# Patient Record
Sex: FEMALE | Race: WHITE | NOT HISPANIC OR LATINO | Employment: OTHER | URBAN - METROPOLITAN AREA
[De-identification: names, ages, dates, MRNs, and addresses within clinical notes are randomized per-mention and may not be internally consistent; named-entity substitution may affect disease eponyms.]

---

## 2017-01-23 DIAGNOSIS — I10 BENIGN ESSENTIAL HYPERTENSION: Primary | ICD-10-CM

## 2017-01-23 RX ORDER — AMLODIPINE BESYLATE 10 MG/1
TABLET ORAL
Qty: 90 TABLET | Refills: 0 | Status: SHIPPED | OUTPATIENT
Start: 2017-01-23 | End: 2017-07-02

## 2017-01-23 NOTE — TELEPHONE ENCOUNTER
Medication is being filled for 1 time refill only due to:  will be due for f/u OV with PCP x 3 mths.     Antonia Dobbs RN, BSN

## 2017-01-23 NOTE — TELEPHONE ENCOUNTER
amLODIPine (NORVASC) 10 MG tablet 90 tablet 0 11/22/2016           Last Written Prescription Date: 11/22/2016  Last Fill Quantity: 90, # refills: 0  Last Office Visit with [facility], [facility] or [facility] prescribing provider: 09/21/2016       POTASSIUM   Date Value Ref Range Status   09/21/2016 4.1 3.4 - 5.3 mmol/L Final     CREATININE   Date Value Ref Range Status   09/21/2016 1.25* 0.52 - 1.04 mg/dL Final     BP Readings from Last 3 Encounters:   09/21/16 134/86   09/01/16 119/83   06/07/16 123/84

## 2017-03-24 ENCOUNTER — RADIANT APPOINTMENT (OUTPATIENT)
Dept: GENERAL RADIOLOGY | Facility: CLINIC | Age: 67
End: 2017-03-24
Attending: PHYSICIAN ASSISTANT
Payer: COMMERCIAL

## 2017-03-24 ENCOUNTER — MYC MEDICAL ADVICE (OUTPATIENT)
Dept: FAMILY MEDICINE | Facility: CLINIC | Age: 67
End: 2017-03-24

## 2017-03-24 ENCOUNTER — OFFICE VISIT (OUTPATIENT)
Dept: URGENT CARE | Facility: URGENT CARE | Age: 67
End: 2017-03-24
Payer: COMMERCIAL

## 2017-03-24 ENCOUNTER — TELEPHONE (OUTPATIENT)
Dept: FAMILY MEDICINE | Facility: CLINIC | Age: 67
End: 2017-03-24

## 2017-03-24 VITALS
SYSTOLIC BLOOD PRESSURE: 128 MMHG | HEART RATE: 90 BPM | BODY MASS INDEX: 36.06 KG/M2 | OXYGEN SATURATION: 92 % | WEIGHT: 216.7 LBS | TEMPERATURE: 98.3 F | RESPIRATION RATE: 16 BRPM | DIASTOLIC BLOOD PRESSURE: 80 MMHG

## 2017-03-24 DIAGNOSIS — N18.30 CKD (CHRONIC KIDNEY DISEASE) STAGE 3, GFR 30-59 ML/MIN (H): ICD-10-CM

## 2017-03-24 DIAGNOSIS — R06.02 SOB (SHORTNESS OF BREATH): Primary | ICD-10-CM

## 2017-03-24 DIAGNOSIS — R05.8 PRODUCTIVE COUGH: ICD-10-CM

## 2017-03-24 DIAGNOSIS — R09.02 HYPOXIA: ICD-10-CM

## 2017-03-24 DIAGNOSIS — R06.02 SOB (SHORTNESS OF BREATH): ICD-10-CM

## 2017-03-24 PROCEDURE — 99215 OFFICE O/P EST HI 40 MIN: CPT | Mod: 25 | Performed by: PHYSICIAN ASSISTANT

## 2017-03-24 PROCEDURE — 94640 AIRWAY INHALATION TREATMENT: CPT | Performed by: PHYSICIAN ASSISTANT

## 2017-03-24 PROCEDURE — 71020 XR CHEST 2 VW: CPT

## 2017-03-24 RX ORDER — PREDNISONE 20 MG/1
20 TABLET ORAL 2 TIMES DAILY
Qty: 10 TABLET | Refills: 0 | Status: SHIPPED | OUTPATIENT
Start: 2017-03-24 | End: 2017-10-11

## 2017-03-24 RX ORDER — PREDNISONE 20 MG/1
40 TABLET ORAL ONCE
Qty: 2 TABLET | Refills: 0 | Status: SHIPPED | OUTPATIENT
Start: 2017-03-24 | End: 2017-03-24

## 2017-03-24 RX ORDER — AZITHROMYCIN 250 MG/1
TABLET, FILM COATED ORAL
Qty: 6 TABLET | Refills: 0 | Status: SHIPPED | OUTPATIENT
Start: 2017-03-24 | End: 2017-10-11

## 2017-03-24 RX ORDER — ALBUTEROL SULFATE 0.83 MG/ML
1 SOLUTION RESPIRATORY (INHALATION) EVERY 4 HOURS PRN
Qty: 1 BOX | Refills: 0 | Status: SHIPPED | OUTPATIENT
Start: 2017-03-24 | End: 2017-12-05

## 2017-03-24 RX ORDER — ALBUTEROL SULFATE 90 UG/1
2 AEROSOL, METERED RESPIRATORY (INHALATION) EVERY 6 HOURS
Qty: 1 INHALER | Refills: 0 | Status: SHIPPED | OUTPATIENT
Start: 2017-03-24 | End: 2017-12-05

## 2017-03-24 RX ORDER — ALBUTEROL SULFATE 0.83 MG/ML
1 SOLUTION RESPIRATORY (INHALATION) ONCE
Qty: 3 ML | Refills: 0
Start: 2017-03-24 | End: 2017-03-24

## 2017-03-24 NOTE — PROGRESS NOTES
SUBJECTIVE:   Yvette Gastelum is a 66 year old female presenting with a chief complaint of chest congestion, wheezing, feeling SOB .  Onset of symptoms was 1 week(s) ago.  Course of illness is worsening.    Severity moderately severe  Current and Associated symptoms: coughing chest tightness, wheezing  Treatment measures tried include OTC medications.  Predisposing factors include recent illness.    Past Medical History:   Diagnosis Date     Hypertension      Kidney stones      Recurrent UTI      S/P CL-BSO      Sepsis (H)     secondary to uti        ALLERGIES   No Known Allergies      Social History   Substance Use Topics     Smoking status: Former Smoker     Quit date: 9/1/2015     Smokeless tobacco: Never Used     Alcohol use No       ROS:  CONSTITUTIONAL:NEGATIVE for fever, chills, change in weight  INTEGUMENTARY/SKIN: NEGATIVE for worrisome rashes, moles or lesions  ENT/MOUTH: POSITIVE for nasal congestion  RESP:POSITIVE for chest congestion, coughing, wheezing  CV: NEGATIVE for chest pain, pressure  GI: NEGATIVE for nausea, abdominal pain, heartburn, or change in bowel habits  MUSCULOSKELETAL: NEGATIVE for significant arthralgias or myalgia  NEURO: NEGATIVE for weakness, dizziness or paresthesias    OBJECTIVE  :/80 (BP Location: Right arm, Patient Position: Chair, Cuff Size: Adult Regular)  Pulse 90  Temp 98.3  F (36.8  C) (Oral)  Resp 16  Wt 216 lb 11.2 oz (98.3 kg)  SpO2 92%  BMI 36.06 kg/m2  GENERAL APPEARANCE: healthy, alert and no distress  EYES: EOMI,  PERRL, conjunctiva clear  HENT: ear canals and TM's normal.  Nose and mouth without ulcers, erythema or lesions  NECK: supple, nontender, no lymphadenopathy  RESP: Positive for wheezing, crackles in lungs  CV: regular rates and rhythm, normal S1 S2, no murmur noted  ABDOMEN:  soft, nontender, no HSM or masses and bowel sounds normal  NEURO: Normal strength and tone, sensory exam grossly normal,  normal speech and mentation  SKIN: no  "suspicious lesions or rashes    Chest xray Negative for acute findings, read by Wilver Garcia PA-C [facility] at time of visit.    Prednisone 40 mg PO given in clinic    Albuterol and Atrovent nebs given in clinic    ASSESSMENT/PLAN:\"      ICD-10-CM    1. SOB (shortness of breath) R06.02 INHALATION/NEBULIZER TREATMENT, INITIAL     albuterol (2.5 MG/3ML) 0.083% neb solution     XR Chest 2 Views     predniSONE (DELTASONE) 20 MG tablet     albuterol (PROVENTIL HFA) 108 (90 BASE) MCG/ACT Inhaler     predniSONE (DELTASONE) 20 MG tablet     IPRATROPIUM BROM INH SOL U D     order for DME     albuterol (2.5 MG/3ML) 0.083% neb solution   2. Hypoxia R09.02 INHALATION/NEBULIZER TREATMENT, INITIAL     albuterol (2.5 MG/3ML) 0.083% neb solution     XR Chest 2 Views     predniSONE (DELTASONE) 20 MG tablet     albuterol (PROVENTIL HFA) 108 (90 BASE) MCG/ACT Inhaler     predniSONE (DELTASONE) 20 MG tablet     IPRATROPIUM BROM INH SOL U D     order for DME     albuterol (2.5 MG/3ML) 0.083% neb solution   3. Productive cough R05 XR Chest 2 Views     azithromycin (ZITHROMAX) 250 MG tablet     albuterol (PROVENTIL HFA) 108 (90 BASE) MCG/ACT Inhaler   4. CKD (chronic kidney disease) stage 3, GFR 30-59 ml/min N18.3        I have discussed any lab or imaging results, the patient's diagnosis, and my plan of treatment with the patient and/or family. Patient is aware to come back in with worsening symptoms or if no relief despite treatment plan.  Patient voiced understanding and had no further questions.   If symptoms worsen then she should go to the ED  "

## 2017-03-24 NOTE — MR AVS SNAPSHOT
After Visit Summary   3/24/2017    Yvette Gastelum    MRN: [id number]           Patient Information     Date Of Birth          1950        Visit Information        Provider Department      3/24/2017 10:00 AM Wilver Garcia PA-C [facility]        Today's Diagnoses     SOB (shortness of breath)    -  1    Hypoxia        Productive cough        CKD (chronic kidney disease) stage 3, GFR 30-59 ml/min           Follow-ups after your visit        Who to contact     If you have questions or need follow up information about today's clinic visit or your schedule please contact [facility] directly at [phone].  Normal or non-critical lab and imaging results will be communicated to you by [employer], letter or phone within 4 business days after the clinic has received the results. If you do not hear from us within 7 days, please contact the clinic through [employer] or phone. If you have a critical or abnormal lab result, we will notify you by phone as soon as possible.  Submit refill requests through [employer] or call your pharmacy and they will forward the refill request to us. Please allow 3 business days for your refill to be completed.          Additional Information About Your Visit        MyChart Information     [employer] gives you secure access to your electronic health record. If you see a primary care provider, you can also send messages to your care team and make appointments. If you have questions, please call your primary care clinic.  If you do not have a primary care provider, please call [phone] and they will assist you.        Care EveryWhere ID     This is your Care EveryWhere ID. This could be used by other organizations to access your [place] medical records  [id number]        Your Vitals Were     Pulse Temperature Respirations Pulse Oximetry BMI (Body Mass Index)       90 98.3  F (36.8  C) (Oral) 16 92% 36.06 kg/m2         Blood Pressure from Last 3 Encounters:   03/24/17 128/80   09/21/16 134/86   09/01/16 119/83    Weight from Last 3 Encounters:   03/24/17 216 lb 11.2 oz (98.3 kg)   09/21/16 213 lb 1.6 oz (96.7 kg)   09/01/16 217 lb 3.2 oz (98.5 kg)              We Performed the Following     INHALATION/NEBULIZER TREATMENT, INITIAL     IPRATROPIUM BROM INH SOL U D          Today's Medication Changes          These changes are accurate as of: 3/24/17 11:05 AM.  If you have any questions, ask your nurse or doctor.               Start taking these medicines.        Dose/Directions    azithromycin 250 MG tablet   Commonly known as:  ZITHROMAX   Used for:  Productive cough   Started by:  Wilver Garcia PA-C        2 tabs po qd day 1, then 1 tab po qd days 2-5   Quantity:  6 tablet   Refills:  0       order for DME   Used for:  Hypoxia, SOB (shortness of breath)   Started by:  Wilver Garcia PA-C        Dose:  1 Device   Inhale 1 Device into the lungs 4 times daily as needed Nebulizer with tubing   Quantity:  1 Device   Refills:  0       * predniSONE 20 MG tablet   Commonly known as:  DELTASONE   Used for:  SOB (shortness of breath), Hypoxia   Started by:  Wilver Garcia PA-C        Dose:  20 mg   Take 1 tablet (20 mg) by mouth 2 times daily   Quantity:  10 tablet   Refills:  0       * predniSONE 20 MG tablet   Commonly known as:  DELTASONE   Used for:  Hypoxia, SOB (shortness of breath)   Started by:  Wilver Garcia PA-C        Dose:  40 mg   Take 2 tablets (40 mg) by mouth once for 1 dose   Quantity:  2 tablet   Refills:  0       * Notice:  This list has 2 medication(s) that are the same as other medications prescribed for you. Read the directions carefully, and ask your doctor or other care provider to review them with you.      These medicines have changed or have updated prescriptions.        Dose/Directions    * albuterol 108 (90 BASE) MCG/ACT Inhaler   Commonly known as:  PROAIR HFA/PROVENTIL HFA/VENTOLIN HFA   This may  have changed:  Another medication with the same name was added. Make sure you understand how and when to take each.   Used for:  Simple chronic bronchitis (H)   Changed by:  Arie House MD        Dose:  2 puff   Inhale 2 puffs into the lungs every 6 hours as needed for shortness of breath / dyspnea or wheezing   Quantity:  1 Inhaler   Refills:  1       * albuterol (2.5 MG/3ML) 0.083% neb solution   This may have changed:  You were already taking a medication with the same name, and this prescription was added. Make sure you understand how and when to take each.   Used for:  SOB (shortness of breath), Hypoxia   Changed by:  Wilver Garcia PA-C        Dose:  1 vial   Take 1 vial (2.5 mg) by nebulization once for 1 dose   Quantity:  3 mL   Refills:  0       * albuterol 108 (90 BASE) MCG/ACT Inhaler   Commonly known as:  PROVENTIL HFA   This may have changed:  You were already taking a medication with the same name, and this prescription was added. Make sure you understand how and when to take each.   Used for:  Productive cough, Hypoxia, SOB (shortness of breath)   Changed by:  Wilver Garcia PA-C        Dose:  2 puff   Inhale 2 puffs into the lungs every 6 hours   Quantity:  1 Inhaler   Refills:  0       * albuterol (2.5 MG/3ML) 0.083% neb solution   This may have changed:  You were already taking a medication with the same name, and this prescription was added. Make sure you understand how and when to take each.   Used for:  Hypoxia, SOB (shortness of breath)   Changed by:  Wilver Garcia PA-C        Dose:  1 vial   Take 1 vial (2.5 mg) by nebulization every 4 hours as needed for shortness of breath / dyspnea or wheezing   Quantity:  1 Box   Refills:  0       * Notice:  This list has 4 medication(s) that are the same as other medications prescribed for you. Read the directions carefully, and ask your doctor or other care provider to review them with you.         Where to get your medicines       These medications were sent to [place], [place] - [address]  [address], [facility] [address]     Phone:  [phone]     albuterol (2.5 MG/3ML) 0.083% neb solution    albuterol 108 (90 BASE) MCG/ACT Inhaler    azithromycin 250 MG tablet    predniSONE 20 MG tablet         Some of these will need a paper prescription and others can be bought over the counter.  Ask your nurse if you have questions.     Bring a paper prescription for each of these medications     order for DME    predniSONE 20 MG tablet       You don't need a prescription for these medications     albuterol (2.5 MG/3ML) 0.083% neb solution                Primary Care Provider Office Phone # Fax #    Arie House [phone]       [facility] [address] [facility] [address]  [facility] [address]        Thank you!     Thank you for choosing [facility]  for your care. Our goal is always to provide you with excellent care. Hearing back from our patients is one way we can continue to improve our services. Please take a few minutes to complete the written survey that you may receive in the mail after your visit with us. Thank you!             Your Updated Medication List - Protect others around you: Learn how to safely use, store and throw away your medicines at www.disposemymeds.org.          This list is accurate as of: 3/24/17 11:05 AM.  Always use your most recent med list.                   Brand Name Dispense Instructions for use    ACETAMINOPHEN PO      Take 325-650 mg by mouth every 6 hours as needed for pain       * albuterol 108 (90 BASE) MCG/ACT Inhaler    PROAIR HFA/PROVENTIL HFA/VENTOLIN HFA    1 Inhaler    Inhale 2 puffs into the lungs every 6 hours as needed for shortness of breath / dyspnea or wheezing       * albuterol (2.5 MG/3ML) 0.083% neb solution     3 mL    Take 1 vial (2.5 mg) by nebulization once for 1 dose       * albuterol 108 (90  BASE) MCG/ACT Inhaler    PROVENTIL HFA    1 Inhaler    Inhale 2 puffs into the lungs every 6 hours       * albuterol (2.5 MG/3ML) 0.083% neb solution     1 Box    Take 1 vial (2.5 mg) by nebulization every 4 hours as needed for shortness of breath / dyspnea or wheezing       amLODIPine 10 MG tablet    NORVASC    90 tablet    TAKE 1 TABLET EVERY DAY       aspirin 81 MG chewable tablet     90 tablet    Take 1 tablet (81 mg) by mouth daily       atorvastatin 10 MG tablet    LIPITOR    90 tablet    TAKE 1 TABLET EVERY DAY       azithromycin 250 MG tablet    ZITHROMAX    6 tablet    2 tabs po qd day 1, then 1 tab po qd days 2-5       Multi-vitamin Tabs tablet      Take 1 tablet by mouth daily       order for DME     1 Device    Inhale 1 Device into the lungs 4 times daily as needed Nebulizer with tubing       * predniSONE 20 MG tablet    DELTASONE    10 tablet    Take 1 tablet (20 mg) by mouth 2 times daily       * predniSONE 20 MG tablet    DELTASONE    2 tablet    Take 2 tablets (40 mg) by mouth once for 1 dose       * Notice:  This list has 6 medication(s) that are the same as other medications prescribed for you. Read the directions carefully, and ask your doctor or other care provider to review them with you.

## 2017-07-02 DIAGNOSIS — I10 BENIGN ESSENTIAL HYPERTENSION: ICD-10-CM

## 2017-07-03 RX ORDER — AMLODIPINE BESYLATE 10 MG/1
TABLET ORAL
Qty: 90 TABLET | Refills: 0 | Status: SHIPPED | OUTPATIENT
Start: 2017-07-03 | End: 2017-10-11

## 2017-07-03 NOTE — TELEPHONE ENCOUNTER
Prescription approved per [facility] Refill Protocol.  Gladys Velasquez RN

## 2017-07-03 NOTE — TELEPHONE ENCOUNTER
Pending Prescriptions:                       Disp   Refills    amLODIPine (NORVASC) 10 MG tablet [Pharma*90 tab*0            Sig: TAKE 1 TABLET EVERY DAY          Last Written Prescription Date: 1/23/17  Last Fill Quantity: 90, # refills: 0    Last Office Visit with [facility], [facility] or [facility] prescribing provider:  9/21/16   Future Office Visit:        BP Readings from Last 3 Encounters:   03/24/17 128/80   09/21/16 134/86   09/01/16 119/83

## 2017-10-07 DIAGNOSIS — E78.5 HYPERLIPIDEMIA LDL GOAL <130: ICD-10-CM

## 2017-10-07 DIAGNOSIS — I10 BENIGN ESSENTIAL HYPERTENSION: ICD-10-CM

## 2017-10-07 RX ORDER — ATORVASTATIN CALCIUM 10 MG/1
TABLET, FILM COATED ORAL
Qty: 90 TABLET | Refills: 2 | Status: CANCELLED | OUTPATIENT
Start: 2017-10-07

## 2017-10-07 NOTE — TELEPHONE ENCOUNTER
TC - please contact pt to set up appt with PCP, then route back to CS Refill    Pending Prescriptions:                       Disp   Refills    amLODIPine (NORVASC) 10 MG tablet [Pharma*90 tab*0            Sig: TAKE 1 TABLET EVERY DAY    atorvastatin (LIPITOR) 10 MG tablet [Phar*90 tab*2            Sig: TAKE 1 TABLET EVERY DAY    Amlodipine      Last Written Prescription Date: 7/3/17  Last Fill Quantity: 90, # refills: 0    Last Office Visit with [facility], [facility] or [facility] prescribing provider:  9/21/16 [employer]   Future Office Visit:        BP Readings from Last 3 Encounters:   03/24/17 128/80   09/21/16 134/86   09/01/16 119/83     Atorvastatin     Last Written Prescription Date: 11/28/16  Last Fill Quantity: 90, # refills: 2  Last Office Visit with [facility], [facility] or [facility] prescribing provider: 9/21/16 [employer]       Lab Results   Component Value Date    CHOL 260 01/25/2016     Lab Results   Component Value Date    HDL 45 01/25/2016     Lab Results   Component Value Date     01/25/2016     Lab Results   Component Value Date    TRIG 297 01/25/2016     No results found for: AUTUMN Bob, RT(R)

## 2017-10-09 NOTE — TELEPHONE ENCOUNTER
TC - please contact pt to set up appt with PCP, then route back to CS Refill    Maile Dowling RN

## 2017-10-10 NOTE — PATIENT INSTRUCTIONS
Preventive Health Recommendations    Female Ages 65 +    Yearly exam:     See your health care provider every year in order to  o Review health changes.   o Discuss preventive care.    o Review your medicines if your doctor has prescribed any.      You no longer need a yearly Pap test unless you've had an abnormal Pap test in the past 10 years. If you have vaginal symptoms, such as bleeding or discharge, be sure to talk with your provider about a Pap test.      Every 1 to 2 years, have a mammogram.  If you are over 69, talk with your health care provider about whether or not you want to continue having screening mammograms.      Every 10 years, have a colonoscopy. Or, have a yearly FIT test (stool test). These exams will check for colon cancer.       Have a cholesterol test every 5 years, or more often if your doctor advises it.       Have a diabetes test (fasting glucose) every three years. If you are at risk for diabetes, you should have this test more often.       At age 65, have a bone density scan (DEXA) to check for osteoporosis (brittle bone disease).    Shots:    Get a flu shot each year.    Get a tetanus shot every 10 years.    Talk to your doctor about your pneumonia vaccines. There are now two you should receive - Pneumovax (PPSV 23) and Prevnar (PCV 13).    Talk to your doctor about the shingles vaccine.    Talk to your doctor about the hepatitis B vaccine.    Nutrition:     Eat at least 5 servings of fruits and vegetables each day.      Eat whole-grain bread, whole-wheat pasta and brown rice instead of white grains and rice.      Talk to your provider about Calcium and Vitamin D.     Lifestyle    Exercise at least 150 minutes a week (30 minutes a day, 5 days a week). This will help you control your weight and prevent disease.      Limit alcohol to one drink per day.      No smoking.       Wear sunscreen to prevent skin cancer.       See your dentist twice a year for an exam and cleaning.      See your  eye doctor every 1 to 2 years to screen for conditions such as glaucoma, macular degeneration and cataracts.    (Z00.00) Routine general medical examination at a health care facility  (primary encounter diagnosis)  Comment: For routine exam, we will draw labs as ordered, cholesterol, diabetes mellitus check, liver function, renal function,.  We will also update vaccination history.  Plan: CBC with platelets, Lipid panel reflex to         direct LDL, Comprehensive metabolic panel,         NUTRITION REFERRAL, Pneumococcal vaccine 13         valent PCV13 IM (Prevnar) [15418], EKG 12-lead         complete w/read - Clinics            (Z23) Need for prophylactic vaccination and inoculation against influenza  Comment:   Plan: FLU VACCINE, INCREASED ANTIGEN, PRESV FREE            (N18.3) CKD (chronic kidney disease) stage 3, GFR 30-59 ml/min  Comment: Check labs today - renal ultrasound was performed in 2015 and within normal limits   Plan: EKG 12-lead complete w/read - Clinics            (D50.9) Iron deficiency anemia, unspecified iron deficiency anemia type  Comment: check complete blood counts today   Plan:     (R06.81) Apnea  Comment: Referral for sleep clinic today  Plan: SLEEP EVALUATION & MANAGEMENT REFERRAL - ADULT            (Z12.31) Screening breast examination  Comment:  [facility] (also performs diagnostic mammogram, ultrasound and biopsy) [phone].   Plan: *MA Screening Digital Bilateral           hypertension  Comment; noted recent worsening fluid retention which may be due to amlodipine prescription - we will stop amlodipine and start hydrochlorothiazide 12.5 mg dialy - follow up in 2-3 weeks for recheck of blood pressure and labs.

## 2017-10-10 NOTE — PROGRESS NOTES
SUBJECTIVE:   Yvette Gastelum is a 66 year old female who presents for Preventive Visit.      Are you in the first 12 months of your Medicare Part B coverage?  No    Healthy Habits:    Do you get at least three servings of calcium containing foods daily (dairy, green leafy vegetables, etc.)?no     Amount of exercise or daily activities, outside of work: 5 day(s) per week    Problems taking medications regularly No    Medication side effects: No    Have you had an eye exam in the past two years? yes    Do you see a dentist twice per year? no    Do you have sleep apnea, excessive snoring or daytime drowsiness?yes    COGNITIVE SCREEN  1) Repeat 3 items (Banana, Sunrise, Chair)    2) Clock draw:   3) 3 item recall: Recalls 3 objects  Results: 3 items recalled: COGNITIVE IMPAIRMENT LESS LIKELY    Mini-CogTM Copyright S [place]. Licensed by the author for use in [facility]; reprinted with permission ([email or web address]). All rights reserved.                PROBLEMS TO ADD ON...  Reviewed and updated as needed this visit by clinical staff         Reviewed and updated as needed this visit by Provider      Social History   Substance Use Topics     Smoking status: Former Smoker     Quit date: 9/1/2015     Smokeless tobacco: Never Used     Alcohol use No       The patient does not drink >3 drinks per day nor >7 drinks per week.    Today's PHQ-2 Score:   PHQ-2 ( 1999 Pfizer) 9/1/2016 1/25/2016   Q1: Little interest or pleasure in doing things 0 0   Q2: Feeling down, depressed or hopeless 0 0   PHQ-2 Score 0 0         Do you feel safe in your environment - Yes    Do you have a Health Care Directive?: No: Advance care planning was reviewed with patient; patient declined at this time.      Current providers sharing in care for this patient include: Patient Care Team:  Arie House MD as PCP - General (Internal Medicine)      Hearing impairment: No    Ability to successfully perform activities of daily living:  "Yes, no assistance needed     Fall risk:         Home safety:  none identified      The following health maintenance items are reviewed in Epic and correct as of today:Health Maintenance   Topic Date Due     ADVANCE DIRECTIVE PLANNING Q5 YRS  10/26/2005     PNEUMOCOCCAL (2 of 2 - PCV13) 11/27/2016     CMP Q1 YR  01/25/2017     FALL RISK ASSESSMENT  01/25/2017     LIPID MONITORING Q1 YEAR  01/25/2017     INFLUENZA VACCINE (SYSTEM ASSIGNED)  09/01/2017     HEMOGLOBIN Q1 YR  09/21/2017     MICROALBUMIN Q1 YEAR  09/21/2017     JONATHAN QUESTIONNAIRE 1 YEAR  09/21/2017     CBC Q1 YR  09/21/2017     PHQ-9 Q1YR  09/21/2017     MAMMO SCREEN Q2 YR (SYSTEM ASSIGNED)  01/15/2018     TETANUS IMMUNIZATION (SYSTEM ASSIGNED)  12/04/2025     COLON CANCER SCREEN (SYSTEM ASSIGNED)  02/10/2026     DEXA SCAN SCREENING (SYSTEM ASSIGNED)  Completed     HEPATITIS C SCREENING  Completed     Degenerative joint disease right knee   Yvette Gastelum has not been able to see an orthopedic surgeon.  She is planning to schedule with orthopedics.    ROS:  Constitutional, HEENT, cardiovascular, pulmonary, GI, , musculoskeletal, neuro, skin, endocrine and psych systems are negative, except as otherwise noted.      OBJECTIVE:   /88  Pulse 73  Temp 97.8  F (36.6  C) (Oral)  Ht 5' 5\" (1.651 m)  Wt 220 lb (99.8 kg)  SpO2 97%  Breastfeeding? No  BMI 36.61 kg/m2 Estimated body mass index is 36.06 kg/(m^2) as calculated from the following:    Height as of 9/21/16: 5' 5\" (1.651 m).    Weight as of 3/24/17: 216 lb 11.2 oz (98.3 kg).  EXAM:   GENERAL: healthy, alert and no distress  EYES: Eyes grossly normal to inspection, PERRL and conjunctivae and sclerae normal  HENT: ear canals and TM's normal, nose and mouth without ulcers or lesions  NECK: no adenopathy, no asymmetry, masses, or scars and thyroid normal to palpation  RESP: lungs clear to auscultation - no rales, rhonchi or wheezes  BREAST: deferred  CV: regular rate and rhythm, normal S1 S2, " no S3 or S4, no murmur, click or rub, 1+ bilateral peripheral edema and peripheral pulses strong  ABDOMEN: soft, nontender, no hepatosplenomegaly, no masses and bowel sounds normal  MS: no gross musculoskeletal defects noted, no edema  SKIN: no suspicious lesions or rashes  NEURO: Normal strength and tone, mentation intact and speech normal  PSYCH: mentation appears normal, affect normal/bright    ASSESSMENT / PLAN:     (Z00.00) Routine general medical examination at a health care facility  (primary encounter diagnosis)  Comment: For routine exam, we will draw labs as ordered, cholesterol, diabetes mellitus check, liver function, renal function,.  We will also update vaccination history.  Plan: CBC with platelets, Lipid panel reflex to         direct LDL, Comprehensive metabolic panel,         NUTRITION REFERRAL, Pneumococcal vaccine 13         valent PCV13 IM (Prevnar) [[id number]], EKG 12-lead         complete w/read - Clinics            (Z23) Need for prophylactic vaccination and inoculation against influenza  Comment:   Plan: FLU VACCINE, INCREASED ANTIGEN, PRESV FREE            (N18.3) CKD (chronic kidney disease) stage 3, GFR 30-59 ml/min  Comment: Check labs today - renal ultrasound was performed in 2015 and within normal limits   Plan: EKG 12-lead complete w/read - Clinics            (D50.9) Iron deficiency anemia, unspecified iron deficiency anemia type  Comment: check complete blood counts today   Plan:     (R06.81) Apnea  Comment: Referral for sleep clinic today  Plan: SLEEP EVALUATION & MANAGEMENT REFERRAL - ADULT            (Z12.31) Screening breast examination  Comment:  [facility] (also performs diagnostic mammogram, ultrasound and biopsy) [phone].   Plan: *MA Screening Digital Bilateral           hypertension  Comment; noted recent worsening fluid retention which may be due to amlodipine prescription - we will stop amlodipine and start hydrochlorothiazide 12.5 mg dialy - follow  "up in 2-3 weeks for recheck of blood pressure and labs.          End of Life Planning:  Patient currently has an advanced directive: Yes.  Practitioner is supportive of decision.    COUNSELING:  Reviewed preventive health counseling, as reflected in patient instructions        Estimated body mass index is 36.06 kg/(m^2) as calculated from the following:    Height as of 9/21/16: 5' 5\" (1.651 m).    Weight as of 3/24/17: 216 lb 11.2 oz (98.3 kg).     reports that she quit smoking about 2 years ago. She has never used smokeless tobacco.        Appropriate preventive services were discussed with this patient, including applicable screening as appropriate for cardiovascular disease, diabetes, osteopenia/osteoporosis, and glaucoma.  As appropriate for age/gender, discussed screening for colorectal cancer, prostate cancer, breast cancer, and cervical cancer. Checklist reviewing preventive services available has been given to the patient.    Reviewed patients plan of care and provided an AVS. The Basic Care Plan (routine screening as documented in Health Maintenance) for Yvette meets the Care Plan requirement. This Care Plan has been established and reviewed with the Patient.    Counseling Resources:  ATP IV Guidelines  Pooled Cohorts Equation Calculator  Breast Cancer Risk Calculator  FRAX Risk Assessment  ICSI Preventive Guidelines  Dietary Guidelines for Americans, 2010  [employer] MyPlate  ASA Prophylaxis  Lung CA Screening    Arie House MD, MD  [facility] EDINAInjectable Influenza Immunization Documentation    1.  Is the person to be vaccinated sick today?   No    2. Does the person to be vaccinated have an allergy to a component   of the vaccine?   No    3. Has the person to be vaccinated ever had a serious reaction   to influenza vaccine in the past?   No    4. Has the person to be vaccinated ever had Guillain-Barré syndrome?   No    Form completed by Marianna MCDONOUGH CMA  Prior to injection verified patient " identity using patient's name and date of birth.

## 2017-10-11 ENCOUNTER — OFFICE VISIT (OUTPATIENT)
Dept: FAMILY MEDICINE | Facility: CLINIC | Age: 67
End: 2017-10-11
Payer: COMMERCIAL

## 2017-10-11 VITALS
HEART RATE: 73 BPM | OXYGEN SATURATION: 97 % | BODY MASS INDEX: 36.65 KG/M2 | DIASTOLIC BLOOD PRESSURE: 88 MMHG | SYSTOLIC BLOOD PRESSURE: 122 MMHG | TEMPERATURE: 97.8 F | HEIGHT: 65 IN | WEIGHT: 220 LBS

## 2017-10-11 DIAGNOSIS — D50.9 IRON DEFICIENCY ANEMIA, UNSPECIFIED IRON DEFICIENCY ANEMIA TYPE: ICD-10-CM

## 2017-10-11 DIAGNOSIS — E78.5 HYPERLIPIDEMIA LDL GOAL <130: ICD-10-CM

## 2017-10-11 DIAGNOSIS — Z12.39 SCREENING BREAST EXAMINATION: ICD-10-CM

## 2017-10-11 DIAGNOSIS — I10 BENIGN ESSENTIAL HYPERTENSION: ICD-10-CM

## 2017-10-11 DIAGNOSIS — Z00.00 ROUTINE GENERAL MEDICAL EXAMINATION AT A HEALTH CARE FACILITY: Primary | ICD-10-CM

## 2017-10-11 DIAGNOSIS — R06.81 APNEA: ICD-10-CM

## 2017-10-11 DIAGNOSIS — N18.30 CKD (CHRONIC KIDNEY DISEASE) STAGE 3, GFR 30-59 ML/MIN (H): ICD-10-CM

## 2017-10-11 DIAGNOSIS — Z23 NEED FOR PROPHYLACTIC VACCINATION AND INOCULATION AGAINST INFLUENZA: ICD-10-CM

## 2017-10-11 LAB
ERYTHROCYTE [DISTWIDTH] IN BLOOD BY AUTOMATED COUNT: 12.5 % (ref 10–15)
HCT VFR BLD AUTO: 39.6 % (ref 35–47)
HGB BLD-MCNC: 13.6 G/DL (ref 11.7–15.7)
MCH RBC QN AUTO: 32.1 PG (ref 26.5–33)
MCHC RBC AUTO-ENTMCNC: 34.3 G/DL (ref 31.5–36.5)
MCV RBC AUTO: 93 FL (ref 78–100)
PLATELET # BLD AUTO: 193 10E9/L (ref 150–450)
RBC # BLD AUTO: 4.24 10E12/L (ref 3.8–5.2)
WBC # BLD AUTO: 8 10E9/L (ref 4–11)

## 2017-10-11 PROCEDURE — 93000 ELECTROCARDIOGRAM COMPLETE: CPT | Performed by: INTERNAL MEDICINE

## 2017-10-11 PROCEDURE — 80061 LIPID PANEL: CPT | Performed by: INTERNAL MEDICINE

## 2017-10-11 PROCEDURE — 85027 COMPLETE CBC AUTOMATED: CPT | Performed by: INTERNAL MEDICINE

## 2017-10-11 PROCEDURE — 36415 COLL VENOUS BLD VENIPUNCTURE: CPT | Performed by: INTERNAL MEDICINE

## 2017-10-11 PROCEDURE — G0439 PPPS, SUBSEQ VISIT: HCPCS | Performed by: INTERNAL MEDICINE

## 2017-10-11 PROCEDURE — 99212 OFFICE O/P EST SF 10 MIN: CPT | Mod: 25 | Performed by: INTERNAL MEDICINE

## 2017-10-11 PROCEDURE — 80053 COMPREHEN METABOLIC PANEL: CPT | Performed by: INTERNAL MEDICINE

## 2017-10-11 PROCEDURE — 90662 IIV NO PRSV INCREASED AG IM: CPT | Performed by: INTERNAL MEDICINE

## 2017-10-11 PROCEDURE — 90670 PCV13 VACCINE IM: CPT | Performed by: INTERNAL MEDICINE

## 2017-10-11 PROCEDURE — G0008 ADMIN INFLUENZA VIRUS VAC: HCPCS | Performed by: INTERNAL MEDICINE

## 2017-10-11 PROCEDURE — G0009 ADMIN PNEUMOCOCCAL VACCINE: HCPCS | Performed by: INTERNAL MEDICINE

## 2017-10-11 RX ORDER — ASPIRIN 81 MG/1
81 TABLET, CHEWABLE ORAL DAILY
Qty: 90 TABLET | Refills: 3 | Status: ON HOLD | OUTPATIENT
Start: 2017-10-11 | End: 2018-08-23

## 2017-10-11 RX ORDER — HYDROCHLOROTHIAZIDE 12.5 MG/1
12.5 TABLET ORAL DAILY
Qty: 90 TABLET | Refills: 3 | Status: SHIPPED | OUTPATIENT
Start: 2017-10-11 | End: 2017-12-05

## 2017-10-11 RX ORDER — ATORVASTATIN CALCIUM 10 MG/1
10 TABLET, FILM COATED ORAL DAILY
Qty: 90 TABLET | Refills: 3 | Status: SHIPPED | OUTPATIENT
Start: 2017-10-11 | End: 2018-07-09

## 2017-10-11 RX ORDER — AMLODIPINE BESYLATE 10 MG/1
TABLET ORAL
Qty: 90 TABLET | Refills: 0 | OUTPATIENT
Start: 2017-10-11

## 2017-10-11 NOTE — PROGRESS NOTES
Screening Questionnaire for Adult Immunization    Are you sick today?   No   Do you have allergies to medications, food, a vaccine component or latex?   No   Have you ever had a serious reaction after receiving a vaccination?   No   Do you have a long-term health problem with heart disease, lung disease, asthma, kidney disease, metabolic disease (e.g. diabetes), anemia, or other blood disorder?   No   Do you have cancer, leukemia, HIV/AIDS, or any other immune system problem?   No   In the past 3 months, have you taken medications that affect  your immune system, such as prednisone, other steroids, or anticancer drugs; drugs for the treatment of rheumatoid arthritis, Crohn s disease, or psoriasis; or have you had radiation treatments?   No   Have you had a seizure, or a brain or other nervous system problem?   No   During the past year, have you received a transfusion of blood or blood     products, or been given immune (gamma) globulin or antiviral drug?   No   For women: Are you pregnant or is there a chance you could become        pregnant during the next month?   No   Have you received any vaccinations in the past 4 weeks?   No     Immunization questionnaire answers were all negative.        Per orders of Dr. wei, injection of Prevnar 13 given by Arlin Almaguer. Patient instructed to remain in clinic for 15 minutes afterwards, and to report any adverse reaction to me immediately.       Screening performed by Arlin Almaguer on 10/11/2017 at 3:08 PM.  Prior to injection verified patient identity using patient's name and date of birth.

## 2017-10-11 NOTE — TELEPHONE ENCOUNTER
Patient has appointment today with Dr. House.  Will route refill request to him to review and approve.    GIOVANNI King, RN, N  [facility]) [phone]

## 2017-10-11 NOTE — MR AVS SNAPSHOT
After Visit Summary   10/11/2017    Yvette Gastelum    MRN: [id number]           Patient Information     Date Of Birth          1950        Visit Information        Provider Department      10/11/2017 12:30 PM Arie House MD [facility]        Today's Diagnoses     Routine general medical examination at a health care facility    -  1    Need for prophylactic vaccination and inoculation against influenza        CKD (chronic kidney disease) stage 3, GFR 30-59 ml/min        Iron deficiency anemia, unspecified iron deficiency anemia type        Apnea        Screening breast examination        Benign essential hypertension        Hyperlipidemia LDL goal <130          Care Instructions      Preventive Health Recommendations    Female Ages 65 +    Yearly exam:     See your health care provider every year in order to  o Review health changes.   o Discuss preventive care.    o Review your medicines if your doctor has prescribed any.      You no longer need a yearly Pap test unless you've had an abnormal Pap test in the past 10 years. If you have vaginal symptoms, such as bleeding or discharge, be sure to talk with your provider about a Pap test.      Every 1 to 2 years, have a mammogram.  If you are over 69, talk with your health care provider about whether or not you want to continue having screening mammograms.      Every 10 years, have a colonoscopy. Or, have a yearly FIT test (stool test). These exams will check for colon cancer.       Have a cholesterol test every 5 years, or more often if your doctor advises it.       Have a diabetes test (fasting glucose) every three years. If you are at risk for diabetes, you should have this test more often.       At age 65, have a bone density scan (DEXA) to check for osteoporosis (brittle bone disease).    Shots:    Get a flu shot each year.    Get a tetanus shot every 10 years.    Talk to your doctor about your pneumonia vaccines.  There are now two you should receive - Pneumovax (PPSV 23) and Prevnar (PCV 13).    Talk to your doctor about the shingles vaccine.    Talk to your doctor about the hepatitis B vaccine.    Nutrition:     Eat at least 5 servings of fruits and vegetables each day.      Eat whole-grain bread, whole-wheat pasta and brown rice instead of white grains and rice.      Talk to your provider about Calcium and Vitamin D.     Lifestyle    Exercise at least 150 minutes a week (30 minutes a day, 5 days a week). This will help you control your weight and prevent disease.      Limit alcohol to one drink per day.      No smoking.       Wear sunscreen to prevent skin cancer.       See your dentist twice a year for an exam and cleaning.      See your eye doctor every 1 to 2 years to screen for conditions such as glaucoma, macular degeneration and cataracts.    (Z00.00) Routine general medical examination at a health care facility  (primary encounter diagnosis)  Comment: For routine exam, we will draw labs as ordered, cholesterol, diabetes mellitus check, liver function, renal function,.  We will also update vaccination history.  Plan: CBC with platelets, Lipid panel reflex to         direct LDL, Comprehensive metabolic panel,         NUTRITION REFERRAL, Pneumococcal vaccine 13         valent PCV13 IM (Prevnar) [[id number]], EKG 12-lead         complete w/read - Clinics            (Z23) Need for prophylactic vaccination and inoculation against influenza  Comment:   Plan: FLU VACCINE, INCREASED ANTIGEN, PRESV FREE            (N18.3) CKD (chronic kidney disease) stage 3, GFR 30-59 ml/min  Comment: Check labs today - renal ultrasound was performed in 2015 and within normal limits   Plan: EKG 12-lead complete w/read - Clinics            (D50.9) Iron deficiency anemia, unspecified iron deficiency anemia type  Comment: check complete blood counts today   Plan:     (R06.81) Apnea  Comment: Referral for sleep clinic today  Plan: SLEEP EVALUATION &  MANAGEMENT REFERRAL - ADULT            (Z12.31) Screening breast examination  Comment:  [facility] (also performs diagnostic mammogram, ultrasound and biopsy) [phone].   Plan: *MA Screening Digital Bilateral           hypertension  Comment; noted recent worsening fluid retention which may be due to amlodipine prescription - we will stop amlodipine and start hydrochlorothiazide 12.5 mg dialy - follow up in 2-3 weeks for recheck of blood pressure and labs.            Follow-ups after your visit        Additional Services     NUTRITION REFERRAL       Your provider has referred you to: [facility]: [facility] - [place] [phone]   [email or web address]    Please be aware that coverage of these services is subject to the terms and limitations of your health insurance plan.  Call member services at your health plan with any benefit or coverage questions.      Please bring the following with you to your appointment:    (1) This referral request  (2) Any documents given to you regarding this referral  (3) Any specific questions you have about diet and/or food choices            SLEEP EVALUATION & MANAGEMENT REFERRAL - ADULT       Please be aware that coverage of these services is subject to the terms and limitations of your health insurance plan.  Call member services at your health plan with any benefit or coverage questions.      Please bring the following to your appointment:    >>   List of current medications   >>   This referral request   >>   Any documents/labs given to you for this referral    [facility]tammi ([place])   [phone] (Age 18 and up)                  Future tests that were ordered for you today     Open Future Orders        Priority Expected Expires Ordered    *MA Screening Digital Bilateral Routine  10/11/2018 10/11/2017    SLEEP EVALUATION & MANAGEMENT REFERRAL - ADULT Routine  10/11/2018 10/11/2017            Who to contact     If you have  "questions or need follow up information about today's clinic visit or your schedule please contact [facility] directly at [phone].  Normal or non-critical lab and imaging results will be communicated to you by MyChart, letter or phone within 4 business days after the clinic has received the results. If you do not hear from us within 7 days, please contact the clinic through [employer] or phone. If you have a critical or abnormal lab result, we will notify you by phone as soon as possible.  Submit refill requests through [employer] or call your pharmacy and they will forward the refill request to us. Please allow 3 business days for your refill to be completed.          Additional Information About Your Visit        [employer] Information     [employer] gives you secure access to your electronic health record. If you see a primary care provider, you can also send messages to your care team and make appointments. If you have questions, please call your primary care clinic.  If you do not have a primary care provider, please call [phone] and they will assist you.        Care EveryWhere ID     This is your Care EveryWhere ID. This could be used by other organizations to access your [facility] medical records  [id number]        Your Vitals Were     Pulse Temperature Height Pulse Oximetry Breastfeeding? BMI (Body Mass Index)    73 97.8  F (36.6  C) (Oral) 5' 5\" (1.651 m) 97% No 36.61 kg/m2       Blood Pressure from Last 3 Encounters:   10/11/17 122/88   03/24/17 128/80   09/21/16 134/86    Weight from Last 3 Encounters:   10/11/17 220 lb (99.8 kg)   03/24/17 216 lb 11.2 oz (98.3 kg)   09/21/16 213 lb 1.6 oz (96.7 kg)              We Performed the Following     CBC with platelets     Comprehensive metabolic panel     EKG 12-lead complete w/read - Clinics     FLU VACCINE, INCREASED ANTIGEN, PRESV FREE     Lipid panel reflex to direct LDL     NUTRITION REFERRAL     Pneumococcal vaccine 13 valent PCV13 IM (Prevnar) " [[id number]]          Today's Medication Changes          These changes are accurate as of: 10/11/17  1:09 PM.  If you have any questions, ask your nurse or doctor.               Start taking these medicines.        Dose/Directions    hydrochlorothiazide 12.5 MG Tabs tablet   Used for:  Benign essential hypertension   Started by:  Arie House MD        Dose:  12.5 mg   Take 1 tablet (12.5 mg) by mouth daily   Quantity:  90 tablet   Refills:  3         These medicines have changed or have updated prescriptions.        Dose/Directions    atorvastatin 10 MG tablet   Commonly known as:  LIPITOR   This may have changed:  See the new instructions.   Used for:  Hyperlipidemia LDL goal <130   Changed by:  Arie House MD        Dose:  10 mg   Take 1 tablet (10 mg) by mouth daily   Quantity:  90 tablet   Refills:  3         Stop taking these medicines if you haven't already. Please contact your care team if you have questions.     amLODIPine 10 MG tablet   Commonly known as:  NORVASC   Stopped by:  Arie House MD           azithromycin 250 MG tablet   Commonly known as:  ZITHROMAX   Stopped by:  Arie House MD           predniSONE 20 MG tablet   Commonly known as:  DELTASONE   Stopped by:  Arie House MD                Where to get your medicines      These medications were sent to [facility] - [facility] [address]  [address] [facility] [address]     Phone:  [phone]     aspirin 81 MG chewable tablet    atorvastatin 10 MG tablet    hydrochlorothiazide 12.5 MG Tabs tablet                Primary Care Provider Office Phone # Fax #    Arie House [phone] [address] [facility] [address]  [facility] [address]        Equal Access to Services     YANIRA VILLANUEVA : Skyla Louise, tony ellis, qaybta kamary canales. So [facility]  [phone].    ATENCIÓN: Si sridhar marin, tiene a brewster disposición servicios gratuitos de asistencia lingüística. Tk rahman [phone].    We comply with applicable federal civil rights laws and [place] laws. We do not discriminate on the basis of race, color, national origin, age, disability, sex, sexual orientation, or gender identity.            Thank you!     Thank you for choosing [facility]  for your care. Our goal is always to provide you with excellent care. Hearing back from our patients is one way we can continue to improve our services. Please take a few minutes to complete the written survey that you may receive in the mail after your visit with us. Thank you!             Your Updated Medication List - Protect others around you: Learn how to safely use, store and throw away your medicines at www.disposemymeds.org.          This list is accurate as of: 10/11/17  1:10 PM.  Always use your most recent med list.                   Brand Name Dispense Instructions for use Diagnosis    ACETAMINOPHEN PO      Take 325-650 mg by mouth every 6 hours as needed for pain        * albuterol 108 (90 BASE) MCG/ACT Inhaler    PROAIR HFA/PROVENTIL HFA/VENTOLIN HFA    1 Inhaler    Inhale 2 puffs into the lungs every 6 hours as needed for shortness of breath / dyspnea or wheezing    Simple chronic bronchitis (H)       * albuterol 108 (90 BASE) MCG/ACT Inhaler    PROVENTIL HFA    1 Inhaler    Inhale 2 puffs into the lungs every 6 hours    Productive cough, Hypoxia, SOB (shortness of breath)       * albuterol (2.5 MG/3ML) 0.083% neb solution     1 Box    Take 1 vial (2.5 mg) by nebulization every 4 hours as needed for shortness of breath / dyspnea or wheezing    Hypoxia, SOB (shortness of breath)       aspirin 81 MG chewable tablet     90 tablet    Take 1 tablet (81 mg) by mouth daily    Hyperlipidemia LDL goal <130       atorvastatin 10 MG tablet    LIPITOR    90 tablet    Take 1 tablet (10 mg) by mouth daily     Hyperlipidemia LDL goal <130       hydrochlorothiazide 12.5 MG Tabs tablet     90 tablet    Take 1 tablet (12.5 mg) by mouth daily    Benign essential hypertension       Multi-vitamin Tabs tablet      Take 1 tablet by mouth daily        order for DME     1 Device    Inhale 1 Device into the lungs 4 times daily as needed Nebulizer with tubing    Hypoxia, SOB (shortness of breath)       * Notice:  This list has 3 medication(s) that are the same as other medications prescribed for you. Read the directions carefully, and ask your doctor or other care provider to review them with you.

## 2017-10-11 NOTE — NURSING NOTE
"Chief Complaint   Patient presents with     Medicare Visit       Initial /88  Pulse 73  Temp 97.8  F (36.6  C) (Oral)  Ht 5' 5\" (1.651 m)  Wt 220 lb (99.8 kg)  SpO2 97%  Breastfeeding? No  BMI 36.61 kg/m2 Estimated body mass index is 36.61 kg/(m^2) as calculated from the following:    Height as of this encounter: 5' 5\" (1.651 m).    Weight as of this encounter: 220 lb (99.8 kg).  Medication Reconciliation: complete   Marianna MCDONOUGH CMA      "

## 2017-10-12 ENCOUNTER — TELEPHONE (OUTPATIENT)
Dept: FAMILY MEDICINE | Facility: CLINIC | Age: 67
End: 2017-10-12

## 2017-10-12 DIAGNOSIS — E78.5 HYPERLIPIDEMIA LDL GOAL <130: Primary | ICD-10-CM

## 2017-10-12 LAB
ALBUMIN SERPL-MCNC: 4.1 G/DL (ref 3.4–5)
ALP SERPL-CCNC: 93 U/L (ref 40–150)
ALT SERPL W P-5'-P-CCNC: 40 U/L (ref 0–50)
ANION GAP SERPL CALCULATED.3IONS-SCNC: 11 MMOL/L (ref 3–14)
AST SERPL W P-5'-P-CCNC: 52 U/L (ref 0–45)
BILIRUB SERPL-MCNC: 0.6 MG/DL (ref 0.2–1.3)
BUN SERPL-MCNC: 20 MG/DL (ref 7–30)
CALCIUM SERPL-MCNC: 9.5 MG/DL (ref 8.5–10.1)
CHLORIDE SERPL-SCNC: 109 MMOL/L (ref 94–109)
CHOLEST SERPL-MCNC: 189 MG/DL
CO2 SERPL-SCNC: 21 MMOL/L (ref 20–32)
CREAT SERPL-MCNC: 1.01 MG/DL (ref 0.52–1.04)
GFR SERPL CREATININE-BSD FRML MDRD: 55 ML/MIN/1.7M2
GLUCOSE SERPL-MCNC: 93 MG/DL (ref 70–99)
HDLC SERPL-MCNC: 48 MG/DL
LDLC SERPL CALC-MCNC: 90 MG/DL
NONHDLC SERPL-MCNC: 141 MG/DL
POTASSIUM SERPL-SCNC: 3.9 MMOL/L (ref 3.4–5.3)
PROT SERPL-MCNC: 8.2 G/DL (ref 6.8–8.8)
SODIUM SERPL-SCNC: 141 MMOL/L (ref 133–144)
TRIGL SERPL-MCNC: 257 MG/DL

## 2017-10-12 NOTE — TELEPHONE ENCOUNTER
Spoke with patient and relayed lab results message she will f/u as recommended and cont. Statin.  Marilin Bingham- SHAVONNE

## 2017-10-12 NOTE — PROGRESS NOTES
Brady Crawford,    I had the opportunity to review your recent labs and a summary of your labs reads as follows:    Your complete blood counts show no sign of anemia, normal white blood cell count and platelets.  Your comprehensive metabolic panel showed stable renal function and normal fasting blood glucose indicating no evidence of diabetes mellitus.  Your liver function tests returned within normal limits, but for an isolated AST level - this may be related to statin medication and I would recommend rechecking this lab in 2-3 weeks.  Continue statin for now  Your fasting lipid panel show  - stable HDL (good) cholesterol -as your goal is greater than 40  - low LDL (bad) cholesterol as your goal is less than 130  - stable triglyceride levels    I would recommend we recheck your liver function tests at your convenience within the next few weeks      Sincerely,  Arie House MD

## 2017-10-12 NOTE — TELEPHONE ENCOUNTER
Can we call Yvette Gastelum and let her know that     Brady Crawford,    I had the opportunity to review your recent labs and a summary of your labs reads as follows:    Your complete blood counts show no sign of anemia, normal white blood cell count and platelets.  Your comprehensive metabolic panel showed stable renal function and normal fasting blood glucose indicating no evidence of diabetes mellitus.  Your liver function tests returned within normal limits, but for an isolated AST level - this may be related to statin medication and I would recommend rechecking this lab in 2-3 weeks.  Continue statin for now  Your fasting lipid panel show  - stable HDL (good) cholesterol -as your goal is greater than 40  - low LDL (bad) cholesterol as your goal is less than 130  - stable triglyceride levels    I would recommend we recheck your liver function tests at your convenience within the next few weeks      Sincerely,  Arie House MD

## 2017-12-05 ENCOUNTER — OFFICE VISIT (OUTPATIENT)
Dept: FAMILY MEDICINE | Facility: CLINIC | Age: 67
End: 2017-12-05
Payer: COMMERCIAL

## 2017-12-05 VITALS
DIASTOLIC BLOOD PRESSURE: 94 MMHG | TEMPERATURE: 97.1 F | WEIGHT: 232 LBS | HEART RATE: 105 BPM | HEIGHT: 65 IN | SYSTOLIC BLOOD PRESSURE: 132 MMHG | BODY MASS INDEX: 38.65 KG/M2 | OXYGEN SATURATION: 96 %

## 2017-12-05 DIAGNOSIS — R06.02 SOB (SHORTNESS OF BREATH): ICD-10-CM

## 2017-12-05 DIAGNOSIS — Z91.81 AT RISK FOR FALLING: ICD-10-CM

## 2017-12-05 DIAGNOSIS — Z01.818 PREOP GENERAL PHYSICAL EXAM: ICD-10-CM

## 2017-12-05 DIAGNOSIS — I10 BENIGN ESSENTIAL HYPERTENSION: ICD-10-CM

## 2017-12-05 DIAGNOSIS — R09.02 HYPOXIA: ICD-10-CM

## 2017-12-05 PROCEDURE — 80048 BASIC METABOLIC PNL TOTAL CA: CPT | Performed by: INTERNAL MEDICINE

## 2017-12-05 PROCEDURE — 82043 UR ALBUMIN QUANTITATIVE: CPT | Performed by: INTERNAL MEDICINE

## 2017-12-05 PROCEDURE — 99214 OFFICE O/P EST MOD 30 MIN: CPT | Performed by: INTERNAL MEDICINE

## 2017-12-05 PROCEDURE — 36415 COLL VENOUS BLD VENIPUNCTURE: CPT | Performed by: INTERNAL MEDICINE

## 2017-12-05 RX ORDER — HYDROCHLOROTHIAZIDE 25 MG/1
25 TABLET ORAL DAILY
Qty: 90 TABLET | Refills: 3 | Status: SHIPPED | OUTPATIENT
Start: 2017-12-05 | End: 2018-07-09

## 2017-12-05 RX ORDER — ALBUTEROL SULFATE 0.83 MG/ML
1 SOLUTION RESPIRATORY (INHALATION) EVERY 4 HOURS PRN
Qty: 1 BOX | Refills: 11 | Status: SHIPPED | OUTPATIENT
Start: 2017-12-05 | End: 2020-07-01

## 2017-12-05 ASSESSMENT — PATIENT HEALTH QUESTIONNAIRE - PHQ9
5. POOR APPETITE OR OVEREATING: NOT AT ALL
SUM OF ALL RESPONSES TO PHQ QUESTIONS 1-9: 3

## 2017-12-05 ASSESSMENT — ANXIETY QUESTIONNAIRES
7. FEELING AFRAID AS IF SOMETHING AWFUL MIGHT HAPPEN: NOT AT ALL
1. FEELING NERVOUS, ANXIOUS, OR ON EDGE: NOT AT ALL
GAD7 TOTAL SCORE: 0
5. BEING SO RESTLESS THAT IT IS HARD TO SIT STILL: NOT AT ALL
6. BECOMING EASILY ANNOYED OR IRRITABLE: NOT AT ALL
IF YOU CHECKED OFF ANY PROBLEMS ON THIS QUESTIONNAIRE, HOW DIFFICULT HAVE THESE PROBLEMS MADE IT FOR YOU TO DO YOUR WORK, TAKE CARE OF THINGS AT HOME, OR GET ALONG WITH OTHER PEOPLE: NOT DIFFICULT AT ALL
3. WORRYING TOO MUCH ABOUT DIFFERENT THINGS: NOT AT ALL
2. NOT BEING ABLE TO STOP OR CONTROL WORRYING: NOT AT ALL

## 2017-12-05 NOTE — PATIENT INSTRUCTIONS
Before Your Surgery      Call your surgeon if there is any change in your health. This includes signs of a cold or flu (such as a sore throat, runny nose, cough, rash or fever).    Do not smoke, drink alcohol or take over the counter medicine (unless your surgeon or primary care doctor tells you to) for the 24 hours before and after surgery.    If you take prescribed drugs: Follow your doctor s orders about which medicines to take and which to stop until after surgery.    Eating and drinking prior to surgery: follow the instructions from your surgeon    Take a shower or bath the night before surgery. Use the soap your surgeon gave you to gently clean your skin. If you do not have soap from your surgeon, use your regular soap. Do not shave or scrub the surgery site.  Wear clean pajamas and have clean sheets on your bed.     (Z01.818) Preop general physical exam  Comment: We will check basic metabolic panel today and plan to conitnue all medications.  Holds hydrochlorothiazide on the day of surgery.  Plan:       (I10) Benign essential hypertension  Comment: blood pressure is a bit elevated today and we will increase your hydrochlorothiazide to 25 mg daily  Plan: Albumin Random Urine Quantitative with Creat         Ratio, hydrochlorothiazide (HYDRODIURIL) 25 MG         tablet, Basic metabolic panel            (R09.02) Hypoxia  Comment: OK to continue albuterol as needed   Plan: albuterol (2.5 MG/3ML) 0.083% neb solution            (R06.02) SOB (shortness of breath)  Comment: as above   Plan: albuterol (2.5 MG/3ML) 0.083% neb solution

## 2017-12-05 NOTE — MR AVS SNAPSHOT
After Visit Summary   12/5/2017    Yvette Gastelum    MRN: [id number]           Patient Information     Date Of Birth          1950        Visit Information        Provider Department      12/5/2017 3:00 PM Arie House MD [facility]        Today's Diagnoses     Preop general physical exam        At risk for falling        Benign essential hypertension        Hypoxia        SOB (shortness of breath)          Care Instructions      Before Your Surgery      Call your surgeon if there is any change in your health. This includes signs of a cold or flu (such as a sore throat, runny nose, cough, rash or fever).    Do not smoke, drink alcohol or take over the counter medicine (unless your surgeon or primary care doctor tells you to) for the 24 hours before and after surgery.    If you take prescribed drugs: Follow your doctor s orders about which medicines to take and which to stop until after surgery.    Eating and drinking prior to surgery: follow the instructions from your surgeon    Take a shower or bath the night before surgery. Use the soap your surgeon gave you to gently clean your skin. If you do not have soap from your surgeon, use your regular soap. Do not shave or scrub the surgery site.  Wear clean pajamas and have clean sheets on your bed.     (Z01.818) Preop general physical exam  Comment: We will check basic metabolic panel today and plan to conitnue all medications.  Holds hydrochlorothiazide on the day of surgery.  Plan:       (I10) Benign essential hypertension  Comment: blood pressure is a bit elevated today and we will increase your hydrochlorothiazide to 25 mg daily  Plan: Albumin Random Urine Quantitative with Creat         Ratio, hydrochlorothiazide (HYDRODIURIL) 25 MG         tablet, Basic metabolic panel            (R09.02) Hypoxia  Comment: OK to continue albuterol as needed   Plan: albuterol (2.5 MG/3ML) 0.083% neb solution            (R06.02) SOB  "(shortness of breath)  Comment: as above   Plan: albuterol (2.5 MG/3ML) 0.083% neb solution                     Follow-ups after your visit        Who to contact     If you have questions or need follow up information about today's clinic visit or your schedule please contact [facility] directly at [phone].  Normal or non-critical lab and imaging results will be communicated to you by [employer], letter or phone within 4 business days after the clinic has received the results. If you do not hear from us within 7 days, please contact the clinic through [employer] or phone. If you have a critical or abnormal lab result, we will notify you by phone as soon as possible.  Submit refill requests through [employer] or call your pharmacy and they will forward the refill request to us. Please allow 3 business days for your refill to be completed.          Additional Information About Your Visit        [employer] Information     [employer] gives you secure access to your electronic health record. If you see a primary care provider, you can also send messages to your care team and make appointments. If you have questions, please call your primary care clinic.  If you do not have a primary care provider, please call [phone] and they will assist you.        Care EveryWhere ID     This is your Care EveryWhere ID. This could be used by other organizations to access your [place] medical records  [id number]        Your Vitals Were     Pulse Temperature Height Pulse Oximetry BMI (Body Mass Index)       105 97.1  F (36.2  C) (Tympanic) 5' 5\" (1.651 m) 96% 38.61 kg/m2        Blood Pressure from Last 3 Encounters:   12/05/17 (!) 132/94   10/11/17 122/88   03/24/17 128/80    Weight from Last 3 Encounters:   12/05/17 232 lb (105.2 kg)   10/11/17 220 lb (99.8 kg)   03/24/17 216 lb 11.2 oz (98.3 kg)              We Performed the Following     Albumin Random Urine Quantitative with Creat Ratio     Basic metabolic panel        "   Today's Medication Changes          These changes are accurate as of: 12/5/17  3:28 PM.  If you have any questions, ask your nurse or doctor.               These medicines have changed or have updated prescriptions.        Dose/Directions    hydrochlorothiazide 25 MG tablet   Commonly known as:  HYDRODIURIL   This may have changed:    - medication strength  - how much to take   Used for:  Benign essential hypertension   Changed by:  Arie House MD        Dose:  25 mg   Take 1 tablet (25 mg) by mouth daily   Quantity:  90 tablet   Refills:  3            Where to get your medicines      These medications were sent to [facility] - [place], [place] - [address]  [address], [facility] [address]     Phone:  [phone]     albuterol (2.5 MG/3ML) 0.083% neb solution    hydrochlorothiazide 25 MG tablet                Primary Care Provider Office Phone # Fax #    Arie House [phone] [address] [facility] [address] [address]        Equal Access to Services     [facility]: Hadii prashant ku hadasho Soomaali, waaxda luqadaha, qaybta kaalmada adeegyada, waxay marielin haysonja hernandez . So [facility] [phone].    ATENCIÓN: Si habla español, tiene a brewster disposición servicios gratuitos de asistencia lingüística. Llame al [phone].    We comply with applicable federal civil rights laws and [place] laws. We do not discriminate on the basis of race, color, national origin, age, disability, sex, sexual orientation, or gender identity.            Thank you!     Thank you for choosing [facility]  for your care. Our goal is always to provide you with excellent care. Hearing back from our patients is one way we can continue to improve our services. Please take a few minutes to complete the written survey that you may receive in the mail after your visit with us. Thank you!             Your Updated Medication List - Protect others  around you: Learn how to safely use, store and throw away your medicines at www.disposemymeds.org.          This list is accurate as of: 12/5/17  3:28 PM.  Always use your most recent med list.                   Brand Name Dispense Instructions for use Diagnosis    ACETAMINOPHEN PO      Take 325-650 mg by mouth every 6 hours as needed for pain        albuterol (2.5 MG/3ML) 0.083% neb solution     1 Box    Take 1 vial (2.5 mg) by nebulization every 4 hours as needed for shortness of breath / dyspnea or wheezing    Hypoxia, SOB (shortness of breath)       aspirin 81 MG chewable tablet     90 tablet    Take 1 tablet (81 mg) by mouth daily    Hyperlipidemia LDL goal <130       atorvastatin 10 MG tablet    LIPITOR    90 tablet    Take 1 tablet (10 mg) by mouth daily    Hyperlipidemia LDL goal <130       hydrochlorothiazide 25 MG tablet    HYDRODIURIL    90 tablet    Take 1 tablet (25 mg) by mouth daily    Benign essential hypertension       Multi-vitamin Tabs tablet      Take 1 tablet by mouth daily

## 2017-12-05 NOTE — PROGRESS NOTES
[place] [address] [address] [facility] [address]  [phone]  Dept: [phone]    PRE-OP EVALUATION:  Today's date: 2017    Yvette Gastelum (: 1950) presents for pre-operative evaluation assessment as requested by Dr. Lua.  She requires evaluation and anesthesia risk assessment prior to undergoing surgery/procedure for treatment of Cataracts .  Proposed procedure: Cataract surgery    Date of Surgery/ Procedure: 17 Left eye  17 right eye  Time of Surgery/ Procedure: RUST  Hospital/Surgical Facility[facility]  Fax number for surgical facility: [phone]  Primary Physician: Arie House  Type of Anesthesia Anticipated: Local    Patient has a Health Care Directive or Living Will:  no    1. NO - Do you have a history of heart attack, stroke, stent, bypass or surgery on an artery in the head, neck, heart or legs?  2. NO - Do you ever have any pain or discomfort in your chest?  3. NO - Do you have a history of  Heart Failure?  4. NO - Are you troubled by shortness of breath when: walking on the level, up a slight hill or at night?  5. NO - Do you currently have a cold, bronchitis or other respiratory infection?  6. NO - Do you have a cough, shortness of breath or wheezing?  7. NO - Do you sometimes get pains in the calves of your legs when you walk?  8. NO - Do you or anyone in your family have previous history of blood clots?  9. NO - Do you or does anyone in your family have a serious bleeding problem such as prolonged bleeding following surgeries or cuts?  10.YES - Have you ever had problems with anemia or been told to take iron pills? - not currently  11. NO - Have you had any abnormal blood loss such as black, tarry or bloody stools, or abnormal vaginal bleeding?  12. NO - Have you ever had a blood transfusion?  13. NO - Have you or any of your relatives ever had problems with anesthesia?  14. NO - Do you have sleep apnea, excessive  snoring or daytime drowsiness?  15. NO - Do you have any prosthetic heart valves?  16. NO - Do you have prosthetic joints?  17. NO - Is there any chance that you may be pregnant?        HPI:                                                      Brief HPI related to upcoming procedure: Cataract      See problem list for active medical problems.  Problems all longstanding and stable, except as noted/documented.  See ROS for pertinent symptoms related to these conditions.                                                                                                  .    MEDICAL HISTORY:                                                    Patient Active Problem List    Diagnosis Date Noted     CKD (chronic kidney disease) stage 3, GFR 30-59 ml/min 09/01/2016     Priority: Medium     Hyperlipidemia LDL goal <130 01/25/2016     Priority: Medium     Iron deficiency anemia 12/05/2015     Priority: Medium     Simple chronic bronchitis (H) 12/04/2015     Priority: Medium     Anemia 12/04/2015     Priority: Medium     Benign essential hypertension 12/04/2015     Priority: Medium     Kidney stones      Priority: Medium     Sepsis secondary to UTI (H) 11/25/2015     Priority: Medium      Past Medical History:   Diagnosis Date     Hypertension      Kidney stones      Recurrent UTI      S/P CL-BSO      Sepsis (H)     secondary to uti      Past Surgical History:   Procedure Laterality Date     COLONOSCOPY       COLONOSCOPY N/A 2/10/2016    Procedure: COMBINED COLONOSCOPY, SINGLE OR MULTIPLE BIOPSY/POLYPECTOMY BY BIOPSY;  Surgeon: Antonia Jiménez MD;  Location:  GI     GYN SURGERY      ABD     Current Outpatient Prescriptions   Medication Sig Dispense Refill     hydrochlorothiazide 12.5 MG TABS tablet Take 1 tablet (12.5 mg) by mouth daily 90 tablet 3     atorvastatin (LIPITOR) 10 MG tablet Take 1 tablet (10 mg) by mouth daily 90 tablet 3     aspirin 81 MG chewable tablet Take 1 tablet (81 mg) by mouth daily 90 tablet 3      "albuterol (PROVENTIL HFA) 108 (90 BASE) MCG/ACT Inhaler Inhale 2 puffs into the lungs every 6 hours 1 Inhaler 0     order for DME Inhale 1 Device into the lungs 4 times daily as needed Nebulizer with tubing 1 Device 0     albuterol (2.5 MG/3ML) 0.083% neb solution Take 1 vial (2.5 mg) by nebulization every 4 hours as needed for shortness of breath / dyspnea or wheezing 1 Box 0     ACETAMINOPHEN PO Take 325-650 mg by mouth every 6 hours as needed for pain       albuterol (PROAIR HFA, PROVENTIL HFA, VENTOLIN HFA) 108 (90 BASE) MCG/ACT inhaler Inhale 2 puffs into the lungs every 6 hours as needed for shortness of breath / dyspnea or wheezing 1 Inhaler 1     multivitamin, therapeutic with minerals (MULTI-VITAMIN) TABS Take 1 tablet by mouth daily       OTC products: None, except as noted above    No Known Allergies   Latex Allergy: NO    Social History   Substance Use Topics     Smoking status: Former Smoker     Quit date: 9/1/2015     Smokeless tobacco: Never Used     Alcohol use No     History   Drug Use No       REVIEW OF SYSTEMS:                                                    Constitutional, neuro, ENT, endocrine, pulmonary, cardiac, gastrointestinal, genitourinary, musculoskeletal, integument and psychiatric systems are negative, except as otherwise noted.      EXAM:                                                    BP (!) 132/94 (BP Location: Right arm, Patient Position: Chair, Cuff Size: Adult Large)  Pulse 105  Temp 97.1  F (36.2  C) (Tympanic)  Ht 5' 5\" (1.651 m)  Wt 232 lb (105.2 kg)  SpO2 96%  BMI 38.61 kg/m2    GENERAL APPEARANCE: healthy, alert and no distress     EYES: EOMI,  PERRL     HENT: ear canals and TM's normal and nose and mouth without ulcers or lesions     NECK: no adenopathy, no asymmetry, masses, or scars and thyroid normal to palpation     RESP: lungs clear to auscultation - no rales, rhonchi or wheezes     CV: regular rates and rhythm, normal S1 S2, no S3 or S4 and no murmur, click " or rub     ABDOMEN:  soft, nontender, no HSM or masses and bowel sounds normal     MS: extremities normal- no gross deformities noted, no evidence of inflammation in joints, FROM in all extremities.     SKIN: no suspicious lesions or rashes     NEURO: Normal strength and tone, sensory exam grossly normal, mentation intact and speech normal     PSYCH: mentation appears normal. and affect normal/bright     LYMPHATICS: No axillary, cervical, or supraclavicular nodes    DIAGNOSTICS:                                                      No EKG required for low risk surgery (cataract, skin procedure, breast biopsy, etc)  Labs Resulted Today:   Results for orders placed or performed in visit on 12/05/17   Albumin Random Urine Quantitative with Creat Ratio   Result Value Ref Range    Creatinine Urine 160 mg/dL    Albumin Urine mg/L 8 mg/L    Albumin Urine mg/g Cr 4.79 0 - 25 mg/g Cr   Basic metabolic panel   Result Value Ref Range    Sodium 142 133 - 144 mmol/L    Potassium 3.8 3.4 - 5.3 mmol/L    Chloride 110 (H) 94 - 109 mmol/L    Carbon Dioxide 22 20 - 32 mmol/L    Anion Gap 10 3 - 14 mmol/L    Glucose 119 (H) 70 - 99 mg/dL    Urea Nitrogen 19 7 - 30 mg/dL    Creatinine 1.06 (H) 0.52 - 1.04 mg/dL    GFR Estimate 52 (L) >60 mL/min/1.7m2    GFR Estimate If Black 63 >60 mL/min/1.7m2    Calcium 9.3 8.5 - 10.1 mg/dL     Labs Drawn and in Process:   Unresulted Labs Ordered in the Past 30 Days of this Admission     No orders found from 10/6/2017 to 12/6/2017.          Recent Labs   Lab Test  10/11/17   1325  09/21/16   1154   HGB  13.6  13.0   PLT  193  195   NA  141  140   POTASSIUM  3.9  4.1   CR  1.01  1.25*        IMPRESSION:                                                    Reason for surgery/procedure:  Cataract  Diagnosis/reason for consult: Pre-operative Evalaution    The proposed surgical procedure is considered LOW risk.    REVISED CARDIAC RISK INDEX  The patient has the following serious cardiovascular risks for  perioperative complications such as (MI, PE, VFib and 3  AV Block):  No serious cardiac risks  INTERPRETATION: 0 risks: Class I (very low risk - 0.4% complication rate)    The patient has the following additional risks for perioperative complications:  No identified additional risks    No diagnosis found.    RECOMMENDATIONS:                                                      (Z01.818) Preop general physical exam  Comment: We will check basic metabolic panel today and plan to conitnue all medications.  Holds hydrochlorothiazide on the day of surgery.  Plan:       (I10) Benign essential hypertension  Comment: blood pressure is a bit elevated today and we will increase your hydrochlorothiazide to 25 mg daily  Plan: Albumin Random Urine Quantitative with Creat         Ratio, hydrochlorothiazide (HYDRODIURIL) 25 MG         tablet, Basic metabolic panel            (R09.02) Hypoxia  Comment: OK to continue albuterol as needed   Plan: albuterol (2.5 MG/3ML) 0.083% neb solution            (R06.02) SOB (shortness of breath)  Comment: as above   Plan: albuterol (2.5 MG/3ML) 0.083% neb solution       Signed Electronically by: Arie House MD, MD    Copy of this evaluation report is provided to requesting physician.    [place] Preop Guidelines

## 2017-12-06 LAB
ANION GAP SERPL CALCULATED.3IONS-SCNC: 10 MMOL/L (ref 3–14)
BUN SERPL-MCNC: 19 MG/DL (ref 7–30)
CALCIUM SERPL-MCNC: 9.3 MG/DL (ref 8.5–10.1)
CHLORIDE SERPL-SCNC: 110 MMOL/L (ref 94–109)
CO2 SERPL-SCNC: 22 MMOL/L (ref 20–32)
CREAT SERPL-MCNC: 1.06 MG/DL (ref 0.52–1.04)
CREAT UR-MCNC: 160 MG/DL
GFR SERPL CREATININE-BSD FRML MDRD: 52 ML/MIN/1.7M2
GLUCOSE SERPL-MCNC: 119 MG/DL (ref 70–99)
MICROALBUMIN UR-MCNC: 8 MG/L
MICROALBUMIN/CREAT UR: 4.79 MG/G CR (ref 0–25)
POTASSIUM SERPL-SCNC: 3.8 MMOL/L (ref 3.4–5.3)
SODIUM SERPL-SCNC: 142 MMOL/L (ref 133–144)

## 2017-12-06 ASSESSMENT — ANXIETY QUESTIONNAIRES: GAD7 TOTAL SCORE: 0

## 2017-12-06 NOTE — PROGRESS NOTES
Brady Crawford,    I have had the opportunity to review your recent results and an interpretation is as follows:  Your follow up basic metabolic panel shows stable renal function and electrolytes     Sincerely,  Arie House MD

## 2018-07-09 DIAGNOSIS — I10 BENIGN ESSENTIAL HYPERTENSION: ICD-10-CM

## 2018-07-09 DIAGNOSIS — E78.5 HYPERLIPIDEMIA LDL GOAL <130: ICD-10-CM

## 2018-07-09 NOTE — TELEPHONE ENCOUNTER
" atorvastatin (LIPITOR) 10 MG tablet 90 tablet 3 10/11/2017     Last Written Prescription Date:  10/11/17  Last Fill Quantity: 90,  # refills: 3   Last office visit: 12/5/2017 with prescribing provider:  Harsh   Future Office Visit:  none    Requested Prescriptions   Pending Prescriptions Disp Refills     atorvastatin (LIPITOR) 10 MG tablet [Pharmacy Med Name: ATORVASTATIN CALCIUM 10 MG Tablet] 90 tablet 3     Sig: TAKE 1 TABLET EVERY DAY    Statins Protocol Passed    7/9/2018 12:46 PM       Passed - LDL on file in past 12 months    Recent Labs   Lab Test  10/11/17   1325   LDL  90            Passed - No abnormal creatine kinase in past 12 months    No lab results found.            Passed - Recent (12 mo) or future (30 days) visit within the authorizing provider's specialty    Patient had office visit in the last 12 months or has a visit in the next 30 days with authorizing provider or within the authorizing provider's specialty.  See \"Patient Info\" tab in inbasket, or \"Choose Columns\" in Meds & Orders section of the refill encounter.           Passed - Patient is age 18 or older       Passed - No active pregnancy on record       Passed - No positive pregnancy test in past 12 months        hydrochlorothiazide (HYDRODIURIL) 25 MG tablet [Pharmacy Med Name: HYDROCHLOROTHIAZIDE 25 MG Tablet] 90 tablet 3     Sig: TAKE 1 TABLET EVERY DAY (INCREASED DOSE)    Diuretics (Including Combos) Protocol Failed    7/9/2018 12:46 PM       Failed - Blood pressure under 140/90 in past 12 months    BP Readings from Last 3 Encounters:   12/05/17 (!) 132/94   10/11/17 122/88   03/24/17 128/80                Failed - Normal serum creatinine on file in past 12 months    Recent Labs   Lab Test  12/05/17   1532   CR  1.06*             Passed - Recent (12 mo) or future (30 days) visit within the authorizing provider's specialty    Patient had office visit in the last 12 months or has a visit in the next 30 days with authorizing provider " "or within the authorizing provider's specialty.  See \"Patient Info\" tab in inbasket, or \"Choose Columns\" in Meds & Orders section of the refill encounter.           Passed - Patient is age 18 or older       Passed - No active pregancy on record       Passed - Normal serum potassium on file in past 12 months    Recent Labs   Lab Test  12/05/17   1532   POTASSIUM  3.8                   Passed - Normal serum sodium on file in past 12 months    Recent Labs   Lab Test  12/05/17   1532   NA  142             Passed - No positive pregnancy test in past 12 months        PHQ-9 SCORE 9/21/2016 12/5/2017   Total Score 10 3     hydrochlorothiazide (HYDRODIURIL) 25 MG tablet 90 tablet 3 12/5/2017     Last Written Prescription Date:  12/5/17  Last Fill Quantity: 90,  # refills: 3   Last office visit: 12/5/2017 with prescribing provider:  Harsh   Future Office Visit:  none    "

## 2018-07-10 RX ORDER — HYDROCHLOROTHIAZIDE 25 MG/1
TABLET ORAL
Qty: 90 TABLET | Refills: 0 | Status: SHIPPED | OUTPATIENT
Start: 2018-07-10 | End: 2018-10-03

## 2018-07-10 RX ORDER — ATORVASTATIN CALCIUM 10 MG/1
TABLET, FILM COATED ORAL
Qty: 90 TABLET | Refills: 0 | Status: SHIPPED | OUTPATIENT
Start: 2018-07-10 | End: 2019-02-26

## 2018-07-10 NOTE — TELEPHONE ENCOUNTER
Routing refill request to provider for review/approval because:  Labs out of range:  LFTs from 10/11/17- was to recheck in a couple of weeks but has not.  Has had BMP that creat was abnormal 12/5/17.  Also BP out of range.  Not sure if you want to wait until Oct to do physical and labs or come in sooner.  Please authorize if appropriate.  Thanks,  Apryl Portillo RN

## 2018-07-14 ENCOUNTER — E-VISIT (OUTPATIENT)
Dept: FAMILY MEDICINE | Facility: CLINIC | Age: 68
End: 2018-07-14
Payer: COMMERCIAL

## 2018-07-14 DIAGNOSIS — N30.00 ACUTE CYSTITIS WITHOUT HEMATURIA: Primary | ICD-10-CM

## 2018-07-14 PROCEDURE — 99444 ZZC PHYSICIAN ONLINE EVALUATION & MANAGEMENT SERVICE: CPT | Performed by: INTERNAL MEDICINE

## 2018-07-17 RX ORDER — CEPHALEXIN 500 MG/1
500 CAPSULE ORAL 2 TIMES DAILY
Qty: 14 CAPSULE | Refills: 0 | Status: SHIPPED | OUTPATIENT
Start: 2018-07-17 | End: 2019-02-26

## 2018-08-21 ENCOUNTER — HOSPITAL ENCOUNTER (INPATIENT)
Facility: CLINIC | Age: 68
LOS: 2 days | Discharge: HOME | DRG: 181 | End: 2018-08-23
Attending: EMERGENCY MEDICINE | Admitting: INTERNAL MEDICINE
Payer: COMMERCIAL

## 2018-08-21 ENCOUNTER — OFFICE VISIT (OUTPATIENT)
Dept: FAMILY MEDICINE | Facility: CLINIC | Age: 68
End: 2018-08-21
Payer: COMMERCIAL

## 2018-08-21 ENCOUNTER — HOSPITAL ENCOUNTER (OUTPATIENT)
Dept: CT IMAGING | Facility: CLINIC | Age: 68
Discharge: HOME | DRG: 181 | End: 2018-08-21
Attending: INTERNAL MEDICINE | Admitting: INTERNAL MEDICINE
Payer: COMMERCIAL

## 2018-08-21 VITALS
HEART RATE: 90 BPM | SYSTOLIC BLOOD PRESSURE: 122 MMHG | DIASTOLIC BLOOD PRESSURE: 88 MMHG | OXYGEN SATURATION: 95 % | HEIGHT: 65 IN | WEIGHT: 231 LBS | TEMPERATURE: 97.2 F | BODY MASS INDEX: 38.49 KG/M2

## 2018-08-21 DIAGNOSIS — Z12.31 ENCOUNTER FOR SCREENING MAMMOGRAM FOR BREAST CANCER: ICD-10-CM

## 2018-08-21 DIAGNOSIS — J41.0 SIMPLE CHRONIC BRONCHITIS (H): ICD-10-CM

## 2018-08-21 DIAGNOSIS — R04.2 HEMOPTYSIS: Primary | ICD-10-CM

## 2018-08-21 DIAGNOSIS — R91.8 MASS OF LOWER LOBE OF RIGHT LUNG: ICD-10-CM

## 2018-08-21 DIAGNOSIS — R06.02 SHORTNESS OF BREATH: ICD-10-CM

## 2018-08-21 DIAGNOSIS — E66.01 MORBID OBESITY (H): ICD-10-CM

## 2018-08-21 DIAGNOSIS — R04.2 HEMOPTYSIS: ICD-10-CM

## 2018-08-21 LAB
ABO + RH BLD: NORMAL
ABO + RH BLD: NORMAL
ALBUMIN SERPL-MCNC: 3.7 G/DL (ref 3.4–5)
ALP SERPL-CCNC: 98 U/L (ref 40–150)
ALT SERPL W P-5'-P-CCNC: 37 U/L (ref 0–50)
ANION GAP SERPL CALCULATED.3IONS-SCNC: 8 MMOL/L (ref 3–14)
APTT PPP: 33 SEC (ref 22–37)
AST SERPL W P-5'-P-CCNC: 75 U/L (ref 0–45)
BILIRUB DIRECT SERPL-MCNC: 0.2 MG/DL (ref 0–0.2)
BILIRUB SERPL-MCNC: 0.6 MG/DL (ref 0.2–1.3)
BLD GP AB SCN SERPL QL: NORMAL
BLOOD BANK CMNT PATIENT-IMP: NORMAL
BUN SERPL-MCNC: 20 MG/DL (ref 7–30)
CALCIUM SERPL-MCNC: 9.4 MG/DL (ref 8.5–10.1)
CHLORIDE SERPL-SCNC: 107 MMOL/L (ref 94–109)
CO2 SERPL-SCNC: 24 MMOL/L (ref 20–32)
CREAT SERPL-MCNC: 1.02 MG/DL (ref 0.52–1.04)
ERYTHROCYTE [DISTWIDTH] IN BLOOD BY AUTOMATED COUNT: 13.6 % (ref 10–15)
GFR SERPL CREATININE-BSD FRML MDRD: 54 ML/MIN/1.7M2
GLUCOSE SERPL-MCNC: 98 MG/DL (ref 70–99)
HCT VFR BLD AUTO: 40 % (ref 35–47)
HGB BLD-MCNC: 13.3 G/DL (ref 11.7–15.7)
INR PPP: 1.03 (ref 0.86–1.14)
INTERPRETATION ECG - MUSE: NORMAL
MCH RBC QN AUTO: 30.6 PG (ref 26.5–33)
MCHC RBC AUTO-ENTMCNC: 33.3 G/DL (ref 31.5–36.5)
MCV RBC AUTO: 92 FL (ref 78–100)
PLATELET # BLD AUTO: 197 10E9/L (ref 150–450)
POTASSIUM SERPL-SCNC: 4 MMOL/L (ref 3.4–5.3)
PROT SERPL-MCNC: 8.4 G/DL (ref 6.8–8.8)
RADIOLOGIST FLAGS: ABNORMAL
RBC # BLD AUTO: 4.34 10E12/L (ref 3.8–5.2)
SODIUM SERPL-SCNC: 139 MMOL/L (ref 133–144)
SPECIMEN EXP DATE BLD: NORMAL
WBC # BLD AUTO: 7.3 10E9/L (ref 4–11)

## 2018-08-21 PROCEDURE — 80048 BASIC METABOLIC PNL TOTAL CA: CPT | Performed by: INTERNAL MEDICINE

## 2018-08-21 PROCEDURE — 85610 PROTHROMBIN TIME: CPT | Performed by: EMERGENCY MEDICINE

## 2018-08-21 PROCEDURE — 12000000 ZZH R&B MED SURG/OB

## 2018-08-21 PROCEDURE — 86901 BLOOD TYPING SEROLOGIC RH(D): CPT | Performed by: EMERGENCY MEDICINE

## 2018-08-21 PROCEDURE — 99214 OFFICE O/P EST MOD 30 MIN: CPT | Performed by: INTERNAL MEDICINE

## 2018-08-21 PROCEDURE — 99223 1ST HOSP IP/OBS HIGH 75: CPT | Mod: AI | Performed by: INTERNAL MEDICINE

## 2018-08-21 PROCEDURE — 86900 BLOOD TYPING SEROLOGIC ABO: CPT | Performed by: EMERGENCY MEDICINE

## 2018-08-21 PROCEDURE — 36415 COLL VENOUS BLD VENIPUNCTURE: CPT | Performed by: INTERNAL MEDICINE

## 2018-08-21 PROCEDURE — 25000128 H RX IP 250 OP 636: Performed by: RADIOLOGY

## 2018-08-21 PROCEDURE — 85027 COMPLETE CBC AUTOMATED: CPT | Performed by: INTERNAL MEDICINE

## 2018-08-21 PROCEDURE — 25000132 ZZH RX MED GY IP 250 OP 250 PS 637: Performed by: INTERNAL MEDICINE

## 2018-08-21 PROCEDURE — 80076 HEPATIC FUNCTION PANEL: CPT | Performed by: EMERGENCY MEDICINE

## 2018-08-21 PROCEDURE — 25000128 H RX IP 250 OP 636: Performed by: INTERNAL MEDICINE

## 2018-08-21 PROCEDURE — 25000125 ZZHC RX 250: Performed by: RADIOLOGY

## 2018-08-21 PROCEDURE — 85730 THROMBOPLASTIN TIME PARTIAL: CPT | Performed by: EMERGENCY MEDICINE

## 2018-08-21 PROCEDURE — 93005 ELECTROCARDIOGRAM TRACING: CPT

## 2018-08-21 PROCEDURE — 86850 RBC ANTIBODY SCREEN: CPT | Performed by: EMERGENCY MEDICINE

## 2018-08-21 PROCEDURE — 71260 CT THORAX DX C+: CPT

## 2018-08-21 PROCEDURE — 99285 EMERGENCY DEPT VISIT HI MDM: CPT

## 2018-08-21 RX ORDER — ALBUTEROL SULFATE 0.83 MG/ML
2.5 SOLUTION RESPIRATORY (INHALATION) EVERY 4 HOURS PRN
Status: DISCONTINUED | OUTPATIENT
Start: 2018-08-21 | End: 2018-08-23 | Stop reason: HOSPADM

## 2018-08-21 RX ORDER — MAGNESIUM SULFATE HEPTAHYDRATE 40 MG/ML
2 INJECTION, SOLUTION INTRAVENOUS DAILY PRN
Status: DISCONTINUED | OUTPATIENT
Start: 2018-08-21 | End: 2018-08-22

## 2018-08-21 RX ORDER — POTASSIUM CHLORIDE 1.5 G/1.58G
20-40 POWDER, FOR SOLUTION ORAL
Status: DISCONTINUED | OUTPATIENT
Start: 2018-08-21 | End: 2018-08-23 | Stop reason: HOSPADM

## 2018-08-21 RX ORDER — LIDOCAINE 40 MG/G
CREAM TOPICAL
Status: DISCONTINUED | OUTPATIENT
Start: 2018-08-21 | End: 2018-08-23 | Stop reason: HOSPADM

## 2018-08-21 RX ORDER — AMOXICILLIN 250 MG
2 CAPSULE ORAL 2 TIMES DAILY
Status: DISCONTINUED | OUTPATIENT
Start: 2018-08-21 | End: 2018-08-22

## 2018-08-21 RX ORDER — ATORVASTATIN CALCIUM 10 MG/1
10 TABLET, FILM COATED ORAL EVERY EVENING
Status: DISCONTINUED | OUTPATIENT
Start: 2018-08-22 | End: 2018-08-23 | Stop reason: HOSPADM

## 2018-08-21 RX ORDER — MAGNESIUM OXIDE 400 MG/1
400 TABLET ORAL DAILY
Status: DISCONTINUED | OUTPATIENT
Start: 2018-08-22 | End: 2018-08-22

## 2018-08-21 RX ORDER — HYDROCHLOROTHIAZIDE 25 MG/1
25 TABLET ORAL DAILY
Status: DISCONTINUED | OUTPATIENT
Start: 2018-08-22 | End: 2018-08-23 | Stop reason: HOSPADM

## 2018-08-21 RX ORDER — ONDANSETRON 2 MG/ML
4 INJECTION INTRAMUSCULAR; INTRAVENOUS EVERY 6 HOURS PRN
Status: DISCONTINUED | OUTPATIENT
Start: 2018-08-21 | End: 2018-08-23 | Stop reason: HOSPADM

## 2018-08-21 RX ORDER — MAGNESIUM SULFATE HEPTAHYDRATE 40 MG/ML
4 INJECTION, SOLUTION INTRAVENOUS EVERY 4 HOURS PRN
Status: DISCONTINUED | OUTPATIENT
Start: 2018-08-21 | End: 2018-08-22

## 2018-08-21 RX ORDER — SODIUM CHLORIDE 9 MG/ML
INJECTION, SOLUTION INTRAVENOUS CONTINUOUS
Status: ACTIVE | OUTPATIENT
Start: 2018-08-21 | End: 2018-08-22

## 2018-08-21 RX ORDER — OXYCODONE HYDROCHLORIDE 5 MG/1
5 TABLET ORAL EVERY 4 HOURS PRN
Status: DISCONTINUED | OUTPATIENT
Start: 2018-08-21 | End: 2018-08-23 | Stop reason: HOSPADM

## 2018-08-21 RX ORDER — POTASSIUM CL/LIDO/0.9 % NACL 10MEQ/0.1L
10 INTRAVENOUS SOLUTION, PIGGYBACK (ML) INTRAVENOUS
Status: DISCONTINUED | OUTPATIENT
Start: 2018-08-21 | End: 2018-08-23 | Stop reason: HOSPADM

## 2018-08-21 RX ORDER — NALOXONE HYDROCHLORIDE 0.4 MG/ML
.1-.4 INJECTION, SOLUTION INTRAMUSCULAR; INTRAVENOUS; SUBCUTANEOUS
Status: DISCONTINUED | OUTPATIENT
Start: 2018-08-21 | End: 2018-08-23 | Stop reason: HOSPADM

## 2018-08-21 RX ORDER — AMOXICILLIN 250 MG
1 CAPSULE ORAL 2 TIMES DAILY
Status: DISCONTINUED | OUTPATIENT
Start: 2018-08-21 | End: 2018-08-22

## 2018-08-21 RX ORDER — ONDANSETRON 4 MG/1
4 TABLET, ORALLY DISINTEGRATING ORAL EVERY 6 HOURS PRN
Status: DISCONTINUED | OUTPATIENT
Start: 2018-08-21 | End: 2018-08-23 | Stop reason: HOSPADM

## 2018-08-21 RX ORDER — IOPAMIDOL 755 MG/ML
79 INJECTION, SOLUTION INTRAVASCULAR ONCE
Status: COMPLETED | OUTPATIENT
Start: 2018-08-21 | End: 2018-08-21

## 2018-08-21 RX ORDER — POTASSIUM CHLORIDE 7.45 MG/ML
10 INJECTION INTRAVENOUS
Status: DISCONTINUED | OUTPATIENT
Start: 2018-08-21 | End: 2018-08-23 | Stop reason: HOSPADM

## 2018-08-21 RX ORDER — POTASSIUM CHLORIDE 29.8 MG/ML
20 INJECTION INTRAVENOUS
Status: DISCONTINUED | OUTPATIENT
Start: 2018-08-21 | End: 2018-08-23 | Stop reason: HOSPADM

## 2018-08-21 RX ORDER — BISACODYL 10 MG
10 SUPPOSITORY, RECTAL RECTAL DAILY PRN
Status: DISCONTINUED | OUTPATIENT
Start: 2018-08-21 | End: 2018-08-23 | Stop reason: HOSPADM

## 2018-08-21 RX ORDER — ACETAMINOPHEN 325 MG/1
325-650 TABLET ORAL EVERY 6 HOURS PRN
Status: DISCONTINUED | OUTPATIENT
Start: 2018-08-21 | End: 2018-08-23 | Stop reason: HOSPADM

## 2018-08-21 RX ORDER — ACETAMINOPHEN 325 MG/1
650 TABLET ORAL EVERY 4 HOURS PRN
Status: DISCONTINUED | OUTPATIENT
Start: 2018-08-21 | End: 2018-08-21

## 2018-08-21 RX ORDER — POTASSIUM CHLORIDE 1500 MG/1
20-40 TABLET, EXTENDED RELEASE ORAL
Status: DISCONTINUED | OUTPATIENT
Start: 2018-08-21 | End: 2018-08-23 | Stop reason: HOSPADM

## 2018-08-21 RX ORDER — LANOLIN ALCOHOL/MO/W.PET/CERES
400 CREAM (GRAM) TOPICAL DAILY
Status: ON HOLD | COMMUNITY
End: 2018-08-23

## 2018-08-21 RX ORDER — POLYETHYLENE GLYCOL 3350 17 G/17G
17 POWDER, FOR SOLUTION ORAL DAILY PRN
Status: DISCONTINUED | OUTPATIENT
Start: 2018-08-21 | End: 2018-08-23 | Stop reason: HOSPADM

## 2018-08-21 RX ADMIN — SENNOSIDES AND DOCUSATE SODIUM 2 TABLET: 8.6; 5 TABLET ORAL at 20:29

## 2018-08-21 RX ADMIN — SODIUM CHLORIDE, PRESERVATIVE FREE 101 ML: 5 INJECTION INTRAVENOUS at 15:29

## 2018-08-21 RX ADMIN — IOPAMIDOL 79 ML: 755 INJECTION, SOLUTION INTRAVENOUS at 15:29

## 2018-08-21 RX ADMIN — SODIUM CHLORIDE: 9 INJECTION, SOLUTION INTRAVENOUS at 20:23

## 2018-08-21 ASSESSMENT — ACTIVITIES OF DAILY LIVING (ADL)
TOILETING: 0-->INDEPENDENT
BATHING: 0-->INDEPENDENT
WHICH_OF_THE_ABOVE_FUNCTIONAL_RISKS_HAD_A_RECENT_ONSET_OR_CHANGE?: FALL HISTORY
COGNITION: 0 - NO COGNITION ISSUES REPORTED
AMBULATION: 0-->INDEPENDENT
NUMBER_OF_TIMES_PATIENT_HAS_FALLEN_WITHIN_LAST_SIX_MONTHS: 1
DRESS: 0-->INDEPENDENT
FALL_HISTORY_WITHIN_LAST_SIX_MONTHS: YES
TRANSFERRING: 0-->INDEPENDENT
RETIRED_EATING: 0-->INDEPENDENT
SWALLOWING: 0-->SWALLOWS FOODS/LIQUIDS WITHOUT DIFFICULTY
RETIRED_COMMUNICATION: 0-->UNDERSTANDS/COMMUNICATES WITHOUT DIFFICULTY

## 2018-08-21 ASSESSMENT — ENCOUNTER SYMPTOMS
BACK PAIN: 1
DIAPHORESIS: 1
SHORTNESS OF BREATH: 1

## 2018-08-21 ASSESSMENT — PAIN DESCRIPTION - DESCRIPTORS: DESCRIPTORS: ACHING

## 2018-08-21 NOTE — MR AVS SNAPSHOT
After Visit Summary   8/21/2018    Yvette Gastelum    MRN: [id number]           Patient Information     Date Of Birth          1950        Visit Information        Provider Department      8/21/2018 9:30 AM Arie House MD [facility]        Today's Diagnoses     Encounter for screening mammogram for breast cancer    -  1    Hemoptysis          Care Instructions    (Z12.31) Encounter for screening mammogram for breast cancer  (primary encounter diagnosis)  Comment:  [facility] (also performs diagnostic mammogram, ultrasound and biopsy) [phone].   Plan: *MA Screening Digital Bilateral            (R04.2) Hemoptysis  Comment: We will check labs today and will request sputum culture and CT chest [facility] phone #[phone]   Plan: Basic metabolic panel, CBC with platelets,         Sputum Culture Aerobic Bacterial, Gram stain,         PULMONARY MEDICINE REFERRAL, CT Chest w         Contrast, CANCELED: XR Chest 2 Views                     Follow-ups after your visit        Additional Services     PULMONARY MEDICINE REFERRAL       Your provider has referred you to: [facility]: [place] [facility] [phone]   [email or web address]    Please be aware that coverage of these services is subject to the terms and limitations of your health insurance plan.  Call member services at your health plan with any benefit or coverage questions.      Please bring the following with you to your appointment:    (1) Any X-Rays, CTs or MRIs which have been performed.  Contact the facility where they were done to arrange for  prior to your scheduled appointment.    (2) List of current medications   (3) This referral request   (4) Any documents/labs given to you for this referral                  Your next 10 appointments already scheduled     Aug 21, 2018  3:30 PM CDT   CT CHEST W  CONTRAST with SHCT1   [facility] ([facility]    [address] [address] [phone]           Please bring any scans or X-rays taken at other hospitals, if similar tests were done. Also bring a list of your medicines, including vitamins, minerals and over-the-counter drugs. It is safest to leave personal items at home.  Be sure to tell your doctor:   If you have any allergies.   If there s any chance you are pregnant.   If you are breastfeeding.    If you have diabetes as your medication may need to be adjusted for this exam.  You will have contrast for this exam. To prepare:   Do not eat or drink for 2 hours before your exam. If you need to take medicine, you may take it with small sips of water. (We may ask you to take liquid medicine as well.)   The day before your exam, drink extra fluids at least six 8-ounce glasses (unless your doctor tells you to restrict your fluids).  Patients over 70 or patients with diabetes or kidney problems:   If you haven t had a blood test (creatinine test) within the last 30 days, the Cardiologist/Radiologist may require you to get this test prior to your exam.  Please wear loose clothing, such as a sweat suit or jogging clothes. Avoid snaps, zippers and other metal. We may ask you to undress and put on a hospital gown.  If you have any questions, please call the Imaging Department where you will have your exam.              Future tests that were ordered for you today     Open Future Orders        Priority Expected Expires Ordered    CT Chest w Contrast Routine  8/21/2019 8/21/2018    Sputum Culture Aerobic Bacterial Routine  9/20/2018 8/21/2018    Gram stain Routine  10/20/2018 8/21/2018    *MA Screening Digital Bilateral Routine  8/21/2019 8/21/2018            Who to contact     If you have questions or need follow up information about today's clinic visit or your schedule please contact [facility] directly at  "[phone].  Normal or non-critical lab and imaging results will be communicated to you by MyChart, letter or phone within 4 business days after the clinic has received the results. If you do not hear from us within 7 days, please contact the clinic through [facility] or phone. If you have a critical or abnormal lab result, we will notify you by phone as soon as possible.  Submit refill requests through [employer] or call your pharmacy and they will forward the refill request to us. Please allow 3 business days for your refill to be completed.          Additional Information About Your Visit        [facility] Information     [employer] gives you secure access to your electronic health record. If you see a primary care provider, you can also send messages to your care team and make appointments. If you have questions, please call your primary care clinic.  If you do not have a primary care provider, please call [phone] and they will assist you.        Care EveryWhere ID     This is your Care EveryWhere ID. This could be used by other organizations to access your [facility] medical records  [id number]        Your Vitals Were     Pulse Temperature Height Pulse Oximetry Breastfeeding? BMI (Body Mass Index)    90 97.2  F (36.2  C) (Tympanic) 5' 5\" (1.651 m) 95% No 38.44 kg/m2       Blood Pressure from Last 3 Encounters:   08/21/18 122/88   12/05/17 (!) 132/94   10/11/17 122/88    Weight from Last 3 Encounters:   08/21/18 231 lb (104.8 kg)   12/05/17 232 lb (105.2 kg)   10/11/17 220 lb (99.8 kg)              We Performed the Following     Basic metabolic panel     CBC with platelets     PULMONARY MEDICINE REFERRAL        Primary Care Provider Office Phone # Fax #    Arie House [phone] [address]  [place] [address]        Equal Access to Services     YANIRA VILLANUEVA AH: Hadii prashant bao Soomaali, waaxda luqadaha, qaybta kaalmaginger hadley, mary ochoa " la'ivelissen laila. So [facility] [phone].    ATENCIÓN: Si habla tomas, tiene a brewster disposición servicios gratuitos de asistencia lingüística. Tk al [phone].    We comply with applicable federal civil rights laws and [place] laws. We do not discriminate on the basis of race, color, national origin, age, disability, sex, sexual orientation, or gender identity.            Thank you!     Thank you for choosing [facility]  for your care. Our goal is always to provide you with excellent care. Hearing back from our patients is one way we can continue to improve our services. Please take a few minutes to complete the written survey that you may receive in the mail after your visit with us. Thank you!             Your Updated Medication List - Protect others around you: Learn how to safely use, store and throw away your medicines at www.disposemymeds.org.          This list is accurate as of 8/21/18 10:21 AM.  Always use your most recent med list.                   Brand Name Dispense Instructions for use Diagnosis    ACETAMINOPHEN PO      Take 325-650 mg by mouth every 6 hours as needed for pain        albuterol (2.5 MG/3ML) 0.083% neb solution     1 Box    Take 1 vial (2.5 mg) by nebulization every 4 hours as needed for shortness of breath / dyspnea or wheezing    Hypoxia, SOB (shortness of breath)       aspirin 81 MG chewable tablet     90 tablet    Take 1 tablet (81 mg) by mouth daily    Hyperlipidemia LDL goal <130       atorvastatin 10 MG tablet    LIPITOR    90 tablet    TAKE 1 TABLET EVERY DAY    Hyperlipidemia LDL goal <130       hydrochlorothiazide 25 MG tablet    HYDRODIURIL    90 tablet    TAKE 1 TABLET EVERY DAY (INCREASED DOSE)    Benign essential hypertension       MAGNESIUM CITRATE PO           Multi-vitamin Tabs tablet      Take 1 tablet by mouth daily        VITAMIN E COMPLEX PO

## 2018-08-21 NOTE — IP AVS SNAPSHOT
[address], [address]    [place] [address]    Phone:  [phone]                                       After Visit Summary   8/21/2018    Yvette Gastelum    MRN: [id number]           After Visit Summary Signature Page     I have received my discharge instructions, and my questions have been answered. I have discussed any challenges I see with this plan with the nurse or doctor.    ..........................................................................................................................................  Patient/Patient Representative Signature      ..........................................................................................................................................  Patient Representative Print Name and Relationship to Patient    ..................................................               ................................................  Date                                            Time    ..........................................................................................................................................  Reviewed by Signature/Title    ...................................................              ..............................................  Date                                                            Time          22EPIC Rev 08/18

## 2018-08-21 NOTE — PATIENT INSTRUCTIONS
(Z12.31) Encounter for screening mammogram for breast cancer  (primary encounter diagnosis)  Comment:  [facility] (also performs diagnostic mammogram, ultrasound and biopsy) [phone].   Plan: *MA Screening Digital Bilateral            (R04.2) Hemoptysis  Comment: We will check labs today and will request sputum culture and CT chest [facility] phone #[phone]   Plan: Basic metabolic panel, CBC with platelets,         Sputum Culture Aerobic Bacterial, Gram stain,         PULMONARY MEDICINE REFERRAL, CT Chest w         Contrast, CANCELED: XR Chest 2 Views

## 2018-08-21 NOTE — PHARMACY-ADMISSION MEDICATION HISTORY
Admission medication history interview status for the 8/21/2018  admission is complete. See EPIC admission navigator for prior to admission medications     Medication history source reliability:Good    Actions taken by pharmacist (provider contacted, etc):None     Additional medication history information not noted on PTA med list : pt [facility] immediate needs but long term uses [facility] mail order    Medication reconciliation/reorder completed by provider prior to medication history? No    Time spent in this activity: 15 mins    Prior to Admission medications    Medication Sig Last Dose Taking? Auth Provider   ACETAMINOPHEN PO Take 325-650 mg by mouth every 6 hours as needed for pain prn Yes Reported, Patient   albuterol (2.5 MG/3ML) 0.083% neb solution Take 1 vial (2.5 mg) by nebulization every 4 hours as needed for shortness of breath / dyspnea or wheezing prn Yes Arie House MD   aspirin 81 MG chewable tablet Take 1 tablet (81 mg) by mouth daily 8/21/2018 at Unknown time Yes Arie House MD   atorvastatin (LIPITOR) 10 MG tablet TAKE 1 TABLET EVERY DAY 8/21/2018 at Unknown time Yes Arie House MD   hydrochlorothiazide (HYDRODIURIL) 25 MG tablet TAKE 1 TABLET EVERY DAY (INCREASED DOSE) 8/21/2018 at Unknown time Yes Arie House MD   magnesium oxide (MAG-OX) 400 (240 Mg) MG tablet Take 400 mg by mouth daily (for muscle cramps associated with lipitor) 8/21/2018 at Unknown time Yes Unknown, Entered By History   multivitamin, therapeutic with minerals (MULTI-VITAMIN) TABS Take 1 tablet by mouth daily 8/21/2018 at Unknown time Yes Unknown, Entered By History   VITAMIN E COMPLEX PO Take 400 Units by mouth daily  8/21/2018 at Unknown time Yes Reported, Patient   Piper Coffey, PharmD

## 2018-08-21 NOTE — IP AVS SNAPSHOT
MRN:[id number]                      After Visit Summary   8/21/2018    Yvette Gastelum    MRN: [id number]           Thank you!     Thank you for choosing [facility] for your care. Our goal is always to provide you with excellent care. Hearing back from our patients is one way we can continue to improve our services. Please take a few minutes to complete the written survey that you may receive in the mail after you visit with us. Thank you!        Patient Information     Date Of Birth          1950        Designated Caregiver       Most Recent Value    Caregiver    Will someone help with your care after discharge? no      About your hospital stay     You were admitted on:  August 21, 2018 You last received care in the:  [place] [address] [facility]    You were discharged on:  August 23, 2018        Reason for your hospital stay       This is a 67 year old female admitted with a lung mass.                  Who to Call     For medical emergencies, please call 911.  For non-urgent questions about your medical care, please call your primary care provider or clinic, [phone]          Attending Provider     Provider Specialty    Neville Sheldon MD Emergency Medicine    [facility]    Pradip Garcia MD Internal Medicine       Primary Care Provider Office Phone # Fax #    Arie House [phone]      After Care Instructions     Activity       Your activity upon discharge: activity as tolerated            Diet       Follow this diet upon discharge: Orders Placed This Encounter      NPO for Medical/Clinical Reasons Except for: Other, Meds; Specify: NPO 4 hours prior to procedure      Regular Diet Adult                  Follow-up Appointments     Follow-up and recommended labs and tests       Follow up with Dr. Norris (Thoracic Surgery) at the [facility] in [place].  Our  will call you to set up a time for your appointment.   "Dr Norris has asked that you have a PET scan performed prior to your appointment so he can review it with you.  Our  will help to coordinate the PET scan as well.    Scheduled for Wednesday August 29   + 2:00 PM PET SCAN  + 3:45 PM DR NORRIS  [facility] [place] [facility]  [phone]  [address] [place] [address]            Follow-up and recommended labs and tests        Follow up with Thoracic Surgery as directed.                  Your next 10 appointments already scheduled     Aug 31, 2018 11:00 AM CDT   Office Visit with Arie House MD   [facility] ([facility])    [address] [facility] [id number] [phone]           Bring a current list of meds and any records pertaining to this visit. For Physicals, please bring immunization records and any forms needing to be filled out. Please arrive 10 minutes early to complete paperwork.              Further instructions from your care team       A follow-up appointment was scheduled for you [see above].  It is very important to go to it--bring these papers and your insurance card with you.  At the visit, talk about your hospital stay.  Tell your doctor how you feel.  Show your new list of medications.  Your doctor will update records, make sure you are still doing OK, and decide if any tests or medication changes are needed.          Pending Results     No orders found from 8/19/2018 to 8/22/2018.            Admission Information     Date & Time Provider Department Dept. Phone    8/21/2018 Pradip Garcia MD [place] [address] [facility] [phone]      Your Vitals Were     Blood Pressure Pulse Temperature Respirations Height Weight    100/70 (BP Location: Left arm) 107 95.9  F (35.5  C) (Oral) 16 1.651 m (5' 5\") 108.4 kg (238 lb 14.4 oz)    Pulse Oximetry BMI (Body Mass Index)                94% 39.76 kg/m2          [employer] Information     [employer] gives you secure access to your electronic " health record. If you see a primary care provider, you can also send messages to your care team and make appointments. If you have questions, please call your primary care clinic.  If you do not have a primary care provider, please call [phone] and they will assist you.        Care EveryWhere ID     This is your Care EveryWhere ID. This could be used by other organizations to access your [place] medical records  [id number]        Equal Access to Services     YANIRA VILLANUEVA : Skyla Louise, wapio ellis, qalilia kaalmada leonie, mary martinezdgjefferson hernandez . So [facility] [phone].    ATENCIÓN: Si habla espnicole, tiene a brewster disposición servicios gratuitos de asistencia lingüística. Tk al [phone].    We comply with applicable federal civil rights laws and [place] laws. We do not discriminate on the basis of race, color, national origin, age, disability, sex, sexual orientation, or gender identity.               Review of your medicines      CONTINUE these medicines which have NOT CHANGED        Dose / Directions    ACETAMINOPHEN PO        Dose:  325-650 mg   Take 325-650 mg by mouth every 6 hours as needed for pain   Refills:  0       albuterol (2.5 MG/3ML) 0.083% neb solution   Used for:  Hypoxia, SOB (shortness of breath)        Dose:  1 vial   Take 1 vial (2.5 mg) by nebulization every 4 hours as needed for shortness of breath / dyspnea or wheezing   Quantity:  1 Box   Refills:  11       atorvastatin 10 MG tablet   Commonly known as:  LIPITOR   Used for:  Hyperlipidemia LDL goal <130        TAKE 1 TABLET EVERY DAY   Quantity:  90 tablet   Refills:  0       hydrochlorothiazide 25 MG tablet   Commonly known as:  HYDRODIURIL   Used for:  Benign essential hypertension        TAKE 1 TABLET EVERY DAY (INCREASED DOSE)   Quantity:  90 tablet   Refills:  0       Multi-vitamin Tabs tablet        Dose:  1 tablet   Take 1 tablet by mouth daily   Refills:  0         STOP taking      aspirin 81 MG chewable tablet           magnesium oxide 400 (240 Mg) MG tablet   Commonly known as:  MAG-OX           VITAMIN E COMPLEX PO                    Protect others around you: Learn how to safely use, store and throw away your medicines at www.disposemymeds.org.             Medication List: This is a list of all your medications and when to take them. Check marks below indicate your daily home schedule. Keep this list as a reference.      Medications           Morning Afternoon Evening Bedtime As Needed    ACETAMINOPHEN PO   Take 325-650 mg by mouth every 6 hours as needed for pain   Last time this was given:  650 mg on 8/23/2018  8:19 AM                                albuterol (2.5 MG/3ML) 0.083% neb solution   Take 1 vial (2.5 mg) by nebulization every 4 hours as needed for shortness of breath / dyspnea or wheezing                                atorvastatin 10 MG tablet   Commonly known as:  LIPITOR   TAKE 1 TABLET EVERY DAY   Last time this was given:  10 mg on 8/22/2018  8:54 PM                                hydrochlorothiazide 25 MG tablet   Commonly known as:  HYDRODIURIL   TAKE 1 TABLET EVERY DAY (INCREASED DOSE)   Last time this was given:  25 mg on 8/23/2018  8:19 AM                                Multi-vitamin Tabs tablet   Take 1 tablet by mouth daily

## 2018-08-21 NOTE — PROGRESS NOTES
"[facility]  CLINIC PROGRESS NOTE    Subjective:  Hemoptysis   Yvette Gastelum presents to the clinic for evaluation of hemoptysis.  She has a normal chest x-ray and a normal VQ scan from November 2015.  She notes that hemoptysis is sporadic.  Also having some pain when she tries to breath deep.  She lives in an apartment building in which she may be exposed to pathogens.       Past medical history, medications, allergies, social history, family history reviewed and updated in [facility] as of 8/21/2018 .    ROS  CONSTITUTIONAL: no fatigue, no unexpected change in weight  SKIN: no worrisome rashes, no worrisome moles, no worrisome lesions  EYES: no acute vision problems or changes  ENT: no ear problems, no mouth problems, no throat problems  RESP: as above   CV: no chest pain, no palpitations, no new or worsening peripheral edema  GI: no nausea, no vomiting, no constipation, no diarrhea  : + frequency, no dysuria, no hematuria  MS: no claudication, no myalgias, no joint aches  PSYCHIATRIC: no changes in mood or affect      Objective:  Vitals  /88 (BP Location: Right arm, Cuff Size: Adult Large)  Pulse 90  Temp 97.2  F (36.2  C) (Tympanic)  Ht 5' 5\" (1.651 m)  Wt 231 lb (104.8 kg)  SpO2 95%  Breastfeeding? No  BMI 38.44 kg/m2  GEN: Alert Oriented x3 NAD  HEENT: Atraumatic, normocephalic, neck supple, no thyromegaly, negative cervical adenopathy  TM: TM bilaterally pearly and grey with normal light reflex  CV: RRR no murmurs or rubs  PULM: Wheezing all lung fields  ABD: Soft, nontender nondistended, no hepatosplenomegally  SKIN: No visible skin lesion or ulcerations  EXT:  no edema bilateral lower extremities  NEURO: Gait and station deferred, No focal neurologic deficits  PSYCH: Mood good, affect mood congruent    No images are attached to the encounter.    Results for orders placed or performed in visit on 08/21/18 (from the past 24 hour(s))   CBC with platelets   Result Value Ref Range    WBC " 7.3 4.0 - 11.0 10e9/L    RBC Count 4.34 3.8 - 5.2 10e12/L    Hemoglobin 13.3 11.7 - 15.7 g/dL    Hematocrit 40.0 35.0 - 47.0 %    MCV 92 78 - 100 fl    MCH 30.6 26.5 - 33.0 pg    MCHC 33.3 31.5 - 36.5 g/dL    RDW 13.6 10.0 - 15.0 %    Platelet Count 197 150 - 450 10e9/L       Assessment/Plan:  Patient Instructions   (Z12.31) Encounter for screening mammogram for breast cancer  (primary encounter diagnosis)  Comment:  [facility] (also performs diagnostic mammogram, ultrasound and biopsy) [phone].   Plan: *MA Screening Digital Bilateral            (R04.2) Hemoptysis  Comment: We will check labs today and will request sputum culture and CT chest [facility] phone #[phone]   Plan: Basic metabolic panel, CBC with platelets,         Sputum Culture Aerobic Bacterial, Gram stain,         PULMONARY MEDICINE REFERRAL, CT Chest w         Contrast, CANCELED: XR Chest 2 Views               Follow up in pulmonology pending CT  25 minutes spent with patient.  Over 50% of time counseling      Disclaimer: This note consists of symbols derived from keyboarding, dictation and/or voice recognition software. As a result, there may be errors in the script that have gone undetected. Please consider this when interpreting information found in this chart.    Arie House MD  [phone]

## 2018-08-21 NOTE — ED NOTES
"[facility] [place]  ED Nurse Handoff Report    ED Chief complaint: Shortness of Breath (Having SOB. Was in Radiology Dept. for CT. CT showed ? PE & mass so pt was brought to ED.)      ED Diagnosis:   Final diagnoses:   Hemoptysis   Mass of lower lobe of right lung   Shortness of breath       Code Status: Full    Allergies: No Known Allergies    Activity level - Baseline/Home:  Independent    Activity Level - Current:   Stand with Assist     Needed?: No    Isolation: No  Infection: Not Applicable  Bariatric?: No    Vital Signs:   Vitals:    08/21/18 1734 08/21/18 1805   BP: (!) 131/96 122/86   Pulse: 107    Resp: 20 18   Temp: 98.6  F (37  C)    TempSrc: Oral    SpO2: 96%    Weight: 103.4 kg (228 lb)    Height: 1.651 m (5' 5\")        Cardiac Rhythm: ,   Cardiac  Cardiac Rhythm: Normal sinus rhythm    Pain level: 0-10 Pain Scale: 6    Is this patient confused?: No   Teller - Suicide Severity Rating Scale Completed?  Yes  If yes, what color did the patient score?  White    Patient Report: Initial Complaint: Pt presents to the ED today after having an out patient CT scan that revealed multiple masses and a possible PE.  Focused Assessment: Pt presents to the ED complaining of worsening SOB with activity since April, as well as bouts were she has coughed up timur red blood on Sunday and Monday.  Pt currently denies pain but reports SOB with activity.  Pt VS unremarkable, O2 sats are in the high 90s on RA, lungs CTA.  Pt in NSR on cardiac monitor.  Pt alert and oriented x4 and cooperative with cares.    Tests Performed: Outpatient CT scan from previously today reads as follows:Right lower lobe pulmonary mass that is either directly invading the right lower lobe pulmonary artery and/or causing thrombusof the right lower lobe pulmonary artery. Mild mediastinal adenopathy.  [Critical Result: Pulmonary embolism versus direct invasion of right lower lobe pulmonary artery with tumor.]  INR, Hep panel, PTT, " and type and cross unremarkable.  Abnormal Results: See above  Treatments provided: 18g IV placed in right AC.    Family Comments: None    OBS brochure/video discussed/provided to patient: N/A    ED Medications: Medications - No data to display    Drips infusing?:  No    For the majority of the shift this patient was Green.   Interventions performed were NA.    Severe Sepsis OR Septic Shock Diagnosis Present: No      ED NURSE PHONE NUMBER: [phone]

## 2018-08-21 NOTE — ED PROVIDER NOTES
History     Chief Complaint:  Shortness of Breath     HPI   Yvette Gastelum is a 67 year old female with a history of tobacco use who presents to the emergency department today for evaluation of shortness of breath. Per chart review, the patient was seen by her primary care provider today for hemoptysis and shortness of breath. A CT was obtained as noted below and patient was immediately referred to the ED. Patient reports that she has been feeling short of breath since May and had one bout of coughing up blood in May as well. Since then, she says her shortness of breath has worsened with activity and is associated with sweating. She describes that she saw her primary care provider today because she was coughing up quarter sized amount of bloody sputum on Sunday and Monday but notes she had no blood today. She also notes mid back pain that worsens when taking a deep breath and radiates to her chest. Patient says she stopped smoking cigarettes three years ago.     CT Chest Pulmonary Embolism with Contrast   Right lower lobe pulmonary mass that is either directly  invading the right lower lobe pulmonary artery and/or causing thrombus  of the right lower lobe pulmonary artery. Mild mediastinal adenopathy.  Jimy Bailey MD     Allergies:  No Known Drug Allergies      Medications:    Albuterol  Aspirin  Lipitor  Hydrodiuril     Past Medical History:    Hypertension   Kidney stones   Recurrent UTI   Sepsis      Past Surgical History:    Hysterectomy     Family History:    Father: Aortic aneurysm  Mother: Cervical cancer    Social History:  Smoking Status: Former Smoker   Quit date: 9/1/2015  Smokeless Tobacco: Never Used  Alcohol Use: Negative    Marital Status:       Review of Systems   Constitutional: Positive for diaphoresis.   Respiratory: Positive for shortness of breath.         Hemoptysis    Musculoskeletal: Positive for back pain (mid ).   All other systems reviewed and are negative.    Physical Exam  "    Patient Vitals for the past 24 hrs:   BP Temp Temp src Pulse Heart Rate Resp SpO2 Height Weight   08/21/18 1926 - - - - 97 9 98 % - -   08/21/18 1918 (!) 156/99 - - - 96 24 97 % - -   08/21/18 1845 - - - - 97 (!) 41 97 % - -   08/21/18 1830 112/73 - - - - - - - -   08/21/18 1805 122/86 - - - 103 18 - - -   08/21/18 1734 (!) 131/96 98.6  F (37  C) Oral 107 - 20 96 % 1.651 m (5' 5\") 103.4 kg (228 lb)     Physical Exam  General: Well appearing, nontoxic. Resting comfortably  Head:  Scalp, face, and head appear normal  Eyes:  Pupils are equal, round, and reactive to light    Conjunctivae non-injected and sclerae white  ENT:    The external nose is normal    Pinnae are normal    The oropharynx is normal, mucous membranes moist    Posterior pharynx clear without swelling, exudates or erythema. No mucosal lesions, tongue or lip swelling. No tongue elevation. Uvula normal without deviation. Voice normal. No drooling or trismus.     Uvula is in the midline  Neck:  Normal range of motion    There is no rigidity noted    Trachea is in the midline  CV:  Tachycardic rate, regular rhythm     Normal S1/S2, no S3/S4    No murmur or rub. Radial pulses 2+ bilaterally   Resp:  Lungs are equal bilaterally    There is no tachypnea    No increased work of breathing    No rales, wheezing. Inspiratory rhonchi right lower lung fields.   GI:  Abdomen is soft, no rigidity or guarding    No distension, or mass    No tenderness or rebound tenderness   MS:  Normal muscular tone    Symmetric motor strength    No lower extremity edema  Skin:  No rash or acute skin lesions noted  Neuro: Awake and alert    Speech is normal and fluent    Moves all extremities spontaneously  Psych:  Normal affect.  Appropriate interactions.     Emergency Department Course     ECG:  ECG taken at 1741, ECG read at 1800  Sinus tachycardia  Low voltage QRS  Borderline ECG  Rate 103 bpm. MO interval 170 ms. QRS duration 92 ms. QT/QTc 352/461 ms. P-R-T axes 48 -3 " 45.    Laboratory:  Laboratory findings were communicated with the patient who voiced understanding of the findings.    ABO/Rh type and screen: ABO: A, Rh(D): Pos, Antibody Screen: Neg  INR: 1.03  Partial thromboplastin time: 33  Hepatic panel: AST 75(H) o/w WNL     Emergency Department Course:    1739 Nursing notes and vitals reviewed.    1741 EKG performed as noted above.     1744 I performed an exam of the patient as documented above.     1758 I spoke with Dr. Norris of the thoracic surgery service from [facility] regarding patient's presentation, findings, and plan of care.    1819 I spoke with Dr. Dudley of the hospitalist service from [facility] regarding patient's presentation, findings, and plan of care.    1944 I personally reviewed the lab and imaging results with the patient and answered all related questions prior to admit.    Impression & Plan      Medical Decision Making:  Yvette Gastelum is a 67 year old female who presents from referral from Dr. House after large right lower lube lung mass with possible arterial involvement versus thrombosis was seen on outpatient CT scan of the chest today. As noted above patient has been having subacute worsening shortness of breath with pleuritic chest pain and had hemoptysis this past weekend which has been small volume therefore lab studies and CTA of the chest was obtained which is concerning for a large mass with possible erosion into the right pulmonary artery verus thrombosis in this location. Patient is hemodynamically stable, well appearing, afebrile, no significant work of breathing or hypoxia. She has not had any hemoptysis today or in the emergency department. I discussed the case with Dr. Norris of thoracic surgery who feels that aside from the large tumor there does not appear to be an acute process that would require emergent intervention at this time. He agreed with hospital admission and observation as well as considering  workup for possible etiology of this mass which is likely cancer. I discussed the CT findings with the patient and showed her the CT scan images of the mass. Patient is agreeable with the plan of admission. Laboratory studies obtained as an outpatient today were reviewed and reveal a normal hemoglobin and are essentially otherwise unremarkable. Type and screen and LFT's were added and case was discussed with the hospitalist. Patient was admitted for further evaluation and treatment in stable condition.     Diagnosis:    ICD-10-CM    1. Hemoptysis R04.2 ABO/Rh type and screen     INR     Partial thromboplastin time     Hepatic panel     Hepatic panel   2. Mass of lower lobe of right lung R91.8    3. Shortness of breath R06.02      Disposition:   The patient is admitted into the care of Dr. Dudley.    Scribe Disclosure:  I, Nichol Layne, am serving as a scribe at 5:45 PM on 8/21/2018 to document services personally performed by Neville Sheldon MD based on my observations and the provider's statements to me.       EMERGENCY DEPARTMENT       Neville Sheldon MD  08/22/18 0813

## 2018-08-22 ENCOUNTER — TELEPHONE (OUTPATIENT)
Dept: FAMILY MEDICINE | Facility: CLINIC | Age: 68
End: 2018-08-22

## 2018-08-22 ENCOUNTER — APPOINTMENT (OUTPATIENT)
Dept: CT IMAGING | Facility: CLINIC | Age: 68
DRG: 181 | End: 2018-08-22
Attending: INTERNAL MEDICINE
Payer: COMMERCIAL

## 2018-08-22 ENCOUNTER — APPOINTMENT (OUTPATIENT)
Dept: MRI IMAGING | Facility: CLINIC | Age: 68
DRG: 181 | End: 2018-08-22
Attending: INTERNAL MEDICINE
Payer: COMMERCIAL

## 2018-08-22 LAB
ALBUMIN SERPL-MCNC: 3.1 G/DL (ref 3.4–5)
ALP SERPL-CCNC: 88 U/L (ref 40–150)
ALT SERPL W P-5'-P-CCNC: 28 U/L (ref 0–50)
ANION GAP SERPL CALCULATED.3IONS-SCNC: 8 MMOL/L (ref 3–14)
AST SERPL W P-5'-P-CCNC: 66 U/L (ref 0–45)
BILIRUB SERPL-MCNC: 0.8 MG/DL (ref 0.2–1.3)
BUN SERPL-MCNC: 15 MG/DL (ref 7–30)
CALCIUM SERPL-MCNC: 8.5 MG/DL (ref 8.5–10.1)
CHLORIDE SERPL-SCNC: 108 MMOL/L (ref 94–109)
CO2 SERPL-SCNC: 26 MMOL/L (ref 20–32)
CREAT SERPL-MCNC: 0.96 MG/DL (ref 0.52–1.04)
ERYTHROCYTE [DISTWIDTH] IN BLOOD BY AUTOMATED COUNT: 13.4 % (ref 10–15)
GFR SERPL CREATININE-BSD FRML MDRD: 58 ML/MIN/1.7M2
GLUCOSE SERPL-MCNC: 104 MG/DL (ref 70–99)
HCT VFR BLD AUTO: 35.9 % (ref 35–47)
HGB BLD-MCNC: 11.8 G/DL (ref 11.7–15.7)
MAGNESIUM SERPL-MCNC: 2 MG/DL (ref 1.6–2.3)
MCH RBC QN AUTO: 30 PG (ref 26.5–33)
MCHC RBC AUTO-ENTMCNC: 32.9 G/DL (ref 31.5–36.5)
MCV RBC AUTO: 91 FL (ref 78–100)
PLATELET # BLD AUTO: 160 10E9/L (ref 150–450)
POTASSIUM SERPL-SCNC: 3.6 MMOL/L (ref 3.4–5.3)
PROT SERPL-MCNC: 7.3 G/DL (ref 6.8–8.8)
RBC # BLD AUTO: 3.93 10E12/L (ref 3.8–5.2)
SODIUM SERPL-SCNC: 142 MMOL/L (ref 133–144)
WBC # BLD AUTO: 5.9 10E9/L (ref 4–11)

## 2018-08-22 PROCEDURE — 36415 COLL VENOUS BLD VENIPUNCTURE: CPT | Performed by: INTERNAL MEDICINE

## 2018-08-22 PROCEDURE — 25000132 ZZH RX MED GY IP 250 OP 250 PS 637: Performed by: INTERNAL MEDICINE

## 2018-08-22 PROCEDURE — 72157 MRI CHEST SPINE W/O & W/DYE: CPT

## 2018-08-22 PROCEDURE — 25000128 H RX IP 250 OP 636: Performed by: INTERNAL MEDICINE

## 2018-08-22 PROCEDURE — 85027 COMPLETE CBC AUTOMATED: CPT | Performed by: INTERNAL MEDICINE

## 2018-08-22 PROCEDURE — 12000000 ZZH R&B MED SURG/OB

## 2018-08-22 PROCEDURE — 99222 1ST HOSP IP/OBS MODERATE 55: CPT | Performed by: INTERNAL MEDICINE

## 2018-08-22 PROCEDURE — A9585 GADOBUTROL INJECTION: HCPCS | Performed by: INTERNAL MEDICINE

## 2018-08-22 PROCEDURE — 99232 SBSQ HOSP IP/OBS MODERATE 35: CPT | Performed by: INTERNAL MEDICINE

## 2018-08-22 PROCEDURE — 80053 COMPREHEN METABOLIC PANEL: CPT | Performed by: INTERNAL MEDICINE

## 2018-08-22 PROCEDURE — 83735 ASSAY OF MAGNESIUM: CPT | Performed by: INTERNAL MEDICINE

## 2018-08-22 PROCEDURE — 25000125 ZZHC RX 250: Performed by: INTERNAL MEDICINE

## 2018-08-22 PROCEDURE — 74177 CT ABD & PELVIS W/CONTRAST: CPT

## 2018-08-22 RX ORDER — MAGNESIUM SULFATE HEPTAHYDRATE 40 MG/ML
4 INJECTION, SOLUTION INTRAVENOUS EVERY 4 HOURS PRN
Status: DISCONTINUED | OUTPATIENT
Start: 2018-08-22 | End: 2018-08-23 | Stop reason: HOSPADM

## 2018-08-22 RX ORDER — LORAZEPAM 2 MG/ML
1 INJECTION INTRAMUSCULAR ONCE
Status: COMPLETED | OUTPATIENT
Start: 2018-08-22 | End: 2018-08-22

## 2018-08-22 RX ORDER — IOPAMIDOL 755 MG/ML
120 INJECTION, SOLUTION INTRAVASCULAR ONCE
Status: COMPLETED | OUTPATIENT
Start: 2018-08-22 | End: 2018-08-22

## 2018-08-22 RX ORDER — LOPERAMIDE HCL 2 MG
2 CAPSULE ORAL 4 TIMES DAILY PRN
Status: DISCONTINUED | OUTPATIENT
Start: 2018-08-22 | End: 2018-08-23 | Stop reason: HOSPADM

## 2018-08-22 RX ORDER — GADOBUTROL 604.72 MG/ML
10 INJECTION INTRAVENOUS ONCE
Status: COMPLETED | OUTPATIENT
Start: 2018-08-22 | End: 2018-08-22

## 2018-08-22 RX ORDER — LIDOCAINE 40 MG/G
CREAM TOPICAL
Status: DISCONTINUED | OUTPATIENT
Start: 2018-08-22 | End: 2018-08-23 | Stop reason: HOSPADM

## 2018-08-22 RX ADMIN — ACETAMINOPHEN 650 MG: 325 TABLET, FILM COATED ORAL at 01:12

## 2018-08-22 RX ADMIN — SODIUM CHLORIDE: 9 INJECTION, SOLUTION INTRAVENOUS at 06:01

## 2018-08-22 RX ADMIN — GADOBUTROL 10 ML: 604.72 INJECTION INTRAVENOUS at 16:24

## 2018-08-22 RX ADMIN — IOPAMIDOL 120 ML: 755 INJECTION, SOLUTION INTRAVENOUS at 14:36

## 2018-08-22 RX ADMIN — ATORVASTATIN CALCIUM 10 MG: 10 TABLET, FILM COATED ORAL at 20:54

## 2018-08-22 RX ADMIN — SODIUM CHLORIDE, PRESERVATIVE FREE 76 ML: 5 INJECTION INTRAVENOUS at 14:39

## 2018-08-22 RX ADMIN — LORAZEPAM 1 MG: 2 INJECTION INTRAMUSCULAR; INTRAVENOUS at 14:20

## 2018-08-22 RX ADMIN — Medication 400 MG: at 09:12

## 2018-08-22 RX ADMIN — HYDROCHLOROTHIAZIDE 25 MG: 25 TABLET ORAL at 09:12

## 2018-08-22 RX ADMIN — MAGNESIUM SULFATE HEPTAHYDRATE 2 G: 40 INJECTION, SOLUTION INTRAVENOUS at 09:08

## 2018-08-22 RX ADMIN — ACETAMINOPHEN 650 MG: 325 TABLET, FILM COATED ORAL at 17:15

## 2018-08-22 ASSESSMENT — ACTIVITIES OF DAILY LIVING (ADL)
ADLS_ACUITY_SCORE: 9

## 2018-08-22 ASSESSMENT — PATIENT HEALTH QUESTIONNAIRE - PHQ9: SUM OF ALL RESPONSES TO PHQ QUESTIONS 1-9: 5

## 2018-08-22 ASSESSMENT — PAIN DESCRIPTION - DESCRIPTORS: DESCRIPTORS: HEADACHE

## 2018-08-22 NOTE — TELEPHONE ENCOUNTER
Deepak Coleman) from [facility] called wanting to know more about the need for the pre authorization for the imaging and referral from Dr. House.  [facility] will be sending a fax over also, since they did have the wrong fax number.

## 2018-08-22 NOTE — UTILIZATION REVIEW
"  Admission Status; Secondary Review Determination         Under the authority of the Utilization Management Committee, the utilization review process indicated a secondary review on the above patient.  The review outcome is based on review of the medical records, discussions with staff, and applying clinical experience noted on the date of the review.          (x) Observation Status Appropriate - This patient does not meet hospital inpatient criteria and is placed in observation status. If this patient's primary payer is Medicare and was admitted as an inpatient, Condition Code 44 should be used and patient status changed to \"observation\".     RATIONALE FOR DETERMINATION     The severity of illness, intensity of service provided, expected LOS and risk for adverse outcome make the care appropriate for further observation; however, doesn't meet criteria for hospital inpatient admission. Dr. Garcia  notified of this determination.    This document was produced using voice recognition software.      The patient is a 67-year-old woman who has a past history significant for chronic kidney disease and essential hypertension and a 50 smoking history of 1 pack a day.  She has had issues with intermittent hemoptysis and some increasing shortness of breath when she is out walking.  She has a chronic cough.  CT scan done of her lungs does show a right middle lobe pulmonary mass that is either directly invading the right lower pulmonary artery or causing thrombus in the right lower lobe pulmonary artery with my mild mediastinal adenopathy.  Her primary care physician did not discuss and review with Dr. Vladislav Allen, thoracic surgery,  recommended admission.  Her other labs and oxygen saturation are stable at this time.  I did have several conversations  with her hospitalist, Dr. Garcia who is awaiting consultation from oncology and thoracic surgery.  She is currently not receiving care other than observation level and she " will be switched to observation status.  This can be re-reviewed and change back to inpatient if oncology or cardiothoracic surgery feels she needs more aggressive, acute tight inpatient care during this admission.    The information on this document is developed by the utilization review team in order for the business office to ensure compliance.  This only denotes the appropriateness of proper admission status and does not reflect the quality of care rendered.         The definitions of Inpatient Status and Observation Status used in making the determination above are those provided in the CMS Coverage Manual, Chapter 1 and Chapter 6, section 70.4.      Sincerely,     Alex Wilson MD  Physician Advisor  Utilization Review/ Case Management  [facility].

## 2018-08-22 NOTE — PLAN OF CARE
Problem: Breathing Pattern Ineffective (Adult)  Goal: Identify Related Risk Factors and Signs and Symptoms  Related risk factors and signs and symptoms are identified upon initiation of Human Response Clinical Practice Guideline (CPG).   Outcome: Improving  A/ o x 4.  C/o chronic pain to back declines pain medication intervention.  Tolerating full liquid det today but advance to regular for supper.  Patient COOPER but sats WDL.  Patient up independently in room.  Magnesium 2.0 on high protocol replaced this am and recheck in am.  Lung sounds diminished to bases with expiratory wheezes to upper lobes.  Possible DC in am then f/u in 5 days once off ASA for lung biopsy.

## 2018-08-22 NOTE — PLAN OF CARE
Problem: Patient Care Overview  Goal: Plan of Care/Patient Progress Review  Outcome: No Change  Pt is A&Ox4. VSS, on room air. Tele NSR. C/o 8/10 back pain, declined medication. C/o headache 3/10, given PRN Tylenol, effective. Full liquid diet. SBA, voids in bathroom. Dyspnea on excertion. NS infusing 100 mL/hr. Plan: consult w/ hem/onc and thoracic surgery

## 2018-08-22 NOTE — PLAN OF CARE
Problem: Patient Care Overview  Goal: Plan of Care/Patient Progress Review  Outcome: No Change  Note from [id number]:  A&Ox4. Pt. denies SOB in bed, but dyspneic with activity. VSS. Pt. c/o back pain, but declined medication. Pt. denies nausea. NS infusing at 100mL/hr. Full liquid diet. Voiding spontaneously. Up with SBA. Hem/Onc consulted. No acute events. Pt. Rested comfortably between cares. Will continue POC and notify MD with changes.

## 2018-08-22 NOTE — PROGRESS NOTES
[facility]    Hospitalist Progress Note    Date of Service (when I saw the patient): 08/22/2018    Assessment & Plan   Yvette Gastelum is a 67 year old female who was admitted on 8/21/2018 with a lung mass and hemoptysis.    1. Lung mass.  Concerning for malignancy.  Awaiting consults from Oncology and Thoracic Surgery.      2. Hemoptysis.  No recurrence since Monday afternoon.  ASA on hold.    3. HTN.  Continue hydrochlorothiazide.    4. HL.  Continue Lipitor.    # Pain Assessment:  Current Pain Score 8/22/2018   Patient currently in pain? sleeping: patient not able to self report   Pain score (0-10) -   Pain location -   Pain descriptors -   Yvette sanchez pain level was assessed and she currently denies pain.        DVT Prophylaxis: Pneumatic Compression Devices  Code Status: Full Code    Disposition: Expected discharge in 1-3 days.    Pradip Garcia  Text Page (7 am to 6 pm)    Interval History   The patient is resting comfortably in bed.  No acute complaints.    -Data reviewed today: I reviewed all new labs and imaging results over the last 24 hours. I personally reviewed no images or EKG's today.    Physical Exam   Temp: 95.6  F (35.3  C) Temp src: Oral BP: 108/57 Pulse: 107 Heart Rate: 76 Resp: 18 SpO2: 97 % O2 Device: None (Room air)    Vitals:    08/21/18 1734 08/22/18 0715   Weight: 103.4 kg (228 lb) 108.4 kg (238 lb 14.4 oz)     Vital Signs with Ranges  Temp:  [95.6  F (35.3  C)-98.6  F (37  C)] 95.6  F (35.3  C)  Pulse:  [107] 107  Heart Rate:  [] 76  Resp:  [9-41] 18  BP: (108-156)/(57-99) 108/57  SpO2:  [94 %-98 %] 97 %  I/O last 3 completed shifts:  In: 1406 [P.O.:240; I.V.:1166]  Out: 300 [Urine:300]    Gen: Well nourished, well developed, alert and oriented x 3, no acute distressed  HEENT: Atraumatic, normocephalic  Lungs: Clear to ausculation without wheezes, rhonchi, or rales  Heart: Regular rate and rhythm, no murmurs, gallops, or rubs  GI: Bowel sound normal, no  hepatosplenomegaly or masses  Lymph: No lymphadenopathy or edema  Skin: No rashes     Medications     sodium chloride 100 mL/hr at 08/22/18 0601       atorvastatin  10 mg Oral QPM     hydrochlorothiazide  25 mg Oral Daily     sodium chloride (PF)  3 mL Intracatheter Q8H       Data     Recent Labs  Lab 08/22/18  0715 08/21/18  1743 08/21/18  0954   WBC 5.9  --  7.3   HGB 11.8  --  13.3   MCV 91  --  92     --  197   INR  --  1.03  --      --  139   POTASSIUM 3.6  --  4.0   CHLORIDE 108  --  107   CO2 26  --  24   BUN 15  --  20   CR 0.96  --  1.02   ANIONGAP 8  --  8   RUSTY 8.5  --  9.4   *  --  98   ALBUMIN 3.1* 3.7  --    PROTTOTAL 7.3 8.4  --    BILITOTAL 0.8 0.6  --    ALKPHOS 88 98  --    ALT 28 37  --    AST 66* 75*  --        Recent Results (from the past 24 hour(s))   CT Chest Pulmonary Embolism w Contrast   Result Value    Radiologist flags Pulmonary embolism versus direct invasion of right (AA)    Narrative    CT CHEST PULMONARY EMBOLISM WITH CONTRAST  8/21/2018 3:34 PM     HISTORY: Hemoptysis and cough.     COMPARISON: None.    TECHNIQUE: Volumetric helical acquisition of CT images of the chest  from the lung apices to the kidneys were acquired after the  administration of 79 mL Isovue-370 IV contrast. Radiation dose for  this scan was reduced using automated exposure control, adjustment of  the mA and/or kV according to patient size, or iterative  reconstruction technique.    FINDINGS: There is a mass in the right lower lobe measuring 7.4 x 5.1  cm. The right lower lobe pulmonary artery appears cut off by the mass,  it is unclear if this is direct invasion and/or thrombus. At least  some thrombus could be present. Mildly prominent mediastinal lymph  nodes are noted, one adjacent aorta measures 1.4 x 1.0 cm. A  subcarinal lymph node measures 2.1 x 1.6 cm. The right lower lobe  bronchus is also occluded. The right middle lobe bronchus is narrowed.  The heart is not enlarged. Thoracic  aorta is atherosclerotic without  evidence of dissection or aneurysm. There is no pleural or pericardial  effusion.  There is no pneumothorax. Adrenal glands are normal.  Remainder of the visualized upper abdomen is unremarkable.      Impression    IMPRESSION: Right lower lobe pulmonary mass that is either directly  invading the right lower lobe pulmonary artery and/or causing thrombus  of the right lower lobe pulmonary artery. Mild mediastinal adenopathy.    [Critical Result: Pulmonary embolism versus direct invasion of right  lower lobe pulmonary artery with tumor.]    Finding was identified on 8/21/2018 3:34 PM.     Dr. OLMAN GUO was contacted by me at 8/21/2018 3:49 PM  and verbalized understanding of the critical finding.     BRANDT BARNEY MD

## 2018-08-22 NOTE — PROGRESS NOTES
RECEIVING UNIT ED HANDOFF REVIEW    ED Nurse Handoff Report was reviewed by: Kathy Nguyen on August 21, 2018 at 7:27 PM

## 2018-08-22 NOTE — CONSULTS
Oncology In -Patient Consult     Date:  08/22/2018      CHIEF COMPLAINT AND REASON FOR VISIT:   Right lower lobe mass.   The patient was admitted on 08/21/2016 for short of breath and status post hemoptysis prior to admission.      REQUESTING PHYSICIAN:  Hospitalist Service from Dr. Pradip Molina.      HISTORY OF PRESENT ILLNESS:  A 67-year-old female has  chronic kidney disease due to recurrent urinary tract infection, hypertension, 50 year history of smoking history, quit about 3 years ago, presented with gradually increasing dyspnea on exertion, decreased exercise tolerance.  She had 1 episode of hemoptysis in 05/2018, had according to the patient 10-15 times of hemoptysis on last Sunday and 1 episode on Mon, presented to the ER 08/21/2018 for further evaluation.  Her last dose of baby aspirin stay was on the day of admission.    CT scan identified large right lower lobe mass 7.4 cm.  Right lower lobe pulmonary artery appeared to be cut off by the mass.  It is not clear if this is direct invasion or thrombosis.  Mild prominent mediastinal lymph nodes are noticed, including subcarinal nodes at 2.1 cm.  The right lower lobe bronchus is also occluded right middle lobe bronchus is narrowed.      e is admitted for observation and possible workup.  Since admission, the patient has no episode of hemoptysis.      PAST MEDICAL HISTORY:  Hypertension, kidney stone, recurrent UTI, which led to renal insufficiency,  history of sepsis secondary to UTI      ALLERGIES:  MEDICATION ALLERGY REVIEWED IN [facility].      SOCIAL HISTORY:  She is .  She has 3 boys, 2 of them are close by.  She quit smoking 3 years ago, had 50-pack-year smoking history.      FAMILY HISTORY:  Mother had cervical cancer.  She lived a long age.  Father had gastric cancer, maybe also lung cancer, details unknown.      REVIEW OF SYSTEMS:   GENERAL: She denies any weight loss.  She had positive night sweats.  She has frequent UTI and episode of  "hemoptysis. No weight change, baseline energy level, ECOG 0:1, mid back pain 4-5/10.  NEURO:   No headache, double vision, or focal weakness.  No neuropathy.   SKIN:  No chronic skin rash or skin infection.   CARDIOVASCULAR:  Hypertension. No palpitations, no chest pressure, no dyspnea on exertion.   PULMONARY:  Gradually increasing dyspnea on exertion, found to have large right lower lobe mass 08/2018.  She also had hemoptysis 2 days prior to admission. No shortness of breath, no pleurisy, no cough.   GI:  No abdominal pain, nausea, vomiting, constipation.  No diarrhea.  No bright red blood per rectum.   :  No urgency, frequency.  Recurrent urinary tract infection plus renal insufficiency and kidney stone.   RHEUMATOLOGY/MUSCULOSKELETAL SYSTEM:  No arthritis.  No joint pain.  No muscle ache. The patient reports mid back pain, probably for weeks, not by months.     ENDOCRINE:  No history of diabetes or thyroid problem.  No complaints of hot flashes.   HEMATOLOGY:  No history of bleeding or thrombosis episode.   IMMUNOLOGY:  No recurrent fever or chills episode.  No recurrent infectious episode.   BREASTS/GYN:  No history of vaginal bleeding/discharge/dryness.  No breast pain or nipple discharge.       PHYSICAL EXAMINATION:   VITAL SIGNS:  Blood pressure 108/57, pulse 107, temperature 95.6  F (35.3  C), temperature source Oral, resp. rate 18, height 1.651 m (5' 5\"), weight 108.4 kg (238 lb 14.4 oz), SpO2 97 %, not currently breastfeeding.    ECOG 0-1    GENERAL APPEARANCE:  A middle-aged woman, looks very pleasant, not in acute distress.   HEENT: The patient is normocephalic, atraumatic. Mild ptosis noticed on the left side.  No miosis, no anhidrosis. Pupils are equally reactive to light.  Sclerae are anicteric.  Moist oral mucosa.  Negative pharynx.  No oral thrush.   NECK:  Supple.  No jugular venous distention.  Thyroid is not palpable.   LYMPH NODES:  Superficial lymphadenopathy is not appreciable in the " bilateral cervical, supraclavicular, axillary or inguinal areas.   CARDIOVASCULAR:  S1, S2 regular with no murmurs or gallops.  No carotid or abdominal bruits.   PULMONARY:  High pitched breath sounds on the right side, indicating of bronchus narrowing, no obvious rhonchi or crackles   GASTROINTESTINAL:  Abdomen is soft, nontender.  No hepatosplenomegaly.  No signs of ascites.  No mass appreciable.   MUSCULOSKELETAL/EXTREMITIES:  No edema.  No cyanotic changes.  No signs of joint deformity.  No lymphedema.  No spinal or paraspinal tenderness.  No CVA tenderness. The  patient has pain on palpation mid thoracic spine area.   NEUROLOGIC:  Cranial nerves II-XII are grossly intact.  Sensation intact.  Muscle strength and muscle tone symmetrical, 5/5 throughout.   SKIN:  No petechiae.  No rash.  No signs of cellulitis.          CURRENT LABORATORY DATA REVIEWED:    CBC without diff is normal.  Magnesium is normal.    CMP indicating normal kidney function, a touch elevation of AST at 66, bilirubin is normal.      CURRENT IMAGE DATA REVIEWED  CT scan identified large right lower lobe mass 7.4 cm.  Right lower lobe pulmonary artery appeared to be cut off by the mass.  It is not clear if this is direct invasion or thrombosis.  Mild prominent mediastinal lymph nodes are noticed, including subcarinal nodes at 2.1 cm.  The right lower lobe bronchus is also occluded right middle lobe bronchus is narrowed.         OLD DATA REVIEWED WITH SUMMARY:    03/2017 chest x-ray that was negative.        ASSESSMENT AND PLAN:   1.  67 years old female who presented with a months' duration of worsening dyspnea on exertion, hemoptysis prior to admission.  On admission 08/21, found to have large right lower lobe pulmonary mass 7.4 cm causing narrowing or cut off pulmonary artery,  and right lower lobe bronchus is occluded, right middle lobe bronchus narrowed.  She is a former 50-pack-year smoker, quit 3 years ago.      I discussed the case with  Dr. Sequeira and the radiologist, Dr. Leach.  The quicker and less invasive diagnostic procedure will be CT-guided right lower lobe biopsy.  Unfortunately, patient's last dose of aspirin was yesterday on admission.  This will delay the biopsy./       She is advised to hold her aspirin until the biopsy is done.      When the path is available, will do further staging staging work up e.g. outpt PET.   While she is here, can proceed with CT a/p.      Final oncology recommendation will be based on the path. Base on current data, this is not a resectable early stage.     2.  New mid thoracic pain has been going on for weeks, probably not months, as part of her cancer workup recommend T-spine MRI.      3.  New slight ptosis on the left side without miosis or anhidrosis on exam.   She has no neurologic deficit.  Will hold off brain MRI or other  neurologic workup at this point.     4.  New hemoptysis, last episode was 2 days ago. stay prior to admission.   The most episodes she had was on  prior to admission.  She has no active hemoptysis since admission.  She is hemodynamically stable on observation with nl Hb at this point.   If hemoptysis happens again, she may benefit from diagnostic and therapeutic bronchoscopy.    She is to hold aspirin and no NSAID.       The above plan was discussed with Dr. Molina.         THU SAWYER MD             D: 2018   T: 2018   MT: JEFF      Name:     BISHNU LEY   MRN:      [id number]        Account:       [id number]   :      1950           Consult Date:  2018      Document: [id number]

## 2018-08-22 NOTE — PROGRESS NOTES
Here is the result of your recent CT scan - as per our discussion we will plan to evaluate further in the hospital

## 2018-08-22 NOTE — H&P
[facility]    History and Physical  Hospitalist       Date of Admission:  8/21/2018    Cumulative Summary:  Yvette Gastelum is a 67 year old female who with past medical history significant for chronic kidney disease stage III, essential hypertension, 50 years history of smoking about 1 pack per day, quit his smoking 3 years ago, also have history of recurrent urinary tract infection who was admitted from the emergency room with a new diagnosis of right lower lobe lung mass invading to right pulmonary artery with recent episodes of hemoptysis.  Patient also have issues with shortness of breath on exertion with significant decrease in exertional capacity in the last month.      Assessment & Plan   Principal Problem:  Right lower lobe lung mass (8/21/2018): Patient is found to have a mass in the right lower lobe measuring 7.4 X 5.1 cm the right lower lobe pulmonary artery appears cut off by the mass and it is unclear if this is direct invasion or thrombus.  Mildly prominent mediastinal lymph nodes are also present one near aorta measuring 1.4 into 1.0 cm.  Subcranial lymph node measuring 2.1 into 1.6 cm.  The right lower lobe bronchus is also occluded.  The right middle lobe bronchus is narrowed.  There is no pleural or pericardial effusion.  Patient has been dealing with ongoing hemoptysis especially 7-8 episodes on Sunday and couple of episodes on Monday she does not have any further hemoptysis since then.  Case was also reviewed with  from thoracic surgery who recommended admission to hospital   Hemoptysis (8/21/2018): Most likely sec to above , although there is some concern for thrombus considering patient ongoing hemoptysis at this time patient should not be started on anticoagulation before discussing with thoracic surgery and oncology.  Her hemoglobin was stable at 13.3    --Admit patient to general medical floor with telemetry.  --Start patient on full liquid diet considering  patient has noticed some episodes of feeling of food stuck in her throat which might be most likely secondary to mass-effect from this lung mass.  --Activity as tolerated.  --Start patient on gentle hydration as patient has received contrast in the picture of her chronic kidney disease her GFR was 54.  --We will consult thoracic surgery and oncology for further evaluation regarding biopsy and if there is high concern for thrombus and patient needs to be started on any type of anticoagulation.  --Patient should not be started on anticoagulation until case is reviewed by thoracic surgery and oncology as if patient pulmonary arteries invaded patient is at risk for significant bleeding.  --Check her INR and PTT    Kidney stones ()  Benign essential hypertension (12/4/2015): Patient is taking hydrochlorothiazide at home  Hyperlipidemia LDL goal <130 (1/25/2016)  CKD (chronic kidney disease) stage 3, GFR 30-59 ml/min (9/1/2016)    --Start patient on her home medication of Lipitor 10 mg p.o. daily and hydrochlorothiazide 25 mg p.o. daily.  --I will hold patient aspirin at this point in   Anticipation for plan for any biopsy and ongoing hemoptysis.  --Continue her home dose of mag oxide 400 mg p.o. daily.    # Pain Assessment:  Current Pain Score 8/21/2018   Pain score (0-10) 6   Pain location -   - Yvette is experiencing pain due to joint pain. Pain management was discussed and the plan was created in a collaborative fashion.  Yvette's response to the current recommendations: engaged  --At this point Tylenol is ordered for mild to moderate pain, and oxycodone is ordered for moderate to severe pain she is also ordered some stool softeners with MiraLAX.    DVT Prophylaxis: Pneumatic Compression Devices and Anti-embolisim stockings (TEDs)  Code Status: Full Code    Disposition: Expected discharge in next couple of days once her diagnostic workup is done and she is ok to be discharged from thoracic surgery and oncology point  of view  One of my hospitalist colleague will assume the care from tomorrow morning     Glory Dudley MD,[facility]    Primary Care Physician   Arie House MD    Chief Complaint   Shortness of breath on exertion along with hemoptysis for the last 2 days.    History is obtained from the patient    Patient Active Problem List   Diagnosis     Sepsis secondary to UTI (H)     Simple chronic bronchitis (H)     Kidney stones     Anemia     Benign essential hypertension     Iron deficiency anemia     Hyperlipidemia LDL goal <130     CKD (chronic kidney disease) stage 3, GFR 30-59 ml/min     Right lower lobe lung mass     Hemoptysis       History of Present Illness   Yvette Gastelum is a 67 year old female who presents with past medical history significant for chronic kidney disease, essential hypertension, 50 years history of smoking for 1 pack per day but quit his smoking 3 years ago, also have history of recurrent urinary tract infection who was admitted from the emergency room with the above complaint.    Patient is a 67-year-old female who was in her usual state of health but noticed that she was dealing with bronchitis throughout the winter but her symptoms did not get better even in April of this year.  Patient has a started to notice significant progressive shortness of breath on exertion she has noticed significant decline in her exertional capacity she used to swim almost 5 days a week for losing weight but she is now down to only 2 days a week, she still is able to get out of the house and is able to walk her dog 4-5 times a day but she has to do it very slowly otherwise she starts to get short of breath quickly.  She is denying any chest pain or palpitations.  She always have chronic cough which she attributed to smoker's cough.  She  had one episode of hemoptysis in May and did get appointment with her primary care physician which was in 2 weeks time.  As she did not have another episode of hemoptysis she  canceled her appointment but 2 days ago on Sunday she noticed that she had almost 8-9 episodes of hemoptysis and almost couple of episodes on Monday she did call her primary care physician and was seen this morning who ordered CT scan of the chest.  After getting the CT scan of the chest patient left the hospital and reached home after doing some grocery shopping and noticed they were for this because from Dr. House.  Her CT scan was concerning for right lower lobe pulmonary mass that is either directly invading the right lower lobe pulmonary artery or causing thrombus of the right lower lobe pulmonary artery with mild mediastinal adenopathy.  Case was also reviewed with thoracic surgery from the emergency room who recommended admission to the hospital for further evaluation and management.  Patient is otherwise denying any other complaints.    Of note her family history is positive for mother with a history of cervical cancer, father had cancer of the stomach and lung, one of her older brother was diagnosed with renal cancer and has undergone treatment her other brother is healthy.    Past Medical History    I have reviewed this patient's medical history and updated it with pertinent information if needed.   Past Medical History:   Diagnosis Date     Hypertension      Kidney stones      Recurrent UTI      S/P CL-BSO      Sepsis (H)     secondary to uti        Past Surgical History   I have reviewed this patient's surgical history and updated it with pertinent information if needed.  Past Surgical History:   Procedure Laterality Date     COLONOSCOPY       COLONOSCOPY N/A 2/10/2016    Procedure: COMBINED COLONOSCOPY, SINGLE OR MULTIPLE BIOPSY/POLYPECTOMY BY BIOPSY;  Surgeon: Antonia Jiménez MD;  Location:  GI     GYN SURGERY      ABD       Prior to Admission Medications   Prior to Admission Medications   Prescriptions Last Dose Informant Patient Reported? Taking?   ACETAMINOPHEN PO prn Self Yes Yes   Sig:  Take 325-650 mg by mouth every 6 hours as needed for pain   VITAMIN E COMPLEX PO 8/21/2018 at Unknown time Self Yes Yes   Sig: Take 400 Units by mouth daily    albuterol (2.5 MG/3ML) 0.083% neb solution prn Self No Yes   Sig: Take 1 vial (2.5 mg) by nebulization every 4 hours as needed for shortness of breath / dyspnea or wheezing   aspirin 81 MG chewable tablet 8/21/2018 at Unknown time Self No Yes   Sig: Take 1 tablet (81 mg) by mouth daily   atorvastatin (LIPITOR) 10 MG tablet 8/21/2018 at Unknown time Self No Yes   Sig: TAKE 1 TABLET EVERY DAY   hydrochlorothiazide (HYDRODIURIL) 25 MG tablet 8/21/2018 at Unknown time Self No Yes   Sig: TAKE 1 TABLET EVERY DAY (INCREASED DOSE)   magnesium oxide (MAG-OX) 400 (240 Mg) MG tablet 8/21/2018 at Unknown time Self Yes Yes   Sig: Take 400 mg by mouth daily (for muscle cramps associated with lipitor)   multivitamin, therapeutic with minerals (MULTI-VITAMIN) TABS 8/21/2018 at Unknown time Self Yes Yes   Sig: Take 1 tablet by mouth daily      Facility-Administered Medications: None     Allergies   No Known Allergies    Social History   I have reviewed this patient's social history and updated it with pertinent information if needed. Yvette Gastelum  reports that she quit smoking about 2 years ago. She has never used smokeless tobacco. She reports that she does not drink alcohol or use illicit drugs.    Family History   I have reviewed this patient's family history and updated it with pertinent information if needed.   Family History   Problem Relation Age of Onset     Aneurysm Father      Aorta     Cervical Cancer Mother        Review of Systems   CONSTITUTIONAL:  positive for fatigue and generalized weakness.  EYES:  negative for blurred vision and visual disturbance  HEENT:  negative for hoarseness and voice change  RESPIRATORY: Positive for cough with sputum, dyspnea,no wheezing and chest pain  CARDIOVASCULAR:  negative for chest pain, palpitations, orthopnea, exertional  chest pressure/discomfort  GASTROINTESTINAL: Negative for abd pain, nausea , vomiting ,constipation and abdominal pain  GENITOURINARY: Negative for burning /urgency and frequency.  HEMATOLOGIC/LYMPHATIC: negative  ALLERGIC/IMMUNOLOGIC:  negative for drug reactions  ENDOCRINE:  negative for diabetic symptoms including polyuria, polydipsia and weight loss  MUSCULOSKELETAL:  positive for arthralgias  NEUROLOGICAL:  negative  BEHAVIOR/PSYCH: negative    Physical Exam   Temp: 98.6  F (37  C) Temp src: Oral BP: 112/73 Pulse: 107 Heart Rate: 97 Resp: (!) 41 SpO2: 97 % O2 Device: None (Room air)    Vital Signs with Ranges  Temp:  [97.2  F (36.2  C)-98.6  F (37  C)] 98.6  F (37  C)  Pulse:  [] 107  Heart Rate:  [] 97  Resp:  [18-41] 41  BP: (112-131)/(73-96) 112/73  SpO2:  [95 %-97 %] 97 %  228 lbs 0 oz    Constitutional: Awake, alert,oriented to time, place and person , cooperative, no apparent distress.Pleasent and cooperative  Eyes: Conjunctiva and pupils examined and normal.  HEENT: Moist mucous membranes, normal dentition.  Respiratory: Clear to auscultation bilaterally, no crackles or wheezing.  Cardiovascular: Regular rate and rhythm, normal S1 and S2, and no murmur noted.  GI: Soft, non-distended, non-tender, normal bowel sounds.  Lymph/Hematologic: No anterior cervical or supraclavicular adenopathy.  Skin: No rashes, no cyanosis, no edema.  Musculoskeletal: No joint swelling, erythema or tenderness.  Neurologic: Cranial nerves 2-12 intact, normal strength and sensation.  Psychiatric: Alert, oriented to person, place and time, no obvious anxiety or depression.    Data   Data reviewed today:  I personally reviewed the EKG tracing showing sinua tachycardia with low voltage QRS.    Recent Labs  Lab 08/21/18  1743 08/21/18  0954   WBC  --  7.3   HGB  --  13.3   MCV  --  92   PLT  --  197   INR 1.03  --    NA  --  139   POTASSIUM  --  4.0   CHLORIDE  --  107   CO2  --  24   BUN  --  20   CR  --  1.02    ANIONGAP  --  8   RUSTY  --  9.4   GLC  --  98   ALBUMIN 3.7  --    PROTTOTAL 8.4  --    BILITOTAL 0.6  --    ALKPHOS 98  --    ALT 37  --    AST 75*  --        Imaging:  Recent Results (from the past 24 hour(s))   CT Chest Pulmonary Embolism w Contrast   Result Value    Radiologist flags Pulmonary embolism versus direct invasion of right (AA)    Narrative    CT CHEST PULMONARY EMBOLISM WITH CONTRAST  8/21/2018 3:34 PM     HISTORY: Hemoptysis and cough.     COMPARISON: None.    TECHNIQUE: Volumetric helical acquisition of CT images of the chest  from the lung apices to the kidneys were acquired after the  administration of 79 mL Isovue-370 IV contrast. Radiation dose for  this scan was reduced using automated exposure control, adjustment of  the mA and/or kV according to patient size, or iterative  reconstruction technique.    FINDINGS: There is a mass in the right lower lobe measuring 7.4 x 5.1  cm. The right lower lobe pulmonary artery appears cut off by the mass,  it is unclear if this is direct invasion and/or thrombus. At least  some thrombus could be present. Mildly prominent mediastinal lymph  nodes are noted, one adjacent aorta measures 1.4 x 1.0 cm. A  subcarinal lymph node measures 2.1 x 1.6 cm. The right lower lobe  bronchus is also occluded. The right middle lobe bronchus is narrowed.  The heart is not enlarged. Thoracic aorta is atherosclerotic without  evidence of dissection or aneurysm. There is no pleural or pericardial  effusion.  There is no pneumothorax. Adrenal glands are normal.  Remainder of the visualized upper abdomen is unremarkable.      Impression    IMPRESSION: Right lower lobe pulmonary mass that is either directly  invading the right lower lobe pulmonary artery and/or causing thrombus  of the right lower lobe pulmonary artery. Mild mediastinal adenopathy.    [Critical Result: Pulmonary embolism versus direct invasion of right  lower lobe pulmonary artery with tumor.]    Finding was  identified on 8/21/2018 3:34 PM.     Dr. OLMAN GUO was contacted by me at 8/21/2018 3:49 PM  and verbalized understanding of the critical finding.     BRANDT BARNEY MD

## 2018-08-23 VITALS
WEIGHT: 238.9 LBS | HEIGHT: 65 IN | HEART RATE: 107 BPM | DIASTOLIC BLOOD PRESSURE: 70 MMHG | SYSTOLIC BLOOD PRESSURE: 100 MMHG | BODY MASS INDEX: 39.8 KG/M2 | TEMPERATURE: 95.9 F | OXYGEN SATURATION: 94 % | RESPIRATION RATE: 16 BRPM

## 2018-08-23 LAB
HGB BLD-MCNC: 11.9 G/DL (ref 11.7–15.7)
MAGNESIUM SERPL-MCNC: 2.3 MG/DL (ref 1.6–2.3)

## 2018-08-23 PROCEDURE — 99239 HOSP IP/OBS DSCHRG MGMT >30: CPT | Performed by: INTERNAL MEDICINE

## 2018-08-23 PROCEDURE — 85018 HEMOGLOBIN: CPT | Performed by: INTERNAL MEDICINE

## 2018-08-23 PROCEDURE — 36415 COLL VENOUS BLD VENIPUNCTURE: CPT | Performed by: INTERNAL MEDICINE

## 2018-08-23 PROCEDURE — 25000132 ZZH RX MED GY IP 250 OP 250 PS 637: Performed by: INTERNAL MEDICINE

## 2018-08-23 PROCEDURE — 83735 ASSAY OF MAGNESIUM: CPT | Performed by: INTERNAL MEDICINE

## 2018-08-23 RX ADMIN — HYDROCHLOROTHIAZIDE 25 MG: 25 TABLET ORAL at 08:19

## 2018-08-23 RX ADMIN — ACETAMINOPHEN 650 MG: 325 TABLET, FILM COATED ORAL at 08:19

## 2018-08-23 ASSESSMENT — PAIN DESCRIPTION - DESCRIPTORS: DESCRIPTORS: HEADACHE

## 2018-08-23 ASSESSMENT — ACTIVITIES OF DAILY LIVING (ADL)
ADLS_ACUITY_SCORE: 9

## 2018-08-23 NOTE — PLAN OF CARE
Problem: Patient Care Overview  Goal: Plan of Care/Patient Progress Review  Outcome: No Change   A/O x4 VSS,Sats mid 90's RA-cont pulse Ox. Slept well-No Hemoptysis this shift. Lungs diminished Hgb 11.9. Denied any pain. Up IND.

## 2018-08-23 NOTE — PLAN OF CARE
Problem: Patient Care Overview  Goal: Plan of Care/Patient Progress Review  Outcome: No Change  Shift Update: A&O, up independently, thoracic spine MRI today showing lungs mass but no spinal invasion, unable to perform CT Bx as pt needs to be off ASA for 5 days prior, tylenol given for headache post MRI, tele NSR, advanced to regular diet, O2 stable on RA, lungs diminished in the bases, COOPER, coughing up blood this evening, hospitalist notified, cont pulse ox, Q4hr vitals, and midnight Hgb ordered

## 2018-08-23 NOTE — DISCHARGE SUMMARY
"Discharge Summary    Yvette Gastelum MRN# [id number]   YOB: 1950 Age: 67 year old     Date of Admission:  8/21/2018  Date of Discharge:  8/23/2018  Admitting Physician:  Pradip Garcia MD  Discharge Physician:  Pradip Garcia MD  Discharging Service:  Hospitalist     Primary Provider: Arie House          Admission Diagnoses:   Shortness of breath [R06.02]  Mass of lower lobe of right lung [R91.8]  Hemoptysis [R04.2]          Discharge Diagnosis:   Patient Active Problem List   Diagnosis     Sepsis secondary to UTI (H)     Simple chronic bronchitis (H)     Kidney stones     Anemia     Benign essential hypertension     Iron deficiency anemia     Hyperlipidemia LDL goal <130     CKD (chronic kidney disease) stage 3, GFR 30-59 ml/min     Right lower lobe lung mass     Hemoptysis     Lung mass, concerning for malignancy             Condition on Discharge:       Discharge vitals: Blood pressure 100/70, pulse 107, temperature 95.9  F (35.5  C), temperature source Oral, resp. rate 16, height 1.651 m (5' 5\"), weight 108.4 kg (238 lb 14.4 oz), SpO2 94 %, not currently breastfeeding.         Gen: Well nourished, well developed, alert and oriented x 3, no acute distressed  HEENT: Atraumatic, normocephalic  Lungs: Clear to ausculation without wheezes, rhonchi, or rales  Heart: Regular rate and rhythm, no murmurs, gallops, or rubs  GI: Bowel sound normal, no hepatosplenomegaly or masses  Lymph: No lymphadenopathy or edema  Skin: No rashes    # Discharge Pain Plan:   - Patient currently has NO PAIN and is not being prescribed pain medications on discharge.            Procedures / Labs / Imaging:   Most Recent 3 CBC's:  Recent Labs   Lab Test  08/23/18   0015  08/22/18   0715  08/21/18   0954  10/11/17   1325   WBC   --   5.9  7.3  8.0   HGB  11.9  11.8  13.3  13.6   MCV   --   91  92  93   PLT   --   160  197  193      Most Recent 3 BMP's:  Recent Labs   Lab Test  08/22/18   0715  08/21/18   " 0954  12/05/17   1532   NA  142  139  142   POTASSIUM  3.6  4.0  3.8   CHLORIDE  108  107  110*   CO2  26  24  22   BUN  15  20  19   CR  0.96  1.02  1.06*   ANIONGAP  8  8  10   RUSTY  8.5  9.4  9.3   GLC  104*  98  119*     Most Recent 3 Troponin's:No lab results found.    Invalid input(s): TROP, TROPONINIES  Most Recent 3 INR's:  Recent Labs   Lab Test  08/21/18   1743   INR  1.03     Most Recent 2 LFT's:  Recent Labs   Lab Test  08/22/18   0715  08/21/18   1743   AST  66*  75*   ALT  28  37   ALKPHOS  88  98   BILITOTAL  0.8  0.6     Most Recent Cholesterol Panel:  Recent Labs   Lab Test  10/11/17   1325   CHOL  189   LDL  90   HDL  48*   TRIG  257*     Most Recent 6 Bacteria Isolates From Any Culture (See EPIC Reports for Culture Details):  Recent Labs   Lab Test  11/25/15   1749  11/25/15   1535  11/25/15   1530   CULT  >100,000 colonies/mL Escherichia coli*  No growth  No growth     Most Recent TSH, T4 and HgbA1c:   Recent Labs   Lab Test  01/25/16   0939   TSH  1.43     Results for orders placed or performed during the hospital encounter of 08/21/18   CT Abdomen Pelvis w Contrast    Narrative    CT ABDOMEN AND PELVIS WITH CONTRAST 8/22/2018 2:40 PM     HISTORY: Staging possible lung cancer.     COMPARISON: None.    TECHNIQUE: Volumetric helical acquisition of CT images from the lung  bases through the symphysis pubis after the administration of 120 mL  Isovue-370  intravenous contrast. Radiation dose for this scan was  reduced using automated exposure control, adjustment of the mA and/or  kV according to patient size, or iterative reconstruction technique.    FINDINGS: Mass in the right lower lobe partially visualized. The  liver, spleen, adrenal glands, kidneys, and pancreas demonstrate no  worrisome focal lesion. There are no dilated loops of small intestine  or large bowel to suggest ileus or obstruction. No free fluid. No free  air. There are no lymph nodes that are abnormal by size criteria.  Survey of  the visualized bony structures demonstrates no destructive  bony lesions.      Impression    IMPRESSION: No evidence of metastatic disease or occult malignancy in  the abdomen and pelvis.     BRANDT BARNEY MD   MR Thoracic Spine w/o & w Contrast    Narrative    MRI THORACIC SPINE WITHOUT AND WITH CONTRAST  8/22/2018 4:23 PM     HISTORY: Likely lung cancer, no biopsy yet, mid back pain, rule out  spine pathology.     TECHNIQUE: Multiplanar multisequence MRI of the thoracic spine without  and with 10 mL Gadavist.    COMPARISON: Chest CT 8/21/2018.    FINDINGS:    Large mass is present within the medial aspect of the right lower lobe  abutting the mediastinum and pleura. There is no evidence of invasion  of the thoracic spine. Prevertebral fat plane is maintained between  the mass and the spine (series 2102 image 10). The spinal canal and  foramina are patent. Thoracic cord is normal in contour and signal. No  abnormal cord enhancement is identified.      Impression    IMPRESSION:  Right paraspinal lung mass without evidence of spine  invasion.    LUCIA ALLEN MD             Medications Prior to Admission:     Prescriptions Prior to Admission   Medication Sig Dispense Refill Last Dose     ACETAMINOPHEN PO Take 325-650 mg by mouth every 6 hours as needed for pain   prn     albuterol (2.5 MG/3ML) 0.083% neb solution Take 1 vial (2.5 mg) by nebulization every 4 hours as needed for shortness of breath / dyspnea or wheezing 1 Box 11 prn     atorvastatin (LIPITOR) 10 MG tablet TAKE 1 TABLET EVERY DAY 90 tablet 0 8/21/2018 at Unknown time     hydrochlorothiazide (HYDRODIURIL) 25 MG tablet TAKE 1 TABLET EVERY DAY (INCREASED DOSE) 90 tablet 0 8/21/2018 at Unknown time     multivitamin, therapeutic with minerals (MULTI-VITAMIN) TABS Take 1 tablet by mouth daily   8/21/2018 at Unknown time     [DISCONTINUED] aspirin 81 MG chewable tablet Take 1 tablet (81 mg) by mouth daily 90 tablet 3 8/21/2018 at Unknown time      [DISCONTINUED] magnesium oxide (MAG-OX) 400 (240 Mg) MG tablet Take 400 mg by mouth daily (for muscle cramps associated with lipitor)   8/21/2018 at Unknown time     [DISCONTINUED] VITAMIN E COMPLEX PO Take 400 Units by mouth daily    8/21/2018 at Unknown time             Discharge Medications:     Current Discharge Medication List      CONTINUE these medications which have NOT CHANGED    Details   ACETAMINOPHEN PO Take 325-650 mg by mouth every 6 hours as needed for pain      albuterol (2.5 MG/3ML) 0.083% neb solution Take 1 vial (2.5 mg) by nebulization every 4 hours as needed for shortness of breath / dyspnea or wheezing  Qty: 1 Box, Refills: 11    Associated Diagnoses: Hypoxia; SOB (shortness of breath)      atorvastatin (LIPITOR) 10 MG tablet TAKE 1 TABLET EVERY DAY  Qty: 90 tablet, Refills: 0    Associated Diagnoses: Hyperlipidemia LDL goal <130      hydrochlorothiazide (HYDRODIURIL) 25 MG tablet TAKE 1 TABLET EVERY DAY (INCREASED DOSE)  Qty: 90 tablet, Refills: 0    Associated Diagnoses: Benign essential hypertension      multivitamin, therapeutic with minerals (MULTI-VITAMIN) TABS Take 1 tablet by mouth daily         STOP taking these medications       aspirin 81 MG chewable tablet Comments:   Reason for Stopping:         magnesium oxide (MAG-OX) 400 (240 Mg) MG tablet Comments:   Reason for Stopping:         VITAMIN E COMPLEX PO Comments:   Reason for Stopping:                     Brief History of Illness:   Yvette Gastelum is a 67 year old female who was admitted for new right lung mass.          Hospital Course:   The patient was admitted with reports of hemoptysis and a new right lower lung mass on chest CT.  Oncology and Thoracic Surgery were consulted.  Because the patient was taking aspirin daily, CT guided biopsy was unable to be completed until next week.  The patient's last dose of aspirin was Tuesday, 8/21/18.  The patient had minimal hemoptysis during hospitalization.  CT of the abdomen and  pelvis with contrast demonstrated no evidence of metastatic disease.  MRI of the thoracic spine demonstrated a right paraspinal lung mass without spinal invasion.  The patient was discharged to home and will follow up with Thoracic Surgery next week after a PET scan.      Greater than 35 minutes were spent in discharge planning.             Pending Results:   Unresulted Labs Ordered in the Past 30 Days of this Admission     No orders found from 6/22/2018 to 8/22/2018.

## 2018-08-23 NOTE — PLAN OF CARE
Problem: Patient Care Overview  Goal: Plan of Care/Patient Progress Review  Outcome: Improving  A/O x4. VSS. Tele: SR. C/o headache, prn tylenol given x1, effective. Denies SOB/chest pain. Reg diet, tolerating well. Voiding adequately in BR. Plan for CT guided biopsy and PET scan on Monday. Pt is discharging home this evening.

## 2018-08-23 NOTE — PROVIDER NOTIFICATION
Brief update:    Paged re: small volume hemoptysis    Patient here with lung mass appearing to invade pulmonary vessels.  Episodes of hemoptysis prior to admission    Patient with a coughing spell tonight which was limited.  Coughed up approximately 1 teaspoon of small blood clots.  No longer coughing    Not hypoxic, vitally stable.    Continuous pulse oximetry ordered  Every 4 hours vitals  Repeat hemoglobin at midnight    If patient becomes hypoxic, hypotensive, tachycardic, evaluate closer for bleeding.  Most telling/concerning symptoms would be fits of coughing which are unable to be controlled which would likely represent ongoing bleeding.    If any of these occur, would transfer patient to the ICU for close monitoring and possible bronchoscopy in a.m.  At this time, patient is stable to remain on the floor with more frequent monitoring as she is no longer coughing.    Wilver Harrison MD  10:13 PM

## 2018-08-23 NOTE — CONSULTS
Care Transition Initial Assessment - SW  Reason For Consult: discharge planning  Met with:Chart review  Principal Problem:    Right lower lobe lung mass  Active Problems:    Hemoptysis    Kidney stones    Benign essential hypertension    Hyperlipidemia LDL goal <130    CKD (chronic kidney disease) stage 3, GFR 30-59 ml/min    Lung mass       DATA  Lives With: alone  Living Arrangements: apartment  Description of Support System: Supportive, Involved  Who is your support system?: Children  Support Assessment: Adequate family and caregiver support.   Identified issues/concerns regarding health management: SW following for d.c needs  Quality Of Family Relationships: supportive, involved     ASSESSMENT  Cognitive Status:  Alert and oriented X4  Concerns to be addressed: Patient is a 67 year old female who was admitted to the hospital for Right Lower Lobe Lung Mass. Prior to hospitalization patient was living at home alone where she was managing well. Patient is independent in room, on room air, tolerating reg diet. CTS RN assisting in follow ups. No other needs identified at this time. SW will continue to follow and assist as needed.     PLAN  Financial costs for the patient includes   Patient given options and choices for discharge   Patient/family is agreeable to the plan?  YES  Patient Goals and Preferences: HOME  Patient anticipates discharging to: HOME    SIMRAN Nieves   *[phone]

## 2018-08-23 NOTE — PROGRESS NOTES
Chart check    Patient is discharged home  Patient is scheduled for PET scan and with thoracic surgery for the CT-guided biopsy  Please schedule with medical oncology after PET scan and biopsy results are available

## 2018-08-23 NOTE — CONSULTS
Care Coordination:    Bedside RN and charge RN concerned, they state patient is supposed to have a CT guided biopsy as an outpatient, but it is not scheduled.     Reviewed chart:   + There is no order for CT  + There is no order for biopsy  + RN's do not have ordering capabilities    Noted order on discharge papers,   Follow up with Dr. Norris (Thoracic Surgery) on Monday, 8/27/2018, at the [facility] in [place].  Our  will call you to set up a time for your appointment.  Dr Norris has asked that you have a PET scan performed prior to your appointment so he can review it with you.  Our  will help to coordinate the PET scan as well.     Called Dr. Norris office.  Was transferred to , which went to voicemail.     Called office again.  The staff can see the order mentioning PET scan, but do not have CT / biopsy orders.  They do not have ordering abilities either.  They state usually Dr. Norris wants patient to bring results with them to appointments.  They state Carleen Gastelum (who wrote the above order) is Dr. Norris' PA.  A MD, NP, or PA needs to enter the order.  Staff were able to assist in providing the appointment time for the PET scan and with Dr. Norris.     Amended the above order to include:   Scheduled for Wednesday August 29   + 2:00 PM PET SCAN   + 3:45 PM DR NORRIS   [facility] [place] [facility]  [phone] [address] [facility] [address] [facility] [address]     Day shift bedside RN states there was a female NP who originally stated there would be a CT on Monday.  She does not know the name.  Evening RN will page providers to obtain orders.     Taya Sy RN, BSN  [facility] Care Coordinator   Mobile Phone: [phone]

## 2018-08-23 NOTE — PROGRESS NOTES
Pt discharged to home.  Per care coordinator PET scan set up for Wednesday 8/29 and follow up with Dr. Mejia following the scan.  Per [facility] oncology NP pt needs to have CT guided biopsy of the lung anytime after the PET scan.  CT was ordered as inpatient.  RN spoke with CT and order was changed to outpatient.  RN also spoke with scheduling to confirm CT biopsy was now showing up as outpatient.  RN informed that once the order is approved the patient will be contacted to set up an appointment for that biopsy on Thursday 8/31 or later.  Pt was discharged with this information.  PIV was removed.  Discharge information was gone over and understood by pt.

## 2018-08-24 ENCOUNTER — TELEPHONE (OUTPATIENT)
Dept: FAMILY MEDICINE | Facility: CLINIC | Age: 68
End: 2018-08-24

## 2018-08-24 NOTE — TELEPHONE ENCOUNTER
"ED / Discharge Outreach Protocol    Patient Contact    Attempt # 1    Was call answered?  Yes.  \"May I please speak with <Yvette>\"  Is patient available?   Yes    ED/Discharge Protocol    \"Hi, my name is Taya Schumacher, a registered nurse, and I am calling on behalf of Dr. House's office at [place].  I am calling to follow up and see how things are going for you after your recent visit.\"    \"I see that you were in the (ER/UC/IP) on Doing fine, needs to stop ASA x5 days to do biopsy. Scheduled on Wed for imaging, then consult with thoracic surgeon. Coughed up blood Wednesday night, not since then.     Is patient experiencing symptoms that may require a hospital visit?  Not currently    Discharge Instructions    \"Let's review your discharge instructions.  What is/are the follow-up recommendations?  Pt. Response: follow up with thoracic surgeon - may follow up with PCP after     \"Were you instructed to make a follow-up appointment?\"  Pt. Response: No.       \"When you see the provider, I would recommend that you bring your discharge instructions with you.    Medications    \"How many new medications are you on since your hospitalization/ED visit?\"    0-1  \"How many of your current medicines changed (dose, timing, name, etc.) while you were in the hospital/ED visit?\"   0-1  \"Do you have questions about your medications?\"   No  \"Were you newly diagnosed with heart failure, COPD, diabetes or did you have a heart attack?\"   No  For patients on insulin: \"Did you start on insulin in the hospital or did you have your insulin dose changed?\"   No    Medication reconciliation completed? Yes    Was MTM referral placed (*Make sure to put transitions as reason for referral)?   No    Call Summary    \"Do you have any questions or concerns about your condition or care plan at the moment?\"    No    Patient was in ER 1x in the past year (assess appropriateness of ER visits.)      \"If you have questions or things don't continue to " "improve, we encourage you contact us through the main clinic number, ([phone])  Even if the clinic is not open, triage nurses are available 24/7 to help you.     We would like you to know that our clinic has extended hours (provide information).  We also have urgent care (provide details on closest location and hours/contact info)\"    \"Thank you for your time and take care!\"    Taya MCDONOUGH RN        Date of Admission:                                      8/21/2018  Date of Discharge:                                      8/23/2018    STOP taking these medications         aspirin 81 MG chewable tablet Comments:   Reason for Stopping:            magnesium oxide (MAG-OX) 400 (240 Mg) MG tablet Comments:   Reason for Stopping:            VITAMIN E COMPLEX PO Comments:   Reason for Stopping:                After Care Instructions           Activity         Your activity upon discharge: activity as tolerated               Diet         Follow this diet upon discharge: Orders Placed This Encounter      NPO for Medical/Clinical Reasons Except for: Other, Meds; Specify: NPO 4 hours prior to procedure      Regular Diet Adult                        Follow-up Appointments           Follow-up and recommended labs and tests         Follow up with Dr. Norris (Thoracic Surgery) at the [facility] in [place].  Our  will call you to set up a time for your appointment.  Dr Norris has asked that you have a PET scan performed prior to your appointment so he can review it with you.  Our  will help to coordinate the PET scan as well.     Scheduled for Wednesday August 29   + 2:00 PM PET SCAN  + 3:45 PM DR NORRIS  [facility] [place] [facility]  [phone]  [address] [facility] [address] [facility] [address]               Follow-up and recommended labs and tests          Follow up with Thoracic Surgery as directed.                        Your next 10 appointments already scheduled            Aug 31, 2018 11:00 AM CDT "   [facility]   [facility]     [address] [facility] [address] [phone]

## 2018-08-27 ENCOUNTER — HOSPITAL ENCOUNTER (OUTPATIENT)
Facility: CLINIC | Age: 68
End: 2018-08-27

## 2018-08-27 ENCOUNTER — TELEPHONE (OUTPATIENT)
Dept: INTERVENTIONAL RADIOLOGY/VASCULAR | Facility: CLINIC | Age: 68
End: 2018-08-27

## 2018-08-27 NOTE — TELEPHONE ENCOUNTER
Interventional Radiology   Interventional Radiology at [facility] has been requested to perform a Right lower lobe lung biopsy from Dr Johnson.  Yvette Gastelum is a 67 year old woman with a history of sepsis, chronic renal disease r/t renal stones and chronic UTIs, 50 pack smoking history-quit 3 years ago who was admitted to the hospital on 8/21/18 with increasing shortness of breath and hemoptysis.  Per imaging there was a large right lower lung mass 7.4 cm in size.  A biopsy had been recommended during admission but Yvette had taken her last aspirin up to the day of admission. During hospitalization Dr Johnson discussed with Dr Norris and Dr Bailey. The patient is scheduled to have a PET scan on 8/29 and will see Dr Norris this week also.   Reviewed imaging and clinical information with Radiology staff Dr Bailey who approved the biopsy with CT guidance.     Yvette takes a daily Aspirin and has been holding it since Wednesday 8/22/18,  Central scheduling has contacted the patient and scheduled the biopsy for Tuesday 8/28/18.     Thanks Kassy [facility] Interventional Radiology CNP ([phone])

## 2018-08-28 ENCOUNTER — TELEPHONE (OUTPATIENT)
Dept: ONCOLOGY | Facility: CLINIC | Age: 68
End: 2018-08-28

## 2018-08-28 ENCOUNTER — HOSPITAL ENCOUNTER (OUTPATIENT)
Facility: CLINIC | Age: 68
Discharge: HOME | End: 2018-08-28
Payer: COMMERCIAL

## 2018-08-28 VITALS
DIASTOLIC BLOOD PRESSURE: 69 MMHG | TEMPERATURE: 97.3 F | RESPIRATION RATE: 18 BRPM | SYSTOLIC BLOOD PRESSURE: 116 MMHG | OXYGEN SATURATION: 95 %

## 2018-08-28 PROCEDURE — 40000863 ZZH STATISTIC RADIOLOGY XRAY, US, CT, MAR, NM

## 2018-08-28 RX ORDER — LIDOCAINE 40 MG/G
CREAM TOPICAL
Status: DISCONTINUED | OUTPATIENT
Start: 2018-08-28 | End: 2018-08-28 | Stop reason: HOSPADM

## 2018-08-28 RX ORDER — FENTANYL CITRATE 50 UG/ML
25-50 INJECTION, SOLUTION INTRAMUSCULAR; INTRAVENOUS EVERY 5 MIN PRN
Status: DISCONTINUED | OUTPATIENT
Start: 2018-08-28 | End: 2018-08-28 | Stop reason: HOSPADM

## 2018-08-28 RX ORDER — FLUMAZENIL 0.1 MG/ML
0.2 INJECTION, SOLUTION INTRAVENOUS
Status: DISCONTINUED | OUTPATIENT
Start: 2018-08-28 | End: 2018-08-28 | Stop reason: HOSPADM

## 2018-08-28 RX ORDER — LIDOCAINE HYDROCHLORIDE 10 MG/ML
1-30 INJECTION, SOLUTION EPIDURAL; INFILTRATION; INTRACAUDAL; PERINEURAL
Status: DISCONTINUED | OUTPATIENT
Start: 2018-08-28 | End: 2018-08-28 | Stop reason: HOSPADM

## 2018-08-28 RX ORDER — NALOXONE HYDROCHLORIDE 0.4 MG/ML
.1-.4 INJECTION, SOLUTION INTRAMUSCULAR; INTRAVENOUS; SUBCUTANEOUS
Status: DISCONTINUED | OUTPATIENT
Start: 2018-08-28 | End: 2018-08-28 | Stop reason: HOSPADM

## 2018-08-28 NOTE — DISCHARGE INSTRUCTIONS
Lung Biopsy Discharge Instructions     After you go home:      You may resume your normal diet    Have an adult stay with you for 6 hours if you received sedation       For 24 hours - due to the sedation you received:    Relax and take it easy    Do NOT make any important or legal decisions    Do NOT drive or operate machines at home or at work    Do NOT drink alcohol    Care of Puncture Site:      For the first 48 hrs, check your puncture site every couple hours while you are awake     You may remove/change the bandaid tomorrow    You may shower tomorrow    No tub baths, whirlpools or swimming until your puncture site has fully healed    Activity       You may go back to normal activity in 24 hours     Wait 48 hours before lifting, straining, exercise or other strenuous activity    Medicines:      You may resume all medications    For minor pain, you may take Acetaminophen (Tylenol) or Ibuprofen (Advil)            Call the provider who ordered this test if:      Increased pain or a large or growing hard lump around the site    Blood or fluid is draining from the site    The site is red, swollen, hot or tender    Chills or a fever greater than 101 F (38 C)    Pain that is getting worse    Any questions or concerns    Call  911 or go to the Emergency Room if:      Severe pain or trouble breathing    Bleeding that you cannot control        If you have questions call:          [facility] Radiology Dept @ [phone]      The provider who performed your procedure was ______Dr Bailey___________.

## 2018-08-28 NOTE — IP AVS SNAPSHOT
[place] [address]    [place] [address]    Phone:  [phone]                                       After Visit Summary   8/28/2018    Yvette Gastelum    MRN: [id number]           After Visit Summary Signature Page     I have received my discharge instructions, and my questions have been answered. I have discussed any challenges I see with this plan with the nurse or doctor.    ..........................................................................................................................................  Patient/Patient Representative Signature      ..........................................................................................................................................  Patient Representative Print Name and Relationship to Patient    ..................................................               ................................................  Date                                            Time    ..........................................................................................................................................  Reviewed by Signature/Title    ...................................................              ..............................................  Date                                                            Time          22EPIC Rev 08/18

## 2018-08-28 NOTE — PROGRESS NOTES
Pt to CT and discussed plan for biopsy with MD office and [facility].  Pt would like to have PET scan and Dr. Norris visit tomorrow as scheduled with pt and sons.  She is willing to reschedule biopsy for Friday if necessary. Pt Discharged to home per self and aware to arrive Friday at 8am for CT biopsy if not cancelled by Dr Norris office.

## 2018-08-28 NOTE — TELEPHONE ENCOUNTER
I wonder if this is tied to the recent fax that we got from [employer] [facility] (f [phone]). This form from [facility] is requesting last office notes from [facility].     I faxed 8/21 office note and 8/21 admission note to [facility] just now.    Dandre Romero, [facility]

## 2018-08-28 NOTE — TELEPHONE ENCOUNTER
Received a phone call from Fiord[place] in [place] stating that patient was scheduled for a biopsy today and a Pet/CT scan tomorrow at [facility]. The biopsy can effect the results of the Pet/CT scan so writer consulted with Dr. Johnson who stated that Pet should be held for a week. Fiordaliza called back after consulting with patient's family and family wants to keep Pet/CT scan appointment at [facility] tomorrow because it was scheduled through Dr. Norris. Patient's biopsy will be rescheduled. Stated to Fiordaliza that Dr. Norris office will coordinate. Writer will follow up after Pet scan and biopsy per Dr. Norris.

## 2018-08-28 NOTE — IP AVS SNAPSHOT
MRN:[id number]                      After Visit Summary   8/28/2018    Yvette Gastelum    MRN: [id number]           Visit Information        Department      8/28/2018  7:24 AM [facility]          Review of your medicines      UNREVIEWED medicines. Ask your doctor about these medicines        Dose / Directions    ACETAMINOPHEN PO        Dose:  325-650 mg   Take 325-650 mg by mouth every 6 hours as needed for pain   Refills:  0       albuterol (2.5 MG/3ML) 0.083% neb solution   Used for:  Hypoxia, SOB (shortness of breath)        Dose:  1 vial   Take 1 vial (2.5 mg) by nebulization every 4 hours as needed for shortness of breath / dyspnea or wheezing   Quantity:  1 Box   Refills:  11       ASPIRIN EC PO        Dose:  81 mg   Take 81 mg by mouth daily   Refills:  0       atorvastatin 10 MG tablet   Commonly known as:  LIPITOR   Used for:  Hyperlipidemia LDL goal <130        TAKE 1 TABLET EVERY DAY   Quantity:  90 tablet   Refills:  0       hydrochlorothiazide 25 MG tablet   Commonly known as:  HYDRODIURIL   Used for:  Benign essential hypertension        TAKE 1 TABLET EVERY DAY (INCREASED DOSE)   Quantity:  90 tablet   Refills:  0       Multi-vitamin Tabs tablet        Dose:  1 tablet   Take 1 tablet by mouth daily   Refills:  0                Protect others around you: Learn how to safely use, store and throw away your medicines at www.disposemymeds.org.         Follow-ups after your visit        Your next 10 appointments already scheduled     Aug 28, 2018  9:00 AM CDT   [facility],  BODY RAD   [facility] CT ([facility])    [address] [place] [address] [phone]           Please bring any scans or X-rays taken at other hospitals, if they may be helpful. Also bring a list of your medicines, including vitamins, minerals and over-the-counter drugs.  Tell your doctor in advance:   If you  have any allergies.   If you are breastfeeding or there s any chance you are pregnant.   If you are taking Coumadin (or any other blood thinners) 5 days prior to the exam for any special instructions.   If you are diabetic to determine if your insulin needs have to be adjusted for the exam.  The day before your exam:   Drink extra fluids  at least six 8-ounce glasses (unless your doctor tells you to restrict fluids).  The day of your exam:   No eating or drinking for 4 hours before your test. You may take medicine with small sips of water.   Plan for an adult to drive you home and stay with you until morning.   Leave your valuables at home.  If you have any questions, please call the imaging department where you will have your exam.            Aug 31, 2018 11:00 AM CDT   [facility] with Arie House MD   [facility]    [address] [facility] [address] [phone]           Bring a current list of meds and any records pertaining to this visit. For Physicals, please bring immunization records and any forms needing to be filled out. Please arrive 10 minutes early to complete paperwork.               Care Instructions        Further instructions from your care team       Lung Biopsy Discharge Instructions     After you go home:      You may resume your normal diet    Have an adult stay with you for 6 hours if you received sedation       For 24 hours - due to the sedation you received:    Relax and take it easy    Do NOT make any important or legal decisions    Do NOT drive or operate machines at home or at work    Do NOT drink alcohol    Care of Puncture Site:      For the first 48 hrs, check your puncture site every couple hours while you are awake     You may remove/change the bandaid tomorrow    You may shower tomorrow    No tub baths, whirlpools or swimming until your puncture site has fully healed    Activity       You may go back to normal activity in  24 hours     Wait 48 hours before lifting, straining, exercise or other strenuous activity    Medicines:      You may resume all medications    For minor pain, you may take Acetaminophen (Tylenol) or Ibuprofen (Advil)            Call the provider who ordered this test if:      Increased pain or a large or growing hard lump around the site    Blood or fluid is draining from the site    The site is red, swollen, hot or tender    Chills or a fever greater than 101 F (38 C)    Pain that is getting worse    Any questions or concerns    Call  911 or go to the Emergency Room if:      Severe pain or trouble breathing    Bleeding that you cannot control        If you have questions call:          [facility] Radiology Dept @ [phone]      The provider who performed your procedure was ______Dr Bailey___________.             Additional Information About Your Visit        MyChart Information     [employer] Â® gives you secure access to your electronic health record. If you see a primary care provider, you can also send messages to your care team and make appointments. If you have questions, please call your primary care clinic.  If you do not have a primary care provider, please call [phone] and they will assist you.        Care EveryWhere ID     This is your Care EveryWhere ID. This could be used by other organizations to access your [facility] medical records  [id number]        Your Vitals Were     Blood Pressure Temperature Respirations Pulse Oximetry          116/69 (BP Location: Left arm) 97.3  F (36.3  C) (Oral) 18 95%         Primary Care Provider Office Phone # Fax #    Arie House [phone]      Equal Access to Services     [facility]: Hadii prashant bao Sokel, waaxda luqadaha, qaybta kaalmada mary hadley . So [facility] [phone].    ATENCIÓN: Si habla español, tiene a brewster disposición servicios gratuitos de asistencia lingüística.  Tk rahman [phone].    We comply with applicable federal civil rights laws and [place] laws. We do not discriminate on the basis of race, color, national origin, age, disability, sex, sexual orientation, or gender identity.            Thank you!     Thank you for choosing [facility] for your care. Our goal is always to provide you with excellent care. Hearing back from our patients is one way we can continue to improve our services. Please take a few minutes to complete the written survey that you may receive in the mail after you visit with us. Thank you!             Medication List: This is a list of all your medications and when to take them. Check marks below indicate your daily home schedule. Keep this list as a reference.      Medications           Morning Afternoon Evening Bedtime As Needed    ACETAMINOPHEN PO   Take 325-650 mg by mouth every 6 hours as needed for pain                                albuterol (2.5 MG/3ML) 0.083% neb solution   Take 1 vial (2.5 mg) by nebulization every 4 hours as needed for shortness of breath / dyspnea or wheezing                                ASPIRIN EC PO   Take 81 mg by mouth daily                                atorvastatin 10 MG tablet   Commonly known as:  LIPITOR   TAKE 1 TABLET EVERY DAY                                hydrochlorothiazide 25 MG tablet   Commonly known as:  HYDRODIURIL   TAKE 1 TABLET EVERY DAY (INCREASED DOSE)                                Multi-vitamin Tabs tablet   Take 1 tablet by mouth daily

## 2018-08-28 NOTE — PROGRESS NOTES
Pre procedure plan of care reviewed with pt prior to sedation.  All questions answered and pt appears to accept and understand. Discharge instructions reviewed prior to sedation.

## 2018-08-29 ENCOUNTER — TRANSFERRED RECORDS (OUTPATIENT)
Dept: HEALTH INFORMATION MANAGEMENT | Facility: CLINIC | Age: 68
End: 2018-08-29

## 2018-08-30 ENCOUNTER — HOSPITAL ENCOUNTER (OUTPATIENT)
Dept: MRI IMAGING | Facility: CLINIC | Age: 68
Discharge: HOME | End: 2018-08-30
Attending: THORACIC SURGERY (CARDIOTHORACIC VASCULAR SURGERY) | Admitting: THORACIC SURGERY (CARDIOTHORACIC VASCULAR SURGERY)
Payer: COMMERCIAL

## 2018-08-30 DIAGNOSIS — R91.8 LUNG MASS: ICD-10-CM

## 2018-08-30 PROCEDURE — 70553 MRI BRAIN STEM W/O & W/DYE: CPT

## 2018-08-30 PROCEDURE — A9585 GADOBUTROL INJECTION: HCPCS | Performed by: THORACIC SURGERY (CARDIOTHORACIC VASCULAR SURGERY)

## 2018-08-30 PROCEDURE — 25000128 H RX IP 250 OP 636: Performed by: THORACIC SURGERY (CARDIOTHORACIC VASCULAR SURGERY)

## 2018-08-30 RX ORDER — GADOBUTROL 604.72 MG/ML
11 INJECTION INTRAVENOUS ONCE
Status: COMPLETED | OUTPATIENT
Start: 2018-08-30 | End: 2018-08-30

## 2018-08-30 RX ADMIN — GADOBUTROL 11 ML: 604.72 INJECTION INTRAVENOUS at 14:13

## 2018-08-31 ENCOUNTER — APPOINTMENT (OUTPATIENT)
Dept: GENERAL RADIOLOGY | Facility: CLINIC | Age: 68
End: 2018-08-31
Attending: RADIOLOGY
Payer: COMMERCIAL

## 2018-08-31 ENCOUNTER — HOSPITAL ENCOUNTER (OUTPATIENT)
Facility: CLINIC | Age: 68
Discharge: HOME | End: 2018-08-31
Attending: THORACIC SURGERY (CARDIOTHORACIC VASCULAR SURGERY) | Admitting: THORACIC SURGERY (CARDIOTHORACIC VASCULAR SURGERY)
Payer: COMMERCIAL

## 2018-08-31 ENCOUNTER — APPOINTMENT (OUTPATIENT)
Dept: CT IMAGING | Facility: CLINIC | Age: 68
End: 2018-08-31
Attending: INTERNAL MEDICINE
Payer: COMMERCIAL

## 2018-08-31 VITALS
OXYGEN SATURATION: 97 % | SYSTOLIC BLOOD PRESSURE: 117 MMHG | RESPIRATION RATE: 18 BRPM | DIASTOLIC BLOOD PRESSURE: 89 MMHG

## 2018-08-31 PROCEDURE — 32405 CT LUNG MEDIASTINUM BIOPSY: CPT

## 2018-08-31 PROCEDURE — 88172 CYTP DX EVAL FNA 1ST EA SITE: CPT | Performed by: THORACIC SURGERY (CARDIOTHORACIC VASCULAR SURGERY)

## 2018-08-31 PROCEDURE — 88305 TISSUE EXAM BY PATHOLOGIST: CPT | Performed by: THORACIC SURGERY (CARDIOTHORACIC VASCULAR SURGERY)

## 2018-08-31 PROCEDURE — 40000986 XR CHEST 1 VW

## 2018-08-31 PROCEDURE — 88161 CYTOPATH SMEAR OTHER SOURCE: CPT | Performed by: THORACIC SURGERY (CARDIOTHORACIC VASCULAR SURGERY)

## 2018-08-31 PROCEDURE — 88161 CYTOPATH SMEAR OTHER SOURCE: CPT | Mod: 26 | Performed by: THORACIC SURGERY (CARDIOTHORACIC VASCULAR SURGERY)

## 2018-08-31 PROCEDURE — 88172 CYTP DX EVAL FNA 1ST EA SITE: CPT | Mod: 26 | Performed by: THORACIC SURGERY (CARDIOTHORACIC VASCULAR SURGERY)

## 2018-08-31 PROCEDURE — 25000125 ZZHC RX 250: Performed by: RADIOLOGY

## 2018-08-31 PROCEDURE — 88342 IMHCHEM/IMCYTCHM 1ST ANTB: CPT | Mod: 26 | Performed by: THORACIC SURGERY (CARDIOTHORACIC VASCULAR SURGERY)

## 2018-08-31 PROCEDURE — 99153 MOD SED SAME PHYS/QHP EA: CPT

## 2018-08-31 PROCEDURE — 99152 MOD SED SAME PHYS/QHP 5/>YRS: CPT

## 2018-08-31 PROCEDURE — 88305 TISSUE EXAM BY PATHOLOGIST: CPT | Mod: 26 | Performed by: THORACIC SURGERY (CARDIOTHORACIC VASCULAR SURGERY)

## 2018-08-31 PROCEDURE — 88341 IMHCHEM/IMCYTCHM EA ADD ANTB: CPT | Performed by: THORACIC SURGERY (CARDIOTHORACIC VASCULAR SURGERY)

## 2018-08-31 PROCEDURE — 88341 IMHCHEM/IMCYTCHM EA ADD ANTB: CPT | Mod: 26 | Performed by: THORACIC SURGERY (CARDIOTHORACIC VASCULAR SURGERY)

## 2018-08-31 PROCEDURE — G0500 MOD SEDAT ENDO SERVICE >5YRS: HCPCS

## 2018-08-31 PROCEDURE — 40000863 ZZH STATISTIC RADIOLOGY XRAY, US, CT, MAR, NM

## 2018-08-31 PROCEDURE — 88173 CYTOPATH EVAL FNA REPORT: CPT | Performed by: THORACIC SURGERY (CARDIOTHORACIC VASCULAR SURGERY)

## 2018-08-31 PROCEDURE — 88342 IMHCHEM/IMCYTCHM 1ST ANTB: CPT | Performed by: THORACIC SURGERY (CARDIOTHORACIC VASCULAR SURGERY)

## 2018-08-31 PROCEDURE — 25000128 H RX IP 250 OP 636: Performed by: RADIOLOGY

## 2018-08-31 PROCEDURE — 88173 CYTOPATH EVAL FNA REPORT: CPT | Mod: 26 | Performed by: THORACIC SURGERY (CARDIOTHORACIC VASCULAR SURGERY)

## 2018-08-31 RX ORDER — LIDOCAINE 40 MG/G
CREAM TOPICAL
Status: DISCONTINUED | OUTPATIENT
Start: 2018-08-31 | End: 2018-08-31 | Stop reason: HOSPADM

## 2018-08-31 RX ORDER — FLUMAZENIL 0.1 MG/ML
0.2 INJECTION, SOLUTION INTRAVENOUS
Status: DISCONTINUED | OUTPATIENT
Start: 2018-08-31 | End: 2018-08-31 | Stop reason: HOSPADM

## 2018-08-31 RX ORDER — NALOXONE HYDROCHLORIDE 0.4 MG/ML
.1-.4 INJECTION, SOLUTION INTRAMUSCULAR; INTRAVENOUS; SUBCUTANEOUS
Status: DISCONTINUED | OUTPATIENT
Start: 2018-08-31 | End: 2018-08-31 | Stop reason: HOSPADM

## 2018-08-31 RX ORDER — FENTANYL CITRATE 50 UG/ML
25-50 INJECTION, SOLUTION INTRAMUSCULAR; INTRAVENOUS EVERY 5 MIN PRN
Status: DISCONTINUED | OUTPATIENT
Start: 2018-08-31 | End: 2018-08-31 | Stop reason: HOSPADM

## 2018-08-31 RX ADMIN — LIDOCAINE HYDROCHLORIDE 18 ML: 10 INJECTION, SOLUTION EPIDURAL; INFILTRATION; INTRACAUDAL; PERINEURAL at 10:55

## 2018-08-31 RX ADMIN — FENTANYL CITRATE 50 MCG: 50 INJECTION INTRAMUSCULAR; INTRAVENOUS at 09:50

## 2018-08-31 RX ADMIN — MIDAZOLAM HYDROCHLORIDE 1 MG: 1 INJECTION, SOLUTION INTRAMUSCULAR; INTRAVENOUS at 09:58

## 2018-08-31 RX ADMIN — MIDAZOLAM HYDROCHLORIDE 1 MG: 1 INJECTION, SOLUTION INTRAMUSCULAR; INTRAVENOUS at 09:50

## 2018-08-31 NOTE — PROGRESS NOTES
Sedation Post Procedure Summary:    Immediately prior to starting the procedure a Time Out was conducted with procedural staff and re-confirmed the patient s name, procedure, and site/side.      Consent obtained from patient after discussing the risks, benefits and alternatives.       Indication:  Sedation was required to allow for CT guided lung biopsy    Sedatives: Fentanyl and Midazolam (Versed)    Vital signs, airway, and pulse oximetry were monitored throughout the procedure and sedation was maintained until the procedure was complete.      Patient tolerance: Patient tolerated the procedure well with no immediate complications.  Awaiting response from pathology to determine if we have adequate tissue samples.      (See Doc Flow-sheets and MAR for additional information)    Vianey Alcantar

## 2018-08-31 NOTE — PROGRESS NOTES
RADIOLOGY PROCEDURE NOTE  Patient name: Yvette Gastelum  MRN: [id number]  : 1950     Pre-procedure diagnosis: RLL mass  Post-procedure diagnosis: Same    Procedure Date/Time: 2018  10:31 AM  Procedure: Biopsy.  Estimated blood loss: None  Specimen(s) collected with description: Core biopsy samples.  The patient tolerated the procedure well with no immediate complications.  I determined this patient to be an appropriate candidate for the planned sedation and procedure and reassessed the patient IMMEDIATELY PRIOR to sedation and procedure.    See imaging dictation for procedural details and findings.    Provider name: Jimy Bailey  Assistant(s):None

## 2018-08-31 NOTE — DISCHARGE INSTRUCTIONS
Lung Biopsy Discharge Instructions     After you go home:      You may resume your normal diet    Have an adult stay with you for 6 hours if you received sedation       For 24 hours - due to the sedation you received:    Relax and take it easy    Do NOT make any important or legal decisions    Do NOT drive or operate machines at home or at work    Do NOT drink alcohol    Care of Puncture Site:      For the first 48 hrs, check your puncture site every couple hours while you are awake     You may remove/change the bandaid tomorrow    You may shower tomorrow    No tub baths, whirlpools or swimming until your puncture site has fully healed    Activity       You may go back to normal activity in 24 hours     Wait 48 hours before lifting, straining, exercise or other strenuous activity    Medicines:      You may resume all medications    Resume your Warfarin/Coumadin at your regular dose today. Follow up with your provider to have your INR rechecked    Resume your Platelet Inhibitors and Aspirin tomorrow at your regular dose    For minor pain, you may take Acetaminophen (Tylenol) or Ibuprofen (Advil)            Call the provider who ordered this test if:      Increased pain or a large or growing hard lump around the site    Blood or fluid is draining from the site    The site is red, swollen, hot or tender    Chills or a fever greater than 101 F (38 C)    Pain that is getting worse    Any questions or concerns    Call  911 or go to the Emergency Room if:      Severe pain or trouble breathing    Bleeding that you cannot control        If you have questions call:          [facility] Radiology Dept @ [phone]      The provider who performed your procedure was _________________.

## 2018-08-31 NOTE — IP AVS SNAPSHOT
[place] [address]    [place] [address]    Phone:  [phone]                                       After Visit Summary   8/31/2018    Yvette Gastelum    MRN: [id number]           After Visit Summary Signature Page     I have received my discharge instructions, and my questions have been answered. I have discussed any challenges I see with this plan with the nurse or doctor.    ..........................................................................................................................................  Patient/Patient Representative Signature      ..........................................................................................................................................  Patient Representative Print Name and Relationship to Patient    ..................................................               ................................................  Date                                            Time    ..........................................................................................................................................  Reviewed by Signature/Title    ...................................................              ..............................................  Date                                                            Time          22EPIC Rev 08/18

## 2018-08-31 NOTE — PROGRESS NOTES
1225 VSS. pt denies pain. bx site right mid lung on back is CDI with bandaid. Pt drinking juice. Pt chest x ray is good, no pneumothorax per dr chow and ok to discharge per dr chow. Iv d/c'd. Pt has all discharge papers given her by yvette taylor. Pt discharged to son.

## 2018-08-31 NOTE — IP AVS SNAPSHOT
MRN:[id number]                      After Visit Summary   8/31/2018    Yvette Gastelum    MRN: [id number]           Visit Information        Department      8/31/2018  8:08 AM [facility]          Review of your medicines      UNREVIEWED medicines. Ask your doctor about these medicines        Dose / Directions    ACETAMINOPHEN PO        Dose:  325-650 mg   Take 325-650 mg by mouth every 6 hours as needed for pain   Refills:  0       albuterol (2.5 MG/3ML) 0.083% neb solution   Used for:  Hypoxia, SOB (shortness of breath)        Dose:  1 vial   Take 1 vial (2.5 mg) by nebulization every 4 hours as needed for shortness of breath / dyspnea or wheezing   Quantity:  1 Box   Refills:  11       ASPIRIN EC PO        Dose:  81 mg   Take 81 mg by mouth daily   Refills:  0       atorvastatin 10 MG tablet   Commonly known as:  LIPITOR   Used for:  Hyperlipidemia LDL goal <130        TAKE 1 TABLET EVERY DAY   Quantity:  90 tablet   Refills:  0       hydrochlorothiazide 25 MG tablet   Commonly known as:  HYDRODIURIL   Used for:  Benign essential hypertension        TAKE 1 TABLET EVERY DAY (INCREASED DOSE)   Quantity:  90 tablet   Refills:  0       Multi-vitamin Tabs tablet        Dose:  1 tablet   Take 1 tablet by mouth daily   Refills:  0                Protect others around you: Learn how to safely use, store and throw away your medicines at www.disposemymeds.org.         Follow-ups after your visit        Your next 10 appointments already scheduled     Aug 31, 2018  9:00 AM CDT   [facility]  [facility]UR[facility],  BODY RAD   [facility] CT ([facility])    [address] [place] [address] [phone]           Please bring any scans or X-rays taken at other hospitals, if they may be helpful. Also bring a list of your medicines, including vitamins, minerals and over-the-counter drugs.  Tell your doctor in advance:   If you  have any allergies.   If you are breastfeeding or there s any chance you are pregnant.   If you are taking Coumadin (or any other blood thinners) 5 days prior to the exam for any special instructions.   If you are diabetic to determine if your insulin needs have to be adjusted for the exam.  The day before your exam:   Drink extra fluids  at least six 8-ounce glasses (unless your doctor tells you to restrict fluids).  The day of your exam:   No eating or drinking for 4 hours before your test. You may take medicine with small sips of water.   Plan for an adult to drive you home and stay with you until morning.   Leave your valuables at home.  If you have any questions, please call the imaging department where you will have your exam.            Aug 31, 2018  2:30 PM CDT   Office Visit with Arie House MD   [facility]    [address] [facility] [address] [phone]           Bring a current list of meds and any records pertaining to this visit. For Physicals, please bring immunization records and any forms needing to be filled out. Please arrive 10 minutes early to complete paperwork.               Care Instructions        Further instructions from your care team       Lung Biopsy Discharge Instructions     After you go home:      You may resume your normal diet    Have an adult stay with you for 6 hours if you received sedation       For 24 hours - due to the sedation you received:    Relax and take it easy    Do NOT make any important or legal decisions    Do NOT drive or operate machines at home or at work    Do NOT drink alcohol    Care of Puncture Site:      For the first 48 hrs, check your puncture site every couple hours while you are awake     You may remove/change the bandaid tomorrow    You may shower tomorrow    No tub baths, whirlpools or swimming until your puncture site has fully healed    Activity       You may go back to normal activity in  24 hours     Wait 48 hours before lifting, straining, exercise or other strenuous activity    Medicines:      You may resume all medications    Resume your Warfarin/Coumadin at your regular dose today. Follow up with your provider to have your INR rechecked    Resume your Platelet Inhibitors and Aspirin tomorrow at your regular dose    For minor pain, you may take Acetaminophen (Tylenol) or Ibuprofen (Advil)            Call the provider who ordered this test if:      Increased pain or a large or growing hard lump around the site    Blood or fluid is draining from the site    The site is red, swollen, hot or tender    Chills or a fever greater than 101 F (38 C)    Pain that is getting worse    Any questions or concerns    Call  911 or go to the Emergency Room if:      Severe pain or trouble breathing    Bleeding that you cannot control        If you have questions call:          [facility] Radiology Dept @ [phone]      The provider who performed your procedure was _________________.             Additional Information About Your Visit        [employer] Information     [employer] gives you secure access to your electronic health record. If you see a primary care provider, you can also send messages to your care team and make appointments. If you have questions, please call your primary care clinic.  If you do not have a primary care provider, please call [phone] and they will assist you.        Care EveryWhere ID     This is your Care EveryWhere ID. This could be used by other organizations to access your [facility] medical records  [id number]        Your Vitals Were     Blood Pressure Pulse Oximetry                109/85 96%           Primary Care Provider Office Phone # Fax #    Arie House [phone]      Equal Access to Services     [facility] KAY : Skyla Louise, tony ellis, mary beaulieu. So [facility]  [phone].    ATENCIÓN: Si sridhar marin, tiene a brewster disposición servicios gratuitos de asistencia lingüística. Tk rahman [phone].    We comply with applicable federal civil rights laws and [place] laws. We do not discriminate on the basis of race, color, national origin, age, disability, sex, sexual orientation, or gender identity.            Thank you!     Thank you for choosing [facility] for your care. Our goal is always to provide you with excellent care. Hearing back from our patients is one way we can continue to improve our services. Please take a few minutes to complete the written survey that you may receive in the mail after you visit with us. Thank you!             Medication List: This is a list of all your medications and when to take them. Check marks below indicate your daily home schedule. Keep this list as a reference.      Medications           Morning Afternoon Evening Bedtime As Needed    ACETAMINOPHEN PO   Take 325-650 mg by mouth every 6 hours as needed for pain                                albuterol (2.5 MG/3ML) 0.083% neb solution   Take 1 vial (2.5 mg) by nebulization every 4 hours as needed for shortness of breath / dyspnea or wheezing                                ASPIRIN EC PO   Take 81 mg by mouth daily                                atorvastatin 10 MG tablet   Commonly known as:  LIPITOR   TAKE 1 TABLET EVERY DAY                                hydrochlorothiazide 25 MG tablet   Commonly known as:  HYDRODIURIL   TAKE 1 TABLET EVERY DAY (INCREASED DOSE)                                Multi-vitamin Tabs tablet   Take 1 tablet by mouth daily

## 2018-09-05 ENCOUNTER — TRANSFERRED RECORDS (OUTPATIENT)
Dept: HEALTH INFORMATION MANAGEMENT | Facility: CLINIC | Age: 68
End: 2018-09-05

## 2018-09-05 LAB — COPATH REPORT: NORMAL

## 2018-09-07 ENCOUNTER — TRANSFERRED RECORDS (OUTPATIENT)
Dept: HEALTH INFORMATION MANAGEMENT | Facility: CLINIC | Age: 68
End: 2018-09-07

## 2018-09-14 ENCOUNTER — ANESTHESIA (OUTPATIENT)
Dept: SURGERY | Facility: CLINIC | Age: 68
End: 2018-09-14
Payer: COMMERCIAL

## 2018-09-14 ENCOUNTER — SURGERY (OUTPATIENT)
Age: 68
End: 2018-09-14

## 2018-09-14 ENCOUNTER — HOSPITAL ENCOUNTER (OUTPATIENT)
Facility: CLINIC | Age: 68
Discharge: HOME | End: 2018-09-14
Attending: THORACIC SURGERY (CARDIOTHORACIC VASCULAR SURGERY) | Admitting: THORACIC SURGERY (CARDIOTHORACIC VASCULAR SURGERY)
Payer: COMMERCIAL

## 2018-09-14 ENCOUNTER — ANESTHESIA EVENT (OUTPATIENT)
Dept: SURGERY | Facility: CLINIC | Age: 68
End: 2018-09-14
Payer: COMMERCIAL

## 2018-09-14 ENCOUNTER — APPOINTMENT (OUTPATIENT)
Dept: GENERAL RADIOLOGY | Facility: CLINIC | Age: 68
End: 2018-09-14
Attending: THORACIC SURGERY (CARDIOTHORACIC VASCULAR SURGERY)
Payer: COMMERCIAL

## 2018-09-14 VITALS
WEIGHT: 228.9 LBS | OXYGEN SATURATION: 94 % | DIASTOLIC BLOOD PRESSURE: 75 MMHG | BODY MASS INDEX: 38.14 KG/M2 | TEMPERATURE: 98 F | RESPIRATION RATE: 16 BRPM | HEIGHT: 65 IN | SYSTOLIC BLOOD PRESSURE: 121 MMHG

## 2018-09-14 DIAGNOSIS — G89.18 ACUTE POST-OPERATIVE PAIN: ICD-10-CM

## 2018-09-14 DIAGNOSIS — R91.8 RIGHT LOWER LOBE LUNG MASS: Primary | ICD-10-CM

## 2018-09-14 PROCEDURE — 25000125 ZZHC RX 250: Performed by: THORACIC SURGERY (CARDIOTHORACIC VASCULAR SURGERY)

## 2018-09-14 PROCEDURE — 25000128 H RX IP 250 OP 636: Performed by: THORACIC SURGERY (CARDIOTHORACIC VASCULAR SURGERY)

## 2018-09-14 PROCEDURE — 37000009 ZZH ANESTHESIA TECHNICAL FEE, EACH ADDTL 15 MIN: Performed by: THORACIC SURGERY (CARDIOTHORACIC VASCULAR SURGERY)

## 2018-09-14 PROCEDURE — 71000027 ZZH RECOVERY PHASE 2 EACH 15 MINS: Performed by: THORACIC SURGERY (CARDIOTHORACIC VASCULAR SURGERY)

## 2018-09-14 PROCEDURE — 36000052 ZZH SURGERY LEVEL 2 EA 15 ADDTL MIN: Performed by: THORACIC SURGERY (CARDIOTHORACIC VASCULAR SURGERY)

## 2018-09-14 PROCEDURE — 27210794 ZZH OR GENERAL SUPPLY STERILE: Performed by: THORACIC SURGERY (CARDIOTHORACIC VASCULAR SURGERY)

## 2018-09-14 PROCEDURE — 25000128 H RX IP 250 OP 636: Performed by: NURSE ANESTHETIST, CERTIFIED REGISTERED

## 2018-09-14 PROCEDURE — 40000985 XR CHEST PORT 1 VW

## 2018-09-14 PROCEDURE — 37000008 ZZH ANESTHESIA TECHNICAL FEE, 1ST 30 MIN: Performed by: THORACIC SURGERY (CARDIOTHORACIC VASCULAR SURGERY)

## 2018-09-14 PROCEDURE — 36000054 ZZH SURGERY LEVEL 2 W FLUORO 1ST 30 MIN: Performed by: THORACIC SURGERY (CARDIOTHORACIC VASCULAR SURGERY)

## 2018-09-14 PROCEDURE — 40000170 ZZH STATISTIC PRE-PROCEDURE ASSESSMENT II: Performed by: THORACIC SURGERY (CARDIOTHORACIC VASCULAR SURGERY)

## 2018-09-14 PROCEDURE — 71000012 ZZH RECOVERY PHASE 1 LEVEL 1 FIRST HR: Performed by: THORACIC SURGERY (CARDIOTHORACIC VASCULAR SURGERY)

## 2018-09-14 PROCEDURE — 25000125 ZZHC RX 250: Performed by: NURSE ANESTHETIST, CERTIFIED REGISTERED

## 2018-09-14 PROCEDURE — C1788 PORT, INDWELLING, IMP: HCPCS | Performed by: THORACIC SURGERY (CARDIOTHORACIC VASCULAR SURGERY)

## 2018-09-14 DEVICE — CATH PORT POWERPORT CLEARVUE ISP 8FR 5608062
Type: IMPLANTABLE DEVICE | Status: NON-FUNCTIONAL
Removed: 2019-05-21

## 2018-09-14 RX ORDER — SODIUM CHLORIDE, SODIUM LACTATE, POTASSIUM CHLORIDE, CALCIUM CHLORIDE 600; 310; 30; 20 MG/100ML; MG/100ML; MG/100ML; MG/100ML
INJECTION, SOLUTION INTRAVENOUS CONTINUOUS
Status: DISCONTINUED | OUTPATIENT
Start: 2018-09-14 | End: 2018-09-14 | Stop reason: HOSPADM

## 2018-09-14 RX ORDER — LIDOCAINE HYDROCHLORIDE 10 MG/ML
INJECTION, SOLUTION INFILTRATION; PERINEURAL PRN
Status: DISCONTINUED | OUTPATIENT
Start: 2018-09-14 | End: 2018-09-14 | Stop reason: HOSPADM

## 2018-09-14 RX ORDER — LIDOCAINE HYDROCHLORIDE 10 MG/ML
INJECTION, SOLUTION INFILTRATION; PERINEURAL
Status: DISCONTINUED
Start: 2018-09-14 | End: 2018-09-14 | Stop reason: HOSPADM

## 2018-09-14 RX ORDER — HYDROCODONE BITARTRATE AND ACETAMINOPHEN 5; 325 MG/1; MG/1
1 TABLET ORAL
Status: DISCONTINUED | OUTPATIENT
Start: 2018-09-14 | End: 2018-09-14 | Stop reason: HOSPADM

## 2018-09-14 RX ORDER — PROPOFOL 10 MG/ML
INJECTION, EMULSION INTRAVENOUS CONTINUOUS PRN
Status: DISCONTINUED | OUTPATIENT
Start: 2018-09-14 | End: 2018-09-14

## 2018-09-14 RX ORDER — ONDANSETRON 2 MG/ML
INJECTION INTRAMUSCULAR; INTRAVENOUS PRN
Status: DISCONTINUED | OUTPATIENT
Start: 2018-09-14 | End: 2018-09-14

## 2018-09-14 RX ORDER — MEPERIDINE HYDROCHLORIDE 25 MG/ML
12.5 INJECTION INTRAMUSCULAR; INTRAVENOUS; SUBCUTANEOUS
Status: DISCONTINUED | OUTPATIENT
Start: 2018-09-14 | End: 2018-09-14 | Stop reason: HOSPADM

## 2018-09-14 RX ORDER — ACETAMINOPHEN 325 MG/1
650 TABLET ORAL
Status: DISCONTINUED | OUTPATIENT
Start: 2018-09-14 | End: 2018-09-14 | Stop reason: HOSPADM

## 2018-09-14 RX ORDER — HYDROMORPHONE HYDROCHLORIDE 1 MG/ML
.3-.5 INJECTION, SOLUTION INTRAMUSCULAR; INTRAVENOUS; SUBCUTANEOUS EVERY 10 MIN PRN
Status: DISCONTINUED | OUTPATIENT
Start: 2018-09-14 | End: 2018-09-14 | Stop reason: HOSPADM

## 2018-09-14 RX ORDER — FENTANYL CITRATE 50 UG/ML
INJECTION, SOLUTION INTRAMUSCULAR; INTRAVENOUS PRN
Status: DISCONTINUED | OUTPATIENT
Start: 2018-09-14 | End: 2018-09-14

## 2018-09-14 RX ORDER — HEPARIN SODIUM (PORCINE) LOCK FLUSH IV SOLN 100 UNIT/ML 100 UNIT/ML
SOLUTION INTRAVENOUS
Status: DISCONTINUED
Start: 2018-09-14 | End: 2018-09-14 | Stop reason: HOSPADM

## 2018-09-14 RX ORDER — EPHEDRINE SULFATE 50 MG/ML
INJECTION, SOLUTION INTRAMUSCULAR; INTRAVENOUS; SUBCUTANEOUS PRN
Status: DISCONTINUED | OUTPATIENT
Start: 2018-09-14 | End: 2018-09-14

## 2018-09-14 RX ORDER — ONDANSETRON 2 MG/ML
4 INJECTION INTRAMUSCULAR; INTRAVENOUS EVERY 30 MIN PRN
Status: DISCONTINUED | OUTPATIENT
Start: 2018-09-14 | End: 2018-09-14 | Stop reason: HOSPADM

## 2018-09-14 RX ORDER — HEPARIN SODIUM 1000 [USP'U]/ML
INJECTION, SOLUTION INTRAVENOUS; SUBCUTANEOUS
Status: DISCONTINUED
Start: 2018-09-14 | End: 2018-09-14 | Stop reason: HOSPADM

## 2018-09-14 RX ORDER — FENTANYL CITRATE 50 UG/ML
25-50 INJECTION, SOLUTION INTRAMUSCULAR; INTRAVENOUS
Status: DISCONTINUED | OUTPATIENT
Start: 2018-09-14 | End: 2018-09-14 | Stop reason: HOSPADM

## 2018-09-14 RX ORDER — NALOXONE HYDROCHLORIDE 0.4 MG/ML
.1-.4 INJECTION, SOLUTION INTRAMUSCULAR; INTRAVENOUS; SUBCUTANEOUS
Status: DISCONTINUED | OUTPATIENT
Start: 2018-09-14 | End: 2018-09-14 | Stop reason: HOSPADM

## 2018-09-14 RX ORDER — HYDROCODONE BITARTRATE AND ACETAMINOPHEN 5; 325 MG/1; MG/1
1 TABLET ORAL EVERY 6 HOURS PRN
Qty: 4 TABLET | Refills: 0 | Status: SHIPPED | OUTPATIENT
Start: 2018-09-14 | End: 2018-10-03

## 2018-09-14 RX ORDER — ONDANSETRON 4 MG/1
4 TABLET, ORALLY DISINTEGRATING ORAL EVERY 30 MIN PRN
Status: DISCONTINUED | OUTPATIENT
Start: 2018-09-14 | End: 2018-09-14 | Stop reason: HOSPADM

## 2018-09-14 RX ORDER — HEPARIN SODIUM (PORCINE) LOCK FLUSH IV SOLN 100 UNIT/ML 100 UNIT/ML
SOLUTION INTRAVENOUS PRN
Status: DISCONTINUED | OUTPATIENT
Start: 2018-09-14 | End: 2018-09-14 | Stop reason: HOSPADM

## 2018-09-14 RX ORDER — SODIUM CHLORIDE, SODIUM LACTATE, POTASSIUM CHLORIDE, CALCIUM CHLORIDE 600; 310; 30; 20 MG/100ML; MG/100ML; MG/100ML; MG/100ML
INJECTION, SOLUTION INTRAVENOUS CONTINUOUS PRN
Status: DISCONTINUED | OUTPATIENT
Start: 2018-09-14 | End: 2018-09-14

## 2018-09-14 RX ADMIN — MIDAZOLAM 2 MG: 1 INJECTION INTRAMUSCULAR; INTRAVENOUS at 12:03

## 2018-09-14 RX ADMIN — FENTANYL CITRATE 100 MCG: 50 INJECTION, SOLUTION INTRAMUSCULAR; INTRAVENOUS at 12:03

## 2018-09-14 RX ADMIN — Medication 10 MG: at 12:25

## 2018-09-14 RX ADMIN — Medication 10 MG: at 12:31

## 2018-09-14 RX ADMIN — ONDANSETRON 4 MG: 2 INJECTION INTRAMUSCULAR; INTRAVENOUS at 12:15

## 2018-09-14 RX ADMIN — HEPARIN SODIUM 25 ML: 1000 INJECTION, SOLUTION INTRAVENOUS; SUBCUTANEOUS at 12:37

## 2018-09-14 RX ADMIN — PROPOFOL 75 MCG/KG/MIN: 10 INJECTION, EMULSION INTRAVENOUS at 12:06

## 2018-09-14 RX ADMIN — SODIUM CHLORIDE, POTASSIUM CHLORIDE, SODIUM LACTATE AND CALCIUM CHLORIDE: 600; 310; 30; 20 INJECTION, SOLUTION INTRAVENOUS at 12:03

## 2018-09-14 RX ADMIN — Medication 5 MG: at 12:21

## 2018-09-14 RX ADMIN — DEXMEDETOMIDINE HYDROCHLORIDE 12 MCG: 100 INJECTION, SOLUTION INTRAVENOUS at 12:08

## 2018-09-14 RX ADMIN — SODIUM CHLORIDE, PRESERVATIVE FREE 10 ML: 5 INJECTION INTRAVENOUS at 12:37

## 2018-09-14 RX ADMIN — LIDOCAINE HYDROCHLORIDE 10 ML: 10 INJECTION, SOLUTION INFILTRATION; PERINEURAL at 12:37

## 2018-09-14 ASSESSMENT — COPD QUESTIONNAIRES: COPD: 1

## 2018-09-14 ASSESSMENT — ENCOUNTER SYMPTOMS: SEIZURES: 0

## 2018-09-14 ASSESSMENT — LIFESTYLE VARIABLES: TOBACCO_USE: 1

## 2018-09-14 NOTE — ANESTHESIA PREPROCEDURE EVALUATION
Anesthesia Evaluation     . Pt has not had prior anesthetic     No history of anesthetic complications          ROS/MED HX    ENT/Pulmonary: Comment: Lung mass; hx of hemoptysis  Small cell lung ca    (+)tobacco use, Past use COPD, , . .   (-) asthma and sleep apnea   Neurologic:      (-) seizures and CVA   Cardiovascular:     (+) hypertension----. : . . . :. .       METS/Exercise Tolerance:     Hematologic:         Musculoskeletal:         GI/Hepatic:        (-) GERD   Renal/Genitourinary:     (+) chronic renal disease,       Endo:     (+) Obesity, .      Psychiatric:         Infectious Disease:         Malignancy:         Other:                     Physical Exam      Airway   Mallampati: I  TM distance: >3 FB  Neck ROM: full    Dental   (+) upper dentures    Cardiovascular   Rhythm and rate: regular      Pulmonary    breath sounds clear to auscultation                    Anesthesia Plan      History & Physical Review  History and physical reviewed and following examination; no interval change.    ASA Status:  3 .        Plan for MAC with Intravenous induction.   PONV prophylaxis:  Ondansetron (or other 5HT-3) and Dexamethasone or Solumedrol       Postoperative Care  Postoperative pain management:  IV analgesics.      Consents  Anesthetic plan, risks, benefits and alternatives discussed with:  Patient..                          .

## 2018-09-14 NOTE — IP AVS SNAPSHOT
[facility] Same Day Surgery    [address]    [place] [address]    Phone:  [phone]    Fax:  [phone]                                       After Visit Summary   9/14/2018    Yvette Gastelum    MRN: [id number]           After Visit Summary Signature Page     I have received my discharge instructions, and my questions have been answered. I have discussed any challenges I see with this plan with the nurse or doctor.    ..........................................................................................................................................  Patient/Patient Representative Signature      ..........................................................................................................................................  Patient Representative Print Name and Relationship to Patient    ..................................................               ................................................  Date                                   Time    ..........................................................................................................................................  Reviewed by Signature/Title    ...................................................              ..............................................  Date                                               Time          22EPIC Rev 08/18

## 2018-09-14 NOTE — OP NOTE
DATE OF PROCEDURE:  September 14, 2018      SURGEON:  Todd Norris MD   FIRST ASSIST: Chloé Gann PA-C      PREOPERATIVE DIAGNOSIS:  small cell lung cancer right lower lobe lung      POSTOPERATIVE DIAGNOSIS:  Same       PROCEDURE:  Placement of [place] PowerPort implantable port, left subclavian vein.       ANESTHESIA:  Local with lidocaine 1% without epinephrine and sedation.       INDICATIONS:  Patient will undergo chemotherapy and a PowerPort is indicated for venous access.       DESCRIPTION OF PROCEDURE:  The patient was brought to the OR and placed in supine position.  The patient was placed into Trendelenburg.  IV sedation was given.  The neck and upper chest were prepared and draped in the usual fashion using ChloraPrep.  Local anesthesia was performed with lidocaine 1% without epinephrine. The left subclavian vein was punctured easily with a 16-gauge needle on the first attempt.  There was excellent blood return.  A guidewire was advanced through the needle without any resistance.  The needle was removed.  A transverse incision was made along the guidewire.  Subcutaneous pocket was made inferior to the incision.  Hemostasis was verified and was excellent.  An introducer with a peel-away sheath was introduced into the vein over the guidewire.  Guidewire and introducer were then removed.  The catheter was advanced through the peel-away sheath without resistance. The peel-away sheath was removed.  The catheter was placed at 21 cm at the skin level.  The catheter was cut and connected to the port.  The port was placed in the subcutaneous pocket.  The port was accessed with a non-coring needle.  There was excellent blood return, PowerPort was finally flushed with 10 mL of solution of heparin flush.  The incision was closed in the usual fashion.  A chest x-ray performed in the operating room and showed the catheter was in excellent position and the PowerPort is ready to be used.           TODD  SOLEDAD VELA MD

## 2018-09-14 NOTE — ANESTHESIA POSTPROCEDURE EVALUATION
Patient: Yvette Gastelum    Procedure(s):  POWER PORT PLACEMENT - Wound Class: I-Clean    Diagnosis:SMALL CELL LUNG CANCER   Diagnosis Additional Information: No value filed.    Anesthesia Type:  MAC    Note:  Anesthesia Post Evaluation    Patient location during evaluation: PACU  Patient participation: Able to fully participate in evaluation  Level of consciousness: awake  Pain management: adequate  Airway patency: patent  Cardiovascular status: acceptable  Respiratory status: acceptable  Hydration status: acceptable  PONV: none     Anesthetic complications: None          Last vitals:  Vitals:    09/14/18 1240 09/14/18 1245 09/14/18 1300   BP: 108/80 108/87 106/74   Resp: 12 9 9   Temp: 36.6  C (97.9  F)  36.7  C (98  F)   SpO2: 99% 100% 100%         Electronically Signed By: Lalo Trammell MD  September 14, 2018  1:21 PM

## 2018-09-14 NOTE — DISCHARGE INSTRUCTIONS
Same Day Surgery Discharge Instructions for  Sedation and General Anesthesia       It's not unusual to feel dizzy, light-headed or faint for up to 24 hours after surgery or while taking pain medication.  If you have these symptoms: sit for a few minutes before standing and have someone assist you when you get up to walk or use the bathroom.      You should rest and relax for the next 24 hours. We recommend you make arrangements to have an adult stay with you for at least 24 hours after your discharge.  Avoid hazardous and strenuous activity.      DO NOT DRIVE any vehicle or operate mechanical equipment for 24 hours following the end of your surgery.  Even though you may feel normal, your reactions may be affected by the medication you have received.      Do not drink alcoholic beverages for 24 hours following surgery.       Slowly progress to your regular diet as you feel able. It's not unusual to feel nauseated and/or vomit after receiving anesthesia.  If you develop these symptoms, drink clear liquids (apple juice, ginger ale, broth, 7-up, etc. ) until you feel better.  If your nausea and vomiting persists for 24 hours, please notify your surgeon.        All narcotic pain medications, along with inactivity and anesthesia, can cause constipation. Drinking plenty of liquids and increasing fiber intake will help.      For any questions of a medical nature, call your surgeon.      Do not make important decisions for 24 hours.      If you had general anesthesia, you may have a sore throat for a couple of days related to the breathing tube used during surgery.  You may use Cepacol lozenges to help with this discomfort.  If it worsens or if you develop a fever, contact your surgeon.       If you feel your pain is not well managed with the pain medications prescribed by your surgeon, please contact your surgeon's office to let them know so they can address your concerns.         Discharge Instructions for Power Port  Placement      General Instructions:    You will be given a card with information about your port.  You should carry this with you in your wallet/billfold. It provides information to clinicians about your port.  You should also carry the card in case your port triggers any security devices.     Activity:    Limit your activity for the first few days after your port is placed    Incision Care:     Unless otherwise directed by your surgeon, the dressing may removed tomorrow.      If a topical skin adhesive was used to close incision, you may shower tomorrow. Do not use soaps, lotions, or ointments on the wound area. Do not scrub the wound. After bathing, pat the wound dry with a soft towel.  Do not scratch, rub, or pick at the strips or film. Do not place tape directly over the strips or film.  Do not apply liquids (such as peroxide), ointments, or creams to the wound while the strips or film are in place.    Call your surgeon if you have:     Redness, swelling or drainage from incision     A fever of 101 F or greater.      Nausea or vomiting.     Pain that is not controlled by medications and/or rest.     Questions or concerns.                **If you have questions or concerns about your procedure,  call Dr. Norris at [phone]**

## 2018-09-14 NOTE — ANESTHESIA CARE TRANSFER NOTE
Patient: Yvette Gastelum    Procedure(s):  POWER PORT PLACEMENT - Wound Class: I-Clean    Diagnosis: SMALL CELL LUNG CANCER   Diagnosis Additional Information: No value filed.    Anesthesia Type:   MAC     Note:  Airway :Room Air  Patient transferred to:Phase II  Handoff Report: Identifed the Patient, Identified the Reponsible Provider, Reviewed the pertinent medical history, Discussed the surgical course, Reviewed Intra-OP anesthesia mangement and issues during anesthesia, Set expectations for post-procedure period and Allowed opportunity for questions and acknowledgement of understanding      Vitals: (Last set prior to Anesthesia Care Transfer)    CRNA VITALS  9/14/2018 1207 - 9/14/2018 1244      9/14/2018             Pulse: 103    SpO2: 100 %    Resp Rate (set): 10                Electronically Signed By: ЕКАТЕРИНА Ivey CRNA  September 14, 2018  12:44 PM

## 2018-09-14 NOTE — IP AVS SNAPSHOT
MRN:[id number]                      After Visit Summary   9/14/2018    Yvette Gastelum    MRN: [id number]           Thank you!     Thank you for choosing [facility] for your care. Our goal is always to provide you with excellent care. Hearing back from our patients is one way we can continue to improve our services. Please take a few minutes to complete the written survey that you may receive in the mail after you visit with us. Thank you!        Patient Information     Date Of Birth          1950        About your hospital stay     You were admitted on:  September 14, 2018 You last received care in the:  [facility] Same Day Surgery    You were discharged on:  September 14, 2018       Who to Call     For medical emergencies, please call 911.  For non-urgent questions about your medical care, please call your primary care provider or clinic, [phone]  For questions related to your surgery, please call your surgery clinic        Attending Provider     Provider Specialty    Todd Norris MD Thoracic Diseases       Primary Care Provider Office Phone # Fax #    Arie House [phone]      After Care Instructions     Diet Instructions       Resume pre-procedure diet            Discharge Instructions       Follow up appointment as instructed by Surgeon and or RN            Do not - immerse incision in water until sutures removed       Do not immerse incision in water/swim for one week.            Dressing       Keep clear Dermabond glue in place.  It will peel off on its own after about 7-10 days.            Shower       No shower for 24 hours post procedure. May shower Postoperative Day (POD)  one                  Further instructions from your care team         Same Day Surgery Discharge Instructions for  Sedation and General Anesthesia       It's not unusual to feel dizzy, light-headed or faint for up to 24 hours after surgery or while taking  pain medication.  If you have these symptoms: sit for a few minutes before standing and have someone assist you when you get up to walk or use the bathroom.      You should rest and relax for the next 24 hours. We recommend you make arrangements to have an adult stay with you for at least 24 hours after your discharge.  Avoid hazardous and strenuous activity.      DO NOT DRIVE any vehicle or operate mechanical equipment for 24 hours following the end of your surgery.  Even though you may feel normal, your reactions may be affected by the medication you have received.      Do not drink alcoholic beverages for 24 hours following surgery.       Slowly progress to your regular diet as you feel able. It's not unusual to feel nauseated and/or vomit after receiving anesthesia.  If you develop these symptoms, drink clear liquids (apple juice, ginger ale, broth, 7-up, etc. ) until you feel better.  If your nausea and vomiting persists for 24 hours, please notify your surgeon.        All narcotic pain medications, along with inactivity and anesthesia, can cause constipation. Drinking plenty of liquids and increasing fiber intake will help.      For any questions of a medical nature, call your surgeon.      Do not make important decisions for 24 hours.      If you had general anesthesia, you may have a sore throat for a couple of days related to the breathing tube used during surgery.  You may use Cepacol lozenges to help with this discomfort.  If it worsens or if you develop a fever, contact your surgeon.       If you feel your pain is not well managed with the pain medications prescribed by your surgeon, please contact your surgeon's office to let them know so they can address your concerns.         Discharge Instructions for Power Port Placement      General Instructions:    You will be given a card with information about your port.  You should carry this with you in your wallet/billfold. It provides information to  "clinicians about your port.  You should also carry the card in case your port triggers any security devices.     Activity:    Limit your activity for the first few days after your port is placed    Incision Care:     Unless otherwise directed by your surgeon, the dressing may removed tomorrow.      If a topical skin adhesive was used to close incision, you may shower tomorrow. Do not use soaps, lotions, or ointments on the wound area. Do not scrub the wound. After bathing, pat the wound dry with a soft towel.  Do not scratch, rub, or pick at the strips or film. Do not place tape directly over the strips or film.  Do not apply liquids (such as peroxide), ointments, or creams to the wound while the strips or film are in place.    Call your surgeon if you have:     Redness, swelling or drainage from incision     A fever of 101 F or greater.      Nausea or vomiting.     Pain that is not controlled by medications and/or rest.     Questions or concerns.                **If you have questions or concerns about your procedure,  call Dr. Norris at [phone]**          Pending Results     Date and Time Order Name Status Description    9/14/2018 1222 XR Chest Port 1 View In process             Admission Information     Date & Time Provider Department Dept. Phone    9/14/2018 Todd Norris MD [facility] Same Day Surgery [phone]      Your Vitals Were     Blood Pressure Temperature Respirations Height Weight Pulse Oximetry    106/74 97.9  F (36.6  C) (Temporal) 9 1.645 m (5' 4.75\") 103.8 kg (228 lb 14.4 oz) 100%    BMI (Body Mass Index)                   38.39 kg/m2           [facility] [employer] Information     [employer] gives you secure access to your electronic health record. If you see a primary care provider, you can also send messages to your care team and make appointments. If you have questions, please call your primary care clinic.  If you do not have a primary care provider, please call [phone] and " they will assist you.        Care EveryWhere ID     This is your Care EveryWhere ID. This could be used by other organizations to access your [place] medical records  [id number]        Equal Access to Services     YANIRA VILLANUEVA : Skyla Louise, tony ellis, portillota kakerri bebetonathanginger, mary martinezdgjefferson ulloa. So [facility] [phone].    ATENCIÓN: Si habla español, tiene a brewster disposición servicios gratuitos de asistencia lingüística. Llame al [phone].    We comply with applicable federal civil rights laws and [place] laws. We do not discriminate on the basis of race, color, national origin, age, disability, sex, sexual orientation, or gender identity.               Review of your medicines      START taking        Dose / Directions    HYDROcodone-acetaminophen 5-325 MG per tablet   Commonly known as:  NORCO   Used for:  Right lower lobe lung mass, Acute post-operative pain        Dose:  1 tablet   Take 1 tablet by mouth every 6 hours as needed for other (Moderate to Severe Pain)   Quantity:  4 tablet   Refills:  0         CONTINUE these medicines which have NOT CHANGED        Dose / Directions    ACETAMINOPHEN PO        Dose:  325-650 mg   Take 325-650 mg by mouth every 6 hours as needed for pain   Refills:  0       albuterol (2.5 MG/3ML) 0.083% neb solution   Used for:  Hypoxia, SOB (shortness of breath)        Dose:  1 vial   Take 1 vial (2.5 mg) by nebulization every 4 hours as needed for shortness of breath / dyspnea or wheezing   Quantity:  1 Box   Refills:  11       ASPIRIN EC PO        Dose:  81 mg   Take 81 mg by mouth daily   Refills:  0       atorvastatin 10 MG tablet   Commonly known as:  LIPITOR   Used for:  Hyperlipidemia LDL goal <130        TAKE 1 TABLET EVERY DAY   Quantity:  90 tablet   Refills:  0       hydrochlorothiazide 25 MG tablet   Commonly known as:  HYDRODIURIL   Used for:  Benign essential hypertension        TAKE 1 TABLET EVERY DAY (INCREASED  DOSE)   Quantity:  90 tablet   Refills:  0       Multi-vitamin Tabs tablet        Dose:  1 tablet   Take 1 tablet by mouth daily   Refills:  0            Where to get your medicines      Some of these will need a paper prescription and others can be bought over the counter. Ask your nurse if you have questions.     Bring a paper prescription for each of these medications     HYDROcodone-acetaminophen 5-325 MG per tablet                Protect others around you: Learn how to safely use, store and throw away your medicines at www.disposemymeds.org.        Information about OPIOIDS     PRESCRIPTION OPIOIDS: WHAT YOU NEED TO KNOW   We gave you an opioid (narcotic) pain medicine. It is important to manage your pain, but opioids are not always the best choice. You should first try all the other options your care team gave you. Take this medicine for as short a time (and as few doses) as possible.    Some activities can increase your pain, such as bandage changes or therapy sessions. It may help to take your pain medicine 30 to 60 minutes before these activities. Reduce your stress by getting enough sleep, working on hobbies you enjoy and practicing relaxation or meditation. Talk to your care team about ways to manage your pain beyond prescription opioids.    These medicines have risks:    DO NOT drive when on new or higher doses of pain medicine. These medicines can affect your alertness and reaction times, and you could be arrested for driving under the influence (DUI). If you need to use opioids long-term, talk to your care team about driving.    DO NOT operate heavy machinery    DO NOT do any other dangerous activities while taking these medicines.    DO NOT drink any alcohol while taking these medicines.     If the opioid prescribed includes acetaminophen, DO NOT take with any other medicines that contain acetaminophen. Read all labels carefully. Look for the word  acetaminophen  or  Tylenol.  Ask your pharmacist if  you have questions or are unsure.    You can get addicted to pain medicines, especially if you have a history of addiction (chemical, alcohol or substance dependence). Talk to your care team about ways to reduce this risk.    All opioids tend to cause constipation. Drink plenty of water and eat foods that have a lot of fiber, such as fruits, vegetables, prune juice, apple juice and high-fiber cereal. Take a laxative (Miralax, milk of magnesia, Colace, Senna) if you don t move your bowels at least every other day. Other side effects include upset stomach, sleepiness, dizziness, throwing up, tolerance (needing more of the medicine to have the same effect), physical dependence and slowed breathing.    Store your pills in a secure place, locked if possible. We will not replace any lost or stolen medicine. If you don t finish your medicine, please throw away (dispose) as directed by your pharmacist. The [place] Pollution Control Agency has more information about safe disposal: https://www.pca.[facility].[place].us/living-green/managing-unwanted-medications             Medication List: This is a list of all your medications and when to take them. Check marks below indicate your daily home schedule. Keep this list as a reference.      Medications           Morning Afternoon Evening Bedtime As Needed    ACETAMINOPHEN PO   Take 325-650 mg by mouth every 6 hours as needed for pain                                albuterol (2.5 MG/3ML) 0.083% neb solution   Take 1 vial (2.5 mg) by nebulization every 4 hours as needed for shortness of breath / dyspnea or wheezing                                ASPIRIN EC PO   Take 81 mg by mouth daily                                atorvastatin 10 MG tablet   Commonly known as:  LIPITOR   TAKE 1 TABLET EVERY DAY                                hydrochlorothiazide 25 MG tablet   Commonly known as:  HYDRODIURIL   TAKE 1 TABLET EVERY DAY (INCREASED DOSE)                                 HYDROcodone-acetaminophen 5-325 MG per tablet   Commonly known as:  NORCO   Take 1 tablet by mouth every 6 hours as needed for other (Moderate to Severe Pain)                                Multi-vitamin Tabs tablet   Take 1 tablet by mouth daily

## 2018-09-25 ENCOUNTER — TRANSFERRED RECORDS (OUTPATIENT)
Dept: HEALTH INFORMATION MANAGEMENT | Facility: CLINIC | Age: 68
End: 2018-09-25

## 2018-10-03 ENCOUNTER — TELEPHONE (OUTPATIENT)
Dept: FAMILY MEDICINE | Facility: CLINIC | Age: 68
End: 2018-10-03

## 2018-10-03 ENCOUNTER — TRANSFERRED RECORDS (OUTPATIENT)
Dept: HEALTH INFORMATION MANAGEMENT | Facility: CLINIC | Age: 68
End: 2018-10-03

## 2018-10-03 NOTE — TELEPHONE ENCOUNTER
Reason for Call:  Other Patient update/FYI    Detailed comments: per cancer clinic, patient is supposed to let Dr. House know that they took her off hr BP medication-HYDROcodone-acetaminophen (NORCO) 5-325 MG per tablet.    Pt. Will schedule an OV as soon as she gets all of her other appointments organized.        Phone Number Patient can be reached at: Home number on file [phone] (home)    Best Time: anytime    Can we leave a detailed message on this number? YES    Call taken on 10/3/2018 at 2:43 PM by Yecenia Agrawal  .

## 2018-10-12 ENCOUNTER — TRANSFERRED RECORDS (OUTPATIENT)
Dept: HEALTH INFORMATION MANAGEMENT | Facility: CLINIC | Age: 68
End: 2018-10-12

## 2018-10-30 ENCOUNTER — TRANSFERRED RECORDS (OUTPATIENT)
Dept: HEALTH INFORMATION MANAGEMENT | Facility: CLINIC | Age: 68
End: 2018-10-30

## 2018-10-31 ENCOUNTER — HOSPITAL ENCOUNTER (OUTPATIENT)
Dept: GENERAL RADIOLOGY | Facility: CLINIC | Age: 68
Discharge: HOME | End: 2018-10-31
Attending: NURSE PRACTITIONER | Admitting: NURSE PRACTITIONER
Payer: COMMERCIAL

## 2018-10-31 ENCOUNTER — HOSPITAL ENCOUNTER (OUTPATIENT)
Dept: NUCLEAR MEDICINE | Facility: CLINIC | Age: 68
Setting detail: NUCLEAR MEDICINE
End: 2018-10-31
Attending: INTERNAL MEDICINE
Payer: COMMERCIAL

## 2018-10-31 DIAGNOSIS — R06.02 SOB (SHORTNESS OF BREATH): ICD-10-CM

## 2018-10-31 DIAGNOSIS — C34.90 LUNG CANCER (H): ICD-10-CM

## 2018-10-31 DIAGNOSIS — R00.0 TACHYCARDIA: ICD-10-CM

## 2018-10-31 PROCEDURE — A9567 TECHNETIUM TC-99M AEROSOL: HCPCS | Performed by: INTERNAL MEDICINE

## 2018-10-31 PROCEDURE — 78582 LUNG VENTILAT&PERFUS IMAGING: CPT

## 2018-10-31 PROCEDURE — A9540 TC99M MAA: HCPCS | Performed by: INTERNAL MEDICINE

## 2018-10-31 PROCEDURE — 71046 X-RAY EXAM CHEST 2 VIEWS: CPT

## 2018-10-31 PROCEDURE — 34300033 ZZH RX 343: Performed by: INTERNAL MEDICINE

## 2018-10-31 RX ADMIN — KIT FOR THE PREPARATION OF TECHNETIUM TC 99M PENTETATE 65 MILLICURIE: 20 INJECTION, POWDER, LYOPHILIZED, FOR SOLUTION INTRAVENOUS; RESPIRATORY (INHALATION) at 10:35

## 2018-10-31 RX ADMIN — Medication 3.3 MILLICURIE: at 10:27

## 2018-11-12 ENCOUNTER — TRANSFERRED RECORDS (OUTPATIENT)
Dept: HEALTH INFORMATION MANAGEMENT | Facility: CLINIC | Age: 68
End: 2018-11-12

## 2018-11-27 ENCOUNTER — TRANSFERRED RECORDS (OUTPATIENT)
Dept: HEALTH INFORMATION MANAGEMENT | Facility: CLINIC | Age: 68
End: 2018-11-27

## 2018-11-28 ENCOUNTER — TRANSFERRED RECORDS (OUTPATIENT)
Dept: HEALTH INFORMATION MANAGEMENT | Facility: CLINIC | Age: 68
End: 2018-11-28

## 2018-11-28 ENCOUNTER — HOSPITAL ENCOUNTER (OUTPATIENT)
Dept: LAB | Facility: CLINIC | Age: 68
Discharge: HOME | End: 2018-11-28
Attending: INTERNAL MEDICINE | Admitting: INTERNAL MEDICINE
Payer: COMMERCIAL

## 2018-11-28 DIAGNOSIS — R91.8 RIGHT LOWER LOBE LUNG MASS: Primary | ICD-10-CM

## 2018-11-28 DIAGNOSIS — R06.02 SOB (SHORTNESS OF BREATH): ICD-10-CM

## 2018-11-28 DIAGNOSIS — D64.9 LOW HEMOGLOBIN: Primary | ICD-10-CM

## 2018-11-28 DIAGNOSIS — R91.8 RIGHT LOWER LOBE LUNG MASS: ICD-10-CM

## 2018-11-28 DIAGNOSIS — Z01.83 ENCOUNTER FOR BLOOD TYPING: ICD-10-CM

## 2018-11-28 DIAGNOSIS — R53.83 FATIGUE: ICD-10-CM

## 2018-11-28 LAB
BASOPHILS # BLD AUTO: 0 10E9/L (ref 0–0.2)
BASOPHILS NFR BLD AUTO: 0 %
DACRYOCYTES BLD QL SMEAR: SLIGHT
DIFFERENTIAL METHOD BLD: ABNORMAL
EOSINOPHIL # BLD AUTO: 0 10E9/L (ref 0–0.7)
EOSINOPHIL NFR BLD AUTO: 3.5 %
ERYTHROCYTE [DISTWIDTH] IN BLOOD BY AUTOMATED COUNT: 16.4 % (ref 10–15)
HCT VFR BLD AUTO: 20.4 % (ref 35–47)
HGB BLD-MCNC: 7 G/DL (ref 11.7–15.7)
LYMPHOCYTES # BLD AUTO: 0.4 10E9/L (ref 0.8–5.3)
LYMPHOCYTES NFR BLD AUTO: 41.5 %
MCH RBC QN AUTO: 30.3 PG (ref 26.5–33)
MCHC RBC AUTO-ENTMCNC: 34.3 G/DL (ref 31.5–36.5)
MCV RBC AUTO: 88 FL (ref 78–100)
MICROCYTES BLD QL SMEAR: PRESENT
MONOCYTES # BLD AUTO: 0.2 10E9/L (ref 0–1.3)
MONOCYTES NFR BLD AUTO: 17.5 %
NEUTROPHILS # BLD AUTO: 0.3 10E9/L (ref 1.6–8.3)
NEUTROPHILS NFR BLD AUTO: 37.5 %
NRBC # BLD AUTO: 0 10*3/UL
NRBC BLD AUTO-RTO: 2 /100
OVALOCYTES BLD QL SMEAR: SLIGHT
PLATELET # BLD AUTO: 22 10E9/L (ref 150–450)
PLATELET # BLD EST: ABNORMAL 10*3/UL
RBC # BLD AUTO: 2.31 10E12/L (ref 3.8–5.2)
WBC # BLD AUTO: 0.9 10E9/L (ref 4–11)

## 2018-11-28 PROCEDURE — 85025 COMPLETE CBC W/AUTO DIFF WBC: CPT | Performed by: INTERNAL MEDICINE

## 2018-11-28 PROCEDURE — 86923 COMPATIBILITY TEST ELECTRIC: CPT | Performed by: INTERNAL MEDICINE

## 2018-11-28 PROCEDURE — 86901 BLOOD TYPING SEROLOGIC RH(D): CPT | Performed by: INTERNAL MEDICINE

## 2018-11-28 PROCEDURE — 86900 BLOOD TYPING SEROLOGIC ABO: CPT | Performed by: INTERNAL MEDICINE

## 2018-11-28 PROCEDURE — 36415 COLL VENOUS BLD VENIPUNCTURE: CPT | Performed by: INTERNAL MEDICINE

## 2018-11-28 PROCEDURE — 86850 RBC ANTIBODY SCREEN: CPT | Performed by: INTERNAL MEDICINE

## 2018-11-29 LAB
ABO + RH BLD: NORMAL
ABO + RH BLD: NORMAL
BLD GP AB SCN SERPL QL: NORMAL
BLD PROD TYP BPU: NORMAL
BLOOD BANK CMNT PATIENT-IMP: NORMAL
NUM BPU REQUESTED: 1
SPECIMEN EXP DATE BLD: NORMAL

## 2018-11-30 ENCOUNTER — HOSPITAL ENCOUNTER (OUTPATIENT)
Facility: CLINIC | Age: 68
Setting detail: SPECIMEN
Discharge: HOME | End: 2018-11-30
Attending: NURSE PRACTITIONER | Admitting: INTERNAL MEDICINE
Payer: COMMERCIAL

## 2018-11-30 ENCOUNTER — INFUSION THERAPY VISIT (OUTPATIENT)
Dept: INFUSION THERAPY | Facility: CLINIC | Age: 68
End: 2018-11-30
Attending: NURSE PRACTITIONER
Payer: COMMERCIAL

## 2018-11-30 VITALS
DIASTOLIC BLOOD PRESSURE: 77 MMHG | HEART RATE: 94 BPM | SYSTOLIC BLOOD PRESSURE: 112 MMHG | TEMPERATURE: 97.6 F | OXYGEN SATURATION: 99 % | RESPIRATION RATE: 16 BRPM

## 2018-11-30 DIAGNOSIS — R91.8 RIGHT LOWER LOBE LUNG MASS: Primary | ICD-10-CM

## 2018-11-30 LAB
BASOPHILS # BLD AUTO: 0 10E9/L (ref 0–0.2)
BASOPHILS NFR BLD AUTO: 0.6 %
BLD PROD TYP BPU: NORMAL
BLD UNIT ID BPU: 0
BLOOD PRODUCT CODE: NORMAL
BPU ID: NORMAL
DACRYOCYTES BLD QL SMEAR: SLIGHT
DIFFERENTIAL METHOD BLD: ABNORMAL
EOSINOPHIL # BLD AUTO: 0 10E9/L (ref 0–0.7)
EOSINOPHIL NFR BLD AUTO: 1.9 %
ERYTHROCYTE [DISTWIDTH] IN BLOOD BY AUTOMATED COUNT: 16.9 % (ref 10–15)
HCT VFR BLD AUTO: 20.2 % (ref 35–47)
HGB BLD-MCNC: 6.8 G/DL (ref 11.7–15.7)
IMM GRANULOCYTES # BLD: 0 10E9/L (ref 0–0.4)
IMM GRANULOCYTES NFR BLD: 0.6 %
LYMPHOCYTES # BLD AUTO: 0.5 10E9/L (ref 0.8–5.3)
LYMPHOCYTES NFR BLD AUTO: 30.6 %
MCH RBC QN AUTO: 30.4 PG (ref 26.5–33)
MCHC RBC AUTO-ENTMCNC: 33.7 G/DL (ref 31.5–36.5)
MCV RBC AUTO: 90 FL (ref 78–100)
MICROCYTES BLD QL SMEAR: PRESENT
MONOCYTES # BLD AUTO: 0.4 10E9/L (ref 0–1.3)
MONOCYTES NFR BLD AUTO: 25.5 %
NEUTROPHILS # BLD AUTO: 0.6 10E9/L (ref 1.6–8.3)
NEUTROPHILS NFR BLD AUTO: 40.8 %
NRBC # BLD AUTO: 0 10*3/UL
NRBC BLD AUTO-RTO: 2 /100
OVALOCYTES BLD QL SMEAR: SLIGHT
PLATELET # BLD AUTO: 37 10E9/L (ref 150–450)
PLATELET # BLD EST: ABNORMAL 10*3/UL
POLYCHROMASIA BLD QL SMEAR: SLIGHT
RBC # BLD AUTO: 2.24 10E12/L (ref 3.8–5.2)
TRANSFUSION STATUS PATIENT QL: NORMAL
TRANSFUSION STATUS PATIENT QL: NORMAL
WBC # BLD AUTO: 1.6 10E9/L (ref 4–11)

## 2018-11-30 PROCEDURE — 36430 TRANSFUSION BLD/BLD COMPNT: CPT

## 2018-11-30 PROCEDURE — P9016 RBC LEUKOCYTES REDUCED: HCPCS | Performed by: INTERNAL MEDICINE

## 2018-11-30 PROCEDURE — 85025 COMPLETE CBC W/AUTO DIFF WBC: CPT | Performed by: INTERNAL MEDICINE

## 2018-11-30 RX ORDER — HEPARIN SODIUM (PORCINE) LOCK FLUSH IV SOLN 100 UNIT/ML 100 UNIT/ML
5 SOLUTION INTRAVENOUS EVERY 8 HOURS
Status: DISCONTINUED | OUTPATIENT
Start: 2018-11-30 | End: 2018-11-30 | Stop reason: HOSPADM

## 2018-11-30 ASSESSMENT — PAIN SCALES - GENERAL: PAINLEVEL: NO PAIN (0)

## 2018-11-30 NOTE — MR AVS SNAPSHOT
After Visit Summary   11/30/2018    Yvette Gastelum    MRN: [id number]           Patient Information     Date Of Birth          1950        Visit Information        Provider Department      11/30/2018 12:00 PM  INF[facility]AIR [facility]        Today's Diagnoses     Right lower lobe lung mass    -  1       Follow-ups after your visit        Who to contact     If you have questions or need follow up information about today's clinic visit or your schedule please contact [facility] directly at [phone].  Normal or non-critical lab and imaging results will be communicated to you by [employer], letter or phone within 4 business days after the clinic has received the results. If you do not hear from us within 7 days, please contact the clinic through [employer] or phone. If you have a critical or abnormal lab result, we will notify you by phone as soon as possible.  Submit refill requests through [employer] or call your pharmacy and they will forward the refill request to us. Please allow 3 business days for your refill to be completed.          Additional Information About Your Visit        MyChart Information     [employer] gives you secure access to your electronic health record. If you see a primary care provider, you can also send messages to your care team and make appointments. If you have questions, please call your primary care clinic.  If you do not have a primary care provider, please call [phone] and they will assist you.        Care EveryWhere ID     This is your Care EveryWhere ID. This could be used by other organizations to access your [place] medical records  [id number]        Your Vitals Were     Pulse Temperature Respirations Pulse Oximetry          94 97.6  F (36.4  C) (Oral) 16 99%         Blood Pressure from Last 3 Encounters:   11/30/18 112/77   09/14/18 121/75   08/31/18 117/89    Weight from Last 3  Encounters:   09/14/18 103.8 kg (228 lb 14.4 oz)   08/22/18 108.4 kg (238 lb 14.4 oz)   08/21/18 104.8 kg (231 lb)              We Performed the Following     CBC with platelets differential     Transfuse red blood cell unit        Primary Care Provider Office Phone # Fax #    Arie House [phone] [address]  [place] [address]        Equal Access to Services     ABRAHAN VILLANUEVA : Hadii aad ku hadasho Soomaali, waaxda luqadaha, qaybta kaalmada adeegyada, waxay idiin hayaan adeeg don hernandez . So [facility] [phone].    ATENCIÓN: Si habla español, tiene a brewster disposición servicios gratuitos de asistencia lingüística. [facility] [phone].    We comply with applicable federal civil rights laws and [place] laws. We do not discriminate on the basis of race, color, national origin, age, disability, sex, sexual orientation, or gender identity.            Thank you!     Thank you for choosing [facility]  for your care. Our goal is always to provide you with excellent care. Hearing back from our patients is one way we can continue to improve our services. Please take a few minutes to complete the written survey that you may receive in the mail after your visit with us. Thank you!             Your Updated Medication List - Protect others around you: Learn how to safely use, store and throw away your medicines at www.disposemymeds.org.          This list is accurate as of 11/30/18  4:05 PM.  Always use your most recent med list.                   Brand Name Dispense Instructions for use Diagnosis    ACETAMINOPHEN PO      Take 325-650 mg by mouth every 6 hours as needed for pain        albuterol (2.5 MG/3ML) 0.083% neb solution    PROVENTIL    1 Box    Take 1 vial (2.5 mg) by nebulization every 4 hours as needed for shortness of breath / dyspnea or wheezing    Hypoxia, SOB (shortness of breath)       ASPIRIN EC PO      Take 81 mg by mouth daily         atorvastatin 10 MG tablet    LIPITOR    90 tablet    TAKE 1 TABLET EVERY DAY    Hyperlipidemia LDL goal <130       Multi-vitamin tablet      Take 1 tablet by mouth daily

## 2018-11-30 NOTE — PROGRESS NOTES
Infusion Nursing Note:  Yvetet Gastelum presents today for labs and  1 unit PRBC's.    Patient seen by provider today: No   present during visit today: Not Applicable.    Note: Pt started on Levaquin yesterday per MOHPA.    Intravenous Access:  Lab draw site Right AC, Needle type Butterfly, Gauge #23.  Labs drawn without difficulty.  Implanted Port.    Treatment Conditions:  Lab Results   Component Value Date    HGB 6.8 11/30/2018     Lab Results   Component Value Date    WBC 1.6 11/30/2018      Lab Results   Component Value Date    ANEU 0.3 11/28/2018     Lab Results   Component Value Date    PLT 37 11/30/2018      Blood transfusion consent signed 11/30/2018.  Copy of [facility] labs faxed to [facility] at [phone]    Post Infusion Assessment:  Patient tolerated infusion without incident.  Blood return noted pre and post infusion.  Site patent and intact, free from redness, edema or discomfort.  No evidence of extravasations.  Access discontinued per protocol.    Discharge Plan:   Discharge instructions reviewed with: Patient.  Patient and/or family verbalized understanding of discharge instructions and all questions answered.  Copy of AVS reviewed with patient and/or family.  Patient will return prn for next appointment.  Patient discharged in stable condition accompanied by: self.  Departure Mode: Ambulatory.    Ca Burch RN

## 2018-12-16 ENCOUNTER — APPOINTMENT (OUTPATIENT)
Dept: GENERAL RADIOLOGY | Facility: CLINIC | Age: 68
End: 2018-12-16
Attending: EMERGENCY MEDICINE
Payer: COMMERCIAL

## 2018-12-16 ENCOUNTER — HOSPITAL ENCOUNTER (EMERGENCY)
Facility: CLINIC | Age: 68
Discharge: HOME | End: 2018-12-16
Attending: EMERGENCY MEDICINE | Admitting: EMERGENCY MEDICINE
Payer: COMMERCIAL

## 2018-12-16 VITALS
WEIGHT: 219 LBS | SYSTOLIC BLOOD PRESSURE: 111 MMHG | TEMPERATURE: 98.3 F | HEIGHT: 64 IN | DIASTOLIC BLOOD PRESSURE: 74 MMHG | RESPIRATION RATE: 16 BRPM | BODY MASS INDEX: 37.39 KG/M2 | OXYGEN SATURATION: 98 %

## 2018-12-16 DIAGNOSIS — N17.9 ACUTE RENAL FAILURE, UNSPECIFIED ACUTE RENAL FAILURE TYPE (H): ICD-10-CM

## 2018-12-16 DIAGNOSIS — J04.0 LARYNGITIS, ACUTE: ICD-10-CM

## 2018-12-16 DIAGNOSIS — J06.9 UPPER RESPIRATORY TRACT INFECTION, UNSPECIFIED TYPE: ICD-10-CM

## 2018-12-16 DIAGNOSIS — E86.0 DEHYDRATION: ICD-10-CM

## 2018-12-16 DIAGNOSIS — T45.1X5A ANTINEOPLASTIC CHEMOTHERAPY INDUCED PANCYTOPENIA (H): ICD-10-CM

## 2018-12-16 DIAGNOSIS — D61.810 ANTINEOPLASTIC CHEMOTHERAPY INDUCED PANCYTOPENIA (H): ICD-10-CM

## 2018-12-16 LAB
ANION GAP SERPL CALCULATED.3IONS-SCNC: 10 MMOL/L (ref 3–14)
BASOPHILS # BLD AUTO: 0 10E9/L (ref 0–0.2)
BASOPHILS NFR BLD AUTO: 2 %
BUN SERPL-MCNC: 31 MG/DL (ref 7–30)
CALCIUM SERPL-MCNC: 8.7 MG/DL (ref 8.5–10.1)
CHLORIDE SERPL-SCNC: 110 MMOL/L (ref 94–109)
CO2 SERPL-SCNC: 21 MMOL/L (ref 20–32)
CREAT SERPL-MCNC: 1.28 MG/DL (ref 0.52–1.04)
DACRYOCYTES BLD QL SMEAR: SLIGHT
DIFFERENTIAL METHOD BLD: ABNORMAL
EOSINOPHIL # BLD AUTO: 0 10E9/L (ref 0–0.7)
EOSINOPHIL NFR BLD AUTO: 1 %
ERYTHROCYTE [DISTWIDTH] IN BLOOD BY AUTOMATED COUNT: 18.8 % (ref 10–15)
FLUAV+FLUBV AG SPEC QL: NEGATIVE
FLUAV+FLUBV AG SPEC QL: NEGATIVE
GFR SERPL CREATININE-BSD FRML MDRD: 41 ML/MIN/1.7M2
GLUCOSE SERPL-MCNC: 116 MG/DL (ref 70–99)
HCT VFR BLD AUTO: 24 % (ref 35–47)
HGB BLD-MCNC: 8.2 G/DL (ref 11.7–15.7)
LACTATE BLD-SCNC: 2 MMOL/L (ref 0.7–2)
LYMPHOCYTES # BLD AUTO: 0.3 10E9/L (ref 0.8–5.3)
LYMPHOCYTES NFR BLD AUTO: 14 %
MCH RBC QN AUTO: 32.3 PG (ref 26.5–33)
MCHC RBC AUTO-ENTMCNC: 34.2 G/DL (ref 31.5–36.5)
MCV RBC AUTO: 95 FL (ref 78–100)
MONOCYTES # BLD AUTO: 0 10E9/L (ref 0–1.3)
MONOCYTES NFR BLD AUTO: 1 %
MYELOCYTES # BLD: 0 10E9/L
MYELOCYTES NFR BLD MANUAL: 1 %
NEUTROPHILS # BLD AUTO: 1.5 10E9/L (ref 1.6–8.3)
NEUTROPHILS NFR BLD AUTO: 81 %
PLATELET # BLD AUTO: 72 10E9/L (ref 150–450)
PLATELET # BLD EST: ABNORMAL 10*3/UL
POTASSIUM SERPL-SCNC: 4 MMOL/L (ref 3.4–5.3)
RBC # BLD AUTO: 2.54 10E12/L (ref 3.8–5.2)
RBC MORPH BLD: ABNORMAL
SODIUM SERPL-SCNC: 141 MMOL/L (ref 133–144)
SPECIMEN SOURCE: NORMAL
TOXIC GRANULES BLD QL SMEAR: PRESENT
WBC # BLD AUTO: 1.8 10E9/L (ref 4–11)

## 2018-12-16 PROCEDURE — 25000125 ZZHC RX 250: Performed by: EMERGENCY MEDICINE

## 2018-12-16 PROCEDURE — 96361 HYDRATE IV INFUSION ADD-ON: CPT

## 2018-12-16 PROCEDURE — 99284 EMERGENCY DEPT VISIT MOD MDM: CPT | Mod: 25

## 2018-12-16 PROCEDURE — 94640 AIRWAY INHALATION TREATMENT: CPT

## 2018-12-16 PROCEDURE — 80048 BASIC METABOLIC PNL TOTAL CA: CPT | Performed by: EMERGENCY MEDICINE

## 2018-12-16 PROCEDURE — 71046 X-RAY EXAM CHEST 2 VIEWS: CPT

## 2018-12-16 PROCEDURE — 87804 INFLUENZA ASSAY W/OPTIC: CPT | Performed by: EMERGENCY MEDICINE

## 2018-12-16 PROCEDURE — 86901 BLOOD TYPING SEROLOGIC RH(D): CPT | Performed by: EMERGENCY MEDICINE

## 2018-12-16 PROCEDURE — 86923 COMPATIBILITY TEST ELECTRIC: CPT | Performed by: EMERGENCY MEDICINE

## 2018-12-16 PROCEDURE — 83605 ASSAY OF LACTIC ACID: CPT | Performed by: EMERGENCY MEDICINE

## 2018-12-16 PROCEDURE — 85025 COMPLETE CBC W/AUTO DIFF WBC: CPT | Performed by: EMERGENCY MEDICINE

## 2018-12-16 PROCEDURE — 86850 RBC ANTIBODY SCREEN: CPT | Performed by: EMERGENCY MEDICINE

## 2018-12-16 PROCEDURE — 86900 BLOOD TYPING SEROLOGIC ABO: CPT | Performed by: EMERGENCY MEDICINE

## 2018-12-16 PROCEDURE — 25000131 ZZH RX MED GY IP 250 OP 636 PS 637: Performed by: EMERGENCY MEDICINE

## 2018-12-16 PROCEDURE — 96374 THER/PROPH/DIAG INJ IV PUSH: CPT

## 2018-12-16 PROCEDURE — 25000128 H RX IP 250 OP 636: Performed by: EMERGENCY MEDICINE

## 2018-12-16 RX ORDER — CODEINE PHOSPHATE AND GUAIFENESIN 10; 100 MG/5ML; MG/5ML
1-2 SOLUTION ORAL EVERY 4 HOURS PRN
Qty: 120 ML | Refills: 0 | Status: SHIPPED | OUTPATIENT
Start: 2018-12-16 | End: 2019-02-26

## 2018-12-16 RX ORDER — SODIUM CHLORIDE 9 MG/ML
1000 INJECTION, SOLUTION INTRAVENOUS CONTINUOUS
Status: DISCONTINUED | OUTPATIENT
Start: 2018-12-16 | End: 2018-12-16 | Stop reason: HOSPADM

## 2018-12-16 RX ORDER — HEPARIN SODIUM (PORCINE) LOCK FLUSH IV SOLN 100 UNIT/ML 100 UNIT/ML
500 SOLUTION INTRAVENOUS ONCE
Status: COMPLETED | OUTPATIENT
Start: 2018-12-16 | End: 2018-12-16

## 2018-12-16 RX ORDER — IPRATROPIUM BROMIDE AND ALBUTEROL SULFATE 2.5; .5 MG/3ML; MG/3ML
3 SOLUTION RESPIRATORY (INHALATION) ONCE
Status: COMPLETED | OUTPATIENT
Start: 2018-12-16 | End: 2018-12-16

## 2018-12-16 RX ORDER — DEXAMETHASONE 4 MG/1
12 TABLET ORAL ONCE
Status: COMPLETED | OUTPATIENT
Start: 2018-12-16 | End: 2018-12-16

## 2018-12-16 RX ADMIN — SODIUM CHLORIDE 1000 ML: 9 INJECTION, SOLUTION INTRAVENOUS at 09:20

## 2018-12-16 RX ADMIN — IPRATROPIUM BROMIDE AND ALBUTEROL SULFATE 3 ML: .5; 2.5 SOLUTION RESPIRATORY (INHALATION) at 09:32

## 2018-12-16 RX ADMIN — SODIUM CHLORIDE 1000 ML: 9 INJECTION, SOLUTION INTRAVENOUS at 10:27

## 2018-12-16 RX ADMIN — DEXAMETHASONE 12 MG: 4 TABLET ORAL at 12:22

## 2018-12-16 RX ADMIN — SODIUM CHLORIDE, PRESERVATIVE FREE 500 UNITS: 5 INJECTION INTRAVENOUS at 12:14

## 2018-12-16 ASSESSMENT — ENCOUNTER SYMPTOMS
SORE THROAT: 1
SHORTNESS OF BREATH: 1
APPETITE CHANGE: 1
FEVER: 0
CHILLS: 1
COUGH: 1
NAUSEA: 1
VOMITING: 0

## 2018-12-16 ASSESSMENT — MIFFLIN-ST. JEOR: SCORE: 1508.38

## 2018-12-16 NOTE — ED AVS SNAPSHOT
Emergency Department  [address] [place] [id number]  Phone:  [phone]  Fax:  [phone]                                    Yvette Gastelum   MRN: [id number]    Department:   Emergency Department   Date of Visit:  12/16/2018           After Visit Summary Signature Page    I have received my discharge instructions, and my questions have been answered. I have discussed any challenges I see with this plan with the nurse or doctor.    ..........................................................................................................................................  Patient/Patient Representative Signature      ..........................................................................................................................................  Patient Representative Print Name and Relationship to Patient    ..................................................               ................................................  Date                                   Time    ..........................................................................................................................................  Reviewed by Signature/Title    ...................................................              ..............................................  Date                                               Time          22EPIC Rev 08/18

## 2018-12-16 NOTE — DISCHARGE INSTRUCTIONS
Push fluids, Robitussin with codeine cough syrup at night to help you sleep, use your nebulizer every 4 hours and every hour as needed.  Recheck in the clinic tomorrow with your doctor, return to the ER sooner if you get significantly worse.

## 2018-12-16 NOTE — ED PROVIDER NOTES
"  History     Chief Complaint:  Shortness of breath    HPI   Yvette Gastelum is a 68 year old female with a history of hypertension, chronic kidney disease, chronic bronchitis, hyperlipidemia, and lung cancer who presents to the emergency department with her son for evaluation of shortness of breath. Of note, the patient had right lower lobe lung cancer and finished her last rounds of chemotherapy earlier this week. Here, she reports the onset of a \"croup like cold\" three days ago with a phlegm producing cough that worsens at night. She is also feeling more short of breath that worsens with activity. She was short of breath prior to cancer diagnosis, which temporarily improved, but has worsened again with the cold. She has tried nebulizing treatment at home with no improvement. Due to chemotherapy, a few weeks ago she needed a hemoglobin infusion and was told she will likely need another one this week. She also complains of a sore throat, nausea, loss of appetite, chills, and esophagitis from radiation. The patient denies fever, vomiting, chest pain, or hemoptysis.     Allergies:  No Known Drug Allergies    Medications:    Acetaminophen  Albuterol  Aspirin  Lipitor     Past Medical History:    Hypertension  Kidney stones  Obesity  Recurrent UTI  Sepsis  Chronic kidney disease  Anemia   Chronic bronchitis  Iron deficiency anemia  Hyperlipidemia  Hemoptysis   Right lower lung cancer     Past Surgical History:    Colonoscopy  Cataract extraction bilateral  Tubal ligation bilateral  [place] teeth removal  Port insertion     Family History:    Aorta Aneurysm   Cervical cancer     Social History:  Former smoker: quit date 9/1/15  Negative for alcohol use.  Marital Status:      Oncologist: Dr. Ames     Review of Systems   Constitutional: Positive for appetite change and chills. Negative for fever.   HENT: Positive for sore throat.    Respiratory: Positive for cough and shortness of breath.    Cardiovascular: " "Negative for chest pain.   Gastrointestinal: Positive for nausea. Negative for vomiting.   All other systems reviewed and are negative.    Physical Exam     Patient Vitals for the past 24 hrs:   BP Temp Temp src Heart Rate Resp SpO2 Height Weight   12/16/18 1214 -- -- -- -- -- 98 % -- --   12/16/18 1148 -- -- -- -- -- 99 % -- --   12/16/18 1120 111/74 -- -- -- -- 99 % -- --   12/16/18 1117 103/65 -- -- -- -- 100 % -- --   12/16/18 1100 102/71 -- -- 105 16 99 % -- --   12/16/18 1056 -- -- -- -- -- 99 % -- --   12/16/18 1001 103/68 -- -- -- -- 100 % -- --   12/16/18 0856 91/70 98.3  F (36.8  C) Oral -- 18 100 % 1.626 m (5' 4\") 99.3 kg (219 lb)       Physical Exam  Nursing note and vitals reviewed.    Constitutional:  Appears well-developed and well-nourished, comfortable, hoarse voice, hair loss.   HENT:    Nose normal.  No discharge.      Oropharynx is clear and moist.  Eyes:    Conjunctivae are normal without injection. No lid droop.     Pupils are equal, round, and reactive to light.   Lymph:  No enlarged or tender cervical or submandibular lymph nodes.   Cardiovascular:  Normal rate, regular rhythm with normal S1 and S2.      Normal heart sounds and peripheral pulses 2+ and equal.       No murmur or kenisha.  Pulmonary:  Effort normal and breath sounds clear to auscultation bilaterally   No respiratory distress.  No stridor.     No wheezes. No rales. Port a cath in chest wall.   GI:    Soft. No distension and no mass. No tenderness.      No rebound and no guarding. No flank pain.  No HSM.  Musculoskeletal:  Normal range of motion. No extremity deformity.     No edema and no tenderness.  No cyanosis.  Neurological:   Alert and oriented. No cranial nerve deficit, no facial droop.     Exhibits good muscle tone. Coordination normal.      GCS eye subscore is 4. GCS verbal subscore is 5.      GCS motor subscore is 6.   Skin:    Skin is warm and dry. No rash noted. No diaphoresis.      No erythema. No pallor.  No " lesions.  Psychiatric:   Behavior is normal. Appropriate mood and affect.     Judgment and thought content normal.     Emergency Department Course   Imaging:  Chest X-Ray. 2 Views:   IMPRESSION:  No acute consolidation or significant change.  Reading per radiology.     Radiographic findings were communicated with the patient who voiced understanding of the findings.    Laboratory:  CBC: WBC: 1.8 (L), HGB: 8.2 (L), PLT: 72 (L)  BMP: Glucose 116 (H), Chloride: 110 (H), Urea nitrogen: 31 (H), Creatinine: 1.28 (H), GFR: 41 (L), o/w WNL     0941 Lactic acid: 2.0    ABO/Rh type and screen: A pos, Antibody Screen Neg.    Influenza A/B antigen: Negative    Interventions:  0920 0.9% Sodium Chloride BOLUS 1000 mLs IV  0932 Albuterol-Ipratropium inhalation solution, 2.5 mg-0.5 mg/3 ml; 3 mL, inhalation  1027 0.9% Sodium Chloride BOLUS 1000 mLs IV   1214 Heparin 500 units IV  1222 Decadron 12 mg PO    Emergency Department Course:  0857 Nursing notes and vitals reviewed. I performed an exam of the patient as documented above.     IV inserted. Medicine administered as documented above. Blood drawn. Nose swabbed. This was sent to the lab for further testing, results above.    The patient was sent for a chest XR while in the emergency department, findings above.     1034 I rechecked the patient and discussed the results of her workup thus far.     Findings and plan explained to the Patient. Patient discharged home with instructions regarding supportive care, medications, and reasons to return. The importance of close follow-up was reviewed. The patient was prescribed Robitussin.    I personally reviewed the laboratory results with the Patient and answered all related questions prior to discharge.     Impression & Plan    Medical Decision Making:  Patient comes in with hoarse voice and cough.  She is feeling little more short of breath when she tries to do anything.  No chest pain.  She feels like there is quite a bit of mucus in  her chest.  She is not bringing up any phlegm.  No fevers.  She just finished chemo again this week for lung cancer.  Chest x-ray is unremarkable; no big tumor, no evidence of pneumonia or heart failure.  Her laboratory work came back with pancytopenia but it is actually improved somewhat.  Her white count is 1.8 with 81% neutrophils, hemoglobin is 8.2 which has come up, and her platelets are 72,000.  She was given a DuoNeb which may have helped a little bit.  Her oxygen saturations very briefly dropped but then came right back up to normal.  Most of her blood pressures have been normal, she had a few blood pressure readings that were in the 80s and 90s.  She said she runs low blood pressures normally but I believe these were false readings.  She was sitting up bending her arm when she got these low blood pressure readings.  Her electrolytes look normal.  She did have some prerenal azotemia with an elevated BUN of 31 and her creatinine had dropped to 41.  She was given over a liter of fluids, tolerating fluids well and was feeling much better.  She did not want to be admitted and I recommended that we send her home with conservative treatment at this time.  She does sound very hoarse and I think this is a viral laryngotracheal bronchitis.  She is not diabetic and I gave her 1 dose of Decadron to help with the swelling.  She will follow-up in the clinic tomorrow, return sooner if she gets significantly worse.    Diagnosis:    ICD-10-CM    1. Laryngitis, acute J04.0    2. Upper respiratory tract infection, unspecified type J06.9    3. Antineoplastic chemotherapy induced pancytopenia (H) D61.810     T45.1X5A    4. Dehydration E86.0    5. Acute renal failure, unspecified acute renal failure type (H) N17.9        Disposition:  Discharged to home. Push fluids, Robitussin with codeine cough syrup at night to help you sleep, use your nebulizer every 4 hours and every hour as needed.  Recheck in the clinic tomorrow with your  doctor, return to the ER sooner if you get significantly worse.    Discharge Medications:     Medication List      Started    guaiFENesin-codeine 100-10 MG/5ML solution  Commonly known as:  ROBITUSSIN AC  1-2 tsp., Oral, EVERY 4 HOURS PRN          Scribe Disclosure:  OLIVE, Aram Glass, am serving as a scribe on 12/16/2018 at 8:57 AM to personally document services performed by Dr. Moises MD based on my observations and the provider's statements to me.       Aram Glass  12/16/2018    EMERGENCY DEPARTMENT       Maeve De Leon MD  12/16/18 3199

## 2018-12-18 ENCOUNTER — TRANSFERRED RECORDS (OUTPATIENT)
Dept: HEALTH INFORMATION MANAGEMENT | Facility: CLINIC | Age: 68
End: 2018-12-18

## 2018-12-19 ENCOUNTER — HOSPITAL ENCOUNTER (OUTPATIENT)
Facility: CLINIC | Age: 68
Discharge: HOME | End: 2018-12-19
Attending: INTERNAL MEDICINE | Admitting: INTERNAL MEDICINE
Payer: COMMERCIAL

## 2018-12-19 VITALS
DIASTOLIC BLOOD PRESSURE: 72 MMHG | OXYGEN SATURATION: 99 % | RESPIRATION RATE: 18 BRPM | TEMPERATURE: 98.3 F | SYSTOLIC BLOOD PRESSURE: 127 MMHG | HEART RATE: 67 BPM

## 2018-12-19 LAB
ABO + RH BLD: NORMAL
ABO + RH BLD: NORMAL
BLD GP AB SCN SERPL QL: NORMAL
BLD PROD TYP BPU: NORMAL
BLD UNIT ID BPU: 0
BLOOD BANK CMNT PATIENT-IMP: NORMAL
BLOOD PRODUCT CODE: NORMAL
BPU ID: NORMAL
LACTATE BLD-SCNC: 1.9 MMOL/L (ref 0.7–2)
NUM BPU REQUESTED: 1
SPECIMEN EXP DATE BLD: NORMAL
TRANSFUSION STATUS PATIENT QL: NORMAL
TRANSFUSION STATUS PATIENT QL: NORMAL

## 2018-12-19 PROCEDURE — 40000935 ZZH STATISTIC OUTPATIENT (NON-OBS) EVE

## 2018-12-19 PROCEDURE — P9016 RBC LEUKOCYTES REDUCED: HCPCS | Performed by: EMERGENCY MEDICINE

## 2018-12-19 PROCEDURE — 86923 COMPATIBILITY TEST ELECTRIC: CPT | Performed by: INTERNAL MEDICINE

## 2018-12-19 PROCEDURE — 36430 TRANSFUSION BLD/BLD COMPNT: CPT

## 2018-12-19 PROCEDURE — 86901 BLOOD TYPING SEROLOGIC RH(D): CPT | Performed by: INTERNAL MEDICINE

## 2018-12-19 PROCEDURE — 86850 RBC ANTIBODY SCREEN: CPT | Performed by: INTERNAL MEDICINE

## 2018-12-19 PROCEDURE — 83605 ASSAY OF LACTIC ACID: CPT | Performed by: INTERNAL MEDICINE

## 2018-12-19 PROCEDURE — 25000128 H RX IP 250 OP 636: Performed by: INTERNAL MEDICINE

## 2018-12-19 PROCEDURE — 86900 BLOOD TYPING SEROLOGIC ABO: CPT | Performed by: INTERNAL MEDICINE

## 2018-12-19 RX ORDER — HEPARIN SODIUM (PORCINE) LOCK FLUSH IV SOLN 100 UNIT/ML 100 UNIT/ML
5 SOLUTION INTRAVENOUS
Status: DISCONTINUED | OUTPATIENT
Start: 2018-12-19 | End: 2018-12-19 | Stop reason: HOSPADM

## 2018-12-19 RX ORDER — HEPARIN SODIUM,PORCINE 10 UNIT/ML
5-10 VIAL (ML) INTRAVENOUS EVERY 24 HOURS
Status: DISCONTINUED | OUTPATIENT
Start: 2018-12-19 | End: 2018-12-19 | Stop reason: HOSPADM

## 2018-12-19 RX ORDER — HEPARIN SODIUM,PORCINE 10 UNIT/ML
5-10 VIAL (ML) INTRAVENOUS
Status: DISCONTINUED | OUTPATIENT
Start: 2018-12-19 | End: 2018-12-19 | Stop reason: HOSPADM

## 2018-12-19 RX ADMIN — SODIUM CHLORIDE, PRESERVATIVE FREE 5 ML: 5 INJECTION INTRAVENOUS at 17:09

## 2018-12-19 NOTE — PLAN OF CARE
A&OX4, pleasant. VSS x HR tachy. Lactic 1.9, MD notified via clinic. Ok to proceed with 1unit PRBC.

## 2018-12-20 NOTE — PLAN OF CARE
Pt A&O, received 1 unit PRBC. Tolerating infusing well. Baseline tachycardia, MD aware. All other VSS. L chest port de accessed after transfusion. Escorted to exit via wheelchair by aid.

## 2018-12-21 ENCOUNTER — TRANSFERRED RECORDS (OUTPATIENT)
Dept: HEALTH INFORMATION MANAGEMENT | Facility: CLINIC | Age: 68
End: 2018-12-21

## 2018-12-22 ENCOUNTER — HOSPITAL ENCOUNTER (OUTPATIENT)
Facility: CLINIC | Age: 68
Discharge: HOME | End: 2018-12-22
Attending: INTERNAL MEDICINE | Admitting: INTERNAL MEDICINE
Payer: COMMERCIAL

## 2018-12-22 VITALS
OXYGEN SATURATION: 100 % | BODY MASS INDEX: 37.39 KG/M2 | DIASTOLIC BLOOD PRESSURE: 69 MMHG | RESPIRATION RATE: 16 BRPM | HEIGHT: 64 IN | WEIGHT: 219 LBS | TEMPERATURE: 98.7 F | HEART RATE: 103 BPM | SYSTOLIC BLOOD PRESSURE: 106 MMHG

## 2018-12-22 LAB
ABO + RH BLD: NORMAL
ABO + RH BLD: NORMAL
BLD GP AB SCN SERPL QL: NORMAL
BLD PROD TYP BPU: NORMAL
BLD UNIT ID BPU: 0
BLD UNIT ID BPU: 0
BLOOD BANK CMNT PATIENT-IMP: NORMAL
BLOOD PRODUCT CODE: NORMAL
BLOOD PRODUCT CODE: NORMAL
BPU ID: NORMAL
BPU ID: NORMAL
NUM BPU REQUESTED: 1
SPECIMEN EXP DATE BLD: NORMAL
TRANSFUSION STATUS PATIENT QL: NORMAL

## 2018-12-22 PROCEDURE — P9016 RBC LEUKOCYTES REDUCED: HCPCS | Performed by: INTERNAL MEDICINE

## 2018-12-22 PROCEDURE — P9037 PLATE PHERES LEUKOREDU IRRAD: HCPCS | Performed by: INTERNAL MEDICINE

## 2018-12-22 PROCEDURE — 40000935 ZZH STATISTIC OUTPATIENT (NON-OBS) EVE

## 2018-12-22 PROCEDURE — 25000128 H RX IP 250 OP 636: Performed by: INTERNAL MEDICINE

## 2018-12-22 PROCEDURE — 40000934 ZZH STATISTIC OUTPATIENT (NON-OBS) DAY

## 2018-12-22 PROCEDURE — 36430 TRANSFUSION BLD/BLD COMPNT: CPT

## 2018-12-22 RX ORDER — HEPARIN SODIUM (PORCINE) LOCK FLUSH IV SOLN 100 UNIT/ML 100 UNIT/ML
5 SOLUTION INTRAVENOUS
Status: DISCONTINUED | OUTPATIENT
Start: 2018-12-22 | End: 2018-12-22 | Stop reason: HOSPADM

## 2018-12-22 RX ORDER — LIDOCAINE 40 MG/G
CREAM TOPICAL
Status: DISCONTINUED | OUTPATIENT
Start: 2018-12-22 | End: 2018-12-22 | Stop reason: HOSPADM

## 2018-12-22 RX ORDER — HEPARIN SODIUM,PORCINE 10 UNIT/ML
5-10 VIAL (ML) INTRAVENOUS EVERY 24 HOURS
Status: DISCONTINUED | OUTPATIENT
Start: 2018-12-22 | End: 2018-12-22 | Stop reason: HOSPADM

## 2018-12-22 RX ORDER — HEPARIN SODIUM,PORCINE 10 UNIT/ML
5-10 VIAL (ML) INTRAVENOUS
Status: DISCONTINUED | OUTPATIENT
Start: 2018-12-22 | End: 2018-12-22 | Stop reason: HOSPADM

## 2018-12-22 RX ADMIN — SODIUM CHLORIDE, PRESERVATIVE FREE 5 ML: 5 INJECTION INTRAVENOUS at 18:03

## 2018-12-22 ASSESSMENT — MIFFLIN-ST. JEOR: SCORE: 1508.38

## 2018-12-22 NOTE — PROGRESS NOTES
A&Ox4. VSS on RA. Denies discomfort. L chest power port. Platelets infusing. After platelets needs 1 unit RBCs. Reg diet. Up SBA. Continue to monitor.

## 2018-12-28 ENCOUNTER — MEDICAL CORRESPONDENCE (OUTPATIENT)
Dept: ULTRASOUND IMAGING | Facility: CLINIC | Age: 68
End: 2018-12-28

## 2018-12-28 ENCOUNTER — HOSPITAL ENCOUNTER (OUTPATIENT)
Dept: ULTRASOUND IMAGING | Facility: CLINIC | Age: 68
Discharge: HOME | End: 2018-12-28
Attending: INTERNAL MEDICINE | Admitting: INTERNAL MEDICINE
Payer: COMMERCIAL

## 2018-12-28 DIAGNOSIS — M79.89 LEG SWELLING: ICD-10-CM

## 2018-12-28 PROCEDURE — 93970 EXTREMITY STUDY: CPT

## 2019-01-02 ENCOUNTER — TRANSFERRED RECORDS (OUTPATIENT)
Dept: HEALTH INFORMATION MANAGEMENT | Facility: CLINIC | Age: 69
End: 2019-01-02

## 2019-01-03 ENCOUNTER — HOSPITAL ENCOUNTER (OUTPATIENT)
Facility: CLINIC | Age: 69
Discharge: HOME | End: 2019-01-03
Attending: INTERNAL MEDICINE | Admitting: INTERNAL MEDICINE
Payer: COMMERCIAL

## 2019-01-03 VITALS
TEMPERATURE: 98.4 F | WEIGHT: 220 LBS | OXYGEN SATURATION: 94 % | BODY MASS INDEX: 37.56 KG/M2 | RESPIRATION RATE: 16 BRPM | HEIGHT: 64 IN | DIASTOLIC BLOOD PRESSURE: 100 MMHG | HEART RATE: 79 BPM | SYSTOLIC BLOOD PRESSURE: 122 MMHG

## 2019-01-03 LAB
ABO + RH BLD: NORMAL
ABO + RH BLD: NORMAL
BLD GP AB SCN SERPL QL: NORMAL
BLD PROD TYP BPU: NORMAL
BLD PROD TYP BPU: NORMAL
BLD UNIT ID BPU: 0
BLOOD BANK CMNT PATIENT-IMP: NORMAL
BLOOD PRODUCT CODE: NORMAL
BPU ID: NORMAL
NUM BPU REQUESTED: 1
SPECIMEN EXP DATE BLD: NORMAL
TRANSFUSION STATUS PATIENT QL: NORMAL
TRANSFUSION STATUS PATIENT QL: NORMAL

## 2019-01-03 PROCEDURE — 36430 TRANSFUSION BLD/BLD COMPNT: CPT

## 2019-01-03 PROCEDURE — 86850 RBC ANTIBODY SCREEN: CPT | Performed by: INTERNAL MEDICINE

## 2019-01-03 PROCEDURE — 86901 BLOOD TYPING SEROLOGIC RH(D): CPT | Performed by: INTERNAL MEDICINE

## 2019-01-03 PROCEDURE — 86900 BLOOD TYPING SEROLOGIC ABO: CPT | Performed by: INTERNAL MEDICINE

## 2019-01-03 PROCEDURE — 86923 COMPATIBILITY TEST ELECTRIC: CPT | Performed by: INTERNAL MEDICINE

## 2019-01-03 PROCEDURE — P9016 RBC LEUKOCYTES REDUCED: HCPCS | Performed by: INTERNAL MEDICINE

## 2019-01-03 PROCEDURE — 25000128 H RX IP 250 OP 636: Performed by: INTERNAL MEDICINE

## 2019-01-03 RX ORDER — HEPARIN SODIUM (PORCINE) LOCK FLUSH IV SOLN 100 UNIT/ML 100 UNIT/ML
5 SOLUTION INTRAVENOUS
Status: DISCONTINUED | OUTPATIENT
Start: 2019-01-03 | End: 2019-01-03 | Stop reason: HOSPADM

## 2019-01-03 RX ORDER — HEPARIN SODIUM,PORCINE 10 UNIT/ML
5-10 VIAL (ML) INTRAVENOUS EVERY 24 HOURS
Status: DISCONTINUED | OUTPATIENT
Start: 2019-01-03 | End: 2019-01-03 | Stop reason: HOSPADM

## 2019-01-03 RX ORDER — HEPARIN SODIUM,PORCINE 10 UNIT/ML
5-10 VIAL (ML) INTRAVENOUS
Status: DISCONTINUED | OUTPATIENT
Start: 2019-01-03 | End: 2019-01-03 | Stop reason: HOSPADM

## 2019-01-03 RX ORDER — LIDOCAINE 40 MG/G
CREAM TOPICAL
Status: DISCONTINUED | OUTPATIENT
Start: 2019-01-03 | End: 2019-01-03 | Stop reason: HOSPADM

## 2019-01-03 RX ADMIN — SODIUM CHLORIDE, PRESERVATIVE FREE 5 ML: 5 INJECTION INTRAVENOUS at 12:50

## 2019-01-03 ASSESSMENT — MIFFLIN-ST. JEOR: SCORE: 1512.91

## 2019-01-03 NOTE — PROGRESS NOTES
1 unit RBC's transfused per order- patient tolerates well.  Port a cath de-accessed and per heparinized per protoocol.  Patient discharged with a .

## 2019-01-10 ENCOUNTER — TRANSFERRED RECORDS (OUTPATIENT)
Dept: HEALTH INFORMATION MANAGEMENT | Facility: CLINIC | Age: 69
End: 2019-01-10

## 2019-01-17 DIAGNOSIS — C34.31 MALIGNANT NEOPLASM OF LOWER LOBE OF RIGHT LUNG (H): Primary | ICD-10-CM

## 2019-01-18 ENCOUNTER — HOSPITAL ENCOUNTER (OUTPATIENT)
Facility: CLINIC | Age: 69
Discharge: HOME | End: 2019-01-18
Admitting: RADIOLOGY
Payer: COMMERCIAL

## 2019-01-18 ENCOUNTER — HOSPITAL ENCOUNTER (OUTPATIENT)
Dept: MRI IMAGING | Facility: CLINIC | Age: 69
End: 2019-01-18
Attending: RADIOLOGY | Admitting: RADIOLOGY
Payer: COMMERCIAL

## 2019-01-18 DIAGNOSIS — C34.31 MALIGNANT NEOPLASM OF LOWER LOBE OF RIGHT LUNG (H): ICD-10-CM

## 2019-01-18 PROCEDURE — 25500064 ZZH RX 255 OP 636: Performed by: RADIOLOGY

## 2019-01-18 PROCEDURE — 70553 MRI BRAIN STEM W/O & W/DYE: CPT

## 2019-01-18 PROCEDURE — A9585 GADOBUTROL INJECTION: HCPCS | Performed by: RADIOLOGY

## 2019-01-18 RX ORDER — GADOBUTROL 604.72 MG/ML
20 INJECTION INTRAVENOUS ONCE
Status: COMPLETED | OUTPATIENT
Start: 2019-01-18 | End: 2019-01-18

## 2019-01-18 RX ADMIN — GADOBUTROL 20 ML: 604.72 INJECTION INTRAVENOUS at 10:00

## 2019-02-11 ENCOUNTER — TRANSFERRED RECORDS (OUTPATIENT)
Dept: HEALTH INFORMATION MANAGEMENT | Facility: CLINIC | Age: 69
End: 2019-02-11

## 2019-02-26 ENCOUNTER — OFFICE VISIT (OUTPATIENT)
Dept: FAMILY MEDICINE | Facility: CLINIC | Age: 69
End: 2019-02-26
Payer: COMMERCIAL

## 2019-02-26 VITALS
OXYGEN SATURATION: 96 % | WEIGHT: 202 LBS | BODY MASS INDEX: 34.49 KG/M2 | HEART RATE: 103 BPM | SYSTOLIC BLOOD PRESSURE: 85 MMHG | DIASTOLIC BLOOD PRESSURE: 60 MMHG | TEMPERATURE: 98.6 F | HEIGHT: 64 IN

## 2019-02-26 DIAGNOSIS — C34.90 SMALL CELL LUNG CANCER (H): ICD-10-CM

## 2019-02-26 DIAGNOSIS — D50.9 IRON DEFICIENCY ANEMIA, UNSPECIFIED IRON DEFICIENCY ANEMIA TYPE: ICD-10-CM

## 2019-02-26 DIAGNOSIS — N18.30 CKD (CHRONIC KIDNEY DISEASE) STAGE 3, GFR 30-59 ML/MIN (H): ICD-10-CM

## 2019-02-26 DIAGNOSIS — J41.0 SIMPLE CHRONIC BRONCHITIS (H): ICD-10-CM

## 2019-02-26 DIAGNOSIS — Z12.31 ENCOUNTER FOR SCREENING MAMMOGRAM FOR BREAST CANCER: ICD-10-CM

## 2019-02-26 DIAGNOSIS — R00.0 TACHYCARDIA: ICD-10-CM

## 2019-02-26 DIAGNOSIS — E78.5 HYPERLIPIDEMIA LDL GOAL <130: ICD-10-CM

## 2019-02-26 DIAGNOSIS — I10 BENIGN ESSENTIAL HYPERTENSION: Primary | ICD-10-CM

## 2019-02-26 PROBLEM — R91.8 LUNG MASS: Status: RESOLVED | Noted: 2018-08-21 | Resolved: 2019-02-26

## 2019-02-26 PROBLEM — E66.01 MORBID OBESITY (H): Status: ACTIVE | Noted: 2019-02-26

## 2019-02-26 LAB
ERYTHROCYTE [DISTWIDTH] IN BLOOD BY AUTOMATED COUNT: 13.6 % (ref 10–15)
HCT VFR BLD AUTO: 31.1 % (ref 35–47)
HGB BLD-MCNC: 10.4 G/DL (ref 11.7–15.7)
MCH RBC QN AUTO: 32.8 PG (ref 26.5–33)
MCHC RBC AUTO-ENTMCNC: 33.4 G/DL (ref 31.5–36.5)
MCV RBC AUTO: 98 FL (ref 78–100)
PLATELET # BLD AUTO: 105 10E9/L (ref 150–450)
RBC # BLD AUTO: 3.17 10E12/L (ref 3.8–5.2)
WBC # BLD AUTO: 4.1 10E9/L (ref 4–11)

## 2019-02-26 PROCEDURE — 36415 COLL VENOUS BLD VENIPUNCTURE: CPT | Performed by: INTERNAL MEDICINE

## 2019-02-26 PROCEDURE — 80061 LIPID PANEL: CPT | Performed by: INTERNAL MEDICINE

## 2019-02-26 PROCEDURE — 85027 COMPLETE CBC AUTOMATED: CPT | Performed by: INTERNAL MEDICINE

## 2019-02-26 PROCEDURE — 80053 COMPREHEN METABOLIC PANEL: CPT | Performed by: INTERNAL MEDICINE

## 2019-02-26 PROCEDURE — 82043 UR ALBUMIN QUANTITATIVE: CPT | Performed by: INTERNAL MEDICINE

## 2019-02-26 PROCEDURE — 99214 OFFICE O/P EST MOD 30 MIN: CPT | Performed by: INTERNAL MEDICINE

## 2019-02-26 RX ORDER — FUROSEMIDE 20 MG
20 TABLET ORAL DAILY
Qty: 90 TABLET | Refills: 3 | Status: SHIPPED | OUTPATIENT
Start: 2019-02-26 | End: 2019-04-15

## 2019-02-26 RX ORDER — FUROSEMIDE 20 MG
20 TABLET ORAL DAILY
Refills: 1 | COMMUNITY
Start: 2019-02-06 | End: 2019-02-26

## 2019-02-26 RX ORDER — ATORVASTATIN CALCIUM 10 MG/1
10 TABLET, FILM COATED ORAL DAILY
Qty: 90 TABLET | Refills: 0 | Status: SHIPPED | OUTPATIENT
Start: 2019-02-26 | End: 2019-05-03

## 2019-02-26 ASSESSMENT — ANXIETY QUESTIONNAIRES
3. WORRYING TOO MUCH ABOUT DIFFERENT THINGS: NOT AT ALL
6. BECOMING EASILY ANNOYED OR IRRITABLE: NOT AT ALL
5. BEING SO RESTLESS THAT IT IS HARD TO SIT STILL: NOT AT ALL
IF YOU CHECKED OFF ANY PROBLEMS ON THIS QUESTIONNAIRE, HOW DIFFICULT HAVE THESE PROBLEMS MADE IT FOR YOU TO DO YOUR WORK, TAKE CARE OF THINGS AT HOME, OR GET ALONG WITH OTHER PEOPLE: NOT DIFFICULT AT ALL
7. FEELING AFRAID AS IF SOMETHING AWFUL MIGHT HAPPEN: NOT AT ALL
2. NOT BEING ABLE TO STOP OR CONTROL WORRYING: NOT AT ALL
GAD7 TOTAL SCORE: 0
1. FEELING NERVOUS, ANXIOUS, OR ON EDGE: NOT AT ALL

## 2019-02-26 ASSESSMENT — MIFFLIN-ST. JEOR: SCORE: 1431.27

## 2019-02-26 ASSESSMENT — PATIENT HEALTH QUESTIONNAIRE - PHQ9: 5. POOR APPETITE OR OVEREATING: NOT AT ALL

## 2019-02-26 NOTE — PROGRESS NOTES
SUBJECTIVE:   Yvette Gastelum is a 68 year old female who presents to clinic today for the following health issues:      Chief Complaint   Patient presents with     Follow Up     Malignant neoplasm of lower lobe of right lung      [facility]  CLINIC PROGRESS NOTE    Subjective:  Small Cell Lung Cancer   She has recently completed treatment for her small cell lung cancer and did complete both radiation and chemotherapy.  She is feeling good from a general health standpoint, but still with cough and concern for ongoing bronchitis that has been present since December.    Simple chronic bronchitis (H)   She did treat with levofloxacin which did not help and had to sit upright in a chair in 6 weeks.  She no longer has any shortness of breath  Benign essential hypertension   Yvette Gastelum presents to the clinic today for follow up.  She has been trying to stay hydrated.  She continues to drink propel, however her appetite is decreased.  She does cook, but having discomfort with swallowing as a result of her radiation.    CKD (chronic kidney disease) stage 3, GFR 30-59 ml/min (H)   She did develop worsening renal impairment during her treatment with chemotherapy.  She did have anemia as well and this has been followed     Past medical history, medications, allergies, social history, family history reviewed and updated in [facility] as of 2/26/2019 .    ROS  CONSTITUTIONAL: no fatigue, no unexpected change in weight  SKIN: no worrisome rashes, no worrisome moles, no worrisome lesions  EYES: no acute vision problems or changes  ENT: no ear problems, no mouth problems, no throat problems  RESP: as above   CV: no chest pain, no palpitations, +  worsening peripheral edema  GI: no nausea, no vomiting, no constipation, no diarrhea  : no frequency, no dysuria, no hematuria  MS: no claudication, no myalgias, no joint aches  PSYCHIATRIC: no changes in mood or affect      Objective:  Vitals  BP (!) 85/60 (BP Location:  "Left arm, Patient Position: Sitting, Cuff Size: Adult Large)   Pulse 103   Temp 98.6  F (37  C) (Oral)   Ht 1.626 m (5' 4\")   Wt 91.6 kg (202 lb)   SpO2 96%   BMI 34.67 kg/m    GEN: Alert Oriented x3 NAD  HEENT: Atraumatic, normocephalic, neck supple, no thyromegaly, negative cervical adenopathy  TM: TM bilaterally pearly and grey with normal light reflex  CV: tachycardia no murmurs or rubs  PULM: crackles right lung field otherwise clear to auscultation   ABD: Soft, nontender nondistended, no hepatosplenomegally  SKIN: No visible skin lesion or ulcerations  EXT:  trace edema bilateral lower extremities  NEURO: Gait and station normal , No focal neurologic deficits  PSYCH: Mood good, affect mood congruent    No images are attached to the encounter.    No results found for this or any previous visit (from the past 24 hour(s)).    Assessment/Plan:  Patient Instructions   (I10) Benign essential hypertension  (primary encounter diagnosis)  Comment: [facility] - [phone] to set up an echocardiogram to evaluate general heart function.  Following the test we will refer to cardiology to discuss ongoing rapid heart rate and possible need for beta blocker   Plan: Echocardiogram Complete with Bubble Study            (J41.0) Simple chronic bronchitis (H)  Comment: I would recommend holding off on any additional antibiotics - I would recommend using a neti-pot - make sure that you use sterile saline.    Plan:     (N18.3) CKD (chronic kidney disease) stage 3, GFR 30-59 ml/min (H)  Comment: check labs today and OK to continue lasix  Plan: Albumin Random Urine Quantitative with Creat         Ratio, Comprehensive metabolic panel            (D50.9) Iron deficiency anemia, unspecified iron deficiency anemia type  Comment: check complete blood counts today   Plan:     (C34.90) Small cell lung cancer (H)  Comment: Follow-up in oncology every 6 months  Plan:     (Z12.31) Encounter for screening mammogram for breast " cancer  Comment:  [facility] [place] (also performs diagnostic mammogram, ultrasound and biopsy) [phone].   Plan: MA SCREENING DIGITAL BILAT - Future  (s+30)            (R00.0) Tachycardia  Comment: Referral to cardiology placed and echocardiogram   Plan: Echocardiogram Complete with Bubble Study,         CARDIOLOGY EVAL ADULT REFERRAL            (E78.5) Hyperlipidemia LDL goal <130  Comment: check fasting lipid panel  Today   Plan: Lipid panel reflex to direct LDL Fasting               Follow up in 3 months    Disclaimer: This note consists of symbols derived from keyboarding, dictation and/or voice recognition software. As a result, there may be errors in the script that have gone undetected. Please consider this when interpreting information found in this chart.    Arie House MD  [phone]

## 2019-02-26 NOTE — PATIENT INSTRUCTIONS
(I10) Benign essential hypertension  (primary encounter diagnosis)  Comment: [facility] - [phone] to set up an echocardiogram to evaluate general heart function.  Following the test we will refer to cardiology to discuss ongoing rapid heart rate and possible need for beta blocker   Plan: Echocardiogram Complete with Bubble Study            (J41.0) Simple chronic bronchitis (H)  Comment: I would recommend holding off on any additional antibiotics - I would recommend using a neti-pot - make sure that you use sterile saline.    Plan:     (N18.3) CKD (chronic kidney disease) stage 3, GFR 30-59 ml/min (H)  Comment: check labs today and OK to continue lasix  Plan: Albumin Random Urine Quantitative with Creat         Ratio, Comprehensive metabolic panel            (D50.9) Iron deficiency anemia, unspecified iron deficiency anemia type  Comment: check complete blood counts today   Plan:     (C34.90) Small cell lung cancer (H)  Comment: Follow-up in oncology every 6 months  Plan:     (Z12.31) Encounter for screening mammogram for breast cancer  Comment:  [facility] [place] (also performs diagnostic mammogram, ultrasound and biopsy) [phone].   Plan: MA SCREENING DIGITAL BILAT - Future  (s+30)            (R00.0) Tachycardia  Comment: Referral to cardiology placed and echocardiogram   Plan: Echocardiogram Complete with Bubble Study,         CARDIOLOGY EVAL ADULT REFERRAL            (E78.5) Hyperlipidemia LDL goal <130  Comment: check fasting lipid panel  Today   Plan: Lipid panel reflex to direct LDL Fasting

## 2019-02-27 ENCOUNTER — TELEPHONE (OUTPATIENT)
Dept: FAMILY MEDICINE | Facility: CLINIC | Age: 69
End: 2019-02-27

## 2019-02-27 LAB
ALBUMIN SERPL-MCNC: 3.5 G/DL (ref 3.4–5)
ALP SERPL-CCNC: 76 U/L (ref 40–150)
ALT SERPL W P-5'-P-CCNC: 16 U/L (ref 0–50)
ANION GAP SERPL CALCULATED.3IONS-SCNC: 11 MMOL/L (ref 3–14)
AST SERPL W P-5'-P-CCNC: 43 U/L (ref 0–45)
BILIRUB SERPL-MCNC: 1.2 MG/DL (ref 0.2–1.3)
BUN SERPL-MCNC: 29 MG/DL (ref 7–30)
CALCIUM SERPL-MCNC: 9.4 MG/DL (ref 8.5–10.1)
CHLORIDE SERPL-SCNC: 107 MMOL/L (ref 94–109)
CHOLEST SERPL-MCNC: 155 MG/DL
CO2 SERPL-SCNC: 24 MMOL/L (ref 20–32)
CREAT SERPL-MCNC: 1.42 MG/DL (ref 0.52–1.04)
CREAT UR-MCNC: 187 MG/DL
GFR SERPL CREATININE-BSD FRML MDRD: 38 ML/MIN/{1.73_M2}
GLUCOSE SERPL-MCNC: 118 MG/DL (ref 70–99)
HDLC SERPL-MCNC: 34 MG/DL
LDLC SERPL CALC-MCNC: 82 MG/DL
MICROALBUMIN UR-MCNC: 10 MG/L
MICROALBUMIN/CREAT UR: 5.14 MG/G CR (ref 0–25)
NONHDLC SERPL-MCNC: 121 MG/DL
POTASSIUM SERPL-SCNC: 3.7 MMOL/L (ref 3.4–5.3)
PROT SERPL-MCNC: 8.2 G/DL (ref 6.8–8.8)
SODIUM SERPL-SCNC: 142 MMOL/L (ref 133–144)
TRIGL SERPL-MCNC: 194 MG/DL

## 2019-02-27 ASSESSMENT — ANXIETY QUESTIONNAIRES: GAD7 TOTAL SCORE: 0

## 2019-02-28 NOTE — TELEPHONE ENCOUNTER
Can we call Yvette Gastelum and let her know that     Brady Crawford,    I had the opportunity to review your recent labs and a summary of your labs reads as follows:    Your complete blood counts show stable hemoglobin and platelets  Your comprehensive metabolic panel shows normal liver function, and stable elevated fasting blood glucose indicating no evidence of diabetes mellitus.  Your creatinine is elevated compared to your previous and I would recommend follow up in the clinic in the next 1-2 weeks to review this and try to improve hydration with propel (sodium and electrolytes) in the interim.    Your fasting lipid panel show  - low HDL (good) cholesterol -as your goal is greater than 40  - low LDL (bad) cholesterol as your goal is less than 100  - elevated triglyceride levels    Let me know if you have any questions      Sincerely,  Arie House MD

## 2019-02-28 NOTE — TELEPHONE ENCOUNTER
"Left message to call back for \"lab results and recommendations from \".  Aurelia Patricia RN on 2/28/2019 at 8:52 AM    "

## 2019-02-28 NOTE — RESULT ENCOUNTER NOTE
Brady Crawford,    I had the opportunity to review your recent labs and a summary of your labs reads as follows:    Your complete blood counts show stable hemoglobin and platelets  Your comprehensive metabolic panel shows normal liver function, and stable elevated fasting blood glucose indicating no evidence of diabetes mellitus.  Your creatinine is elevated compared to your previous and I would recommend follow up in the clinic in the next 1-2 weeks to review this and try to improve hydration with propel (sodium and electrolytes) in the interim.    Your fasting lipid panel show  - low HDL (good) cholesterol -as your goal is greater than 40  - low LDL (bad) cholesterol as your goal is less than 100  - elevated triglyceride levels    Let me know if you have any questions      Sincerely,  Arie House MD

## 2019-02-28 NOTE — TELEPHONE ENCOUNTER
Called patient and reviewed below with her   Scheduled follow up visit with Dr. House:     Next 5 appointments (look out 90 days)    Mar 11, 2019 11:00 AM CDT  Office Visit with Arie House MD  [facility] [address] [address]  [phone]          Taya MCDONOUGH RN

## 2019-03-07 ENCOUNTER — TRANSFERRED RECORDS (OUTPATIENT)
Dept: HEALTH INFORMATION MANAGEMENT | Facility: CLINIC | Age: 69
End: 2019-03-07

## 2019-03-11 ENCOUNTER — OFFICE VISIT (OUTPATIENT)
Dept: FAMILY MEDICINE | Facility: CLINIC | Age: 69
End: 2019-03-11
Payer: COMMERCIAL

## 2019-03-11 VITALS
DIASTOLIC BLOOD PRESSURE: 66 MMHG | OXYGEN SATURATION: 98 % | SYSTOLIC BLOOD PRESSURE: 103 MMHG | HEIGHT: 64 IN | BODY MASS INDEX: 34.15 KG/M2 | TEMPERATURE: 97.4 F | HEART RATE: 105 BPM | WEIGHT: 200 LBS

## 2019-03-11 DIAGNOSIS — R00.0 TACHYCARDIA: Primary | ICD-10-CM

## 2019-03-11 DIAGNOSIS — Z12.31 VISIT FOR SCREENING MAMMOGRAM: ICD-10-CM

## 2019-03-11 DIAGNOSIS — I10 BENIGN ESSENTIAL HYPERTENSION: ICD-10-CM

## 2019-03-11 PROCEDURE — 99214 OFFICE O/P EST MOD 30 MIN: CPT | Performed by: INTERNAL MEDICINE

## 2019-03-11 PROCEDURE — 93000 ELECTROCARDIOGRAM COMPLETE: CPT | Performed by: INTERNAL MEDICINE

## 2019-03-11 PROCEDURE — 80048 BASIC METABOLIC PNL TOTAL CA: CPT | Performed by: INTERNAL MEDICINE

## 2019-03-11 PROCEDURE — 84439 ASSAY OF FREE THYROXINE: CPT | Performed by: INTERNAL MEDICINE

## 2019-03-11 PROCEDURE — 83735 ASSAY OF MAGNESIUM: CPT | Performed by: INTERNAL MEDICINE

## 2019-03-11 PROCEDURE — 36415 COLL VENOUS BLD VENIPUNCTURE: CPT | Performed by: INTERNAL MEDICINE

## 2019-03-11 PROCEDURE — 84443 ASSAY THYROID STIM HORMONE: CPT | Performed by: INTERNAL MEDICINE

## 2019-03-11 ASSESSMENT — MIFFLIN-ST. JEOR: SCORE: 1422.19

## 2019-03-11 NOTE — PROGRESS NOTES
"[facility]  CLINIC PROGRESS NOTE    Subjective:  Elevated creatinine   Yvette Gastelum did have difficulty with maintaining hydration.  She craves root-beer and has been trying to drink Propel to help with electrolyte replenishment.  She is taking the lasix off an on depending on swelling which comes and goes.    Tachycardia   She continues to have rapid heart rate.  She does experience occasional palpitations and hopes to start exercise soon to help with this.  No chest pain.       Past medical history, medications, allergies, social history, family history reviewed and updated in [facility] as of 3/11/2019 .    ROS  CONSTITUTIONAL: no fatigue, no unexpected change in weight  SKIN: no worrisome rashes, no worrisome moles, no worrisome lesions  EYES: no acute vision problems or changes  ENT: no ear problems, no mouth problems, no throat problems  RESP: no significant cough, no shortness of breath  CV: no chest pain, no palpitations, no new or worsening peripheral edema  GI: no nausea, no vomiting, no constipation, no diarrhea  : no frequency, no dysuria, no hematuria  MS: muscle weakness  PSYCHIATRIC: no changes in mood or affect      Objective:  Vitals  /66 (BP Location: Right arm, Patient Position: Chair, Cuff Size: Adult Large)   Pulse 105   Temp 97.4  F (36.3  C) (Tympanic)   Ht 1.626 m (5' 4\")   Wt 90.7 kg (200 lb)   SpO2 98%   BMI 34.33 kg/m    GEN: Alert Oriented x3 NAD  HEENT: Atraumatic, normocephalic, neck supple  CV: RRR no murmurs or rubs  PULM: CTA no wheezes or crackles  ABD: Soft, nontender nondistended  SKIN: No visible skin lesion or ulcerations  EXT: No edema bilateral lower extremities  NEURO:  No focal neurologic deficits  PSYCH: Mood good, affect mood congruent    No images are attached to the encounter.    No results found for this or any previous visit (from the past 24 hour(s)).    Assessment/Plan:  Patient Instructions   (R00.0) Tachycardia  (primary encounter " diagnosis)  Comment: We will check EKG, TSH, and recheck basic metabolic panel today  Plan: TSH with free T4 reflex, Basic metabolic panel            (Z12.31) Visit for screening mammogram  Comment: due for mammogram -  [facility] (also performs diagnostic mammogram, ultrasound and biopsy) [phone].   Plan:     (I10) Benign essential hypertension  Comment: blood pressure is well controlled today, and we will continue lasix intermittently for now.  Plan: Basic metabolic panel, Magnesium               Follow up in 3 months     Disclaimer: This note consists of symbols derived from keyboarding, dictation and/or voice recognition software. As a result, there may be errors in the script that have gone undetected. Please consider this when interpreting information found in this chart.    Arie House MD  [phone]

## 2019-03-11 NOTE — PATIENT INSTRUCTIONS
(R00.0) Tachycardia  (primary encounter diagnosis)  Comment: We will check EKG, TSH, and recheck basic metabolic panel today  Plan: TSH with free T4 reflex, Basic metabolic panel            (Z12.31) Visit for screening mammogram  Comment: due for mammogram -  [facility] (also performs diagnostic mammogram, ultrasound and biopsy) [phone].   Plan:     (I10) Benign essential hypertension  Comment: blood pressure is well controlled today, and we will continue lasix intermittently for now.  Plan: Basic metabolic panel, Magnesium

## 2019-03-12 ENCOUNTER — TELEPHONE (OUTPATIENT)
Dept: FAMILY MEDICINE | Facility: CLINIC | Age: 69
End: 2019-03-12

## 2019-03-12 DIAGNOSIS — I10 BENIGN ESSENTIAL HYPERTENSION: Primary | ICD-10-CM

## 2019-03-12 LAB
ANION GAP SERPL CALCULATED.3IONS-SCNC: 9 MMOL/L (ref 3–14)
BUN SERPL-MCNC: 26 MG/DL (ref 7–30)
CALCIUM SERPL-MCNC: 9.2 MG/DL (ref 8.5–10.1)
CHLORIDE SERPL-SCNC: 104 MMOL/L (ref 94–109)
CO2 SERPL-SCNC: 26 MMOL/L (ref 20–32)
CREAT SERPL-MCNC: 1.52 MG/DL (ref 0.52–1.04)
GFR SERPL CREATININE-BSD FRML MDRD: 35 ML/MIN/{1.73_M2}
GLUCOSE SERPL-MCNC: 107 MG/DL (ref 70–99)
MAGNESIUM SERPL-MCNC: 1.7 MG/DL (ref 1.6–2.3)
POTASSIUM SERPL-SCNC: 3.4 MMOL/L (ref 3.4–5.3)
SODIUM SERPL-SCNC: 139 MMOL/L (ref 133–144)
T4 FREE SERPL-MCNC: 1.15 NG/DL (ref 0.76–1.46)
TSH SERPL DL<=0.005 MIU/L-ACNC: 0.38 MU/L (ref 0.4–4)

## 2019-03-12 NOTE — TELEPHONE ENCOUNTER
Can we call Yvette Gastelum and let her know that     Brady Crawford,    I have had the opportunity to review your recent results and an interpretation is as follows:  Your follow-up metabolic panel shows a your creatinine is elevated still.  I would recommend we hold your Lasix at this time.  And plan to recheck again in 2 weeks.  Your thyroid labs also show a suppressed TSH and I would recommend we repeat this again in 6 weeks    Sincerely,  Arie House MD

## 2019-03-12 NOTE — RESULT ENCOUNTER NOTE
Brady Crawford,    I have had the opportunity to review your recent results and an interpretation is as follows:  Your follow-up metabolic panel shows a your creatinine is elevated still.  I would recommend we hold your Lasix at this time.  And plan to recheck again in 2 weeks.  Your thyroid labs also show a suppressed TSH and I would recommend we repeat this again in 6 weeks    Sincerely,  Arie House MD

## 2019-03-26 DIAGNOSIS — I10 BENIGN ESSENTIAL HYPERTENSION: ICD-10-CM

## 2019-03-26 LAB
ANION GAP SERPL CALCULATED.3IONS-SCNC: 10 MMOL/L (ref 3–14)
BUN SERPL-MCNC: 22 MG/DL (ref 7–30)
CALCIUM SERPL-MCNC: 9.1 MG/DL (ref 8.5–10.1)
CHLORIDE SERPL-SCNC: 113 MMOL/L (ref 94–109)
CO2 SERPL-SCNC: 20 MMOL/L (ref 20–32)
CREAT SERPL-MCNC: 1.2 MG/DL (ref 0.52–1.04)
GFR SERPL CREATININE-BSD FRML MDRD: 46 ML/MIN/{1.73_M2}
GLUCOSE SERPL-MCNC: 107 MG/DL (ref 70–99)
POTASSIUM SERPL-SCNC: 4.2 MMOL/L (ref 3.4–5.3)
SODIUM SERPL-SCNC: 143 MMOL/L (ref 133–144)
TSH SERPL DL<=0.005 MIU/L-ACNC: 0.94 MU/L (ref 0.4–4)

## 2019-03-26 PROCEDURE — 84443 ASSAY THYROID STIM HORMONE: CPT | Performed by: INTERNAL MEDICINE

## 2019-03-26 PROCEDURE — 36415 COLL VENOUS BLD VENIPUNCTURE: CPT | Performed by: INTERNAL MEDICINE

## 2019-03-26 PROCEDURE — 80048 BASIC METABOLIC PNL TOTAL CA: CPT | Performed by: INTERNAL MEDICINE

## 2019-03-27 NOTE — RESULT ENCOUNTER NOTE
Brady Crawford,    I have had the opportunity to review your recent results and an interpretation is as follows:  Your follow-up basic metabolic panel showed an improvement in your creatinine back to your baseline of 1.2.  Your thyroid levels returned stable    Sincerely,  Arie House MD

## 2019-04-12 ENCOUNTER — DOCUMENTATION ONLY (OUTPATIENT)
Dept: FAMILY MEDICINE | Facility: CLINIC | Age: 69
End: 2019-04-12

## 2019-04-12 ENCOUNTER — HOSPITAL ENCOUNTER (OUTPATIENT)
Dept: CARDIOLOGY | Facility: CLINIC | Age: 69
Discharge: HOME | End: 2019-04-12
Attending: INTERNAL MEDICINE | Admitting: INTERNAL MEDICINE
Payer: COMMERCIAL

## 2019-04-12 DIAGNOSIS — I10 BENIGN ESSENTIAL HYPERTENSION: ICD-10-CM

## 2019-04-12 DIAGNOSIS — R00.0 TACHYCARDIA: ICD-10-CM

## 2019-04-12 DIAGNOSIS — N18.30 CKD (CHRONIC KIDNEY DISEASE) STAGE 3, GFR 30-59 ML/MIN (H): Primary | ICD-10-CM

## 2019-04-12 PROCEDURE — 93306 TTE W/DOPPLER COMPLETE: CPT | Mod: 26 | Performed by: INTERNAL MEDICINE

## 2019-04-12 PROCEDURE — 25500064 ZZH RX 255 OP 636: Performed by: INTERNAL MEDICINE

## 2019-04-12 PROCEDURE — 40000264 ECHOCARDIOGRAM COMPLETE

## 2019-04-12 RX ADMIN — HUMAN ALBUMIN MICROSPHERES AND PERFLUTREN 9 ML: 10; .22 INJECTION, SOLUTION INTRAVENOUS at 09:38

## 2019-04-12 NOTE — PROGRESS NOTES
There are no orders for this patient for the upcoming lab visit. Please order labs as needed.     Patient did not leave reason for visit.    Thank you, lab.

## 2019-04-12 NOTE — RESULT ENCOUNTER NOTE
Brady Crawford,    I have had the opportunity to review your recent results and an interpretation is as follows:  Your echocardiogram shows low normal function with mild hypokinesia of the left ventricle.  I would recommend a follow-up in cardiology to review this given your recent heart rate. [facility] - [phone]     Sincerely,  Arie House MD

## 2019-04-15 ENCOUNTER — OFFICE VISIT (OUTPATIENT)
Dept: CARDIOLOGY | Facility: CLINIC | Age: 69
End: 2019-04-15
Attending: INTERNAL MEDICINE
Payer: COMMERCIAL

## 2019-04-15 VITALS
SYSTOLIC BLOOD PRESSURE: 105 MMHG | HEART RATE: 92 BPM | BODY MASS INDEX: 33.89 KG/M2 | DIASTOLIC BLOOD PRESSURE: 72 MMHG | HEIGHT: 64 IN | WEIGHT: 198.5 LBS

## 2019-04-15 DIAGNOSIS — R00.0 TACHYCARDIA: ICD-10-CM

## 2019-04-15 DIAGNOSIS — I95.1 ORTHOSTATIC HYPOTENSION: ICD-10-CM

## 2019-04-15 DIAGNOSIS — R13.19 ESOPHAGEAL DYSPHAGIA: ICD-10-CM

## 2019-04-15 DIAGNOSIS — K21.00 GASTROESOPHAGEAL REFLUX DISEASE WITH ESOPHAGITIS: ICD-10-CM

## 2019-04-15 DIAGNOSIS — I10 BENIGN ESSENTIAL HYPERTENSION: ICD-10-CM

## 2019-04-15 DIAGNOSIS — I10 BENIGN ESSENTIAL HYPERTENSION: Primary | ICD-10-CM

## 2019-04-15 DIAGNOSIS — T66.XXXS ADVERSE EFFECT OF RADIATION, SEQUELA: ICD-10-CM

## 2019-04-15 LAB — HGB BLD-MCNC: 10.6 G/DL (ref 11.7–15.7)

## 2019-04-15 PROCEDURE — 99204 OFFICE O/P NEW MOD 45 MIN: CPT | Performed by: INTERNAL MEDICINE

## 2019-04-15 PROCEDURE — 36415 COLL VENOUS BLD VENIPUNCTURE: CPT | Performed by: INTERNAL MEDICINE

## 2019-04-15 PROCEDURE — 85018 HEMOGLOBIN: CPT | Performed by: INTERNAL MEDICINE

## 2019-04-15 ASSESSMENT — MIFFLIN-ST. JEOR: SCORE: 1415.39

## 2019-04-15 NOTE — LETTER
4/15/2019    Arie House MD, MD  [address]  [facility] [address]    RE: Yvette Gatselum       Dear Colleague,    I had the pleasure of seeing Yvette Gastelum in the [facility].    HPI and Plan:   See dictation    Orders Placed This Encounter   Procedures     Hemoglobin     GASTROENTEROLOGY ADULT REF CONSULT ONLY     Follow-Up with Cardiologist     Zechariaho [facility] Greater than 48 hrs       Orders Placed This Encounter   Medications     omeprazole (PRILOSEC) 20 MG DR capsule     Sig: Take 1 capsule (20 mg) by mouth daily     Dispense:  30 capsule     Refill:  3     Elastic Bandages & Supports (MEDICAL COMPRESSION SOCKS) MISC     Si Package daily     Dispense:  2 each     Refill:  1       Medications Discontinued During This Encounter   Medication Reason     furosemide (LASIX) 20 MG tablet Stopped by Patient         Encounter Diagnoses   Name Primary?     Benign essential hypertension Yes     Gastroesophageal reflux disease with esophagitis      Adverse effect of radiation, sequela      Tachycardia      Orthostatic hypotension      Esophageal dysphagia        CURRENT MEDICATIONS:  Current Outpatient Medications   Medication Sig Dispense Refill     ACETAMINOPHEN PO Take 325-650 mg by mouth every 6 hours as needed for pain       albuterol (2.5 MG/3ML) 0.083% neb solution Take 1 vial (2.5 mg) by nebulization every 4 hours as needed for shortness of breath / dyspnea or wheezing 1 Box 11     atorvastatin (LIPITOR) 10 MG tablet Take 1 tablet (10 mg) by mouth daily 90 tablet 0     Elastic Bandages & Supports (MEDICAL COMPRESSION SOCKS) MISC 1 Package daily 2 each 1     omeprazole (PRILOSEC) 20 MG DR capsule Take 1 capsule (20 mg) by mouth daily 30 capsule 3       ALLERGIES     Allergies   Allergen Reactions     No Known Allergies        PAST MEDICAL HISTORY:  Past Medical History:   Diagnosis Date     Hypertension      Kidney stones      Obese       Recurrent UTI      S/P CL-BSO      Sepsis (H)     secondary to uti        PAST SURGICAL HISTORY:  Past Surgical History:   Procedure Laterality Date     COLONOSCOPY       COLONOSCOPY N/A 2/10/2016    Procedure: COMBINED COLONOSCOPY, SINGLE OR MULTIPLE BIOPSY/POLYPECTOMY BY BIOPSY;  Surgeon: Antonia Jiménez MD;  Location:  GI     EYE SURGERY      CATARACT EXTRACTION RIGHT & LEFT     GYN SURGERY      BILATERAL TUBAL LIGATION, CL, LAPAROSCOPIC LEFT SALPINGO-OOPHORECTOMY     HEAD & NECK SURGERY      WISDOM TEETH EXTRACTION     INSERT PORT VASCULAR ACCESS N/A 9/14/2018    Procedure: INSERT PORT VASCULAR ACCESS;  POWER PORT PLACEMENT;  Surgeon: Todd Norris MD;  Location: [facility]       FAMILY HISTORY:  Family History   Problem Relation Age of Onset     Aneurysm Father         Aorta     Hypertension Father      Cervical Cancer Mother      Lung Cancer Brother         non smoker       SOCIAL HISTORY:  Social History     Socioeconomic History     Marital status:      Spouse name: None     Number of children: None     Years of education: None     Highest education level: None   Occupational History     None   Social Needs     Financial resource strain: None     Food insecurity:     Worry: None     Inability: None     Transportation needs:     Medical: None     Non-medical: None   Tobacco Use     Smoking status: Former Smoker     Packs/day: 1.00     Years: 50.00     Pack years: 50.00     Types: Cigarettes     Start date: 10/1965     Last attempt to quit: 9/1/2015     Years since quitting: 3.6     Smokeless tobacco: Never Used   Substance and Sexual Activity     Alcohol use: No     Alcohol/week: 0.0 oz     Drug use: No     Sexual activity: Not Currently     Partners: Male   Lifestyle     Physical activity:     Days per week: None     Minutes per session: None     Stress: None   Relationships     Social connections:     Talks on phone: None     Gets together: None     Attends Zoroastrian service: None     " Active member of club or organization: None     Attends meetings of clubs or organizations: None     Relationship status: None     Intimate partner violence:     Fear of current or ex partner: None     Emotionally abused: None     Physically abused: None     Forced sexual activity: None   Other Topics Concern     Parent/sibling w/ CABG, MI or angioplasty before 65F 55M? Not Asked   Social History Narrative    Single, 3 adult sons    Retired - previously employed as industrial consultant for [employer]        Review of Systems:  Skin:  Positive for hair changes;itching     Eyes:         ENT:         Respiratory:  Positive for dyspnea on exertion;shortness of breath;cough     Cardiovascular:  Negative;chest pain;syncope or near-syncope;cyanosis Positive for;lightheadedness;dizziness;fatigue;exercise intolerance edema, LE , at the end of the day, palpitations every now and then  Gastroenterology: Positive for nausea;heartburn;excessive gas or bloating;poor appetite    Genitourinary:  Positive for urinary frequency;nocturia    Musculoskeletal:  Positive for back pain;joint pain    Neurologic:  Negative      Psychiatric:  Negative      Heme/Lymph/Imm:  Positive for weight loss;anemia    Endocrine:  Negative        Physical Exam:  Vitals: /72 (BP Location: Left arm, Cuff Size: Adult Large)   Pulse 92   Ht 1.626 m (5' 4\")   Wt 90 kg (198 lb 8 oz)   BMI 34.07 kg/m       Constitutional:  cooperative chronically ill      Skin:  warm and dry to the touch          Head:  normocephalic        Eyes:  pupils equal and round        Lymph:      ENT:    pallor      Neck:  carotid pulses are full and equal bilaterally        Respiratory:  clear to auscultation;normal symmetry         Cardiac: regular rhythm;no murmurs, gallops or rubs detected                pulses full and equal                                        GI:  abdomen soft        Extremities and Muscular Skeletal:  no edema;no deformities, clubbing, " cyanosis, erythema observed              Neurological:  no gross motor deficits        Psych:  Alert and Oriented x 3          CC  Arie House MD  [address]  [place] [address]                    Thank you for allowing me to participate in the care of your patient.      Sincerely,     Valeria Macedo,      [facility]    cc:   Arie House MD  [address]  [place] [address]

## 2019-04-15 NOTE — PROGRESS NOTES
HPI and Plan:   See dictation    Orders Placed This Encounter   Procedures     Hemoglobin     GASTROENTEROLOGY ADULT REF CONSULT ONLY     Follow-Up with Cardiologist     Alysa [facility] Greater than 48 hrs       Orders Placed This Encounter   Medications     omeprazole (PRILOSEC) 20 MG DR capsule     Sig: Take 1 capsule (20 mg) by mouth daily     Dispense:  30 capsule     Refill:  3     Elastic Bandages & Supports (MEDICAL COMPRESSION SOCKS) MISC     Si Package daily     Dispense:  2 each     Refill:  1       Medications Discontinued During This Encounter   Medication Reason     furosemide (LASIX) 20 MG tablet Stopped by Patient         Encounter Diagnoses   Name Primary?     Benign essential hypertension Yes     Gastroesophageal reflux disease with esophagitis      Adverse effect of radiation, sequela      Tachycardia      Orthostatic hypotension      Esophageal dysphagia        CURRENT MEDICATIONS:  Current Outpatient Medications   Medication Sig Dispense Refill     ACETAMINOPHEN PO Take 325-650 mg by mouth every 6 hours as needed for pain       albuterol (2.5 MG/3ML) 0.083% neb solution Take 1 vial (2.5 mg) by nebulization every 4 hours as needed for shortness of breath / dyspnea or wheezing 1 Box 11     atorvastatin (LIPITOR) 10 MG tablet Take 1 tablet (10 mg) by mouth daily 90 tablet 0     Elastic Bandages & Supports (MEDICAL COMPRESSION SOCKS) MISC 1 Package daily 2 each 1     omeprazole (PRILOSEC) 20 MG DR capsule Take 1 capsule (20 mg) by mouth daily 30 capsule 3       ALLERGIES     Allergies   Allergen Reactions     No Known Allergies        PAST MEDICAL HISTORY:  Past Medical History:   Diagnosis Date     Hypertension      Kidney stones      Obese      Recurrent UTI      S/P CL-BSO      Sepsis (H)     secondary to uti        PAST SURGICAL HISTORY:  Past Surgical History:   Procedure Laterality Date     COLONOSCOPY       COLONOSCOPY N/A 2/10/2016    Procedure: COMBINED COLONOSCOPY,  SINGLE OR MULTIPLE BIOPSY/POLYPECTOMY BY BIOPSY;  Surgeon: Antonia Jiménez MD;  Location:  GI     EYE SURGERY      CATARACT EXTRACTION RIGHT & LEFT     GYN SURGERY      BILATERAL TUBAL LIGATION, CL, LAPAROSCOPIC LEFT SALPINGO-OOPHORECTOMY     HEAD & NECK SURGERY      WISDOM TEETH EXTRACTION     INSERT PORT VASCULAR ACCESS N/A 9/14/2018    Procedure: INSERT PORT VASCULAR ACCESS;  POWER PORT PLACEMENT;  Surgeon: Todd Norris MD;  Location: [facility]       FAMILY HISTORY:  Family History   Problem Relation Age of Onset     Aneurysm Father         Aorta     Hypertension Father      Cervical Cancer Mother      Lung Cancer Brother         non smoker       SOCIAL HISTORY:  Social History     Socioeconomic History     Marital status:      Spouse name: None     Number of children: None     Years of education: None     Highest education level: None   Occupational History     None   Social Needs     Financial resource strain: None     Food insecurity:     Worry: None     Inability: None     Transportation needs:     Medical: None     Non-medical: None   Tobacco Use     Smoking status: Former Smoker     Packs/day: 1.00     Years: 50.00     Pack years: 50.00     Types: Cigarettes     Start date: 10/1965     Last attempt to quit: 9/1/2015     Years since quitting: 3.6     Smokeless tobacco: Never Used   Substance and Sexual Activity     Alcohol use: No     Alcohol/week: 0.0 oz     Drug use: No     Sexual activity: Not Currently     Partners: Male   Lifestyle     Physical activity:     Days per week: None     Minutes per session: None     Stress: None   Relationships     Social connections:     Talks on phone: None     Gets together: None     Attends Orthodoxy service: None     Active member of club or organization: None     Attends meetings of clubs or organizations: None     Relationship status: None     Intimate partner violence:     Fear of current or ex partner: None     Emotionally abused: None      "Physically abused: None     Forced sexual activity: None   Other Topics Concern     Parent/sibling w/ CABG, MI or angioplasty before 65F 55M? Not Asked   Social History Narrative    Single, 3 adult sons    Retired - previously employed as industrial consultant for manufacturing        Review of Systems:  Skin:  Positive for hair changes;itching     Eyes:         ENT:         Respiratory:  Positive for dyspnea on exertion;shortness of breath;cough     Cardiovascular:  Negative;chest pain;syncope or near-syncope;cyanosis Positive for;lightheadedness;dizziness;fatigue;exercise intolerance edema, LE , at the end of the day, palpitations every now and then  Gastroenterology: Positive for nausea;heartburn;excessive gas or bloating;poor appetite    Genitourinary:  Positive for urinary frequency;nocturia    Musculoskeletal:  Positive for back pain;joint pain    Neurologic:  Negative      Psychiatric:  Negative      Heme/Lymph/Imm:  Positive for weight loss;anemia    Endocrine:  Negative        Physical Exam:  Vitals: /72 (BP Location: Left arm, Cuff Size: Adult Large)   Pulse 92   Ht 1.626 m (5' 4\")   Wt 90 kg (198 lb 8 oz)   BMI 34.07 kg/m      Constitutional:  cooperative chronically ill      Skin:  warm and dry to the touch          Head:  normocephalic        Eyes:  pupils equal and round        Lymph:      ENT:    pallor      Neck:  carotid pulses are full and equal bilaterally        Respiratory:  clear to auscultation;normal symmetry         Cardiac: regular rhythm;no murmurs, gallops or rubs detected                pulses full and equal                                        GI:  abdomen soft        Extremities and Muscular Skeletal:  no edema;no deformities, clubbing, cyanosis, erythema observed              Neurological:  no gross motor deficits        Psych:  Alert and Oriented x 3          CC  Arie House MD  [address]  [place] [address]                  "

## 2019-04-15 NOTE — LETTER
4/15/2019      Arie House MD, MD  [address] [facility] [address]  [facility] [address]      RE: Yvette BROTHERS Nirav       Dear Colleague,    I had the pleasure of seeing Yvette Gastelum in the [facility].    Service Date: 04/15/2019      REFERRING PHYSICIAN:  Dr. Arie House.      HISTORY OF PRESENT ILLNESS:  Ms. Gastelum is a pleasant 68-year-old female with a history of small cell lung cancer treated with carboplatin, cisplatin and radiation therapy.  She had preventative radiation therapy to her brain as well.  This was completed back in December of 2018.  Since then she has been experiencing occasional positional lightheadedness and fast heart rhythms and palpitations.  She did require transfusions during her chemotherapy for chemotherapy of anemia.  Her last hemoglobin check was 02/26.  At that time, it was 10.4.  She has had thyroid testing here this past March and although the TSH was abnormal initially, the free T4 was within normal range.  She has some kidney dysfunction that is thought secondary to her chemotherapy.  Her last creatinine was 1.2 checked on 03/26 with normal electrolytes.  Ms. Gastelum has been having trouble eating and drinking.  She has nausea and discomfort in the center of her chest and she feels pain and discomfort whenever she eats or even drinks anything.  She can only take sips at a time because of the discomfort and she has trouble maintaining hydration and also she has lost about 40-45 pounds since January.  She states she has no taste with loss of taste buds since chemotherapy as well.  She denies any difficulty breathing.      PHYSICAL EXAMINATION:  On exam today, her blood pressure is 105/72, but she is orthostatic.  Her blood pressure dropped to 90/78 with standing after 1 minute.  Her heart rate is 92 beats per minute.  Weight 198 with a body mass index of 34.  I did review her EKG from 03/11 which shows a sinus rhythm, fast heart rate.   Otherwise normal.  On exam, cardiovascular tones are regular and fast, distant.  I do not appreciate a murmur, gallop or rub.  Lungs are clear posteriorly.  I do not note any peripheral edema, but she has had complaints of peripheral edema since her chemo.      SUMMARY:  Ms. Gastelum is a very pleasant 68-year-old female with small cell lung cancer.  She has undergone carboplatin and cisplatin followed by radiation therapy to her chest and head.  She has sequelae of loss of taste, dysphagia with poor oral intake.  She is orthostatic today on exam.  I would like to recheck her hemoglobin and ensure that she is not continuing to have marked anemia.  I would like her to wear a ZIO Patch monitor to evaluate for possible arrhythmia given her palpitations.  Her tachycardia likely is reflux with presence of orthostatic hypotension.  I have ordered her compression socks and we may need to consider salt tablets for her as well if the compression socks are ineffective in treating her positional lightheadedness.  Lastly, I would like to refer her to Gastroenterology for her ongoing continued dysphagia with poor oral intake.  This is likely affecting her heart rate and blood pressure as well and I have taken the liberty of starting her on an acid suppressant with Prilosec.  Lastly, I did review her echocardiogram findings.  Her LV function is borderline low normal and no valvular disease.  I will see her back in clinic to go over the ZIO Patch monitor results and see if the compression socks are working for her if we need to add salt tablets.        Please feel free to contact me with any questions you have in regards to her care.  Thank you for allowing me to participate in the care of your nice patient.      cc:   [place] [address]         ELZA LINARES DO             D: 04/15/2019   T: 04/15/2019   MT: MELISSA      Name:     AV  BISHNU   MRN:      [id number]        Account:      [id number]   :      1950           Service Date: 04/15/2019      Document: [id number]         Outpatient Encounter Medications as of 4/15/2019   Medication Sig Dispense Refill     ACETAMINOPHEN PO Take 325-650 mg by mouth every 6 hours as needed for pain       albuterol (2.5 MG/3ML) 0.083% neb solution Take 1 vial (2.5 mg) by nebulization every 4 hours as needed for shortness of breath / dyspnea or wheezing 1 Box 11     atorvastatin (LIPITOR) 10 MG tablet Take 1 tablet (10 mg) by mouth daily 90 tablet 0     [DISCONTINUED] Elastic Bandages & Supports (MEDICAL COMPRESSION SOCKS) MISC 1 Package daily 2 each 1     [DISCONTINUED] omeprazole (PRILOSEC) 20 MG DR capsule Take 1 capsule (20 mg) by mouth daily 30 capsule 3     [DISCONTINUED] furosemide (LASIX) 20 MG tablet Take 1 tablet (20 mg) by mouth daily 90 tablet 3     No facility-administered encounter medications on file as of 4/15/2019.        Again, thank you for allowing me to participate in the care of your patient.      Sincerely,    Valeria Macedo, DO     [facility]

## 2019-04-15 NOTE — PROGRESS NOTES
Service Date: 04/15/2019      REFERRING PHYSICIAN:  Dr. Arie House.      HISTORY OF PRESENT ILLNESS:  Ms. Gastelum is a pleasant 68-year-old female with a history of small cell lung cancer treated with carboplatin, cisplatin and radiation therapy.  She had preventative radiation therapy to her brain as well.  This was completed back in December of 2018.  Since then she has been experiencing occasional positional lightheadedness and fast heart rhythms and palpitations.  She did require transfusions during her chemotherapy for chemotherapy of anemia.  Her last hemoglobin check was 02/26.  At that time, it was 10.4.  She has had thyroid testing here this past March and although the TSH was abnormal initially, the free T4 was within normal range.  She has some kidney dysfunction that is thought secondary to her chemotherapy.  Her last creatinine was 1.2 checked on 03/26 with normal electrolytes.  Ms. Gastelum has been having trouble eating and drinking.  She has nausea and discomfort in the center of her chest and she feels pain and discomfort whenever she eats or even drinks anything.  She can only take sips at a time because of the discomfort and she has trouble maintaining hydration and also she has lost about 40-45 pounds since January.  She states she has no taste with loss of taste buds since chemotherapy as well.  She denies any difficulty breathing.      PHYSICAL EXAMINATION:  On exam today, her blood pressure is 105/72, but she is orthostatic.  Her blood pressure dropped to 90/78 with standing after 1 minute.  Her heart rate is 92 beats per minute.  Weight 198 with a body mass index of 34.  I did review her EKG from 03/11 which shows a sinus rhythm, fast heart rate.  Otherwise normal.  On exam, cardiovascular tones are regular and fast, distant.  I do not appreciate a murmur, gallop or rub.  Lungs are clear posteriorly.  I do not note any peripheral edema, but she has had complaints of peripheral edema since  her chemo.      SUMMARY:  Ms. Gastelum is a very pleasant 68-year-old female with small cell lung cancer.  She has undergone carboplatin and cisplatin followed by radiation therapy to her chest and head.  She has sequelae of loss of taste, dysphagia with poor oral intake.  She is orthostatic today on exam.  I would like to recheck her hemoglobin and ensure that she is not continuing to have marked anemia.  I would like her to wear a ZIO Patch monitor to evaluate for possible arrhythmia given her palpitations.  Her tachycardia likely is reflux with presence of orthostatic hypotension.  I have ordered her compression socks and we may need to consider salt tablets for her as well if the compression socks are ineffective in treating her positional lightheadedness.  Lastly, I would like to refer her to Gastroenterology for her ongoing continued dysphagia with poor oral intake.  This is likely affecting her heart rate and blood pressure as well and I have taken the liberty of starting her on an acid suppressant with Prilosec.  Lastly, I did review her echocardiogram findings.  Her LV function is borderline low normal and no valvular disease.  I will see her back in clinic to go over the ZIO Patch monitor results and see if the compression socks are working for her if we need to add salt tablets.        Please feel free to contact me with any questions you have in regards to her care.  Thank you for allowing me to participate in the care of your nice patient.      cc:   [place] [address]         ELZA LINARES DO             D: 04/15/2019   T: 04/15/2019   MT: MELISSA      Name:     BISHNU GASTELUM   MRN:      [id number]        Account:      [id number]   :      1950           Service Date: 04/15/2019      Document: [id number]

## 2019-04-16 ENCOUNTER — HOSPITAL ENCOUNTER (OUTPATIENT)
Dept: CARDIOLOGY | Facility: CLINIC | Age: 69
Discharge: HOME | End: 2019-04-16
Attending: INTERNAL MEDICINE | Admitting: INTERNAL MEDICINE
Payer: COMMERCIAL

## 2019-04-16 DIAGNOSIS — K21.00 GASTROESOPHAGEAL REFLUX DISEASE WITH ESOPHAGITIS: ICD-10-CM

## 2019-04-16 DIAGNOSIS — T66.XXXS ADVERSE EFFECT OF RADIATION, SEQUELA: ICD-10-CM

## 2019-04-16 DIAGNOSIS — I10 BENIGN ESSENTIAL HYPERTENSION: ICD-10-CM

## 2019-04-16 DIAGNOSIS — R00.0 TACHYCARDIA: ICD-10-CM

## 2019-04-16 PROCEDURE — 0296T ZIO PATCH HOLTER ADULT PEDIATRIC GREATER THAN 48 HRS: CPT

## 2019-04-16 PROCEDURE — 0298T ZIO PATCH HOLTER ADULT PEDIATRIC GREATER THAN 48 HRS: CPT | Performed by: INTERNAL MEDICINE

## 2019-04-23 ENCOUNTER — TELEPHONE (OUTPATIENT)
Dept: FAMILY MEDICINE | Facility: CLINIC | Age: 69
End: 2019-04-23

## 2019-04-23 DIAGNOSIS — N18.30 CKD (CHRONIC KIDNEY DISEASE) STAGE 3, GFR 30-59 ML/MIN (H): ICD-10-CM

## 2019-04-23 LAB
ANION GAP SERPL CALCULATED.3IONS-SCNC: 8 MMOL/L (ref 3–14)
BUN SERPL-MCNC: 21 MG/DL (ref 7–30)
CALCIUM SERPL-MCNC: 9.3 MG/DL (ref 8.5–10.1)
CHLORIDE SERPL-SCNC: 113 MMOL/L (ref 94–109)
CO2 SERPL-SCNC: 21 MMOL/L (ref 20–32)
CREAT SERPL-MCNC: 1.23 MG/DL (ref 0.52–1.04)
ERYTHROCYTE [DISTWIDTH] IN BLOOD BY AUTOMATED COUNT: 14.2 % (ref 10–15)
GFR SERPL CREATININE-BSD FRML MDRD: 45 ML/MIN/{1.73_M2}
GLUCOSE SERPL-MCNC: 121 MG/DL (ref 70–99)
HCT VFR BLD AUTO: 32.4 % (ref 35–47)
HGB BLD-MCNC: 11 G/DL (ref 11.7–15.7)
MCH RBC QN AUTO: 32.8 PG (ref 26.5–33)
MCHC RBC AUTO-ENTMCNC: 34 G/DL (ref 31.5–36.5)
MCV RBC AUTO: 97 FL (ref 78–100)
PLATELET # BLD AUTO: 98 10E9/L (ref 150–450)
POTASSIUM SERPL-SCNC: 3.6 MMOL/L (ref 3.4–5.3)
RBC # BLD AUTO: 3.35 10E12/L (ref 3.8–5.2)
SODIUM SERPL-SCNC: 142 MMOL/L (ref 133–144)
WBC # BLD AUTO: 4 10E9/L (ref 4–11)

## 2019-04-23 PROCEDURE — 36415 COLL VENOUS BLD VENIPUNCTURE: CPT | Performed by: INTERNAL MEDICINE

## 2019-04-23 PROCEDURE — 80048 BASIC METABOLIC PNL TOTAL CA: CPT | Performed by: INTERNAL MEDICINE

## 2019-04-23 PROCEDURE — 85027 COMPLETE CBC AUTOMATED: CPT | Performed by: INTERNAL MEDICINE

## 2019-04-23 NOTE — TELEPHONE ENCOUNTER
Reason for Call:  Other Pt would like a call back.     Detailed comments: Pt stopped in requesting information on getting a service dog. Please call to discuss.     Phone Number Patient can be reached at: Home number on file [phone] (home)    Best Time: Anytime     Can we leave a detailed message on this number? YES    Call taken on 4/23/2019 at 9:26 AM by Cheri Gutierrez

## 2019-04-23 NOTE — LETTER
[facility]  [address] [facility]  [address]  [place]  [address]  Tel: [phone]    April 23, 2019    Yvette Gastelum  [address]   [facility] [address]        To whom it may concern,    Please allow Ms. Yvette Gastelum to keep her dog in her apartment. Her dog is an emotional support animal and she would benefit from keeping her dog with her in the apartment.    If you have any further questions or concerns, please contact me at [phone].    Sincerely,          Arie House MD

## 2019-04-23 NOTE — TELEPHONE ENCOUNTER
Pt has a dog now  Apartment rules are changing and animals are not allowed unless a letter is written from her PCP  Pt states her dog helped get her through her cancer and would like it if PCP could write/ sign letter  Pended. Please review and authorize if appropriate.    Pt would like letter mailed to her current address- confirmed address    Thank you,   Deandre CHAMBERS RN

## 2019-04-25 NOTE — RESULT ENCOUNTER NOTE
Brady Crawford,    I have had the opportunity to review your recent results and an interpretation is as follows:  Your follow up basic metabolic panel shows stable renal function and electrolytes  Your complete blood counts also shows stable anemia - no changes - we can continue to monitor     Sincerely,  Arie House MD

## 2019-05-01 DIAGNOSIS — T66.XXXS ADVERSE EFFECT OF RADIATION, SEQUELA: ICD-10-CM

## 2019-05-01 DIAGNOSIS — K21.00 GASTROESOPHAGEAL REFLUX DISEASE WITH ESOPHAGITIS: ICD-10-CM

## 2019-05-03 DIAGNOSIS — E78.5 HYPERLIPIDEMIA LDL GOAL <130: ICD-10-CM

## 2019-05-06 RX ORDER — ATORVASTATIN CALCIUM 10 MG/1
10 TABLET, FILM COATED ORAL DAILY
Qty: 90 TABLET | Refills: 2 | Status: SHIPPED | OUTPATIENT
Start: 2019-05-06 | End: 2019-10-02

## 2019-05-06 NOTE — TELEPHONE ENCOUNTER
"atorvastatin (LIPITOR) 10 MG tablet 90 tablet 0 2/26/2019     Last Written Prescription Date:  2/26/19  Last Fill Quantity: 90,  # refills: 0   Last office visit: 3/11/2019 with prescribing provider:  Harsh   Future Office Visit:   Next 5 appointments (look out 90 days)    May 15, 2019 12:30 PM CDT  Return Visit with Bisi Tsai PA-C  [facility] ([facility]) [address]  [facility] [address] [phone]   Jul 31, 2019  8:45 AM CDT  Return Visit with Valeria Macedo DO  [facility] ([facility]) [address]  [facility] [address] [phone] OPT 2         Requested Prescriptions   Pending Prescriptions Disp Refills     atorvastatin (LIPITOR) 10 MG tablet [Pharmacy Med Name: ATORVASTATIN CALCIUM 10 MG Tablet] 90 tablet 0     Sig: TAKE 1 TABLET (10 MG) BY MOUTH DAILY       Statins Protocol Passed - 5/3/2019  7:57 PM        Passed - LDL on file in past 12 months     Recent Labs   Lab Test 02/26/19  0921   LDL 82             Passed - No abnormal creatine kinase in past 12 months     No lab results found.             Passed - Recent (12 mo) or future (30 days) visit within the authorizing provider's specialty     Patient had office visit in the last 12 months or has a visit in the next 30 days with authorizing provider or within the authorizing provider's specialty.  See \"Patient Info\" tab in inbasket, or \"Choose Columns\" in Meds & Orders section of the refill encounter.              Passed - Medication is active on med list        Passed - Patient is age 18 or older        Passed - No active pregnancy on record        Passed - No positive pregnancy test in past 12 months        [facility] Follow-up to PHQ 9/21/2016 12/5/2017 8/21/2018   PHQ-9 9. Suicide Ideation past 2 weeks Not at all Not at all Not at all       "

## 2019-05-06 NOTE — TELEPHONE ENCOUNTER
Prescription approved per [facility] Refill Protocol.  Deirdre LEYVA RN

## 2019-05-16 ENCOUNTER — TRANSFERRED RECORDS (OUTPATIENT)
Dept: HEALTH INFORMATION MANAGEMENT | Facility: CLINIC | Age: 69
End: 2019-05-16

## 2019-05-21 ENCOUNTER — ANESTHESIA (OUTPATIENT)
Dept: SURGERY | Facility: CLINIC | Age: 69
End: 2019-05-21
Payer: COMMERCIAL

## 2019-05-21 ENCOUNTER — ANESTHESIA EVENT (OUTPATIENT)
Dept: SURGERY | Facility: CLINIC | Age: 69
End: 2019-05-21
Payer: COMMERCIAL

## 2019-05-21 ENCOUNTER — HOSPITAL ENCOUNTER (OUTPATIENT)
Facility: CLINIC | Age: 69
Discharge: HOME | End: 2019-05-21
Attending: THORACIC SURGERY (CARDIOTHORACIC VASCULAR SURGERY) | Admitting: THORACIC SURGERY (CARDIOTHORACIC VASCULAR SURGERY)
Payer: COMMERCIAL

## 2019-05-21 VITALS
TEMPERATURE: 97.3 F | HEIGHT: 64 IN | BODY MASS INDEX: 33.58 KG/M2 | SYSTOLIC BLOOD PRESSURE: 116 MMHG | HEART RATE: 72 BPM | DIASTOLIC BLOOD PRESSURE: 83 MMHG | RESPIRATION RATE: 16 BRPM | OXYGEN SATURATION: 100 % | WEIGHT: 196.7 LBS

## 2019-05-21 PROCEDURE — 27210794 ZZH OR GENERAL SUPPLY STERILE: Performed by: THORACIC SURGERY (CARDIOTHORACIC VASCULAR SURGERY)

## 2019-05-21 PROCEDURE — 40000170 ZZH STATISTIC PRE-PROCEDURE ASSESSMENT II: Performed by: THORACIC SURGERY (CARDIOTHORACIC VASCULAR SURGERY)

## 2019-05-21 PROCEDURE — 25000125 ZZHC RX 250: Performed by: THORACIC SURGERY (CARDIOTHORACIC VASCULAR SURGERY)

## 2019-05-21 PROCEDURE — 71000012 ZZH RECOVERY PHASE 1 LEVEL 1 FIRST HR: Performed by: THORACIC SURGERY (CARDIOTHORACIC VASCULAR SURGERY)

## 2019-05-21 PROCEDURE — 36000050 ZZH SURGERY LEVEL 2 1ST 30 MIN: Performed by: THORACIC SURGERY (CARDIOTHORACIC VASCULAR SURGERY)

## 2019-05-21 PROCEDURE — 37000008 ZZH ANESTHESIA TECHNICAL FEE, 1ST 30 MIN: Performed by: THORACIC SURGERY (CARDIOTHORACIC VASCULAR SURGERY)

## 2019-05-21 PROCEDURE — 71000027 ZZH RECOVERY PHASE 2 EACH 15 MINS: Performed by: THORACIC SURGERY (CARDIOTHORACIC VASCULAR SURGERY)

## 2019-05-21 PROCEDURE — 25800030 ZZH RX IP 258 OP 636: Performed by: NURSE ANESTHETIST, CERTIFIED REGISTERED

## 2019-05-21 PROCEDURE — 25000125 ZZHC RX 250: Performed by: NURSE ANESTHETIST, CERTIFIED REGISTERED

## 2019-05-21 PROCEDURE — 25000128 H RX IP 250 OP 636: Performed by: NURSE ANESTHETIST, CERTIFIED REGISTERED

## 2019-05-21 RX ORDER — FENTANYL CITRATE 50 UG/ML
25-50 INJECTION, SOLUTION INTRAMUSCULAR; INTRAVENOUS
Status: DISCONTINUED | OUTPATIENT
Start: 2019-05-21 | End: 2019-05-21 | Stop reason: HOSPADM

## 2019-05-21 RX ORDER — FENTANYL CITRATE 50 UG/ML
INJECTION, SOLUTION INTRAMUSCULAR; INTRAVENOUS PRN
Status: DISCONTINUED | OUTPATIENT
Start: 2019-05-21 | End: 2019-05-21

## 2019-05-21 RX ORDER — PROPOFOL 10 MG/ML
INJECTION, EMULSION INTRAVENOUS PRN
Status: DISCONTINUED | OUTPATIENT
Start: 2019-05-21 | End: 2019-05-21

## 2019-05-21 RX ORDER — NALOXONE HYDROCHLORIDE 0.4 MG/ML
.1-.4 INJECTION, SOLUTION INTRAMUSCULAR; INTRAVENOUS; SUBCUTANEOUS
Status: DISCONTINUED | OUTPATIENT
Start: 2019-05-21 | End: 2019-05-21 | Stop reason: HOSPADM

## 2019-05-21 RX ORDER — ONDANSETRON 4 MG/1
4 TABLET, ORALLY DISINTEGRATING ORAL EVERY 30 MIN PRN
Status: DISCONTINUED | OUTPATIENT
Start: 2019-05-21 | End: 2019-05-21 | Stop reason: HOSPADM

## 2019-05-21 RX ORDER — PROPOFOL 10 MG/ML
INJECTION, EMULSION INTRAVENOUS CONTINUOUS PRN
Status: DISCONTINUED | OUTPATIENT
Start: 2019-05-21 | End: 2019-05-21

## 2019-05-21 RX ORDER — SODIUM CHLORIDE, SODIUM LACTATE, POTASSIUM CHLORIDE, CALCIUM CHLORIDE 600; 310; 30; 20 MG/100ML; MG/100ML; MG/100ML; MG/100ML
INJECTION, SOLUTION INTRAVENOUS CONTINUOUS PRN
Status: DISCONTINUED | OUTPATIENT
Start: 2019-05-21 | End: 2019-05-21

## 2019-05-21 RX ORDER — AZITHROMYCIN 500 MG/1
500 TABLET, FILM COATED ORAL DAILY
COMMUNITY
End: 2019-11-07

## 2019-05-21 RX ORDER — ONDANSETRON 2 MG/ML
4 INJECTION INTRAMUSCULAR; INTRAVENOUS EVERY 30 MIN PRN
Status: DISCONTINUED | OUTPATIENT
Start: 2019-05-21 | End: 2019-05-21 | Stop reason: HOSPADM

## 2019-05-21 RX ORDER — ONDANSETRON 2 MG/ML
INJECTION INTRAMUSCULAR; INTRAVENOUS PRN
Status: DISCONTINUED | OUTPATIENT
Start: 2019-05-21 | End: 2019-05-21

## 2019-05-21 RX ORDER — SODIUM CHLORIDE, SODIUM LACTATE, POTASSIUM CHLORIDE, CALCIUM CHLORIDE 600; 310; 30; 20 MG/100ML; MG/100ML; MG/100ML; MG/100ML
INJECTION, SOLUTION INTRAVENOUS CONTINUOUS
Status: DISCONTINUED | OUTPATIENT
Start: 2019-05-21 | End: 2019-05-21 | Stop reason: HOSPADM

## 2019-05-21 RX ORDER — LIDOCAINE HYDROCHLORIDE 20 MG/ML
INJECTION, SOLUTION INFILTRATION; PERINEURAL PRN
Status: DISCONTINUED | OUTPATIENT
Start: 2019-05-21 | End: 2019-05-21

## 2019-05-21 RX ORDER — HYDROMORPHONE HYDROCHLORIDE 1 MG/ML
.3-.5 INJECTION, SOLUTION INTRAMUSCULAR; INTRAVENOUS; SUBCUTANEOUS EVERY 10 MIN PRN
Status: DISCONTINUED | OUTPATIENT
Start: 2019-05-21 | End: 2019-05-21 | Stop reason: HOSPADM

## 2019-05-21 RX ADMIN — PROPOFOL 20 MG: 10 INJECTION, EMULSION INTRAVENOUS at 12:13

## 2019-05-21 RX ADMIN — LIDOCAINE HYDROCHLORIDE 30 MG: 20 INJECTION, SOLUTION INFILTRATION; PERINEURAL at 12:13

## 2019-05-21 RX ADMIN — SODIUM CHLORIDE, POTASSIUM CHLORIDE, SODIUM LACTATE AND CALCIUM CHLORIDE: 600; 310; 30; 20 INJECTION, SOLUTION INTRAVENOUS at 12:12

## 2019-05-21 RX ADMIN — PROPOFOL 75 MCG/KG/MIN: 10 INJECTION, EMULSION INTRAVENOUS at 12:13

## 2019-05-21 RX ADMIN — ONDANSETRON 4 MG: 2 INJECTION INTRAMUSCULAR; INTRAVENOUS at 12:19

## 2019-05-21 RX ADMIN — MIDAZOLAM 2 MG: 1 INJECTION INTRAMUSCULAR; INTRAVENOUS at 12:13

## 2019-05-21 RX ADMIN — FENTANYL CITRATE 50 MCG: 50 INJECTION, SOLUTION INTRAMUSCULAR; INTRAVENOUS at 12:13

## 2019-05-21 ASSESSMENT — MIFFLIN-ST. JEOR: SCORE: 1407.23

## 2019-05-21 ASSESSMENT — LIFESTYLE VARIABLES: TOBACCO_USE: 1

## 2019-05-21 ASSESSMENT — COPD QUESTIONNAIRES: COPD: 1

## 2019-05-21 NOTE — ANESTHESIA POSTPROCEDURE EVALUATION
Patient: Yvette Gastelum    Procedure(s):  REMOVAL, VASCULAR ACCESS PORT    Diagnosis:SMALL CELL LUNG CANCER  Diagnosis Additional Information: No value filed.    Anesthesia Type:  MAC    Note:  Anesthesia Post Evaluation    Patient location during evaluation: PACU  Patient participation: Able to fully participate in evaluation  Level of consciousness: awake  Pain management: adequate  Airway patency: patent  Cardiovascular status: acceptable  Respiratory status: acceptable  Hydration status: acceptable  PONV: none     Anesthetic complications: None          Last vitals:  Vitals:    05/21/19 1300 05/21/19 1325 05/21/19 1345   BP: 109/69 126/81 116/83   Pulse: 72     Resp: 16 16 16   Temp: 36.3  C (97.3  F)     SpO2: 100% 100% 100%         Electronically Signed By: Kailyn Olson  May 21, 2019  3:07 PM

## 2019-05-21 NOTE — ANESTHESIA CARE TRANSFER NOTE
Patient: Yvette Gastelum    Procedure(s):  REMOVAL, VASCULAR ACCESS PORT    Diagnosis: SMALL CELL LUNG CANCER  Diagnosis Additional Information: No value filed.    Anesthesia Type:   MAC     Note:  Airway :Room Air  Patient transferred to:PACU  Comments: Pt to PACU on room air, airway patent, VSS.  Report to RN.Handoff Report: Identifed the Patient, Identified the Reponsible Provider, Reviewed the pertinent medical history, Discussed the surgical course, Reviewed Intra-OP anesthesia mangement and issues during anesthesia, Set expectations for post-procedure period and Allowed opportunity for questions and acknowledgement of understanding      Vitals: (Last set prior to Anesthesia Care Transfer)    CRNA VITALS  5/21/2019 1202 - 5/21/2019 1237      5/21/2019             Pulse:  70    SpO2:  97 %    Resp Rate (set):  10                Electronically Signed By: ЕКАТЕРИНА Nascimento CRNA  May 21, 2019  12:37 PM

## 2019-05-21 NOTE — DISCHARGE INSTRUCTIONS
Same Day Surgery Discharge Instructions for  Sedation and General Anesthesia       It's not unusual to feel dizzy, light-headed or faint for up to 24 hours after surgery or while taking pain medication.  If you have these symptoms: sit for a few minutes before standing and have someone assist you when you get up to walk or use the bathroom.      You should rest and relax for the next 24 hours. We recommend you make arrangements to have an adult stay with you for at least 24 hours after your discharge.  Avoid hazardous and strenuous activity.      DO NOT DRIVE any vehicle or operate mechanical equipment for 24 hours following the end of your surgery.  Even though you may feel normal, your reactions may be affected by the medication you have received.      Do not drink alcoholic beverages for 24 hours following surgery.       Slowly progress to your regular diet as you feel able. It's not unusual to feel nauseated and/or vomit after receiving anesthesia.  If you develop these symptoms, drink clear liquids (apple juice, ginger ale, broth, 7-up, etc. ) until you feel better.  If your nausea and vomiting persists for 24 hours, please notify your surgeon.        All narcotic pain medications, along with inactivity and anesthesia, can cause constipation. Drinking plenty of liquids and increasing fiber intake will help.      For any questions of a medical nature, call your surgeon.      Do not make important decisions for 24 hours.      If you had general anesthesia, you may have a sore throat for a couple of days related to the breathing tube used during surgery.  You may use Cepacol lozenges to help with this discomfort.  If it worsens or if you develop a fever, contact your surgeon.       If you feel your pain is not well managed with the pain medications prescribed by your surgeon, please contact your surgeon's office to let them know so they can address your concerns.       Discharge Instructions for Port  Removal        You may remove your bandage and shower in 24 hours.     You may resume normal activity as tolerated.      You may apply ice to the area for comfort.    Your incision was closed using a liquid adhesive.  Do not scratch, rub or pick at the film over your incision.  You may shower in 24 hours.  Do not soak in a tub for at least one week.  Do not apply lotions or cream to you incision.     Watch incision for signs of infection:  Redness  Swelling  Drainage  Temperature    Call your surgeon for questions and concerns.

## 2019-05-21 NOTE — OP NOTE
DATE OF PROCEDURE: 05/21/2019      SURGEON:  Todd Norris MD           PREOPERATIVE DIAGNOSIS:  small cell lung cancer      POSTOPERATIVE DIAGNOSIS:  Same       PROCEDURE:  Removal of PowerPort implanted port, left subclavian vein.       ANESTHESIA:  Local with lidocaine 1% without epinephrine and sedation.       INDICATIONS:  Patient had a port placed for treatment.  Therapy has now been completed and a PowerPort was no longer necessary.       DESCRIPTION OF PROCEDURE:  The patient was brought to the OR and placed in supine position.  IV sedation was given.  The left infraclavicular area was prepared and draped in the usual fashion using ChloraPrep.  Local anesthesia was done with lidocaine 1% without epinephrine.  The infraclavicular incision was opened.  The port was dissected from the subcutaneous pocket and port and the catheter were removed.  The catheter was intact.  The tract of the catheter was closed with a figure-of-eight of 3-0 Vicryl.  Hemostasis was excellent.  Incision was closed in the usual fashion.  Estimated blood loss minimal.  Sponge and needle counts correct.           TODD NORRIS MD

## 2019-05-21 NOTE — ANESTHESIA PREPROCEDURE EVALUATION
Anesthesia Pre-Procedure Evaluation    Patient: Yvette Gastelum   MRN: [id number] : 1950          Preoperative Diagnosis: SMALL CELL LUNG CANCER    Procedure(s):  REMOVAL, VASCULAR ACCESS PORT    Past Medical History:   Diagnosis Date     Cancer (H)     Lung cancer       Hypertension      Kidney stones      Obese      Recurrent UTI      S/P CL-BSO      Sepsis (H)     secondary to uti      Past Surgical History:   Procedure Laterality Date     COLONOSCOPY       COLONOSCOPY N/A 2/10/2016    Procedure: COMBINED COLONOSCOPY, SINGLE OR MULTIPLE BIOPSY/POLYPECTOMY BY BIOPSY;  Surgeon: Antonia Jiménez MD;  Location:  GI     EYE SURGERY      CATARACT EXTRACTION RIGHT & LEFT     GYN SURGERY      BILATERAL TUBAL LIGATION, CL, LAPAROSCOPIC LEFT SALPINGO-OOPHORECTOMY     HEAD & NECK SURGERY      WISDOM TEETH EXTRACTION     INSERT PORT VASCULAR ACCESS N/A 2018    Procedure: INSERT PORT VASCULAR ACCESS;  POWER PORT PLACEMENT;  Surgeon: Todd Norris MD;  Location: [facility]       Anesthesia Evaluation     . Pt has had prior anesthetic.     No history of anesthetic complications          ROS/MED HX    ENT/Pulmonary: Comment: Lung mass, small cell ca    (+)tobacco use, COPD, , . .    Neurologic:       Cardiovascular:     (+) hypertension----. : . . . :. .       METS/Exercise Tolerance:     Hematologic:         Musculoskeletal:         GI/Hepatic:         Renal/Genitourinary:     (+) chronic renal disease, type: CRI,       Endo:     (+) Obesity, .      Psychiatric:         Infectious Disease:         Malignancy:         Other:                          Physical Exam      Airway   Mallampati: I  TM distance: >3 FB  Neck ROM: full    Dental   (+) upper dentures    Cardiovascular   Rhythm and rate: regular and normal      Pulmonary             Lab Results   Component Value Date    WBC 4.0 2019    HGB 11.0 (L) 2019    HCT 32.4 (L) 2019    PLT 98 (L) 2019     2019     "POTASSIUM 3.6 04/23/2019    CHLORIDE 113 (H) 04/23/2019    CO2 21 04/23/2019    BUN 21 04/23/2019    CR 1.23 (H) 04/23/2019     (H) 04/23/2019    RUSTY 9.3 04/23/2019    MAG 1.7 03/11/2019    ALBUMIN 3.5 02/26/2019    PROTTOTAL 8.2 02/26/2019    ALT 16 02/26/2019    AST 43 02/26/2019    ALKPHOS 76 02/26/2019    BILITOTAL 1.2 02/26/2019    PTT 33 08/21/2018    INR 1.03 08/21/2018    TSH 0.94 03/26/2019    T4 1.15 03/11/2019       Preop Vitals  BP Readings from Last 3 Encounters:   05/21/19 123/80   04/15/19 105/72   03/11/19 103/66    Pulse Readings from Last 3 Encounters:   04/15/19 92   03/11/19 105   02/26/19 103      Resp Readings from Last 3 Encounters:   05/21/19 18   01/03/19 16   12/22/18 16    SpO2 Readings from Last 3 Encounters:   05/21/19 95%   03/11/19 98%   02/26/19 96%      Temp Readings from Last 1 Encounters:   05/21/19 36.6  C (97.9  F) (Oral)    Ht Readings from Last 1 Encounters:   05/21/19 1.626 m (5' 4\")      Wt Readings from Last 1 Encounters:   05/21/19 89.2 kg (196 lb 11.2 oz)    Estimated body mass index is 33.76 kg/m  as calculated from the following:    Height as of this encounter: 1.626 m (5' 4\").    Weight as of this encounter: 89.2 kg (196 lb 11.2 oz).       Anesthesia Plan      History & Physical Review  History and physical reviewed and following examination; no interval change.    ASA Status:  3 .        Plan for MAC with Intravenous induction.   PONV prophylaxis:  Ondansetron (or other 5HT-3)       Postoperative Care  Postoperative pain management:  IV analgesics.      Consents  Anesthetic plan, risks, benefits and alternatives discussed with:  Patient..                 Kailyn Olson  "

## 2019-07-31 ENCOUNTER — OFFICE VISIT (OUTPATIENT)
Dept: CARDIOLOGY | Facility: CLINIC | Age: 69
End: 2019-07-31
Attending: INTERNAL MEDICINE
Payer: COMMERCIAL

## 2019-07-31 VITALS
SYSTOLIC BLOOD PRESSURE: 113 MMHG | DIASTOLIC BLOOD PRESSURE: 77 MMHG | BODY MASS INDEX: 33.92 KG/M2 | WEIGHT: 198.7 LBS | HEIGHT: 64 IN | HEART RATE: 90 BPM

## 2019-07-31 DIAGNOSIS — T66.XXXS ADVERSE EFFECT OF RADIATION, SEQUELA: ICD-10-CM

## 2019-07-31 DIAGNOSIS — I95.1 ORTHOSTATIC HYPOTENSION: ICD-10-CM

## 2019-07-31 DIAGNOSIS — R13.19 ESOPHAGEAL DYSPHAGIA: ICD-10-CM

## 2019-07-31 DIAGNOSIS — R00.0 TACHYCARDIA: ICD-10-CM

## 2019-07-31 DIAGNOSIS — I10 BENIGN ESSENTIAL HYPERTENSION: ICD-10-CM

## 2019-07-31 DIAGNOSIS — K21.00 GASTROESOPHAGEAL REFLUX DISEASE WITH ESOPHAGITIS: ICD-10-CM

## 2019-07-31 PROCEDURE — 99213 OFFICE O/P EST LOW 20 MIN: CPT | Performed by: INTERNAL MEDICINE

## 2019-07-31 ASSESSMENT — MIFFLIN-ST. JEOR: SCORE: 1416.3

## 2019-07-31 NOTE — PROGRESS NOTES
HPI and Plan:   See dictation    No orders of the defined types were placed in this encounter.      Orders Placed This Encounter   Medications     omeprazole (PRILOSEC) 20 MG DR capsule     Sig: Take 1 capsule (20 mg) by mouth daily     Dispense:  90 capsule     Refill:  11       Medications Discontinued During This Encounter   Medication Reason     omeprazole (PRILOSEC) 20 MG DR capsule Reorder         Encounter Diagnoses   Name Primary?     Benign essential hypertension      Gastroesophageal reflux disease with esophagitis      Adverse effect of radiation, sequela      Tachycardia      Orthostatic hypotension      Esophageal dysphagia        CURRENT MEDICATIONS:  Current Outpatient Medications   Medication Sig Dispense Refill     ACETAMINOPHEN PO Take 325-650 mg by mouth every 6 hours as needed for pain       albuterol (2.5 MG/3ML) 0.083% neb solution Take 1 vial (2.5 mg) by nebulization every 4 hours as needed for shortness of breath / dyspnea or wheezing 1 Box 11     atorvastatin (LIPITOR) 10 MG tablet TAKE 1 TABLET (10 MG) BY MOUTH DAILY 90 tablet 2     Elastic Bandages & Supports (MEDICAL COMPRESSION SOCKS) MISC 1 Package daily 2 each 1     omeprazole (PRILOSEC) 20 MG DR capsule Take 1 capsule (20 mg) by mouth daily 90 capsule 11     azithromycin (ZITHROMAX TRI-OLIVIA) 500 MG tablet Take 500 mg by mouth daily         ALLERGIES     Allergies   Allergen Reactions     No Known Allergies        PAST MEDICAL HISTORY:  Past Medical History:   Diagnosis Date     Cancer (H)     Lung cancer       Hypertension      Kidney stones      Obese      Recurrent UTI      S/P CL-BSO      Sepsis (H)     secondary to uti        PAST SURGICAL HISTORY:  Past Surgical History:   Procedure Laterality Date     COLONOSCOPY       COLONOSCOPY N/A 2/10/2016    Procedure: COMBINED COLONOSCOPY, SINGLE OR MULTIPLE BIOPSY/POLYPECTOMY BY BIOPSY;  Surgeon: Antonia Jiménez MD;  Location:  GI     EYE SURGERY      CATARACT EXTRACTION RIGHT &  LEFT     GYN SURGERY      BILATERAL TUBAL LIGATION, CL, LAPAROSCOPIC LEFT SALPINGO-OOPHORECTOMY     HEAD & NECK SURGERY      WISDOM TEETH EXTRACTION     INSERT PORT VASCULAR ACCESS N/A 9/14/2018    Procedure: INSERT PORT VASCULAR ACCESS;  POWER PORT PLACEMENT;  Surgeon: Todd Norris MD;  Location: [facility]     REMOVE PORT VASCULAR ACCESS N/A 5/21/2019    Procedure: REMOVAL, VASCULAR ACCESS PORT;  Surgeon: Todd Norris MD;  Location:  OR       FAMILY HISTORY:  Family History   Problem Relation Age of Onset     Aneurysm Father         Aorta     Hypertension Father      Cervical Cancer Mother      Lung Cancer Brother         non smoker       SOCIAL HISTORY:  Social History     Socioeconomic History     Marital status:      Spouse name: None     Number of children: None     Years of education: None     Highest education level: None   Occupational History     None   Social Needs     Financial resource strain: None     Food insecurity:     Worry: None     Inability: None     Transportation needs:     Medical: None     Non-medical: None   Tobacco Use     Smoking status: Former Smoker     Packs/day: 1.00     Years: 50.00     Pack years: 50.00     Types: Cigarettes     Start date: 10/1965     Last attempt to quit: 9/1/2015     Years since quitting: 3.9     Smokeless tobacco: Never Used   Substance and Sexual Activity     Alcohol use: No     Alcohol/week: 0.0 oz     Drug use: No     Sexual activity: Not Currently     Partners: Male   Lifestyle     Physical activity:     Days per week: None     Minutes per session: None     Stress: None   Relationships     Social connections:     Talks on phone: None     Gets together: None     Attends [employer] service: None     Active member of club or organization: None     Attends meetings of clubs or organizations: None     Relationship status: None     Intimate partner violence:     Fear of current or ex partner: None     Emotionally abused: None      "Physically abused: None     Forced sexual activity: None   Other Topics Concern     Parent/sibling w/ CABG, MI or angioplasty before 65F 55M? Not Asked   Social History Narrative    Single, 3 adult sons    Retired - previously employed as industrial consultant for [employer]:  Skin:  Positive for rash     Eyes:  Negative      ENT:  Negative      Respiratory:  Positive for dyspnea on exertion;cough SOB occ. when laying down   Cardiovascular:  Negative;palpitations;chest pain;syncope or near-syncope;cyanosis;exercise intolerance;fatigue;edema Positive for lightheadedness, occ. if not wearing compression stockings  Gastroenterology: Positive for poor appetite has Gastro appt end of september  Genitourinary:  Positive for urinary frequency;nocturia    Musculoskeletal:  Positive for back pain    Neurologic:  Negative      Psychiatric:  Negative      Heme/Lymph/Imm:  Negative      Endocrine:  Negative        Physical Exam:  Vitals: /77 (BP Location: Right arm, Cuff Size: Adult Large)   Pulse 90   Ht 1.626 m (5' 4\")   Wt 90.1 kg (198 lb 11.2 oz)   BMI 34.11 kg/m      Constitutional:  cooperative chronically ill      Skin:  warm and dry to the touch          Head:  normocephalic        Eyes:  pupils equal and round        Lymph:      ENT:    pallor      Neck:  carotid pulses are full and equal bilaterally        Respiratory:  clear to auscultation;normal symmetry         Cardiac: regular rhythm;no murmurs, gallops or rubs detected                pulses full and equal                                        GI:  abdomen soft        Extremities and Muscular Skeletal:  no edema;no deformities, clubbing, cyanosis, erythema observed              Neurological:  no gross motor deficits        Psych:  Alert and Oriented x 3          CC  Valeria Alysia Macedo, DO  [address]  [place] [address]                  "

## 2019-07-31 NOTE — LETTER
7/31/2019      Arie House MD, MD  [address] [facility] [address]  [facility] [address]      RE: Yvette BROTHERS Nirav       Dear Colleague,    I had the pleasure of seeing Yvette Gastelum in the [facility].    Service Date: 07/31/2019      REFERRING PHYSICIAN:  Dr. Arie House.      HISTORY OF PRESENT ILLNESS:  Ms. Gastelum is a very pleasant 68-year-old female with a history of small cell lung cancer.  She underwent treatment with carboplatin and cisplatin as well as radiation therapy, and she had preventative radiation therapy to her brain as well.  She had symptoms of dysphagia, poor taste and poor oral intake following treatment.  She also had difficulty with orthostatic hypotension.  I saw her back in April and had recommended Prilosec for her GI symptoms, and I also recommended compression socks as well as liberal fluid and salt intake for her orthostatic hypotension.  We had her wear a Zio Patch monitor to look for arrhythmias as well.  She had an echocardiogram that showed fairly normal function, no valvular issues, and she is here to go over the Zio Patch monitor test results today, too.  The Zio Patch was worn for about 14 days, during which time she really did not have much.  She had one 6-beat run of SVT and that was it, and I reviewed those tests results with her.  It sounds like she is actually doing quite well.  Compression socks have worked well for her for her blood pressure and feeling lightheadedness, and the Prilosec really helps with her dysphagia symptoms as well.  She has gradually and slowly been improving over the last several months, and it looks like her weight has maintained since April as well.  Her weight is stable at 198.  She had previously lost about 30-35 pounds following her treatment.      PHYSICAL EXAMINATION:  Her blood pressure is 113/77, pulse is 90, body mass index of 34 and her physical exam findings were otherwise unchanged.      ASSESSMENT  AND PLAN:  In summary, Ms. Ley is a very pleasant 68-year-old female with small cell lung cancer.  She has undergone chemotherapy and radiation therapy.  She had orthostatic hypotension and dysphasia following her treatment, and both have been successfully treated with compression socks, liberal fluid and salt intake and Prilosec.  I did refill her Prilosec for her today.  She does have an appointment with Gastroenterology in the near future, but hopefully this will work well for her.  It seems to be doing really well so far.  I reminded her about the hot and humid months in August.  She may want to be real cognizant of her fluid intake and also liberal with salt intake in addition to wearing compression socks to avoid low blood pressures, especially if she is going to be outdoors.  I am happy to see her back at any time for any of her future cardiovascular needs.  Please feel free to contact me with any questions you have in regards to her care.      cc:   Arie House MD    [address], [address]    [place]  [address]       Artem Villanueva MD    [facility]    [address] [place] [address], [address]    [place]  [address]         ELZA LINARES DO             D: 2019   T: 2019   MT: KEVAN      Name:     BISHNU LEY   MRN:      [id number]        Account:      [id number]   :      1950           Service Date: 2019      Document: [id number]         Outpatient Encounter Medications as of 2019   Medication Sig Dispense Refill     ACETAMINOPHEN PO Take 325-650 mg by mouth every 6 hours as needed for pain       albuterol (2.5 MG/3ML) 0.083% neb solution Take 1 vial (2.5 mg) by nebulization every 4 hours as needed for shortness of breath / dyspnea or wheezing 1 Box 11     atorvastatin (LIPITOR) 10 MG tablet TAKE 1 TABLET (10 MG) BY MOUTH DAILY 90 tablet 2     Elastic Bandages & Supports (MEDICAL COMPRESSION SOCKS) MISC 1  Package daily 2 each 1     omeprazole (PRILOSEC) 20 MG DR capsule Take 1 capsule (20 mg) by mouth daily 90 capsule 11     azithromycin (ZITHROMAX TRI-OLIVIA) 500 MG tablet Take 500 mg by mouth daily       [DISCONTINUED] omeprazole (PRILOSEC) 20 MG DR capsule Take 1 capsule (20 mg) by mouth daily 90 capsule 3     No facility-administered encounter medications on file as of 7/31/2019.        Again, thank you for allowing me to participate in the care of your patient.      Sincerely,    Valeria Macedo, DO     [facility]

## 2019-07-31 NOTE — PROGRESS NOTES
Service Date: 07/31/2019      REFERRING PHYSICIAN:  Dr. Arie House.      HISTORY OF PRESENT ILLNESS:  Ms. Gastelum is a very pleasant 68-year-old female with a history of small cell lung cancer.  She underwent treatment with carboplatin and cisplatin as well as radiation therapy, and she had preventative radiation therapy to her brain as well.  She had symptoms of dysphagia, poor taste and poor oral intake following treatment.  She also had difficulty with orthostatic hypotension.  I saw her back in April and had recommended Prilosec for her GI symptoms, and I also recommended compression socks as well as liberal fluid and salt intake for her orthostatic hypotension.  We had her wear a Zio Patch monitor to look for arrhythmias as well.  She had an echocardiogram that showed fairly normal function, no valvular issues, and she is here to go over the Zio Patch monitor test results today, too.  The Zio Patch was worn for about 14 days, during which time she really did not have much.  She had one 6-beat run of SVT and that was it, and I reviewed those tests results with her.  It sounds like she is actually doing quite well.  Compression socks have worked well for her for her blood pressure and feeling lightheadedness, and the Prilosec really helps with her dysphagia symptoms as well.  She has gradually and slowly been improving over the last several months, and it looks like her weight has maintained since April as well.  Her weight is stable at 198.  She had previously lost about 30-35 pounds following her treatment.      PHYSICAL EXAMINATION:  Her blood pressure is 113/77, pulse is 90, body mass index of 34 and her physical exam findings were otherwise unchanged.      ASSESSMENT AND PLAN:  In summary, Ms. Gastelum is a very pleasant 68-year-old female with small cell lung cancer.  She has undergone chemotherapy and radiation therapy.  She had orthostatic hypotension and dysphasia following her treatment, and both  have been successfully treated with compression socks, liberal fluid and salt intake and Prilosec.  I did refill her Prilosec for her today.  She does have an appointment with Gastroenterology in the near future, but hopefully this will work well for her.  It seems to be doing really well so far.  I reminded her about the hot and humid months in August.  She may want to be real cognizant of her fluid intake and also liberal with salt intake in addition to wearing compression socks to avoid low blood pressures, especially if she is going to be outdoors.  I am happy to see her back at any time for any of her future cardiovascular needs.  Please feel free to contact me with any questions you have in regards to her care.      cc:   Arie House MD    [address], [address]    [place]  [address]       Artem Villaneuva MD    [facility]    [address] [place] [address], [address]    [place]  [address]         ELZA LINARES DO             D: 2019   T: 2019   MT: KEVAN      Name:     BISHNU LEY   MRN:      [id number]        Account:      [id number]   :      1950           Service Date: 2019      Document: [id number]

## 2019-07-31 NOTE — LETTER
7/31/2019    Arie House MD, MD  [address]  [place] [address]    RE: Yvette Gastelum       Dear Colleague,    I had the pleasure of seeing Yvette Gastelum in the [facility].    HPI and Plan:   See dictation    No orders of the defined types were placed in this encounter.      Orders Placed This Encounter   Medications     omeprazole (PRILOSEC) 20 MG DR capsule     Sig: Take 1 capsule (20 mg) by mouth daily     Dispense:  90 capsule     Refill:  11       Medications Discontinued During This Encounter   Medication Reason     omeprazole (PRILOSEC) 20 MG DR capsule Reorder         Encounter Diagnoses   Name Primary?     Benign essential hypertension      Gastroesophageal reflux disease with esophagitis      Adverse effect of radiation, sequela      Tachycardia      Orthostatic hypotension      Esophageal dysphagia        CURRENT MEDICATIONS:  Current Outpatient Medications   Medication Sig Dispense Refill     ACETAMINOPHEN PO Take 325-650 mg by mouth every 6 hours as needed for pain       albuterol (2.5 MG/3ML) 0.083% neb solution Take 1 vial (2.5 mg) by nebulization every 4 hours as needed for shortness of breath / dyspnea or wheezing 1 Box 11     atorvastatin (LIPITOR) 10 MG tablet TAKE 1 TABLET (10 MG) BY MOUTH DAILY 90 tablet 2     Elastic Bandages & Supports (MEDICAL COMPRESSION SOCKS) MISC 1 Package daily 2 each 1     omeprazole (PRILOSEC) 20 MG DR capsule Take 1 capsule (20 mg) by mouth daily 90 capsule 11     azithromycin (ZITHROMAX TRI-OLIVIA) 500 MG tablet Take 500 mg by mouth daily         ALLERGIES     Allergies   Allergen Reactions     No Known Allergies        PAST MEDICAL HISTORY:  Past Medical History:   Diagnosis Date     Cancer (H)     Lung cancer       Hypertension      Kidney stones      Obese      Recurrent UTI      S/P CL-BSO      Sepsis (H)     secondary to uti        PAST SURGICAL HISTORY:  Past Surgical History:   Procedure Laterality Date      COLONOSCOPY       COLONOSCOPY N/A 2/10/2016    Procedure: COMBINED COLONOSCOPY, SINGLE OR MULTIPLE BIOPSY/POLYPECTOMY BY BIOPSY;  Surgeon: Antonia Jiménez MD;  Location:  GI     EYE SURGERY      CATARACT EXTRACTION RIGHT & LEFT     GYN SURGERY      BILATERAL TUBAL LIGATION, CL, LAPAROSCOPIC LEFT SALPINGO-OOPHORECTOMY     HEAD & NECK SURGERY      WISDOM TEETH EXTRACTION     INSERT PORT VASCULAR ACCESS N/A 9/14/2018    Procedure: INSERT PORT VASCULAR ACCESS;  POWER PORT PLACEMENT;  Surgeon: Todd Norris MD;  Location:  SD     REMOVE PORT VASCULAR ACCESS N/A 5/21/2019    Procedure: REMOVAL, VASCULAR ACCESS PORT;  Surgeon: Todd Norris MD;  Location:  OR       FAMILY HISTORY:  Family History   Problem Relation Age of Onset     Aneurysm Father         Aorta     Hypertension Father      Cervical Cancer Mother      Lung Cancer Brother         non smoker       SOCIAL HISTORY:  Social History     Socioeconomic History     Marital status:      Spouse name: None     Number of children: None     Years of education: None     Highest education level: None   Occupational History     None   Social Needs     Financial resource strain: None     Food insecurity:     Worry: None     Inability: None     Transportation needs:     Medical: None     Non-medical: None   Tobacco Use     Smoking status: Former Smoker     Packs/day: 1.00     Years: 50.00     Pack years: 50.00     Types: Cigarettes     Start date: 10/1965     Last attempt to quit: 9/1/2015     Years since quitting: 3.9     Smokeless tobacco: Never Used   Substance and Sexual Activity     Alcohol use: No     Alcohol/week: 0.0 oz     Drug use: No     Sexual activity: Not Currently     Partners: Male   Lifestyle     Physical activity:     Days per week: None     Minutes per session: None     Stress: None   Relationships     Social connections:     Talks on phone: None     Gets together: None     Attends Taoism service: None      "Active member of club or organization: None     Attends meetings of clubs or organizations: None     Relationship status: None     Intimate partner violence:     Fear of current or ex partner: None     Emotionally abused: None     Physically abused: None     Forced sexual activity: None   Other Topics Concern     Parent/sibling w/ CABG, MI or angioplasty before 65F 55M? Not Asked   Social History Narrative    Single, 3 adult sons    Retired - previously employed as industrial consultant for [employer]        Review of Systems:  Skin:  Positive for rash     Eyes:  Negative      ENT:  Negative      Respiratory:  Positive for dyspnea on exertion;cough SOB occ. when laying down   Cardiovascular:  Negative;palpitations;chest pain;syncope or near-syncope;cyanosis;exercise intolerance;fatigue;edema Positive for lightheadedness, occ. if not wearing compression stockings  Gastroenterology: Positive for poor appetite has Gastro appt end of september  Genitourinary:  Positive for urinary frequency;nocturia    Musculoskeletal:  Positive for back pain    Neurologic:  Negative      Psychiatric:  Negative      Heme/Lymph/Imm:  Negative      Endocrine:  Negative        Physical Exam:  Vitals: /77 (BP Location: Right arm, Cuff Size: Adult Large)   Pulse 90   Ht 1.626 m (5' 4\")   Wt 90.1 kg (198 lb 11.2 oz)   BMI 34.11 kg/m       Constitutional:  cooperative chronically ill      Skin:  warm and dry to the touch          Head:  normocephalic        Eyes:  pupils equal and round        Lymph:      ENT:    pallor      Neck:  carotid pulses are full and equal bilaterally        Respiratory:  clear to auscultation;normal symmetry         Cardiac: regular rhythm;no murmurs, gallops or rubs detected                pulses full and equal                                        GI:  abdomen soft        Extremities and Muscular Skeletal:  no edema;no deformities, clubbing, cyanosis, erythema observed              Neurological:  no " gross motor deficits        Psych:  Alert and Oriented x 3          CC  Valeria Macedo,   [address]  [place] [address]                    Thank you for allowing me to participate in the care of your patient.      Sincerely,     Valeria Macedo DO     [facility]    cc:   Valeria Macedo DO  [address]  [place] [id number]

## 2019-08-21 ENCOUNTER — TRANSFERRED RECORDS (OUTPATIENT)
Dept: HEALTH INFORMATION MANAGEMENT | Facility: CLINIC | Age: 69
End: 2019-08-21

## 2019-08-23 ENCOUNTER — TRANSFERRED RECORDS (OUTPATIENT)
Dept: HEALTH INFORMATION MANAGEMENT | Facility: CLINIC | Age: 69
End: 2019-08-23

## 2019-10-02 ENCOUNTER — OFFICE VISIT (OUTPATIENT)
Dept: FAMILY MEDICINE | Facility: CLINIC | Age: 69
End: 2019-10-02
Payer: COMMERCIAL

## 2019-10-02 VITALS
TEMPERATURE: 98 F | SYSTOLIC BLOOD PRESSURE: 115 MMHG | HEART RATE: 90 BPM | WEIGHT: 200 LBS | HEIGHT: 64 IN | OXYGEN SATURATION: 98 % | BODY MASS INDEX: 34.15 KG/M2 | DIASTOLIC BLOOD PRESSURE: 86 MMHG

## 2019-10-02 DIAGNOSIS — E78.5 HYPERLIPIDEMIA LDL GOAL <130: ICD-10-CM

## 2019-10-02 DIAGNOSIS — C34.90 SMALL CELL LUNG CANCER (H): ICD-10-CM

## 2019-10-02 DIAGNOSIS — Z00.00 ROUTINE GENERAL MEDICAL EXAMINATION AT A HEALTH CARE FACILITY: Primary | ICD-10-CM

## 2019-10-02 DIAGNOSIS — I10 BENIGN ESSENTIAL HYPERTENSION: ICD-10-CM

## 2019-10-02 DIAGNOSIS — D61.810 ANTINEOPLASTIC CHEMOTHERAPY INDUCED PANCYTOPENIA (H): ICD-10-CM

## 2019-10-02 DIAGNOSIS — E66.01 MORBID OBESITY (H): ICD-10-CM

## 2019-10-02 DIAGNOSIS — T45.1X5A ANTINEOPLASTIC CHEMOTHERAPY INDUCED PANCYTOPENIA (H): ICD-10-CM

## 2019-10-02 LAB
ERYTHROCYTE [DISTWIDTH] IN BLOOD BY AUTOMATED COUNT: 12.2 % (ref 10–15)
HCT VFR BLD AUTO: 36.9 % (ref 35–47)
HGB BLD-MCNC: 12.6 G/DL (ref 11.7–15.7)
MCH RBC QN AUTO: 34.4 PG (ref 26.5–33)
MCHC RBC AUTO-ENTMCNC: 34.1 G/DL (ref 31.5–36.5)
MCV RBC AUTO: 101 FL (ref 78–100)
PLATELET # BLD AUTO: 118 10E9/L (ref 150–450)
RBC # BLD AUTO: 3.66 10E12/L (ref 3.8–5.2)
WBC # BLD AUTO: 4.7 10E9/L (ref 4–11)

## 2019-10-02 PROCEDURE — 80061 LIPID PANEL: CPT | Performed by: INTERNAL MEDICINE

## 2019-10-02 PROCEDURE — 85027 COMPLETE CBC AUTOMATED: CPT | Performed by: INTERNAL MEDICINE

## 2019-10-02 PROCEDURE — 99207 C PAF COMPLETED  NO CHARGE: CPT | Mod: 25 | Performed by: INTERNAL MEDICINE

## 2019-10-02 PROCEDURE — 36415 COLL VENOUS BLD VENIPUNCTURE: CPT | Performed by: INTERNAL MEDICINE

## 2019-10-02 PROCEDURE — 99213 OFFICE O/P EST LOW 20 MIN: CPT | Mod: 25 | Performed by: INTERNAL MEDICINE

## 2019-10-02 PROCEDURE — 99397 PER PM REEVAL EST PAT 65+ YR: CPT | Performed by: INTERNAL MEDICINE

## 2019-10-02 PROCEDURE — 80053 COMPREHEN METABOLIC PANEL: CPT | Performed by: INTERNAL MEDICINE

## 2019-10-02 RX ORDER — ATORVASTATIN CALCIUM 10 MG/1
10 TABLET, FILM COATED ORAL DAILY
Qty: 90 TABLET | Refills: 3 | Status: SHIPPED | OUTPATIENT
Start: 2019-10-02 | End: 2020-09-21

## 2019-10-02 ASSESSMENT — ACTIVITIES OF DAILY LIVING (ADL): CURRENT_FUNCTION: NO ASSISTANCE NEEDED

## 2019-10-02 ASSESSMENT — MIFFLIN-ST. JEOR: SCORE: 1422.19

## 2019-10-02 NOTE — PATIENT INSTRUCTIONS
(Z00.00) Routine general medical examination at a health care facility  (primary encounter diagnosis)  Comment: For routine exam, we will draw labs as ordered, cholesterol, diabetes mellitus check, liver function, renal function We will also update vaccination history.  Plan: CBC with platelets, Lipid panel reflex to         direct LDL Fasting, Comprehensive metabolic         panel            (D61.810,  T45.1X5A) Antineoplastic chemotherapy induced pancytopenia (H)  Comment:   Plan: PAF COMPLETED            (E66.01) Morbid obesity (H)  Comment: Continue to monitor diet  Plan: PAF COMPLETED            (C34.90) Small cell lung cancer (H)  Comment: referral placed to oncology within [place] today  Plan: PAF COMPLETED, Oncology/Hematology Adult         Referral            (I10) Benign essential hypertension  Comment: blood pressure is well controlled today off of medications - noted that you are taking sodium liberally to prevent erythrostasis   Plan: PAF COMPLETED, CBC with platelets,         Comprehensive metabolic panel            (E78.5) Hyperlipidemia LDL goal <130  Comment:   Plan: atorvastatin (LIPITOR) 10 MG tablet

## 2019-10-02 NOTE — PROGRESS NOTES
"SUBJECTIVE:   Yvette Gastelum is a 68 year old female who presents for Preventive Visit.  click delete button to remove this line now  click delete button to remove this line now  Are you in the first 12 months of your Medicare coverage?  No    Healthy Habits:    In general, how would you rate your overall health?  Good    Frequency of exercise:  6-7 days/week    Duration of exercise:  30-45 minutes    Do you usually eat at least 4 servings of fruit and vegetables a day, include whole grains    & fiber and avoid regularly eating high fat or \"junk\" foods?  Yes    Taking medications regularly:  Yes    Barriers to taking medications:  Not applicable    Medication side effects:  Not applicable    Ability to successfully perform activities of daily living:  No assistance needed    Home Safety:  No safety concerns identified    Hearing Impairment:  No hearing concerns    In the past 6 months, have you been bothered by leaking of urine?  No    In general, how would you rate your overall mental or emotional health?  Good      PHQ-2 Total Score:    Additional concerns today:  No    Do you feel safe in your environment? Yes    Do you have a Health Care Directive? Yes: Patient states has Advance Directive and will bring in a copy to clinic.      Fall risk       Cognitive Screening   1) Repeat 3 items (Leader, Season, Table)    2) Clock draw: NORMAL  3) 3 item recall: Recalls 3 objects  Results: 3 items recalled: COGNITIVE IMPAIRMENT LESS LIKELY    Mini-CogTM Copyright ANIYA Downing. Licensed by the author for use in [facility]; reprinted with permission (darin@.[facility]). All rights reserved.      Do you have sleep apnea, excessive snoring or daytime drowsiness?: no    Reviewed and updated as needed this visit by clinical staff         Reviewed and updated as needed this visit by Provider        Social History     Tobacco Use     Smoking status: Former Smoker     Packs/day: 1.00     Years: 50.00     Pack years: 50.00     " Types: Cigarettes     Start date: 10/1965     Last attempt to quit: 2015     Years since quittin.0     Smokeless tobacco: Never Used   Substance Use Topics     Alcohol use: No     Alcohol/week: 0.0 standard drinks     If you drink alcohol do you typically have >3 drinks per day or >7 drinks per week? No    Alcohol Use 10/11/2017   Prescreen: >3 drinks/day or >7 drinks/week? The patient does not drink >3 drinks per day nor >7 drinks per week.           PROBLEMS TO ADD ON...    Current providers sharing in care for this patient include:   Patient Care Team:  Arei House MD as PCP - General (Internal Medicine)  Arie House MD as Assigned PCP    The following health maintenance items are reviewed in Epic and correct as of today:  Health Maintenance   Topic Date Due     COPD ACTION PLAN  1950     ZOSTER IMMUNIZATION (1 of 2) 10/26/2000     MAMMO SCREENING  01/15/2018     MEDICARE ANNUAL WELLNESS VISIT  10/11/2018     PHQ-9  2019     INFLUENZA VACCINE (1) 2019     CMP  2020     LIPID  2020     MICROALBUMIN  2020     JONATHAN ASSESSMENT  2020     FALL RISK ASSESSMENT  2020     BMP  2020     CBC  2020     ADVANCE CARE PLANNING  2023     DTAP/TDAP/TD IMMUNIZATION (2 - Td) 2025     COLONOSCOPY  02/10/2026     DEXA  Completed     SPIROMETRY  Completed     HEPATITIS C SCREENING  Completed     PNEUMOCOCCAL IMMUNIZATION 65+ HIGH/HIGHEST RISK  Completed     IPV IMMUNIZATION  Aged Out     MENINGITIS IMMUNIZATION  Aged Out        Antineoplastic chemotherapy induced pancytopenia (H)   She has had follow up in oncology and doing well, however she had a CT scan showing a lung nodule  Morbid obesity (H)   She is doing well with her weight.  Small cell lung cancer (H)   As above  Benign essential hypertension    Blood pressure is well excellent    Review of Systems  Constitutional, HEENT, cardiovascular, pulmonary - recent cough and cold,  "GI, , musculoskeletal, neuro, skin, endocrine and psych systems are negative, except as otherwise noted.    OBJECTIVE:   /86 (BP Location: Left arm, Patient Position: Chair, Cuff Size: Adult Large)   Pulse 90   Temp 98  F (36.7  C) (Oral)   Ht 1.626 m (5' 4\")   Wt 90.7 kg (200 lb)   SpO2 98%   Breastfeeding? No   BMI 34.33 kg/m   Estimated body mass index is 34.33 kg/m  as calculated from the following:    Height as of this encounter: 1.626 m (5' 4\").    Weight as of this encounter: 90.7 kg (200 lb).  Physical Exam  GENERAL: healthy, alert and no distress  EYES: Eyes grossly normal to inspection, PERRL and conjunctivae and sclerae normal  HENT: ear canals and TM's normal, nose and mouth without ulcers or lesions  NECK: no adenopathy, no asymmetry, masses, or scars and thyroid normal to palpation  RESP: lungs clear to auscultation - no rales, rhonchi or wheezes  CV: regular rate and rhythm, normal S1 S2, no S3 or S4, no murmur, click or rub, no peripheral edema and peripheral pulses strong  ABDOMEN: soft, nontender, no hepatosplenomegaly, no masses and bowel sounds normal  MS: no gross musculoskeletal defects noted, no edema  SKIN: no suspicious lesions or rashes  NEURO: Normal strength and tone, mentation intact and speech normal  PSYCH: mentation appears normal, affect normal/bright    Diagnostic Test Results:  Labs reviewed in Epic    ASSESSMENT / PLAN:     Patient Instructions   (Z00.00) Routine general medical examination at a health care facility  (primary encounter diagnosis)  Comment: For routine exam, we will draw labs as ordered, cholesterol, diabetes mellitus check, liver function, renal function We will also update vaccination history.  Plan: CBC with platelets, Lipid panel reflex to         direct LDL Fasting, Comprehensive metabolic         panel            (D61.810,  T45.1X5A) Antineoplastic chemotherapy induced pancytopenia (H)  Comment:   Plan: PAF COMPLETED            (E66.01) Morbid " "obesity (H)  Comment: Continue to monitor diet  Plan: PAF COMPLETED            (C34.90) Small cell lung cancer (H)  Comment: referral placed to oncology within [place] today  Plan: PAF COMPLETED, Oncology/Hematology Adult         Referral            (I10) Benign essential hypertension  Comment: blood pressure is well controlled today off of medications - noted that you are taking sodium liberally to prevent orthostasis   Plan: PAF COMPLETED, CBC with platelets,         Comprehensive metabolic panel            (E78.5) Hyperlipidemia LDL goal <130  Comment:   Plan: atorvastatin (LIPITOR) 10 MG tablet                    End of Life Planning:  Patient currently has an advanced directive: No.  I have verified the patient's ablity to prepare an advanced directive/make health care decisions.  Literature was provided to assist patient in preparing an advanced directive.    COUNSELING:  Reviewed preventive health counseling, as reflected in patient instructions    Estimated body mass index is 34.11 kg/m  as calculated from the following:    Height as of 7/31/19: 1.626 m (5' 4\").    Weight as of 7/31/19: 90.1 kg (198 lb 11.2 oz).         reports that she quit smoking about 4 years ago. Her smoking use included cigarettes. She started smoking about 54 years ago. She has a 50.00 pack-year smoking history. She has never used smokeless tobacco.      Appropriate preventive services were discussed with this patient, including applicable screening as appropriate for cardiovascular disease, diabetes, osteopenia/osteoporosis, and glaucoma.  As appropriate for age/gender, discussed screening for colorectal cancer, prostate cancer, breast cancer, and cervical cancer. Checklist reviewing preventive services available has been given to the patient.    Reviewed patients plan of care and provided an AVS. The Basic Care Plan (routine screening as documented in Health Maintenance) for Yvette meets the Care Plan requirement. This Care Plan " has been established and reviewed with the Patient.    Counseling Resources:  ATP IV Guidelines  Pooled Cohorts Equation Calculator  Breast Cancer Risk Calculator  FRAX Risk Assessment  ICSI Preventive Guidelines  Dietary Guidelines for Americans, 2010  USDA's MyPlate  ASA Prophylaxis  Lung CA Screening    Arie House MD, MD  [facility]    Identified Health Risks:

## 2019-10-02 NOTE — LETTER
[address] [address]       To whom it may concern,    Ms Yvette Gastelum's dog is an emotional support animal and her dog is essential for her mental health.    Sincerely,  Arie House MD, MD  10/2/2019

## 2019-10-03 ENCOUNTER — TELEPHONE (OUTPATIENT)
Dept: ONCOLOGY | Facility: CLINIC | Age: 69
End: 2019-10-03

## 2019-10-03 LAB
ALBUMIN SERPL-MCNC: 3.9 G/DL (ref 3.4–5)
ALP SERPL-CCNC: 86 U/L (ref 40–150)
ALT SERPL W P-5'-P-CCNC: 27 U/L (ref 0–50)
ANION GAP SERPL CALCULATED.3IONS-SCNC: 9 MMOL/L (ref 3–14)
AST SERPL W P-5'-P-CCNC: 45 U/L (ref 0–45)
BILIRUB SERPL-MCNC: 0.8 MG/DL (ref 0.2–1.3)
BUN SERPL-MCNC: 12 MG/DL (ref 7–30)
CALCIUM SERPL-MCNC: 9 MG/DL (ref 8.5–10.1)
CHLORIDE SERPL-SCNC: 108 MMOL/L (ref 94–109)
CHOLEST SERPL-MCNC: 186 MG/DL
CO2 SERPL-SCNC: 22 MMOL/L (ref 20–32)
CREAT SERPL-MCNC: 1.19 MG/DL (ref 0.52–1.04)
GFR SERPL CREATININE-BSD FRML MDRD: 47 ML/MIN/{1.73_M2}
GLUCOSE SERPL-MCNC: 86 MG/DL (ref 70–99)
HDLC SERPL-MCNC: 44 MG/DL
LDLC SERPL CALC-MCNC: 89 MG/DL
NONHDLC SERPL-MCNC: 142 MG/DL
POTASSIUM SERPL-SCNC: 4.2 MMOL/L (ref 3.4–5.3)
PROT SERPL-MCNC: 7.4 G/DL (ref 6.8–8.8)
SODIUM SERPL-SCNC: 139 MMOL/L (ref 133–144)
TRIGL SERPL-MCNC: 265 MG/DL

## 2019-10-03 NOTE — TELEPHONE ENCOUNTER
ONCOLOGY INTAKE: Records Information      APPT INFORMATION:  Referring provider:  Dr. Arie House  Referring provider s clinic:  [facility]  Reason for visit/diagnosis:  Small cell lung cancer (H) [C34.90]  Has patient been notified of appointment date and time?: NA    RECORDS INFORMATION:  Were the records received with the referral (via [employer])? Internal Referral    ADDITIONAL INFORMATION:  .lmvl

## 2019-10-04 NOTE — RESULT ENCOUNTER NOTE
Brady Crawford,    I had the opportunity to review your recent labs and a summary of your labs reads as follows:    Your complete blood counts show no sign of anemia, normal white blood cell count and stable low platelets.  Your comprehensive metabolic panel showed stable renal function, normal liver function, and normal fasting blood glucose indicating no evidence of diabetes mellitus.  Your fasting lipid panel show  - normal HDL (good) cholesterol -as your goal is greater than 40  - low LDL (bad) cholesterol as your goal is less than 130  -Stable triglyceride levels    Congratulaions on your excellent results       Sincerely,  Arie House MD

## 2019-10-04 NOTE — TELEPHONE ENCOUNTER
ONCOLOGY INTAKE: Records Information      APPT INFORMATION:  Referring provider:  Dr. Arie House  Referring provider s clinic:  [facility]  Reason for visit/diagnosis:  small cell lung cancer, transfer of care  Has patient been notified of appointment date and time?: yes    RECORDS INFORMATION:  Were the records received with the referral (via [employer])? No, internal referral    Has patient been seen for any external appt for this diagnosis? yes    If yes, where? [facility]     Has patient had any imaging or procedures outside of [place] for this condition? yes      If Yes, where? [facility]    ADDITIONAL INFORMATION:

## 2019-10-07 NOTE — TELEPHONE ENCOUNTER
RECORDS STATUS - ALL OTHER DIAGNOSIS      RECORDS RECEIVED FROM: [facility]/ [facility]    DATE RECEIVED: 10/23/2019    NOTES STATUS DETAILS   OFFICE NOTE from referring provider Complete  Dr. Arie House   OFFICE NOTE from medical oncologist Complete [facility] Records in [facility]   DISCHARGE SUMMARY from hospital Complete EPIC   DISCHARGE REPORT from the ER N/A    OPERATIVE REPORT Complete EPIC   MEDICATION LIST Complete [facility]   CLINICAL TRIAL TREATMENTS TO DATE     LABS     PATHOLOGY REPORTS Complete [facility]   ANYTHING RELATED TO DIAGNOSIS Complete EPIC   GENONOMIC TESTING     TYPE:     IMAGING (NEED IMAGES & REPORT)     CT SCANS Complete  [facility] in PACS    MRI     MAMMO     ULTRASOUND     PET Complete PACS     Action    Action Taken 10/7/2019 4:51pm     I left a  for pt Yvette requesting a call back.    10/8/2019 12:36pm   Yvette called back - she confirmed that her last visit to [facility] in August. All [facility] Recs are in [facility] already     I called [facility] to have all recent CT IMG pushed to PACS and reports faxed over.

## 2019-10-22 ENCOUNTER — PATIENT OUTREACH (OUTPATIENT)
Dept: ONCOLOGY | Facility: CLINIC | Age: 69
End: 2019-10-22

## 2019-10-22 NOTE — PROGRESS NOTES
Patient is transferring care from Dr. Villanueva of [facility]. All records have been scanned into Epic.    I called and spoke with the patient and introduced myself and my role. I provided a brief description of our location and appointment details.    Jeannette Moore RN

## 2019-10-23 ENCOUNTER — ONCOLOGY VISIT (OUTPATIENT)
Dept: ONCOLOGY | Facility: CLINIC | Age: 69
End: 2019-10-23
Attending: INTERNAL MEDICINE
Payer: COMMERCIAL

## 2019-10-23 ENCOUNTER — PRE VISIT (OUTPATIENT)
Dept: ONCOLOGY | Facility: CLINIC | Age: 69
End: 2019-10-23

## 2019-10-23 VITALS
SYSTOLIC BLOOD PRESSURE: 116 MMHG | HEIGHT: 64 IN | TEMPERATURE: 97.8 F | OXYGEN SATURATION: 92 % | WEIGHT: 205.2 LBS | RESPIRATION RATE: 16 BRPM | DIASTOLIC BLOOD PRESSURE: 83 MMHG | BODY MASS INDEX: 35.03 KG/M2 | HEART RATE: 87 BPM

## 2019-10-23 DIAGNOSIS — C34.31 SMALL CELL LUNG CANCER, RIGHT LOWER LOBE (H): Primary | ICD-10-CM

## 2019-10-23 DIAGNOSIS — C34.90 SMALL CELL LUNG CANCER (H): ICD-10-CM

## 2019-10-23 PROCEDURE — G0463 HOSPITAL OUTPT CLINIC VISIT: HCPCS

## 2019-10-23 PROCEDURE — 99214 OFFICE O/P EST MOD 30 MIN: CPT | Performed by: INTERNAL MEDICINE

## 2019-10-23 ASSESSMENT — MIFFLIN-ST. JEOR: SCORE: 1445.78

## 2019-10-23 ASSESSMENT — PAIN SCALES - GENERAL: PAINLEVEL: NO PAIN (0)

## 2019-10-23 NOTE — PROGRESS NOTES
"Oncology Rooming Note    October 23, 2019 8:21 AM   Yvette Gastelum is a 68 year old female who presents for:    Chief Complaint   Patient presents with     Oncology Clinic Visit     Initial Vitals: /83   Pulse 87   Temp 97.8  F (36.6  C) (Oral)   Resp 16   Ht 1.626 m (5' 4\")   Wt 93.1 kg (205 lb 3.2 oz)   SpO2 92%   BMI 35.22 kg/m   Estimated body mass index is 35.22 kg/m  as calculated from the following:    Height as of this encounter: 1.626 m (5' 4\").    Weight as of this encounter: 93.1 kg (205 lb 3.2 oz). Body surface area is 2.05 meters squared.  No Pain (0) Comment: Data Unavailable   No LMP recorded. Patient has had a hysterectomy.  Allergies reviewed: Yes  Medications reviewed: Yes    Medications: Medication refills not needed today.  Pharmacy name entered into [employer]: [facility] - [facility] [address]    Clinical concerns:  doctor was notified.      Kim Plata CMA            "

## 2019-10-23 NOTE — PROGRESS NOTES
Visit Date:   10/23/2019      This is a self-referral for lung cancer.      Ms. Gastelum is a 68-year-old female who was admitted to the hospital in 08/2018 with hemoptysis and shortness of breath.  She had multiple investigations done and was found to have small cell lung cancer.  She was seen by Dr. Villanueva and has been treated with chemotherapy and radiation.  Investigation and treatment are summarized below.   1.  CT chest angiogram on 08/21/2018 revealed right lower lobe pulmonary mass invading the right lower lobe pulmonary artery and mild mediastinal lymphadenopathy.   2.  CT abdomen and pelvis on 08/22/2018 did not reveal any evidence of metastatic disease.   3.  Brain MRI on 08/30/2019 did not reveal any intracranial metastasis.   4.  CT-guided biopsy of right lung mass was done on 08/31/2018.  Pathology revealed high-grade neuroendocrine carcinoma, favor small cell carcinoma.   5.  PET scan in 08/2019 did not reveal any evidence of metastatic disease.   6.  She had limited stage small cell lung cancer.  She received concurrent chemoradiation with 4 cycles of platinum and etoposide between 09/17/2018 and 12/10/2018. Patient received 5940 cGy in 33 fractions between 10/11/2018 and 11/28/2018.   7.  The patient received prophylactic cranial radiation between 01/29/2019 and 02/11/2019.   8.  CT chest on 08/21/2019 revealed a new 0.6 cm pulmonary nodule in right lower lobe suspicious for metastatic lesion.  There are 2 additional indeterminate nodular opacities in the right lower lobe posteriorly which are stable.  There is a mildly enlarged subcarinal lymph node which is stable.      REVIEW OF SYSTEMS:  The patient has some fatigue.  No headache.  No dizziness.  No visual problem.  No chest pain.  No shortness of breath at rest.  She gets mildly short of breath on exertion.  No abdominal pain, nausea or vomiting.  No urinary or bowel complaints.  No bleeding.  No fever, chills, or night sweats.  Appetite has  been good.      All other review of systems negative.      ALLERGIES:  NONE.      MEDICATIONS:  Reviewed.      PAST MEDICAL HISTORY:   1.  Limited stage small cell lung cancer as described above.   2.  Chronic kidney disease.   3.  Renal stone.   4.  CL and BSO.   5.  Hyperlipidemia.   6.  Hypertension.      SOCIAL HISTORY:   -She is a former smoker.  Quit about 4 years ago.   -No alcohol use for 30 years.      FAMILY HISTORY:   -Mother is .  She has cervical cancer.   -Father is .  He had lung cancer, but  of ruptured aneurysm.   -She had 2 brothers.  One  of lung cancer.  He was a smoker.      PHYSICAL EXAMINATION:   GENERAL:  Alert and oriented x 3.   VITAL SIGNS:  Reviewed.  ECOG PS of 1.     EYES:  No icterus.   THROAT:  No ulcer. No thrush.   NECK:  Supple. No lymphadenopathy. No thyromegaly.   AXILLAE:  No lymphadenopathy.   LUNGS:  Good air entry bilaterally.  No crackles or wheezing.   HEART:  Regular.  No murmur.   ABDOMEN:  Soft.  Nontender. No mass.   EXTREMITIES:  No pedal edema.  No calf swelling or tenderness.   SKIN:  No rash.      LABORATORY DATA:  Reviewed.      ASSESSMENT:   1.  A 68-year-old female with limited stage small cell lung cancer diagnosed in 2018.  She was treated with concurrent chemoradiation and prophylactic cranial radiation.  CT scan reveals a new nodule in the right lower lobe suspicious for recurrent lung cancer.   2.  Chronic kidney disease.   3.  Thrombocytopenia from chemoradiation.   4.  Other medical problems including hypertension and hyperlipidemia.      PLAN:   1.  I had a long discussion with the patient.  I reviewed all of her previous investigations.  The patient was diagnosed with small cell lung cancer and was treated with the standard of care concurrent chemoradiation and prophylactic radiation.  She did well. Most recent CT chest from 2019 reveals new lung nodule.  This is suspicious for recurrent disease.     2.  We discussed  regarding further workup.  I am going to get a PET scan.  Because of renal insufficiency, I am avoiding CT with contrast.      If PET scan reveals hypermetabolic mass/nodule in the lung, we will biopsy it.  If there is no hypermetabolic abnormality, then we will simply monitor her.  She is agreeable for this plan.     3.  I will see her after the PET scan and decide regarding further investigation/treatment.         INOCENTE SABA MD             D: 10/23/2019   T: 10/23/2019   MT: PRABHAKAR      Name:     BISHNU ELY   MRN:      [id number]        Account:      [id number]   :      1950           Visit Date:   10/23/2019      Document: [id number]

## 2019-10-23 NOTE — Clinical Note
"    10/23/2019         RE: Yvette Gastelum  [address]  [facility] [place] [address]        Dear Colleague,    Thank you for referring your patient, Yvette Gastelum, to the [facility]. Please see a copy of my visit note below.    Oncology Rooming Note    October 23, 2019 8:21 AM   Yvette Gastelum is a 68 year old female who presents for:    Chief Complaint   Patient presents with     Oncology Clinic Visit     Initial Vitals: /83   Pulse 87   Temp 97.8  F (36.6  C) (Oral)   Resp 16   Ht 1.626 m (5' 4\")   Wt 93.1 kg (205 lb 3.2 oz)   SpO2 92%   BMI 35.22 kg/m    Estimated body mass index is 35.22 kg/m  as calculated from the following:    Height as of this encounter: 1.626 m (5' 4\").    Weight as of this encounter: 93.1 kg (205 lb 3.2 oz). Body surface area is 2.05 meters squared.  No Pain (0) Comment: Data Unavailable   No LMP recorded. Patient has had a hysterectomy.  Allergies reviewed: Yes  Medications reviewed: Yes    Medications: Medication refills not needed today.  Pharmacy name entered into [employer]: [facility] - [facility] [address]    Clinical concerns:  doctor was notified.      Kim Plata, [facility]              Visit Date:   10/23/2019      This is a self-referral for lung cancer.      Ms. Gastelum is a 68-year-old female who was admitted to the hospital in 08/2018 with hemoptysis and shortness of breath.  She had multiple investigations done and was found to have small cell lung cancer.  She was seen by Dr. Villanueva and has been treated with chemotherapy and radiation.  Investigation and treatment are summarized below.   1.  CT chest angiogram on 08/21/2018 revealed right lower lobe pulmonary mass invading the right lower lobe pulmonary artery and mild mediastinal lymphadenopathy.   2.  CT abdomen and pelvis on 08/22/2018 did not reveal any evidence of metastatic disease.   3.  Brain MRI on 08/30/2019 did not reveal any intracranial " metastasis.   4.  CT-guided biopsy of right lung mass was done on 2018.  Pathology revealed high-grade neuroendocrine carcinoma, favoring small cell carcinoma.   5.  PET scan did not reveal any evidence of metastatic disease.   6.  She had limited stage small cell lung cancer.  She received concurrent chemoradiation.  Chemotherapy was with 4 cycles of platinum and etoposide between 2018 and 12/10/2018.  The patient received 5940 cGy in 33 fractions, which was completed on 2018.   7.  The patient received prophylactic cranial radiation between 2019 and 2019.   8.  CT chest on 2019 revealed a new 0.6 cm pulmonary nodule in right lower lobe suspicious for metastatic lesion.  There are 2 additional indeterminate nodular opacities in the right lower lobe posteriorly which are stable.  There is a mildly enlarged subcarinal lymph node which is stable.      REVIEW OF SYSTEMS:  The patient has some fatigue.  No headache.  No dizziness.  No visual problem.  No chest pain.  No shortness of breath at rest.  She gets mildly short of breath on exertion.  No abdominal pain, nausea or vomiting.  No urinary bowel complaints.  No bleeding.  No fever, chills, or night sweats.  Appetite has been good.      All other review of systems negative.      ALLERGIES:  NONE.      MEDICATIONS:  Reviewed.      PAST MEDICAL HISTORY:   1.  Limited stage small cell lung cancer as described above.   2.  Chronic kidney disease.   3.  Renal stone.   4.  CL and BSO.   5.  Hyperlipidemia.   6.  Hypertension.      SOCIAL HISTORY:   -- She is a former smoker.  Quit about 4 years ago.   -- No alcohol use for 30 years.      FAMILY HISTORY:   -- Mother is .  She has cervical cancer.   -- Father is .  He had lung cancer, but  of ruptured aneurysm.   -- She had 2 brothers.  One  of lung cancer.  He was a smoker.      PHYSICAL EXAMINATION:  Normal.  ECOG PS of 1.      LABORATORY DATA:  Reviewed.       ASSESSMENT:   1.  A 68-year-old female with limited stage small cell lung cancer diagnosed in 2018.  She was treated with concurrent chemoradiation and prophylactic cranial radiation.  CT scan reveals a new nodule in the right lower lobe suspicious for recurrent lung cancer.   2.  Chronic kidney disease.   3.  Thrombocytopenia from chemoradiation.   4.  Other medical problems including hypertension and hyperlipidemia.      PLAN:   1.  I had a long discussion with the patient.  I reviewed all of her previous investigations.  The patient was diagnosed with small cell lung cancer and was treated with the standard of care concurrent chemoradiation and prophylactic radiation.  She did well.      Most recent CT chest from 2019 reveals new lung nodule.  This is suspicious for recurrent disease.   2.  We discussed regarding further workup.  I am going to get a PET scan.  Because of renal insufficiency, I am avoiding CT with contrast.      If PET scan reveals hypermetabolic mass/nodule in the lung, we will biopsy it.  If there is no hypermetabolic abnormality, then we will simply monitor her.  She is agreeable for this plan.   3.  I will see him after the PET scan and decide regarding further investigation/treatment.         INOCENTE SABA MD             D: 10/23/2019   T: 10/23/2019   MT: PRABHAKAR      Name:     BISHNU LEY   MRN:      [id number]        Account:      [id number]   :      1950           Visit Date:   10/23/2019      Document: [id number]       Again, thank you for allowing me to participate in the care of your patient.        Sincerely,        Inocente Saba MD

## 2019-10-28 ENCOUNTER — HOSPITAL ENCOUNTER (OUTPATIENT)
Dept: PET IMAGING | Facility: CLINIC | Age: 69
Discharge: HOME | End: 2019-10-28
Attending: INTERNAL MEDICINE | Admitting: INTERNAL MEDICINE
Payer: COMMERCIAL

## 2019-10-28 DIAGNOSIS — C34.31 SMALL CELL LUNG CANCER, RIGHT LOWER LOBE (H): ICD-10-CM

## 2019-10-28 DIAGNOSIS — C34.90 SMALL CELL LUNG CANCER (H): ICD-10-CM

## 2019-10-28 PROCEDURE — A9552 F18 FDG: HCPCS | Performed by: INTERNAL MEDICINE

## 2019-10-28 PROCEDURE — 78816 PET IMAGE W/CT FULL BODY: CPT | Mod: PS

## 2019-10-28 PROCEDURE — 34300033 ZZH RX 343: Performed by: INTERNAL MEDICINE

## 2019-10-28 RX ADMIN — FLUDEOXYGLUCOSE F-18 11.84 MCI.: 500 INJECTION, SOLUTION INTRAVENOUS at 08:39

## 2019-10-31 DIAGNOSIS — C34.31 SMALL CELL LUNG CANCER, RIGHT LOWER LOBE (H): ICD-10-CM

## 2019-10-31 DIAGNOSIS — R91.8 MASS OF LOWER LOBE OF RIGHT LUNG: Primary | ICD-10-CM

## 2019-10-31 NOTE — PROGRESS NOTES
Patient had PET scan done on 10/28/2019.  I spoke to her.  PET scan was reviewed.  There is mildly hypermetabolic 0.8 cm right lower lobe nodule.  We will get a biopsy.  Patient agreeable for it.  I will see her after CT-guided biopsy.

## 2019-11-01 ENCOUNTER — PATIENT OUTREACH (OUTPATIENT)
Dept: ONCOLOGY | Facility: CLINIC | Age: 69
End: 2019-11-01

## 2019-11-01 ENCOUNTER — TELEPHONE (OUTPATIENT)
Dept: INTERVENTIONAL RADIOLOGY/VASCULAR | Facility: CLINIC | Age: 69
End: 2019-11-01

## 2019-11-01 NOTE — TELEPHONE ENCOUNTER
Interventional Radiology   Radiology at [facility] has been requested to perform a Right lower lung nodule biopsy from Dr Lzu.  Yvette Gastelum is a 69 year old woman with a history significant for limited stage small cell lung cancer diagnosed in 8/2018 s/p chemo radiation ended in 11/2018.  Follow up CT chest showed a 6 mm pulmonary nodule in the R LL which grew to 8 mm noted on last PET to be mildly hypermetabolic. A biopsy was requested.   Reviewed imaging and clinical information with Radiology staff Dr Bailey who did not approve the biopsy as the lesion was too small. Need a 1 cm or larger nodule to get accurate diagnostic results.     I spoke with Angela RN in oncology clinic who messaged Dr Luz and his RN Angio Teresa.      Thanks [facility] Interventional Radiology CNP ([phone]) (phone [phone])

## 2019-11-01 NOTE — PROGRESS NOTES
Received a phone call from [facility] in radiology stating that lung mass is too small to be biopsied. Message will be routed to Dr. Luz and PAYALN.

## 2019-11-05 ENCOUNTER — TELEPHONE (OUTPATIENT)
Dept: ONCOLOGY | Facility: CLINIC | Age: 69
End: 2019-11-05

## 2019-11-05 NOTE — PROGRESS NOTES
Below message noted and Dr. Luz is aware and requested that patient be scheduled to follow up to develop an updated plan of care.    I have spoke to the patient and reviewed with her that the lung mass is too small to biopsy and Dr. Luz wants to be seen to develop and updated plan of care.     Patient has been added on for Thursday 11/7 at 2:40pm.     Jeannette Moore RN

## 2019-11-05 NOTE — TELEPHONE ENCOUNTER
Patient had a PET scan on 10/28/2019 and I had informed her of the result.  Plan was to get a biopsy of right lower lobe nodule and see her back in the clinic after that.  Radiologist called me today saying that they will not be able to do the biopsy as this lesion is very small.    I called and spoke to the patient today.  Told her that biopsy cannot be done.  We have few different options.  One is to monitor it.  When it is bigger, we will get a biopsy.  Second is to have a wedge resection.  Third is to treat her with radiation.  I would personally favor either waiting or do radiation.  I am going to discuss the case with radiation oncologist.  After that, will call the patient again.  Patient agreeable for this plan.

## 2019-11-07 ENCOUNTER — ONCOLOGY VISIT (OUTPATIENT)
Dept: ONCOLOGY | Facility: CLINIC | Age: 69
End: 2019-11-07
Attending: INTERNAL MEDICINE
Payer: COMMERCIAL

## 2019-11-07 ENCOUNTER — TRANSFERRED RECORDS (OUTPATIENT)
Dept: HEALTH INFORMATION MANAGEMENT | Facility: CLINIC | Age: 69
End: 2019-11-07

## 2019-11-07 VITALS
RESPIRATION RATE: 16 BRPM | HEIGHT: 64 IN | OXYGEN SATURATION: 98 % | SYSTOLIC BLOOD PRESSURE: 118 MMHG | WEIGHT: 208 LBS | HEART RATE: 97 BPM | DIASTOLIC BLOOD PRESSURE: 81 MMHG | BODY MASS INDEX: 35.51 KG/M2

## 2019-11-07 DIAGNOSIS — C34.31 SMALL CELL LUNG CANCER, RIGHT LOWER LOBE (H): Primary | ICD-10-CM

## 2019-11-07 PROCEDURE — 99213 OFFICE O/P EST LOW 20 MIN: CPT | Performed by: INTERNAL MEDICINE

## 2019-11-07 PROCEDURE — G0463 HOSPITAL OUTPT CLINIC VISIT: HCPCS

## 2019-11-07 ASSESSMENT — MIFFLIN-ST. JEOR: SCORE: 1453.48

## 2019-11-07 ASSESSMENT — PAIN SCALES - GENERAL: PAINLEVEL: NO PAIN (0)

## 2019-11-07 NOTE — PATIENT INSTRUCTIONS
1. Radiation oncology appointment. She is going there today.  2. See me in 3 months with CT scan.    Patient prefers a call closer to appt date

## 2019-11-07 NOTE — Clinical Note
"    11/7/2019         RE: Yvette Gastelum  [address]  [facility] [address]        Dear Colleague,    Thank you for referring your patient, Yvette Gastelum, to the [facility]. Please see a copy of my visit note below.    Oncology Rooming Note    November 7, 2019 2:44 PM   Yvette Gastelum is a 69 year old female who presents for:    Chief Complaint   Patient presents with     Oncology Clinic Visit     Initial Vitals: /81   Pulse 97   Resp 16   Ht 1.626 m (5' 4\")   Wt 94.3 kg (208 lb)   SpO2 98%   BMI 35.70 kg/m    Estimated body mass index is 35.7 kg/m  as calculated from the following:    Height as of this encounter: 1.626 m (5' 4\").    Weight as of this encounter: 94.3 kg (208 lb). Body surface area is 2.06 meters squared.  No Pain (0) Comment: Data Unavailable   No LMP recorded. Patient has had a hysterectomy.  Allergies reviewed: Yes  Medications reviewed: Yes    Medications: Medication refills not needed today.  Pharmacy name entered into [employer]: [facility] - [facility] [address]    Clinical concerns: no      Arlin Elena, [facility]              Visit Date:   11/07/2019      SUBJECTIVE:  Ms. Gastelum is a 69-year-old female with a small cell lung cancer.  Recent CT scan had revealed right lower lobe lung nodule.  A PET scan was done on 10/28/2019.  It revealed 0.8 cm right lower lobe nodule which is hypermetabolic and suspicious for malignancy.  No other areas of abnormal uptake.      Overall, her condition is stable.  She has no chest-related symptoms.  No chest pain or shortness of breath or cough.      ASSESSMENT:   1.  A 69-year-old female with limited stage small cell lung cancer, status post chemotherapy, chest radiation and prophylactic cranial radiation.  PET scan reveals a right lower lobe nodule which is suspicious for recurrent small cell lung cancer.   2.  Other medical problems including hypertension, hyperlipidemia and chronic " kidney disease.      PLAN:   1.  PET scan was reviewed with the patient.  I explained to her there is a tiny lung nodule which is suspicious for recurrent small cell lung cancer.  That was the only abnormal area.  No other suspicion of malignancy.      I had recommended biopsy.  Interventional radiologist did not recommend doing the biopsy as the nodule is small and they will not be able to get the sample.   2.  Discussed regarding treatment of this suspicious lung nodule.  Different options were discussed.  One is observation with followup CT chest in 6-8 weeks.  Second is a surgical resection.  Third is SBRT.  These were all discussed.  The patient does not want to be observed.  She is worried about progressive disease.      After discussion, patient will proceed with radiation.  I discussed the case with Dr. Barker.  He will see the patient today for SBRT.   3.  I will see her in 3 months' time with CT scan.  Advised her to call us with any questions or concerns.         INOCENTE SABA MD             D: 2019   T: 2019   MT: WT      Name:     BISHNU LEY   MRN:      [id number]        Account:      [id number]   :      1950           Visit Date:   2019      Document: [id number]       Again, thank you for allowing me to participate in the care of your patient.        Sincerely,        Inocente Saba MD

## 2019-11-07 NOTE — PROGRESS NOTES
"Oncology Rooming Note    November 7, 2019 2:44 PM   Yvette Gastelum is a 69 year old female who presents for:    Chief Complaint   Patient presents with     Oncology Clinic Visit     Initial Vitals: /81   Pulse 97   Resp 16   Ht 1.626 m (5' 4\")   Wt 94.3 kg (208 lb)   SpO2 98%   BMI 35.70 kg/m   Estimated body mass index is 35.7 kg/m  as calculated from the following:    Height as of this encounter: 1.626 m (5' 4\").    Weight as of this encounter: 94.3 kg (208 lb). Body surface area is 2.06 meters squared.  No Pain (0) Comment: Data Unavailable   No LMP recorded. Patient has had a hysterectomy.  Allergies reviewed: Yes  Medications reviewed: Yes    Medications: Medication refills not needed today.  Pharmacy name entered into [employer]: [facility] - [facility] [address]    Clinical concerns: no      Arlin Elena CMA            "

## 2019-11-08 NOTE — PROGRESS NOTES
Visit Date:   11/07/2019     ONCOLOGY HISTORY: Ms. Gastelum is a female with small cell lung cancer.    1.  CT chest angiogram on 08/21/2018 for hemoptysis revealed right lower lobe pulmonary mass invading the right lower lobe pulmonary artery and mild mediastinal lymphadenopathy.   -CT abdomen and pelvis on 08/22/2018 did not reveal any evidence of metastatic disease.   -Brain MRI on 08/30/2019 did not reveal any intracranial metastasis.   -CT-guided biopsy of right lung mass on 08/31/2018 revealed high-grade neuroendocrine carcinoma, favor small cell carcinoma.   -PET scan in 08/2019 did not reveal any evidence of metastatic disease.     2.  She had limited stage small cell lung cancer.  She received concurrent chemoradiation with 4 cycles of platinum and etoposide between 09/17/2018 and 12/10/2018. Patient received 5940 cGy in 33 fractions between 10/11/2018 and 11/28/2018.   -Prophylactic cranial radiation between 01/29/2019 and 02/11/2019.     3.  CT chest on 08/21/2019 revealed a new 0.6 cm pulmonary nodule in right lower lobe suspicious for metastatic lesion.  There are 2 additional indeterminate nodular opacities in the right lower lobe posteriorly which are stable.  There is a mildly enlarged subcarinal lymph node which is stable.   -PET scan on 10/28/2019 revealed 0.8 cm right lower lobe nodule which is hypermetabolic and suspicious for malignancy.  No other areas of abnormal uptake.      SUBJECTIVE:  Ms. Gastelum is a 69-year-old female with small cell lung cancer.  Recent CT scan had revealed right lower lobe lung nodule.  A PET scan was done on 10/28/2019.  It revealed 0.8 cm right lower lobe nodule which is hypermetabolic and suspicious for malignancy.  No other areas of abnormal uptake.      Overall, her condition is stable.  She has no chest-related symptoms.  No chest pain or shortness of breath or cough.      ASSESSMENT:   1.  A 69-year-old female with limited stage small cell lung cancer status post  chemotherapy, chest radiation and prophylactic cranial radiation.  PET scan reveals a right lower lobe nodule which is suspicious for recurrent small cell lung cancer.   2.  Other medical problems including hypertension, hyperlipidemia and chronic kidney disease.      PLAN:   1.  PET scan was reviewed with the patient.  I explained to her that there is a tiny lung nodule which is suspicious for recurrent small cell lung cancer.  That was the only abnormal area.  No other suspicion of malignancy.      I had recommended biopsy.  Interventional radiologist reviewed the films. They did not recommend doing the biopsy as the nodule is small and they will not be able to get the sample.     2.  Discussed regarding treatment of this suspicious lung nodule.  Different options were discussed.  One is observation with followup CT chest in 6-8 weeks.  Second is a surgical resection.  Third is SBRT.  These were all discussed.  The patient does not want to be observed.  She is worried about progressive disease.      After discussion, patient will proceed with radiation.  I discussed the case with Dr. Barker.  He will see the patient today for SBRT.     3.  I will see her in 3 months' time with CT scan.  Advised her to call us with any questions or concerns.         INOCENTE SABA MD             D: 2019   T: 2019   MT: OSCAR      Name:     BISHNU LEY   MRN:      [id number]        Account:      [id number]   :      1950           Visit Date:   2019      Document: [id number]

## 2019-11-27 ENCOUNTER — TRANSFERRED RECORDS (OUTPATIENT)
Dept: HEALTH INFORMATION MANAGEMENT | Facility: CLINIC | Age: 69
End: 2019-11-27

## 2019-12-26 ENCOUNTER — PATIENT OUTREACH (OUTPATIENT)
Dept: ONCOLOGY | Facility: CLINIC | Age: 69
End: 2019-12-26

## 2019-12-26 DIAGNOSIS — R05.9 COUGH: Primary | ICD-10-CM

## 2019-12-26 RX ORDER — BENZONATATE 100 MG/1
100 CAPSULE ORAL 3 TIMES DAILY PRN
Qty: 90 CAPSULE | Refills: 1 | Status: SHIPPED | OUTPATIENT
Start: 2019-12-26 | End: 2020-06-22

## 2020-01-01 ENCOUNTER — HEALTH MAINTENANCE LETTER (OUTPATIENT)
Age: 70
End: 2020-01-01

## 2020-01-01 ENCOUNTER — VIRTUAL VISIT (OUTPATIENT)
Dept: ONCOLOGY | Facility: CLINIC | Age: 70
End: 2020-01-01
Attending: INTERNAL MEDICINE
Payer: COMMERCIAL

## 2020-01-01 ENCOUNTER — HOSPITAL ENCOUNTER (OUTPATIENT)
Dept: LAB | Facility: CLINIC | Age: 70
End: 2020-12-17
Attending: INTERNAL MEDICINE
Payer: COMMERCIAL

## 2020-01-01 ENCOUNTER — HOSPITAL ENCOUNTER (OUTPATIENT)
Dept: CT IMAGING | Facility: CLINIC | Age: 70
End: 2020-12-17
Attending: INTERNAL MEDICINE
Payer: COMMERCIAL

## 2020-01-01 DIAGNOSIS — D69.6 THROMBOCYTOPENIA (H): ICD-10-CM

## 2020-01-01 DIAGNOSIS — C34.31 SMALL CELL LUNG CANCER, RIGHT LOWER LOBE (H): ICD-10-CM

## 2020-01-01 DIAGNOSIS — C34.31 SMALL CELL LUNG CANCER, RIGHT LOWER LOBE (H): Primary | ICD-10-CM

## 2020-01-01 DIAGNOSIS — D64.9 ANEMIA, UNSPECIFIED TYPE: ICD-10-CM

## 2020-01-01 LAB
ALBUMIN SERPL-MCNC: 3.8 G/DL (ref 3.4–5)
ALP SERPL-CCNC: 64 U/L (ref 40–150)
ALT SERPL W P-5'-P-CCNC: 20 U/L (ref 0–50)
ANION GAP SERPL CALCULATED.3IONS-SCNC: 8 MMOL/L (ref 3–14)
AST SERPL W P-5'-P-CCNC: 35 U/L (ref 0–45)
BILIRUB SERPL-MCNC: 1 MG/DL (ref 0.2–1.3)
BUN SERPL-MCNC: 23 MG/DL (ref 7–30)
CALCIUM SERPL-MCNC: 9.3 MG/DL (ref 8.5–10.1)
CHLORIDE SERPL-SCNC: 112 MMOL/L (ref 94–109)
CO2 SERPL-SCNC: 22 MMOL/L (ref 20–32)
CREAT BLD-MCNC: 1.3 MG/DL (ref 0.52–1.04)
CREAT SERPL-MCNC: 1.16 MG/DL (ref 0.52–1.04)
ERYTHROCYTE [DISTWIDTH] IN BLOOD BY AUTOMATED COUNT: 27.7 % (ref 10–15)
GFR SERPL CREATININE-BSD FRML MDRD: 40 ML/MIN/{1.73_M2}
GFR SERPL CREATININE-BSD FRML MDRD: 48 ML/MIN/{1.73_M2}
GLUCOSE SERPL-MCNC: 92 MG/DL (ref 70–99)
HCT VFR BLD AUTO: 31.5 % (ref 35–47)
HGB BLD-MCNC: 9.7 G/DL (ref 11.7–15.7)
MCH RBC QN AUTO: 27 PG (ref 26.5–33)
MCHC RBC AUTO-ENTMCNC: 30.8 G/DL (ref 31.5–36.5)
MCV RBC AUTO: 88 FL (ref 78–100)
PLATELET # BLD AUTO: 59 10E9/L (ref 150–450)
POTASSIUM SERPL-SCNC: 4.3 MMOL/L (ref 3.4–5.3)
PROT SERPL-MCNC: 7.4 G/DL (ref 6.8–8.8)
RBC # BLD AUTO: 3.59 10E12/L (ref 3.8–5.2)
SODIUM SERPL-SCNC: 142 MMOL/L (ref 133–144)
WBC # BLD AUTO: 4 10E9/L (ref 4–11)

## 2020-01-01 PROCEDURE — 999N001193 HC VIDEO/TELEPHONE VISIT; NO CHARGE

## 2020-01-01 PROCEDURE — 74177 CT ABD & PELVIS W/CONTRAST: CPT

## 2020-01-01 PROCEDURE — 250N000009 HC RX 250: Performed by: INTERNAL MEDICINE

## 2020-01-01 PROCEDURE — 80053 COMPREHEN METABOLIC PANEL: CPT | Performed by: INTERNAL MEDICINE

## 2020-01-01 PROCEDURE — 250N000011 HC RX IP 250 OP 636: Performed by: INTERNAL MEDICINE

## 2020-01-01 PROCEDURE — 85027 COMPLETE CBC AUTOMATED: CPT | Performed by: INTERNAL MEDICINE

## 2020-01-01 PROCEDURE — 82565 ASSAY OF CREATININE: CPT | Mod: 91

## 2020-01-01 PROCEDURE — 99214 OFFICE O/P EST MOD 30 MIN: CPT | Mod: 95 | Performed by: INTERNAL MEDICINE

## 2020-01-01 RX ORDER — IOPAMIDOL 755 MG/ML
104 INJECTION, SOLUTION INTRAVASCULAR ONCE
Status: COMPLETED | OUTPATIENT
Start: 2020-01-01 | End: 2020-01-01

## 2020-01-01 RX ADMIN — IOPAMIDOL 104 ML: 755 INJECTION, SOLUTION INTRAVENOUS at 10:40

## 2020-01-01 RX ADMIN — SODIUM CHLORIDE 70 ML: 9 INJECTION, SOLUTION INTRAVENOUS at 10:40

## 2020-01-30 ENCOUNTER — HOSPITAL ENCOUNTER (OUTPATIENT)
Dept: CT IMAGING | Facility: CLINIC | Age: 70
Discharge: HOME | End: 2020-01-30
Attending: INTERNAL MEDICINE | Admitting: INTERNAL MEDICINE
Payer: COMMERCIAL

## 2020-01-30 DIAGNOSIS — C34.31 SMALL CELL LUNG CANCER, RIGHT LOWER LOBE (H): ICD-10-CM

## 2020-01-30 LAB
CREAT BLD-MCNC: 1.4 MG/DL (ref 0.52–1.04)
GFR SERPL CREATININE-BSD FRML MDRD: 37 ML/MIN/{1.73_M2}

## 2020-01-30 PROCEDURE — 74177 CT ABD & PELVIS W/CONTRAST: CPT

## 2020-01-30 PROCEDURE — 25000128 H RX IP 250 OP 636: Performed by: INTERNAL MEDICINE

## 2020-01-30 PROCEDURE — 82565 ASSAY OF CREATININE: CPT

## 2020-01-30 PROCEDURE — 25000125 ZZHC RX 250: Performed by: INTERNAL MEDICINE

## 2020-01-30 RX ORDER — IOPAMIDOL 755 MG/ML
102 INJECTION, SOLUTION INTRAVASCULAR ONCE
Status: COMPLETED | OUTPATIENT
Start: 2020-01-30 | End: 2020-01-30

## 2020-01-30 RX ADMIN — IOPAMIDOL 102 ML: 755 INJECTION, SOLUTION INTRAVENOUS at 10:23

## 2020-01-30 RX ADMIN — SODIUM CHLORIDE 69 ML: 9 INJECTION, SOLUTION INTRAVENOUS at 10:23

## 2020-01-30 NOTE — RESULT ENCOUNTER NOTE
Dear Ms. Gastelum,    Your CT scan is same as before.  No suspicion of cancer.  We will discuss more during appointment.    Please, call me with any questions.    Jennifer Luz MD

## 2020-02-04 ENCOUNTER — ONCOLOGY VISIT (OUTPATIENT)
Dept: ONCOLOGY | Facility: CLINIC | Age: 70
End: 2020-02-04
Attending: INTERNAL MEDICINE
Payer: COMMERCIAL

## 2020-02-04 VITALS
WEIGHT: 215.2 LBS | HEIGHT: 64 IN | BODY MASS INDEX: 36.74 KG/M2 | DIASTOLIC BLOOD PRESSURE: 68 MMHG | OXYGEN SATURATION: 95 % | TEMPERATURE: 97.7 F | HEART RATE: 98 BPM | SYSTOLIC BLOOD PRESSURE: 92 MMHG | RESPIRATION RATE: 16 BRPM

## 2020-02-04 DIAGNOSIS — C34.31 SMALL CELL LUNG CANCER, RIGHT LOWER LOBE (H): Primary | ICD-10-CM

## 2020-02-04 PROCEDURE — G0463 HOSPITAL OUTPT CLINIC VISIT: HCPCS

## 2020-02-04 PROCEDURE — 99213 OFFICE O/P EST LOW 20 MIN: CPT | Performed by: INTERNAL MEDICINE

## 2020-02-04 ASSESSMENT — PAIN SCALES - GENERAL: PAINLEVEL: NO PAIN (0)

## 2020-02-04 ASSESSMENT — MIFFLIN-ST. JEOR: SCORE: 1486.14

## 2020-02-04 NOTE — PROGRESS NOTES
"Oncology Rooming Note    February 4, 2020 2:05 PM   Yvette Gastelum is a 69 year old female who presents for:    Chief Complaint   Patient presents with     Oncology Clinic Visit     Lung Cancer     Initial Vitals: BP 92/68   Pulse 98   Temp 97.7  F (36.5  C) (Oral)   Resp 16   Ht 1.626 m (5' 4\")   Wt 97.6 kg (215 lb 3.2 oz)   SpO2 95%   BMI 36.94 kg/m   Estimated body mass index is 36.94 kg/m  as calculated from the following:    Height as of this encounter: 1.626 m (5' 4\").    Weight as of this encounter: 97.6 kg (215 lb 3.2 oz). Body surface area is 2.1 meters squared.  No Pain (0) Comment: Data Unavailable   No LMP recorded. Patient has had a hysterectomy.  Allergies reviewed: Yes  Medications reviewed: Yes    Medications: Medication refills not needed today.  Pharmacy name entered into [employer]: [facility] - [facility] [address]    Clinical concerns:  doctor was notified.      Kim Plata CMA            "

## 2020-02-04 NOTE — Clinical Note
"    2/4/2020         RE: Yvette Gastelum  [address]  [facility] [address]        Dear Colleague,    Thank you for referring your patient, Yvette Gastelum, to the [facility]. Please see a copy of my visit note below.    Oncology Rooming Note    February 4, 2020 2:05 PM   Yvette Gastelum is a 69 year old female who presents for:    Chief Complaint   Patient presents with     Oncology Clinic Visit     Lung Cancer     Initial Vitals: BP 92/68   Pulse 98   Temp 97.7  F (36.5  C) (Oral)   Resp 16   Ht 1.626 m (5' 4\")   Wt 97.6 kg (215 lb 3.2 oz)   SpO2 95%   BMI 36.94 kg/m    Estimated body mass index is 36.94 kg/m  as calculated from the following:    Height as of this encounter: 1.626 m (5' 4\").    Weight as of this encounter: 97.6 kg (215 lb 3.2 oz). Body surface area is 2.1 meters squared.  No Pain (0) Comment: Data Unavailable   No LMP recorded. Patient has had a hysterectomy.  Allergies reviewed: Yes  Medications reviewed: Yes    Medications: Medication refills not needed today.  Pharmacy name entered into [employer]: [facility] - [facility] [id number]    Clinical concerns:  doctor was notified.      Kim Plata, [facility]              Visit Date:   02/04/2020      SUBJECTIVE:  Ms. Gastelum is a 69-year-old female with limited stage small cell lung cancer treated with chemoradiation and prophylactic cranial radiation.  She has been doing well.      Last PET scan in 10/2019 had revealed a right lower lobe nodule.  For followup, the scans were recommended.      CT chest, abdomen and pelvis done on 01/30/2020.  The right lower lobe nodule has resolved.  There is stable borderline-size subcarinal lymph node.  It did not have any PET activity before.      The patient is doing well.  Mild fatigue.  No headache.  No dizziness, no chest pain.  No shortness of breath.  No abdominal pain, nausea or vomiting.  No bleeding.  No fever, chills or night sweats.  Appetite " good.      PHYSICAL EXAMINATION:   GENERAL:  The patient was  alert, oriented x 3.   VITAL SIGNS:  Reviewed.  ECOG PS of 1.   The rest of the systems not examined.      ASSESSMENT:   1.  A 69-year-old female with limited stage small cell lung cancer.  No evidence of recurrence.   2.  Other medical problems including hypertension and chronic kidney disease.      PLAN:   1.  CT scan was reviewed with the patient.  I explained to her there is no evidence of any malignancy.  She was happy to know that.  Right lower lobe lung nodule has resolved.  Subcarinal lymph node is borderline and stable.  At this time, nothing to suggest any malignancy.   2.  We will continue to follow her.  CT chest will be done in 4 months.  I will see her back in the clinic after that.  She is agreeable for it.   3.  The patient advised to see a physician sooner if she has chest pain, shortness of breath, coughing of blood, worsening weakness or any other concerns.         INOCENTE SABA MD             D: 2020   T: 2020   MT: FOREIGN      Name:     BISHNU LEY   MRN:      [id number]        Account:      [id number]   :      1950           Visit Date:   2020      Document: [id number]       Again, thank you for allowing me to participate in the care of your patient.        Sincerely,        Inocente Saba MD

## 2020-02-05 NOTE — PROGRESS NOTES
Visit Date:   02/04/2020     ONCOLOGY HISTORY: Ms. Gastelum is a female with small cell lung cancer.    1.  CT chest angiogram on 08/21/2018 for hemoptysis revealed right lower lobe pulmonary mass invading the right lower lobe pulmonary artery and mild mediastinal lymphadenopathy.   -CT abdomen and pelvis on 08/22/2018 did not reveal any evidence of metastatic disease.   -Brain MRI on 08/30/2019 did not reveal any intracranial metastasis.   -CT-guided biopsy of right lung mass on 08/31/2018 revealed high-grade neuroendocrine carcinoma, favor small cell carcinoma.   -PET scan in 08/2019 did not reveal any evidence of metastatic disease.      2.  She had limited stage small cell lung cancer.  She received concurrent chemoradiation with 4 cycles of platinum and etoposide between 09/17/2018 and 12/10/2018. Patient received 5940 cGy in 33 fractions between 10/11/2018 and 11/28/2018.   -Prophylactic cranial radiation between 01/29/2019 and 02/11/2019.      3.  CT chest on 08/21/2019 revealed a new 0.6 cm pulmonary nodule in right lower lobe suspicious for metastatic lesion.  There are 2 additional indeterminate nodular opacities in the right lower lobe posteriorly which are stable.  There is a mildly enlarged subcarinal lymph node which is stable.   -PET scan on 10/28/2019 revealed 0.8 cm right lower lobe nodule which is hypermetabolic and suspicious for malignancy.  No other areas of abnormal uptake.      SUBJECTIVE:  Ms. Gastelum is a 69-year-old female with limited stage small cell lung cancer treated with chemoradiation and prophylactic cranial radiation.  She has been doing well.      Last PET scan in 10/2019 had revealed a right lower lobe nodule.  For followup, the scans were recommended.      CT chest, abdomen and pelvis done on 01/30/2020.  The right lower lobe nodule has resolved.  There is stable borderline-size subcarinal lymph node.  It did not have any PET activity before.      The patient is doing well.   Mild fatigue.  No headache.  No dizziness. No chest pain.  No shortness of breath.  No abdominal pain, nausea or vomiting.  No bleeding.  No fever, chills or night sweats.  Appetite is good.      PHYSICAL EXAMINATION:   GENERAL:  The patient was  alert, oriented x 3.   VITAL SIGNS:  Reviewed.  ECOG PS of 1.   The rest of the systems not examined.      ASSESSMENT:   1.  A 69-year-old female with limited stage small cell lung cancer.  No evidence of recurrence.   2.  Other medical problems including hypertension and chronic kidney disease.      PLAN:   1.  CT scan was reviewed with the patient.  I explained to her there is no evidence of any malignancy.  She was happy to know that.  Right lower lobe lung nodule has resolved.  Subcarinal lymph node is borderline and stable.  At this time, nothing to suggest any malignancy.   2.  We will continue to follow her.  CT chest will be done in 4 months.  I will see her back in the clinic after that.  She is agreeable for it.   3.  The patient advised to see a physician sooner if she has chest pain, shortness of breath, coughing of blood, worsening weakness or any other concerns.         INOCENTE SABA MD             D: 2020   T: 2020   MT: FOREIGN      Name:     BISHNU LEY   MRN:      [id number]        Account:      [id number]   :      1950           Visit Date:   2020      Document: [id number]

## 2020-02-16 NOTE — PROGRESS NOTES
Visit Date:   02/04/2020     ONCOLOGY HISTORY: Ms. Gastelum is a female with small cell lung cancer.    1.  CT chest angiogram on 08/21/2018 for hemoptysis revealed right lower lobe pulmonary mass invading the right lower lobe pulmonary artery and mild mediastinal lymphadenopathy.   -CT abdomen and pelvis on 08/22/2018 did not reveal any evidence of metastatic disease.   -Brain MRI on 08/30/2019 did not reveal any intracranial metastasis.   -CT-guided biopsy of right lung mass on 08/31/2018 revealed high-grade neuroendocrine carcinoma, favor small cell carcinoma.   -PET scan in 08/2019 did not reveal any evidence of metastatic disease.      2.  She had limited stage small cell lung cancer.  She received concurrent chemoradiation with 4 cycles of platinum and etoposide between 09/17/2018 and 12/10/2018. Patient received 5940 cGy in 33 fractions between 10/11/2018 and 11/28/2018.   -Prophylactic cranial radiation between 01/29/2019 and 02/11/2019.      3.  CT chest on 08/21/2019 revealed a new 0.6 cm pulmonary nodule in right lower lobe suspicious for metastatic lesion.  There are 2 additional indeterminate nodular opacities in the right lower lobe posteriorly which are stable.  There is a mildly enlarged subcarinal lymph node which is stable.   -PET scan on 10/28/2019 revealed 0.8 cm right lower lobe nodule which is hypermetabolic and suspicious for malignancy.  No other areas of abnormal uptake.      SUBJECTIVE:  Ms. Gastelum is a 69-year-old female with limited stage small cell lung cancer treated with chemoradiation and prophylactic cranial radiation.  She has been doing well.      Last PET scan in 10/2019 had revealed a right lower lobe nodule.  For followup, the scans were recommended.      CT chest, abdomen and pelvis done on 01/30/2020.  The right lower lobe nodule has resolved.  There is stable borderline-size subcarinal lymph node.  It did not have any PET activity before.      The patient is doing well.  Mild  fatigue.  No headache.  No dizziness. No chest pain.  No shortness of breath.  No abdominal pain, nausea or vomiting.  No bleeding.  No fever, chills or night sweats.  Appetite is good.      PHYSICAL EXAMINATION:   GENERAL:  The patient was  alert, oriented x 3.   VITAL SIGNS:  Reviewed.  ECOG PS of 1.   The rest of the systems not examined.      ASSESSMENT:   1.  A 69-year-old female with limited stage small cell lung cancer.  No evidence of recurrence.   2.  Other medical problems including hypertension and chronic kidney disease.      PLAN:   1.  CT scan was reviewed with the patient.  I explained to her there is no evidence of any malignancy.  She was happy to know that.  Right lower lobe lung nodule has resolved.  Subcarinal lymph node is borderline and stable.  At this time, nothing to suggest any malignancy.   2.  We will continue to follow her.  CT chest will be done in 4 months.  I will see her back in the clinic after that.  She is agreeable for it.   3.  The patient advised to see a physician sooner if she has chest pain, shortness of breath, coughing of blood, worsening weakness or any other concerns.         INOCENTE SABA MD             D: 2020   T: 2020   MT: FOREIGN      Name:     BISHNU LEY   MRN:      [id number]        Account:      [id number]   :      1950           Visit Date:   2020      Document: [id number]

## 2020-03-10 ENCOUNTER — HEALTH MAINTENANCE LETTER (OUTPATIENT)
Age: 70
End: 2020-03-10

## 2020-06-18 ENCOUNTER — HOSPITAL ENCOUNTER (OUTPATIENT)
Dept: CT IMAGING | Facility: CLINIC | Age: 70
Discharge: HOME | End: 2020-06-18
Attending: INTERNAL MEDICINE | Admitting: INTERNAL MEDICINE
Payer: COMMERCIAL

## 2020-06-18 DIAGNOSIS — C34.31 SMALL CELL LUNG CANCER, RIGHT LOWER LOBE (H): ICD-10-CM

## 2020-06-18 LAB
CREAT BLD-MCNC: 1.5 MG/DL (ref 0.52–1.04)
GFR SERPL CREATININE-BSD FRML MDRD: 34 ML/MIN/{1.73_M2}

## 2020-06-18 PROCEDURE — 25000125 ZZHC RX 250: Performed by: INTERNAL MEDICINE

## 2020-06-18 PROCEDURE — 82565 ASSAY OF CREATININE: CPT

## 2020-06-18 PROCEDURE — 71260 CT THORAX DX C+: CPT

## 2020-06-18 PROCEDURE — 25000128 H RX IP 250 OP 636: Performed by: INTERNAL MEDICINE

## 2020-06-18 RX ORDER — IOPAMIDOL 755 MG/ML
80 INJECTION, SOLUTION INTRAVASCULAR ONCE
Status: COMPLETED | OUTPATIENT
Start: 2020-06-18 | End: 2020-06-18

## 2020-06-18 RX ADMIN — IOPAMIDOL 60 ML: 755 INJECTION, SOLUTION INTRAVENOUS at 10:40

## 2020-06-18 RX ADMIN — SODIUM CHLORIDE 70 ML: 9 INJECTION, SOLUTION INTRAVENOUS at 10:40

## 2020-06-22 ENCOUNTER — VIRTUAL VISIT (OUTPATIENT)
Dept: ONCOLOGY | Facility: CLINIC | Age: 70
End: 2020-06-22
Attending: INTERNAL MEDICINE
Payer: COMMERCIAL

## 2020-06-22 DIAGNOSIS — R06.02 SHORTNESS OF BREATH ON EXERTION: ICD-10-CM

## 2020-06-22 DIAGNOSIS — C34.31 SMALL CELL LUNG CANCER, RIGHT LOWER LOBE (H): Primary | ICD-10-CM

## 2020-06-22 DIAGNOSIS — R05.9 COUGH: ICD-10-CM

## 2020-06-22 PROCEDURE — 99214 OFFICE O/P EST MOD 30 MIN: CPT | Mod: 95 | Performed by: INTERNAL MEDICINE

## 2020-06-22 RX ORDER — BENZONATATE 100 MG/1
100 CAPSULE ORAL 3 TIMES DAILY PRN
Qty: 90 CAPSULE | Refills: 3 | Status: SHIPPED | OUTPATIENT
Start: 2020-06-22 | End: 2021-01-01

## 2020-06-22 ASSESSMENT — PAIN SCALES - GENERAL: PAINLEVEL: NO PAIN (0)

## 2020-06-22 NOTE — LETTER
"    6/22/2020         RE: Yvette Gastelum  [address]  [facility] [address]        Dear Colleague,    Thank you for referring your patient, Yvette Gastelum, to the [facility]. Please see a copy of my visit note below.    Yvette Gastelum is a 69 year old female who is being evaluated via a billable video visit.      The patient has been notified of following:     \"This video visit will be conducted via a call between you and your physician/provider. We have found that certain health care needs can be provided without the need for an in-person physical exam.  This service lets us provide the care you need with a video conversation.  If a prescription is necessary we can send it directly to your pharmacy.  If lab work is needed we can place an order for that and you can then stop by our lab to have the test done at a later time.    Video visits are billed at different rates depending on your insurance coverage.  Please reach out to your insurance provider with any questions.    If during the course of the call the physician/provider feels a video visit is not appropriate, you will not be charged for this service.\"    Patient has given verbal consent for Video visit? Yes    Will anyone else be joining your video visit? No        Video-Visit Details    Type of service:  Video Visit    Video Start Time:   Video End Time:    Originating Location (pt. Location): Home    Distant Location (provider location):  [facility]     Platform used for Video Visit: Richard Fregoso MA        Visit Date:   06/22/2020      ONCOLOGY HISTORY: Ms. Gastelum is a female with small cell lung cancer.    1.  CT chest angiogram on 08/21/2018 for hemoptysis revealed right lower lobe pulmonary mass invading the right lower lobe pulmonary artery and mild mediastinal lymphadenopathy.   -CT abdomen and pelvis on 08/22/2018 did not reveal any evidence of metastatic disease.   -Brain MRI on 08/30/2019 " did not reveal any intracranial metastasis.   -CT-guided biopsy of right lung mass on 08/31/2018 revealed high-grade neuroendocrine carcinoma, favor small cell carcinoma.   -PET scan in 08/2019 did not reveal any evidence of metastatic disease.      2.  She had limited stage small cell lung cancer.  She received concurrent chemoradiation with 4 cycles of platinum and etoposide between 09/17/2018 and 12/10/2018. Patient received 5940 cGy in 33 fractions between 10/11/2018 and 11/28/2018.   -Prophylactic cranial radiation between 01/29/2019 and 02/11/2019.      3.  CT chest on 08/21/2019 revealed a new 0.6 cm pulmonary nodule in right lower lobe suspicious for metastatic lesion.  There are 2 additional indeterminate nodular opacities in the right lower lobe posteriorly which are stable.  There is a mildly enlarged subcarinal lymph node which is stable.   -PET scan on 10/28/2019 revealed 0.8 cm right lower lobe nodule which is hypermetabolic and suspicious for malignancy.  No other areas of abnormal uptake.     SUBJECTIVE:  Ms. Gastelum is a 69-year-old female with limited stage small cell lung cancer diagnosed in 2018. She is status post concurrent chemoradiation and prophylactic cranial radiation.  There has been no evidence of recurrence.      CT of the chest on 06/18/2020 does not reveal any evidence of malignancy.  There is a stable soft tissue density in the right lower lobe.  This was negative on prior PET scan.      The patient has had a cough ever since her lung cancer and its treatment.  It is not getting worse. It is nonproductive.  She also gets short of breath on exertion.  She uses Tessalon Perles, which helps with the cough.  She does have an albuterol inhaler, but does not use it often.      REVIEW OF SYSTEMS:  No headache.  No dizziness.  No chest pain. No shortness of breath at rest.  Gets short of breath on exertion.  No abdominal pain, nausea or vomiting.  Appetite has been fairly good.  No urinary  or bowel complaints.  No bleeding.  No fever, chills or night sweats.      The patient has noticed that her cough and shortness of breath gets worse during the summer months with high humidity.      All other review of systems are negative.      PHYSICAL EXAMINATION:   GENERAL:  She is alert, oriented x 3.   RESPIRATORY:  No cough.  No labored breathing.     The rest of a comprehensive physical exam is deferred due to [facility] emergency video visit restrictions.      ASSESSMENT:   1.  A 69-year-old female with limited stage small cell lung cancer. No evidence of disease.   2.  Chronic cough from her lung cancer and its treatment.   3.  Shortness of breath on exertion.   4.  Other medical problems including hypertension and chronic kidney disease.      PLAN:   1.  I had a long discussion with the patient. CT was reviewed.  She was happy to know that there is no evidence of any malignancy.      With regard to small cell lung cancer, we will continue to monitor her.  In 6 months' time, we will get a CT of the chest, abdomen and pelvis and also labs including CBC and CMP.  She is agreeable for it.      2.  Discussed regarding her cough and shortness of breath on exertion.  This needs further evaluation.  We will set her up to see a pulmonologist.  She will continue on Tessalon Perles and also albuterol inhaler.      3. She had a few questions, which were all answered.  I advised her to see a physician if she has chest pain, coughing of blood, weight loss, lumps, worsening weakness or any other concerns.         INOCENTE SABA MD             D: 2020   T: 2020   MT: BERT      Name:     BISHNU LEY   MRN:      [id number]        Account:      [id number]   :      1950           Visit Date:   2020      Document: [id number]      Video visit for 14 minutes.    Visit Date:   2020      ONCOLOGY HISTORY: Ms. Ley is a female with small cell lung cancer.    1.  CT chest angiogram on  08/21/2018 for hemoptysis revealed right lower lobe pulmonary mass invading the right lower lobe pulmonary artery and mild mediastinal lymphadenopathy.   -CT abdomen and pelvis on 08/22/2018 did not reveal any evidence of metastatic disease.   -Brain MRI on 08/30/2019 did not reveal any intracranial metastasis.   -CT-guided biopsy of right lung mass on 08/31/2018 revealed high-grade neuroendocrine carcinoma, favor small cell carcinoma.   -PET scan in 08/2019 did not reveal any evidence of metastatic disease.      2.  She had limited stage small cell lung cancer.  She received concurrent chemoradiation with 4 cycles of platinum and etoposide between 09/17/2018 and 12/10/2018. Patient received 5940 cGy in 33 fractions between 10/11/2018 and 11/28/2018.   -Prophylactic cranial radiation between 01/29/2019 and 02/11/2019.      3.  CT chest on 08/21/2019 revealed a new 0.6 cm pulmonary nodule in right lower lobe suspicious for metastatic lesion.  There are 2 additional indeterminate nodular opacities in the right lower lobe posteriorly which are stable.  There is a mildly enlarged subcarinal lymph node which is stable.   -PET scan on 10/28/2019 revealed 0.8 cm right lower lobe nodule which is hypermetabolic and suspicious for malignancy.  No other areas of abnormal uptake.     SUBJECTIVE:  Ms. Gastelum is a 69-year-old female with limited stage small cell lung cancer diagnosed in 2018. She is status post concurrent chemoradiation and prophylactic cranial radiation.  There has been no evidence of recurrence.      CT of the chest on 06/18/2020 does not reveal any evidence of malignancy.  There is a stable soft tissue density in the right lower lobe.  This was negative on prior PET scan.      The patient has had a cough ever since her lung cancer and its treatment.  It is not getting worse. It is nonproductive.  She also gets short of breath on exertion.  She uses Tessalon Perles, which helps with the cough.  She does have  an albuterol inhaler, but does not use it often.      REVIEW OF SYSTEMS:  No headache.  No dizziness.  No chest pain. No shortness of breath at rest.  Gets short of breath on exertion.  No abdominal pain, nausea or vomiting.  Appetite has been fairly good.  No urinary or bowel complaints.  No bleeding.  No fever, chills or night sweats.      The patient has noticed that her cough and shortness of breath gets worse during the summer months with high humidity.      All other review of systems are negative.      PHYSICAL EXAMINATION:   GENERAL:  She is alert, oriented x 3.   RESPIRATORY:  No cough.  No labored breathing.     The rest of a comprehensive physical exam is deferred due to [facility] emergency video visit restrictions.      ASSESSMENT:   1.  A 69-year-old female with limited stage small cell lung cancer. No evidence of disease.   2.  Chronic cough from her lung cancer and its treatment.   3.  Shortness of breath on exertion.   4.  Other medical problems including hypertension and chronic kidney disease.      PLAN:   1.  I had a long discussion with the patient. CT was reviewed.  She was happy to know that there is no evidence of any malignancy.      With regard to small cell lung cancer, we will continue to monitor her.  In 6 months' time, we will get a CT of the chest, abdomen and pelvis and also labs including CBC and CMP.  She is agreeable for it.      2.  Discussed regarding her cough and shortness of breath on exertion.  This needs further evaluation.  We will set her up to see a pulmonologist.  She will continue on Tessalon Perles and also albuterol inhaler.      3. She had a few questions, which were all answered.  I advised her to see a physician if she has chest pain, coughing of blood, weight loss, lumps, worsening weakness or any other concerns.         INOCENTE SABA MD             D: 06/22/2020   T: 06/22/2020   MT: BERT      Name:     BISHNU LEY   MRN:      [id number]        Account:       [id number]   :      1950           Visit Date:   2020      Document: [id number]      Video visit for 14 minutes.        Again, thank you for allowing me to participate in the care of your patient.        Sincerely,        Jennifer Luz MD

## 2020-06-22 NOTE — LETTER
"    6/22/2020         RE: Yvette Gastelum  [address]  [facility] [address]        Dear Colleague,    Thank you for referring your patient, Yvette Gastelum, to the [facility]. Please see a copy of my visit note below.    Yvette Gastelum is a 69 year old female who is being evaluated via a billable video visit.      The patient has been notified of following:     \"This video visit will be conducted via a call between you and your physician/provider. We have found that certain health care needs can be provided without the need for an in-person physical exam.  This service lets us provide the care you need with a video conversation.  If a prescription is necessary we can send it directly to your pharmacy.  If lab work is needed we can place an order for that and you can then stop by our lab to have the test done at a later time.    Video visits are billed at different rates depending on your insurance coverage.  Please reach out to your insurance provider with any questions.    If during the course of the call the physician/provider feels a video visit is not appropriate, you will not be charged for this service.\"    Patient has given verbal consent for Video visit? Yes    Will anyone else be joining your video visit? No        Video-Visit Details    Type of service:  Video Visit    Video Start Time:   Video End Time:    Originating Location (pt. Location): Home    Distant Location (provider location):  [facility]     Platform used for Video Visit: Richard Fregoso MA        Visit Date:   06/22/2020      ONCOLOGY HISTORY: Ms. Gastelum is a female with small cell lung cancer.    1.  CT chest angiogram on 08/21/2018 for hemoptysis revealed right lower lobe pulmonary mass invading the right lower lobe pulmonary artery and mild mediastinal lymphadenopathy.   -CT abdomen and pelvis on 08/22/2018 did not reveal any evidence of metastatic disease.   -Brain MRI on 08/30/2019 " did not reveal any intracranial metastasis.   -CT-guided biopsy of right lung mass on 08/31/2018 revealed high-grade neuroendocrine carcinoma, favor small cell carcinoma.   -PET scan in 08/2019 did not reveal any evidence of metastatic disease.      2.  She had limited stage small cell lung cancer.  She received concurrent chemoradiation with 4 cycles of platinum and etoposide between 09/17/2018 and 12/10/2018. Patient received 5940 cGy in 33 fractions between 10/11/2018 and 11/28/2018.   -Prophylactic cranial radiation between 01/29/2019 and 02/11/2019.      3.  CT chest on 08/21/2019 revealed a new 0.6 cm pulmonary nodule in right lower lobe suspicious for metastatic lesion.  There are 2 additional indeterminate nodular opacities in the right lower lobe posteriorly which are stable.  There is a mildly enlarged subcarinal lymph node which is stable.   -PET scan on 10/28/2019 revealed 0.8 cm right lower lobe nodule which is hypermetabolic and suspicious for malignancy.  No other areas of abnormal uptake.     SUBJECTIVE:  Ms. Gastelum is a 69-year-old female with limited stage small cell lung cancer diagnosed in 2018. She is status post concurrent chemoradiation and prophylactic cranial radiation.  There has been no evidence of recurrence.      CT of the chest on 06/18/2020 does not reveal any evidence of malignancy.  There is a stable soft tissue density in the right lower lobe.  This was negative on prior PET scan.      The patient has had a cough ever since her lung cancer and its treatment.  It is not getting worse. It is nonproductive.  She also gets short of breath on exertion.  She uses Tessalon Perles, which helps with the cough.  She does have an albuterol inhaler, but does not use it often.      REVIEW OF SYSTEMS:  No headache.  No dizziness.  No chest pain. No shortness of breath at rest.  Gets short of breath on exertion.  No abdominal pain, nausea or vomiting.  Appetite has been fairly good.  No urinary  or bowel complaints.  No bleeding.  No fever, chills or night sweats.      The patient has noticed that her cough and shortness of breath gets worse during the summer months with high humidity.      All other review of systems are negative.      PHYSICAL EXAMINATION:   GENERAL:  She is alert, oriented x 3.   RESPIRATORY:  No cough.  No labored breathing.     The rest of a comprehensive physical exam is deferred due to [facility] emergency video visit restrictions.      ASSESSMENT:   1.  A 69-year-old female with limited stage small cell lung cancer. No evidence of disease.   2.  Chronic cough from her lung cancer and its treatment.   3.  Shortness of breath on exertion.   4.  Other medical problems including hypertension and chronic kidney disease.      PLAN:   1.  I had a long discussion with the patient. CT was reviewed.  She was happy to know that there is no evidence of any malignancy.      With regard to small cell lung cancer, we will continue to monitor her.  In 6 months' time, we will get a CT of the chest, abdomen and pelvis and also labs including CBC and CMP.  She is agreeable for it.      2.  Discussed regarding her cough and shortness of breath on exertion.  This needs further evaluation.  We will set her up to see a pulmonologist.  She will continue on Tessalon Perles and also albuterol inhaler.      3. She had a few questions, which were all answered.  I advised her to see a physician if she has chest pain, coughing of blood, weight loss, lumps, worsening weakness or any other concerns.         INOCENTE SABA MD             D: 2020   T: 2020   MT: BERT      Name:     BISHNU LEY   MRN:      [id number]        Account:      [id number]   :      1950           Visit Date:   2020      Document: [id number]      Video visit for 14 minutes.    Visit Date:   2020      ONCOLOGY HISTORY: Ms. Ley is a female with small cell lung cancer.    1.  CT chest angiogram on  08/21/2018 for hemoptysis revealed right lower lobe pulmonary mass invading the right lower lobe pulmonary artery and mild mediastinal lymphadenopathy.   -CT abdomen and pelvis on 08/22/2018 did not reveal any evidence of metastatic disease.   -Brain MRI on 08/30/2019 did not reveal any intracranial metastasis.   -CT-guided biopsy of right lung mass on 08/31/2018 revealed high-grade neuroendocrine carcinoma, favor small cell carcinoma.   -PET scan in 08/2019 did not reveal any evidence of metastatic disease.      2.  She had limited stage small cell lung cancer.  She received concurrent chemoradiation with 4 cycles of platinum and etoposide between 09/17/2018 and 12/10/2018. Patient received 5940 cGy in 33 fractions between 10/11/2018 and 11/28/2018.   -Prophylactic cranial radiation between 01/29/2019 and 02/11/2019.      3.  CT chest on 08/21/2019 revealed a new 0.6 cm pulmonary nodule in right lower lobe suspicious for metastatic lesion.  There are 2 additional indeterminate nodular opacities in the right lower lobe posteriorly which are stable.  There is a mildly enlarged subcarinal lymph node which is stable.   -PET scan on 10/28/2019 revealed 0.8 cm right lower lobe nodule which is hypermetabolic and suspicious for malignancy.  No other areas of abnormal uptake.     SUBJECTIVE:  Ms. Gastelum is a 69-year-old female with limited stage small cell lung cancer diagnosed in 2018. She is status post concurrent chemoradiation and prophylactic cranial radiation.  There has been no evidence of recurrence.      CT of the chest on 06/18/2020 does not reveal any evidence of malignancy.  There is a stable soft tissue density in the right lower lobe.  This was negative on prior PET scan.      The patient has had a cough ever since her lung cancer and its treatment.  It is not getting worse. It is nonproductive.  She also gets short of breath on exertion.  She uses Tessalon Perles, which helps with the cough.  She does have  an albuterol inhaler, but does not use it often.      REVIEW OF SYSTEMS:  No headache.  No dizziness.  No chest pain. No shortness of breath at rest.  Gets short of breath on exertion.  No abdominal pain, nausea or vomiting.  Appetite has been fairly good.  No urinary or bowel complaints.  No bleeding.  No fever, chills or night sweats.      The patient has noticed that her cough and shortness of breath gets worse during the summer months with high humidity.      All other review of systems are negative.      PHYSICAL EXAMINATION:   GENERAL:  She is alert, oriented x 3.   RESPIRATORY:  No cough.  No labored breathing.     The rest of a comprehensive physical exam is deferred due to [facility] emergency video visit restrictions.      ASSESSMENT:   1.  A 69-year-old female with limited stage small cell lung cancer. No evidence of disease.   2.  Chronic cough from her lung cancer and its treatment.   3.  Shortness of breath on exertion.   4.  Other medical problems including hypertension and chronic kidney disease.      PLAN:   1.  I had a long discussion with the patient. CT was reviewed.  She was happy to know that there is no evidence of any malignancy.      With regard to small cell lung cancer, we will continue to monitor her.  In 6 months' time, we will get a CT of the chest, abdomen and pelvis and also labs including CBC and CMP.  She is agreeable for it.      2.  Discussed regarding her cough and shortness of breath on exertion.  This needs further evaluation.  We will set her up to see a pulmonologist.  She will continue on Tessalon Perles and also albuterol inhaler.      3. She had a few questions, which were all answered.  I advised her to see a physician if she has chest pain, coughing of blood, weight loss, lumps, worsening weakness or any other concerns.         INOCENTE SABA MD             D: 06/22/2020   T: 06/22/2020   MT: BERT      Name:     BISHNU LEY   MRN:      [id number]        Account:       [id number]   :      1950           Visit Date:   2020      Document: [id number]      Video visit for 14 minutes.        Again, thank you for allowing me to participate in the care of your patient.        Sincerely,        Jennifer Luz MD

## 2020-06-22 NOTE — PROGRESS NOTES
"Yvette Gastelum is a 69 year old female who is being evaluated via a billable video visit.      The patient has been notified of following:     \"This video visit will be conducted via a call between you and your physician/provider. We have found that certain health care needs can be provided without the need for an in-person physical exam.  This service lets us provide the care you need with a video conversation.  If a prescription is necessary we can send it directly to your pharmacy.  If lab work is needed we can place an order for that and you can then stop by our lab to have the test done at a later time.    Video visits are billed at different rates depending on your insurance coverage.  Please reach out to your insurance provider with any questions.    If during the course of the call the physician/provider feels a video visit is not appropriate, you will not be charged for this service.\"    Patient has given verbal consent for Video visit? Yes    Will anyone else be joining your video visit? No        Video-Visit Details    Type of service:  Video Visit    Video Start Time:   Video End Time:    Originating Location (pt. Location): Home    Distant Location (provider location):  [facility]     Platform used for Video Visit: Richard Fregoso MA      "

## 2020-06-22 NOTE — PATIENT INSTRUCTIONS
1.  CT scan and labs in 6 months. Scheduled 12/17/20 [facility] Desk, Check in at 10:00am.  Scan is at 10:30am. / [facility]   2.  Continue with Tessalon capsule as needed for cough.  3.  Continue albuterol inhaler as needed.  4.  Pulmonary consult for cough and shortness of breath on exertion.  This department will call the patient to schedule the appointment.  Their phone number is [phone]. If you have not received a phone call in the next couple days, call them.  5.  See me in 6 months.  Scheduled 12/21/20  10:20am/ [facility]     AVS mailed to patient per request/ [facility]

## 2020-06-22 NOTE — PROGRESS NOTES
Visit Date:   06/22/2020      ONCOLOGY HISTORY: Ms. Gastelum is a female with small cell lung cancer.    1.  CT chest angiogram on 08/21/2018 for hemoptysis revealed right lower lobe pulmonary mass invading the right lower lobe pulmonary artery and mild mediastinal lymphadenopathy.   -CT abdomen and pelvis on 08/22/2018 did not reveal any evidence of metastatic disease.   -Brain MRI on 08/30/2019 did not reveal any intracranial metastasis.   -CT-guided biopsy of right lung mass on 08/31/2018 revealed high-grade neuroendocrine carcinoma, favor small cell carcinoma.   -PET scan in 08/2019 did not reveal any evidence of metastatic disease.      2.  She had limited stage small cell lung cancer.  She received concurrent chemoradiation with 4 cycles of platinum and etoposide between 09/17/2018 and 12/10/2018. Patient received 5940 cGy in 33 fractions between 10/11/2018 and 11/28/2018.   -Prophylactic cranial radiation between 01/29/2019 and 02/11/2019.      3.  CT chest on 08/21/2019 revealed a new 0.6 cm pulmonary nodule in right lower lobe suspicious for metastatic lesion.  There are 2 additional indeterminate nodular opacities in the right lower lobe posteriorly which are stable.  There is a mildly enlarged subcarinal lymph node which is stable.   -PET scan on 10/28/2019 revealed 0.8 cm right lower lobe nodule which is hypermetabolic and suspicious for malignancy.  No other areas of abnormal uptake.     SUBJECTIVE:  Ms. Gastelum is a 69-year-old female with limited stage small cell lung cancer diagnosed in 2018. She is status post concurrent chemoradiation and prophylactic cranial radiation.  There has been no evidence of recurrence.      CT of the chest on 06/18/2020 does not reveal any evidence of malignancy.  There is a stable soft tissue density in the right lower lobe.  This was negative on prior PET scan.      The patient has had a cough ever since her lung cancer and its treatment.  It is not getting worse. It is  nonproductive.  She also gets short of breath on exertion.  She uses Tessalon Perles, which helps with the cough.  She does have an albuterol inhaler, but does not use it often.      REVIEW OF SYSTEMS:  No headache.  No dizziness.  No chest pain. No shortness of breath at rest.  Gets short of breath on exertion.  No abdominal pain, nausea or vomiting.  Appetite has been fairly good.  No urinary or bowel complaints.  No bleeding.  No fever, chills or night sweats.      The patient has noticed that her cough and shortness of breath gets worse during the summer months with high humidity.      All other review of systems are negative.      PHYSICAL EXAMINATION:   GENERAL:  She is alert, oriented x 3.   RESPIRATORY:  No cough.  No labored breathing.     The rest of a comprehensive physical exam is deferred due to public [facility] emergency video visit restrictions.      ASSESSMENT:   1.  A 69-year-old female with limited stage small cell lung cancer. No evidence of disease.   2.  Chronic cough from her lung cancer and its treatment.   3.  Shortness of breath on exertion.   4.  Other medical problems including hypertension and chronic kidney disease.      PLAN:   1.  I had a long discussion with the patient. CT was reviewed.  She was happy to know that there is no evidence of any malignancy.      With regard to small cell lung cancer, we will continue to monitor her.  In 6 months' time, we will get a CT of the chest, abdomen and pelvis and also labs including CBC and CMP.  She is agreeable for it.      2.  Discussed regarding her cough and shortness of breath on exertion.  This needs further evaluation.  We will set her up to see a pulmonologist.  She will continue on Tessalon Perles and also albuterol inhaler.      3. She had a few questions, which were all answered.  I advised her to see a physician if she has chest pain, coughing of blood, weight loss, lumps, worsening weakness or any other concerns.         INOCENTE  MD JAYANT             D: 2020   T: 2020   MT: BERT      Name:     BISHNU LEY   MRN:      [id number]        Account:      [id number]   :      1950           Visit Date:   2020      Document: [id number]      Video visit for 14 minutes.

## 2020-06-26 NOTE — TELEPHONE ENCOUNTER
RECORDS RECEIVED FROM:    DATE RECEIVED: 7.1.2020   NOTES STATUS DETAILS   OFFICE NOTE from referring provider Internal 6.22.20 THEA Luz [facility]  2.4.2020  10.23.19     OFFICE NOTE from other specialist Internal More records related to lung cancer from [facility] scanned into system   DISCHARGE SUMMARY from hospital N/A    DISCHARGE REPORT from the ER N/A    MEDICATION LIST In process    IMAGING  (NEED IMAGES AND REPORTS)     CT SCAN Internal 6.18.20  1.30.20   CHEST XRAY (CXR) N/A    TESTS     PULMONARY FUNCTION TESTING (PFT) N/A

## 2020-07-01 ENCOUNTER — PRE VISIT (OUTPATIENT)
Dept: PULMONOLOGY | Facility: CLINIC | Age: 70
End: 2020-07-01

## 2020-07-01 ENCOUNTER — VIRTUAL VISIT (OUTPATIENT)
Dept: PULMONOLOGY | Facility: CLINIC | Age: 70
End: 2020-07-01
Attending: INTERNAL MEDICINE
Payer: COMMERCIAL

## 2020-07-01 DIAGNOSIS — C34.31 SMALL CELL LUNG CANCER, RIGHT LOWER LOBE (H): ICD-10-CM

## 2020-07-01 DIAGNOSIS — R06.02 SHORTNESS OF BREATH ON EXERTION: ICD-10-CM

## 2020-07-01 DIAGNOSIS — R09.02 HYPOXIA: ICD-10-CM

## 2020-07-01 DIAGNOSIS — R06.02 SOB (SHORTNESS OF BREATH): ICD-10-CM

## 2020-07-01 DIAGNOSIS — R05.9 COUGH: ICD-10-CM

## 2020-07-01 DIAGNOSIS — Z08 ENCOUNTER FOR FOLLOW-UP EXAMINATION AFTER COMPLETED TREATMENT FOR MALIGNANT NEOPLASM: ICD-10-CM

## 2020-07-01 RX ORDER — ALBUTEROL SULFATE 0.83 MG/ML
2.5 SOLUTION RESPIRATORY (INHALATION) 2 TIMES DAILY
Qty: 1 BOX | Refills: 11 | Status: SHIPPED | OUTPATIENT
Start: 2020-07-01 | End: 2021-01-01

## 2020-07-01 RX ORDER — SODIUM CHLORIDE FOR INHALATION 3 %
3 VIAL, NEBULIZER (ML) INHALATION 2 TIMES DAILY
Qty: 90 ML | Refills: 4 | Status: SHIPPED | OUTPATIENT
Start: 2020-07-01 | End: 2021-01-01

## 2020-07-01 NOTE — PROGRESS NOTES
"Yvette Gastelum is a 69 year old female who is being evaluated via a billable video visit.      The patient has been notified of following:     \"This video visit will be conducted via a call between you and your physician/provider. We have found that certain health care needs can be provided without the need for an in-person physical exam.  This service lets us provide the care you need with a video conversation.  If a prescription is necessary we can send it directly to your pharmacy.  If lab work is needed we can place an order for that and you can then stop by our lab to have the test done at a later time.    Video visits are billed at different rates depending on your insurance coverage.  Please reach out to your insurance provider with any questions.    If during the course of the call the physician/provider feels a video visit is not appropriate, you will not be charged for this service.\"    Patient has given verbal consent for Video visit? Yes  How would you like to obtain your AVS? Enehar  Patient would like the video invitation sent by: Send to e-mail at: [email or web address].  Will anyone else be joining your video visit? No        Video-Visit Details    Type of service:  Video Visit    Video Start Time: 14:10  Video End Time: 15:25    Originating Location (pt. Location): Home    Distant Location (provider location):  [facility]     Platform used for Video Visit: Richard Gastelum is a 70 yo female with PMH of limited stage small cell lung cancer s/p chemoradiation and prophylactic cranial radiation, CKD, and obesity who is referred by Oncology for chronic cough and dyspnea on exertion.     Chart reviewed. Prior to cancer diagnosis received diagnosis of chronic bronchitis given chronic cough, periods of dyspnea and wheezing. Treated with albuterol nebs and inhaler. PFTs in early 2016 were normal. Cancer course summarized from Oncology notes Development of " "hemoptysis and imaging in Aug 2018 showing mediastinal lymphadenopathy and a RLL mass invading the pulmonary artery. 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 Treated with concurrent chemoradiation (cisplatin, etoposide) and prophylactic cranial radiation) during 9/2018 through 2/2019. Did have a RLL nodule on CT imaging 8/2019 with 0.8 cm hypermetabolic nodule on last PET scan 10/2019. Treated with SBRT in Nov 2019. Repeat CAP CT 1/2020 showed RLL nodule resolved, no evidence of cancer. Last seen in Oncology clinic 6/22/2020 with clear chest CT.    Today patient reports having a chronic cough prior to her cancer diagnosis. Had a \"croup like\" cough each winter with a barky natural and the feeling sometimes like her throat was closing up. Did produce sputum and occasionally cough contained blood. Did use a nebulizer sometimes that would help, also treated with azithromycin and prednisone tapers. In the summer the cough would mostly go away. Following initiation of chemoradiation her cough was mostly the same and then for a while did not have cough at all. Immediately after her first SBRT treatment in November started to have significant cough, fatigue, and dyspnea on exertion which is her primary symptom.     Continues to have a daily cough that is sometimes dry but can sometimes produce sputum and will have trouble bringing up secretions. Cough exacerbated by going between hot and cold environments, can occur at night but not always, and is alleviated sometimes by Tessalon pearls. Breathing becomes more difficult with almost any activity and is worsened by humidity. Still takes dog out for multiple walks each day but goes slow and will have to take a break at times. Voice has become more hoarse since radiation, made worse if talking a lot. Denies any chest pain or pressure with exertion, no dizziness or " lightheadedness. Does have acid reflux that is controlled by Prilosec. No significant allergy or sinus symptoms.     As a child was often sick with bronchitis or croup. Did not go to a doctor until she was an adult. Around [employer] growing up and smoked for 50 years (~ 1 ppd) until quitting about 5 years ago. Lives in an apartment, no mold or water damage, one dog. Worked multiple different jobs, none which brought significant exposures. Continues to spend time with her grandchildren.      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 Do have suspicion for radiation pneumonitis. Continues to have significant symptoms and will discuss possibility of steroid course with Oncology. Does have history of bronchitis which may have been exacerbated by cancer/cancer treatments. Prior PFTs in 2016 were normal and will repeat spirometry, obtain 6MW. Given reported difficulties with clearing secretions will start airway clearance regimen. Prior Echo with borderline low EF, unable to assess for pulmonary hypertension, and will get repeat Echo. Will also check thyroid function. In general will need to remain aware of potential for secondary cancer development.   - Start BID Albuterol and 3% saline nebs with Acapella/Aerobika for airway clearance  - Will bring into clinic for spirometry, 6 minute walk, echocardiogram, and thyroid panel  - Will discuss possibility of radiation pneumonitis/use of steroids with Dr. Luz    Will plan for Pulmonary follow up in 3 months    Patient seen and discussed with Dr. Shelton Davidson MD PhD  Pulmonary Critical Care Fellow    Pulmonary Attending Attestation  I saw the patient with Dr. Davidson and talked with her, confirming key aspects of the history.  I personally reviewed the recent Xrays and other labs.  The  fellow s note reflects our detailed discussion of the findings, assessment and plan.    Ms Gastelum has done remarkably well with her Small cell CA but now has complaints of COOPER and cough.  The cough may be due to difficulty with secretion clearance and we will give her treatment directed at that.  It also could be due to lung inflammation or other causes.  Her voice is very gravely - raising question of thyroid disease (which can cause cough through several mechanisms).  Her COOPER may be due to radiation pneumonitis.  Other possibilities are infection, CHF, or other drug-induced pneumopathy.  Dr. Davidson will discuss potential course of steroids for RT pneumonitis with Dr. Lewis of Oncology.    Homer Peoples MD  [phone]

## 2020-07-01 NOTE — LETTER
"    7/1/2020         RE: Yvette Gastelum  [address]  [facility] [address]        Dear Colleague,    Thank you for referring your patient, Yvette Gastelum, to the [facility]. Please see a copy of my visit note below.    Yvette Gastelum is a 69 year old female who is being evaluated via a billable video visit.          Video-Visit Details    Type of service:  Video Visit    Video Start Time: 14:10  Video End Time: 15:25    Originating Location (pt. Location): Home    Distant Location (provider location):  [facility]     Platform used for Video Visit: AmWell      Yvette Gastelum is a 70 yo female with PMH of limited stage small cell lung cancer s/p chemoradiation and prophylactic cranial radiation, CKD, and obesity who is referred by Oncology for chronic cough and dyspnea on exertion.     Chart reviewed. Prior to cancer diagnosis received diagnosis of chronic bronchitis given chronic cough, periods of dyspnea and wheezing. Treated with albuterol nebs and inhaler. PFTs in early 2016 were normal. Cancer course summarized from Oncology notes Development of hemoptysis and imaging in Aug 2018 showing mediastinal lymphadenopathy and a RLL mass invading the pulmonary artery. 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 Treated with concurrent chemoradiation (cisplatin, etoposide) and prophylactic cranial radiation) during 9/2018 through 2/2019. Did have a RLL nodule on CT imaging 8/2019 with 0.8 cm hypermetabolic nodule on last PET scan 10/2019. Treated with SBRT in Nov 2019. Repeat CAP CT 1/2020 showed RLL nodule resolved, no evidence of cancer. Last seen in Oncology clinic 6/22/2020 with clear chest CT.    Today patient reports having a chronic cough prior to her cancer diagnosis. Had a \"croup like\" cough each winter with a barky natural " and the feeling sometimes like her throat was closing up. Did produce sputum and occasionally cough contained blood. Did use a nebulizer sometimes that would help, also treated with azithromycin and prednisone tapers. In the summer the cough would mostly go away. Following initiation of chemoradiation her cough was mostly the same and then for a while did not have cough at all. Immediately after her first SBRT treatment in November started to have significant cough, fatigue, and dyspnea on exertion which is her primary symptom.     Continues to have a daily cough that is sometimes dry but can sometimes produce sputum and will have trouble bringing up secretions. Cough exacerbated by going between hot and cold environments, can occur at night but not always, and is alleviated sometimes by Tessalon pearls. Breathing becomes more difficult with almost any activity and is worsened by humidity. Still takes dog out for multiple walks each day but goes slow and will have to take a break at times. Voice has become more hoarse since radiation, made worse if talking a lot. Denies any chest pain or pressure with exertion, no dizziness or lightheadedness. Does have acid reflux that is controlled by Prilosec. No significant allergy or sinus symptoms.     As a child was often sick with bronchitis or croup. Did not go to a doctor until she was an adult. Around [employer] growing [place] and smoked for 50 years (~ 1 ppd) until quitting about 5 years ago. Lives in an apartment, no mold or water damage, one dog. Worked multiple different jobs, none which brought significant exposures. Continues to spend time with her grandchildren.      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 Do have suspicion for radiation pneumonitis. Continues to have significant symptoms and  will discuss possibility of steroid course with Oncology. Does have history of bronchitis which may have been exacerbated by cancer/cancer treatments. Prior PFTs in 2016 were normal and will repeat spirometry, obtain 6MW. Given reported difficulties with clearing secretions will start airway clearance regimen. Prior Echo with borderline low EF, unable to assess for pulmonary hypertension, and will get repeat Echo. Will also check thyroid function. In general will need to remain aware of potential for secondary cancer development.   - Start BID Albuterol and 3% saline nebs with Acapella/Aerobika for airway clearance  - Will bring into clinic for spirometry, 6 minute walk, echocardiogram, and thyroid panel  - Will discuss possibility of radiation pneumonitis/use of steroids with Dr. Luz    Will plan for Pulmonary follow up in 3 months    Patient seen and discussed with Dr. Shelton Davidson MD PhD  Pulmonary Critical Care Fellow    Pulmonary Attending Attestation  I saw the patient with Dr. Davidson and talked with her, confirming key aspects of the history.  I personally reviewed the recent Xrays and other labs.  The fellow s note reflects our detailed discussion of the findings, assessment and plan.    Ms Gastelum has done remarkably well with her Small cell CA but now has complaints of COOPER and cough.  The cough may be due to difficulty with secretion clearance and we will give her treatment directed at that.  It also could be due to lung inflammation or other causes.  Her voice is very gravely - raising question of thyroid disease (which can cause cough through several mechanisms).  Her COOPER may be due to radiation pneumonitis.  Other possibilities are infection, CHF, or other drug-induced pneumopathy.  Dr. Davidson will discuss potential course of steroids for RT pneumonitis with Dr. Lewis of Oncology.    Homer Peoples MD  [phone]      Again, thank you for allowing me to participate in the care of  your patient.        Sincerely,        Virginia Davidson MD

## 2020-07-02 NOTE — PATIENT INSTRUCTIONS
Start using your albuterol neb twice daily, once your saline nebs and Acapella arrive use them together.    You can continue to use the Tessalon pearls to help decrease cough    Will bring you into clinic in the next couple weeks to complete lung function testing including spirometry and a six minute walk to see if your oxygen level drops with activity. Will also get an ultrasound of your heart and blood tests to check your thyroid function.     Will discuss your case with Dr. Luz to see if should try any other treatments such as steroids.

## 2020-07-15 ENCOUNTER — ANCILLARY PROCEDURE (OUTPATIENT)
Dept: CARDIOLOGY | Facility: CLINIC | Age: 70
End: 2020-07-15
Payer: COMMERCIAL

## 2020-07-15 DIAGNOSIS — R06.02 SHORTNESS OF BREATH ON EXERTION: ICD-10-CM

## 2020-07-15 DIAGNOSIS — C34.31 SMALL CELL LUNG CANCER, RIGHT LOWER LOBE (H): ICD-10-CM

## 2020-07-15 DIAGNOSIS — R05.9 COUGH: ICD-10-CM

## 2020-07-15 DIAGNOSIS — Z08 ENCOUNTER FOR FOLLOW-UP EXAMINATION AFTER COMPLETED TREATMENT FOR MALIGNANT NEOPLASM: ICD-10-CM

## 2020-07-15 LAB
6 MIN WALK (FT): 1063 FT
6 MIN WALK (M): 324 M
T3FREE SERPL-MCNC: 2 PG/ML (ref 2.3–4.2)
T4 FREE SERPL-MCNC: 0.95 NG/DL (ref 0.76–1.46)
TSH SERPL DL<=0.005 MIU/L-ACNC: 1.65 MU/L (ref 0.4–4)

## 2020-07-15 PROCEDURE — 84481 FREE ASSAY (FT-3): CPT | Performed by: STUDENT IN AN ORGANIZED HEALTH CARE EDUCATION/TRAINING PROGRAM

## 2020-07-16 LAB
DLCOUNC-%PRED-PRE: 57 %
DLCOUNC-PRE: 11.43 ML/MIN/MMHG
DLCOUNC-PRED: 19.87 ML/MIN/MMHG
ERV-%PRED-PRE: 106 %
ERV-PRE: 0.29 L
ERV-PRED: 0.27 L
EXPTIME-PRE: 7.16 SEC
FEF2575-%PRED-PRE: 78 %
FEF2575-PRE: 1.52 L/SEC
FEF2575-PRED: 1.93 L/SEC
FEFMAX-%PRED-PRE: 77 %
FEFMAX-PRE: 4.49 L/SEC
FEFMAX-PRED: 5.8 L/SEC
FEV1-%PRED-PRE: 85 %
FEV1-PRE: 1.95 L
FEV1FEV6-PRE: 74 %
FEV1FEV6-PRED: 79 %
FEV1FVC-PRE: 74 %
FEV1FVC-PRED: 78 %
FEV1SVC-PRE: 76 %
FEV1SVC-PRED: 72 %
FIFMAX-PRE: 4.31 L/SEC
FRCPLETH-%PRED-PRE: 87 %
FRCPLETH-PRE: 2.38 L
FRCPLETH-PRED: 2.74 L
FVC-%PRED-PRE: 89 %
FVC-PRE: 2.63 L
FVC-PRED: 2.93 L
IC-%PRED-PRE: 79 %
IC-PRE: 2.28 L
IC-PRED: 2.87 L
RVPLETH-%PRED-PRE: 101 %
RVPLETH-PRE: 2.09 L
RVPLETH-PRED: 2.07 L
TLCPLETH-%PRED-PRE: 92 %
TLCPLETH-PRE: 4.67 L
TLCPLETH-PRED: 5.02 L
VA-%PRED-PRE: 86 %
VA-PRE: 4.14 L
VC-%PRED-PRE: 81 %
VC-PRE: 2.57 L
VC-PRED: 3.15 L

## 2020-09-20 DIAGNOSIS — E78.5 HYPERLIPIDEMIA LDL GOAL <130: ICD-10-CM

## 2020-09-21 RX ORDER — ATORVASTATIN CALCIUM 10 MG/1
10 TABLET, FILM COATED ORAL DAILY
Qty: 90 TABLET | Refills: 0 | Status: SHIPPED | OUTPATIENT
Start: 2020-09-21 | End: 2021-01-01

## 2020-09-24 ASSESSMENT — ENCOUNTER SYMPTOMS
STIFFNESS: 1
MUSCLE CRAMPS: 1
NECK PAIN: 0
JAUNDICE: 0
BLOOD IN STOOL: 0
NAUSEA: 0
POSTURAL DYSPNEA: 1
BOWEL INCONTINENCE: 1
HEMATURIA: 0
RECTAL PAIN: 0
SHORTNESS OF BREATH: 1
DIFFICULTY URINATING: 1
VOMITING: 0
BACK PAIN: 0
ARTHRALGIAS: 1
DYSPNEA ON EXERTION: 1
DYSURIA: 1
CONSTIPATION: 0
ABDOMINAL PAIN: 0
COUGH DISTURBING SLEEP: 1
HEMOPTYSIS: 0
FLANK PAIN: 0
MUSCLE WEAKNESS: 1
SPUTUM PRODUCTION: 1
BLOATING: 1
WHEEZING: 1
SNORES LOUDLY: 0
HEARTBURN: 1
MYALGIAS: 1
COUGH: 1
JOINT SWELLING: 1
DIARRHEA: 1

## 2020-09-24 NOTE — PROGRESS NOTES
[facility]    Pulmonary, Allergy, Critical Care and Sleep Medicine    Follow-up CLINIC Note  9/25/2020    Assessment and Recommendations:    Yvette Gastelum is a 70 yo female with PMH of limited stage small cell lung cancer s/p chemoradiation and prophylactic cranial radiation, CKD, and obesity who presents for chronic cough and dyspnea on exertion.     Small Cell Lung Cancer s/p Chemoradiation and SBRT  Chronic Cough  Suspected Radiation Pneumonitis  Exertional dyspnea and acute on chronic cough following initiation of SBRT for presumed recurrent SCLC in the RLL. Timing of symptoms and straight line appearance to paramediastinal fibrosis on CT would support radiation pneumonitis. Other testing including Echo and PFTs normal/stable. At this time clinical status slightly improved from prior. Will obtain repeat CT, check CRP. If continued evidence of fibrosis and inflammation will likely treat with steroid course. If appears to be improving may consider inhaled corticosteroid.    - CT chest and CRP, pending results will consider oral vs inhaled steroid therapy  - Will discuss results and steroid plan with patient's oncologist Dr. Luz  - Referral to Pulmonary Rehab to help increase daily activity level    Health Maintenance  - Flu shot today  - Up to date on pneumonia vaccinations      Follow up in 3 months    Seen and discussed with Dr. Sarah Davidson MD PhD  Pulmonary and Critical Care Fellow   Pager [phone]      Faculty Addendum:  This patient has been seen and evaluated by me.  I have discussed care with Dr. Davidson and agree with the findings and plan in this note.    Leon Smith MD  Pulmonary/Critical Care  September 28, 2020 11:29 AM           Subjective, Interval history:   History taken from patient and chart review.     Initially seen in Pulmonary clinic 7/1/2020. Reported chronic bronchitis prior to cancer with sometimes bloody cough, prior treatments with  nebulizers and azithromycin. Cough remained stable following chemotherapy but immediately after her first SBRT treatment in November 2019 started to have worsening cough, fatigue, and dyspnea on exertion. Started on airway clearance given reports of trouble clearing secretions. Further testing including an Echo that was stable from prior and PFTs showing moderate diffusion defect and desaturation on 6MW (99 to 95%).     Today reports feeling about the same as earlier in the summer. Cough is not as bad and coughing episodes do not last as long. Does bring up sputum every morning., no hemoptysis. Tessalon has helped, less nocturnal cough. Nebulizers have also helped. Intermittent wheezing. Trying to stay active by walking her dog multiple times per day. Can walk about one block before short of breath. Does a four block walk that she thinks takes about 30-45 min.     Cancer course (summarized from Oncology notes):  Development of hemoptysis prompting imaging in Aug 2018 showing mediastinal lymphadenopathy and a RLL mass invading the pulmonary artery. 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 Treated with concurrent chemoradiation (cisplatin, etoposide) and prophylactic cranial radiation during 9/2018 through 2/2019. Did have a RLL nodule on CT imaging 8/2019 with 0.8 cm hypermetabolic nodule on last PET scan 10/2019. Treated with SBRT in Nov 2019. Repeat CAP CT 1/2020 showed RLL nodule resolved, no evidence of cancer. Last seen in Oncology clinic 6/22/2020 with clear chest CT.      Exposure History:  Around [employer] growing up and smoked for 50 years (~ 1 ppd) until quitting about 5 years ago. Lives in an apartment, , one dog.     Objective:   Medications:  Current Outpatient Medications   Medication     ACETAMINOPHEN PO     albuterol (PROVENTIL) (2.5 MG/3ML) 0.083% neb solution     atorvastatin (LIPITOR) 10 MG  tablet     benzonatate (TESSALON) 100 MG capsule     Elastic Bandages & Supports (MEDICAL COMPRESSION SOCKS) MISC     omeprazole (PRILOSEC) 20 MG DR capsule     sodium chloride (NEBUSAL) 3 % neb solution     No current facility-administered medications for this visit.      Physical Exam:     /71   Pulse 109   Resp 18   Wt 96.2 kg (212 lb)   SpO2 97%   BMI 36.39 kg/m    General:  Alert, NAD  HEENT: anicteric  Neck: no palpable lymphadenopathy  Chest: CTAB, no wheezing, no crackles  Cardiac: RRR no murmurs, good radial pulses  Abdomen: Soft, flat, non tender, active BS  Extremities: No LE Edema  Neuro: A&Ox3, no focal deficits   Skin: no rash noted    Labs and imaging: All laboratory and imaging data personally reviewed, pertinent results discussed above.      Most Recent [facility] Pulmonary Function Testing    FVC-Pred   Date Value Ref Range Status   07/15/2020 2.93 L      FVC-Pre   Date Value Ref Range Status   07/15/2020 2.63 L      FVC-%Pred-Pre   Date Value Ref Range Status   07/15/2020 89 %      FEV1-Pre   Date Value Ref Range Status   07/15/2020 1.95 L      FEV1-%Pred-Pre   Date Value Ref Range Status   07/15/2020 85 %      FEV1FVC-Pred   Date Value Ref Range Status   07/15/2020 78 %      FEV1FVC-Pre   Date Value Ref Range Status   07/15/2020 74 %      No results found for: 20029  FEFMax-Pred   Date Value Ref Range Status   07/15/2020 5.80 L/sec      FEFMax-Pre   Date Value Ref Range Status   07/15/2020 4.49 L/sec      FEFMax-%Pred-Pre   Date Value Ref Range Status   07/15/2020 77 %      ExpTime-Pre   Date Value Ref Range Status   07/15/2020 7.16 sec      FIFMax-Pre   Date Value Ref Range Status   07/15/2020 4.31 L/sec      FEV1FEV6-Pred   Date Value Ref Range Status   07/15/2020 79 %      FEV1FEV6-Pre   Date Value Ref Range Status   07/15/2020 74 %      No results found for: 73459    7/15/2020   Echocardiogram with two-dimensional, color and spectral Doppler.  Interpretation Summary  Mildly reduced  left ventricular function, LVEF 49% based on biplane 2D tracing.  Global peak LV longitudinal strain is averaged at -16.2%. This suggests abnormal strain (normal <-18%).  Right ventricular function, chamber size, wall motion, and thickness are normal.  This study was compared with the study from 4/12/19: There has been no significant change. Specifically, LVEF is within the range of interstudy variability and appears  unchanged on direct visual comparsion.

## 2020-09-25 ENCOUNTER — OFFICE VISIT (OUTPATIENT)
Dept: PULMONOLOGY | Facility: CLINIC | Age: 70
End: 2020-09-25
Attending: INTERNAL MEDICINE
Payer: COMMERCIAL

## 2020-09-25 VITALS
BODY MASS INDEX: 36.39 KG/M2 | DIASTOLIC BLOOD PRESSURE: 71 MMHG | WEIGHT: 212 LBS | HEART RATE: 109 BPM | RESPIRATION RATE: 18 BRPM | OXYGEN SATURATION: 97 % | SYSTOLIC BLOOD PRESSURE: 118 MMHG

## 2020-09-25 DIAGNOSIS — Z23 ENCOUNTER FOR VACCINATION: Primary | ICD-10-CM

## 2020-09-25 DIAGNOSIS — C34.31 SMALL CELL LUNG CANCER, RIGHT LOWER LOBE (H): ICD-10-CM

## 2020-09-25 PROCEDURE — G0463 HOSPITAL OUTPT CLINIC VISIT: HCPCS | Mod: 25

## 2020-09-25 PROCEDURE — 90662 IIV NO PRSV INCREASED AG IM: CPT | Mod: ZF | Performed by: STUDENT IN AN ORGANIZED HEALTH CARE EDUCATION/TRAINING PROGRAM

## 2020-09-25 PROCEDURE — 25000128 H RX IP 250 OP 636: Mod: ZF | Performed by: STUDENT IN AN ORGANIZED HEALTH CARE EDUCATION/TRAINING PROGRAM

## 2020-09-25 PROCEDURE — G0008 ADMIN INFLUENZA VIRUS VAC: HCPCS | Mod: ZF

## 2020-09-25 RX ORDER — LOPERAMIDE HCL 2 MG
2 CAPSULE ORAL 4 TIMES DAILY PRN
COMMUNITY

## 2020-09-25 RX ORDER — FAMOTIDINE 20 MG
25 TABLET ORAL DAILY
COMMUNITY
End: 2021-01-01

## 2020-09-25 RX ORDER — MULTIVITAMIN WITH IRON
1 TABLET ORAL DAILY
COMMUNITY
End: 2021-01-01

## 2020-09-25 RX ADMIN — INFLUENZA A VIRUS A/MICHIGAN/45/2015 X-275 (H1N1) ANTIGEN (FORMALDEHYDE INACTIVATED), INFLUENZA A VIRUS A/SINGAPORE/INFIMH-16-0019/2016 IVR-186 (H3N2) ANTIGEN (FORMALDEHYDE INACTIVATED), INFLUENZA B VIRUS B/PHUKET/3073/2013 ANTIGEN (FORMALDEHYDE INACTIVATED), AND INFLUENZA B VIRUS B/MARYLAND/15/2016 BX-69A ANTIGEN (FORMALDEHYDE INACTIVATED) 0.7 ML: 60; 60; 60; 60 INJECTION, SUSPENSION INTRAMUSCULAR at 16:10

## 2020-09-25 ASSESSMENT — PAIN SCALES - GENERAL: PAINLEVEL: NO PAIN (0)

## 2020-09-25 NOTE — NURSING NOTE
Chief Complaint   Patient presents with     RECHECK     3 month follow up      Medications reviewed and updated.  Vitals taken  Debbie Tomlin CMA  Flu vaccination given today in right arm. Patient tolerated well.  Debbie Tomlin CMA

## 2020-09-25 NOTE — LETTER
9/25/2020         RE: Yvette Gastelum  [address]  [facility] [address]        Dear Colleague,    Thank you for referring your patient, Yvette Gastelum, to the [facility]. Please see a copy of my visit note below.    [facility]    Pulmonary, Allergy, Critical Care and Sleep Medicine    Follow-up CLINIC Note  9/25/2020    Assessment and Recommendations:    Yvette Gastelum is a 68 yo female with PMH of limited stage small cell lung cancer s/p chemoradiation and prophylactic cranial radiation, CKD, and obesity who presents for chronic cough and dyspnea on exertion.     Small Cell Lung Cancer s/p Chemoradiation and SBRT  Chronic Cough  Suspected Radiation Pneumonitis  Exertional dyspnea and acute on chronic cough following initiation of SBRT for presumed recurrent SCLC in the RLL. Timing of symptoms and straight line appearance to paramediastinal fibrosis on CT would support radiation pneumonitis. Other testing including Echo and PFTs normal/stable. At this time clinical status slightly improved from prior. Will obtain repeat CT, check CRP. If continued evidence of fibrosis and inflammation will likely treat with steroid course. If appears to be improving may consider inhaled corticosteroid.    - CT chest and CRP, pending results will consider oral vs inhaled steroid therapy  - Will discuss results and steroid plan with patient's oncologist Dr. Luz  - Referral to Pulmonary Rehab to help increase daily activity level    Health Maintenance  - Flu shot today  - Up to date on pneumonia vaccinations      Follow up in 3 months    Seen and discussed with Dr. Sarah Davidson MD PhD  Pulmonary and Critical Care Fellow   Pager [phone]      Faculty Addendum:  This patient has been seen and evaluated by me.  I have discussed care with Dr. Davidson and agree with the findings and plan in this note.    Leon Smith MD  Pulmonary/Critical  Care  September 28, 2020 11:29 AM           Subjective, Interval history:   History taken from patient and chart review.     Initially seen in Pulmonary clinic 7/1/2020. Reported chronic bronchitis prior to cancer with sometimes bloody cough, prior treatments with nebulizers and azithromycin. Cough remained stable following chemotherapy but immediately after her first SBRT treatment in November 2019 started to have worsening cough, fatigue, and dyspnea on exertion. Started on airway clearance given reports of trouble clearing secretions. Further testing including an Echo that was stable from prior and PFTs showing moderate diffusion defect and desaturation on 6MW (99 to 95%).     Today reports feeling about the same as earlier in the summer. Cough is not as bad and coughing episodes do not last as long. Does bring up sputum every morning., no hemoptysis. Tessalon has helped, less nocturnal cough. Nebulizers have also helped. Intermittent wheezing. Trying to stay active by walking her dog multiple times per day. Can walk about one block before short of breath. Does a four block walk that she thinks takes about 30-45 min.     Cancer course (summarized from Oncology notes):  Development of hemoptysis prompting imaging in Aug 2018 showing mediastinal lymphadenopathy and a RLL mass invading the pulmonary artery. 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 Treated with concurrent chemoradiation (cisplatin, etoposide) and prophylactic cranial radiation during 9/2018 through 2/2019. Did have a RLL nodule on CT imaging 8/2019 with 0.8 cm hypermetabolic nodule on last PET scan 10/2019. Treated with SBRT in Nov 2019. Repeat CAP CT 1/2020 showed RLL nodule resolved, no evidence of cancer. Last seen in Oncology clinic 6/22/2020 with clear chest CT.      Exposure History:  [place] [employer] growing up and smoked for 50 years (~ 1  ppd) until quitting about 5 years ago. Lives in an apartment, , one dog.     Objective:   Medications:  Current Outpatient Medications   Medication     ACETAMINOPHEN PO     albuterol (PROVENTIL) (2.5 MG/3ML) 0.083% neb solution     atorvastatin (LIPITOR) 10 MG tablet     benzonatate (TESSALON) 100 MG capsule     Elastic Bandages & Supports (MEDICAL COMPRESSION SOCKS) MISC     omeprazole (PRILOSEC) 20 MG DR capsule     sodium chloride (NEBUSAL) 3 % neb solution     No current facility-administered medications for this visit.      Physical Exam:     /71   Pulse 109   Resp 18   Wt 96.2 kg (212 lb)   SpO2 97%   BMI 36.39 kg/m    General:  Alert, NAD  HEENT: anicteric  Neck: no palpable lymphadenopathy  Chest: CTAB, no wheezing, no crackles  Cardiac: RRR no murmurs, good radial pulses  Abdomen: Soft, flat, non tender, active BS  Extremities: No LE Edema  Neuro: A&Ox3, no focal deficits   Skin: no rash noted    Labs and imaging: All laboratory and imaging data personally reviewed, pertinent results discussed above.      Most Recent [facility] Pulmonary Function Testing    FVC-Pred   Date Value Ref Range Status   07/15/2020 2.93 L      FVC-Pre   Date Value Ref Range Status   07/15/2020 2.63 L      FVC-%Pred-Pre   Date Value Ref Range Status   07/15/2020 89 %      FEV1-Pre   Date Value Ref Range Status   07/15/2020 1.95 L      FEV1-%Pred-Pre   Date Value Ref Range Status   07/15/2020 85 %      FEV1FVC-Pred   Date Value Ref Range Status   07/15/2020 78 %      FEV1FVC-Pre   Date Value Ref Range Status   07/15/2020 74 %      No results found for: 20029  FEFMax-Pred   Date Value Ref Range Status   07/15/2020 5.80 L/sec      FEFMax-Pre   Date Value Ref Range Status   07/15/2020 4.49 L/sec      FEFMax-%Pred-Pre   Date Value Ref Range Status   07/15/2020 77 %      ExpTime-Pre   Date Value Ref Range Status   07/15/2020 7.16 sec      FIFMax-Pre   Date Value Ref Range Status   07/15/2020 4.31 L/sec      FEV1FEV6-Pred   Date  Value Ref Range Status   07/15/2020 79 %      FEV1FEV6-Pre   Date Value Ref Range Status   07/15/2020 74 %      No results found for: 18033    7/15/2020   Echocardiogram with two-dimensional, color and spectral Doppler.  Interpretation Summary  Mildly reduced left ventricular function, LVEF 49% based on biplane 2D tracing.  Global peak LV longitudinal strain is averaged at -16.2%. This suggests abnormal strain (normal <-18%).  Right ventricular function, chamber size, wall motion, and thickness are normal.  This study was compared with the study from 4/12/19: There has been no significant change. Specifically, LVEF is within the range of interstudy variability and appears  unchanged on direct visual comparsion.        Again, thank you for allowing me to participate in the care of your patient.        Sincerely,        Virginia Davidson MD

## 2020-09-28 ENCOUNTER — TELEPHONE (OUTPATIENT)
Dept: PULMONOLOGY | Facility: CLINIC | Age: 70
End: 2020-09-28

## 2020-09-28 NOTE — PATIENT INSTRUCTIONS
Schedule your CT and blood test    We will call with the results and discuss next steps in treatment which may include prednisone course or steroid inhaler    Follow up in Pulmonary clinic in 3 months

## 2020-10-01 ENCOUNTER — HOSPITAL ENCOUNTER (OUTPATIENT)
Dept: CT IMAGING | Facility: CLINIC | Age: 70
End: 2020-10-01
Attending: INTERNAL MEDICINE
Payer: COMMERCIAL

## 2020-10-01 ENCOUNTER — HOSPITAL ENCOUNTER (OUTPATIENT)
Dept: LAB | Facility: CLINIC | Age: 70
End: 2020-10-01
Payer: COMMERCIAL

## 2020-10-01 DIAGNOSIS — Z23 ENCOUNTER FOR VACCINATION: ICD-10-CM

## 2020-10-01 DIAGNOSIS — C34.31 SMALL CELL LUNG CANCER, RIGHT LOWER LOBE (H): ICD-10-CM

## 2020-10-01 LAB — CRP SERPL-MCNC: 4.5 MG/L (ref 0–8)

## 2020-10-01 PROCEDURE — 71250 CT THORAX DX C-: CPT

## 2020-10-01 PROCEDURE — 86140 C-REACTIVE PROTEIN: CPT | Performed by: STUDENT IN AN ORGANIZED HEALTH CARE EDUCATION/TRAINING PROGRAM

## 2020-10-01 PROCEDURE — 36415 COLL VENOUS BLD VENIPUNCTURE: CPT | Performed by: STUDENT IN AN ORGANIZED HEALTH CARE EDUCATION/TRAINING PROGRAM

## 2020-10-08 DIAGNOSIS — J70.0 RADIATION PNEUMONITIS (H): Primary | ICD-10-CM

## 2020-10-08 RX ORDER — PREDNISONE 20 MG/1
40 TABLET ORAL DAILY
Qty: 28 TABLET | Refills: 0 | Status: SHIPPED | OUTPATIENT
Start: 2020-10-08 | End: 2020-10-22

## 2020-10-08 RX ORDER — FLUTICASONE PROPIONATE 110 UG/1
1 AEROSOL, METERED RESPIRATORY (INHALATION) 2 TIMES DAILY
Qty: 1 INHALER | Refills: 11 | Status: SHIPPED | OUTPATIENT
Start: 2020-10-08 | End: 2021-01-01

## 2020-10-12 ENCOUNTER — TELEPHONE (OUTPATIENT)
Dept: PULMONOLOGY | Facility: CLINIC | Age: 70
End: 2020-10-12

## 2020-10-12 NOTE — TELEPHONE ENCOUNTER
Jona Capps RN Lambert, Virginia Sanchez MD             Hi Dr Davidson,   Spoke to Yvette and explained she will take 2 weeks of 40 mg Prednisone daily and then Flovent inhaler. She will call clinic if does not receive meds in 5 days and then will put to another pharmacy. Sometimes mail order  can take too long. She has no questions at this time.   Thanks   Jona

## 2020-10-12 NOTE — TELEPHONE ENCOUNTER
----- Message from Virginia Davidson MD sent at 10/8/2020  9:17 AM CDT -----  Regarding: Steroid and inhaler course  Hi,    I saw Ms. Gastelum a few weeks ago for follow up of cough. Suspected radiation pneumonitis after chemo then SBRT for SCLC. Did some additional tests, and based on those Dr. Smith and I want to treat with an ICS inhaler after a 2 week course of steroids. I have tried to reach her multiple times, have left a message, and still haven't gotten through. Went ahead and ordered the meds through her mail service pharmacy. Can you try to reach her?. If she has questions we can set up a time for me to call her to go over things. If you reach her let her know I am updating her Oncologist.    Thanks,  Dandre

## 2020-10-20 ENCOUNTER — TELEPHONE (OUTPATIENT)
Dept: PULMONOLOGY | Facility: CLINIC | Age: 70
End: 2020-10-20

## 2020-10-20 NOTE — TELEPHONE ENCOUNTER
Left message to determine if patient has gotten Prednisone and Flovent inhaler from mail order [facility] pharmacy.    Patient left message that she did get Prednisone and Flovent and is taking per treatment plan.

## 2020-12-18 NOTE — RESULT ENCOUNTER NOTE
Dear Ms. Gastelum,    CT scan is good. No cancer seen.    Please, call me with any questions.    Jennifer Luz MD

## 2020-12-21 NOTE — PROGRESS NOTES
Visit Date:   12/21/2020     ONCOLOGY HISTORY: Ms. Gastelum is a female with small cell lung cancer.    1.  CT chest angiogram on 08/21/2018 for hemoptysis revealed right lower lobe pulmonary mass invading the right lower lobe pulmonary artery and mild mediastinal lymphadenopathy.   -CT abdomen and pelvis on 08/22/2018 did not reveal any evidence of metastatic disease.   -Brain MRI on 08/30/2019 did not reveal any intracranial metastasis.   -CT-guided biopsy of right lung mass on 08/31/2018 revealed high-grade neuroendocrine carcinoma, favor small cell carcinoma.   -PET scan in 08/2019 did not reveal any evidence of metastatic disease.      2.  She had limited stage small cell lung cancer.  She received concurrent chemoradiation with 4 cycles of platinum and etoposide between 09/17/2018 and 12/10/2018. Patient received 5940 cGy in 33 fractions between 10/11/2018 and 11/28/2018.   -Prophylactic cranial radiation between 01/29/2019 and 02/11/2019.      3.  CT chest on 08/21/2019 revealed a new 0.6 cm pulmonary nodule in right lower lobe suspicious for metastatic lesion.  There are 2 additional indeterminate nodular opacities in the right lower lobe posteriorly which are stable.  There is a mildly enlarged subcarinal lymph node which is stable.   -PET scan on 10/28/2019 revealed 0.8 cm right lower lobe nodule which is hypermetabolic and suspicious for malignancy.  No other areas of abnormal uptake.      SUBJECTIVE:  Ms. Gastelum is a 70-year-old female with limited stage small cell lung cancer diagnosed in 2018.  She was treated with chemotherapy and radiation and prophylactic cranial radiation.  She has been doing well.      The patient was having some cough.  She was seen by pulmonologist.  She was treated with steroid and her symptoms had improved.  She had radiation pneumonitis.  Since steroid has been completed, she again is starting to get some cough and also some shortness of breath on exertion.      No headache.   No dizziness.  No chest pain.  No abdominal pain, nausea or vomiting.  No urinary or bowel complaints.  No abnormal bleeding.  No fever, chills or night sweats.  All other review of systems negative.      CT chest, abdomen and pelvis was done on 12/17/2020.  No evidence of malignancy.  There are postradiation changes.      PHYSICAL EXAMINATION:   GENERAL:  The patient is alert, oriented x 3.  He is not in distress.   RESPIRATORY:  No cough.  No labored breathing.    The rest of a comprehensive physical examination is deferred due to [facility] emergency video visit restrictions.      LABORATORY DATA:  Reviewed.      ASSESSMENT:    1.  A 70-year-old female with limited stage small cell lung cancer.  No evidence of disease.   2.  Cough and shortness of breath on exertion.   3.  Anemia.   4.  Thrombocytopenia.      PLAN:   1.  CT scan was reviewed with the patient.  I told her that there is no evidence of any malignancy.  She was happy to know that.  I explained to the patient that there are some postradiation changes on the CT scan.  We will monitor it.  I explained to the patient that she is at risk of recurrence.  For followup of the lung cancer, we will get CT chest, abdomen and pelvis in 6 months.  She is agreeable for it.     2.  The patient has some cough and shortness of breath on exertion; likely sequela of radiation.  I advised her to follow-up with her pulmonologist.     3.  Discussed with her regarding her anemia and thrombocytopenia.  Cause is not clear.  We will recheck CBC and other labs in a month and do a virtual visit after that.     4.  She had a few questions, which were all answered.  I advised the patient to see me in a month with labs.  I advised her to go to the emergency room if she has any fever, chills, worsening weakness, worsening shortness of breath, lump, weight loss, bleeding, easy bruising, or any other concerns.         INOCENTE SABA MD             D: 12/21/2020   T: 12/21/2020    MT: YONATAN      Name:     BISHNU LEY   MRN:      [id number]        Account:      [id number]   :      1950           Visit Date:   2020      Document: [id number]      Video visit time of 20 minutes.

## 2020-12-21 NOTE — LETTER
"    12/21/2020         RE: Yvette Gastelum  [address]  [facility] [place] [facility] [address]        Dear Colleague,    Thank you for referring your patient, Yvette Gastelum, to the [facility]. Please see a copy of my visit note below.    Yvette Gastelum is a 70 year old female who is being evaluated via a billable video visit.      The patient has been notified of following:     \"This video visit will be conducted via a call between you and your physician/provider. We have found that certain health care needs can be provided without the need for an in-person physical exam.  This service lets us provide the care you need with a video conversation.  If a prescription is necessary we can send it directly to your pharmacy.  If lab work is needed we can place an order for that and you can then stop by our lab to have the test done at a later time.    Video visits are billed at different rates depending on your insurance coverage.  Please reach out to your insurance provider with any questions.    If during the course of the call the physician/provider feels a video visit is not appropriate, you will not be charged for this service.\"    Patient has given verbal consent for Video visit? Yes  How would you like to obtain your AVS? Enehart     Patient in the virtual lobby          Video-Visit Details    Type of service:  Video Visit    Originating Location (pt. Location): Home    Distant Location (provider location):  [facility]     Platform used for Video Visit: Richard Elena CMA        Visit Date:   12/21/2020     ONCOLOGY HISTORY: Ms. Gastelum is a female with small cell lung cancer.    1.  CT chest angiogram on 08/21/2018 for hemoptysis revealed right lower lobe pulmonary mass invading the right lower lobe pulmonary artery and mild mediastinal lymphadenopathy.   -CT abdomen and pelvis on 08/22/2018 did not reveal any evidence of metastatic " disease.   -Brain MRI on 08/30/2019 did not reveal any intracranial metastasis.   -CT-guided biopsy of right lung mass on 08/31/2018 revealed high-grade neuroendocrine carcinoma, favor small cell carcinoma.   -PET scan in 08/2019 did not reveal any evidence of metastatic disease.      2.  She had limited stage small cell lung cancer.  She received concurrent chemoradiation with 4 cycles of platinum and etoposide between 09/17/2018 and 12/10/2018. Patient received 5940 cGy in 33 fractions between 10/11/2018 and 11/28/2018.   -Prophylactic cranial radiation between 01/29/2019 and 02/11/2019.      3.  CT chest on 08/21/2019 revealed a new 0.6 cm pulmonary nodule in right lower lobe suspicious for metastatic lesion.  There are 2 additional indeterminate nodular opacities in the right lower lobe posteriorly which are stable.  There is a mildly enlarged subcarinal lymph node which is stable.   -PET scan on 10/28/2019 revealed 0.8 cm right lower lobe nodule which is hypermetabolic and suspicious for malignancy.  No other areas of abnormal uptake.      SUBJECTIVE:  Ms. Gastelum is a 70-year-old female with limited stage small cell lung cancer diagnosed in 2018.  She was treated with chemotherapy and radiation and prophylactic cranial radiation.  She has been doing well.      The patient was having some cough.  She was seen by pulmonologist.  She was treated with steroid and her symptoms had improved.  She had radiation pneumonitis.  Since steroid has been completed, she again is starting to get some cough and also some shortness of breath on exertion.      No headache.  No dizziness.  No chest pain.  No abdominal pain, nausea or vomiting.  No urinary or bowel complaints.  No abnormal bleeding.  No fever, chills or night sweats.  All other review of systems negative.      CT chest, abdomen and pelvis was done on 12/17/2020.  No evidence of malignancy.  There are postradiation changes.      PHYSICAL EXAMINATION:   GENERAL:   The patient is alert, oriented x 3.  He is not in distress.   RESPIRATORY:  No cough.  No labored breathing.    The rest of a comprehensive physical examination is deferred due to [facility] emergency video visit restrictions.      LABORATORY DATA:  Reviewed.      ASSESSMENT:    1.  A 70-year-old female with limited stage small cell lung cancer.  No evidence of disease.   2.  Cough and shortness of breath on exertion.   3.  Anemia.   4.  Thrombocytopenia.      PLAN:   1.  CT scan was reviewed with the patient.  I told her that there is no evidence of any malignancy.  She was happy to know that.  I explained to the patient that there are some postradiation changes on the CT scan.  We will monitor it.  I explained to the patient that she is at risk of recurrence.  For followup of the lung cancer, we will get CT chest, abdomen and pelvis in 6 months.  She is agreeable for it.     2.  The patient has some cough and shortness of breath on exertion; likely sequela of radiation.  I advised her to follow-up with her pulmonologist.     3.  Discussed with her regarding her anemia and thrombocytopenia.  Cause is not clear.  We will recheck CBC and other labs in a month and do a virtual visit after that.     4.  She had a few questions, which were all answered.  I advised the patient to see me in a month with labs.  I advised her to go to the emergency room if she has any fever, chills, worsening weakness, worsening shortness of breath, lump, weight loss, bleeding, easy bruising, or any other concerns.         INOCENTE SABA MD             D: 2020   T: 2020   MT: YONATAN      Name:     BISHNU LEY   MRN:      [id number]        Account:      [id number]   :      1950           Visit Date:   2020      Document: [id number]      Video visit time of 20 minutes.    This office note has been dictated.          Again, thank you for allowing me to participate in the care of your patient.         Sincerely,        Jennifer Luz MD

## 2020-12-21 NOTE — PATIENT INSTRUCTIONS
1.  CBC in 1 month.  -Virtual visit in 1 month.  2.  CT scan in 6 months.  -Follow-up in person in 6 months.    Please call to schedule.

## 2020-12-21 NOTE — LETTER
"    12/21/2020         RE: Yvette Gastelum  [address]  [facility] [address]        Dear Colleague,    Thank you for referring your patient, Yvette Gastelum, to the [facility]. Please see a copy of my visit note below.    Yvette Gastelum is a 70 year old female who is being evaluated via a billable video visit.      The patient has been notified of following:     \"This video visit will be conducted via a call between you and your physician/provider. We have found that certain health care needs can be provided without the need for an in-person physical exam.  This service lets us provide the care you need with a video conversation.  If a prescription is necessary we can send it directly to your pharmacy.  If lab work is needed we can place an order for that and you can then stop by our lab to have the test done at a later time.    Video visits are billed at different rates depending on your insurance coverage.  Please reach out to your insurance provider with any questions.    If during the course of the call the physician/provider feels a video visit is not appropriate, you will not be charged for this service.\"    Patient has given verbal consent for Video visit? Yes  How would you like to obtain your AVS? Enehart     Patient in the virtual lobby          Video-Visit Details    Type of service:  Video Visit    Originating Location (pt. Location): Home    Distant Location (provider location):  [facility]     Platform used for Video Visit: Richard Elena CMA        Visit Date:   12/21/2020     ONCOLOGY HISTORY: Ms. Gastelum is a female with small cell lung cancer.    1.  CT chest angiogram on 08/21/2018 for hemoptysis revealed right lower lobe pulmonary mass invading the right lower lobe pulmonary artery and mild mediastinal lymphadenopathy.   -CT abdomen and pelvis on 08/22/2018 did not reveal any evidence of metastatic " disease.   -Brain MRI on 08/30/2019 did not reveal any intracranial metastasis.   -CT-guided biopsy of right lung mass on 08/31/2018 revealed high-grade neuroendocrine carcinoma, favor small cell carcinoma.   -PET scan in 08/2019 did not reveal any evidence of metastatic disease.      2.  She had limited stage small cell lung cancer.  She received concurrent chemoradiation with 4 cycles of platinum and etoposide between 09/17/2018 and 12/10/2018. Patient received 5940 cGy in 33 fractions between 10/11/2018 and 11/28/2018.   -Prophylactic cranial radiation between 01/29/2019 and 02/11/2019.      3.  CT chest on 08/21/2019 revealed a new 0.6 cm pulmonary nodule in right lower lobe suspicious for metastatic lesion.  There are 2 additional indeterminate nodular opacities in the right lower lobe posteriorly which are stable.  There is a mildly enlarged subcarinal lymph node which is stable.   -PET scan on 10/28/2019 revealed 0.8 cm right lower lobe nodule which is hypermetabolic and suspicious for malignancy.  No other areas of abnormal uptake.      SUBJECTIVE:  Ms. Gastelum is a 70-year-old female with limited stage small cell lung cancer diagnosed in 2018.  She was treated with chemotherapy and radiation and prophylactic cranial radiation.  She has been doing well.      The patient was having some cough.  She was seen by pulmonologist.  She was treated with steroid and her symptoms had improved.  She had radiation pneumonitis.  Since steroid has been completed, she again is starting to get some cough and also some shortness of breath on exertion.      No headache.  No dizziness.  No chest pain.  No abdominal pain, nausea or vomiting.  No urinary or bowel complaints.  No abnormal bleeding.  No fever, chills or night sweats.  All other review of systems negative.      CT chest, abdomen and pelvis was done on 12/17/2020.  No evidence of malignancy.  There are postradiation changes.      PHYSICAL EXAMINATION:   GENERAL:   The patient is alert, oriented x 3.  He is not in distress.   RESPIRATORY:  No cough.  No labored breathing.    The rest of a comprehensive physical examination is deferred due to [facility] emergency video visit restrictions.      LABORATORY DATA:  Reviewed.      ASSESSMENT:    1.  A 70-year-old female with limited stage small cell lung cancer.  No evidence of disease.   2.  Cough and shortness of breath on exertion.   3.  Anemia.   4.  Thrombocytopenia.      PLAN:   1.  CT scan was reviewed with the patient.  I told her that there is no evidence of any malignancy.  She was happy to know that.  I explained to the patient that there are some postradiation changes on the CT scan.  We will monitor it.  I explained to the patient that she is at risk of recurrence.  For followup of the lung cancer, we will get CT chest, abdomen and pelvis in 6 months.  She is agreeable for it.     2.  The patient has some cough and shortness of breath on exertion; likely sequela of radiation.  I advised her to follow-up with her pulmonologist.     3.  Discussed with her regarding her anemia and thrombocytopenia.  Cause is not clear.  We will recheck CBC and other labs in a month and do a virtual visit after that.     4.  She had a few questions, which were all answered.  I advised the patient to see me in a month with labs.  I advised her to go to the emergency room if she has any fever, chills, worsening weakness, worsening shortness of breath, lump, weight loss, bleeding, easy bruising, or any other concerns.         INOCENTE SABA MD             D: 2020   T: 2020   MT: YONATAN      Name:     BISHNU LEY   MRN:      [id number]        Account:      [id number]   :      1950           Visit Date:   2020      Document: [id number]      Video visit time of 20 minutes.    This office note has been dictated.          Again, thank you for allowing me to participate in the care of your patient.         Sincerely,        Jennifer Luz MD

## 2020-12-21 NOTE — PROGRESS NOTES
"Yvette Gastelum is a 70 year old female who is being evaluated via a billable video visit.      The patient has been notified of following:     \"This video visit will be conducted via a call between you and your physician/provider. We have found that certain health care needs can be provided without the need for an in-person physical exam.  This service lets us provide the care you need with a video conversation.  If a prescription is necessary we can send it directly to your pharmacy.  If lab work is needed we can place an order for that and you can then stop by our lab to have the test done at a later time.    Video visits are billed at different rates depending on your insurance coverage.  Please reach out to your insurance provider with any questions.    If during the course of the call the physician/provider feels a video visit is not appropriate, you will not be charged for this service.\"    Patient has given verbal consent for Video visit? Yes  How would you like to obtain your AVS? Enehart     Patient in the virtual lobby          Video-Visit Details    Type of service:  Video Visit    Originating Location (pt. Location): Home    Distant Location (provider location):  [facility] [place]     Platform used for Video Visit: Richard Elena CMA      "

## 2021-01-01 ENCOUNTER — INFUSION THERAPY VISIT (OUTPATIENT)
Dept: INFUSION THERAPY | Facility: CLINIC | Age: 71
End: 2021-01-01
Attending: INTERNAL MEDICINE
Payer: COMMERCIAL

## 2021-01-01 ENCOUNTER — TELEPHONE (OUTPATIENT)
Dept: ONCOLOGY | Facility: CLINIC | Age: 71
End: 2021-01-01

## 2021-01-01 ENCOUNTER — THERAPY VISIT (OUTPATIENT)
Dept: PHYSICAL THERAPY | Facility: CLINIC | Age: 71
End: 2021-01-01
Attending: INTERNAL MEDICINE
Payer: COMMERCIAL

## 2021-01-01 ENCOUNTER — APPOINTMENT (OUTPATIENT)
Dept: PHYSICAL THERAPY | Facility: CLINIC | Age: 71
DRG: 025 | End: 2021-01-01
Payer: COMMERCIAL

## 2021-01-01 ENCOUNTER — PATIENT OUTREACH (OUTPATIENT)
Dept: ONCOLOGY | Facility: CLINIC | Age: 71
End: 2021-01-01

## 2021-01-01 ENCOUNTER — MEDICAL CORRESPONDENCE (OUTPATIENT)
Dept: HEALTH INFORMATION MANAGEMENT | Facility: CLINIC | Age: 71
End: 2021-01-01

## 2021-01-01 ENCOUNTER — ANESTHESIA (OUTPATIENT)
Dept: GASTROENTEROLOGY | Facility: CLINIC | Age: 71
End: 2021-01-01
Payer: COMMERCIAL

## 2021-01-01 ENCOUNTER — APPOINTMENT (OUTPATIENT)
Dept: SPEECH THERAPY | Facility: CLINIC | Age: 71
DRG: 025 | End: 2021-01-01
Payer: COMMERCIAL

## 2021-01-01 ENCOUNTER — PATIENT OUTREACH (OUTPATIENT)
Dept: CARE COORDINATION | Facility: CLINIC | Age: 71
End: 2021-01-01

## 2021-01-01 ENCOUNTER — APPOINTMENT (OUTPATIENT)
Dept: CT IMAGING | Facility: CLINIC | Age: 71
DRG: 025 | End: 2021-01-01
Attending: EMERGENCY MEDICINE
Payer: COMMERCIAL

## 2021-01-01 ENCOUNTER — HOSPITAL ENCOUNTER (OUTPATIENT)
Dept: MAMMOGRAPHY | Facility: CLINIC | Age: 71
Discharge: HOME | End: 2021-03-22
Attending: INTERNAL MEDICINE | Admitting: INTERNAL MEDICINE
Payer: COMMERCIAL

## 2021-01-01 ENCOUNTER — TELEPHONE (OUTPATIENT)
Dept: PHARMACY | Facility: CLINIC | Age: 71
End: 2021-01-01

## 2021-01-01 ENCOUNTER — HOSPITAL ENCOUNTER (OUTPATIENT)
Facility: CLINIC | Age: 71
Discharge: HOME | End: 2021-04-19
Attending: PATHOLOGY | Admitting: PATHOLOGY
Payer: COMMERCIAL

## 2021-01-01 ENCOUNTER — APPOINTMENT (OUTPATIENT)
Dept: GENERAL RADIOLOGY | Facility: CLINIC | Age: 71
DRG: 813 | End: 2021-01-01
Attending: NURSE PRACTITIONER
Payer: COMMERCIAL

## 2021-01-01 ENCOUNTER — ANESTHESIA (OUTPATIENT)
Dept: SURGERY | Facility: CLINIC | Age: 71
DRG: 025 | End: 2021-01-01
Payer: COMMERCIAL

## 2021-01-01 ENCOUNTER — HOSPITAL ENCOUNTER (OUTPATIENT)
Dept: CT IMAGING | Facility: CLINIC | Age: 71
Discharge: HOME | End: 2021-10-01
Attending: PHYSICIAN ASSISTANT | Admitting: PHYSICIAN ASSISTANT
Payer: COMMERCIAL

## 2021-01-01 ENCOUNTER — VIRTUAL VISIT (OUTPATIENT)
Dept: PHARMACY | Facility: CLINIC | Age: 71
End: 2021-01-01
Attending: INTERNAL MEDICINE
Payer: COMMERCIAL

## 2021-01-01 ENCOUNTER — OFFICE VISIT (OUTPATIENT)
Dept: FAMILY MEDICINE | Facility: CLINIC | Age: 71
End: 2021-01-01
Payer: COMMERCIAL

## 2021-01-01 ENCOUNTER — TELEPHONE (OUTPATIENT)
Dept: INTERVENTIONAL RADIOLOGY/VASCULAR | Facility: CLINIC | Age: 71
End: 2021-01-01

## 2021-01-01 ENCOUNTER — ONCOLOGY VISIT (OUTPATIENT)
Dept: ONCOLOGY | Facility: CLINIC | Age: 71
End: 2021-01-01
Attending: NURSE PRACTITIONER
Payer: COMMERCIAL

## 2021-01-01 ENCOUNTER — HOSPITAL ENCOUNTER (OUTPATIENT)
Facility: CLINIC | Age: 71
Discharge: HOME | End: 2021-07-28
Attending: INTERNAL MEDICINE | Admitting: INTERNAL MEDICINE
Payer: COMMERCIAL

## 2021-01-01 ENCOUNTER — ONCOLOGY VISIT (OUTPATIENT)
Dept: ONCOLOGY | Facility: CLINIC | Age: 71
End: 2021-01-01
Attending: INTERNAL MEDICINE
Payer: COMMERCIAL

## 2021-01-01 ENCOUNTER — HOSPITAL ENCOUNTER (OUTPATIENT)
Facility: CLINIC | Age: 71
Discharge: NURSING, REHAB OR LONG-TERM CARE | End: 2021-07-26
Attending: INTERNAL MEDICINE | Admitting: INTERNAL MEDICINE
Payer: COMMERCIAL

## 2021-01-01 ENCOUNTER — INFUSION THERAPY VISIT (OUTPATIENT)
Dept: INFUSION THERAPY | Facility: CLINIC | Age: 71
End: 2021-01-01
Attending: NURSE PRACTITIONER
Payer: COMMERCIAL

## 2021-01-01 ENCOUNTER — HOSPITAL ENCOUNTER (OUTPATIENT)
Facility: CLINIC | Age: 71
Discharge: HOME | End: 2021-08-05
Attending: NURSE PRACTITIONER | Admitting: NURSE PRACTITIONER
Payer: COMMERCIAL

## 2021-01-01 ENCOUNTER — APPOINTMENT (OUTPATIENT)
Dept: OCCUPATIONAL THERAPY | Facility: CLINIC | Age: 71
DRG: 025 | End: 2021-01-01
Payer: COMMERCIAL

## 2021-01-01 ENCOUNTER — APPOINTMENT (OUTPATIENT)
Dept: GENERAL RADIOLOGY | Facility: CLINIC | Age: 71
DRG: 025 | End: 2021-01-01
Attending: INTERNAL MEDICINE
Payer: COMMERCIAL

## 2021-01-01 ENCOUNTER — ANESTHESIA EVENT (OUTPATIENT)
Dept: SURGERY | Facility: CLINIC | Age: 71
DRG: 025 | End: 2021-01-01
Payer: COMMERCIAL

## 2021-01-01 ENCOUNTER — APPOINTMENT (OUTPATIENT)
Dept: OCCUPATIONAL THERAPY | Facility: CLINIC | Age: 71
DRG: 025 | End: 2021-01-01
Attending: HOSPITALIST
Payer: COMMERCIAL

## 2021-01-01 ENCOUNTER — APPOINTMENT (OUTPATIENT)
Dept: OCCUPATIONAL THERAPY | Facility: CLINIC | Age: 71
DRG: 025 | End: 2021-01-01
Attending: PHYSICIAN ASSISTANT
Payer: COMMERCIAL

## 2021-01-01 ENCOUNTER — HOSPITAL ENCOUNTER (OUTPATIENT)
Facility: CLINIC | Age: 71
Discharge: HOME | End: 2021-08-06
Attending: INTERNAL MEDICINE | Admitting: INTERNAL MEDICINE
Payer: COMMERCIAL

## 2021-01-01 ENCOUNTER — HOSPITAL ENCOUNTER (INPATIENT)
Facility: CLINIC | Age: 71
LOS: 9 days | Discharge: NURSING, REHAB OR LONG-TERM CARE | DRG: 025 | End: 2021-07-22
Attending: EMERGENCY MEDICINE | Admitting: INTERNAL MEDICINE
Payer: COMMERCIAL

## 2021-01-01 ENCOUNTER — HOSPITAL ENCOUNTER (OUTPATIENT)
Facility: CLINIC | Age: 71
Discharge: HOME | End: 2021-07-27
Attending: INTERNAL MEDICINE | Admitting: INTERNAL MEDICINE
Payer: COMMERCIAL

## 2021-01-01 ENCOUNTER — HOSPITAL ENCOUNTER (OUTPATIENT)
Dept: CT IMAGING | Facility: CLINIC | Age: 71
Discharge: HOME | End: 2021-08-19
Attending: NURSE PRACTITIONER | Admitting: NURSE PRACTITIONER
Payer: COMMERCIAL

## 2021-01-01 ENCOUNTER — HOSPITAL ENCOUNTER (OUTPATIENT)
Facility: CLINIC | Age: 71
Discharge: HOME | End: 2021-08-20
Attending: INTERNAL MEDICINE | Admitting: INTERNAL MEDICINE
Payer: COMMERCIAL

## 2021-01-01 ENCOUNTER — VIRTUAL VISIT (OUTPATIENT)
Dept: PHARMACY | Facility: CLINIC | Age: 71
End: 2021-01-01
Payer: COMMERCIAL

## 2021-01-01 ENCOUNTER — DISCHARGE SUMMARY NURSING HOME (OUTPATIENT)
Dept: GERIATRICS | Facility: CLINIC | Age: 71
End: 2021-01-01
Payer: COMMERCIAL

## 2021-01-01 ENCOUNTER — ANESTHESIA EVENT (OUTPATIENT)
Dept: GASTROENTEROLOGY | Facility: CLINIC | Age: 71
End: 2021-01-01
Payer: COMMERCIAL

## 2021-01-01 ENCOUNTER — LAB (OUTPATIENT)
Dept: URGENT CARE | Facility: URGENT CARE | Age: 71
End: 2021-01-01
Attending: INTERNAL MEDICINE
Payer: COMMERCIAL

## 2021-01-01 ENCOUNTER — MYC MEDICAL ADVICE (OUTPATIENT)
Dept: FAMILY MEDICINE | Facility: CLINIC | Age: 71
End: 2021-01-01

## 2021-01-01 ENCOUNTER — HOSPITAL ENCOUNTER (OUTPATIENT)
Facility: CLINIC | Age: 71
Discharge: HOME | End: 2021-02-10
Attending: PATHOLOGY | Admitting: PATHOLOGY
Payer: COMMERCIAL

## 2021-01-01 ENCOUNTER — THERAPY VISIT (OUTPATIENT)
Dept: PHYSICAL THERAPY | Facility: CLINIC | Age: 71
End: 2021-01-01
Payer: COMMERCIAL

## 2021-01-01 ENCOUNTER — TELEPHONE (OUTPATIENT)
Dept: NEUROSURGERY | Facility: CLINIC | Age: 71
End: 2021-01-01

## 2021-01-01 ENCOUNTER — APPOINTMENT (OUTPATIENT)
Dept: GENERAL RADIOLOGY | Facility: CLINIC | Age: 71
DRG: 813 | End: 2021-01-01
Attending: EMERGENCY MEDICINE
Payer: COMMERCIAL

## 2021-01-01 ENCOUNTER — APPOINTMENT (OUTPATIENT)
Dept: CT IMAGING | Facility: CLINIC | Age: 71
DRG: 025 | End: 2021-01-01
Attending: PHYSICIAN ASSISTANT
Payer: COMMERCIAL

## 2021-01-01 ENCOUNTER — HOSPITAL ENCOUNTER (OUTPATIENT)
Dept: CT IMAGING | Facility: CLINIC | Age: 71
End: 2021-06-17
Attending: INTERNAL MEDICINE
Payer: COMMERCIAL

## 2021-01-01 ENCOUNTER — HOSPITAL ENCOUNTER (OUTPATIENT)
Facility: CLINIC | Age: 71
End: 2021-07-27
Attending: NURSE PRACTITIONER
Payer: COMMERCIAL

## 2021-01-01 ENCOUNTER — HOSPITAL ENCOUNTER (OUTPATIENT)
Facility: CLINIC | Age: 71
Discharge: HOME | End: 2021-08-13
Attending: INTERNAL MEDICINE | Admitting: INTERNAL MEDICINE
Payer: COMMERCIAL

## 2021-01-01 ENCOUNTER — TELEPHONE (OUTPATIENT)
Dept: CARE COORDINATION | Facility: CLINIC | Age: 71
End: 2021-01-01

## 2021-01-01 ENCOUNTER — DOCUMENTATION ONLY (OUTPATIENT)
Dept: ONCOLOGY | Facility: CLINIC | Age: 71
End: 2021-01-01

## 2021-01-01 ENCOUNTER — APPOINTMENT (OUTPATIENT)
Dept: LAB | Facility: CLINIC | Age: 71
End: 2021-01-01
Payer: COMMERCIAL

## 2021-01-01 ENCOUNTER — APPOINTMENT (OUTPATIENT)
Dept: CT IMAGING | Facility: CLINIC | Age: 71
DRG: 813 | End: 2021-01-01
Attending: STUDENT IN AN ORGANIZED HEALTH CARE EDUCATION/TRAINING PROGRAM
Payer: COMMERCIAL

## 2021-01-01 ENCOUNTER — TELEPHONE (OUTPATIENT)
Dept: FAMILY MEDICINE | Facility: CLINIC | Age: 71
End: 2021-01-01

## 2021-01-01 ENCOUNTER — HOSPITAL ENCOUNTER (INPATIENT)
Facility: CLINIC | Age: 71
LOS: 4 days | DRG: 813 | End: 2021-10-31
Attending: EMERGENCY MEDICINE | Admitting: STUDENT IN AN ORGANIZED HEALTH CARE EDUCATION/TRAINING PROGRAM
Payer: COMMERCIAL

## 2021-01-01 ENCOUNTER — APPOINTMENT (OUTPATIENT)
Dept: PHYSICAL THERAPY | Facility: CLINIC | Age: 71
DRG: 025 | End: 2021-01-01
Attending: HOSPITALIST
Payer: COMMERCIAL

## 2021-01-01 ENCOUNTER — APPOINTMENT (OUTPATIENT)
Dept: PHYSICAL THERAPY | Facility: CLINIC | Age: 71
DRG: 025 | End: 2021-01-01
Attending: PHYSICIAN ASSISTANT
Payer: COMMERCIAL

## 2021-01-01 ENCOUNTER — HOSPITAL ENCOUNTER (OUTPATIENT)
Facility: CLINIC | Age: 71
End: 2021-07-28
Attending: INTERNAL MEDICINE | Admitting: INTERNAL MEDICINE
Payer: COMMERCIAL

## 2021-01-01 ENCOUNTER — HOSPITAL ENCOUNTER (OUTPATIENT)
Facility: CLINIC | Age: 71
Setting detail: SPECIMEN
Discharge: HOME | End: 2021-04-02
Attending: INTERNAL MEDICINE | Admitting: INTERNAL MEDICINE
Payer: COMMERCIAL

## 2021-01-01 ENCOUNTER — TRANSITIONAL CARE UNIT VISIT (OUTPATIENT)
Dept: GERIATRICS | Facility: CLINIC | Age: 71
End: 2021-01-01
Payer: COMMERCIAL

## 2021-01-01 ENCOUNTER — ANCILLARY PROCEDURE (OUTPATIENT)
Dept: GENERAL RADIOLOGY | Facility: CLINIC | Age: 71
End: 2021-01-01
Attending: INTERNAL MEDICINE
Payer: COMMERCIAL

## 2021-01-01 ENCOUNTER — LAB REQUISITION (OUTPATIENT)
Dept: LAB | Facility: CLINIC | Age: 71
End: 2021-01-01

## 2021-01-01 ENCOUNTER — APPOINTMENT (OUTPATIENT)
Dept: SPEECH THERAPY | Facility: CLINIC | Age: 71
DRG: 025 | End: 2021-01-01
Attending: HOSPITALIST
Payer: COMMERCIAL

## 2021-01-01 ENCOUNTER — CARE COORDINATION (OUTPATIENT)
Dept: ONCOLOGY | Facility: CLINIC | Age: 71
End: 2021-01-01

## 2021-01-01 ENCOUNTER — HOSPITAL ENCOUNTER (OUTPATIENT)
Facility: CLINIC | Age: 71
Discharge: HOME | End: 2021-07-25
Attending: INTERNAL MEDICINE | Admitting: NURSE PRACTITIONER
Payer: COMMERCIAL

## 2021-01-01 ENCOUNTER — LAB (OUTPATIENT)
Dept: LAB | Facility: CLINIC | Age: 71
End: 2021-01-01
Attending: INTERNAL MEDICINE
Payer: COMMERCIAL

## 2021-01-01 ENCOUNTER — HOSPITAL ENCOUNTER (OUTPATIENT)
Facility: CLINIC | Age: 71
Discharge: HOME | End: 2021-08-31
Attending: INTERNAL MEDICINE | Admitting: INTERNAL MEDICINE
Payer: COMMERCIAL

## 2021-01-01 ENCOUNTER — TELEPHONE (OUTPATIENT)
Dept: PALLIATIVE MEDICINE | Facility: CLINIC | Age: 71
End: 2021-01-01

## 2021-01-01 ENCOUNTER — HOSPITAL ENCOUNTER (OUTPATIENT)
Facility: CLINIC | Age: 71
Discharge: HOME | End: 2021-08-27
Attending: INTERNAL MEDICINE | Admitting: INTERNAL MEDICINE
Payer: COMMERCIAL

## 2021-01-01 ENCOUNTER — OFFICE VISIT (OUTPATIENT)
Dept: NEUROSURGERY | Facility: CLINIC | Age: 71
End: 2021-01-01
Payer: COMMERCIAL

## 2021-01-01 ENCOUNTER — HOSPITAL ENCOUNTER (OUTPATIENT)
Facility: CLINIC | Age: 71
Setting detail: SPECIMEN
Discharge: HOME | End: 2021-01-20
Attending: INTERNAL MEDICINE | Admitting: INTERNAL MEDICINE
Payer: COMMERCIAL

## 2021-01-01 ENCOUNTER — APPOINTMENT (OUTPATIENT)
Dept: INTERVENTIONAL RADIOLOGY/VASCULAR | Facility: CLINIC | Age: 71
End: 2021-01-01
Attending: INTERNAL MEDICINE
Payer: COMMERCIAL

## 2021-01-01 ENCOUNTER — HOSPITAL ENCOUNTER (OUTPATIENT)
Facility: CLINIC | Age: 71
Discharge: HOME | End: 2021-10-11
Attending: RADIOLOGY | Admitting: RADIOLOGY
Payer: COMMERCIAL

## 2021-01-01 ENCOUNTER — HOSPITAL ENCOUNTER (OUTPATIENT)
Facility: CLINIC | Age: 71
End: 2021-09-04
Attending: INTERNAL MEDICINE
Payer: COMMERCIAL

## 2021-01-01 ENCOUNTER — HOSPITAL ENCOUNTER (INPATIENT)
Facility: CLINIC | Age: 71
LOS: 5 days | Discharge: HOME HEALTH | DRG: 025 | End: 2021-07-10
Attending: EMERGENCY MEDICINE | Admitting: HOSPITALIST
Payer: COMMERCIAL

## 2021-01-01 ENCOUNTER — APPOINTMENT (OUTPATIENT)
Dept: CT IMAGING | Facility: CLINIC | Age: 71
DRG: 813 | End: 2021-01-01
Attending: EMERGENCY MEDICINE
Payer: COMMERCIAL

## 2021-01-01 ENCOUNTER — HOSPITAL ENCOUNTER (OUTPATIENT)
Dept: LAB | Facility: CLINIC | Age: 71
End: 2021-06-17
Attending: INTERNAL MEDICINE
Payer: COMMERCIAL

## 2021-01-01 ENCOUNTER — OFFICE VISIT (OUTPATIENT)
Dept: NEUROSURGERY | Facility: CLINIC | Age: 71
End: 2021-01-01
Attending: PHYSICIAN ASSISTANT
Payer: COMMERCIAL

## 2021-01-01 VITALS
SYSTOLIC BLOOD PRESSURE: 102 MMHG | TEMPERATURE: 98 F | DIASTOLIC BLOOD PRESSURE: 67 MMHG | HEART RATE: 94 BPM | RESPIRATION RATE: 20 BRPM

## 2021-01-01 VITALS
DIASTOLIC BLOOD PRESSURE: 65 MMHG | TEMPERATURE: 98.2 F | HEART RATE: 100 BPM | SYSTOLIC BLOOD PRESSURE: 94 MMHG | RESPIRATION RATE: 16 BRPM | OXYGEN SATURATION: 97 %

## 2021-01-01 VITALS
SYSTOLIC BLOOD PRESSURE: 110 MMHG | RESPIRATION RATE: 18 BRPM | TEMPERATURE: 98.5 F | OXYGEN SATURATION: 100 % | HEART RATE: 91 BPM | DIASTOLIC BLOOD PRESSURE: 68 MMHG | RESPIRATION RATE: 18 BRPM | DIASTOLIC BLOOD PRESSURE: 65 MMHG | SYSTOLIC BLOOD PRESSURE: 95 MMHG | HEART RATE: 92 BPM | TEMPERATURE: 98.3 F

## 2021-01-01 VITALS
OXYGEN SATURATION: 99 % | TEMPERATURE: 97.7 F | HEART RATE: 78 BPM | DIASTOLIC BLOOD PRESSURE: 71 MMHG | RESPIRATION RATE: 14 BRPM | SYSTOLIC BLOOD PRESSURE: 112 MMHG

## 2021-01-01 VITALS
OXYGEN SATURATION: 98 % | HEIGHT: 64 IN | DIASTOLIC BLOOD PRESSURE: 83 MMHG | HEART RATE: 105 BPM | WEIGHT: 206 LBS | TEMPERATURE: 98.5 F | BODY MASS INDEX: 35.17 KG/M2 | SYSTOLIC BLOOD PRESSURE: 127 MMHG

## 2021-01-01 VITALS
TEMPERATURE: 98.1 F | DIASTOLIC BLOOD PRESSURE: 73 MMHG | OXYGEN SATURATION: 100 % | RESPIRATION RATE: 18 BRPM | SYSTOLIC BLOOD PRESSURE: 106 MMHG | HEART RATE: 87 BPM

## 2021-01-01 VITALS
DIASTOLIC BLOOD PRESSURE: 67 MMHG | BODY MASS INDEX: 36.06 KG/M2 | HEIGHT: 64 IN | HEART RATE: 109 BPM | TEMPERATURE: 98.6 F | RESPIRATION RATE: 16 BRPM | OXYGEN SATURATION: 97 % | WEIGHT: 211.2 LBS | SYSTOLIC BLOOD PRESSURE: 120 MMHG

## 2021-01-01 VITALS
BODY MASS INDEX: 30.06 KG/M2 | SYSTOLIC BLOOD PRESSURE: 107 MMHG | TEMPERATURE: 97 F | DIASTOLIC BLOOD PRESSURE: 63 MMHG | HEART RATE: 85 BPM | OXYGEN SATURATION: 99 % | HEIGHT: 70 IN | WEIGHT: 210 LBS

## 2021-01-01 VITALS
SYSTOLIC BLOOD PRESSURE: 94 MMHG | TEMPERATURE: 98.4 F | HEART RATE: 121 BPM | OXYGEN SATURATION: 100 % | BODY MASS INDEX: 34.98 KG/M2 | WEIGHT: 203.8 LBS | DIASTOLIC BLOOD PRESSURE: 71 MMHG | RESPIRATION RATE: 18 BRPM

## 2021-01-01 VITALS
RESPIRATION RATE: 16 BRPM | HEART RATE: 98 BPM | OXYGEN SATURATION: 100 % | SYSTOLIC BLOOD PRESSURE: 102 MMHG | TEMPERATURE: 98.7 F | DIASTOLIC BLOOD PRESSURE: 67 MMHG

## 2021-01-01 VITALS
DIASTOLIC BLOOD PRESSURE: 75 MMHG | OXYGEN SATURATION: 100 % | RESPIRATION RATE: 18 BRPM | HEART RATE: 80 BPM | TEMPERATURE: 97.9 F | SYSTOLIC BLOOD PRESSURE: 112 MMHG

## 2021-01-01 VITALS
DIASTOLIC BLOOD PRESSURE: 57 MMHG | HEART RATE: 85 BPM | RESPIRATION RATE: 20 BRPM | OXYGEN SATURATION: 97 % | SYSTOLIC BLOOD PRESSURE: 119 MMHG | TEMPERATURE: 98 F

## 2021-01-01 VITALS
HEIGHT: 64 IN | OXYGEN SATURATION: 96 % | RESPIRATION RATE: 16 BRPM | BODY MASS INDEX: 36.96 KG/M2 | DIASTOLIC BLOOD PRESSURE: 78 MMHG | HEART RATE: 95 BPM | TEMPERATURE: 98 F | WEIGHT: 216.49 LBS | SYSTOLIC BLOOD PRESSURE: 122 MMHG

## 2021-01-01 VITALS
RESPIRATION RATE: 16 BRPM | DIASTOLIC BLOOD PRESSURE: 64 MMHG | TEMPERATURE: 97.9 F | HEART RATE: 90 BPM | SYSTOLIC BLOOD PRESSURE: 110 MMHG | OXYGEN SATURATION: 97 %

## 2021-01-01 VITALS
DIASTOLIC BLOOD PRESSURE: 66 MMHG | HEIGHT: 64 IN | WEIGHT: 210.32 LBS | HEART RATE: 107 BPM | BODY MASS INDEX: 35.91 KG/M2 | RESPIRATION RATE: 16 BRPM | SYSTOLIC BLOOD PRESSURE: 98 MMHG | OXYGEN SATURATION: 96 % | TEMPERATURE: 98.3 F

## 2021-01-01 VITALS
OXYGEN SATURATION: 100 % | DIASTOLIC BLOOD PRESSURE: 78 MMHG | HEART RATE: 89 BPM | TEMPERATURE: 98.7 F | SYSTOLIC BLOOD PRESSURE: 114 MMHG | RESPIRATION RATE: 16 BRPM

## 2021-01-01 VITALS
HEART RATE: 81 BPM | DIASTOLIC BLOOD PRESSURE: 77 MMHG | TEMPERATURE: 98.3 F | OXYGEN SATURATION: 98 % | RESPIRATION RATE: 16 BRPM | SYSTOLIC BLOOD PRESSURE: 118 MMHG

## 2021-01-01 VITALS
RESPIRATION RATE: 24 BRPM | DIASTOLIC BLOOD PRESSURE: 72 MMHG | HEART RATE: 104 BPM | WEIGHT: 207.3 LBS | TEMPERATURE: 96.9 F | BODY MASS INDEX: 35.58 KG/M2 | OXYGEN SATURATION: 97 % | SYSTOLIC BLOOD PRESSURE: 102 MMHG

## 2021-01-01 VITALS
WEIGHT: 208 LBS | SYSTOLIC BLOOD PRESSURE: 98 MMHG | RESPIRATION RATE: 16 BRPM | HEIGHT: 64 IN | DIASTOLIC BLOOD PRESSURE: 60 MMHG | OXYGEN SATURATION: 97 % | BODY MASS INDEX: 35.51 KG/M2 | HEART RATE: 104 BPM | TEMPERATURE: 98.4 F

## 2021-01-01 VITALS
OXYGEN SATURATION: 95 % | TEMPERATURE: 98.2 F | HEART RATE: 101 BPM | SYSTOLIC BLOOD PRESSURE: 124 MMHG | DIASTOLIC BLOOD PRESSURE: 72 MMHG | RESPIRATION RATE: 20 BRPM

## 2021-01-01 VITALS
TEMPERATURE: 97.9 F | RESPIRATION RATE: 16 BRPM | OXYGEN SATURATION: 97 % | HEART RATE: 103 BPM | DIASTOLIC BLOOD PRESSURE: 67 MMHG | SYSTOLIC BLOOD PRESSURE: 109 MMHG

## 2021-01-01 VITALS
DIASTOLIC BLOOD PRESSURE: 68 MMHG | RESPIRATION RATE: 18 BRPM | DIASTOLIC BLOOD PRESSURE: 82 MMHG | TEMPERATURE: 98.4 F | HEART RATE: 106 BPM | SYSTOLIC BLOOD PRESSURE: 123 MMHG | OXYGEN SATURATION: 97 % | BODY MASS INDEX: 35.99 KG/M2 | OXYGEN SATURATION: 100 % | HEART RATE: 96 BPM | WEIGHT: 206 LBS | HEIGHT: 64 IN | SYSTOLIC BLOOD PRESSURE: 122 MMHG | RESPIRATION RATE: 16 BRPM | BODY MASS INDEX: 35.36 KG/M2 | TEMPERATURE: 98.1 F | WEIGHT: 210.8 LBS

## 2021-01-01 VITALS
TEMPERATURE: 98.3 F | OXYGEN SATURATION: 96 % | SYSTOLIC BLOOD PRESSURE: 119 MMHG | HEART RATE: 105 BPM | DIASTOLIC BLOOD PRESSURE: 84 MMHG | RESPIRATION RATE: 18 BRPM

## 2021-01-01 VITALS
SYSTOLIC BLOOD PRESSURE: 120 MMHG | RESPIRATION RATE: 16 BRPM | TEMPERATURE: 98.8 F | OXYGEN SATURATION: 100 % | HEART RATE: 100 BPM | DIASTOLIC BLOOD PRESSURE: 60 MMHG

## 2021-01-01 VITALS
TEMPERATURE: 98 F | OXYGEN SATURATION: 100 % | RESPIRATION RATE: 18 BRPM | SYSTOLIC BLOOD PRESSURE: 107 MMHG | HEART RATE: 96 BPM | DIASTOLIC BLOOD PRESSURE: 69 MMHG

## 2021-01-01 VITALS
TEMPERATURE: 99.2 F | RESPIRATION RATE: 16 BRPM | OXYGEN SATURATION: 98 % | DIASTOLIC BLOOD PRESSURE: 70 MMHG | HEART RATE: 90 BPM | SYSTOLIC BLOOD PRESSURE: 112 MMHG

## 2021-01-01 VITALS
SYSTOLIC BLOOD PRESSURE: 92 MMHG | RESPIRATION RATE: 24 BRPM | HEART RATE: 96 BPM | TEMPERATURE: 98.2 F | DIASTOLIC BLOOD PRESSURE: 66 MMHG

## 2021-01-01 VITALS
TEMPERATURE: 97 F | WEIGHT: 210 LBS | OXYGEN SATURATION: 97 % | BODY MASS INDEX: 30.06 KG/M2 | HEART RATE: 86 BPM | SYSTOLIC BLOOD PRESSURE: 118 MMHG | DIASTOLIC BLOOD PRESSURE: 73 MMHG | HEIGHT: 70 IN

## 2021-01-01 VITALS
HEART RATE: 96 BPM | RESPIRATION RATE: 18 BRPM | DIASTOLIC BLOOD PRESSURE: 65 MMHG | OXYGEN SATURATION: 97 % | TEMPERATURE: 98.9 F | SYSTOLIC BLOOD PRESSURE: 96 MMHG

## 2021-01-01 VITALS
DIASTOLIC BLOOD PRESSURE: 60 MMHG | OXYGEN SATURATION: 100 % | SYSTOLIC BLOOD PRESSURE: 86 MMHG | HEART RATE: 107 BPM | TEMPERATURE: 98.2 F

## 2021-01-01 VITALS
DIASTOLIC BLOOD PRESSURE: 66 MMHG | HEART RATE: 97 BPM | SYSTOLIC BLOOD PRESSURE: 96 MMHG | TEMPERATURE: 99.1 F | OXYGEN SATURATION: 96 % | RESPIRATION RATE: 20 BRPM

## 2021-01-01 VITALS
DIASTOLIC BLOOD PRESSURE: 60 MMHG | HEART RATE: 117 BPM | SYSTOLIC BLOOD PRESSURE: 93 MMHG | TEMPERATURE: 98.2 F | RESPIRATION RATE: 20 BRPM

## 2021-01-01 VITALS
TEMPERATURE: 99 F | DIASTOLIC BLOOD PRESSURE: 68 MMHG | DIASTOLIC BLOOD PRESSURE: 62 MMHG | SYSTOLIC BLOOD PRESSURE: 102 MMHG | OXYGEN SATURATION: 100 % | HEART RATE: 72 BPM | HEART RATE: 101 BPM | SYSTOLIC BLOOD PRESSURE: 116 MMHG | RESPIRATION RATE: 18 BRPM | OXYGEN SATURATION: 94 % | TEMPERATURE: 98.3 F | RESPIRATION RATE: 20 BRPM

## 2021-01-01 VITALS
SYSTOLIC BLOOD PRESSURE: 92 MMHG | HEART RATE: 92 BPM | RESPIRATION RATE: 21 BRPM | BODY MASS INDEX: 35.85 KG/M2 | OXYGEN SATURATION: 89 % | TEMPERATURE: 97.3 F | HEIGHT: 64 IN | WEIGHT: 210 LBS | DIASTOLIC BLOOD PRESSURE: 78 MMHG

## 2021-01-01 VITALS — OXYGEN SATURATION: 99 % | SYSTOLIC BLOOD PRESSURE: 116 MMHG | DIASTOLIC BLOOD PRESSURE: 79 MMHG | HEART RATE: 102 BPM

## 2021-01-01 VITALS
OXYGEN SATURATION: 97 % | WEIGHT: 206 LBS | TEMPERATURE: 98.9 F | DIASTOLIC BLOOD PRESSURE: 73 MMHG | RESPIRATION RATE: 20 BRPM | SYSTOLIC BLOOD PRESSURE: 106 MMHG | BODY MASS INDEX: 35.36 KG/M2 | HEART RATE: 105 BPM

## 2021-01-01 VITALS
TEMPERATURE: 99.4 F | SYSTOLIC BLOOD PRESSURE: 107 MMHG | HEART RATE: 60 BPM | OXYGEN SATURATION: 97 % | RESPIRATION RATE: 16 BRPM | DIASTOLIC BLOOD PRESSURE: 52 MMHG

## 2021-01-01 VITALS
DIASTOLIC BLOOD PRESSURE: 62 MMHG | HEART RATE: 120 BPM | OXYGEN SATURATION: 100 % | SYSTOLIC BLOOD PRESSURE: 92 MMHG | RESPIRATION RATE: 20 BRPM | TEMPERATURE: 98.1 F

## 2021-01-01 VITALS
RESPIRATION RATE: 28 BRPM | TEMPERATURE: 99.8 F | DIASTOLIC BLOOD PRESSURE: 54 MMHG | OXYGEN SATURATION: 63 % | HEIGHT: 64 IN | BODY MASS INDEX: 34.49 KG/M2 | WEIGHT: 202 LBS | SYSTOLIC BLOOD PRESSURE: 107 MMHG | HEART RATE: 138 BPM

## 2021-01-01 VITALS
HEART RATE: 111 BPM | BODY MASS INDEX: 34.98 KG/M2 | RESPIRATION RATE: 18 BRPM | SYSTOLIC BLOOD PRESSURE: 98 MMHG | DIASTOLIC BLOOD PRESSURE: 70 MMHG | TEMPERATURE: 97.6 F | WEIGHT: 203.8 LBS | OXYGEN SATURATION: 100 %

## 2021-01-01 VITALS
DIASTOLIC BLOOD PRESSURE: 72 MMHG | OXYGEN SATURATION: 100 % | TEMPERATURE: 98.1 F | SYSTOLIC BLOOD PRESSURE: 110 MMHG | HEART RATE: 97 BPM | RESPIRATION RATE: 20 BRPM

## 2021-01-01 VITALS — WEIGHT: 210 LBS | BODY MASS INDEX: 36.05 KG/M2

## 2021-01-01 VITALS
OXYGEN SATURATION: 93 % | RESPIRATION RATE: 16 BRPM | DIASTOLIC BLOOD PRESSURE: 73 MMHG | TEMPERATURE: 97.7 F | SYSTOLIC BLOOD PRESSURE: 116 MMHG | HEART RATE: 62 BPM

## 2021-01-01 VITALS — BODY MASS INDEX: 36.05 KG/M2 | WEIGHT: 210 LBS

## 2021-01-01 DIAGNOSIS — S06.5XAA SDH (SUBDURAL HEMATOMA) (H): ICD-10-CM

## 2021-01-01 DIAGNOSIS — K21.00 GASTROESOPHAGEAL REFLUX DISEASE WITH ESOPHAGITIS, UNSPECIFIED WHETHER HEMORRHAGE: ICD-10-CM

## 2021-01-01 DIAGNOSIS — Z51.11 ENCOUNTER FOR ANTINEOPLASTIC CHEMOTHERAPY: Primary | ICD-10-CM

## 2021-01-01 DIAGNOSIS — D61.818 PANCYTOPENIA (H): ICD-10-CM

## 2021-01-01 DIAGNOSIS — D46.9 MDS (MYELODYSPLASTIC SYNDROME) (H): ICD-10-CM

## 2021-01-01 DIAGNOSIS — D69.6 THROMBOCYTOPENIA (H): ICD-10-CM

## 2021-01-01 DIAGNOSIS — S06.5XAA SUBDURAL HEMATOMA (H): ICD-10-CM

## 2021-01-01 DIAGNOSIS — D64.9 ANEMIA, UNSPECIFIED TYPE: Primary | ICD-10-CM

## 2021-01-01 DIAGNOSIS — Z98.890 S/P CRANIOTOMY: ICD-10-CM

## 2021-01-01 DIAGNOSIS — M10.9 ACUTE GOUT, UNSPECIFIED CAUSE, UNSPECIFIED SITE: Primary | ICD-10-CM

## 2021-01-01 DIAGNOSIS — S06.5XAA SDH (SUBDURAL HEMATOMA) (H): Primary | ICD-10-CM

## 2021-01-01 DIAGNOSIS — T66.XXXS ADVERSE EFFECT OF RADIATION, SEQUELA: ICD-10-CM

## 2021-01-01 DIAGNOSIS — C34.90 SMALL CELL LUNG CANCER (H): ICD-10-CM

## 2021-01-01 DIAGNOSIS — R05.9 COUGH: ICD-10-CM

## 2021-01-01 DIAGNOSIS — K52.9 IBD (INFLAMMATORY BOWEL DISEASE): ICD-10-CM

## 2021-01-01 DIAGNOSIS — Z98.890 S/P CRANIOTOMY: Primary | ICD-10-CM

## 2021-01-01 DIAGNOSIS — N20.0 KIDNEY STONES: ICD-10-CM

## 2021-01-01 DIAGNOSIS — Z51.11 ENCOUNTER FOR ANTINEOPLASTIC CHEMOTHERAPY: ICD-10-CM

## 2021-01-01 DIAGNOSIS — C34.90 SMALL CELL LUNG CANCER (H): Primary | ICD-10-CM

## 2021-01-01 DIAGNOSIS — D46.9 MYELODYSPLASTIC SYNDROME (H): ICD-10-CM

## 2021-01-01 DIAGNOSIS — C34.31 SMALL CELL LUNG CANCER, RIGHT LOWER LOBE (H): Primary | ICD-10-CM

## 2021-01-01 DIAGNOSIS — A41.9 SEPSIS SECONDARY TO UTI (H): ICD-10-CM

## 2021-01-01 DIAGNOSIS — D61.810 ANTINEOPLASTIC CHEMOTHERAPY INDUCED PANCYTOPENIA (H): ICD-10-CM

## 2021-01-01 DIAGNOSIS — I10 BENIGN ESSENTIAL HYPERTENSION: ICD-10-CM

## 2021-01-01 DIAGNOSIS — D69.6 THROMBOCYTOPENIA (H): Primary | ICD-10-CM

## 2021-01-01 DIAGNOSIS — N39.0 SEPSIS SECONDARY TO UTI (H): ICD-10-CM

## 2021-01-01 DIAGNOSIS — D50.9 IRON DEFICIENCY ANEMIA, UNSPECIFIED IRON DEFICIENCY ANEMIA TYPE: ICD-10-CM

## 2021-01-01 DIAGNOSIS — C34.31 SMALL CELL LUNG CANCER, RIGHT LOWER LOBE (H): ICD-10-CM

## 2021-01-01 DIAGNOSIS — R04.2 HEMOPTYSIS: ICD-10-CM

## 2021-01-01 DIAGNOSIS — Z91.89 AT RISK FOR NAUSEA AND VOMITING: ICD-10-CM

## 2021-01-01 DIAGNOSIS — T45.1X5A ANTINEOPLASTIC CHEMOTHERAPY INDUCED PANCYTOPENIA (H): ICD-10-CM

## 2021-01-01 DIAGNOSIS — M10.9 ACUTE GOUT, UNSPECIFIED CAUSE, UNSPECIFIED SITE: ICD-10-CM

## 2021-01-01 DIAGNOSIS — N18.31 STAGE 3A CHRONIC KIDNEY DISEASE (H): ICD-10-CM

## 2021-01-01 DIAGNOSIS — M17.0 PRIMARY OSTEOARTHRITIS OF BOTH KNEES: Primary | ICD-10-CM

## 2021-01-01 DIAGNOSIS — R33.9 URINARY RETENTION: ICD-10-CM

## 2021-01-01 DIAGNOSIS — D61.818 PANCYTOPENIA (H): Primary | ICD-10-CM

## 2021-01-01 DIAGNOSIS — M10.9 ACUTE GOUT OF FOOT, UNSPECIFIED CAUSE, UNSPECIFIED LATERALITY: ICD-10-CM

## 2021-01-01 DIAGNOSIS — E78.5 HYPERLIPIDEMIA LDL GOAL <130: ICD-10-CM

## 2021-01-01 DIAGNOSIS — M79.671 PAIN IN BOTH FEET: Primary | ICD-10-CM

## 2021-01-01 DIAGNOSIS — M17.0 PRIMARY OSTEOARTHRITIS OF BOTH KNEES: ICD-10-CM

## 2021-01-01 DIAGNOSIS — Z11.59 ENCOUNTER FOR SCREENING FOR OTHER VIRAL DISEASES: ICD-10-CM

## 2021-01-01 DIAGNOSIS — D46.9 MDS (MYELODYSPLASTIC SYNDROME) (H): Primary | ICD-10-CM

## 2021-01-01 DIAGNOSIS — K21.9 GASTROESOPHAGEAL REFLUX DISEASE, UNSPECIFIED WHETHER ESOPHAGITIS PRESENT: ICD-10-CM

## 2021-01-01 DIAGNOSIS — M79.672 PAIN IN BOTH FEET: Primary | ICD-10-CM

## 2021-01-01 DIAGNOSIS — Z12.31 ENCOUNTER FOR SCREENING MAMMOGRAM FOR MALIGNANT NEOPLASM OF BREAST: ICD-10-CM

## 2021-01-01 DIAGNOSIS — Z95.828 PORT-A-CATH IN PLACE: ICD-10-CM

## 2021-01-01 DIAGNOSIS — D64.9 ANEMIA, UNSPECIFIED: ICD-10-CM

## 2021-01-01 DIAGNOSIS — M10.9 ACUTE GOUT OF RIGHT FOOT, UNSPECIFIED CAUSE: Primary | ICD-10-CM

## 2021-01-01 DIAGNOSIS — Z78.9 TAKES DIETARY SUPPLEMENTS: ICD-10-CM

## 2021-01-01 DIAGNOSIS — D64.9 ANEMIA, UNSPECIFIED TYPE: ICD-10-CM

## 2021-01-01 DIAGNOSIS — J41.0 SIMPLE CHRONIC BRONCHITIS (H): ICD-10-CM

## 2021-01-01 DIAGNOSIS — Z00.00 ROUTINE GENERAL MEDICAL EXAMINATION AT A HEALTH CARE FACILITY: ICD-10-CM

## 2021-01-01 DIAGNOSIS — Z00.01 ENCOUNTER FOR GENERAL ADULT MEDICAL EXAMINATION WITH ABNORMAL FINDINGS: ICD-10-CM

## 2021-01-01 DIAGNOSIS — E78.5 HYPERLIPIDEMIA: ICD-10-CM

## 2021-01-01 DIAGNOSIS — N18.30 CKD (CHRONIC KIDNEY DISEASE) STAGE 3, GFR 30-59 ML/MIN (H): ICD-10-CM

## 2021-01-01 DIAGNOSIS — M10.9 ACUTE GOUT OF FOOT, UNSPECIFIED CAUSE, UNSPECIFIED LATERALITY: Primary | ICD-10-CM

## 2021-01-01 DIAGNOSIS — Z00.00 ROUTINE GENERAL MEDICAL EXAMINATION AT A HEALTH CARE FACILITY: Primary | ICD-10-CM

## 2021-01-01 DIAGNOSIS — Z13.220 SCREENING FOR HYPERLIPIDEMIA: ICD-10-CM

## 2021-01-01 DIAGNOSIS — K21.9 GERD (GASTROESOPHAGEAL REFLUX DISEASE): Primary | ICD-10-CM

## 2021-01-01 DIAGNOSIS — Z91.89 AT RISK FOR NAUSEA AND VOMITING: Primary | ICD-10-CM

## 2021-01-01 DIAGNOSIS — R91.8 RIGHT LOWER LOBE LUNG MASS: ICD-10-CM

## 2021-01-01 DIAGNOSIS — Z11.1 ENCOUNTER FOR SCREENING FOR RESPIRATORY TUBERCULOSIS: ICD-10-CM

## 2021-01-01 DIAGNOSIS — K58.9 IRRITABLE BOWEL SYNDROME, UNSPECIFIED TYPE: ICD-10-CM

## 2021-01-01 DIAGNOSIS — Z95.828 PORT-A-CATH IN PLACE: Primary | ICD-10-CM

## 2021-01-01 DIAGNOSIS — E78.5 HYPERLIPIDEMIA, UNSPECIFIED HYPERLIPIDEMIA TYPE: ICD-10-CM

## 2021-01-01 LAB
ABO + RH BLD: NORMAL
ABO + RH BLD: NORMAL
ABO/RH(D): NORMAL
ACANTHOCYTES BLD QL SMEAR: ABNORMAL
ACANTHOCYTES BLD QL SMEAR: SLIGHT
ALBUMIN SERPL ELPH-MCNC: 4.1 G/DL (ref 3.7–5.1)
ALBUMIN SERPL-MCNC: 3.4 G/DL (ref 3.4–5)
ALBUMIN SERPL-MCNC: 3.5 G/DL (ref 3.4–5)
ALBUMIN SERPL-MCNC: 4.3 G/DL (ref 3.4–5)
ALBUMIN UR-MCNC: 50 MG/DL
ALBUMIN UR-MCNC: NEGATIVE MG/DL
ALP SERPL-CCNC: 62 U/L (ref 40–150)
ALP SERPL-CCNC: 70 U/L (ref 40–150)
ALP SERPL-CCNC: 83 U/L (ref 40–150)
ALPHA1 GLOB SERPL ELPH-MCNC: 0.3 G/DL (ref 0.2–0.4)
ALPHA2 GLOB SERPL ELPH-MCNC: 0.7 G/DL (ref 0.5–0.9)
ALT SERPL W P-5'-P-CCNC: 12 U/L (ref 0–50)
ALT SERPL W P-5'-P-CCNC: 24 U/L (ref 0–50)
ALT SERPL W P-5'-P-CCNC: 35 U/L (ref 0–50)
ANION GAP SERPL CALCULATED.3IONS-SCNC: 11 MMOL/L (ref 3–14)
ANION GAP SERPL CALCULATED.3IONS-SCNC: 13 MMOL/L (ref 3–14)
ANION GAP SERPL CALCULATED.3IONS-SCNC: 2 MMOL/L (ref 3–14)
ANION GAP SERPL CALCULATED.3IONS-SCNC: 4 MMOL/L (ref 3–14)
ANION GAP SERPL CALCULATED.3IONS-SCNC: 5 MMOL/L (ref 3–14)
ANION GAP SERPL CALCULATED.3IONS-SCNC: 6 MMOL/L (ref 3–14)
ANION GAP SERPL CALCULATED.3IONS-SCNC: 7 MMOL/L (ref 3–14)
ANION GAP SERPL CALCULATED.3IONS-SCNC: 7 MMOL/L (ref 3–14)
ANION GAP SERPL CALCULATED.3IONS-SCNC: 8 MMOL/L (ref 3–14)
ANION GAP SERPL CALCULATED.3IONS-SCNC: 9 MMOL/L (ref 3–14)
ANISOCYTOSIS BLD QL SMEAR: ABNORMAL
ANTIBODY SCREEN: NEGATIVE
APPEARANCE UR: ABNORMAL
APPEARANCE UR: CLEAR
APTT PPP: 35 SECONDS (ref 22–38)
AST SERPL W P-5'-P-CCNC: 25 U/L (ref 0–45)
AST SERPL W P-5'-P-CCNC: 49 U/L (ref 0–45)
AST SERPL W P-5'-P-CCNC: 61 U/L (ref 0–45)
ATRIAL RATE - MUSE: 123 BPM
ATRIAL RATE - MUSE: 94 BPM
B-GLOBULIN SERPL ELPH-MCNC: 1 G/DL (ref 0.6–1)
BACTERIA #/AREA URNS HPF: ABNORMAL /HPF
BACTERIA SPEC CULT: NORMAL
BACTERIA UR CULT: ABNORMAL
BASE EXCESS BLDV CALC-SCNC: -2.4 MMOL/L (ref -7.7–1.9)
BASE EXCESS BLDV CALC-SCNC: -2.9 MMOL/L (ref -7.7–1.9)
BASE EXCESS BLDV CALC-SCNC: -8.6 MMOL/L (ref -7.7–1.9)
BASO STIPL BLD QL SMEAR: PRESENT
BASOPHILS # BLD AUTO: 0 10E9/L (ref 0–0.2)
BASOPHILS # BLD AUTO: 0.1 10E9/L (ref 0–0.2)
BASOPHILS # BLD MANUAL: 0 10E3/UL (ref 0–0.2)
BASOPHILS # BLD MANUAL: 0.1 10E3/UL (ref 0–0.2)
BASOPHILS # BLD MANUAL: 0.2 10E3/UL (ref 0–0.2)
BASOPHILS # BLD MANUAL: 0.3 10E3/UL (ref 0–0.2)
BASOPHILS # BLD MANUAL: 1.2 10E3/UL (ref 0–0.2)
BASOPHILS NFR BLD AUTO: 0 %
BASOPHILS NFR BLD AUTO: 0 %
BASOPHILS NFR BLD AUTO: 0.5 %
BASOPHILS NFR BLD AUTO: 1 %
BASOPHILS NFR BLD AUTO: 1.3 %
BASOPHILS NFR BLD AUTO: 1.5 %
BASOPHILS NFR BLD AUTO: 1.7 %
BASOPHILS NFR BLD AUTO: 3 %
BASOPHILS NFR BLD MANUAL: 0 %
BASOPHILS NFR BLD MANUAL: 1 %
BASOPHILS NFR BLD MANUAL: 2 %
BASOPHILS NFR BLD MANUAL: 3 %
BASOPHILS NFR BLD MANUAL: 5 %
BILIRUB SERPL-MCNC: 0.9 MG/DL (ref 0.2–1.3)
BILIRUB SERPL-MCNC: 1 MG/DL (ref 0.2–1.3)
BILIRUB SERPL-MCNC: 1.7 MG/DL (ref 0.2–1.3)
BILIRUB UR QL STRIP: NEGATIVE
BILIRUB UR QL STRIP: NEGATIVE
BITE CELLS BLD QL SMEAR: SLIGHT
BITE CELLS BLD QL SMEAR: SLIGHT
BLASTS # BLD MANUAL: 0.5 10E3/UL
BLASTS # BLD MANUAL: 0.6 10E3/UL
BLASTS NFR BLD MANUAL: 2 %
BLASTS NFR BLD MANUAL: 3 %
BLD GP AB SCN SERPL QL: NORMAL
BLD PROD TYP BPU: NORMAL
BLD UNIT ID BPU: 0
BLOOD BANK CMNT PATIENT-IMP: NORMAL
BLOOD COMPONENT TYPE: NORMAL
BLOOD PRODUCT CODE: NORMAL
BPU ID: NORMAL
BUN SERPL-MCNC: 13 MG/DL (ref 7–30)
BUN SERPL-MCNC: 14 MG/DL (ref 7–30)
BUN SERPL-MCNC: 14 MG/DL (ref 7–30)
BUN SERPL-MCNC: 15 MG/DL (ref 7–30)
BUN SERPL-MCNC: 16 MG/DL (ref 7–30)
BUN SERPL-MCNC: 18 MG/DL (ref 7–30)
BUN SERPL-MCNC: 22 MG/DL (ref 7–30)
BUN SERPL-MCNC: 24 MG/DL (ref 7–30)
BUN SERPL-MCNC: 24 MG/DL (ref 7–30)
BUN SERPL-MCNC: 26 MG/DL (ref 7–30)
BUN SERPL-MCNC: 27 MG/DL (ref 7–30)
BUN SERPL-MCNC: 29 MG/DL (ref 7–30)
BUN SERPL-MCNC: 29 MG/DL (ref 7–30)
BUN SERPL-MCNC: 36 MG/DL (ref 7–30)
BUN SERPL-MCNC: 42 MG/DL (ref 7–30)
BUN SERPL-MCNC: 61 MG/DL (ref 7–30)
BUN SERPL-MCNC: 63 MG/DL (ref 7–30)
BUN SERPL-MCNC: 69 MG/DL (ref 7–30)
BUN SERPL-MCNC: 73 MG/DL (ref 7–30)
BUN SERPL-MCNC: 77 MG/DL (ref 7–30)
BUN SERPL-MCNC: 80 MG/DL (ref 7–30)
C DIFF TOX B STL QL: NEGATIVE
CALCIUM SERPL-MCNC: 8.1 MG/DL (ref 8.5–10.1)
CALCIUM SERPL-MCNC: 8.2 MG/DL (ref 8.5–10.1)
CALCIUM SERPL-MCNC: 8.4 MG/DL (ref 8.5–10.1)
CALCIUM SERPL-MCNC: 8.4 MG/DL (ref 8.5–10.1)
CALCIUM SERPL-MCNC: 8.5 MG/DL (ref 8.5–10.1)
CALCIUM SERPL-MCNC: 8.5 MG/DL (ref 8.5–10.1)
CALCIUM SERPL-MCNC: 8.6 MG/DL (ref 8.5–10.1)
CALCIUM SERPL-MCNC: 8.7 MG/DL (ref 8.5–10.1)
CALCIUM SERPL-MCNC: 8.8 MG/DL (ref 8.5–10.1)
CALCIUM SERPL-MCNC: 8.9 MG/DL (ref 8.5–10.1)
CALCIUM SERPL-MCNC: 9 MG/DL (ref 8.5–10.1)
CALCIUM SERPL-MCNC: 9.5 MG/DL (ref 8.5–10.1)
CALCIUM SERPL-MCNC: 9.6 MG/DL (ref 8.5–10.1)
CHLORIDE BLD-SCNC: 101 MMOL/L (ref 94–109)
CHLORIDE BLD-SCNC: 102 MMOL/L (ref 94–109)
CHLORIDE BLD-SCNC: 104 MMOL/L (ref 94–109)
CHLORIDE BLD-SCNC: 104 MMOL/L (ref 94–109)
CHLORIDE BLD-SCNC: 105 MMOL/L (ref 94–109)
CHLORIDE BLD-SCNC: 105 MMOL/L (ref 94–109)
CHLORIDE BLD-SCNC: 106 MMOL/L (ref 94–109)
CHLORIDE BLD-SCNC: 107 MMOL/L (ref 94–109)
CHLORIDE BLD-SCNC: 108 MMOL/L (ref 94–109)
CHLORIDE BLD-SCNC: 108 MMOL/L (ref 94–109)
CHLORIDE BLD-SCNC: 112 MMOL/L (ref 94–109)
CHLORIDE BLD-SCNC: 99 MMOL/L (ref 94–109)
CHLORIDE SERPL-SCNC: 101 MMOL/L (ref 94–109)
CHLORIDE SERPL-SCNC: 102 MMOL/L (ref 94–109)
CHLORIDE SERPL-SCNC: 104 MMOL/L (ref 94–109)
CHLORIDE SERPL-SCNC: 105 MMOL/L (ref 94–109)
CHLORIDE SERPL-SCNC: 108 MMOL/L (ref 94–109)
CHLORIDE SERPL-SCNC: 108 MMOL/L (ref 94–109)
CHLORIDE SERPL-SCNC: 110 MMOL/L (ref 94–109)
CHOLEST SERPL-MCNC: 159 MG/DL
CO2 SERPL-SCNC: 18 MMOL/L (ref 20–32)
CO2 SERPL-SCNC: 19 MMOL/L (ref 20–32)
CO2 SERPL-SCNC: 19 MMOL/L (ref 20–32)
CO2 SERPL-SCNC: 20 MMOL/L (ref 20–32)
CO2 SERPL-SCNC: 21 MMOL/L (ref 20–32)
CO2 SERPL-SCNC: 23 MMOL/L (ref 20–32)
CO2 SERPL-SCNC: 23 MMOL/L (ref 20–32)
CO2 SERPL-SCNC: 24 MMOL/L (ref 20–32)
CO2 SERPL-SCNC: 25 MMOL/L (ref 20–32)
CO2 SERPL-SCNC: 25 MMOL/L (ref 20–32)
CO2 SERPL-SCNC: 26 MMOL/L (ref 20–32)
CO2 SERPL-SCNC: 27 MMOL/L (ref 20–32)
CO2 SERPL-SCNC: 28 MMOL/L (ref 20–32)
CO2 SERPL-SCNC: 29 MMOL/L (ref 20–32)
CO2 SERPL-SCNC: 31 MMOL/L (ref 20–32)
CODING SYSTEM: NORMAL
COLOR UR AUTO: ABNORMAL
COLOR UR AUTO: YELLOW
COPATH REPORT: NORMAL
CREAT BLD-MCNC: 1.3 MG/DL (ref 0.52–1.04)
CREAT SERPL-MCNC: 0.76 MG/DL (ref 0.52–1.04)
CREAT SERPL-MCNC: 0.79 MG/DL (ref 0.52–1.04)
CREAT SERPL-MCNC: 0.79 MG/DL (ref 0.52–1.04)
CREAT SERPL-MCNC: 0.8 MG/DL (ref 0.52–1.04)
CREAT SERPL-MCNC: 0.84 MG/DL (ref 0.52–1.04)
CREAT SERPL-MCNC: 0.94 MG/DL (ref 0.52–1.04)
CREAT SERPL-MCNC: 1.01 MG/DL (ref 0.52–1.04)
CREAT SERPL-MCNC: 1.02 MG/DL (ref 0.52–1.04)
CREAT SERPL-MCNC: 1.02 MG/DL (ref 0.52–1.04)
CREAT SERPL-MCNC: 1.05 MG/DL (ref 0.52–1.04)
CREAT SERPL-MCNC: 1.16 MG/DL (ref 0.52–1.04)
CREAT SERPL-MCNC: 1.18 MG/DL (ref 0.52–1.04)
CREAT SERPL-MCNC: 1.23 MG/DL (ref 0.52–1.04)
CREAT SERPL-MCNC: 1.26 MG/DL (ref 0.52–1.04)
CREAT SERPL-MCNC: 1.39 MG/DL (ref 0.52–1.04)
CREAT SERPL-MCNC: 1.69 MG/DL (ref 0.52–1.04)
CREAT SERPL-MCNC: 1.73 MG/DL (ref 0.52–1.04)
CREAT SERPL-MCNC: 2.09 MG/DL (ref 0.52–1.04)
CREAT SERPL-MCNC: 2.23 MG/DL (ref 0.52–1.04)
CREAT SERPL-MCNC: 2.53 MG/DL (ref 0.52–1.04)
CREAT SERPL-MCNC: 3.7 MG/DL (ref 0.52–1.04)
CREAT UR-MCNC: 106 MG/DL
CROSSMATCH: NORMAL
DACRYOCYTES BLD QL SMEAR: ABNORMAL
DACRYOCYTES BLD QL SMEAR: SLIGHT
DIASTOLIC BLOOD PRESSURE - MUSE: NORMAL MMHG
DIASTOLIC BLOOD PRESSURE - MUSE: NORMAL MMHG
DIFFERENTIAL METHOD BLD: ABNORMAL
ELLIPTOCYTES BLD QL SMEAR: SLIGHT
EOSINOPHIL # BLD AUTO: 0.1 10E9/L (ref 0–0.7)
EOSINOPHIL # BLD AUTO: 0.2 10E9/L (ref 0–0.7)
EOSINOPHIL # BLD AUTO: 0.2 10E9/L (ref 0–0.7)
EOSINOPHIL # BLD MANUAL: 0 10E3/UL (ref 0–0.7)
EOSINOPHIL # BLD MANUAL: 0.1 10E3/UL (ref 0–0.7)
EOSINOPHIL # BLD MANUAL: 0.2 10E3/UL (ref 0–0.7)
EOSINOPHIL # BLD MANUAL: 0.2 10E3/UL (ref 0–0.7)
EOSINOPHIL # BLD MANUAL: 0.4 10E3/UL (ref 0–0.7)
EOSINOPHIL # BLD MANUAL: 0.7 10E3/UL (ref 0–0.7)
EOSINOPHIL NFR BLD AUTO: 1.3 %
EOSINOPHIL NFR BLD AUTO: 1.3 %
EOSINOPHIL NFR BLD AUTO: 2 %
EOSINOPHIL NFR BLD AUTO: 2.2 %
EOSINOPHIL NFR BLD AUTO: 3 %
EOSINOPHIL NFR BLD AUTO: 5.5 %
EOSINOPHIL NFR BLD MANUAL: 0 %
EOSINOPHIL NFR BLD MANUAL: 1 %
EOSINOPHIL NFR BLD MANUAL: 2 %
EOSINOPHIL NFR BLD MANUAL: 3 %
EOSINOPHIL NFR BLD MANUAL: 4 %
EOSINOPHIL NFR BLD MANUAL: 5 %
ERYTHROCYTE [DISTWIDTH] IN BLOOD BY AUTOMATED COUNT: 17.4 % (ref 10–15)
ERYTHROCYTE [DISTWIDTH] IN BLOOD BY AUTOMATED COUNT: 17.5 % (ref 10–15)
ERYTHROCYTE [DISTWIDTH] IN BLOOD BY AUTOMATED COUNT: 17.5 % (ref 10–15)
ERYTHROCYTE [DISTWIDTH] IN BLOOD BY AUTOMATED COUNT: 17.6 % (ref 10–15)
ERYTHROCYTE [DISTWIDTH] IN BLOOD BY AUTOMATED COUNT: 19 % (ref 10–15)
ERYTHROCYTE [DISTWIDTH] IN BLOOD BY AUTOMATED COUNT: 20.7 % (ref 10–15)
ERYTHROCYTE [DISTWIDTH] IN BLOOD BY AUTOMATED COUNT: 20.9 % (ref 10–15)
ERYTHROCYTE [DISTWIDTH] IN BLOOD BY AUTOMATED COUNT: 22 % (ref 10–15)
ERYTHROCYTE [DISTWIDTH] IN BLOOD BY AUTOMATED COUNT: 22.3 % (ref 10–15)
ERYTHROCYTE [DISTWIDTH] IN BLOOD BY AUTOMATED COUNT: 22.5 % (ref 10–15)
ERYTHROCYTE [DISTWIDTH] IN BLOOD BY AUTOMATED COUNT: 24.5 % (ref 10–15)
ERYTHROCYTE [DISTWIDTH] IN BLOOD BY AUTOMATED COUNT: 25.2 % (ref 10–15)
ERYTHROCYTE [DISTWIDTH] IN BLOOD BY AUTOMATED COUNT: 25.2 % (ref 10–15)
ERYTHROCYTE [DISTWIDTH] IN BLOOD BY AUTOMATED COUNT: 25.3 % (ref 10–15)
ERYTHROCYTE [DISTWIDTH] IN BLOOD BY AUTOMATED COUNT: 25.5 % (ref 10–15)
ERYTHROCYTE [DISTWIDTH] IN BLOOD BY AUTOMATED COUNT: 25.6 % (ref 10–15)
ERYTHROCYTE [DISTWIDTH] IN BLOOD BY AUTOMATED COUNT: 25.8 % (ref 10–15)
ERYTHROCYTE [DISTWIDTH] IN BLOOD BY AUTOMATED COUNT: 26 % (ref 10–15)
ERYTHROCYTE [DISTWIDTH] IN BLOOD BY AUTOMATED COUNT: 26.1 % (ref 10–15)
ERYTHROCYTE [DISTWIDTH] IN BLOOD BY AUTOMATED COUNT: 26.1 % (ref 10–15)
ERYTHROCYTE [DISTWIDTH] IN BLOOD BY AUTOMATED COUNT: 26.2 % (ref 10–15)
ERYTHROCYTE [DISTWIDTH] IN BLOOD BY AUTOMATED COUNT: 26.3 % (ref 10–15)
ERYTHROCYTE [DISTWIDTH] IN BLOOD BY AUTOMATED COUNT: 26.4 % (ref 10–15)
ERYTHROCYTE [DISTWIDTH] IN BLOOD BY AUTOMATED COUNT: 26.5 % (ref 10–15)
ERYTHROCYTE [DISTWIDTH] IN BLOOD BY AUTOMATED COUNT: 26.6 % (ref 10–15)
ERYTHROCYTE [DISTWIDTH] IN BLOOD BY AUTOMATED COUNT: 26.7 % (ref 10–15)
ERYTHROCYTE [DISTWIDTH] IN BLOOD BY AUTOMATED COUNT: 26.7 % (ref 10–15)
ERYTHROCYTE [DISTWIDTH] IN BLOOD BY AUTOMATED COUNT: 27 % (ref 10–15)
ERYTHROCYTE [DISTWIDTH] IN BLOOD BY AUTOMATED COUNT: 27 % (ref 10–15)
ERYTHROCYTE [DISTWIDTH] IN BLOOD BY AUTOMATED COUNT: 27.2 % (ref 10–15)
ERYTHROCYTE [DISTWIDTH] IN BLOOD BY AUTOMATED COUNT: 27.3 % (ref 10–15)
ERYTHROCYTE [DISTWIDTH] IN BLOOD BY AUTOMATED COUNT: 27.5 % (ref 10–15)
ERYTHROCYTE [DISTWIDTH] IN BLOOD BY AUTOMATED COUNT: 27.5 % (ref 10–15)
ERYTHROCYTE [DISTWIDTH] IN BLOOD BY AUTOMATED COUNT: 27.6 % (ref 10–15)
ERYTHROCYTE [DISTWIDTH] IN BLOOD BY AUTOMATED COUNT: 27.6 % (ref 10–15)
ERYTHROCYTE [DISTWIDTH] IN BLOOD BY AUTOMATED COUNT: 27.7 % (ref 10–15)
ERYTHROCYTE [DISTWIDTH] IN BLOOD BY AUTOMATED COUNT: 27.8 % (ref 10–15)
ERYTHROCYTE [DISTWIDTH] IN BLOOD BY AUTOMATED COUNT: 27.9 % (ref 10–15)
ERYTHROCYTE [DISTWIDTH] IN BLOOD BY AUTOMATED COUNT: 28.7 % (ref 10–15)
ERYTHROCYTE [DISTWIDTH] IN BLOOD BY AUTOMATED COUNT: 28.7 % (ref 10–15)
ERYTHROCYTE [DISTWIDTH] IN BLOOD BY AUTOMATED COUNT: 28.8 % (ref 10–15)
ERYTHROCYTE [DISTWIDTH] IN BLOOD BY AUTOMATED COUNT: 29.4 % (ref 10–15)
ERYTHROCYTE [DISTWIDTH] IN BLOOD BY AUTOMATED COUNT: 29.6 % (ref 10–15)
ERYTHROCYTE [DISTWIDTH] IN BLOOD BY AUTOMATED COUNT: 29.7 % (ref 10–15)
FERRITIN SERPL-MCNC: 482 NG/ML (ref 8–252)
FLUAV RNA SPEC QL NAA+PROBE: NEGATIVE
FLUBV RNA RESP QL NAA+PROBE: NEGATIVE
FOLATE SERPL-MCNC: 40 NG/ML
FRAGMENTS BLD QL SMEAR: ABNORMAL
FRAGMENTS BLD QL SMEAR: SLIGHT
GAMMA GLOB SERPL ELPH-MCNC: 1.1 G/DL (ref 0.7–1.6)
GAMMA INTERFERON BACKGROUND BLD IA-ACNC: 0.02 IU/ML
GFR SERPL CREATININE-BSD FRML MDRD: 12 ML/MIN/1.73M2
GFR SERPL CREATININE-BSD FRML MDRD: 18 ML/MIN/1.73M2
GFR SERPL CREATININE-BSD FRML MDRD: 22 ML/MIN/1.73M2
GFR SERPL CREATININE-BSD FRML MDRD: 23 ML/MIN/1.73M2
GFR SERPL CREATININE-BSD FRML MDRD: 29 ML/MIN/1.73M2
GFR SERPL CREATININE-BSD FRML MDRD: 30 ML/MIN/1.73M2
GFR SERPL CREATININE-BSD FRML MDRD: 38 ML/MIN/1.73M2
GFR SERPL CREATININE-BSD FRML MDRD: 40 ML/MIN/{1.73_M2}
GFR SERPL CREATININE-BSD FRML MDRD: 43 ML/MIN/{1.73_M2}
GFR SERPL CREATININE-BSD FRML MDRD: 45 ML/MIN/1.73M2
GFR SERPL CREATININE-BSD FRML MDRD: 46 ML/MIN/{1.73_M2}
GFR SERPL CREATININE-BSD FRML MDRD: 48 ML/MIN/1.73M2
GFR SERPL CREATININE-BSD FRML MDRD: 54 ML/MIN/{1.73_M2}
GFR SERPL CREATININE-BSD FRML MDRD: 56 ML/MIN/1.73M2
GFR SERPL CREATININE-BSD FRML MDRD: 56 ML/MIN/1.73M2
GFR SERPL CREATININE-BSD FRML MDRD: 57 ML/MIN/1.73M2
GFR SERPL CREATININE-BSD FRML MDRD: 62 ML/MIN/1.73M2
GFR SERPL CREATININE-BSD FRML MDRD: 71 ML/MIN/{1.73_M2}
GFR SERPL CREATININE-BSD FRML MDRD: 74 ML/MIN/{1.73_M2}
GFR SERPL CREATININE-BSD FRML MDRD: 75 ML/MIN/{1.73_M2}
GFR SERPL CREATININE-BSD FRML MDRD: 76 ML/MIN/1.73M2
GFR SERPL CREATININE-BSD FRML MDRD: 79 ML/MIN/{1.73_M2}
GLUCOSE BLD-MCNC: 113 MG/DL (ref 70–99)
GLUCOSE BLD-MCNC: 120 MG/DL (ref 70–99)
GLUCOSE BLD-MCNC: 126 MG/DL (ref 70–99)
GLUCOSE BLD-MCNC: 127 MG/DL (ref 70–99)
GLUCOSE BLD-MCNC: 129 MG/DL (ref 70–99)
GLUCOSE BLD-MCNC: 131 MG/DL (ref 70–99)
GLUCOSE BLD-MCNC: 133 MG/DL (ref 70–99)
GLUCOSE BLD-MCNC: 137 MG/DL (ref 70–99)
GLUCOSE BLD-MCNC: 150 MG/DL (ref 70–99)
GLUCOSE BLD-MCNC: 160 MG/DL (ref 70–99)
GLUCOSE BLD-MCNC: 98 MG/DL (ref 70–99)
GLUCOSE BLD-MCNC: 99 MG/DL (ref 70–99)
GLUCOSE BLDC GLUCOMTR-MCNC: 100 MG/DL (ref 70–99)
GLUCOSE BLDC GLUCOMTR-MCNC: 103 MG/DL (ref 70–99)
GLUCOSE BLDC GLUCOMTR-MCNC: 103 MG/DL (ref 70–99)
GLUCOSE BLDC GLUCOMTR-MCNC: 105 MG/DL (ref 70–99)
GLUCOSE BLDC GLUCOMTR-MCNC: 111 MG/DL (ref 70–99)
GLUCOSE BLDC GLUCOMTR-MCNC: 111 MG/DL (ref 70–99)
GLUCOSE BLDC GLUCOMTR-MCNC: 116 MG/DL (ref 70–99)
GLUCOSE BLDC GLUCOMTR-MCNC: 143 MG/DL (ref 70–99)
GLUCOSE BLDC GLUCOMTR-MCNC: 88 MG/DL (ref 70–99)
GLUCOSE BLDC GLUCOMTR-MCNC: 92 MG/DL (ref 70–99)
GLUCOSE BLDC GLUCOMTR-MCNC: 94 MG/DL (ref 70–99)
GLUCOSE BLDC GLUCOMTR-MCNC: 98 MG/DL (ref 70–99)
GLUCOSE SERPL-MCNC: 104 MG/DL (ref 70–99)
GLUCOSE SERPL-MCNC: 105 MG/DL (ref 70–99)
GLUCOSE SERPL-MCNC: 107 MG/DL (ref 70–99)
GLUCOSE SERPL-MCNC: 110 MG/DL (ref 70–99)
GLUCOSE SERPL-MCNC: 119 MG/DL (ref 70–99)
GLUCOSE SERPL-MCNC: 130 MG/DL (ref 70–99)
GLUCOSE SERPL-MCNC: 99 MG/DL (ref 70–99)
GLUCOSE UR STRIP-MCNC: NEGATIVE MG/DL
GLUCOSE UR STRIP-MCNC: NEGATIVE MG/DL
HCO3 BLDV-SCNC: 18 MMOL/L (ref 21–28)
HCO3 BLDV-SCNC: 22 MMOL/L (ref 21–28)
HCO3 BLDV-SCNC: 23 MMOL/L (ref 21–28)
HCT VFR BLD AUTO: 13.4 % (ref 35–47)
HCT VFR BLD AUTO: 17.6 % (ref 35–47)
HCT VFR BLD AUTO: 18.8 % (ref 35–47)
HCT VFR BLD AUTO: 20.9 % (ref 35–47)
HCT VFR BLD AUTO: 21.7 % (ref 35–47)
HCT VFR BLD AUTO: 22.1 % (ref 35–47)
HCT VFR BLD AUTO: 22.2 % (ref 35–47)
HCT VFR BLD AUTO: 22.4 % (ref 35–47)
HCT VFR BLD AUTO: 23.2 % (ref 35–47)
HCT VFR BLD AUTO: 23.3 % (ref 35–47)
HCT VFR BLD AUTO: 23.9 % (ref 35–47)
HCT VFR BLD AUTO: 24.1 % (ref 35–47)
HCT VFR BLD AUTO: 24.1 % (ref 35–47)
HCT VFR BLD AUTO: 24.2 % (ref 35–47)
HCT VFR BLD AUTO: 24.4 % (ref 35–47)
HCT VFR BLD AUTO: 24.6 % (ref 35–47)
HCT VFR BLD AUTO: 24.9 % (ref 35–47)
HCT VFR BLD AUTO: 25.2 % (ref 35–47)
HCT VFR BLD AUTO: 25.6 % (ref 35–47)
HCT VFR BLD AUTO: 25.7 % (ref 35–47)
HCT VFR BLD AUTO: 25.7 % (ref 35–47)
HCT VFR BLD AUTO: 25.8 % (ref 35–47)
HCT VFR BLD AUTO: 26 % (ref 35–47)
HCT VFR BLD AUTO: 26.2 % (ref 35–47)
HCT VFR BLD AUTO: 26.5 % (ref 35–47)
HCT VFR BLD AUTO: 26.5 % (ref 35–47)
HCT VFR BLD AUTO: 26.9 % (ref 35–47)
HCT VFR BLD AUTO: 26.9 % (ref 35–47)
HCT VFR BLD AUTO: 27 % (ref 35–47)
HCT VFR BLD AUTO: 27.1 % (ref 35–47)
HCT VFR BLD AUTO: 27.1 % (ref 35–47)
HCT VFR BLD AUTO: 27.3 % (ref 35–47)
HCT VFR BLD AUTO: 27.5 % (ref 35–47)
HCT VFR BLD AUTO: 27.5 % (ref 35–47)
HCT VFR BLD AUTO: 27.7 % (ref 35–47)
HCT VFR BLD AUTO: 28.1 % (ref 35–47)
HCT VFR BLD AUTO: 28.1 % (ref 35–47)
HCT VFR BLD AUTO: 28.5 % (ref 35–47)
HCT VFR BLD AUTO: 28.7 % (ref 35–47)
HCT VFR BLD AUTO: 29 % (ref 35–47)
HCT VFR BLD AUTO: 29 % (ref 35–47)
HCT VFR BLD AUTO: 29.3 % (ref 35–47)
HCT VFR BLD AUTO: 29.3 % (ref 35–47)
HCT VFR BLD AUTO: 29.5 % (ref 35–47)
HCT VFR BLD AUTO: 29.7 % (ref 35–47)
HCT VFR BLD AUTO: 29.7 % (ref 35–47)
HCT VFR BLD AUTO: 30.2 % (ref 35–47)
HCT VFR BLD AUTO: 30.4 % (ref 35–47)
HCT VFR BLD AUTO: 31.1 % (ref 35–47)
HCT VFR BLD AUTO: 31.2 % (ref 35–47)
HCT VFR BLD AUTO: 32.2 % (ref 35–47)
HCT VFR BLD AUTO: 32.5 % (ref 35–47)
HCT VFR BLD AUTO: 33.5 % (ref 35–47)
HCT VFR BLD AUTO: 34.6 % (ref 35–47)
HDLC SERPL-MCNC: 40 MG/DL
HGB BLD-MCNC: 4.3 G/DL (ref 11.7–15.7)
HGB BLD-MCNC: 5.5 G/DL (ref 11.7–15.7)
HGB BLD-MCNC: 5.8 G/DL (ref 11.7–15.7)
HGB BLD-MCNC: 6.6 G/DL (ref 11.7–15.7)
HGB BLD-MCNC: 6.8 G/DL (ref 11.7–15.7)
HGB BLD-MCNC: 6.9 G/DL (ref 11.7–15.7)
HGB BLD-MCNC: 6.9 G/DL (ref 11.7–15.7)
HGB BLD-MCNC: 7 G/DL (ref 11.7–15.7)
HGB BLD-MCNC: 7.1 G/DL (ref 11.7–15.7)
HGB BLD-MCNC: 7.1 G/DL (ref 11.7–15.7)
HGB BLD-MCNC: 7.3 G/DL (ref 11.7–15.7)
HGB BLD-MCNC: 7.3 G/DL (ref 11.7–15.7)
HGB BLD-MCNC: 7.4 G/DL (ref 11.7–15.7)
HGB BLD-MCNC: 7.4 G/DL (ref 11.7–15.7)
HGB BLD-MCNC: 7.5 G/DL (ref 11.7–15.7)
HGB BLD-MCNC: 7.6 G/DL (ref 11.7–15.7)
HGB BLD-MCNC: 7.7 G/DL (ref 11.7–15.7)
HGB BLD-MCNC: 7.8 G/DL (ref 11.7–15.7)
HGB BLD-MCNC: 7.8 G/DL (ref 11.7–15.7)
HGB BLD-MCNC: 7.9 G/DL (ref 11.7–15.7)
HGB BLD-MCNC: 7.9 G/DL (ref 11.7–15.7)
HGB BLD-MCNC: 8 G/DL (ref 11.7–15.7)
HGB BLD-MCNC: 8.1 G/DL (ref 11.7–15.7)
HGB BLD-MCNC: 8.2 G/DL (ref 11.7–15.7)
HGB BLD-MCNC: 8.2 G/DL (ref 11.7–15.7)
HGB BLD-MCNC: 8.3 G/DL (ref 11.7–15.7)
HGB BLD-MCNC: 8.4 G/DL (ref 11.7–15.7)
HGB BLD-MCNC: 8.4 G/DL (ref 11.7–15.7)
HGB BLD-MCNC: 8.5 G/DL (ref 11.7–15.7)
HGB BLD-MCNC: 8.6 G/DL (ref 11.7–15.7)
HGB BLD-MCNC: 8.6 G/DL (ref 11.7–15.7)
HGB BLD-MCNC: 8.7 G/DL (ref 11.7–15.7)
HGB BLD-MCNC: 8.9 G/DL (ref 11.7–15.7)
HGB BLD-MCNC: 9.1 G/DL (ref 11.7–15.7)
HGB BLD-MCNC: 9.3 G/DL (ref 11.7–15.7)
HGB BLD-MCNC: 9.4 G/DL (ref 11.7–15.7)
HGB BLD-MCNC: 9.5 G/DL (ref 11.7–15.7)
HGB BLD-MCNC: 9.8 G/DL (ref 11.7–15.7)
HGB BLD-MCNC: 9.9 G/DL (ref 11.7–15.7)
HGB BLD-MCNC: 9.9 G/DL (ref 11.7–15.7)
HGB UR QL STRIP: ABNORMAL
HGB UR QL STRIP: NEGATIVE
HOLD SPECIMEN: NORMAL
HYPOCHROMIA BLD QL: PRESENT
HYPOCHROMIA BLD QL: PRESENT
IMM GRANULOCYTES # BLD: 0 10E9/L (ref 0–0.4)
IMM GRANULOCYTES NFR BLD: 0.5 %
IMM GRANULOCYTES NFR BLD: 0.5 %
IMM GRANULOCYTES NFR BLD: 0.6 %
INR PPP: 1.09 (ref 0.85–1.15)
INR PPP: 1.09 (ref 0.86–1.14)
INR PPP: 1.15 (ref 0.85–1.15)
INR PPP: 1.38 (ref 0.85–1.15)
INR PPP: 1.46 (ref 0.85–1.15)
INTERPRETATION ECG - MUSE: NORMAL
INTERPRETATION ECG - MUSE: NORMAL
IRON SATN MFR SERPL: 14 % (ref 15–46)
IRON SATN MFR SERPL: 14 % (ref 15–46)
IRON SATN MFR SERPL: 44 % (ref 15–46)
IRON SERPL-MCNC: 133 UG/DL (ref 35–180)
IRON SERPL-MCNC: 34 UG/DL (ref 35–180)
IRON SERPL-MCNC: 38 UG/DL (ref 35–180)
ISSUE DATE AND TIME: NORMAL
KETONES UR STRIP-MCNC: NEGATIVE MG/DL
KETONES UR STRIP-MCNC: NEGATIVE MG/DL
LABORATORY COMMENT REPORT: NORMAL
LABORATORY COMMENT REPORT: NORMAL
LACTATE SERPL-SCNC: 1.1 MMOL/L (ref 0.7–2)
LACTATE SERPL-SCNC: 1.4 MMOL/L (ref 0.7–2)
LACTATE SERPL-SCNC: 1.5 MMOL/L (ref 0.7–2)
LACTATE SERPL-SCNC: 1.6 MMOL/L (ref 0.7–2)
LDH SERPL L TO P-CCNC: 234 U/L (ref 81–234)
LDLC SERPL CALC-MCNC: 61 MG/DL
LEUKOCYTE ESTERASE UR QL STRIP: NEGATIVE
LEUKOCYTE ESTERASE UR QL STRIP: NEGATIVE
LYMPHOCYTES # BLD AUTO: 1.2 10E9/L (ref 0.8–5.3)
LYMPHOCYTES # BLD AUTO: 1.3 10E9/L (ref 0.8–5.3)
LYMPHOCYTES # BLD AUTO: 1.3 10E9/L (ref 0.8–5.3)
LYMPHOCYTES # BLD AUTO: 1.4 10E9/L (ref 0.8–5.3)
LYMPHOCYTES # BLD AUTO: 1.7 10E9/L (ref 0.8–5.3)
LYMPHOCYTES # BLD AUTO: 1.7 10E9/L (ref 0.8–5.3)
LYMPHOCYTES # BLD AUTO: 1.8 10E9/L (ref 0.8–5.3)
LYMPHOCYTES # BLD AUTO: 2.7 10E9/L (ref 0.8–5.3)
LYMPHOCYTES # BLD MANUAL: 1.2 10E3/UL (ref 0.8–5.3)
LYMPHOCYTES # BLD MANUAL: 1.4 10E3/UL (ref 0.8–5.3)
LYMPHOCYTES # BLD MANUAL: 1.5 10E3/UL (ref 0.8–5.3)
LYMPHOCYTES # BLD MANUAL: 1.5 10E3/UL (ref 0.8–5.3)
LYMPHOCYTES # BLD MANUAL: 1.8 10E3/UL (ref 0.8–5.3)
LYMPHOCYTES # BLD MANUAL: 1.8 10E3/UL (ref 0.8–5.3)
LYMPHOCYTES # BLD MANUAL: 1.9 10E3/UL (ref 0.8–5.3)
LYMPHOCYTES # BLD MANUAL: 12.2 10E3/UL (ref 0.8–5.3)
LYMPHOCYTES # BLD MANUAL: 2 10E3/UL (ref 0.8–5.3)
LYMPHOCYTES # BLD MANUAL: 2.6 10E3/UL (ref 0.8–5.3)
LYMPHOCYTES # BLD MANUAL: 2.8 10E3/UL (ref 0.8–5.3)
LYMPHOCYTES # BLD MANUAL: 2.9 10E3/UL (ref 0.8–5.3)
LYMPHOCYTES # BLD MANUAL: 2.9 10E3/UL (ref 0.8–5.3)
LYMPHOCYTES # BLD MANUAL: 3.3 10E3/UL (ref 0.8–5.3)
LYMPHOCYTES # BLD MANUAL: 3.5 10E3/UL (ref 0.8–5.3)
LYMPHOCYTES # BLD MANUAL: 8.7 10E3/UL (ref 0.8–5.3)
LYMPHOCYTES NFR BLD AUTO: 24 %
LYMPHOCYTES NFR BLD AUTO: 26 %
LYMPHOCYTES NFR BLD AUTO: 32.6 %
LYMPHOCYTES NFR BLD AUTO: 35 %
LYMPHOCYTES NFR BLD AUTO: 36.2 %
LYMPHOCYTES NFR BLD AUTO: 38 %
LYMPHOCYTES NFR BLD AUTO: 42.5 %
LYMPHOCYTES NFR BLD AUTO: 45 %
LYMPHOCYTES NFR BLD MANUAL: 19 %
LYMPHOCYTES NFR BLD MANUAL: 19 %
LYMPHOCYTES NFR BLD MANUAL: 25 %
LYMPHOCYTES NFR BLD MANUAL: 27 %
LYMPHOCYTES NFR BLD MANUAL: 28 %
LYMPHOCYTES NFR BLD MANUAL: 28 %
LYMPHOCYTES NFR BLD MANUAL: 29 %
LYMPHOCYTES NFR BLD MANUAL: 30 %
LYMPHOCYTES NFR BLD MANUAL: 32 %
LYMPHOCYTES NFR BLD MANUAL: 32 %
LYMPHOCYTES NFR BLD MANUAL: 34 %
LYMPHOCYTES NFR BLD MANUAL: 34 %
LYMPHOCYTES NFR BLD MANUAL: 35 %
LYMPHOCYTES NFR BLD MANUAL: 37 %
LYMPHOCYTES NFR BLD MANUAL: 39 %
LYMPHOCYTES NFR BLD MANUAL: 49 %
LYMPHOCYTES NFR BLD MANUAL: 55 %
LYMPHOCYTES NFR BLD MANUAL: 59 %
M PROTEIN SERPL ELPH-MCNC: 0 G/DL
M TB IFN-G BLD-IMP: NEGATIVE
M TB IFN-G CD4+ BCKGRND COR BLD-ACNC: 7.93 IU/ML
MACROCYTES BLD QL SMEAR: PRESENT
MCH RBC QN AUTO: 22 PG (ref 26.5–33)
MCH RBC QN AUTO: 22.3 PG (ref 26.5–33)
MCH RBC QN AUTO: 22.3 PG (ref 26.5–33)
MCH RBC QN AUTO: 22.4 PG (ref 26.5–33)
MCH RBC QN AUTO: 22.4 PG (ref 26.5–33)
MCH RBC QN AUTO: 22.5 PG (ref 26.5–33)
MCH RBC QN AUTO: 22.6 PG (ref 26.5–33)
MCH RBC QN AUTO: 22.7 PG (ref 26.5–33)
MCH RBC QN AUTO: 22.8 PG (ref 26.5–33)
MCH RBC QN AUTO: 22.8 PG (ref 26.5–33)
MCH RBC QN AUTO: 22.9 PG (ref 26.5–33)
MCH RBC QN AUTO: 23 PG (ref 26.5–33)
MCH RBC QN AUTO: 23.1 PG (ref 26.5–33)
MCH RBC QN AUTO: 23.2 PG (ref 26.5–33)
MCH RBC QN AUTO: 23.3 PG (ref 26.5–33)
MCH RBC QN AUTO: 23.4 PG (ref 26.5–33)
MCH RBC QN AUTO: 23.5 PG (ref 26.5–33)
MCH RBC QN AUTO: 23.5 PG (ref 26.5–33)
MCH RBC QN AUTO: 23.6 PG (ref 26.5–33)
MCH RBC QN AUTO: 23.6 PG (ref 26.5–33)
MCH RBC QN AUTO: 23.7 PG (ref 26.5–33)
MCH RBC QN AUTO: 23.9 PG (ref 26.5–33)
MCH RBC QN AUTO: 23.9 PG (ref 26.5–33)
MCH RBC QN AUTO: 24 PG (ref 26.5–33)
MCH RBC QN AUTO: 24.4 PG (ref 26.5–33)
MCH RBC QN AUTO: 24.5 PG (ref 26.5–33)
MCH RBC QN AUTO: 24.7 PG (ref 26.5–33)
MCH RBC QN AUTO: 24.7 PG (ref 26.5–33)
MCH RBC QN AUTO: 25 PG (ref 26.5–33)
MCH RBC QN AUTO: 25.6 PG (ref 26.5–33)
MCH RBC QN AUTO: 25.9 PG (ref 26.5–33)
MCH RBC QN AUTO: 26 PG (ref 26.5–33)
MCH RBC QN AUTO: 26.6 PG (ref 26.5–33)
MCH RBC QN AUTO: 26.7 PG (ref 26.5–33)
MCH RBC QN AUTO: 26.9 PG (ref 26.5–33)
MCH RBC QN AUTO: 27.5 PG (ref 26.5–33)
MCH RBC QN AUTO: 27.7 PG (ref 26.5–33)
MCH RBC QN AUTO: 28.2 PG (ref 26.5–33)
MCH RBC QN AUTO: 28.2 PG (ref 26.5–33)
MCH RBC QN AUTO: 28.3 PG (ref 26.5–33)
MCH RBC QN AUTO: 28.8 PG (ref 26.5–33)
MCHC RBC AUTO-ENTMCNC: 27.8 G/DL (ref 31.5–36.5)
MCHC RBC AUTO-ENTMCNC: 28 G/DL (ref 31.5–36.5)
MCHC RBC AUTO-ENTMCNC: 28.2 G/DL (ref 31.5–36.5)
MCHC RBC AUTO-ENTMCNC: 28.3 G/DL (ref 31.5–36.5)
MCHC RBC AUTO-ENTMCNC: 28.4 G/DL (ref 31.5–36.5)
MCHC RBC AUTO-ENTMCNC: 28.5 G/DL (ref 31.5–36.5)
MCHC RBC AUTO-ENTMCNC: 28.6 G/DL (ref 31.5–36.5)
MCHC RBC AUTO-ENTMCNC: 28.7 G/DL (ref 31.5–36.5)
MCHC RBC AUTO-ENTMCNC: 28.7 G/DL (ref 31.5–36.5)
MCHC RBC AUTO-ENTMCNC: 28.8 G/DL (ref 31.5–36.5)
MCHC RBC AUTO-ENTMCNC: 28.8 G/DL (ref 31.5–36.5)
MCHC RBC AUTO-ENTMCNC: 29.1 G/DL (ref 31.5–36.5)
MCHC RBC AUTO-ENTMCNC: 29.2 G/DL (ref 31.5–36.5)
MCHC RBC AUTO-ENTMCNC: 29.3 G/DL (ref 31.5–36.5)
MCHC RBC AUTO-ENTMCNC: 29.4 G/DL (ref 31.5–36.5)
MCHC RBC AUTO-ENTMCNC: 29.5 G/DL (ref 31.5–36.5)
MCHC RBC AUTO-ENTMCNC: 29.6 G/DL (ref 31.5–36.5)
MCHC RBC AUTO-ENTMCNC: 29.6 G/DL (ref 31.5–36.5)
MCHC RBC AUTO-ENTMCNC: 29.7 G/DL (ref 31.5–36.5)
MCHC RBC AUTO-ENTMCNC: 29.8 G/DL (ref 31.5–36.5)
MCHC RBC AUTO-ENTMCNC: 30 G/DL (ref 31.5–36.5)
MCHC RBC AUTO-ENTMCNC: 30.1 G/DL (ref 31.5–36.5)
MCHC RBC AUTO-ENTMCNC: 30.2 G/DL (ref 31.5–36.5)
MCHC RBC AUTO-ENTMCNC: 30.3 G/DL (ref 31.5–36.5)
MCHC RBC AUTO-ENTMCNC: 30.4 G/DL (ref 31.5–36.5)
MCHC RBC AUTO-ENTMCNC: 30.4 G/DL (ref 31.5–36.5)
MCHC RBC AUTO-ENTMCNC: 30.6 G/DL (ref 31.5–36.5)
MCHC RBC AUTO-ENTMCNC: 30.8 G/DL (ref 31.5–36.5)
MCHC RBC AUTO-ENTMCNC: 30.9 G/DL (ref 31.5–36.5)
MCHC RBC AUTO-ENTMCNC: 31 G/DL (ref 31.5–36.5)
MCHC RBC AUTO-ENTMCNC: 31 G/DL (ref 31.5–36.5)
MCHC RBC AUTO-ENTMCNC: 31.2 G/DL (ref 31.5–36.5)
MCHC RBC AUTO-ENTMCNC: 31.3 G/DL (ref 31.5–36.5)
MCHC RBC AUTO-ENTMCNC: 31.5 G/DL (ref 31.5–36.5)
MCHC RBC AUTO-ENTMCNC: 32 G/DL (ref 31.5–36.5)
MCHC RBC AUTO-ENTMCNC: 32.1 G/DL (ref 31.5–36.5)
MCHC RBC AUTO-ENTMCNC: 32.5 G/DL (ref 31.5–36.5)
MCV RBC AUTO: 76 FL (ref 78–100)
MCV RBC AUTO: 77 FL (ref 78–100)
MCV RBC AUTO: 77 FL (ref 78–100)
MCV RBC AUTO: 78 FL (ref 78–100)
MCV RBC AUTO: 79 FL (ref 78–100)
MCV RBC AUTO: 80 FL (ref 78–100)
MCV RBC AUTO: 81 FL (ref 78–100)
MCV RBC AUTO: 82 FL (ref 78–100)
MCV RBC AUTO: 83 FL (ref 78–100)
MCV RBC AUTO: 83 FL (ref 78–100)
MCV RBC AUTO: 84 FL (ref 78–100)
MCV RBC AUTO: 84 FL (ref 78–100)
MCV RBC AUTO: 85 FL (ref 78–100)
MCV RBC AUTO: 86 FL (ref 78–100)
MCV RBC AUTO: 87 FL (ref 78–100)
MCV RBC AUTO: 88 FL (ref 78–100)
MCV RBC AUTO: 89 FL (ref 78–100)
MCV RBC AUTO: 90 FL (ref 78–100)
MCV RBC AUTO: 92 FL (ref 78–100)
METAMYELOCYTES # BLD MANUAL: 0.1 10E3/UL
METAMYELOCYTES # BLD MANUAL: 0.2 10E3/UL
METAMYELOCYTES # BLD MANUAL: 0.2 10E3/UL
METAMYELOCYTES # BLD MANUAL: 0.3 10E3/UL
METAMYELOCYTES # BLD MANUAL: 0.4 10E3/UL
METAMYELOCYTES # BLD MANUAL: 0.5 10E3/UL
METAMYELOCYTES # BLD MANUAL: 0.6 10E3/UL
METAMYELOCYTES # BLD MANUAL: 0.6 10E3/UL
METAMYELOCYTES # BLD MANUAL: 0.7 10E3/UL
METAMYELOCYTES # BLD MANUAL: 0.8 10E3/UL
METAMYELOCYTES # BLD MANUAL: 2.2 10E3/UL
METAMYELOCYTES # BLD MANUAL: 2.5 10E3/UL
METAMYELOCYTES # BLD: 0 10E9/L
METAMYELOCYTES # BLD: 0.1 10E9/L
METAMYELOCYTES # BLD: 0.2 10E9/L
METAMYELOCYTES # BLD: 0.3 10E9/L
METAMYELOCYTES NFR BLD MANUAL: 1 %
METAMYELOCYTES NFR BLD MANUAL: 10 %
METAMYELOCYTES NFR BLD MANUAL: 19 %
METAMYELOCYTES NFR BLD MANUAL: 2 %
METAMYELOCYTES NFR BLD MANUAL: 2 %
METAMYELOCYTES NFR BLD MANUAL: 23 %
METAMYELOCYTES NFR BLD MANUAL: 3 %
METAMYELOCYTES NFR BLD MANUAL: 3 %
METAMYELOCYTES NFR BLD MANUAL: 4 %
METAMYELOCYTES NFR BLD MANUAL: 5 %
METAMYELOCYTES NFR BLD MANUAL: 7 %
METAMYELOCYTES NFR BLD MANUAL: 8 %
METAMYELOCYTES NFR BLD MANUAL: 8 %
MICROALBUMIN UR-MCNC: <5 MG/L
MICROALBUMIN/CREAT UR: NORMAL MG/G CR (ref 0–25)
MICROCYTES BLD QL SMEAR: PRESENT
MICROCYTES BLD QL SMEAR: PRESENT
MITOGEN IGNF BCKGRD COR BLD-ACNC: 0.01 IU/ML
MITOGEN IGNF BCKGRD COR BLD-ACNC: 0.05 IU/ML
MONOCYTES # BLD AUTO: 0.1 10E9/L (ref 0–1.3)
MONOCYTES # BLD AUTO: 0.2 10E9/L (ref 0–1.3)
MONOCYTES # BLD AUTO: 0.3 10E9/L (ref 0–1.3)
MONOCYTES # BLD AUTO: 0.3 10E9/L (ref 0–1.3)
MONOCYTES # BLD AUTO: 0.5 10E9/L (ref 0–1.3)
MONOCYTES # BLD AUTO: 0.7 10E9/L (ref 0–1.3)
MONOCYTES # BLD MANUAL: 0.2 10E3/UL (ref 0–1.3)
MONOCYTES # BLD MANUAL: 0.3 10E3/UL (ref 0–1.3)
MONOCYTES # BLD MANUAL: 0.4 10E3/UL (ref 0–1.3)
MONOCYTES # BLD MANUAL: 0.5 10E3/UL (ref 0–1.3)
MONOCYTES # BLD MANUAL: 0.5 10E3/UL (ref 0–1.3)
MONOCYTES # BLD MANUAL: 0.6 10E3/UL (ref 0–1.3)
MONOCYTES # BLD MANUAL: 0.8 10E3/UL (ref 0–1.3)
MONOCYTES # BLD MANUAL: 0.8 10E3/UL (ref 0–1.3)
MONOCYTES # BLD MANUAL: 1.1 10E3/UL (ref 0–1.3)
MONOCYTES # BLD MANUAL: 1.3 10E3/UL (ref 0–1.3)
MONOCYTES # BLD MANUAL: 3.5 10E3/UL (ref 0–1.3)
MONOCYTES # BLD MANUAL: 6.1 10E3/UL (ref 0–1.3)
MONOCYTES NFR BLD AUTO: 10 %
MONOCYTES NFR BLD AUTO: 3 %
MONOCYTES NFR BLD AUTO: 3.5 %
MONOCYTES NFR BLD AUTO: 5.9 %
MONOCYTES NFR BLD AUTO: 6 %
MONOCYTES NFR BLD AUTO: 7.9 %
MONOCYTES NFR BLD AUTO: 8 %
MONOCYTES NFR BLD AUTO: 8.9 %
MONOCYTES NFR BLD MANUAL: 10 %
MONOCYTES NFR BLD MANUAL: 11 %
MONOCYTES NFR BLD MANUAL: 13 %
MONOCYTES NFR BLD MANUAL: 17 %
MONOCYTES NFR BLD MANUAL: 18 %
MONOCYTES NFR BLD MANUAL: 18 %
MONOCYTES NFR BLD MANUAL: 26 %
MONOCYTES NFR BLD MANUAL: 4 %
MONOCYTES NFR BLD MANUAL: 4 %
MONOCYTES NFR BLD MANUAL: 5 %
MONOCYTES NFR BLD MANUAL: 6 %
MONOCYTES NFR BLD MANUAL: 7 %
MONOCYTES NFR BLD MANUAL: 7 %
MONOCYTES NFR BLD MANUAL: 8 %
MONOCYTES NFR BLD MANUAL: 8 %
MONOCYTES NFR BLD MANUAL: 9 %
MRSA DNA SPEC QL NAA+PROBE: POSITIVE
MUCOUS THREADS #/AREA URNS LPF: PRESENT /LPF
MYELOCYTES # BLD MANUAL: 0 10E3/UL
MYELOCYTES # BLD MANUAL: 0 10E3/UL
MYELOCYTES # BLD MANUAL: 0.1 10E3/UL
MYELOCYTES # BLD MANUAL: 0.2 10E3/UL
MYELOCYTES # BLD MANUAL: 0.3 10E3/UL
MYELOCYTES # BLD MANUAL: 0.8 10E3/UL
MYELOCYTES # BLD MANUAL: 1.4 10E3/UL
MYELOCYTES # BLD: 0.1 10E9/L
MYELOCYTES NFR BLD MANUAL: 1 %
MYELOCYTES NFR BLD MANUAL: 2 %
MYELOCYTES NFR BLD MANUAL: 3 %
MYELOCYTES NFR BLD MANUAL: 5 %
MYELOCYTES NFR BLD MANUAL: 6 %
MYELOCYTES NFR BLD MANUAL: 6 %
NEUTROPHILS # BLD AUTO: 1.6 10E9/L (ref 1.6–8.3)
NEUTROPHILS # BLD AUTO: 1.9 10E9/L (ref 1.6–8.3)
NEUTROPHILS # BLD AUTO: 1.9 10E9/L (ref 1.6–8.3)
NEUTROPHILS # BLD AUTO: 2 10E9/L (ref 1.6–8.3)
NEUTROPHILS # BLD AUTO: 2.3 10E9/L (ref 1.6–8.3)
NEUTROPHILS # BLD AUTO: 2.3 10E9/L (ref 1.6–8.3)
NEUTROPHILS # BLD AUTO: 4.1 10E9/L (ref 1.6–8.3)
NEUTROPHILS # BLD AUTO: 4.6 10E9/L (ref 1.6–8.3)
NEUTROPHILS # BLD MANUAL: 1.7 10E3/UL (ref 1.6–8.3)
NEUTROPHILS # BLD MANUAL: 1.8 10E3/UL (ref 1.6–8.3)
NEUTROPHILS # BLD MANUAL: 1.9 10E3/UL (ref 1.6–8.3)
NEUTROPHILS # BLD MANUAL: 2.2 10E3/UL (ref 1.6–8.3)
NEUTROPHILS # BLD MANUAL: 2.3 10E3/UL (ref 1.6–8.3)
NEUTROPHILS # BLD MANUAL: 2.8 10E3/UL (ref 1.6–8.3)
NEUTROPHILS # BLD MANUAL: 3 10E3/UL (ref 1.6–8.3)
NEUTROPHILS # BLD MANUAL: 3.8 10E3/UL (ref 1.6–8.3)
NEUTROPHILS # BLD MANUAL: 3.8 10E3/UL (ref 1.6–8.3)
NEUTROPHILS # BLD MANUAL: 4 10E3/UL (ref 1.6–8.3)
NEUTROPHILS # BLD MANUAL: 4.1 10E3/UL (ref 1.6–8.3)
NEUTROPHILS # BLD MANUAL: 4.2 10E3/UL (ref 1.6–8.3)
NEUTROPHILS # BLD MANUAL: 4.5 10E3/UL (ref 1.6–8.3)
NEUTROPHILS # BLD MANUAL: 4.6 10E3/UL (ref 1.6–8.3)
NEUTROPHILS # BLD MANUAL: 4.6 10E3/UL (ref 1.6–8.3)
NEUTROPHILS # BLD MANUAL: 5.3 10E3/UL (ref 1.6–8.3)
NEUTROPHILS # BLD MANUAL: 5.5 10E3/UL (ref 1.6–8.3)
NEUTROPHILS # BLD MANUAL: 5.7 10E3/UL (ref 1.6–8.3)
NEUTROPHILS NFR BLD AUTO: 37 %
NEUTROPHILS NFR BLD AUTO: 46 %
NEUTROPHILS NFR BLD AUTO: 51 %
NEUTROPHILS NFR BLD AUTO: 51.6 %
NEUTROPHILS NFR BLD AUTO: 53.6 %
NEUTROPHILS NFR BLD AUTO: 58 %
NEUTROPHILS NFR BLD AUTO: 58.4 %
NEUTROPHILS NFR BLD AUTO: 66 %
NEUTROPHILS NFR BLD MANUAL: 19 %
NEUTROPHILS NFR BLD MANUAL: 20 %
NEUTROPHILS NFR BLD MANUAL: 33 %
NEUTROPHILS NFR BLD MANUAL: 41 %
NEUTROPHILS NFR BLD MANUAL: 42 %
NEUTROPHILS NFR BLD MANUAL: 42 %
NEUTROPHILS NFR BLD MANUAL: 43 %
NEUTROPHILS NFR BLD MANUAL: 44 %
NEUTROPHILS NFR BLD MANUAL: 45 %
NEUTROPHILS NFR BLD MANUAL: 45 %
NEUTROPHILS NFR BLD MANUAL: 51 %
NEUTROPHILS NFR BLD MANUAL: 51 %
NEUTROPHILS NFR BLD MANUAL: 52 %
NEUTROPHILS NFR BLD MANUAL: 53 %
NEUTROPHILS NFR BLD MANUAL: 54 %
NEUTROPHILS NFR BLD MANUAL: 61 %
NITRATE UR QL: NEGATIVE
NITRATE UR QL: NEGATIVE
NONHDLC SERPL-MCNC: 119 MG/DL
NRBC # BLD AUTO: 0 10*3/UL
NRBC # BLD AUTO: 0 10E3/UL
NRBC # BLD AUTO: 0.1 10E3/UL
NRBC # BLD AUTO: 0.2 10*3/UL
NRBC # BLD AUTO: 0.2 10*3/UL
NRBC # BLD AUTO: 0.2 10E3/UL
NRBC # BLD AUTO: 0.3 10E3/UL
NRBC # BLD AUTO: 0.4 10*3/UL
NRBC # BLD AUTO: 0.5 10E3/UL
NRBC # BLD AUTO: 1.2 10E3/UL
NRBC # BLD AUTO: 1.4 10E3/UL
NRBC BLD AUTO-RTO: 0 /100
NRBC BLD AUTO-RTO: 1 /100
NRBC BLD AUTO-RTO: 3 /100
NRBC BLD AUTO-RTO: 3 /100
NRBC BLD AUTO-RTO: 4 /100
NRBC BLD AUTO-RTO: 4 /100
NRBC BLD AUTO-RTO: 6 /100
NRBC BLD MANUAL-RTO: 1 %
NRBC BLD MANUAL-RTO: 1 %
NRBC BLD MANUAL-RTO: 11 %
NRBC BLD MANUAL-RTO: 12 %
NRBC BLD MANUAL-RTO: 2 %
NRBC BLD MANUAL-RTO: 3 %
NRBC BLD MANUAL-RTO: 4 %
NRBC BLD MANUAL-RTO: 5 %
NT-PROBNP SERPL-MCNC: 2325 PG/ML (ref 0–900)
NT-PROBNP SERPL-MCNC: 2927 PG/ML (ref 0–900)
NT-PROBNP SERPL-MCNC: 5284 PG/ML (ref 0–900)
NUM BPU REQUESTED: 1
O2/TOTAL GAS SETTING VFR VENT: 0 %
O2/TOTAL GAS SETTING VFR VENT: 3 %
O2/TOTAL GAS SETTING VFR VENT: 35 %
OSMOLALITY SERPL: 272 MMOL/KG (ref 280–301)
OSMOLALITY UR: 155 MMOL/KG (ref 100–1200)
OXYHGB MFR BLDV: 20 % (ref 70–75)
OXYHGB MFR BLDV: 54 % (ref 70–75)
OXYHGB MFR BLDV: 63 % (ref 70–75)
P AXIS - MUSE: 52 DEGREES
P AXIS - MUSE: 58 DEGREES
PATH REPORT.COMMENTS IMP SPEC: ABNORMAL
PATH REPORT.COMMENTS IMP SPEC: YES
PATH REPORT.FINAL DX SPEC: ABNORMAL
PATH REPORT.GROSS SPEC: ABNORMAL
PATH REPORT.MICROSCOPIC SPEC OTHER STN: ABNORMAL
PATH REPORT.RELEVANT HX SPEC: ABNORMAL
PATH REPORT.RELEVANT HX SPEC: ABNORMAL
PATH REPORT.SITE OF ORIGIN SPEC: ABNORMAL
PATH REV: ABNORMAL
PATH REV: ABNORMAL
PCO2 BLDV: 38 MM HG (ref 40–50)
PCO2 BLDV: 39 MM HG (ref 40–50)
PCO2 BLDV: 41 MM HG (ref 40–50)
PH BLDV: 7.27 [PH] (ref 7.32–7.43)
PH BLDV: 7.36 [PH] (ref 7.32–7.43)
PH BLDV: 7.37 [PH] (ref 7.32–7.43)
PH UR STRIP: 5.5 [PH] (ref 5–7)
PH UR STRIP: 6 PH (ref 5–7)
PHOSPHATE SERPL-MCNC: 4 MG/DL (ref 2.5–4.5)
PLAT MORPH BLD: ABNORMAL
PLATELET # BLD AUTO: 1 10E3/UL (ref 150–450)
PLATELET # BLD AUTO: 101 10E3/UL (ref 150–450)
PLATELET # BLD AUTO: 104 10E3/UL (ref 150–450)
PLATELET # BLD AUTO: 104 10E3/UL (ref 150–450)
PLATELET # BLD AUTO: 105 10E3/UL (ref 150–450)
PLATELET # BLD AUTO: 113 10E3/UL (ref 150–450)
PLATELET # BLD AUTO: 124 10E3/UL (ref 150–450)
PLATELET # BLD AUTO: 129 10E3/UL (ref 150–450)
PLATELET # BLD AUTO: 129 10E3/UL (ref 150–450)
PLATELET # BLD AUTO: 15 10E3/UL (ref 150–450)
PLATELET # BLD AUTO: 16 10E3/UL (ref 150–450)
PLATELET # BLD AUTO: 18 10E3/UL (ref 150–450)
PLATELET # BLD AUTO: 2 10E3/UL (ref 150–450)
PLATELET # BLD AUTO: 2 10E3/UL (ref 150–450)
PLATELET # BLD AUTO: 21 10E3/UL (ref 150–450)
PLATELET # BLD AUTO: 22 10E3/UL (ref 150–450)
PLATELET # BLD AUTO: 23 10E3/UL (ref 150–450)
PLATELET # BLD AUTO: 23 10E3/UL (ref 150–450)
PLATELET # BLD AUTO: 24 10E3/UL (ref 150–450)
PLATELET # BLD AUTO: 25 10E3/UL (ref 150–450)
PLATELET # BLD AUTO: 26 10E3/UL (ref 150–450)
PLATELET # BLD AUTO: 28 10E3/UL (ref 150–450)
PLATELET # BLD AUTO: 30 10E3/UL (ref 150–450)
PLATELET # BLD AUTO: 34 10E3/UL (ref 150–450)
PLATELET # BLD AUTO: 34 10E9/L (ref 150–450)
PLATELET # BLD AUTO: 39 10E9/L (ref 150–450)
PLATELET # BLD AUTO: 40 10E3/UL (ref 150–450)
PLATELET # BLD AUTO: 44 10E3/UL (ref 150–450)
PLATELET # BLD AUTO: 47 10E3/UL (ref 150–450)
PLATELET # BLD AUTO: 47 10E9/L (ref 150–450)
PLATELET # BLD AUTO: 51 10E9/L (ref 150–450)
PLATELET # BLD AUTO: 53 10E3/UL (ref 150–450)
PLATELET # BLD AUTO: 57 10E3/UL (ref 150–450)
PLATELET # BLD AUTO: 57 10E9/L (ref 150–450)
PLATELET # BLD AUTO: 58 10E9/L (ref 150–450)
PLATELET # BLD AUTO: 59 10E9/L (ref 150–450)
PLATELET # BLD AUTO: 6 10E3/UL (ref 150–450)
PLATELET # BLD AUTO: 60 10E3/UL (ref 150–450)
PLATELET # BLD AUTO: 60 10E9/L (ref 150–450)
PLATELET # BLD AUTO: 62 10E3/UL (ref 150–450)
PLATELET # BLD AUTO: 63 10E9/L (ref 150–450)
PLATELET # BLD AUTO: 66 10E3/UL (ref 150–450)
PLATELET # BLD AUTO: 7 10E3/UL (ref 150–450)
PLATELET # BLD AUTO: 7 10E3/UL (ref 150–450)
PLATELET # BLD AUTO: 73 10E9/L (ref 150–450)
PLATELET # BLD AUTO: 8 10E3/UL (ref 150–450)
PLATELET # BLD AUTO: 84 10E3/UL (ref 150–450)
PLATELET # BLD AUTO: 84 10E3/UL (ref 150–450)
PLATELET # BLD AUTO: 85 10E3/UL (ref 150–450)
PLATELET # BLD AUTO: 86 10E3/UL (ref 150–450)
PLATELET # BLD AUTO: 87 10E9/L (ref 150–450)
PLATELET # BLD AUTO: 88 10E3/UL (ref 150–450)
PLATELET # BLD AUTO: 89 10E3/UL (ref 150–450)
PLATELET # BLD AUTO: 89 10E3/UL (ref 150–450)
PLATELET # BLD AUTO: 91 10E3/UL (ref 150–450)
PLATELET # BLD AUTO: 91 10E9/L (ref 150–450)
PLATELET # BLD AUTO: 93 10E3/UL (ref 150–450)
PLATELET # BLD AUTO: 93 10E3/UL (ref 150–450)
PLATELET # BLD AUTO: 95 10E3/UL (ref 150–450)
PLATELET # BLD AUTO: 98 10E3/UL (ref 150–450)
PLATELET # BLD EST: ABNORMAL 10*3/UL
PO2 BLDV: 16 MM HG (ref 25–47)
PO2 BLDV: 29 MM HG (ref 25–47)
PO2 BLDV: 33 MM HG (ref 25–47)
POTASSIUM BLD-SCNC: 3.2 MMOL/L (ref 3.4–5.3)
POTASSIUM BLD-SCNC: 3.2 MMOL/L (ref 3.4–5.3)
POTASSIUM BLD-SCNC: 3.3 MMOL/L (ref 3.4–5.3)
POTASSIUM BLD-SCNC: 3.3 MMOL/L (ref 3.4–5.3)
POTASSIUM BLD-SCNC: 3.4 MMOL/L (ref 3.4–5.3)
POTASSIUM BLD-SCNC: 3.5 MMOL/L (ref 3.4–5.3)
POTASSIUM BLD-SCNC: 3.7 MMOL/L (ref 3.4–5.3)
POTASSIUM BLD-SCNC: 4.1 MMOL/L (ref 3.4–5.3)
POTASSIUM BLD-SCNC: 4.2 MMOL/L (ref 3.4–5.3)
POTASSIUM BLD-SCNC: 4.3 MMOL/L (ref 3.4–5.3)
POTASSIUM BLD-SCNC: 4.3 MMOL/L (ref 3.4–5.3)
POTASSIUM BLD-SCNC: 4.4 MMOL/L (ref 3.4–5.3)
POTASSIUM BLD-SCNC: 4.4 MMOL/L (ref 3.4–5.3)
POTASSIUM BLD-SCNC: 4.5 MMOL/L (ref 3.4–5.3)
POTASSIUM BLD-SCNC: 4.6 MMOL/L (ref 3.4–5.3)
POTASSIUM SERPL-SCNC: 3.3 MMOL/L (ref 3.4–5.3)
POTASSIUM SERPL-SCNC: 3.5 MMOL/L (ref 3.4–5.3)
POTASSIUM SERPL-SCNC: 3.6 MMOL/L (ref 3.4–5.3)
POTASSIUM SERPL-SCNC: 3.8 MMOL/L (ref 3.4–5.3)
POTASSIUM SERPL-SCNC: 3.9 MMOL/L (ref 3.4–5.3)
POTASSIUM SERPL-SCNC: 3.9 MMOL/L (ref 3.4–5.3)
POTASSIUM SERPL-SCNC: 4.2 MMOL/L (ref 3.4–5.3)
POTASSIUM SERPL-SCNC: 4.4 MMOL/L (ref 3.4–5.3)
PR INTERVAL - MUSE: 142 MS
PR INTERVAL - MUSE: 164 MS
PROCALCITONIN SERPL-MCNC: 0.07 NG/ML
PROCALCITONIN SERPL-MCNC: 4.71 NG/ML
PROMYELOCYTES # BLD MANUAL: 0.1 10E3/UL
PROMYELOCYTES # BLD MANUAL: 0.1 10E9/L
PROMYELOCYTES # BLD MANUAL: 0.2 10E3/UL
PROMYELOCYTES NFR BLD MANUAL: 1 %
PROMYELOCYTES NFR BLD MANUAL: 1 %
PROMYELOCYTES NFR BLD MANUAL: 2 %
PROT PATTERN SERPL ELPH-IMP: NORMAL
PROT SERPL-MCNC: 6.8 G/DL (ref 6.8–8.8)
PROT SERPL-MCNC: 7.9 G/DL (ref 6.8–8.8)
PROT SERPL-MCNC: 8.3 G/DL (ref 6.8–8.8)
QRS DURATION - MUSE: 78 MS
QRS DURATION - MUSE: 94 MS
QT - MUSE: 320 MS
QT - MUSE: 384 MS
QTC - MUSE: 458 MS
QTC - MUSE: 480 MS
QUANTIFERON MITOGEN: 7.95 IU/ML
QUANTIFERON NIL TUBE: 0.02 IU/ML
QUANTIFERON TB1 TUBE: 0.07 IU/ML
QUANTIFERON TB2 TUBE: 0.03
R AXIS - MUSE: 0 DEGREES
R AXIS - MUSE: 12 DEGREES
RADIOLOGIST FLAGS: ABNORMAL
RADIOLOGIST FLAGS: ABNORMAL
RBC # BLD AUTO: 1.55 10E6/UL (ref 3.8–5.2)
RBC # BLD AUTO: 1.91 10E6/UL (ref 3.8–5.2)
RBC # BLD AUTO: 2.17 10E6/UL (ref 3.8–5.2)
RBC # BLD AUTO: 2.41 10E6/UL (ref 3.8–5.2)
RBC # BLD AUTO: 2.48 10E6/UL (ref 3.8–5.2)
RBC # BLD AUTO: 2.66 10E6/UL (ref 3.8–5.2)
RBC # BLD AUTO: 2.76 10E6/UL (ref 3.8–5.2)
RBC # BLD AUTO: 2.76 10E6/UL (ref 3.8–5.2)
RBC # BLD AUTO: 2.79 10E6/UL (ref 3.8–5.2)
RBC # BLD AUTO: 2.9 10E6/UL (ref 3.8–5.2)
RBC # BLD AUTO: 2.93 10E6/UL (ref 3.8–5.2)
RBC # BLD AUTO: 3.01 10E12/L (ref 3.8–5.2)
RBC # BLD AUTO: 3.04 10E6/UL (ref 3.8–5.2)
RBC # BLD AUTO: 3.05 10E6/UL (ref 3.8–5.2)
RBC # BLD AUTO: 3.06 10E6/UL (ref 3.8–5.2)
RBC # BLD AUTO: 3.08 10E6/UL (ref 3.8–5.2)
RBC # BLD AUTO: 3.1 10E6/UL (ref 3.8–5.2)
RBC # BLD AUTO: 3.12 10E6/UL (ref 3.8–5.2)
RBC # BLD AUTO: 3.17 10E12/L (ref 3.8–5.2)
RBC # BLD AUTO: 3.19 10E6/UL (ref 3.8–5.2)
RBC # BLD AUTO: 3.22 10E6/UL (ref 3.8–5.2)
RBC # BLD AUTO: 3.22 10E6/UL (ref 3.8–5.2)
RBC # BLD AUTO: 3.25 10E12/L (ref 3.8–5.2)
RBC # BLD AUTO: 3.27 10E6/UL (ref 3.8–5.2)
RBC # BLD AUTO: 3.31 10E6/UL (ref 3.8–5.2)
RBC # BLD AUTO: 3.32 10E12/L (ref 3.8–5.2)
RBC # BLD AUTO: 3.36 10E6/UL (ref 3.8–5.2)
RBC # BLD AUTO: 3.37 10E6/UL (ref 3.8–5.2)
RBC # BLD AUTO: 3.38 10E6/UL (ref 3.8–5.2)
RBC # BLD AUTO: 3.39 10E12/L (ref 3.8–5.2)
RBC # BLD AUTO: 3.43 10E6/UL (ref 3.8–5.2)
RBC # BLD AUTO: 3.43 10E6/UL (ref 3.8–5.2)
RBC # BLD AUTO: 3.44 10E6/UL (ref 3.8–5.2)
RBC # BLD AUTO: 3.46 10E6/UL (ref 3.8–5.2)
RBC # BLD AUTO: 3.47 10E6/UL (ref 3.8–5.2)
RBC # BLD AUTO: 3.48 10E6/UL (ref 3.8–5.2)
RBC # BLD AUTO: 3.48 10E6/UL (ref 3.8–5.2)
RBC # BLD AUTO: 3.5 10E12/L (ref 3.8–5.2)
RBC # BLD AUTO: 3.5 10E6/UL (ref 3.8–5.2)
RBC # BLD AUTO: 3.54 10E6/UL (ref 3.8–5.2)
RBC # BLD AUTO: 3.55 10E6/UL (ref 3.8–5.2)
RBC # BLD AUTO: 3.56 10E12/L (ref 3.8–5.2)
RBC # BLD AUTO: 3.61 10E6/UL (ref 3.8–5.2)
RBC # BLD AUTO: 3.64 10E6/UL (ref 3.8–5.2)
RBC # BLD AUTO: 3.69 10E6/UL (ref 3.8–5.2)
RBC # BLD AUTO: 3.73 10E6/UL (ref 3.8–5.2)
RBC # BLD AUTO: 3.76 10E12/L (ref 3.8–5.2)
RBC # BLD AUTO: 3.76 10E6/UL (ref 3.8–5.2)
RBC # BLD AUTO: 3.77 10E12/L (ref 3.8–5.2)
RBC # BLD AUTO: 3.85 10E6/UL (ref 3.8–5.2)
RBC # BLD AUTO: 3.89 10E6/UL (ref 3.8–5.2)
RBC # BLD AUTO: 3.96 10E12/L (ref 3.8–5.2)
RBC # BLD AUTO: 4.01 10E12/L (ref 3.8–5.2)
RBC # BLD AUTO: 4.34 10E12/L (ref 3.8–5.2)
RBC #/AREA URNS AUTO: 7 /HPF (ref 0–2)
RBC INCLUSIONS BLD: ABNORMAL
RBC INCLUSIONS BLD: SLIGHT
RBC MORPH BLD: ABNORMAL
RBC URINE: 7 /HPF
RETICS # AUTO: 53.4 10E9/L (ref 25–95)
RETICS # AUTO: 56.9 10E9/L (ref 25–95)
RETICS # AUTO: 59.8 10E9/L (ref 25–95)
RETICS/RBC NFR AUTO: 1.5 % (ref 0.5–2)
RETICS/RBC NFR AUTO: 1.5 % (ref 0.5–2)
RETICS/RBC NFR AUTO: 1.6 % (ref 0.5–2)
SA TARGET DNA: POSITIVE
SARS-COV-2 RNA RESP QL NAA+PROBE: NEGATIVE
SARS-COV-2 RNA RESP QL NAA+PROBE: NORMAL
SARS-COV-2 RNA RESP QL NAA+PROBE: NOT DETECTED
SMUDGE CELLS BLD QL SMEAR: PRESENT
SODIUM SERPL-SCNC: 129 MMOL/L (ref 133–144)
SODIUM SERPL-SCNC: 130 MMOL/L (ref 133–144)
SODIUM SERPL-SCNC: 131 MMOL/L (ref 133–144)
SODIUM SERPL-SCNC: 132 MMOL/L (ref 133–144)
SODIUM SERPL-SCNC: 133 MMOL/L (ref 133–144)
SODIUM SERPL-SCNC: 134 MMOL/L (ref 133–144)
SODIUM SERPL-SCNC: 135 MMOL/L (ref 133–144)
SODIUM SERPL-SCNC: 136 MMOL/L (ref 133–144)
SODIUM SERPL-SCNC: 137 MMOL/L (ref 133–144)
SODIUM SERPL-SCNC: 138 MMOL/L (ref 133–144)
SODIUM SERPL-SCNC: 139 MMOL/L (ref 133–144)
SODIUM SERPL-SCNC: 139 MMOL/L (ref 133–144)
SODIUM SERPL-SCNC: 140 MMOL/L (ref 133–144)
SODIUM UR-SCNC: 26 MMOL/L
SOURCE: ABNORMAL
SP GR UR STRIP: 1.01 (ref 1–1.03)
SP GR UR STRIP: 1.02 (ref 1–1.03)
SPECIMEN EXP DATE BLD: NORMAL
SPECIMEN EXPIRATION DATE: NORMAL
SPECIMEN SOURCE: NORMAL
SQUAMOUS #/AREA URNS AUTO: 4 /HPF (ref 0–1)
SQUAMOUS EPITHELIAL: <1 /HPF
SYSTOLIC BLOOD PRESSURE - MUSE: NORMAL MMHG
SYSTOLIC BLOOD PRESSURE - MUSE: NORMAL MMHG
T AXIS - MUSE: 57 DEGREES
T AXIS - MUSE: 62 DEGREES
TIBC SERPL-MCNC: 238 UG/DL (ref 240–430)
TIBC SERPL-MCNC: 269 UG/DL (ref 240–430)
TIBC SERPL-MCNC: 301 UG/DL (ref 240–430)
TRANSFUSION STATUS PATIENT QL: NORMAL
TRANSITIONAL EPI: <1 /HPF
TRIGL SERPL-MCNC: 290 MG/DL
TROPONIN I SERPL-MCNC: 0.02 UG/L (ref 0–0.04)
TROPONIN I SERPL-MCNC: 0.03 UG/L (ref 0–0.04)
TROPONIN I SERPL-MCNC: 0.03 UG/L (ref 0–0.04)
TROPONIN I SERPL-MCNC: <0.015 UG/L (ref 0–0.04)
UNIT ABO/RH: NORMAL
UNIT NUMBER: NORMAL
UNIT STATUS: NORMAL
UNIT TYPE ISBT: 5100
UNIT TYPE ISBT: 600
UNIT TYPE ISBT: 6200
UNIT TYPE ISBT: 8400
URATE SERPL-MCNC: 5.7 MG/DL (ref 2.6–6)
URATE SERPL-MCNC: 6.2 MG/DL (ref 2.6–6)
URATE SERPL-MCNC: 8.2 MG/DL (ref 2.6–6)
UROBILINOGEN UR STRIP-MCNC: 0 MG/DL (ref 0–2)
UROBILINOGEN UR STRIP-MCNC: NORMAL MG/DL
VANCOMYCIN SERPL-MCNC: 13 MG/L
VANCOMYCIN SERPL-MCNC: 15.3 MG/L
VENTRICULAR RATE- MUSE: 123 BPM
VENTRICULAR RATE- MUSE: 94 BPM
VIT B12 SERPL-MCNC: 853 PG/ML (ref 193–986)
WBC # BLD AUTO: 10.1 10E3/UL (ref 4–11)
WBC # BLD AUTO: 10.1 10E3/UL (ref 4–11)
WBC # BLD AUTO: 10.4 10E3/UL (ref 4–11)
WBC # BLD AUTO: 10.9 10E3/UL (ref 4–11)
WBC # BLD AUTO: 12.2 10E3/UL (ref 4–11)
WBC # BLD AUTO: 12.2 10E3/UL (ref 4–11)
WBC # BLD AUTO: 12.6 10E3/UL (ref 4–11)
WBC # BLD AUTO: 12.8 10E3/UL (ref 4–11)
WBC # BLD AUTO: 12.9 10E3/UL (ref 4–11)
WBC # BLD AUTO: 16.6 10E3/UL (ref 4–11)
WBC # BLD AUTO: 20.6 10E3/UL (ref 4–11)
WBC # BLD AUTO: 21.8 10E3/UL (ref 4–11)
WBC # BLD AUTO: 23.5 10E3/UL (ref 4–11)
WBC # BLD AUTO: 3.2 10E9/L (ref 4–11)
WBC # BLD AUTO: 3.6 10E9/L (ref 4–11)
WBC # BLD AUTO: 3.8 10E9/L (ref 4–11)
WBC # BLD AUTO: 3.9 10E9/L (ref 4–11)
WBC # BLD AUTO: 31.4 10E3/UL (ref 4–11)
WBC # BLD AUTO: 4 10E3/UL (ref 4–11)
WBC # BLD AUTO: 4.1 10E9/L (ref 4–11)
WBC # BLD AUTO: 4.2 10E3/UL (ref 4–11)
WBC # BLD AUTO: 4.5 10E3/UL (ref 4–11)
WBC # BLD AUTO: 4.6 10E3/UL (ref 4–11)
WBC # BLD AUTO: 4.6 10E3/UL (ref 4–11)
WBC # BLD AUTO: 4.8 10E9/L (ref 4–11)
WBC # BLD AUTO: 5.1 10E3/UL (ref 4–11)
WBC # BLD AUTO: 5.2 10E3/UL (ref 4–11)
WBC # BLD AUTO: 5.3 10E3/UL (ref 4–11)
WBC # BLD AUTO: 5.3 10E3/UL (ref 4–11)
WBC # BLD AUTO: 5.7 10E9/L (ref 4–11)
WBC # BLD AUTO: 5.7 10E9/L (ref 4–11)
WBC # BLD AUTO: 5.8 10E3/UL (ref 4–11)
WBC # BLD AUTO: 5.9 10E3/UL (ref 4–11)
WBC # BLD AUTO: 6.1 10E3/UL (ref 4–11)
WBC # BLD AUTO: 6.1 10E9/L (ref 4–11)
WBC # BLD AUTO: 6.2 10E3/UL (ref 4–11)
WBC # BLD AUTO: 6.3 10E3/UL (ref 4–11)
WBC # BLD AUTO: 6.5 10E3/UL (ref 4–11)
WBC # BLD AUTO: 6.6 10E3/UL (ref 4–11)
WBC # BLD AUTO: 6.9 10E9/L (ref 4–11)
WBC # BLD AUTO: 7 10E3/UL (ref 4–11)
WBC # BLD AUTO: 7 10E9/L (ref 4–11)
WBC # BLD AUTO: 7.1 10E3/UL (ref 4–11)
WBC # BLD AUTO: 7.2 10E3/UL (ref 4–11)
WBC # BLD AUTO: 7.2 10E3/UL (ref 4–11)
WBC # BLD AUTO: 7.2 10E9/L (ref 4–11)
WBC # BLD AUTO: 7.3 10E3/UL (ref 4–11)
WBC # BLD AUTO: 7.7 10E3/UL (ref 4–11)
WBC # BLD AUTO: 8.5 10E3/UL (ref 4–11)
WBC # BLD AUTO: 8.5 10E3/UL (ref 4–11)
WBC # BLD AUTO: 8.6 10E3/UL (ref 4–11)
WBC # BLD AUTO: 8.6 10E3/UL (ref 4–11)
WBC # BLD AUTO: 8.7 10E3/UL (ref 4–11)
WBC # BLD AUTO: 9.5 10E3/UL (ref 4–11)
WBC # BLD AUTO: 9.6 10E3/UL (ref 4–11)
WBC # BLD AUTO: 9.8 10E3/UL (ref 4–11)
WBC #/AREA URNS AUTO: 6 /HPF (ref 0–5)
WBC URINE: 7 /HPF

## 2021-01-01 PROCEDURE — 999N000157 HC STATISTIC RCP TIME EA 10 MIN

## 2021-01-01 PROCEDURE — 85610 PROTHROMBIN TIME: CPT | Performed by: STUDENT IN AN ORGANIZED HEALTH CARE EDUCATION/TRAINING PROGRAM

## 2021-01-01 PROCEDURE — 250N000013 HC RX MED GY IP 250 OP 250 PS 637: Performed by: STUDENT IN AN ORGANIZED HEALTH CARE EDUCATION/TRAINING PROGRAM

## 2021-01-01 PROCEDURE — 250N000013 HC RX MED GY IP 250 OP 250 PS 637: Performed by: PHYSICIAN ASSISTANT

## 2021-01-01 PROCEDURE — 99231 SBSQ HOSP IP/OBS SF/LOW 25: CPT | Performed by: INTERNAL MEDICINE

## 2021-01-01 PROCEDURE — 36415 COLL VENOUS BLD VENIPUNCTURE: CPT | Performed by: NURSE PRACTITIONER

## 2021-01-01 PROCEDURE — 85027 COMPLETE CBC AUTOMATED: CPT | Performed by: INTERNAL MEDICINE

## 2021-01-01 PROCEDURE — P9016 RBC LEUKOCYTES REDUCED: HCPCS | Performed by: INTERNAL MEDICINE

## 2021-01-01 PROCEDURE — 99310 SBSQ NF CARE HIGH MDM 45: CPT | Performed by: NURSE PRACTITIONER

## 2021-01-01 PROCEDURE — 250N000013 HC RX MED GY IP 250 OP 250 PS 637: Performed by: HOSPITALIST

## 2021-01-01 PROCEDURE — 97161 PT EVAL LOW COMPLEX 20 MIN: CPT | Mod: GP | Performed by: PHYSICAL THERAPIST

## 2021-01-01 PROCEDURE — 99215 OFFICE O/P EST HI 40 MIN: CPT | Performed by: INTERNAL MEDICINE

## 2021-01-01 PROCEDURE — 258N000003 HC RX IP 258 OP 636: Performed by: STUDENT IN AN ORGANIZED HEALTH CARE EDUCATION/TRAINING PROGRAM

## 2021-01-01 PROCEDURE — 250N000011 HC RX IP 250 OP 636: Performed by: HOSPITALIST

## 2021-01-01 PROCEDURE — 86901 BLOOD TYPING SEROLOGIC RH(D): CPT | Performed by: INTERNAL MEDICINE

## 2021-01-01 PROCEDURE — 999N001017 HC STATISTIC GLUCOSE BY METER IP

## 2021-01-01 PROCEDURE — P9037 PLATE PHERES LEUKOREDU IRRAD: HCPCS | Performed by: PHYSICIAN ASSISTANT

## 2021-01-01 PROCEDURE — 99239 HOSP IP/OBS DSCHRG MGMT >30: CPT | Performed by: INTERNAL MEDICINE

## 2021-01-01 PROCEDURE — 36415 COLL VENOUS BLD VENIPUNCTURE: CPT

## 2021-01-01 PROCEDURE — 84295 ASSAY OF SERUM SODIUM: CPT | Performed by: STUDENT IN AN ORGANIZED HEALTH CARE EDUCATION/TRAINING PROGRAM

## 2021-01-01 PROCEDURE — 83880 ASSAY OF NATRIURETIC PEPTIDE: CPT | Performed by: EMERGENCY MEDICINE

## 2021-01-01 PROCEDURE — 82728 ASSAY OF FERRITIN: CPT | Performed by: INTERNAL MEDICINE

## 2021-01-01 PROCEDURE — 82607 VITAMIN B-12: CPT | Performed by: INTERNAL MEDICINE

## 2021-01-01 PROCEDURE — 88341 IMHCHEM/IMCYTCHM EA ADD ANTB: CPT | Mod: 26 | Performed by: PATHOLOGY

## 2021-01-01 PROCEDURE — 36430 TRANSFUSION BLD/BLD COMPNT: CPT

## 2021-01-01 PROCEDURE — 94640 AIRWAY INHALATION TREATMENT: CPT

## 2021-01-01 PROCEDURE — 250N000009 HC RX 250: Performed by: EMERGENCY MEDICINE

## 2021-01-01 PROCEDURE — 999N000010 HC STATISTIC ANES STAT CODE-CRNA PER MINUTE: Performed by: PATHOLOGY

## 2021-01-01 PROCEDURE — P9035 PLATELET PHERES LEUKOREDUCED: HCPCS

## 2021-01-01 PROCEDURE — 999N001068 HC STATISTIC BONE MARROW CORE PERF TC 38221: Performed by: INTERNAL MEDICINE

## 2021-01-01 PROCEDURE — 80048 BASIC METABOLIC PNL TOTAL CA: CPT | Performed by: INTERNAL MEDICINE

## 2021-01-01 PROCEDURE — 87635 SARS-COV-2 COVID-19 AMP PRB: CPT | Performed by: EMERGENCY MEDICINE

## 2021-01-01 PROCEDURE — 71250 CT THORAX DX C-: CPT

## 2021-01-01 PROCEDURE — 200N000001 HC R&B ICU

## 2021-01-01 PROCEDURE — 84300 ASSAY OF URINE SODIUM: CPT | Performed by: INTERNAL MEDICINE

## 2021-01-01 PROCEDURE — 83605 ASSAY OF LACTIC ACID: CPT | Performed by: HOSPITALIST

## 2021-01-01 PROCEDURE — 92526 ORAL FUNCTION THERAPY: CPT | Mod: GN | Performed by: SPEECH-LANGUAGE PATHOLOGIST

## 2021-01-01 PROCEDURE — 250N000011 HC RX IP 250 OP 636: Performed by: PHYSICIAN ASSISTANT

## 2021-01-01 PROCEDURE — 85014 HEMATOCRIT: CPT | Performed by: NURSE PRACTITIONER

## 2021-01-01 PROCEDURE — 36592 COLLECT BLOOD FROM PICC: CPT | Performed by: EMERGENCY MEDICINE

## 2021-01-01 PROCEDURE — P9035 PLATELET PHERES LEUKOREDUCED: HCPCS | Performed by: HOSPITALIST

## 2021-01-01 PROCEDURE — 360N000078 HC SURGERY LEVEL 5, PER MIN: Performed by: NEUROLOGICAL SURGERY

## 2021-01-01 PROCEDURE — 88341 IMHCHEM/IMCYTCHM EA ADD ANTB: CPT | Mod: TC | Performed by: INTERNAL MEDICINE

## 2021-01-01 PROCEDURE — 82962 GLUCOSE BLOOD TEST: CPT | Performed by: PHYSICIAN ASSISTANT

## 2021-01-01 PROCEDURE — 96413 CHEMO IV INFUSION 1 HR: CPT

## 2021-01-01 PROCEDURE — 258N000003 HC RX IP 258 OP 636: Performed by: INTERNAL MEDICINE

## 2021-01-01 PROCEDURE — 97535 SELF CARE MNGMENT TRAINING: CPT | Mod: GO

## 2021-01-01 PROCEDURE — 97535 SELF CARE MNGMENT TRAINING: CPT | Mod: GO | Performed by: OCCUPATIONAL THERAPIST

## 2021-01-01 PROCEDURE — 250N000011 HC RX IP 250 OP 636

## 2021-01-01 PROCEDURE — 250N000013 HC RX MED GY IP 250 OP 250 PS 637: Performed by: INTERNAL MEDICINE

## 2021-01-01 PROCEDURE — 88271 CYTOGENETICS DNA PROBE: CPT | Performed by: INTERNAL MEDICINE

## 2021-01-01 PROCEDURE — 88313 SPECIAL STAINS GROUP 2: CPT | Mod: TC | Performed by: INTERNAL MEDICINE

## 2021-01-01 PROCEDURE — 84132 ASSAY OF SERUM POTASSIUM: CPT | Performed by: INTERNAL MEDICINE

## 2021-01-01 PROCEDURE — P9035 PLATELET PHERES LEUKOREDUCED: HCPCS | Performed by: EMERGENCY MEDICINE

## 2021-01-01 PROCEDURE — 97116 GAIT TRAINING THERAPY: CPT | Mod: GP | Performed by: PHYSICAL THERAPIST

## 2021-01-01 PROCEDURE — 250N000011 HC RX IP 250 OP 636: Performed by: INTERNAL MEDICINE

## 2021-01-01 PROCEDURE — 88313 SPECIAL STAINS GROUP 2: CPT | Mod: TC | Performed by: PATHOLOGY

## 2021-01-01 PROCEDURE — 99214 OFFICE O/P EST MOD 30 MIN: CPT | Performed by: NURSE PRACTITIONER

## 2021-01-01 PROCEDURE — 88184 FLOWCYTOMETRY/ TC 1 MARKER: CPT | Performed by: INTERNAL MEDICINE

## 2021-01-01 PROCEDURE — 85027 COMPLETE CBC AUTOMATED: CPT | Performed by: NURSE PRACTITIONER

## 2021-01-01 PROCEDURE — 87635 SARS-COV-2 COVID-19 AMP PRB: CPT | Performed by: INTERNAL MEDICINE

## 2021-01-01 PROCEDURE — 96409 CHEMO IV PUSH SNGL DRUG: CPT

## 2021-01-01 PROCEDURE — 250N000013 HC RX MED GY IP 250 OP 250 PS 637: Performed by: REGISTERED NURSE

## 2021-01-01 PROCEDURE — 99233 SBSQ HOSP IP/OBS HIGH 50: CPT | Performed by: STUDENT IN AN ORGANIZED HEALTH CARE EDUCATION/TRAINING PROGRAM

## 2021-01-01 PROCEDURE — 85049 AUTOMATED PLATELET COUNT: CPT | Performed by: INTERNAL MEDICINE

## 2021-01-01 PROCEDURE — 97140 MANUAL THERAPY 1/> REGIONS: CPT | Mod: GP | Performed by: PHYSICAL THERAPIST

## 2021-01-01 PROCEDURE — 272N000001 HC OR GENERAL SUPPLY STERILE: Performed by: NEUROLOGICAL SURGERY

## 2021-01-01 PROCEDURE — 96361 HYDRATE IV INFUSION ADD-ON: CPT

## 2021-01-01 PROCEDURE — 97530 THERAPEUTIC ACTIVITIES: CPT | Mod: GP | Performed by: PHYSICAL THERAPIST

## 2021-01-01 PROCEDURE — G0452 MOLECULAR PATHOLOGY INTERPR: HCPCS | Mod: 26 | Performed by: PATHOLOGY

## 2021-01-01 PROCEDURE — 99223 1ST HOSP IP/OBS HIGH 75: CPT | Mod: AI | Performed by: STUDENT IN AN ORGANIZED HEALTH CARE EDUCATION/TRAINING PROGRAM

## 2021-01-01 PROCEDURE — 88342 IMHCHEM/IMCYTCHM 1ST ANTB: CPT | Mod: TC | Performed by: PATHOLOGY

## 2021-01-01 PROCEDURE — 250N000012 HC RX MED GY IP 250 OP 636 PS 637: Performed by: STUDENT IN AN ORGANIZED HEALTH CARE EDUCATION/TRAINING PROGRAM

## 2021-01-01 PROCEDURE — 93005 ELECTROCARDIOGRAM TRACING: CPT

## 2021-01-01 PROCEDURE — 250N000009 HC RX 250: Performed by: NEUROLOGICAL SURGERY

## 2021-01-01 PROCEDURE — P9037 PLATE PHERES LEUKOREDU IRRAD: HCPCS | Performed by: HOSPITALIST

## 2021-01-01 PROCEDURE — 120N000001 HC R&B MED SURG/OB

## 2021-01-01 PROCEDURE — 80048 BASIC METABOLIC PNL TOTAL CA: CPT | Performed by: STUDENT IN AN ORGANIZED HEALTH CARE EDUCATION/TRAINING PROGRAM

## 2021-01-01 PROCEDURE — P9035 PLATELET PHERES LEUKOREDUCED: HCPCS | Performed by: INTERNAL MEDICINE

## 2021-01-01 PROCEDURE — 97110 THERAPEUTIC EXERCISES: CPT | Mod: GP | Performed by: PHYSICAL THERAPIST

## 2021-01-01 PROCEDURE — 88185 FLOWCYTOMETRY/TC ADD-ON: CPT | Performed by: INTERNAL MEDICINE

## 2021-01-01 PROCEDURE — 86923 COMPATIBILITY TEST ELECTRIC: CPT | Performed by: NURSE PRACTITIONER

## 2021-01-01 PROCEDURE — 73630 X-RAY EXAM OF FOOT: CPT | Mod: RT

## 2021-01-01 PROCEDURE — 85045 AUTOMATED RETICULOCYTE COUNT: CPT | Performed by: INTERNAL MEDICINE

## 2021-01-01 PROCEDURE — 86923 COMPATIBILITY TEST ELECTRIC: CPT | Performed by: INTERNAL MEDICINE

## 2021-01-01 PROCEDURE — 99223 1ST HOSP IP/OBS HIGH 75: CPT | Performed by: INTERNAL MEDICINE

## 2021-01-01 PROCEDURE — 250N000011 HC RX IP 250 OP 636: Performed by: STUDENT IN AN ORGANIZED HEALTH CARE EDUCATION/TRAINING PROGRAM

## 2021-01-01 PROCEDURE — 80048 BASIC METABOLIC PNL TOTAL CA: CPT | Performed by: EMERGENCY MEDICINE

## 2021-01-01 PROCEDURE — 80202 ASSAY OF VANCOMYCIN: CPT | Performed by: STUDENT IN AN ORGANIZED HEALTH CARE EDUCATION/TRAINING PROGRAM

## 2021-01-01 PROCEDURE — 36415 COLL VENOUS BLD VENIPUNCTURE: CPT | Performed by: INTERNAL MEDICINE

## 2021-01-01 PROCEDURE — 85049 AUTOMATED PLATELET COUNT: CPT | Performed by: STUDENT IN AN ORGANIZED HEALTH CARE EDUCATION/TRAINING PROGRAM

## 2021-01-01 PROCEDURE — 61312 CRNEC/CRNOT STTL XDRL/SDRL: CPT | Mod: AS | Performed by: PHYSICIAN ASSISTANT

## 2021-01-01 PROCEDURE — 85014 HEMATOCRIT: CPT | Performed by: INTERNAL MEDICINE

## 2021-01-01 PROCEDURE — 250N000011 HC RX IP 250 OP 636: Performed by: REGISTERED NURSE

## 2021-01-01 PROCEDURE — 99152 MOD SED SAME PHYS/QHP 5/>YRS: CPT

## 2021-01-01 PROCEDURE — 36415 COLL VENOUS BLD VENIPUNCTURE: CPT | Performed by: STUDENT IN AN ORGANIZED HEALTH CARE EDUCATION/TRAINING PROGRAM

## 2021-01-01 PROCEDURE — 87635 SARS-COV-2 COVID-19 AMP PRB: CPT | Performed by: PATHOLOGY

## 2021-01-01 PROCEDURE — G0463 HOSPITAL OUTPT CLINIC VISIT: HCPCS

## 2021-01-01 PROCEDURE — P9037 PLATE PHERES LEUKOREDU IRRAD: HCPCS | Performed by: EMERGENCY MEDICINE

## 2021-01-01 PROCEDURE — 250N000011 HC RX IP 250 OP 636: Performed by: NURSE ANESTHETIST, CERTIFIED REGISTERED

## 2021-01-01 PROCEDURE — 96375 TX/PRO/DX INJ NEW DRUG ADDON: CPT

## 2021-01-01 PROCEDURE — U0005 INFEC AGEN DETEC AMPLI PROBE: HCPCS

## 2021-01-01 PROCEDURE — 88237 TISSUE CULTURE BONE MARROW: CPT | Performed by: INTERNAL MEDICINE

## 2021-01-01 PROCEDURE — 99233 SBSQ HOSP IP/OBS HIGH 50: CPT | Performed by: INTERNAL MEDICINE

## 2021-01-01 PROCEDURE — 250N000011 HC RX IP 250 OP 636: Performed by: NEUROLOGICAL SURGERY

## 2021-01-01 PROCEDURE — P9037 PLATE PHERES LEUKOREDU IRRAD: HCPCS | Performed by: STUDENT IN AN ORGANIZED HEALTH CARE EDUCATION/TRAINING PROGRAM

## 2021-01-01 PROCEDURE — 84550 ASSAY OF BLOOD/URIC ACID: CPT | Performed by: INTERNAL MEDICINE

## 2021-01-01 PROCEDURE — 999N001068 HC STATISTIC BONE MARROW CORE PERF TC 38221: Performed by: PATHOLOGY

## 2021-01-01 PROCEDURE — 97530 THERAPEUTIC ACTIVITIES: CPT | Mod: GP

## 2021-01-01 PROCEDURE — P9037 PLATE PHERES LEUKOREDU IRRAD: HCPCS | Performed by: INTERNAL MEDICINE

## 2021-01-01 PROCEDURE — 36591 DRAW BLOOD OFF VENOUS DEVICE: CPT

## 2021-01-01 PROCEDURE — 999N001193 HC VIDEO/TELEPHONE VISIT; NO CHARGE

## 2021-01-01 PROCEDURE — 84295 ASSAY OF SERUM SODIUM: CPT | Performed by: INTERNAL MEDICINE

## 2021-01-01 PROCEDURE — G0463 HOSPITAL OUTPT CLINIC VISIT: HCPCS | Mod: 25 | Performed by: PHYSICIAN ASSISTANT

## 2021-01-01 PROCEDURE — 84550 ASSAY OF BLOOD/URIC ACID: CPT | Performed by: NURSE PRACTITIONER

## 2021-01-01 PROCEDURE — 250N000009 HC RX 250: Performed by: NURSE ANESTHETIST, CERTIFIED REGISTERED

## 2021-01-01 PROCEDURE — 61312 CRNEC/CRNOT STTL XDRL/SDRL: CPT | Performed by: NEUROLOGICAL SURGERY

## 2021-01-01 PROCEDURE — 85045 AUTOMATED RETICULOCYTE COUNT: CPT | Performed by: PATHOLOGY

## 2021-01-01 PROCEDURE — P9016 RBC LEUKOCYTES REDUCED: HCPCS | Performed by: HOSPITALIST

## 2021-01-01 PROCEDURE — 999N001109 HC STATISTIC MORPHOLOGY W/INTERP HISTOLOGY TC 85060: Performed by: INTERNAL MEDICINE

## 2021-01-01 PROCEDURE — 99207 PR NO CHARGE NURSE ONLY: CPT

## 2021-01-01 PROCEDURE — 81001 URINALYSIS AUTO W/SCOPE: CPT | Performed by: STUDENT IN AN ORGANIZED HEALTH CARE EDUCATION/TRAINING PROGRAM

## 2021-01-01 PROCEDURE — 88189 FLOWCYTOMETRY/READ 16 & >: CPT | Performed by: STUDENT IN AN ORGANIZED HEALTH CARE EDUCATION/TRAINING PROGRAM

## 2021-01-01 PROCEDURE — 999N001036 HC STATISTIC TOTAL PROTEIN: Performed by: INTERNAL MEDICINE

## 2021-01-01 PROCEDURE — 86923 COMPATIBILITY TEST ELECTRIC: CPT

## 2021-01-01 PROCEDURE — 88342 IMHCHEM/IMCYTCHM 1ST ANTB: CPT | Mod: 26 | Performed by: PATHOLOGY

## 2021-01-01 PROCEDURE — 258N000003 HC RX IP 258 OP 636: Performed by: SURGERY

## 2021-01-01 PROCEDURE — 250N000011 HC RX IP 250 OP 636: Performed by: SURGERY

## 2021-01-01 PROCEDURE — 99223 1ST HOSP IP/OBS HIGH 75: CPT | Mod: AI | Performed by: HOSPITALIST

## 2021-01-01 PROCEDURE — 250N000009 HC RX 250: Performed by: INTERNAL MEDICINE

## 2021-01-01 PROCEDURE — 83615 LACTATE (LD) (LDH) ENZYME: CPT | Performed by: INTERNAL MEDICINE

## 2021-01-01 PROCEDURE — 84484 ASSAY OF TROPONIN QUANT: CPT | Performed by: NURSE PRACTITIONER

## 2021-01-01 PROCEDURE — 80053 COMPREHEN METABOLIC PANEL: CPT | Performed by: INTERNAL MEDICINE

## 2021-01-01 PROCEDURE — 86481 TB AG RESPONSE T-CELL SUSP: CPT | Performed by: NURSE PRACTITIONER

## 2021-01-01 PROCEDURE — 51701 INSERT BLADDER CATHETER: CPT

## 2021-01-01 PROCEDURE — 85025 COMPLETE CBC W/AUTO DIFF WBC: CPT | Performed by: PATHOLOGY

## 2021-01-01 PROCEDURE — 999N000087 HC STATISTIC IV OP-OVERFLOW

## 2021-01-01 PROCEDURE — 36415 COLL VENOUS BLD VENIPUNCTURE: CPT | Performed by: EMERGENCY MEDICINE

## 2021-01-01 PROCEDURE — 86900 BLOOD TYPING SEROLOGIC ABO: CPT | Performed by: EMERGENCY MEDICINE

## 2021-01-01 PROCEDURE — 99223 1ST HOSP IP/OBS HIGH 75: CPT | Mod: AI | Performed by: INTERNAL MEDICINE

## 2021-01-01 PROCEDURE — 85018 HEMOGLOBIN: CPT | Performed by: INTERNAL MEDICINE

## 2021-01-01 PROCEDURE — 85025 COMPLETE CBC W/AUTO DIFF WBC: CPT | Performed by: EMERGENCY MEDICINE

## 2021-01-01 PROCEDURE — 999N001102 HC STATISTIC DNA PROCESS AND HOLD: Performed by: PATHOLOGY

## 2021-01-01 PROCEDURE — 250N000012 HC RX MED GY IP 250 OP 636 PS 637: Performed by: INTERNAL MEDICINE

## 2021-01-01 PROCEDURE — 250N000025 HC SEVOFLURANE, PER MIN: Performed by: NEUROLOGICAL SURGERY

## 2021-01-01 PROCEDURE — 99214 OFFICE O/P EST MOD 30 MIN: CPT | Mod: 95 | Performed by: INTERNAL MEDICINE

## 2021-01-01 PROCEDURE — 96360 HYDRATION IV INFUSION INIT: CPT

## 2021-01-01 PROCEDURE — G0463 HOSPITAL OUTPT CLINIC VISIT: HCPCS | Mod: 25

## 2021-01-01 PROCEDURE — 61312 CRNEC/CRNOT STTL XDRL/SDRL: CPT | Mod: 58 | Performed by: NEUROLOGICAL SURGERY

## 2021-01-01 PROCEDURE — 99213 OFFICE O/P EST LOW 20 MIN: CPT | Mod: 95 | Performed by: INTERNAL MEDICINE

## 2021-01-01 PROCEDURE — 99285 EMERGENCY DEPT VISIT HI MDM: CPT | Mod: 25

## 2021-01-01 PROCEDURE — 88311 DECALCIFY TISSUE: CPT | Mod: 26 | Performed by: PATHOLOGY

## 2021-01-01 PROCEDURE — 272N000196 HC ACCESSORY CR5

## 2021-01-01 PROCEDURE — P9604 ONE-WAY ALLOW PRORATED TRIP: HCPCS | Performed by: NURSE PRACTITIONER

## 2021-01-01 PROCEDURE — 85014 HEMATOCRIT: CPT | Performed by: EMERGENCY MEDICINE

## 2021-01-01 PROCEDURE — 97166 OT EVAL MOD COMPLEX 45 MIN: CPT | Mod: GO | Performed by: OCCUPATIONAL THERAPIST

## 2021-01-01 PROCEDURE — 258N000003 HC RX IP 258 OP 636: Performed by: PHYSICIAN ASSISTANT

## 2021-01-01 PROCEDURE — 86900 BLOOD TYPING SEROLOGIC ABO: CPT | Performed by: INTERNAL MEDICINE

## 2021-01-01 PROCEDURE — 84132 ASSAY OF SERUM POTASSIUM: CPT | Performed by: STUDENT IN AN ORGANIZED HEALTH CARE EDUCATION/TRAINING PROGRAM

## 2021-01-01 PROCEDURE — 97116 GAIT TRAINING THERAPY: CPT | Mod: GP

## 2021-01-01 PROCEDURE — 999N001137 HC STATISTIC FLOW >15 ABY TC 88189: Performed by: INTERNAL MEDICINE

## 2021-01-01 PROCEDURE — 71260 CT THORAX DX C+: CPT

## 2021-01-01 PROCEDURE — 96367 TX/PROPH/DG ADDL SEQ IV INF: CPT

## 2021-01-01 PROCEDURE — 99222 1ST HOSP IP/OBS MODERATE 55: CPT | Performed by: INTERNAL MEDICINE

## 2021-01-01 PROCEDURE — 250N000009 HC RX 250: Performed by: REGISTERED NURSE

## 2021-01-01 PROCEDURE — 94640 AIRWAY INHALATION TREATMENT: CPT | Mod: 76

## 2021-01-01 PROCEDURE — 250N000009 HC RX 250: Performed by: PATHOLOGY

## 2021-01-01 PROCEDURE — 88185 FLOWCYTOMETRY/TC ADD-ON: CPT | Mod: TC | Performed by: INTERNAL MEDICINE

## 2021-01-01 PROCEDURE — 81403 MOPATH PROCEDURE LEVEL 4: CPT | Performed by: PATHOLOGY

## 2021-01-01 PROCEDURE — 82805 BLOOD GASES W/O2 SATURATION: CPT | Performed by: STUDENT IN AN ORGANIZED HEALTH CARE EDUCATION/TRAINING PROGRAM

## 2021-01-01 PROCEDURE — 81479 UNLISTED MOLECULAR PATHOLOGY: CPT | Performed by: PATHOLOGY

## 2021-01-01 PROCEDURE — 999N000128 HC STATISTIC PERIPHERAL IV START W/O US GUIDANCE

## 2021-01-01 PROCEDURE — 83550 IRON BINDING TEST: CPT | Performed by: INTERNAL MEDICINE

## 2021-01-01 PROCEDURE — 258N000003 HC RX IP 258 OP 636: Performed by: NURSE ANESTHETIST, CERTIFIED REGISTERED

## 2021-01-01 PROCEDURE — 370N000017 HC ANESTHESIA TECHNICAL FEE, PER MIN: Performed by: PATHOLOGY

## 2021-01-01 PROCEDURE — 710N000009 HC RECOVERY PHASE 1, LEVEL 1, PER MIN: Performed by: NEUROLOGICAL SURGERY

## 2021-01-01 PROCEDURE — 99214 OFFICE O/P EST MOD 30 MIN: CPT | Mod: GT | Performed by: INTERNAL MEDICINE

## 2021-01-01 PROCEDURE — 258N000003 HC RX IP 258 OP 636: Performed by: HOSPITALIST

## 2021-01-01 PROCEDURE — 88305 TISSUE EXAM BY PATHOLOGIST: CPT | Mod: 26 | Performed by: PATHOLOGY

## 2021-01-01 PROCEDURE — 250N000009 HC RX 250: Performed by: NURSE PRACTITIONER

## 2021-01-01 PROCEDURE — 250N000009 HC RX 250: Performed by: RADIOLOGY

## 2021-01-01 PROCEDURE — 85041 AUTOMATED RBC COUNT: CPT | Performed by: EMERGENCY MEDICINE

## 2021-01-01 PROCEDURE — 88305 TISSUE EXAM BY PATHOLOGIST: CPT | Mod: TC | Performed by: PATHOLOGY

## 2021-01-01 PROCEDURE — P9037 PLATE PHERES LEUKOREDU IRRAD: HCPCS | Performed by: NURSE PRACTITIONER

## 2021-01-01 PROCEDURE — C9803 HOPD COVID-19 SPEC COLLECT: HCPCS

## 2021-01-01 PROCEDURE — 00C40ZZ EXTIRPATION OF MATTER FROM INTRACRANIAL SUBDURAL SPACE, OPEN APPROACH: ICD-10-PCS | Performed by: NEUROLOGICAL SURGERY

## 2021-01-01 PROCEDURE — 83935 ASSAY OF URINE OSMOLALITY: CPT | Performed by: INTERNAL MEDICINE

## 2021-01-01 PROCEDURE — 85610 PROTHROMBIN TIME: CPT | Performed by: EMERGENCY MEDICINE

## 2021-01-01 PROCEDURE — 250N000011 HC RX IP 250 OP 636: Performed by: EMERGENCY MEDICINE

## 2021-01-01 PROCEDURE — 92526 ORAL FUNCTION THERAPY: CPT | Mod: GN

## 2021-01-01 PROCEDURE — 999N000163 HC STATISTIC SIMPLE TUBE INSERTION/CHARGE, PORT, CATH, FISTULOGRAM

## 2021-01-01 PROCEDURE — 84145 PROCALCITONIN (PCT): CPT | Performed by: INTERNAL MEDICINE

## 2021-01-01 PROCEDURE — 38222 DX BONE MARROW BX & ASPIR: CPT | Performed by: PATHOLOGY

## 2021-01-01 PROCEDURE — 86900 BLOOD TYPING SEROLOGIC ABO: CPT | Performed by: NURSE PRACTITIONER

## 2021-01-01 PROCEDURE — 87086 URINE CULTURE/COLONY COUNT: CPT | Performed by: HOSPITALIST

## 2021-01-01 PROCEDURE — 96374 THER/PROPH/DIAG INJ IV PUSH: CPT

## 2021-01-01 PROCEDURE — 85041 AUTOMATED RBC COUNT: CPT | Performed by: INTERNAL MEDICINE

## 2021-01-01 PROCEDURE — 85025 COMPLETE CBC W/AUTO DIFF WBC: CPT | Performed by: INTERNAL MEDICINE

## 2021-01-01 PROCEDURE — 258N000003 HC RX IP 258 OP 636: Performed by: EMERGENCY MEDICINE

## 2021-01-01 PROCEDURE — 83540 ASSAY OF IRON: CPT | Performed by: STUDENT IN AN ORGANIZED HEALTH CARE EDUCATION/TRAINING PROGRAM

## 2021-01-01 PROCEDURE — 82040 ASSAY OF SERUM ALBUMIN: CPT | Performed by: INTERNAL MEDICINE

## 2021-01-01 PROCEDURE — 70450 CT HEAD/BRAIN W/O DYE: CPT

## 2021-01-01 PROCEDURE — P9035 PLATELET PHERES LEUKOREDUCED: HCPCS | Performed by: NURSE PRACTITIONER

## 2021-01-01 PROCEDURE — 86923 COMPATIBILITY TEST ELECTRIC: CPT | Performed by: HOSPITALIST

## 2021-01-01 PROCEDURE — 80061 LIPID PANEL: CPT | Performed by: INTERNAL MEDICINE

## 2021-01-01 PROCEDURE — 88264 CHROMOSOME ANALYSIS 20-25: CPT | Performed by: INTERNAL MEDICINE

## 2021-01-01 PROCEDURE — 99223 1ST HOSP IP/OBS HIGH 75: CPT | Mod: 57 | Performed by: NEUROLOGICAL SURGERY

## 2021-01-01 PROCEDURE — L3260 AMBULATORY SURGICAL BOOT EAC: HCPCS

## 2021-01-01 PROCEDURE — 0042T CT HEAD PERFUSION WITH CONTRAST: CPT

## 2021-01-01 PROCEDURE — C1713 ANCHOR/SCREW BN/BN,TIS/BN: HCPCS | Performed by: NEUROLOGICAL SURGERY

## 2021-01-01 PROCEDURE — 36415 COLL VENOUS BLD VENIPUNCTURE: CPT | Performed by: PATHOLOGY

## 2021-01-01 PROCEDURE — 99291 CRITICAL CARE FIRST HOUR: CPT | Performed by: HOSPITALIST

## 2021-01-01 PROCEDURE — 99397 PER PM REEVAL EST PAT 65+ YR: CPT | Performed by: INTERNAL MEDICINE

## 2021-01-01 PROCEDURE — C1788 PORT, INDWELLING, IMP: HCPCS

## 2021-01-01 PROCEDURE — 83550 IRON BINDING TEST: CPT | Performed by: STUDENT IN AN ORGANIZED HEALTH CARE EDUCATION/TRAINING PROGRAM

## 2021-01-01 PROCEDURE — 96374 THER/PROPH/DIAG INJ IV PUSH: CPT | Mod: 59

## 2021-01-01 PROCEDURE — 97162 PT EVAL MOD COMPLEX 30 MIN: CPT | Mod: GP | Performed by: PHYSICAL THERAPIST

## 2021-01-01 PROCEDURE — 84484 ASSAY OF TROPONIN QUANT: CPT | Performed by: PHYSICIAN ASSISTANT

## 2021-01-01 PROCEDURE — 250N000009 HC RX 250: Performed by: STUDENT IN AN ORGANIZED HEALTH CARE EDUCATION/TRAINING PROGRAM

## 2021-01-01 PROCEDURE — 82565 ASSAY OF CREATININE: CPT

## 2021-01-01 PROCEDURE — 88280 CHROMOSOME KARYOTYPE STUDY: CPT | Performed by: INTERNAL MEDICINE

## 2021-01-01 PROCEDURE — 87641 MR-STAPH DNA AMP PROBE: CPT | Performed by: HOSPITALIST

## 2021-01-01 PROCEDURE — 36415 COLL VENOUS BLD VENIPUNCTURE: CPT | Performed by: HOSPITALIST

## 2021-01-01 PROCEDURE — 88305 TISSUE EXAM BY PATHOLOGIST: CPT | Mod: TC | Performed by: INTERNAL MEDICINE

## 2021-01-01 PROCEDURE — 97530 THERAPEUTIC ACTIVITIES: CPT | Mod: GO | Performed by: OCCUPATIONAL THERAPIST

## 2021-01-01 PROCEDURE — 84484 ASSAY OF TROPONIN QUANT: CPT | Performed by: INTERNAL MEDICINE

## 2021-01-01 PROCEDURE — 83880 ASSAY OF NATRIURETIC PEPTIDE: CPT | Performed by: HOSPITALIST

## 2021-01-01 PROCEDURE — 99222 1ST HOSP IP/OBS MODERATE 55: CPT | Mod: 57 | Performed by: NURSE PRACTITIONER

## 2021-01-01 PROCEDURE — 87493 C DIFF AMPLIFIED PROBE: CPT | Performed by: HOSPITALIST

## 2021-01-01 PROCEDURE — 80048 BASIC METABOLIC PNL TOTAL CA: CPT | Performed by: HOSPITALIST

## 2021-01-01 PROCEDURE — 36415 COLL VENOUS BLD VENIPUNCTURE: CPT | Performed by: PHYSICIAN ASSISTANT

## 2021-01-01 PROCEDURE — 87081 CULTURE SCREEN ONLY: CPT | Performed by: HOSPITALIST

## 2021-01-01 PROCEDURE — 86923 COMPATIBILITY TEST ELECTRIC: CPT | Performed by: EMERGENCY MEDICINE

## 2021-01-01 PROCEDURE — 70496 CT ANGIOGRAPHY HEAD: CPT

## 2021-01-01 PROCEDURE — 80048 BASIC METABOLIC PNL TOTAL CA: CPT | Performed by: NURSE PRACTITIONER

## 2021-01-01 PROCEDURE — 99024 POSTOP FOLLOW-UP VISIT: CPT | Performed by: PHYSICIAN ASSISTANT

## 2021-01-01 PROCEDURE — 250N000011 HC RX IP 250 OP 636: Performed by: ANESTHESIOLOGY

## 2021-01-01 PROCEDURE — 99232 SBSQ HOSP IP/OBS MODERATE 35: CPT | Performed by: INTERNAL MEDICINE

## 2021-01-01 PROCEDURE — 38221 DX BONE MARROW BIOPSIES: CPT | Performed by: PATHOLOGY

## 2021-01-01 PROCEDURE — 88311 DECALCIFY TISSUE: CPT | Mod: TC | Performed by: INTERNAL MEDICINE

## 2021-01-01 PROCEDURE — 370N000017 HC ANESTHESIA TECHNICAL FEE, PER MIN: Performed by: NEUROLOGICAL SURGERY

## 2021-01-01 PROCEDURE — 99233 SBSQ HOSP IP/OBS HIGH 50: CPT | Performed by: HOSPITALIST

## 2021-01-01 PROCEDURE — 97112 NEUROMUSCULAR REEDUCATION: CPT | Mod: GP

## 2021-01-01 PROCEDURE — 272N000602 HC WOUND GLUE CR1

## 2021-01-01 PROCEDURE — 99214 OFFICE O/P EST MOD 30 MIN: CPT | Performed by: INTERNAL MEDICINE

## 2021-01-01 PROCEDURE — 82043 UR ALBUMIN QUANTITATIVE: CPT | Performed by: INTERNAL MEDICINE

## 2021-01-01 PROCEDURE — 85027 COMPLETE CBC AUTOMATED: CPT | Performed by: STUDENT IN AN ORGANIZED HEALTH CARE EDUCATION/TRAINING PROGRAM

## 2021-01-01 PROCEDURE — 77067 SCR MAMMO BI INCL CAD: CPT

## 2021-01-01 PROCEDURE — 85027 COMPLETE CBC AUTOMATED: CPT | Performed by: HOSPITALIST

## 2021-01-01 PROCEDURE — 84165 PROTEIN E-PHORESIS SERUM: CPT | Mod: 26 | Performed by: PATHOLOGY

## 2021-01-01 PROCEDURE — 99213 OFFICE O/P EST LOW 20 MIN: CPT | Performed by: INTERNAL MEDICINE

## 2021-01-01 PROCEDURE — 92610 EVALUATE SWALLOWING FUNCTION: CPT | Mod: GN | Performed by: SPEECH-LANGUAGE PATHOLOGIST

## 2021-01-01 PROCEDURE — 84484 ASSAY OF TROPONIN QUANT: CPT | Performed by: EMERGENCY MEDICINE

## 2021-01-01 PROCEDURE — 99215 OFFICE O/P EST HI 40 MIN: CPT | Mod: 24 | Performed by: INTERNAL MEDICINE

## 2021-01-01 PROCEDURE — 83605 ASSAY OF LACTIC ACID: CPT | Performed by: EMERGENCY MEDICINE

## 2021-01-01 PROCEDURE — 250N000011 HC RX IP 250 OP 636: Performed by: RADIOLOGY

## 2021-01-01 PROCEDURE — 86850 RBC ANTIBODY SCREEN: CPT | Performed by: EMERGENCY MEDICINE

## 2021-01-01 PROCEDURE — 250N000009 HC RX 250: Performed by: HOSPITALIST

## 2021-01-01 PROCEDURE — 86901 BLOOD TYPING SEROLOGIC RH(D): CPT | Performed by: NURSE PRACTITIONER

## 2021-01-01 PROCEDURE — 99214 OFFICE O/P EST MOD 30 MIN: CPT | Mod: 25 | Performed by: INTERNAL MEDICINE

## 2021-01-01 PROCEDURE — 84100 ASSAY OF PHOSPHORUS: CPT | Performed by: INTERNAL MEDICINE

## 2021-01-01 PROCEDURE — P9016 RBC LEUKOCYTES REDUCED: HCPCS | Performed by: NURSE PRACTITIONER

## 2021-01-01 PROCEDURE — 88275 CYTOGENETICS 100-300: CPT | Performed by: INTERNAL MEDICINE

## 2021-01-01 PROCEDURE — 999N000141 HC STATISTIC PRE-PROCEDURE NURSING ASSESSMENT: Performed by: NEUROLOGICAL SURGERY

## 2021-01-01 PROCEDURE — 71045 X-RAY EXAM CHEST 1 VIEW: CPT

## 2021-01-01 PROCEDURE — P9016 RBC LEUKOCYTES REDUCED: HCPCS

## 2021-01-01 PROCEDURE — 36561 INSERT TUNNELED CV CATH: CPT

## 2021-01-01 PROCEDURE — 80053 COMPREHEN METABOLIC PANEL: CPT | Performed by: EMERGENCY MEDICINE

## 2021-01-01 PROCEDURE — 360N000079 HC SURGERY LEVEL 6, PER MIN: Performed by: NEUROLOGICAL SURGERY

## 2021-01-01 PROCEDURE — 250N000011 HC RX IP 250 OP 636: Performed by: NURSE PRACTITIONER

## 2021-01-01 PROCEDURE — U0003 INFECTIOUS AGENT DETECTION BY NUCLEIC ACID (DNA OR RNA); SEVERE ACUTE RESPIRATORY SYNDROME CORONAVIRUS 2 (SARS-COV-2) (CORONAVIRUS DISEASE [COVID-19]), AMPLIFIED PROBE TECHNIQUE, MAKING USE OF HIGH THROUGHPUT TECHNOLOGIES AS DESCRIBED BY CMS-2020-01-R: HCPCS

## 2021-01-01 PROCEDURE — 94660 CPAP INITIATION&MGMT: CPT

## 2021-01-01 PROCEDURE — U0003 INFECTIOUS AGENT DETECTION BY NUCLEIC ACID (DNA OR RNA); SEVERE ACUTE RESPIRATORY SYNDROME CORONAVIRUS 2 (SARS-COV-2) (CORONAVIRUS DISEASE [COVID-19]), AMPLIFIED PROBE TECHNIQUE, MAKING USE OF HIGH THROUGHPUT TECHNOLOGIES AS DESCRIBED BY CMS-2020-01-R: HCPCS | Performed by: PATHOLOGY

## 2021-01-01 PROCEDURE — 73565 X-RAY EXAM OF KNEES: CPT | Performed by: RADIOLOGY

## 2021-01-01 PROCEDURE — 85048 AUTOMATED LEUKOCYTE COUNT: CPT | Performed by: HOSPITALIST

## 2021-01-01 PROCEDURE — 97530 THERAPEUTIC ACTIVITIES: CPT | Mod: GO

## 2021-01-01 PROCEDURE — 88313 SPECIAL STAINS GROUP 2: CPT | Mod: TC,XS | Performed by: INTERNAL MEDICINE

## 2021-01-01 PROCEDURE — 250N000009 HC RX 250

## 2021-01-01 PROCEDURE — 999N001109 HC STATISTIC MORPHOLOGY W/INTERP HISTOLOGY TC 85060: Performed by: PATHOLOGY

## 2021-01-01 PROCEDURE — 36591 DRAW BLOOD OFF VENOUS DEVICE: CPT | Performed by: HOSPITALIST

## 2021-01-01 PROCEDURE — C1769 GUIDE WIRE: HCPCS

## 2021-01-01 PROCEDURE — 86901 BLOOD TYPING SEROLOGIC RH(D): CPT | Performed by: EMERGENCY MEDICINE

## 2021-01-01 PROCEDURE — 258N000003 HC RX IP 258 OP 636: Performed by: ANESTHESIOLOGY

## 2021-01-01 PROCEDURE — 83930 ASSAY OF BLOOD OSMOLALITY: CPT | Performed by: INTERNAL MEDICINE

## 2021-01-01 PROCEDURE — 82746 ASSAY OF FOLIC ACID SERUM: CPT | Performed by: INTERNAL MEDICINE

## 2021-01-01 PROCEDURE — 88313 SPECIAL STAINS GROUP 2: CPT | Mod: 26 | Performed by: PATHOLOGY

## 2021-01-01 PROCEDURE — 88342 IMHCHEM/IMCYTCHM 1ST ANTB: CPT | Mod: TC | Performed by: INTERNAL MEDICINE

## 2021-01-01 PROCEDURE — 99207 PR NO CHARGE LOS: CPT | Performed by: PHARMACIST

## 2021-01-01 PROCEDURE — 85027 COMPLETE CBC AUTOMATED: CPT | Performed by: PHYSICIAN ASSISTANT

## 2021-01-01 PROCEDURE — 99207 PR MOONLIGHTING INDICATOR: CPT | Performed by: INTERNAL MEDICINE

## 2021-01-01 PROCEDURE — 82374 ASSAY BLOOD CARBON DIOXIDE: CPT | Performed by: EMERGENCY MEDICINE

## 2021-01-01 PROCEDURE — 250N000005 HC OR RX SURGIFLO HEMOSTATIC MATRIX 10ML 199102S OPNP: Performed by: NEUROLOGICAL SURGERY

## 2021-01-01 PROCEDURE — 88184 FLOWCYTOMETRY/ TC 1 MARKER: CPT | Mod: TC | Performed by: INTERNAL MEDICINE

## 2021-01-01 PROCEDURE — 88311 DECALCIFY TISSUE: CPT | Mod: TC | Performed by: PATHOLOGY

## 2021-01-01 PROCEDURE — 999N001017 HC STATISTIC GLUCOSE BY METER IP: Performed by: INTERNAL MEDICINE

## 2021-01-01 PROCEDURE — 710N000010 HC RECOVERY PHASE 1, LEVEL 2, PER MIN: Performed by: NEUROLOGICAL SURGERY

## 2021-01-01 PROCEDURE — P9035 PLATELET PHERES LEUKOREDUCED: HCPCS | Performed by: STUDENT IN AN ORGANIZED HEALTH CARE EDUCATION/TRAINING PROGRAM

## 2021-01-01 PROCEDURE — 0W310ZZ CONTROL BLEEDING IN CRANIAL CAVITY, OPEN APPROACH: ICD-10-PCS | Performed by: NEUROLOGICAL SURGERY

## 2021-01-01 PROCEDURE — C9113 INJ PANTOPRAZOLE SODIUM, VIA: HCPCS | Performed by: PHYSICIAN ASSISTANT

## 2021-01-01 PROCEDURE — 99238 HOSP IP/OBS DSCHRG MGMT 30/<: CPT | Performed by: NURSE PRACTITIONER

## 2021-01-01 PROCEDURE — 74019 RADEX ABDOMEN 2 VIEWS: CPT

## 2021-01-01 PROCEDURE — P9016 RBC LEUKOCYTES REDUCED: HCPCS | Mod: 59 | Performed by: NURSE PRACTITIONER

## 2021-01-01 PROCEDURE — 99316 NF DSCHRG MGMT 30 MIN+: CPT | Performed by: NURSE PRACTITIONER

## 2021-01-01 PROCEDURE — 83550 IRON BINDING TEST: CPT | Performed by: NURSE PRACTITIONER

## 2021-01-01 PROCEDURE — 99222 1ST HOSP IP/OBS MODERATE 55: CPT | Mod: 57 | Performed by: NEUROLOGICAL SURGERY

## 2021-01-01 PROCEDURE — 97166 OT EVAL MOD COMPLEX 45 MIN: CPT | Mod: GO

## 2021-01-01 PROCEDURE — 90662 IIV NO PRSV INCREASED AG IM: CPT | Performed by: INTERNAL MEDICINE

## 2021-01-01 PROCEDURE — 83540 ASSAY OF IRON: CPT | Performed by: INTERNAL MEDICINE

## 2021-01-01 PROCEDURE — 88341 IMHCHEM/IMCYTCHM EA ADD ANTB: CPT | Mod: TC | Performed by: PATHOLOGY

## 2021-01-01 PROCEDURE — 99291 CRITICAL CARE FIRST HOUR: CPT | Performed by: NURSE PRACTITIONER

## 2021-01-01 PROCEDURE — 87636 SARSCOV2 & INF A&B AMP PRB: CPT | Performed by: EMERGENCY MEDICINE

## 2021-01-01 PROCEDURE — 258N000003 HC RX IP 258 OP 636: Performed by: REGISTERED NURSE

## 2021-01-01 PROCEDURE — 84145 PROCALCITONIN (PCT): CPT | Performed by: HOSPITALIST

## 2021-01-01 PROCEDURE — G0008 ADMIN INFLUENZA VIRUS VAC: HCPCS | Performed by: INTERNAL MEDICINE

## 2021-01-01 PROCEDURE — U0005 INFEC AGEN DETEC AMPLI PROBE: HCPCS | Performed by: PATHOLOGY

## 2021-01-01 PROCEDURE — 85610 PROTHROMBIN TIME: CPT | Performed by: NURSE PRACTITIONER

## 2021-01-01 PROCEDURE — 85014 HEMATOCRIT: CPT | Performed by: STUDENT IN AN ORGANIZED HEALTH CARE EDUCATION/TRAINING PROGRAM

## 2021-01-01 PROCEDURE — 99442 PR PHYSICIAN TELEPHONE EVALUATION 11-20 MIN: CPT | Performed by: INTERNAL MEDICINE

## 2021-01-01 PROCEDURE — 250N000011 HC RX IP 250 OP 636: Mod: JW | Performed by: INTERNAL MEDICINE

## 2021-01-01 PROCEDURE — 80202 ASSAY OF VANCOMYCIN: CPT | Performed by: INTERNAL MEDICINE

## 2021-01-01 PROCEDURE — 85041 AUTOMATED RBC COUNT: CPT | Performed by: NURSE PRACTITIONER

## 2021-01-01 PROCEDURE — 99232 SBSQ HOSP IP/OBS MODERATE 35: CPT | Performed by: STUDENT IN AN ORGANIZED HEALTH CARE EDUCATION/TRAINING PROGRAM

## 2021-01-01 PROCEDURE — 85049 AUTOMATED PLATELET COUNT: CPT | Performed by: PHYSICIAN ASSISTANT

## 2021-01-01 PROCEDURE — 36591 DRAW BLOOD OFF VENOUS DEVICE: CPT | Performed by: STUDENT IN AN ORGANIZED HEALTH CARE EDUCATION/TRAINING PROGRAM

## 2021-01-01 PROCEDURE — 85730 THROMBOPLASTIN TIME PARTIAL: CPT | Performed by: EMERGENCY MEDICINE

## 2021-01-01 PROCEDURE — 88291 CYTO/MOLECULAR REPORT: CPT | Performed by: MEDICAL GENETICS

## 2021-01-01 PROCEDURE — 99239 HOSP IP/OBS DSCHRG MGMT >30: CPT | Performed by: STUDENT IN AN ORGANIZED HEALTH CARE EDUCATION/TRAINING PROGRAM

## 2021-01-01 PROCEDURE — 85018 HEMOGLOBIN: CPT | Performed by: NURSE PRACTITIONER

## 2021-01-01 PROCEDURE — 84165 PROTEIN E-PHORESIS SERUM: CPT | Mod: TC | Performed by: INTERNAL MEDICINE

## 2021-01-01 DEVICE — IMP SCR SYN MATRIX LOW PRO 1.5X04MM SELF DRILL 04.503.104.01: Type: IMPLANTABLE DEVICE | Site: CRANIAL | Status: FUNCTIONAL

## 2021-01-01 DEVICE — IMP SCR SYN MATRIX LOW PRO 1.5X04MM SELF DRILL 04.503.104.01
Type: IMPLANTABLE DEVICE | Site: CRANIAL | Status: FUNCTIONAL
Removed: 2021-07-13

## 2021-01-01 DEVICE — IMP PLATE SYN BOX 4 HOLE 14X14MM 04.503.065: Type: IMPLANTABLE DEVICE | Site: CRANIAL | Status: FUNCTIONAL

## 2021-01-01 RX ORDER — NALOXONE HYDROCHLORIDE 0.4 MG/ML
0.4 INJECTION, SOLUTION INTRAMUSCULAR; INTRAVENOUS; SUBCUTANEOUS
Status: DISCONTINUED | OUTPATIENT
Start: 2021-01-01 | End: 2021-01-01 | Stop reason: HOSPADM

## 2021-01-01 RX ORDER — HEPARIN SODIUM,PORCINE 10 UNIT/ML
5 VIAL (ML) INTRAVENOUS
Status: CANCELLED | OUTPATIENT
Start: 2021-01-01

## 2021-01-01 RX ORDER — HYDRALAZINE HYDROCHLORIDE 20 MG/ML
10-20 INJECTION INTRAMUSCULAR; INTRAVENOUS EVERY 30 MIN PRN
Status: DISCONTINUED | OUTPATIENT
Start: 2021-01-01 | End: 2021-01-01

## 2021-01-01 RX ORDER — HEPARIN SODIUM,PORCINE 10 UNIT/ML
5 VIAL (ML) INTRAVENOUS
Status: DISCONTINUED | OUTPATIENT
Start: 2021-01-01 | End: 2021-01-01 | Stop reason: HOSPADM

## 2021-01-01 RX ORDER — HEPARIN SODIUM (PORCINE) LOCK FLUSH IV SOLN 100 UNIT/ML 100 UNIT/ML
5 SOLUTION INTRAVENOUS
Status: CANCELLED | OUTPATIENT
Start: 2021-01-01

## 2021-01-01 RX ORDER — DIPHENHYDRAMINE HCL 25 MG
25 CAPSULE ORAL ONCE
Status: COMPLETED | OUTPATIENT
Start: 2021-01-01 | End: 2021-01-01

## 2021-01-01 RX ORDER — NALOXONE HYDROCHLORIDE 0.4 MG/ML
0.2 INJECTION, SOLUTION INTRAMUSCULAR; INTRAVENOUS; SUBCUTANEOUS
Status: CANCELLED | OUTPATIENT
Start: 2021-01-01

## 2021-01-01 RX ORDER — HYDRALAZINE HYDROCHLORIDE 20 MG/ML
2.5-5 INJECTION INTRAMUSCULAR; INTRAVENOUS EVERY 10 MIN PRN
Status: DISCONTINUED | OUTPATIENT
Start: 2021-01-01 | End: 2021-01-01 | Stop reason: HOSPADM

## 2021-01-01 RX ORDER — FUROSEMIDE 10 MG/ML
20 INJECTION INTRAMUSCULAR; INTRAVENOUS ONCE
Status: COMPLETED | OUTPATIENT
Start: 2021-01-01 | End: 2021-01-01

## 2021-01-01 RX ORDER — NALOXONE HYDROCHLORIDE 0.4 MG/ML
0.2 INJECTION, SOLUTION INTRAMUSCULAR; INTRAVENOUS; SUBCUTANEOUS
Status: DISCONTINUED | OUTPATIENT
Start: 2021-01-01 | End: 2021-01-01 | Stop reason: HOSPADM

## 2021-01-01 RX ORDER — ATORVASTATIN CALCIUM 10 MG/1
10 TABLET, FILM COATED ORAL DAILY
Qty: 90 TABLET | Refills: 3 | Status: SHIPPED | OUTPATIENT
Start: 2021-01-01

## 2021-01-01 RX ORDER — ALBUTEROL SULFATE 0.83 MG/ML
2.5 SOLUTION RESPIRATORY (INHALATION) 2 TIMES DAILY PRN
Status: DISCONTINUED | OUTPATIENT
Start: 2021-01-01 | End: 2021-01-01 | Stop reason: HOSPADM

## 2021-01-01 RX ORDER — ONDANSETRON 4 MG/1
4 TABLET, ORALLY DISINTEGRATING ORAL EVERY 30 MIN PRN
Status: DISCONTINUED | OUTPATIENT
Start: 2021-01-01 | End: 2021-01-01 | Stop reason: HOSPADM

## 2021-01-01 RX ORDER — NALOXONE HYDROCHLORIDE 0.4 MG/ML
0.2 INJECTION, SOLUTION INTRAMUSCULAR; INTRAVENOUS; SUBCUTANEOUS
Status: ACTIVE | OUTPATIENT
Start: 2021-01-01 | End: 2021-01-01

## 2021-01-01 RX ORDER — ALBUTEROL SULFATE 90 UG/1
1-2 AEROSOL, METERED RESPIRATORY (INHALATION)
Status: CANCELLED
Start: 2021-01-01

## 2021-01-01 RX ORDER — HYDROMORPHONE HCL IN WATER/PF 6 MG/30 ML
PATIENT CONTROLLED ANALGESIA SYRINGE INTRAVENOUS
Status: COMPLETED
Start: 2021-01-01 | End: 2021-01-01

## 2021-01-01 RX ORDER — DIPHENHYDRAMINE HCL 25 MG
25 CAPSULE ORAL ONCE
Status: DISCONTINUED | OUTPATIENT
Start: 2021-01-01 | End: 2021-01-01

## 2021-01-01 RX ORDER — ONDANSETRON 8 MG/1
8 TABLET, FILM COATED ORAL EVERY 8 HOURS PRN
Qty: 10 TABLET | Refills: 5 | Status: SHIPPED | OUTPATIENT
Start: 2021-01-01 | End: 2021-01-01

## 2021-01-01 RX ORDER — CEFAZOLIN SODIUM 2 G/100ML
2 INJECTION, SOLUTION INTRAVENOUS
Status: DISCONTINUED | OUTPATIENT
Start: 2021-01-01 | End: 2021-01-01 | Stop reason: HOSPADM

## 2021-01-01 RX ORDER — DIPHENHYDRAMINE HYDROCHLORIDE 50 MG/ML
25 INJECTION INTRAMUSCULAR; INTRAVENOUS ONCE
Status: COMPLETED | OUTPATIENT
Start: 2021-01-01 | End: 2021-01-01

## 2021-01-01 RX ORDER — PREDNISONE 5 MG/1
5 TABLET ORAL DAILY
Status: DISCONTINUED | OUTPATIENT
Start: 2021-01-01 | End: 2021-01-01

## 2021-01-01 RX ORDER — METHYLPREDNISOLONE SODIUM SUCCINATE 125 MG/2ML
125 INJECTION, POWDER, LYOPHILIZED, FOR SOLUTION INTRAMUSCULAR; INTRAVENOUS
Status: CANCELLED
Start: 2021-01-01

## 2021-01-01 RX ORDER — ONDANSETRON 2 MG/ML
4 INJECTION INTRAMUSCULAR; INTRAVENOUS EVERY 6 HOURS PRN
Status: DISCONTINUED | OUTPATIENT
Start: 2021-01-01 | End: 2021-01-01 | Stop reason: HOSPADM

## 2021-01-01 RX ORDER — BENZONATATE 100 MG/1
100 CAPSULE ORAL 3 TIMES DAILY PRN
Status: DISCONTINUED | OUTPATIENT
Start: 2021-01-01 | End: 2021-01-01 | Stop reason: HOSPADM

## 2021-01-01 RX ORDER — ALBUTEROL SULFATE 0.83 MG/ML
2.5 SOLUTION RESPIRATORY (INHALATION) 2 TIMES DAILY
Start: 2021-01-01

## 2021-01-01 RX ORDER — ACETAMINOPHEN 325 MG/1
650 TABLET ORAL ONCE
Status: CANCELLED
Start: 2021-01-01 | End: 2021-01-01

## 2021-01-01 RX ORDER — MEPERIDINE HYDROCHLORIDE 25 MG/ML
25 INJECTION INTRAMUSCULAR; INTRAVENOUS; SUBCUTANEOUS EVERY 30 MIN PRN
Status: CANCELLED | OUTPATIENT
Start: 2021-01-01

## 2021-01-01 RX ORDER — PROPOFOL 10 MG/ML
INJECTION, EMULSION INTRAVENOUS CONTINUOUS PRN
Status: DISCONTINUED | OUTPATIENT
Start: 2021-01-01 | End: 2021-01-01

## 2021-01-01 RX ORDER — LORAZEPAM 2 MG/ML
0.5 INJECTION INTRAMUSCULAR EVERY 4 HOURS PRN
Status: CANCELLED | OUTPATIENT
Start: 2021-01-01

## 2021-01-01 RX ORDER — LEVETIRACETAM 500 MG/1
500 TABLET ORAL 2 TIMES DAILY
Status: DISCONTINUED | OUTPATIENT
Start: 2021-01-01 | End: 2021-01-01 | Stop reason: HOSPADM

## 2021-01-01 RX ORDER — ONDANSETRON 2 MG/ML
INJECTION INTRAMUSCULAR; INTRAVENOUS PRN
Status: DISCONTINUED | OUTPATIENT
Start: 2021-01-01 | End: 2021-01-01

## 2021-01-01 RX ORDER — HEPARIN SODIUM (PORCINE) LOCK FLUSH IV SOLN 100 UNIT/ML 100 UNIT/ML
5 SOLUTION INTRAVENOUS
Status: DISCONTINUED | OUTPATIENT
Start: 2021-01-01 | End: 2021-01-01 | Stop reason: HOSPADM

## 2021-01-01 RX ORDER — NALOXONE HYDROCHLORIDE 0.4 MG/ML
0.4 INJECTION, SOLUTION INTRAMUSCULAR; INTRAVENOUS; SUBCUTANEOUS
Status: ACTIVE | OUTPATIENT
Start: 2021-01-01 | End: 2021-01-01

## 2021-01-01 RX ORDER — HYDROXYZINE HYDROCHLORIDE 10 MG/1
10 TABLET, FILM COATED ORAL EVERY 6 HOURS PRN
Status: DISCONTINUED | OUTPATIENT
Start: 2021-01-01 | End: 2021-01-01 | Stop reason: HOSPADM

## 2021-01-01 RX ORDER — BENZONATATE 100 MG/1
100 CAPSULE ORAL 3 TIMES DAILY
Start: 2021-01-01 | End: 2021-01-01

## 2021-01-01 RX ORDER — DEXAMETHASONE SODIUM PHOSPHATE 10 MG/ML
4 INJECTION, SOLUTION INTRAMUSCULAR; INTRAVENOUS ONCE
Status: DISCONTINUED | OUTPATIENT
Start: 2021-01-01 | End: 2021-01-01 | Stop reason: HOSPADM

## 2021-01-01 RX ORDER — DIPHENHYDRAMINE HCL 25 MG
25 CAPSULE ORAL ONCE
Status: CANCELLED
Start: 2021-01-01 | End: 2021-01-01

## 2021-01-01 RX ORDER — ALLOPURINOL 100 MG/1
300 TABLET ORAL DAILY
Status: DISCONTINUED | OUTPATIENT
Start: 2021-01-01 | End: 2021-01-01

## 2021-01-01 RX ORDER — ACETAMINOPHEN 325 MG/1
650 TABLET ORAL ONCE
Status: COMPLETED | OUTPATIENT
Start: 2021-01-01 | End: 2021-01-01

## 2021-01-01 RX ORDER — EPINEPHRINE 1 MG/ML
0.3 INJECTION, SOLUTION INTRAMUSCULAR; SUBCUTANEOUS EVERY 5 MIN PRN
Status: CANCELLED | OUTPATIENT
Start: 2021-01-01

## 2021-01-01 RX ORDER — LIDOCAINE HYDROCHLORIDE 20 MG/ML
INJECTION, SOLUTION INFILTRATION; PERINEURAL PRN
Status: DISCONTINUED | OUTPATIENT
Start: 2021-01-01 | End: 2021-01-01

## 2021-01-01 RX ORDER — HYDROMORPHONE HYDROCHLORIDE 2 MG/1
2 TABLET ORAL
Status: DISCONTINUED | OUTPATIENT
Start: 2021-01-01 | End: 2021-01-01

## 2021-01-01 RX ORDER — PIPERACILLIN SODIUM, TAZOBACTAM SODIUM 3; .375 G/15ML; G/15ML
3.38 INJECTION, POWDER, LYOPHILIZED, FOR SOLUTION INTRAVENOUS EVERY 6 HOURS
Status: DISCONTINUED | OUTPATIENT
Start: 2021-01-01 | End: 2021-01-01 | Stop reason: ALTCHOICE

## 2021-01-01 RX ORDER — CEFAZOLIN SODIUM 2 G/100ML
2 INJECTION, SOLUTION INTRAVENOUS
Status: COMPLETED | OUTPATIENT
Start: 2021-01-01 | End: 2021-01-01

## 2021-01-01 RX ORDER — LIDOCAINE HYDROCHLORIDE 10 MG/ML
8-10 INJECTION, SOLUTION EPIDURAL; INFILTRATION; INTRACAUDAL; PERINEURAL
Status: DISCONTINUED | OUTPATIENT
Start: 2021-01-01 | End: 2021-01-01 | Stop reason: HOSPADM

## 2021-01-01 RX ORDER — TAMSULOSIN HYDROCHLORIDE 0.4 MG/1
0.4 CAPSULE ORAL DAILY
Status: DISCONTINUED | OUTPATIENT
Start: 2021-01-01 | End: 2021-01-01 | Stop reason: HOSPADM

## 2021-01-01 RX ORDER — CEFAZOLIN SODIUM 2 G/100ML
INJECTION, SOLUTION INTRAVENOUS PRN
Status: DISCONTINUED | OUTPATIENT
Start: 2021-01-01 | End: 2021-01-01

## 2021-01-01 RX ORDER — ACETAMINOPHEN 500 MG
1000 TABLET ORAL 3 TIMES DAILY
Status: DISCONTINUED | OUTPATIENT
Start: 2021-01-01 | End: 2021-01-01 | Stop reason: HOSPADM

## 2021-01-01 RX ORDER — SODIUM CHLORIDE 9 MG/ML
INJECTION, SOLUTION INTRAVENOUS CONTINUOUS
Status: DISCONTINUED | OUTPATIENT
Start: 2021-01-01 | End: 2021-01-01

## 2021-01-01 RX ORDER — HYDROMORPHONE HCL IN WATER/PF 6 MG/30 ML
0.2 PATIENT CONTROLLED ANALGESIA SYRINGE INTRAVENOUS ONCE
Status: COMPLETED | OUTPATIENT
Start: 2021-01-01 | End: 2021-01-01

## 2021-01-01 RX ORDER — LIDOCAINE 40 MG/G
CREAM TOPICAL
Status: DISCONTINUED | OUTPATIENT
Start: 2021-01-01 | End: 2021-01-01 | Stop reason: HOSPADM

## 2021-01-01 RX ORDER — PANTOPRAZOLE SODIUM 40 MG/1
40 TABLET, DELAYED RELEASE ORAL
Qty: 30 TABLET | Refills: 0 | Status: SHIPPED | OUTPATIENT
Start: 2021-01-01 | End: 2021-01-01

## 2021-01-01 RX ORDER — ONDANSETRON 8 MG/1
8 TABLET, FILM COATED ORAL EVERY 8 HOURS PRN
Qty: 10 TABLET | Refills: 5 | Status: SHIPPED | OUTPATIENT
Start: 2021-01-01

## 2021-01-01 RX ORDER — NALOXONE HYDROCHLORIDE 0.4 MG/ML
0.4 INJECTION, SOLUTION INTRAMUSCULAR; INTRAVENOUS; SUBCUTANEOUS
Status: DISCONTINUED | OUTPATIENT
Start: 2021-01-01 | End: 2021-01-01

## 2021-01-01 RX ORDER — BUMETANIDE 0.25 MG/ML
2 INJECTION INTRAMUSCULAR; INTRAVENOUS ONCE
Status: COMPLETED | OUTPATIENT
Start: 2021-01-01 | End: 2021-01-01

## 2021-01-01 RX ORDER — AMOXICILLIN 250 MG
2 CAPSULE ORAL 2 TIMES DAILY PRN
Status: DISCONTINUED | OUTPATIENT
Start: 2021-01-01 | End: 2021-01-01 | Stop reason: HOSPADM

## 2021-01-01 RX ORDER — OXYCODONE HYDROCHLORIDE 5 MG/1
5 TABLET ORAL EVERY 6 HOURS PRN
Status: ON HOLD | COMMUNITY
End: 2021-01-01

## 2021-01-01 RX ORDER — ONDANSETRON 2 MG/ML
4 INJECTION INTRAMUSCULAR; INTRAVENOUS EVERY 30 MIN PRN
Status: DISCONTINUED | OUTPATIENT
Start: 2021-01-01 | End: 2021-01-01 | Stop reason: HOSPADM

## 2021-01-01 RX ORDER — ALBUTEROL SULFATE 0.83 MG/ML
2.5 SOLUTION RESPIRATORY (INHALATION)
Status: CANCELLED | OUTPATIENT
Start: 2021-01-01

## 2021-01-01 RX ORDER — ACETAMINOPHEN 325 MG/1
650 TABLET ORAL
Status: DISCONTINUED | OUTPATIENT
Start: 2021-01-01 | End: 2021-01-01 | Stop reason: HOSPADM

## 2021-01-01 RX ORDER — PROPOFOL 10 MG/ML
INJECTION, EMULSION INTRAVENOUS PRN
Status: DISCONTINUED | OUTPATIENT
Start: 2021-01-01 | End: 2021-01-01

## 2021-01-01 RX ORDER — NALOXONE HYDROCHLORIDE 0.4 MG/ML
0.2 INJECTION, SOLUTION INTRAMUSCULAR; INTRAVENOUS; SUBCUTANEOUS
Status: DISCONTINUED | OUTPATIENT
Start: 2021-01-01 | End: 2021-01-01

## 2021-01-01 RX ORDER — OXYCODONE HYDROCHLORIDE 5 MG/1
5 TABLET ORAL EVERY 4 HOURS PRN
Status: DISCONTINUED | OUTPATIENT
Start: 2021-01-01 | End: 2021-01-01

## 2021-01-01 RX ORDER — ALLOPURINOL 100 MG/1
200 TABLET ORAL DAILY
Status: DISCONTINUED | OUTPATIENT
Start: 2021-01-01 | End: 2021-01-01 | Stop reason: ALTCHOICE

## 2021-01-01 RX ORDER — LINEZOLID 600 MG/1
600 TABLET, FILM COATED ORAL EVERY 12 HOURS SCHEDULED
Status: DISCONTINUED | OUTPATIENT
Start: 2021-01-01 | End: 2021-01-01

## 2021-01-01 RX ORDER — DIPHENHYDRAMINE HYDROCHLORIDE 50 MG/ML
50 INJECTION INTRAMUSCULAR; INTRAVENOUS
Status: CANCELLED
Start: 2021-01-01

## 2021-01-01 RX ORDER — FLUMAZENIL 0.1 MG/ML
0.2 INJECTION, SOLUTION INTRAVENOUS
Status: DISCONTINUED | OUTPATIENT
Start: 2021-01-01 | End: 2021-01-01 | Stop reason: HOSPADM

## 2021-01-01 RX ORDER — LORAZEPAM 2 MG/ML
1-2 INJECTION INTRAMUSCULAR
Status: DISCONTINUED | OUTPATIENT
Start: 2021-01-01 | End: 2021-01-01 | Stop reason: HOSPADM

## 2021-01-01 RX ORDER — HYDRALAZINE HYDROCHLORIDE 25 MG/1
25 TABLET, FILM COATED ORAL 3 TIMES DAILY PRN
Status: DISCONTINUED | OUTPATIENT
Start: 2021-01-01 | End: 2021-01-01 | Stop reason: HOSPADM

## 2021-01-01 RX ORDER — TAMSULOSIN HYDROCHLORIDE 0.4 MG/1
0.4 CAPSULE ORAL DAILY
Qty: 30 CAPSULE | Refills: 0 | Status: SHIPPED | DISCHARGE
Start: 2021-01-01 | End: 2021-01-01

## 2021-01-01 RX ORDER — LABETALOL 20 MG/4 ML (5 MG/ML) INTRAVENOUS SYRINGE
PRN
Status: DISCONTINUED | OUTPATIENT
Start: 2021-01-01 | End: 2021-01-01

## 2021-01-01 RX ORDER — METHOCARBAMOL 500 MG/1
500 TABLET, FILM COATED ORAL EVERY 6 HOURS PRN
Status: DISCONTINUED | OUTPATIENT
Start: 2021-01-01 | End: 2021-01-01 | Stop reason: HOSPADM

## 2021-01-01 RX ORDER — PANTOPRAZOLE SODIUM 20 MG/1
40 TABLET, DELAYED RELEASE ORAL DAILY
Status: DISCONTINUED | OUTPATIENT
Start: 2021-01-01 | End: 2021-01-01 | Stop reason: ALTCHOICE

## 2021-01-01 RX ORDER — HYDROMORPHONE HYDROCHLORIDE 2 MG/1
TABLET ORAL
Qty: 20 TABLET | Refills: 0 | Status: ON HOLD | OUTPATIENT
Start: 2021-01-01 | End: 2021-01-01

## 2021-01-01 RX ORDER — METHOCARBAMOL 500 MG/1
500 TABLET, FILM COATED ORAL 4 TIMES DAILY PRN
Qty: 40 TABLET | Refills: 0 | Status: ON HOLD | OUTPATIENT
Start: 2021-01-01 | End: 2021-01-01

## 2021-01-01 RX ORDER — CARBOXYMETHYLCELLULOSE SODIUM 5 MG/ML
1 SOLUTION/ DROPS OPHTHALMIC
Status: DISCONTINUED | OUTPATIENT
Start: 2021-01-01 | End: 2021-01-01

## 2021-01-01 RX ORDER — LORAZEPAM 2 MG/ML
1 INJECTION INTRAMUSCULAR
Status: DISCONTINUED | OUTPATIENT
Start: 2021-01-01 | End: 2021-01-01

## 2021-01-01 RX ORDER — LORAZEPAM 1 MG/1
1 TABLET ORAL
Status: DISCONTINUED | OUTPATIENT
Start: 2021-01-01 | End: 2021-01-01 | Stop reason: HOSPADM

## 2021-01-01 RX ORDER — PREDNISONE 20 MG/1
20 TABLET ORAL DAILY
Status: DISCONTINUED | OUTPATIENT
Start: 2021-01-01 | End: 2021-01-01

## 2021-01-01 RX ORDER — DEXTROSE MONOHYDRATE 25 G/50ML
25-50 INJECTION, SOLUTION INTRAVENOUS
Status: DISCONTINUED | OUTPATIENT
Start: 2021-01-01 | End: 2021-01-01 | Stop reason: HOSPADM

## 2021-01-01 RX ORDER — DIPHENHYDRAMINE HCL 25 MG
25 TABLET ORAL ONCE
Qty: 1 TABLET | Refills: 0 | Status: CANCELLED | OUTPATIENT
Start: 2021-01-01 | End: 2021-01-01

## 2021-01-01 RX ORDER — SODIUM CHLORIDE 9 MG/ML
INJECTION, SOLUTION INTRAVENOUS CONTINUOUS PRN
Status: DISCONTINUED | OUTPATIENT
Start: 2021-01-01 | End: 2021-01-01

## 2021-01-01 RX ORDER — PANTOPRAZOLE SODIUM 40 MG/1
40 TABLET, DELAYED RELEASE ORAL
Status: DISCONTINUED | OUTPATIENT
Start: 2021-01-01 | End: 2021-01-01 | Stop reason: HOSPADM

## 2021-01-01 RX ORDER — OXYCODONE HYDROCHLORIDE 5 MG/1
5 TABLET ORAL EVERY 6 HOURS PRN
Qty: 40 TABLET | Refills: 0 | Status: SHIPPED | OUTPATIENT
Start: 2021-01-01 | End: 2021-01-01

## 2021-01-01 RX ORDER — MORPHINE SULFATE 20 MG/ML
5-10 SOLUTION ORAL
Status: DISCONTINUED | OUTPATIENT
Start: 2021-01-01 | End: 2021-01-01 | Stop reason: HOSPADM

## 2021-01-01 RX ORDER — ALLOPURINOL 300 MG/1
300 TABLET ORAL DAILY
Qty: 90 TABLET | Refills: 3 | Status: SHIPPED | OUTPATIENT
Start: 2021-01-01

## 2021-01-01 RX ORDER — DEXAMETHASONE 4 MG/1
40 TABLET ORAL
Qty: 40 TABLET | Refills: 0 | Status: SHIPPED | OUTPATIENT
Start: 2021-01-01 | End: 2021-01-01

## 2021-01-01 RX ORDER — CALCIUM CARBONATE 500 MG/1
1000 TABLET, CHEWABLE ORAL 4 TIMES DAILY PRN
Status: DISCONTINUED | OUTPATIENT
Start: 2021-01-01 | End: 2021-01-01 | Stop reason: HOSPADM

## 2021-01-01 RX ORDER — ACETAMINOPHEN 325 MG/1
650 TABLET ORAL ONCE
Qty: 2 TABLET | Refills: 0 | Status: CANCELLED | OUTPATIENT
Start: 2021-01-01 | End: 2021-01-01

## 2021-01-01 RX ORDER — PREDNISONE 20 MG/1
20 TABLET ORAL ONCE
Status: COMPLETED | OUTPATIENT
Start: 2021-01-01 | End: 2021-01-01

## 2021-01-01 RX ORDER — ATORVASTATIN CALCIUM 10 MG/1
10 TABLET, FILM COATED ORAL DAILY
Status: DISCONTINUED | OUTPATIENT
Start: 2021-01-01 | End: 2021-01-01 | Stop reason: HOSPADM

## 2021-01-01 RX ORDER — ONDANSETRON 2 MG/ML
4 INJECTION INTRAMUSCULAR; INTRAVENOUS ONCE
Status: COMPLETED | OUTPATIENT
Start: 2021-01-01 | End: 2021-01-01

## 2021-01-01 RX ORDER — HEPARIN SODIUM,PORCINE 10 UNIT/ML
5-10 VIAL (ML) INTRAVENOUS
Status: DISCONTINUED | OUTPATIENT
Start: 2021-01-01 | End: 2021-01-01 | Stop reason: HOSPADM

## 2021-01-01 RX ORDER — LACTOBACILLUS RHAMNOSUS GG 10B CELL
1 CAPSULE ORAL 3 TIMES DAILY
Qty: 90 CAPSULE | Refills: 0 | Status: SHIPPED | OUTPATIENT
Start: 2021-01-01 | End: 2021-01-01

## 2021-01-01 RX ORDER — FENTANYL CITRATE 50 UG/ML
INJECTION, SOLUTION INTRAMUSCULAR; INTRAVENOUS PRN
Status: DISCONTINUED | OUTPATIENT
Start: 2021-01-01 | End: 2021-01-01

## 2021-01-01 RX ORDER — HEPARIN SODIUM,PORCINE 10 UNIT/ML
5-10 VIAL (ML) INTRAVENOUS EVERY 24 HOURS
Status: DISCONTINUED | OUTPATIENT
Start: 2021-01-01 | End: 2021-01-01 | Stop reason: HOSPADM

## 2021-01-01 RX ORDER — LEVETIRACETAM 500 MG/1
500 TABLET ORAL 2 TIMES DAILY
Qty: 15 TABLET | Refills: 0 | Status: SHIPPED | OUTPATIENT
Start: 2021-01-01 | End: 2021-01-01

## 2021-01-01 RX ORDER — POTASSIUM CHLORIDE 1500 MG/1
20 TABLET, EXTENDED RELEASE ORAL ONCE
Status: COMPLETED | OUTPATIENT
Start: 2021-01-01 | End: 2021-01-01

## 2021-01-01 RX ORDER — TAMSULOSIN HYDROCHLORIDE 0.4 MG/1
0.4 CAPSULE ORAL DAILY
Qty: 30 CAPSULE | Refills: 0 | Status: SHIPPED | OUTPATIENT
Start: 2021-01-01 | End: 2021-01-01

## 2021-01-01 RX ORDER — PROCHLORPERAZINE 25 MG
12.5 SUPPOSITORY, RECTAL RECTAL EVERY 12 HOURS PRN
Status: DISCONTINUED | OUTPATIENT
Start: 2021-01-01 | End: 2021-01-01

## 2021-01-01 RX ORDER — HYDROMORPHONE HYDROCHLORIDE 1 MG/ML
0.3 INJECTION, SOLUTION INTRAMUSCULAR; INTRAVENOUS; SUBCUTANEOUS
Status: DISCONTINUED | OUTPATIENT
Start: 2021-01-01 | End: 2021-01-01

## 2021-01-01 RX ORDER — PROCHLORPERAZINE 25 MG
12.5 SUPPOSITORY, RECTAL RECTAL EVERY 12 HOURS PRN
Status: DISCONTINUED | OUTPATIENT
Start: 2021-01-01 | End: 2021-01-01 | Stop reason: HOSPADM

## 2021-01-01 RX ORDER — ONDANSETRON 4 MG/1
4 TABLET, ORALLY DISINTEGRATING ORAL EVERY 6 HOURS PRN
Status: DISCONTINUED | OUTPATIENT
Start: 2021-01-01 | End: 2021-01-01 | Stop reason: HOSPADM

## 2021-01-01 RX ORDER — SODIUM CHLORIDE FOR INHALATION 3 %
3 VIAL, NEBULIZER (ML) INHALATION 2 TIMES DAILY
Qty: 90 ML | Refills: 4
Start: 2021-01-01

## 2021-01-01 RX ORDER — MORPHINE SULFATE 2 MG/ML
INJECTION, SOLUTION INTRAMUSCULAR; INTRAVENOUS
Status: COMPLETED
Start: 2021-01-01 | End: 2021-01-01

## 2021-01-01 RX ORDER — ATORVASTATIN CALCIUM 10 MG/1
10 TABLET, FILM COATED ORAL DAILY
Qty: 90 TABLET | Refills: 3 | Status: SHIPPED | OUTPATIENT
Start: 2021-01-01 | End: 2021-01-01

## 2021-01-01 RX ORDER — FLUTICASONE PROPIONATE 110 UG/1
1 AEROSOL, METERED RESPIRATORY (INHALATION) 2 TIMES DAILY
Status: DISCONTINUED | OUTPATIENT
Start: 2021-01-01 | End: 2021-01-01 | Stop reason: HOSPADM

## 2021-01-01 RX ORDER — DOCUSATE SODIUM 100 MG/1
100 CAPSULE, LIQUID FILLED ORAL 2 TIMES DAILY
Status: DISCONTINUED | OUTPATIENT
Start: 2021-01-01 | End: 2021-01-01 | Stop reason: HOSPADM

## 2021-01-01 RX ORDER — ACETAMINOPHEN 325 MG/1
325-650 TABLET ORAL EVERY 6 HOURS PRN
Status: DISCONTINUED | OUTPATIENT
Start: 2021-01-01 | End: 2021-01-01

## 2021-01-01 RX ORDER — HYDROMORPHONE HYDROCHLORIDE 1 MG/ML
.3-.5 INJECTION, SOLUTION INTRAMUSCULAR; INTRAVENOUS; SUBCUTANEOUS EVERY 5 MIN PRN
Status: DISCONTINUED | OUTPATIENT
Start: 2021-01-01 | End: 2021-01-01 | Stop reason: HOSPADM

## 2021-01-01 RX ORDER — PREDNISONE 5 MG/1
5 TABLET ORAL
Status: DISCONTINUED | OUTPATIENT
Start: 2021-01-01 | End: 2021-01-01 | Stop reason: ALTCHOICE

## 2021-01-01 RX ORDER — HEPARIN SODIUM (PORCINE) LOCK FLUSH IV SOLN 100 UNIT/ML 100 UNIT/ML
5-10 SOLUTION INTRAVENOUS
Status: DISCONTINUED | OUTPATIENT
Start: 2021-01-01 | End: 2021-01-01 | Stop reason: HOSPADM

## 2021-01-01 RX ORDER — HYDRALAZINE HYDROCHLORIDE 20 MG/ML
10-20 INJECTION INTRAMUSCULAR; INTRAVENOUS
Status: DISCONTINUED | OUTPATIENT
Start: 2021-01-01 | End: 2021-01-01

## 2021-01-01 RX ORDER — IOPAMIDOL 755 MG/ML
111 INJECTION, SOLUTION INTRAVASCULAR ONCE
Status: COMPLETED | OUTPATIENT
Start: 2021-01-01 | End: 2021-01-01

## 2021-01-01 RX ORDER — BENZONATATE 100 MG/1
100 CAPSULE ORAL 3 TIMES DAILY PRN
Qty: 90 CAPSULE | Refills: 3 | Status: SHIPPED | OUTPATIENT
Start: 2021-01-01 | End: 2021-01-01

## 2021-01-01 RX ORDER — LEVETIRACETAM 10 MG/ML
1000 INJECTION INTRAVASCULAR ONCE
Status: COMPLETED | OUTPATIENT
Start: 2021-01-01 | End: 2021-01-01

## 2021-01-01 RX ORDER — NEOSTIGMINE METHYLSULFATE 1 MG/ML
VIAL (ML) INJECTION PRN
Status: DISCONTINUED | OUTPATIENT
Start: 2021-01-01 | End: 2021-01-01

## 2021-01-01 RX ORDER — LABETALOL HYDROCHLORIDE 5 MG/ML
10-20 INJECTION, SOLUTION INTRAVENOUS EVERY 10 MIN PRN
Status: DISCONTINUED | OUTPATIENT
Start: 2021-01-01 | End: 2021-01-01

## 2021-01-01 RX ORDER — SODIUM CHLORIDE, SODIUM LACTATE, POTASSIUM CHLORIDE, CALCIUM CHLORIDE 600; 310; 30; 20 MG/100ML; MG/100ML; MG/100ML; MG/100ML
INJECTION, SOLUTION INTRAVENOUS CONTINUOUS PRN
Status: DISCONTINUED | OUTPATIENT
Start: 2021-01-01 | End: 2021-01-01

## 2021-01-01 RX ORDER — LORAZEPAM 0.5 MG/1
0.5 TABLET ORAL EVERY 4 HOURS PRN
Qty: 30 TABLET | Refills: 5 | Status: SHIPPED | OUTPATIENT
Start: 2021-01-01

## 2021-01-01 RX ORDER — ONDANSETRON 2 MG/ML
4 INJECTION INTRAMUSCULAR; INTRAVENOUS EVERY 6 HOURS PRN
Status: DISCONTINUED | OUTPATIENT
Start: 2021-01-01 | End: 2021-01-01

## 2021-01-01 RX ORDER — PREDNISONE 20 MG/1
40 TABLET ORAL DAILY
Qty: 10 TABLET | Refills: 0 | Status: SHIPPED | OUTPATIENT
Start: 2021-01-01 | End: 2021-01-01

## 2021-01-01 RX ORDER — ACETAMINOPHEN 325 MG/1
650 TABLET ORAL EVERY 6 HOURS PRN
Status: DISCONTINUED | OUTPATIENT
Start: 2021-01-01 | End: 2021-01-01 | Stop reason: HOSPADM

## 2021-01-01 RX ORDER — MORPHINE SULFATE 2 MG/ML
2 INJECTION, SOLUTION INTRAMUSCULAR; INTRAVENOUS ONCE
Status: COMPLETED | OUTPATIENT
Start: 2021-01-01 | End: 2021-01-01

## 2021-01-01 RX ORDER — FENTANYL CITRATE 50 UG/ML
25-50 INJECTION, SOLUTION INTRAMUSCULAR; INTRAVENOUS
Status: DISCONTINUED | OUTPATIENT
Start: 2021-01-01 | End: 2021-01-01 | Stop reason: HOSPADM

## 2021-01-01 RX ORDER — FENTANYL CITRATE 50 UG/ML
25-50 INJECTION, SOLUTION INTRAMUSCULAR; INTRAVENOUS EVERY 5 MIN PRN
Status: DISCONTINUED | OUTPATIENT
Start: 2021-01-01 | End: 2021-01-01 | Stop reason: HOSPADM

## 2021-01-01 RX ORDER — HYDROMORPHONE HCL IN WATER/PF 6 MG/30 ML
0.2 PATIENT CONTROLLED ANALGESIA SYRINGE INTRAVENOUS
Status: DISCONTINUED | OUTPATIENT
Start: 2021-01-01 | End: 2021-01-01

## 2021-01-01 RX ORDER — LEVETIRACETAM 500 MG/1
500 TABLET ORAL 2 TIMES DAILY
Qty: 60 TABLET | Refills: 0 | Status: SHIPPED | OUTPATIENT
Start: 2021-01-01 | End: 2021-01-01

## 2021-01-01 RX ORDER — HYDROMORPHONE HYDROCHLORIDE 1 MG/ML
.3-.5 INJECTION, SOLUTION INTRAMUSCULAR; INTRAVENOUS; SUBCUTANEOUS
Status: DISCONTINUED | OUTPATIENT
Start: 2021-01-01 | End: 2021-01-01

## 2021-01-01 RX ORDER — ACETAMINOPHEN 650 MG/1
650 SUPPOSITORY RECTAL EVERY 6 HOURS PRN
Status: DISCONTINUED | OUTPATIENT
Start: 2021-01-01 | End: 2021-01-01 | Stop reason: HOSPADM

## 2021-01-01 RX ORDER — ACETAMINOPHEN 325 MG/1
650 TABLET ORAL ONCE
Status: DISCONTINUED | OUTPATIENT
Start: 2021-01-01 | End: 2021-01-01 | Stop reason: HOSPADM

## 2021-01-01 RX ORDER — CARBOXYMETHYLCELLULOSE SODIUM 5 MG/ML
1-2 SOLUTION/ DROPS OPHTHALMIC
Status: DISCONTINUED | OUTPATIENT
Start: 2021-01-01 | End: 2021-01-01 | Stop reason: HOSPADM

## 2021-01-01 RX ORDER — ALBUTEROL SULFATE 0.83 MG/ML
2.5 SOLUTION RESPIRATORY (INHALATION) EVERY 6 HOURS PRN
Status: DISCONTINUED | OUTPATIENT
Start: 2021-01-01 | End: 2021-01-01 | Stop reason: HOSPADM

## 2021-01-01 RX ORDER — KETOROLAC TROMETHAMINE 15 MG/ML
15 INJECTION, SOLUTION INTRAMUSCULAR; INTRAVENOUS ONCE
Status: COMPLETED | OUTPATIENT
Start: 2021-01-01 | End: 2021-01-01

## 2021-01-01 RX ORDER — IPRATROPIUM BROMIDE AND ALBUTEROL SULFATE 2.5; .5 MG/3ML; MG/3ML
3 SOLUTION RESPIRATORY (INHALATION)
Status: DISCONTINUED | OUTPATIENT
Start: 2021-01-01 | End: 2021-01-01 | Stop reason: ALTCHOICE

## 2021-01-01 RX ORDER — HYDROMORPHONE HYDROCHLORIDE 1 MG/ML
0.2 INJECTION, SOLUTION INTRAMUSCULAR; INTRAVENOUS; SUBCUTANEOUS ONCE
Status: COMPLETED | OUTPATIENT
Start: 2021-01-01 | End: 2021-01-01

## 2021-01-01 RX ORDER — PREDNISONE 5 MG/1
TABLET ORAL
Qty: 111 TABLET | Refills: 1 | Status: SHIPPED | OUTPATIENT
Start: 2021-01-01

## 2021-01-01 RX ORDER — LEVETIRACETAM 100 MG/ML
SOLUTION ORAL PRN
Status: DISCONTINUED | OUTPATIENT
Start: 2021-01-01 | End: 2021-01-01

## 2021-01-01 RX ORDER — ACETAMINOPHEN 325 MG/1
650 TABLET ORAL ONCE
Status: DISCONTINUED | OUTPATIENT
Start: 2021-01-01 | End: 2021-01-01

## 2021-01-01 RX ORDER — ATROPINE SULFATE 10 MG/ML
2 SOLUTION/ DROPS OPHTHALMIC EVERY 4 HOURS PRN
Status: DISCONTINUED | OUTPATIENT
Start: 2021-01-01 | End: 2021-01-01 | Stop reason: HOSPADM

## 2021-01-01 RX ORDER — ONDANSETRON 4 MG/1
4 TABLET, ORALLY DISINTEGRATING ORAL EVERY 6 HOURS PRN
Status: DISCONTINUED | OUTPATIENT
Start: 2021-01-01 | End: 2021-01-01

## 2021-01-01 RX ORDER — CHLOROTHIAZIDE SODIUM 500 MG/1
500 INJECTION INTRAVENOUS ONCE
Status: DISCONTINUED | OUTPATIENT
Start: 2021-01-01 | End: 2021-01-01 | Stop reason: ALTCHOICE

## 2021-01-01 RX ORDER — OXYCODONE HYDROCHLORIDE 5 MG/1
10 TABLET ORAL EVERY 4 HOURS PRN
Status: DISCONTINUED | OUTPATIENT
Start: 2021-01-01 | End: 2021-01-01

## 2021-01-01 RX ORDER — LABETALOL HYDROCHLORIDE 5 MG/ML
INJECTION, SOLUTION INTRAVENOUS PRN
Status: DISCONTINUED | OUTPATIENT
Start: 2021-01-01 | End: 2021-01-01

## 2021-01-01 RX ORDER — HYDROMORPHONE HCL IN WATER/PF 6 MG/30 ML
0.4 PATIENT CONTROLLED ANALGESIA SYRINGE INTRAVENOUS
Status: DISCONTINUED | OUTPATIENT
Start: 2021-01-01 | End: 2021-01-01

## 2021-01-01 RX ORDER — HYDROMORPHONE HYDROCHLORIDE 1 MG/ML
0.5 INJECTION, SOLUTION INTRAMUSCULAR; INTRAVENOUS; SUBCUTANEOUS ONCE
Status: DISCONTINUED | OUTPATIENT
Start: 2021-01-01 | End: 2021-01-01

## 2021-01-01 RX ORDER — FLUTICASONE PROPIONATE 110 UG/1
1 AEROSOL, METERED RESPIRATORY (INHALATION) 2 TIMES DAILY
Status: DISCONTINUED | OUTPATIENT
Start: 2021-01-01 | End: 2021-01-01 | Stop reason: CLARIF

## 2021-01-01 RX ORDER — SODIUM CHLORIDE, SODIUM LACTATE, POTASSIUM CHLORIDE, CALCIUM CHLORIDE 600; 310; 30; 20 MG/100ML; MG/100ML; MG/100ML; MG/100ML
INJECTION, SOLUTION INTRAVENOUS CONTINUOUS
Status: DISCONTINUED | OUTPATIENT
Start: 2021-01-01 | End: 2021-01-01 | Stop reason: ALTCHOICE

## 2021-01-01 RX ORDER — BISACODYL 10 MG
10 SUPPOSITORY, RECTAL RECTAL DAILY PRN
Status: DISCONTINUED | OUTPATIENT
Start: 2021-01-01 | End: 2021-01-01 | Stop reason: HOSPADM

## 2021-01-01 RX ORDER — GLYCOPYRROLATE 0.2 MG/ML
INJECTION, SOLUTION INTRAMUSCULAR; INTRAVENOUS PRN
Status: DISCONTINUED | OUTPATIENT
Start: 2021-01-01 | End: 2021-01-01

## 2021-01-01 RX ORDER — GUAIFENESIN/DEXTROMETHORPHAN 100-10MG/5
5 SYRUP ORAL EVERY 4 HOURS PRN
Status: DISCONTINUED | OUTPATIENT
Start: 2021-01-01 | End: 2021-01-01 | Stop reason: HOSPADM

## 2021-01-01 RX ORDER — PREDNISONE 20 MG/1
40 TABLET ORAL DAILY
Status: COMPLETED | OUTPATIENT
Start: 2021-01-01 | End: 2021-01-01

## 2021-01-01 RX ORDER — LABETALOL HYDROCHLORIDE 5 MG/ML
10 INJECTION, SOLUTION INTRAVENOUS
Status: DISCONTINUED | OUTPATIENT
Start: 2021-01-01 | End: 2021-01-01 | Stop reason: HOSPADM

## 2021-01-01 RX ORDER — POLYETHYLENE GLYCOL 3350 17 G/17G
17 POWDER, FOR SOLUTION ORAL DAILY PRN
Status: DISCONTINUED | OUTPATIENT
Start: 2021-01-01 | End: 2021-01-01 | Stop reason: HOSPADM

## 2021-01-01 RX ORDER — ALLOPURINOL 300 MG/1
300 TABLET ORAL DAILY
Qty: 90 TABLET | Refills: 3 | Status: SHIPPED | OUTPATIENT
Start: 2021-01-01 | End: 2021-01-01

## 2021-01-01 RX ORDER — HYDROMORPHONE HYDROCHLORIDE 2 MG/1
2 TABLET ORAL EVERY 6 HOURS PRN
Qty: 12 TABLET | Refills: 0 | Status: SHIPPED | OUTPATIENT
Start: 2021-01-01 | End: 2021-01-01

## 2021-01-01 RX ORDER — FUROSEMIDE 10 MG/ML
INJECTION INTRAMUSCULAR; INTRAVENOUS
Status: COMPLETED
Start: 2021-01-01 | End: 2021-01-01

## 2021-01-01 RX ORDER — OXYCODONE HYDROCHLORIDE 5 MG/1
5-10 TABLET ORAL EVERY 4 HOURS PRN
Status: DISCONTINUED | OUTPATIENT
Start: 2021-01-01 | End: 2021-01-01

## 2021-01-01 RX ORDER — NICOTINE POLACRILEX 4 MG
15-30 LOZENGE BUCCAL
Status: DISCONTINUED | OUTPATIENT
Start: 2021-01-01 | End: 2021-01-01 | Stop reason: HOSPADM

## 2021-01-01 RX ORDER — BENZONATATE 100 MG/1
100 CAPSULE ORAL 3 TIMES DAILY PRN
COMMUNITY
Start: 2021-01-01

## 2021-01-01 RX ORDER — LABETALOL HYDROCHLORIDE 5 MG/ML
10-40 INJECTION, SOLUTION INTRAVENOUS EVERY 10 MIN PRN
Status: DISCONTINUED | OUTPATIENT
Start: 2021-01-01 | End: 2021-01-01 | Stop reason: HOSPADM

## 2021-01-01 RX ORDER — ENALAPRILAT 1.25 MG/ML
0.62 INJECTION INTRAVENOUS EVERY 6 HOURS PRN
Status: DISCONTINUED | OUTPATIENT
Start: 2021-01-01 | End: 2021-01-01

## 2021-01-01 RX ORDER — IOPAMIDOL 755 MG/ML
120 INJECTION, SOLUTION INTRAVASCULAR ONCE
Status: COMPLETED | OUTPATIENT
Start: 2021-01-01 | End: 2021-01-01

## 2021-01-01 RX ORDER — ACETAMINOPHEN 325 MG/1
975 TABLET ORAL EVERY 8 HOURS
Status: COMPLETED | OUTPATIENT
Start: 2021-01-01 | End: 2021-01-01

## 2021-01-01 RX ORDER — POTASSIUM CHLORIDE 1500 MG/1
40 TABLET, EXTENDED RELEASE ORAL ONCE
Status: COMPLETED | OUTPATIENT
Start: 2021-01-01 | End: 2021-01-01

## 2021-01-01 RX ORDER — MORPHINE SULFATE 10 MG/5ML
5-10 SOLUTION ORAL
Status: DISCONTINUED | OUTPATIENT
Start: 2021-01-01 | End: 2021-01-01 | Stop reason: HOSPADM

## 2021-01-01 RX ORDER — AMOXICILLIN 250 MG
1 CAPSULE ORAL 2 TIMES DAILY PRN
Status: DISCONTINUED | OUTPATIENT
Start: 2021-01-01 | End: 2021-01-01 | Stop reason: HOSPADM

## 2021-01-01 RX ORDER — ALLOPURINOL 300 MG/1
300 TABLET ORAL DAILY
Qty: 30 TABLET | Refills: 3 | Status: SHIPPED | OUTPATIENT
Start: 2021-01-01 | End: 2021-01-01

## 2021-01-01 RX ORDER — SODIUM CHLORIDE, SODIUM LACTATE, POTASSIUM CHLORIDE, CALCIUM CHLORIDE 600; 310; 30; 20 MG/100ML; MG/100ML; MG/100ML; MG/100ML
INJECTION, SOLUTION INTRAVENOUS CONTINUOUS
Status: DISCONTINUED | OUTPATIENT
Start: 2021-01-01 | End: 2021-01-01 | Stop reason: HOSPADM

## 2021-01-01 RX ORDER — PROCHLORPERAZINE MALEATE 10 MG
10 TABLET ORAL EVERY 6 HOURS PRN
Qty: 30 TABLET | Refills: 5 | Status: SHIPPED | OUTPATIENT
Start: 2021-01-01

## 2021-01-01 RX ORDER — ACETAMINOPHEN 325 MG/1
975 TABLET ORAL ONCE
Status: COMPLETED | OUTPATIENT
Start: 2021-01-01 | End: 2021-01-01

## 2021-01-01 RX ORDER — DESMOPRESSIN ACETATE 4 UG/ML
27 INJECTION, SOLUTION INTRAVENOUS; SUBCUTANEOUS 2 TIMES DAILY
Status: DISCONTINUED | OUTPATIENT
Start: 2021-01-01 | End: 2021-01-01

## 2021-01-01 RX ORDER — DEXAMETHASONE SODIUM PHOSPHATE 4 MG/ML
INJECTION, SOLUTION INTRA-ARTICULAR; INTRALESIONAL; INTRAMUSCULAR; INTRAVENOUS; SOFT TISSUE PRN
Status: DISCONTINUED | OUTPATIENT
Start: 2021-01-01 | End: 2021-01-01

## 2021-01-01 RX ORDER — GLYCOPYRROLATE 0.2 MG/ML
0.2 INJECTION, SOLUTION INTRAMUSCULAR; INTRAVENOUS EVERY 4 HOURS PRN
Status: DISCONTINUED | OUTPATIENT
Start: 2021-01-01 | End: 2021-01-01 | Stop reason: HOSPADM

## 2021-01-01 RX ORDER — CEFAZOLIN SODIUM 2 G/100ML
2 INJECTION, SOLUTION INTRAVENOUS SEE ADMIN INSTRUCTIONS
Status: DISCONTINUED | OUTPATIENT
Start: 2021-01-01 | End: 2021-01-01 | Stop reason: HOSPADM

## 2021-01-01 RX ORDER — NICARDIPINE HYDROCHLORIDE 0.2 MG/ML
2.5-15 INJECTION INTRAVENOUS CONTINUOUS
Status: DISCONTINUED | OUTPATIENT
Start: 2021-01-01 | End: 2021-01-01

## 2021-01-01 RX ORDER — NALOXONE HYDROCHLORIDE 0.4 MG/ML
0.1 INJECTION, SOLUTION INTRAMUSCULAR; INTRAVENOUS; SUBCUTANEOUS
Status: DISCONTINUED | OUTPATIENT
Start: 2021-01-01 | End: 2021-01-01 | Stop reason: HOSPADM

## 2021-01-01 RX ORDER — NITROGLYCERIN 0.4 MG/1
0.4 TABLET SUBLINGUAL EVERY 5 MIN PRN
Status: DISCONTINUED | OUTPATIENT
Start: 2021-01-01 | End: 2021-01-01 | Stop reason: ALTCHOICE

## 2021-01-01 RX ORDER — AMOXICILLIN 250 MG
1 CAPSULE ORAL 2 TIMES DAILY
Status: DISCONTINUED | OUTPATIENT
Start: 2021-01-01 | End: 2021-01-01 | Stop reason: HOSPADM

## 2021-01-01 RX ORDER — HYDROMORPHONE HCL IN WATER/PF 6 MG/30 ML
.1-.2 PATIENT CONTROLLED ANALGESIA SYRINGE INTRAVENOUS
Status: DISCONTINUED | OUTPATIENT
Start: 2021-01-01 | End: 2021-01-01 | Stop reason: ALTCHOICE

## 2021-01-01 RX ORDER — PROCHLORPERAZINE MALEATE 10 MG
10 TABLET ORAL EVERY 6 HOURS PRN
Qty: 30 TABLET | Refills: 5 | Status: SHIPPED | OUTPATIENT
Start: 2021-01-01 | End: 2021-01-01

## 2021-01-01 RX ORDER — PREDNISONE 20 MG/1
20 TABLET ORAL ONCE
Status: DISCONTINUED | OUTPATIENT
Start: 2021-01-01 | End: 2021-01-01

## 2021-01-01 RX ORDER — PREDNISONE 5 MG/1
TABLET ORAL
Qty: 111 TABLET | Refills: 1 | Status: CANCELLED | OUTPATIENT
Start: 2021-01-01

## 2021-01-01 RX ORDER — LACTOBACILLUS RHAMNOSUS GG 10B CELL
1 CAPSULE ORAL 3 TIMES DAILY
COMMUNITY
End: 2021-01-01

## 2021-01-01 RX ORDER — PROCHLORPERAZINE MALEATE 5 MG
5 TABLET ORAL EVERY 6 HOURS PRN
Status: DISCONTINUED | OUTPATIENT
Start: 2021-01-01 | End: 2021-01-01

## 2021-01-01 RX ORDER — LIDOCAINE HYDROCHLORIDE 10 MG/ML
INJECTION, SOLUTION INFILTRATION; PERINEURAL PRN
Status: DISCONTINUED | OUTPATIENT
Start: 2021-01-01 | End: 2021-01-01 | Stop reason: HOSPADM

## 2021-01-01 RX ORDER — PROCHLORPERAZINE MALEATE 5 MG
5 TABLET ORAL EVERY 6 HOURS PRN
Status: DISCONTINUED | OUTPATIENT
Start: 2021-01-01 | End: 2021-01-01 | Stop reason: HOSPADM

## 2021-01-01 RX ORDER — DIPHENHYDRAMINE HCL 25 MG
25 CAPSULE ORAL ONCE
Status: DISCONTINUED | OUTPATIENT
Start: 2021-01-01 | End: 2021-01-01 | Stop reason: HOSPADM

## 2021-01-01 RX ORDER — ACETAMINOPHEN 325 MG/1
650 TABLET ORAL EVERY 4 HOURS PRN
Status: DISCONTINUED | OUTPATIENT
Start: 2021-01-01 | End: 2021-01-01 | Stop reason: HOSPADM

## 2021-01-01 RX ORDER — LIDOCAINE 40 MG/G
CREAM TOPICAL
Status: DISCONTINUED | OUTPATIENT
Start: 2021-01-01 | End: 2021-01-01

## 2021-01-01 RX ORDER — POLYETHYLENE GLYCOL 3350 17 G/17G
17 POWDER, FOR SOLUTION ORAL DAILY
Status: DISCONTINUED | OUTPATIENT
Start: 2021-01-01 | End: 2021-01-01 | Stop reason: HOSPADM

## 2021-01-01 RX ORDER — FENTANYL CITRATE 50 UG/ML
50 INJECTION, SOLUTION INTRAMUSCULAR; INTRAVENOUS EVERY 5 MIN PRN
Status: DISCONTINUED | OUTPATIENT
Start: 2021-01-01 | End: 2021-01-01 | Stop reason: HOSPADM

## 2021-01-01 RX ORDER — DESMOPRESSIN ACETATE 4 UG/ML
29 INJECTION, SOLUTION INTRAVENOUS; SUBCUTANEOUS ONCE
Status: DISCONTINUED | OUTPATIENT
Start: 2021-01-01 | End: 2021-01-01

## 2021-01-01 RX ORDER — LIDOCAINE 4 G/G
1 PATCH TOPICAL
Status: DISCONTINUED | OUTPATIENT
Start: 2021-01-01 | End: 2021-01-01 | Stop reason: HOSPADM

## 2021-01-01 RX ORDER — METOCLOPRAMIDE HYDROCHLORIDE 5 MG/ML
5 INJECTION INTRAMUSCULAR; INTRAVENOUS ONCE
Status: COMPLETED | OUTPATIENT
Start: 2021-01-01 | End: 2021-01-01

## 2021-01-01 RX ORDER — HYDROMORPHONE HYDROCHLORIDE 2 MG/1
2-4 TABLET ORAL
Status: DISCONTINUED | OUTPATIENT
Start: 2021-01-01 | End: 2021-01-01 | Stop reason: HOSPADM

## 2021-01-01 RX ORDER — DOCUSATE SODIUM 100 MG/1
100 CAPSULE, LIQUID FILLED ORAL 2 TIMES DAILY PRN
Qty: 20 CAPSULE | Refills: 0 | Status: SHIPPED | OUTPATIENT
Start: 2021-01-01 | End: 2021-01-01

## 2021-01-01 RX ORDER — METHYLPREDNISOLONE SODIUM SUCCINATE 125 MG/2ML
125 INJECTION, POWDER, LYOPHILIZED, FOR SOLUTION INTRAMUSCULAR; INTRAVENOUS EVERY 6 HOURS
Status: DISCONTINUED | OUTPATIENT
Start: 2021-01-01 | End: 2021-01-01 | Stop reason: ALTCHOICE

## 2021-01-01 RX ORDER — MORPHINE SULFATE 2 MG/ML
1-2 INJECTION, SOLUTION INTRAMUSCULAR; INTRAVENOUS
Status: DISCONTINUED | OUTPATIENT
Start: 2021-01-01 | End: 2021-01-01 | Stop reason: HOSPADM

## 2021-01-01 RX ORDER — HYDRALAZINE HYDROCHLORIDE 20 MG/ML
10-20 INJECTION INTRAMUSCULAR; INTRAVENOUS EVERY 30 MIN PRN
Status: DISCONTINUED | OUTPATIENT
Start: 2021-01-01 | End: 2021-01-01 | Stop reason: HOSPADM

## 2021-01-01 RX ORDER — LORAZEPAM 2 MG/ML
INJECTION INTRAMUSCULAR
Status: COMPLETED
Start: 2021-01-01 | End: 2021-01-01

## 2021-01-01 RX ORDER — LORAZEPAM 0.5 MG/1
0.5 TABLET ORAL EVERY 4 HOURS PRN
Status: DISCONTINUED | OUTPATIENT
Start: 2021-01-01 | End: 2021-01-01

## 2021-01-01 RX ORDER — LABETALOL HYDROCHLORIDE 5 MG/ML
10-40 INJECTION, SOLUTION INTRAVENOUS EVERY 10 MIN PRN
Status: DISCONTINUED | OUTPATIENT
Start: 2021-01-01 | End: 2021-01-01

## 2021-01-01 RX ORDER — CEFAZOLIN SODIUM 1 G/3ML
1 INJECTION, POWDER, FOR SOLUTION INTRAMUSCULAR; INTRAVENOUS EVERY 8 HOURS
Status: DISCONTINUED | OUTPATIENT
Start: 2021-01-01 | End: 2021-01-01 | Stop reason: CLARIF

## 2021-01-01 RX ORDER — LORAZEPAM 1 MG/1
1 TABLET ORAL
Status: DISCONTINUED | OUTPATIENT
Start: 2021-01-01 | End: 2021-01-01

## 2021-01-01 RX ADMIN — HYDROMORPHONE HYDROCHLORIDE 4 MG: 2 TABLET ORAL at 11:51

## 2021-01-01 RX ADMIN — HYDROMORPHONE HYDROCHLORIDE 0.5 MG: 1 INJECTION, SOLUTION INTRAMUSCULAR; INTRAVENOUS; SUBCUTANEOUS at 03:48

## 2021-01-01 RX ADMIN — AZACITIDINE 160 MG: 100 INJECTION, POWDER, LYOPHILIZED, FOR SOLUTION INTRAVENOUS; SUBCUTANEOUS at 14:21

## 2021-01-01 RX ADMIN — ONDANSETRON 4 MG: 4 TABLET, ORALLY DISINTEGRATING ORAL at 00:50

## 2021-01-01 RX ADMIN — ONDANSETRON 4 MG: 2 INJECTION INTRAMUSCULAR; INTRAVENOUS at 10:42

## 2021-01-01 RX ADMIN — ONDANSETRON 4 MG: 4 TABLET, ORALLY DISINTEGRATING ORAL at 07:42

## 2021-01-01 RX ADMIN — DEXAMETHASONE SODIUM PHOSPHATE: 10 INJECTION, SOLUTION INTRAMUSCULAR; INTRAVENOUS at 12:52

## 2021-01-01 RX ADMIN — PROPOFOL 30 MG: 10 INJECTION, EMULSION INTRAVENOUS at 10:22

## 2021-01-01 RX ADMIN — INFLUENZA A VIRUS A/VICTORIA/2570/2019 IVR-215 (H1N1) ANTIGEN (FORMALDEHYDE INACTIVATED), INFLUENZA A VIRUS A/TASMANIA/503/2020 IVR-221 (H3N2) ANTIGEN (FORMALDEHYDE INACTIVATED), INFLUENZA B VIRUS B/PHUKET/3073/2013 ANTIGEN (FORMALDEHYDE INACTIVATED), AND INFLUENZA B VIRUS B/WASHINGTON/02/2019 ANTIGEN (FORMALDEHYDE INACTIVATED) 0.7 ML: 60; 60; 60; 60 INJECTION, SUSPENSION INTRAMUSCULAR at 08:15

## 2021-01-01 RX ADMIN — HYDROMORPHONE HYDROCHLORIDE 4 MG: 2 TABLET ORAL at 05:29

## 2021-01-01 RX ADMIN — DIPHENHYDRAMINE HYDROCHLORIDE 25 MG: 25 CAPSULE ORAL at 10:41

## 2021-01-01 RX ADMIN — LEVETIRACETAM 500 MG: 500 TABLET, FILM COATED ORAL at 09:12

## 2021-01-01 RX ADMIN — DEXAMETHASONE SODIUM PHOSPHATE: 10 INJECTION, SOLUTION INTRAMUSCULAR; INTRAVENOUS at 08:39

## 2021-01-01 RX ADMIN — TAMSULOSIN HYDROCHLORIDE 0.4 MG: 0.4 CAPSULE ORAL at 08:26

## 2021-01-01 RX ADMIN — OMEPRAZOLE 20 MG: 20 CAPSULE, DELAYED RELEASE ORAL at 09:20

## 2021-01-01 RX ADMIN — SENNOSIDES AND DOCUSATE SODIUM 1 TABLET: 8.6; 5 TABLET ORAL at 08:58

## 2021-01-01 RX ADMIN — HYDROMORPHONE HYDROCHLORIDE 4 MG: 2 TABLET ORAL at 00:40

## 2021-01-01 RX ADMIN — LEVETIRACETAM 500 MG: 500 TABLET, FILM COATED ORAL at 08:16

## 2021-01-01 RX ADMIN — FLUTICASONE FUROATE 1 PUFF: 100 POWDER RESPIRATORY (INHALATION) at 08:31

## 2021-01-01 RX ADMIN — GUAIFENESIN 10 ML: 200 SOLUTION ORAL at 03:40

## 2021-01-01 RX ADMIN — MIDAZOLAM HYDROCHLORIDE 0.5 MG: 1 INJECTION, SOLUTION INTRAMUSCULAR; INTRAVENOUS at 09:41

## 2021-01-01 RX ADMIN — ALLOPURINOL 200 MG: 100 TABLET ORAL at 08:50

## 2021-01-01 RX ADMIN — VANCOMYCIN HYDROCHLORIDE 750 MG: 1 INJECTION, POWDER, LYOPHILIZED, FOR SOLUTION INTRAVENOUS at 14:52

## 2021-01-01 RX ADMIN — PIPERACILLIN SODIUM AND TAZOBACTAM SODIUM 3.38 G: 3; .375 INJECTION, POWDER, LYOPHILIZED, FOR SOLUTION INTRAVENOUS at 00:37

## 2021-01-01 RX ADMIN — HYDROMORPHONE HYDROCHLORIDE 4 MG: 2 TABLET ORAL at 14:22

## 2021-01-01 RX ADMIN — PANTOPRAZOLE SODIUM 40 MG: 20 TABLET, DELAYED RELEASE ORAL at 08:50

## 2021-01-01 RX ADMIN — BENZONATATE 100 MG: 100 CAPSULE ORAL at 14:47

## 2021-01-01 RX ADMIN — LEVETIRACETAM 1000 MG: 10 INJECTION INTRAVENOUS at 20:23

## 2021-01-01 RX ADMIN — PANTOPRAZOLE SODIUM 40 MG: 20 TABLET, DELAYED RELEASE ORAL at 09:02

## 2021-01-01 RX ADMIN — ACETAMINOPHEN 650 MG: 325 TABLET, FILM COATED ORAL at 10:41

## 2021-01-01 RX ADMIN — HYDROMORPHONE HYDROCHLORIDE 0.4 MG: 0.2 INJECTION, SOLUTION INTRAMUSCULAR; INTRAVENOUS; SUBCUTANEOUS at 13:31

## 2021-01-01 RX ADMIN — DEXAMETHASONE SODIUM PHOSPHATE: 10 INJECTION, SOLUTION INTRAMUSCULAR; INTRAVENOUS at 14:01

## 2021-01-01 RX ADMIN — ROCURONIUM BROMIDE 30 MG: 10 INJECTION INTRAVENOUS at 20:20

## 2021-01-01 RX ADMIN — LEVETIRACETAM 500 MG: 500 TABLET, FILM COATED ORAL at 20:49

## 2021-01-01 RX ADMIN — SODIUM CHLORIDE 72 ML: 9 INJECTION, SOLUTION INTRAVENOUS at 10:26

## 2021-01-01 RX ADMIN — SODIUM CHLORIDE, PRESERVATIVE FREE 5 ML: 5 INJECTION INTRAVENOUS at 17:16

## 2021-01-01 RX ADMIN — SODIUM PHOSPHATE, DIBASIC AND SODIUM PHOSPHATE, MONOBASIC 1 ENEMA: 7; 19 ENEMA RECTAL at 04:05

## 2021-01-01 RX ADMIN — ATORVASTATIN CALCIUM 10 MG: 10 TABLET, FILM COATED ORAL at 08:58

## 2021-01-01 RX ADMIN — NEOSTIGMINE METHYLSULFATE 4.5 MG: 1 INJECTION, SOLUTION INTRAVENOUS at 21:47

## 2021-01-01 RX ADMIN — OXYCODONE HYDROCHLORIDE 5 MG: 5 TABLET ORAL at 00:09

## 2021-01-01 RX ADMIN — HYDROMORPHONE HYDROCHLORIDE 4 MG: 2 TABLET ORAL at 11:32

## 2021-01-01 RX ADMIN — HYDROMORPHONE HYDROCHLORIDE 0.4 MG: 0.2 INJECTION, SOLUTION INTRAMUSCULAR; INTRAVENOUS; SUBCUTANEOUS at 19:40

## 2021-01-01 RX ADMIN — TRANEXAMIC ACID 500 MG: 100 INJECTION, SOLUTION INTRAVENOUS at 20:07

## 2021-01-01 RX ADMIN — TRANEXAMIC ACID 500 MG: 100 INJECTION, SOLUTION INTRAVENOUS at 17:56

## 2021-01-01 RX ADMIN — CEFAZOLIN SODIUM 2 G: 2 INJECTION, SOLUTION INTRAVENOUS at 20:58

## 2021-01-01 RX ADMIN — SODIUM CHLORIDE, PRESERVATIVE FREE 5 ML: 5 INJECTION INTRAVENOUS at 11:27

## 2021-01-01 RX ADMIN — BENZONATATE 100 MG: 100 CAPSULE ORAL at 15:40

## 2021-01-01 RX ADMIN — ATORVASTATIN CALCIUM 10 MG: 10 TABLET, FILM COATED ORAL at 07:34

## 2021-01-01 RX ADMIN — BENZONATATE 100 MG: 100 CAPSULE ORAL at 07:47

## 2021-01-01 RX ADMIN — PROPOFOL 100 MCG/KG/MIN: 10 INJECTION, EMULSION INTRAVENOUS at 10:21

## 2021-01-01 RX ADMIN — LORAZEPAM 2 MG: 2 INJECTION INTRAMUSCULAR; INTRAVENOUS at 17:53

## 2021-01-01 RX ADMIN — HYDROMORPHONE HYDROCHLORIDE 0.4 MG: 0.2 INJECTION, SOLUTION INTRAMUSCULAR; INTRAVENOUS; SUBCUTANEOUS at 01:11

## 2021-01-01 RX ADMIN — SODIUM CHLORIDE: 9 INJECTION, SOLUTION INTRAVENOUS at 01:29

## 2021-01-01 RX ADMIN — FUROSEMIDE 20 MG: 10 INJECTION, SOLUTION INTRAVENOUS at 11:56

## 2021-01-01 RX ADMIN — ONDANSETRON 4 MG: 2 INJECTION INTRAMUSCULAR; INTRAVENOUS at 21:34

## 2021-01-01 RX ADMIN — BUMETANIDE 0.5 MG/HR: 0.25 INJECTION INTRAMUSCULAR; INTRAVENOUS at 14:16

## 2021-01-01 RX ADMIN — TRANEXAMIC ACID 500 MG: 100 INJECTION, SOLUTION INTRAVENOUS at 11:56

## 2021-01-01 RX ADMIN — ROCURONIUM BROMIDE 20 MG: 10 INJECTION INTRAVENOUS at 20:34

## 2021-01-01 RX ADMIN — BENZONATATE 100 MG: 100 CAPSULE ORAL at 03:48

## 2021-01-01 RX ADMIN — HYDROMORPHONE HYDROCHLORIDE 4 MG: 2 TABLET ORAL at 22:24

## 2021-01-01 RX ADMIN — BENZONATATE 100 MG: 100 CAPSULE ORAL at 14:23

## 2021-01-01 RX ADMIN — SODIUM CHLORIDE 500 ML: 9 INJECTION, SOLUTION INTRAVENOUS at 02:56

## 2021-01-01 RX ADMIN — HYDROMORPHONE HYDROCHLORIDE 4 MG: 2 TABLET ORAL at 22:05

## 2021-01-01 RX ADMIN — ACETAMINOPHEN 650 MG: 325 TABLET, FILM COATED ORAL at 14:59

## 2021-01-01 RX ADMIN — DOCUSATE SODIUM 100 MG: 100 CAPSULE, LIQUID FILLED ORAL at 08:55

## 2021-01-01 RX ADMIN — OXYCODONE HYDROCHLORIDE 10 MG: 5 TABLET ORAL at 09:55

## 2021-01-01 RX ADMIN — FENTANYL CITRATE 50 MCG: 50 INJECTION, SOLUTION INTRAMUSCULAR; INTRAVENOUS at 21:33

## 2021-01-01 RX ADMIN — BENZONATATE 100 MG: 100 CAPSULE ORAL at 16:35

## 2021-01-01 RX ADMIN — VANCOMYCIN HYDROCHLORIDE 750 MG: 1 INJECTION, POWDER, LYOPHILIZED, FOR SOLUTION INTRAVENOUS at 22:24

## 2021-01-01 RX ADMIN — ACETAMINOPHEN 975 MG: 325 TABLET, FILM COATED ORAL at 00:20

## 2021-01-01 RX ADMIN — MIDAZOLAM HYDROCHLORIDE 0.5 MG: 1 INJECTION, SOLUTION INTRAMUSCULAR; INTRAVENOUS at 09:26

## 2021-01-01 RX ADMIN — HYDROMORPHONE HYDROCHLORIDE 4 MG: 2 TABLET ORAL at 16:35

## 2021-01-01 RX ADMIN — HYDROMORPHONE HYDROCHLORIDE 2 MG: 2 TABLET ORAL at 02:06

## 2021-01-01 RX ADMIN — DOCUSATE SODIUM 100 MG: 100 CAPSULE, LIQUID FILLED ORAL at 09:37

## 2021-01-01 RX ADMIN — Medication 5 ML: at 13:49

## 2021-01-01 RX ADMIN — ACETAMINOPHEN 975 MG: 325 TABLET, FILM COATED ORAL at 23:47

## 2021-01-01 RX ADMIN — HYDROMORPHONE HYDROCHLORIDE 4 MG: 2 TABLET ORAL at 13:57

## 2021-01-01 RX ADMIN — DEXMEDETOMIDINE HYDROCHLORIDE 4 MCG: 100 INJECTION, SOLUTION INTRAVENOUS at 12:28

## 2021-01-01 RX ADMIN — BENZONATATE 100 MG: 100 CAPSULE ORAL at 08:35

## 2021-01-01 RX ADMIN — PROPOFOL 20 MG: 10 INJECTION, EMULSION INTRAVENOUS at 10:25

## 2021-01-01 RX ADMIN — FLUTICASONE FUROATE 1 PUFF: 100 POWDER RESPIRATORY (INHALATION) at 08:27

## 2021-01-01 RX ADMIN — PANTOPRAZOLE SODIUM 40 MG: 40 TABLET, DELAYED RELEASE ORAL at 08:07

## 2021-01-01 RX ADMIN — ONDANSETRON 4 MG: 2 INJECTION INTRAMUSCULAR; INTRAVENOUS at 00:22

## 2021-01-01 RX ADMIN — PANTOPRAZOLE SODIUM 40 MG: 40 INJECTION, POWDER, FOR SOLUTION INTRAVENOUS at 08:23

## 2021-01-01 RX ADMIN — PROPOFOL 150 MG: 10 INJECTION, EMULSION INTRAVENOUS at 20:07

## 2021-01-01 RX ADMIN — TRANEXAMIC ACID 500 MG: 100 INJECTION, SOLUTION INTRAVENOUS at 11:41

## 2021-01-01 RX ADMIN — TAMSULOSIN HYDROCHLORIDE 0.4 MG: 0.4 CAPSULE ORAL at 08:54

## 2021-01-01 RX ADMIN — HYDROMORPHONE HYDROCHLORIDE 4 MG: 2 TABLET ORAL at 22:18

## 2021-01-01 RX ADMIN — SODIUM CHLORIDE: 9 INJECTION, SOLUTION INTRAVENOUS at 17:36

## 2021-01-01 RX ADMIN — TAMSULOSIN HYDROCHLORIDE 0.4 MG: 0.4 CAPSULE ORAL at 16:05

## 2021-01-01 RX ADMIN — SODIUM CHLORIDE: 9 INJECTION, SOLUTION INTRAVENOUS at 02:29

## 2021-01-01 RX ADMIN — HYDROMORPHONE HYDROCHLORIDE 4 MG: 2 TABLET ORAL at 23:17

## 2021-01-01 RX ADMIN — ATORVASTATIN CALCIUM 10 MG: 10 TABLET, FILM COATED ORAL at 09:19

## 2021-01-01 RX ADMIN — PIPERACILLIN SODIUM AND TAZOBACTAM SODIUM 3.38 G: 3; .375 INJECTION, POWDER, LYOPHILIZED, FOR SOLUTION INTRAVENOUS at 11:18

## 2021-01-01 RX ADMIN — PREDNISONE 5 MG: 5 TABLET ORAL at 07:48

## 2021-01-01 RX ADMIN — SODIUM CHLORIDE: 9 INJECTION, SOLUTION INTRAVENOUS at 00:12

## 2021-01-01 RX ADMIN — PREDNISONE 40 MG: 20 TABLET ORAL at 08:30

## 2021-01-01 RX ADMIN — SODIUM CHLORIDE: 9 INJECTION, SOLUTION INTRAVENOUS at 11:48

## 2021-01-01 RX ADMIN — ACETAMINOPHEN 1000 MG: 500 TABLET, FILM COATED ORAL at 15:54

## 2021-01-01 RX ADMIN — TRANEXAMIC ACID 500 MG: 100 INJECTION, SOLUTION INTRAVENOUS at 02:30

## 2021-01-01 RX ADMIN — ONDANSETRON 4 MG: 4 TABLET, ORALLY DISINTEGRATING ORAL at 11:19

## 2021-01-01 RX ADMIN — ACETAMINOPHEN 1000 MG: 500 TABLET, FILM COATED ORAL at 15:48

## 2021-01-01 RX ADMIN — FLUTICASONE FUROATE 1 PUFF: 100 POWDER RESPIRATORY (INHALATION) at 08:35

## 2021-01-01 RX ADMIN — HYDROMORPHONE HYDROCHLORIDE 4 MG: 2 TABLET ORAL at 06:12

## 2021-01-01 RX ADMIN — PANTOPRAZOLE SODIUM 40 MG: 20 TABLET, DELAYED RELEASE ORAL at 07:47

## 2021-01-01 RX ADMIN — METOCLOPRAMIDE HYDROCHLORIDE 5 MG: 5 INJECTION INTRAMUSCULAR; INTRAVENOUS at 15:54

## 2021-01-01 RX ADMIN — OXYCODONE HYDROCHLORIDE 10 MG: 5 TABLET ORAL at 14:52

## 2021-01-01 RX ADMIN — Medication 5 ML: at 15:16

## 2021-01-01 RX ADMIN — HYDROMORPHONE HYDROCHLORIDE 0.4 MG: 0.2 INJECTION, SOLUTION INTRAMUSCULAR; INTRAVENOUS; SUBCUTANEOUS at 07:41

## 2021-01-01 RX ADMIN — OXYCODONE HYDROCHLORIDE 10 MG: 5 TABLET ORAL at 18:29

## 2021-01-01 RX ADMIN — SODIUM CHLORIDE, POTASSIUM CHLORIDE, SODIUM LACTATE AND CALCIUM CHLORIDE: 600; 310; 30; 20 INJECTION, SOLUTION INTRAVENOUS at 12:05

## 2021-01-01 RX ADMIN — FENTANYL CITRATE 50 MCG: 50 INJECTION, SOLUTION INTRAMUSCULAR; INTRAVENOUS at 20:24

## 2021-01-01 RX ADMIN — PROPOFOL 30 MG: 10 INJECTION, EMULSION INTRAVENOUS at 21:33

## 2021-01-01 RX ADMIN — OMEPRAZOLE 20 MG: 20 CAPSULE, DELAYED RELEASE ORAL at 07:34

## 2021-01-01 RX ADMIN — HYDROMORPHONE HYDROCHLORIDE 1 MG: 2 TABLET ORAL at 11:56

## 2021-01-01 RX ADMIN — PREDNISONE 5 MG: 5 TABLET ORAL at 08:25

## 2021-01-01 RX ADMIN — PHENYLEPHRINE HYDROCHLORIDE 100 MCG: 10 INJECTION INTRAVENOUS at 10:57

## 2021-01-01 RX ADMIN — SODIUM CHLORIDE 250 ML: 9 INJECTION, SOLUTION INTRAVENOUS at 12:52

## 2021-01-01 RX ADMIN — HYDROMORPHONE HYDROCHLORIDE 0.3 MG: 1 INJECTION, SOLUTION INTRAMUSCULAR; INTRAVENOUS; SUBCUTANEOUS at 00:03

## 2021-01-01 RX ADMIN — PROPOFOL 30 MG: 10 INJECTION, EMULSION INTRAVENOUS at 10:30

## 2021-01-01 RX ADMIN — TAMSULOSIN HYDROCHLORIDE 0.4 MG: 0.4 CAPSULE ORAL at 08:16

## 2021-01-01 RX ADMIN — POTASSIUM CHLORIDE 40 MEQ: 1500 TABLET, EXTENDED RELEASE ORAL at 12:51

## 2021-01-01 RX ADMIN — DEXMEDETOMIDINE HYDROCHLORIDE 4 MCG: 100 INJECTION, SOLUTION INTRAVENOUS at 12:27

## 2021-01-01 RX ADMIN — SODIUM CHLORIDE: 9 INJECTION, SOLUTION INTRAVENOUS at 21:04

## 2021-01-01 RX ADMIN — ACETAMINOPHEN 1000 MG: 500 TABLET, FILM COATED ORAL at 21:18

## 2021-01-01 RX ADMIN — PHENYLEPHRINE HYDROCHLORIDE 0.25 MCG/KG/MIN: 10 INJECTION INTRAVENOUS at 20:51

## 2021-01-01 RX ADMIN — PROPOFOL 20 MG: 10 INJECTION, EMULSION INTRAVENOUS at 20:38

## 2021-01-01 RX ADMIN — LIDOCAINE 1 PATCH: 246 PATCH TOPICAL at 07:48

## 2021-01-01 RX ADMIN — HYDROMORPHONE HYDROCHLORIDE 4 MG: 2 TABLET ORAL at 08:15

## 2021-01-01 RX ADMIN — MORPHINE SULFATE 2 MG: 2 INJECTION, SOLUTION INTRAMUSCULAR; INTRAVENOUS at 11:26

## 2021-01-01 RX ADMIN — TAMSULOSIN HYDROCHLORIDE 0.4 MG: 0.4 CAPSULE ORAL at 08:23

## 2021-01-01 RX ADMIN — HYDROMORPHONE HYDROCHLORIDE 4 MG: 2 TABLET ORAL at 12:51

## 2021-01-01 RX ADMIN — PANTOPRAZOLE SODIUM 40 MG: 40 TABLET, DELAYED RELEASE ORAL at 08:16

## 2021-01-01 RX ADMIN — ALLOPURINOL 200 MG: 100 TABLET ORAL at 07:47

## 2021-01-01 RX ADMIN — SODIUM CHLORIDE, POTASSIUM CHLORIDE, SODIUM LACTATE AND CALCIUM CHLORIDE: 600; 310; 30; 20 INJECTION, SOLUTION INTRAVENOUS at 19:59

## 2021-01-01 RX ADMIN — DEXAMETHASONE SODIUM PHOSPHATE 4 MG: 4 INJECTION, SOLUTION INTRA-ARTICULAR; INTRALESIONAL; INTRAMUSCULAR; INTRAVENOUS; SOFT TISSUE at 20:59

## 2021-01-01 RX ADMIN — HYDROMORPHONE HYDROCHLORIDE 0.2 MG: 0.2 INJECTION, SOLUTION INTRAMUSCULAR; INTRAVENOUS; SUBCUTANEOUS at 22:02

## 2021-01-01 RX ADMIN — ROCURONIUM BROMIDE 10 MG: 10 INJECTION INTRAVENOUS at 21:18

## 2021-01-01 RX ADMIN — HYDROMORPHONE HYDROCHLORIDE 0.3 MG: 1 INJECTION, SOLUTION INTRAMUSCULAR; INTRAVENOUS; SUBCUTANEOUS at 19:52

## 2021-01-01 RX ADMIN — FENTANYL CITRATE 25 MCG: 50 INJECTION, SOLUTION INTRAMUSCULAR; INTRAVENOUS at 20:07

## 2021-01-01 RX ADMIN — HYDROMORPHONE HYDROCHLORIDE 2 MG: 2 TABLET ORAL at 22:53

## 2021-01-01 RX ADMIN — ACETAMINOPHEN 1000 MG: 500 TABLET, FILM COATED ORAL at 21:46

## 2021-01-01 RX ADMIN — ACETAMINOPHEN 1000 MG: 500 TABLET, FILM COATED ORAL at 08:29

## 2021-01-01 RX ADMIN — SODIUM CHLORIDE: 9 INJECTION, SOLUTION INTRAVENOUS at 20:15

## 2021-01-01 RX ADMIN — LEVETIRACETAM 500 MG: 500 TABLET, FILM COATED ORAL at 19:08

## 2021-01-01 RX ADMIN — HYDROMORPHONE HYDROCHLORIDE 4 MG: 2 TABLET ORAL at 09:25

## 2021-01-01 RX ADMIN — HYDROMORPHONE HYDROCHLORIDE 4 MG: 2 TABLET ORAL at 23:07

## 2021-01-01 RX ADMIN — PIPERACILLIN SODIUM AND TAZOBACTAM SODIUM 3.38 G: 3; .375 INJECTION, POWDER, LYOPHILIZED, FOR SOLUTION INTRAVENOUS at 12:55

## 2021-01-01 RX ADMIN — HYDROMORPHONE HYDROCHLORIDE 0.3 MG: 1 INJECTION, SOLUTION INTRAMUSCULAR; INTRAVENOUS; SUBCUTANEOUS at 19:36

## 2021-01-01 RX ADMIN — HYDROMORPHONE HYDROCHLORIDE 0.2 MG: 0.2 INJECTION, SOLUTION INTRAMUSCULAR; INTRAVENOUS; SUBCUTANEOUS at 03:48

## 2021-01-01 RX ADMIN — SODIUM CHLORIDE: 9 INJECTION, SOLUTION INTRAVENOUS at 00:00

## 2021-01-01 RX ADMIN — PANTOPRAZOLE SODIUM 40 MG: 40 TABLET, DELAYED RELEASE ORAL at 08:30

## 2021-01-01 RX ADMIN — Medication 5 ML: at 10:04

## 2021-01-01 RX ADMIN — SODIUM CHLORIDE 100 ML: 9 INJECTION, SOLUTION INTRAVENOUS at 14:33

## 2021-01-01 RX ADMIN — PIPERACILLIN SODIUM AND TAZOBACTAM SODIUM 3.38 G: 3; .375 INJECTION, POWDER, LYOPHILIZED, FOR SOLUTION INTRAVENOUS at 23:35

## 2021-01-01 RX ADMIN — SUCCINYLCHOLINE CHLORIDE 100 MG: 20 INJECTION, SOLUTION INTRAMUSCULAR; INTRAVENOUS; PARENTERAL at 20:07

## 2021-01-01 RX ADMIN — ATORVASTATIN CALCIUM 10 MG: 10 TABLET, FILM COATED ORAL at 08:54

## 2021-01-01 RX ADMIN — ACETAMINOPHEN 650 MG: 325 TABLET, FILM COATED ORAL at 08:38

## 2021-01-01 RX ADMIN — SODIUM CHLORIDE 250 ML: 9 INJECTION, SOLUTION INTRAVENOUS at 08:38

## 2021-01-01 RX ADMIN — SODIUM CHLORIDE: 9 INJECTION, SOLUTION INTRAVENOUS at 04:07

## 2021-01-01 RX ADMIN — PROPOFOL 30 MG: 10 INJECTION, EMULSION INTRAVENOUS at 20:35

## 2021-01-01 RX ADMIN — ACETAMINOPHEN 975 MG: 325 TABLET, FILM COATED ORAL at 08:51

## 2021-01-01 RX ADMIN — DEXAMETHASONE SODIUM PHOSPHATE: 10 INJECTION, SOLUTION INTRAMUSCULAR; INTRAVENOUS at 13:45

## 2021-01-01 RX ADMIN — PHENYLEPHRINE HYDROCHLORIDE 50 MCG: 10 INJECTION INTRAVENOUS at 12:22

## 2021-01-01 RX ADMIN — BENZONATATE 100 MG: 100 CAPSULE ORAL at 05:44

## 2021-01-01 RX ADMIN — MORPHINE SULFATE 2 MG: 2 INJECTION, SOLUTION INTRAMUSCULAR; INTRAVENOUS at 17:28

## 2021-01-01 RX ADMIN — HYDROMORPHONE HYDROCHLORIDE 0.2 MG: 0.2 INJECTION, SOLUTION INTRAMUSCULAR; INTRAVENOUS; SUBCUTANEOUS at 07:08

## 2021-01-01 RX ADMIN — GUAIFENESIN 10 ML: 200 SOLUTION ORAL at 09:02

## 2021-01-01 RX ADMIN — ACETAMINOPHEN 650 MG: 325 TABLET, FILM COATED ORAL at 17:53

## 2021-01-01 RX ADMIN — AZACITIDINE 160 MG: 100 INJECTION, POWDER, LYOPHILIZED, FOR SOLUTION INTRAVENOUS; SUBCUTANEOUS at 13:21

## 2021-01-01 RX ADMIN — KETOROLAC TROMETHAMINE 15 MG: 15 INJECTION, SOLUTION INTRAMUSCULAR; INTRAVENOUS at 15:53

## 2021-01-01 RX ADMIN — ACETAMINOPHEN 1000 MG: 500 TABLET, FILM COATED ORAL at 16:27

## 2021-01-01 RX ADMIN — ACETAMINOPHEN 1000 MG: 500 TABLET, FILM COATED ORAL at 16:05

## 2021-01-01 RX ADMIN — SODIUM CHLORIDE 250 ML: 9 INJECTION, SOLUTION INTRAVENOUS at 12:24

## 2021-01-01 RX ADMIN — PROPOFOL 20 MG: 10 INJECTION, EMULSION INTRAVENOUS at 12:27

## 2021-01-01 RX ADMIN — HYDROMORPHONE HYDROCHLORIDE 4 MG: 2 TABLET ORAL at 12:40

## 2021-01-01 RX ADMIN — POLYETHYLENE GLYCOL 3350 17 G: 17 POWDER, FOR SOLUTION ORAL at 08:55

## 2021-01-01 RX ADMIN — OMEPRAZOLE 20 MG: 20 CAPSULE, DELAYED RELEASE ORAL at 08:52

## 2021-01-01 RX ADMIN — ACETAMINOPHEN 650 MG: 325 TABLET, FILM COATED ORAL at 11:56

## 2021-01-01 RX ADMIN — DESMOPRESSIN ACETATE 29 MCG: 4 INJECTION INTRAVENOUS at 22:40

## 2021-01-01 RX ADMIN — TAMSULOSIN HYDROCHLORIDE 0.4 MG: 0.4 CAPSULE ORAL at 08:30

## 2021-01-01 RX ADMIN — ACETAMINOPHEN 975 MG: 325 TABLET, FILM COATED ORAL at 07:34

## 2021-01-01 RX ADMIN — LIDOCAINE HYDROCHLORIDE 40 MG: 20 INJECTION, SOLUTION INFILTRATION; PERINEURAL at 12:08

## 2021-01-01 RX ADMIN — HYDROMORPHONE HYDROCHLORIDE 2 MG: 2 TABLET ORAL at 08:30

## 2021-01-01 RX ADMIN — HEPARIN SODIUM (PORCINE) LOCK FLUSH IV SOLN 100 UNIT/ML 500 UNITS: 100 SOLUTION at 09:45

## 2021-01-01 RX ADMIN — IPRATROPIUM BROMIDE AND ALBUTEROL SULFATE 3 ML: .5; 3 SOLUTION RESPIRATORY (INHALATION) at 19:09

## 2021-01-01 RX ADMIN — HYDROMORPHONE HYDROCHLORIDE 0.2 MG: 0.2 INJECTION, SOLUTION INTRAMUSCULAR; INTRAVENOUS; SUBCUTANEOUS at 20:44

## 2021-01-01 RX ADMIN — HYDROMORPHONE HYDROCHLORIDE 0.2 MG: 0.2 INJECTION, SOLUTION INTRAMUSCULAR; INTRAVENOUS; SUBCUTANEOUS at 04:27

## 2021-01-01 RX ADMIN — SODIUM CHLORIDE, POTASSIUM CHLORIDE, SODIUM LACTATE AND CALCIUM CHLORIDE: 600; 310; 30; 20 INJECTION, SOLUTION INTRAVENOUS at 13:31

## 2021-01-01 RX ADMIN — PIPERACILLIN SODIUM AND TAZOBACTAM SODIUM 3.38 G: 3; .375 INJECTION, POWDER, LYOPHILIZED, FOR SOLUTION INTRAVENOUS at 23:15

## 2021-01-01 RX ADMIN — FENTANYL CITRATE 25 MCG: 50 INJECTION, SOLUTION INTRAMUSCULAR; INTRAVENOUS at 09:41

## 2021-01-01 RX ADMIN — IOPAMIDOL 120 ML: 755 INJECTION, SOLUTION INTRAVENOUS at 14:24

## 2021-01-01 RX ADMIN — HYDROMORPHONE HYDROCHLORIDE 0.4 MG: 0.2 INJECTION, SOLUTION INTRAMUSCULAR; INTRAVENOUS; SUBCUTANEOUS at 22:53

## 2021-01-01 RX ADMIN — VANCOMYCIN HYDROCHLORIDE 2000 MG: 5 INJECTION, POWDER, LYOPHILIZED, FOR SOLUTION INTRAVENOUS at 20:07

## 2021-01-01 RX ADMIN — ONDANSETRON 4 MG: 2 INJECTION INTRAMUSCULAR; INTRAVENOUS at 15:56

## 2021-01-01 RX ADMIN — BENZONATATE 100 MG: 100 CAPSULE ORAL at 22:24

## 2021-01-01 RX ADMIN — HYDROMORPHONE HYDROCHLORIDE 4 MG: 2 TABLET ORAL at 04:25

## 2021-01-01 RX ADMIN — HYDROMORPHONE HYDROCHLORIDE 0.2 MG: 0.2 INJECTION, SOLUTION INTRAMUSCULAR; INTRAVENOUS; SUBCUTANEOUS at 20:58

## 2021-01-01 RX ADMIN — HYDROMORPHONE HYDROCHLORIDE 0.5 MG: 1 INJECTION, SOLUTION INTRAMUSCULAR; INTRAVENOUS; SUBCUTANEOUS at 15:27

## 2021-01-01 RX ADMIN — SODIUM CHLORIDE: 9 INJECTION, SOLUTION INTRAVENOUS at 09:00

## 2021-01-01 RX ADMIN — HYDROMORPHONE HYDROCHLORIDE 4 MG: 2 TABLET ORAL at 19:06

## 2021-01-01 RX ADMIN — FLUTICASONE FUROATE 1 PUFF: 100 POWDER RESPIRATORY (INHALATION) at 07:54

## 2021-01-01 RX ADMIN — LIDOCAINE HYDROCHLORIDE 100 MG: 20 INJECTION, SOLUTION INFILTRATION; PERINEURAL at 20:07

## 2021-01-01 RX ADMIN — DEXAMETHASONE SODIUM PHOSPHATE: 10 INJECTION, SOLUTION INTRAMUSCULAR; INTRAVENOUS at 09:08

## 2021-01-01 RX ADMIN — LIDOCAINE 1 PATCH: 246 PATCH TOPICAL at 10:53

## 2021-01-01 RX ADMIN — PIPERACILLIN SODIUM AND TAZOBACTAM SODIUM 3.38 G: 3; .375 INJECTION, POWDER, LYOPHILIZED, FOR SOLUTION INTRAVENOUS at 17:09

## 2021-01-01 RX ADMIN — HYDROMORPHONE HYDROCHLORIDE 4 MG: 2 TABLET ORAL at 04:24

## 2021-01-01 RX ADMIN — HYDROMORPHONE HYDROCHLORIDE 2 MG: 2 TABLET ORAL at 05:56

## 2021-01-01 RX ADMIN — PANTOPRAZOLE SODIUM 40 MG: 40 TABLET, DELAYED RELEASE ORAL at 08:54

## 2021-01-01 RX ADMIN — METHYLPREDNISOLONE 125 MG: 125 INJECTION, POWDER, LYOPHILIZED, FOR SOLUTION INTRAMUSCULAR; INTRAVENOUS at 15:22

## 2021-01-01 RX ADMIN — FUROSEMIDE 20 MG: 10 INJECTION, SOLUTION INTRAVENOUS at 11:16

## 2021-01-01 RX ADMIN — HYDROMORPHONE HYDROCHLORIDE 4 MG: 2 TABLET ORAL at 08:58

## 2021-01-01 RX ADMIN — SODIUM CHLORIDE, PRESERVATIVE FREE 5 ML: 5 INJECTION INTRAVENOUS at 01:53

## 2021-01-01 RX ADMIN — ACETAMINOPHEN 650 MG: 325 TABLET, FILM COATED ORAL at 18:47

## 2021-01-01 RX ADMIN — PROPOFOL 20 MG: 10 INJECTION, EMULSION INTRAVENOUS at 10:33

## 2021-01-01 RX ADMIN — ACETAMINOPHEN 650 MG: 325 TABLET, FILM COATED ORAL at 07:47

## 2021-01-01 RX ADMIN — FENTANYL CITRATE 25 MCG: 0.05 INJECTION, SOLUTION INTRAMUSCULAR; INTRAVENOUS at 22:55

## 2021-01-01 RX ADMIN — Medication 5 ML: at 11:15

## 2021-01-01 RX ADMIN — DIPHENHYDRAMINE HYDROCHLORIDE 25 MG: 25 CAPSULE ORAL at 14:58

## 2021-01-01 RX ADMIN — DESMOPRESSIN ACETATE 20 MCG: 4 INJECTION INTRAVENOUS at 22:16

## 2021-01-01 RX ADMIN — HYDROMORPHONE HYDROCHLORIDE 4 MG: 2 TABLET ORAL at 02:31

## 2021-01-01 RX ADMIN — FENTANYL CITRATE 50 MCG: 50 INJECTION, SOLUTION INTRAMUSCULAR; INTRAVENOUS at 22:34

## 2021-01-01 RX ADMIN — GLYCOPYRROLATE 0.8 MG: 0.2 INJECTION, SOLUTION INTRAMUSCULAR; INTRAVENOUS at 21:47

## 2021-01-01 RX ADMIN — ACETAMINOPHEN 975 MG: 325 TABLET, FILM COATED ORAL at 00:09

## 2021-01-01 RX ADMIN — ACETAMINOPHEN 650 MG: 325 TABLET, FILM COATED ORAL at 14:23

## 2021-01-01 RX ADMIN — GUAIFENESIN AND DEXTROMETHORPHAN 5 ML: 100; 10 SYRUP ORAL at 14:55

## 2021-01-01 RX ADMIN — ONDANSETRON 4 MG: 2 INJECTION INTRAMUSCULAR; INTRAVENOUS at 09:02

## 2021-01-01 RX ADMIN — BENZONATATE 100 MG: 100 CAPSULE ORAL at 14:59

## 2021-01-01 RX ADMIN — HYDROMORPHONE HYDROCHLORIDE 4 MG: 2 TABLET ORAL at 22:34

## 2021-01-01 RX ADMIN — SODIUM CHLORIDE, PRESERVATIVE FREE 5 ML: 5 INJECTION INTRAVENOUS at 15:34

## 2021-01-01 RX ADMIN — MORPHINE SULFATE 2 MG: 2 INJECTION, SOLUTION INTRAMUSCULAR; INTRAVENOUS at 15:49

## 2021-01-01 RX ADMIN — SODIUM CHLORIDE: 9 INJECTION, SOLUTION INTRAVENOUS at 16:50

## 2021-01-01 RX ADMIN — PREDNISONE 20 MG: 20 TABLET ORAL at 16:05

## 2021-01-01 RX ADMIN — PANTOPRAZOLE SODIUM 40 MG: 40 TABLET, DELAYED RELEASE ORAL at 08:26

## 2021-01-01 RX ADMIN — HYDROMORPHONE HYDROCHLORIDE 4 MG: 2 TABLET ORAL at 08:54

## 2021-01-01 RX ADMIN — SENNOSIDES AND DOCUSATE SODIUM 2 TABLET: 8.6; 5 TABLET ORAL at 08:23

## 2021-01-01 RX ADMIN — ACETAMINOPHEN 650 MG: 325 TABLET, FILM COATED ORAL at 00:38

## 2021-01-01 RX ADMIN — METHOCARBAMOL 500 MG: 500 TABLET ORAL at 08:59

## 2021-01-01 RX ADMIN — LORAZEPAM 1 MG: 2 INJECTION INTRAMUSCULAR at 15:52

## 2021-01-01 RX ADMIN — DEXAMETHASONE SODIUM PHOSPHATE: 10 INJECTION, SOLUTION INTRAMUSCULAR; INTRAVENOUS at 12:24

## 2021-01-01 RX ADMIN — FLUTICASONE FUROATE 1 PUFF: 100 POWDER RESPIRATORY (INHALATION) at 09:23

## 2021-01-01 RX ADMIN — HYDROMORPHONE HYDROCHLORIDE 4 MG: 2 TABLET ORAL at 17:48

## 2021-01-01 RX ADMIN — BENZONATATE 100 MG: 100 CAPSULE ORAL at 01:57

## 2021-01-01 RX ADMIN — METHOCARBAMOL 500 MG: 500 TABLET ORAL at 02:51

## 2021-01-01 RX ADMIN — LIDOCAINE HYDROCHLORIDE 5 ML: 10 INJECTION, SOLUTION INFILTRATION; PERINEURAL at 12:20

## 2021-01-01 RX ADMIN — BENZONATATE 100 MG: 100 CAPSULE ORAL at 08:50

## 2021-01-01 RX ADMIN — TAMSULOSIN HYDROCHLORIDE 0.4 MG: 0.4 CAPSULE ORAL at 09:22

## 2021-01-01 RX ADMIN — ACETAMINOPHEN 1000 MG: 500 TABLET, FILM COATED ORAL at 09:22

## 2021-01-01 RX ADMIN — BENZONATATE 100 MG: 100 CAPSULE ORAL at 10:32

## 2021-01-01 RX ADMIN — FLUTICASONE FUROATE 1 PUFF: 100 POWDER RESPIRATORY (INHALATION) at 08:21

## 2021-01-01 RX ADMIN — PREDNISONE 40 MG: 20 TABLET ORAL at 08:54

## 2021-01-01 RX ADMIN — TRANEXAMIC ACID 500 MG: 100 INJECTION, SOLUTION INTRAVENOUS at 12:24

## 2021-01-01 RX ADMIN — NEOSTIGMINE METHYLSULFATE 4.5 MG: 1 INJECTION, SOLUTION INTRAVENOUS at 21:57

## 2021-01-01 RX ADMIN — HYDROMORPHONE HYDROCHLORIDE 4 MG: 2 TABLET ORAL at 02:00

## 2021-01-01 RX ADMIN — METHOCARBAMOL 500 MG: 500 TABLET ORAL at 12:11

## 2021-01-01 RX ADMIN — AZACITIDINE 160 MG: 100 INJECTION, POWDER, LYOPHILIZED, FOR SOLUTION INTRAVENOUS; SUBCUTANEOUS at 09:06

## 2021-01-01 RX ADMIN — SENNOSIDES AND DOCUSATE SODIUM 1 TABLET: 8.6; 5 TABLET ORAL at 08:55

## 2021-01-01 RX ADMIN — HYDROMORPHONE HYDROCHLORIDE 4 MG: 2 TABLET ORAL at 05:28

## 2021-01-01 RX ADMIN — ACETAMINOPHEN 650 MG: 325 TABLET, FILM COATED ORAL at 08:00

## 2021-01-01 RX ADMIN — SENNOSIDES AND DOCUSATE SODIUM 1 TABLET: 8.6; 5 TABLET ORAL at 21:05

## 2021-01-01 RX ADMIN — CEFAZOLIN 1 G: 1 INJECTION, POWDER, FOR SOLUTION INTRAMUSCULAR; INTRAVENOUS at 03:52

## 2021-01-01 RX ADMIN — FENTANYL CITRATE 50 MCG: 50 INJECTION, SOLUTION INTRAMUSCULAR; INTRAVENOUS at 23:07

## 2021-01-01 RX ADMIN — HYDROMORPHONE HYDROCHLORIDE 4 MG: 2 TABLET ORAL at 19:58

## 2021-01-01 RX ADMIN — BENZONATATE 100 MG: 100 CAPSULE ORAL at 02:07

## 2021-01-01 RX ADMIN — PIPERACILLIN SODIUM AND TAZOBACTAM SODIUM 3.38 G: 3; .375 INJECTION, POWDER, LYOPHILIZED, FOR SOLUTION INTRAVENOUS at 06:07

## 2021-01-01 RX ADMIN — TAMSULOSIN HYDROCHLORIDE 0.4 MG: 0.4 CAPSULE ORAL at 08:58

## 2021-01-01 RX ADMIN — HYDROMORPHONE HYDROCHLORIDE 1 MG: 2 TABLET ORAL at 07:48

## 2021-01-01 RX ADMIN — HYDROMORPHONE HYDROCHLORIDE 4 MG: 2 TABLET ORAL at 19:34

## 2021-01-01 RX ADMIN — FENTANYL CITRATE 50 MCG: 50 INJECTION, SOLUTION INTRAMUSCULAR; INTRAVENOUS at 20:41

## 2021-01-01 RX ADMIN — FLUTICASONE PROPIONATE 1 PUFF: 110 AEROSOL, METERED RESPIRATORY (INHALATION) at 10:30

## 2021-01-01 RX ADMIN — OXYCODONE HYDROCHLORIDE 10 MG: 5 TABLET ORAL at 08:54

## 2021-01-01 RX ADMIN — DOCUSATE SODIUM 100 MG: 100 CAPSULE, LIQUID FILLED ORAL at 20:41

## 2021-01-01 RX ADMIN — HYDROMORPHONE HYDROCHLORIDE 4 MG: 2 TABLET ORAL at 16:54

## 2021-01-01 RX ADMIN — HYDROMORPHONE HYDROCHLORIDE 4 MG: 2 TABLET ORAL at 18:47

## 2021-01-01 RX ADMIN — SODIUM CHLORIDE, PRESERVATIVE FREE 5 ML: 5 INJECTION INTRAVENOUS at 07:12

## 2021-01-01 RX ADMIN — LEVETIRACETAM 500 MG: 500 TABLET, FILM COATED ORAL at 08:30

## 2021-01-01 RX ADMIN — POLYETHYLENE GLYCOL 3350 17 G: 17 POWDER, FOR SOLUTION ORAL at 09:36

## 2021-01-01 RX ADMIN — ACETAMINOPHEN 650 MG: 325 TABLET, FILM COATED ORAL at 09:29

## 2021-01-01 RX ADMIN — LABETALOL HYDROCHLORIDE 10 MG: 5 INJECTION, SOLUTION INTRAVENOUS at 19:48

## 2021-01-01 RX ADMIN — HYDROMORPHONE HYDROCHLORIDE 4 MG: 2 TABLET ORAL at 17:51

## 2021-01-01 RX ADMIN — LIDOCAINE 1 PATCH: 246 PATCH TOPICAL at 08:50

## 2021-01-01 RX ADMIN — LEVETIRACETAM 500 MG: 500 TABLET, FILM COATED ORAL at 20:03

## 2021-01-01 RX ADMIN — PREDNISONE 20 MG: 20 TABLET ORAL at 07:53

## 2021-01-01 RX ADMIN — SODIUM CHLORIDE 250 ML: 9 INJECTION, SOLUTION INTRAVENOUS at 13:45

## 2021-01-01 RX ADMIN — AZACITIDINE 160 MG: 100 INJECTION, POWDER, LYOPHILIZED, FOR SOLUTION INTRAVENOUS; SUBCUTANEOUS at 09:50

## 2021-01-01 RX ADMIN — CEFAZOLIN SODIUM 2 G: 2 INJECTION, SOLUTION INTRAVENOUS at 08:37

## 2021-01-01 RX ADMIN — SODIUM CHLORIDE: 9 INJECTION, SOLUTION INTRAVENOUS at 20:57

## 2021-01-01 RX ADMIN — LEVETIRACETAM 500 MG: 500 TABLET, FILM COATED ORAL at 08:07

## 2021-01-01 RX ADMIN — BENZONATATE 100 MG: 100 CAPSULE ORAL at 22:44

## 2021-01-01 RX ADMIN — HYDROMORPHONE HYDROCHLORIDE 2 MG: 2 TABLET ORAL at 12:52

## 2021-01-01 RX ADMIN — SODIUM CHLORIDE, POTASSIUM CHLORIDE, SODIUM LACTATE AND CALCIUM CHLORIDE 1000 ML: 600; 310; 30; 20 INJECTION, SOLUTION INTRAVENOUS at 08:10

## 2021-01-01 RX ADMIN — CEFAZOLIN 1 G: 1 INJECTION, POWDER, FOR SOLUTION INTRAMUSCULAR; INTRAVENOUS at 10:33

## 2021-01-01 RX ADMIN — POLYETHYLENE GLYCOL 3350 17 G: 17 POWDER, FOR SOLUTION ORAL at 08:58

## 2021-01-01 RX ADMIN — HYDROMORPHONE HYDROCHLORIDE 0.5 MG: 1 INJECTION, SOLUTION INTRAMUSCULAR; INTRAVENOUS; SUBCUTANEOUS at 02:08

## 2021-01-01 RX ADMIN — PANTOPRAZOLE SODIUM 40 MG: 20 TABLET, DELAYED RELEASE ORAL at 08:25

## 2021-01-01 RX ADMIN — ROCURONIUM BROMIDE 10 MG: 10 INJECTION INTRAVENOUS at 21:11

## 2021-01-01 RX ADMIN — PANTOPRAZOLE SODIUM 40 MG: 20 TABLET, DELAYED RELEASE ORAL at 22:31

## 2021-01-01 RX ADMIN — SODIUM CHLORIDE, PRESERVATIVE FREE 5 ML: 5 INJECTION INTRAVENOUS at 13:44

## 2021-01-01 RX ADMIN — AZACITIDINE 160 MG: 100 INJECTION, POWDER, LYOPHILIZED, FOR SOLUTION INTRAVENOUS; SUBCUTANEOUS at 13:34

## 2021-01-01 RX ADMIN — HYDROMORPHONE HYDROCHLORIDE 4 MG: 2 TABLET ORAL at 14:55

## 2021-01-01 RX ADMIN — ONDANSETRON 4 MG: 4 TABLET, ORALLY DISINTEGRATING ORAL at 20:11

## 2021-01-01 RX ADMIN — POTASSIUM CHLORIDE 20 MEQ: 1500 TABLET, EXTENDED RELEASE ORAL at 16:05

## 2021-01-01 RX ADMIN — CEFAZOLIN SODIUM 2 G: 2 INJECTION, SOLUTION INTRAVENOUS at 20:25

## 2021-01-01 RX ADMIN — DOCUSATE SODIUM 100 MG: 100 CAPSULE, LIQUID FILLED ORAL at 00:09

## 2021-01-01 RX ADMIN — ACETAMINOPHEN 650 MG: 325 TABLET, FILM COATED ORAL at 08:50

## 2021-01-01 RX ADMIN — LORAZEPAM 1 MG: 2 INJECTION INTRAMUSCULAR; INTRAVENOUS at 15:52

## 2021-01-01 RX ADMIN — PREDNISONE 20 MG: 20 TABLET ORAL at 17:54

## 2021-01-01 RX ADMIN — PREDNISONE 5 MG: 5 TABLET ORAL at 22:31

## 2021-01-01 RX ADMIN — CEFAZOLIN 1 G: 1 INJECTION, POWDER, FOR SOLUTION INTRAMUSCULAR; INTRAVENOUS at 19:41

## 2021-01-01 RX ADMIN — SODIUM CHLORIDE 250 ML: 9 INJECTION, SOLUTION INTRAVENOUS at 14:01

## 2021-01-01 RX ADMIN — ACETAMINOPHEN 1000 MG: 500 TABLET, FILM COATED ORAL at 23:08

## 2021-01-01 RX ADMIN — Medication 5 ML: at 09:19

## 2021-01-01 RX ADMIN — LABETALOL HYDROCHLORIDE 10 MG: 5 INJECTION INTRAVENOUS at 21:50

## 2021-01-01 RX ADMIN — HYDROMORPHONE HYDROCHLORIDE 0.4 MG: 0.2 INJECTION, SOLUTION INTRAMUSCULAR; INTRAVENOUS; SUBCUTANEOUS at 04:57

## 2021-01-01 RX ADMIN — BENZONATATE 100 MG: 100 CAPSULE ORAL at 00:17

## 2021-01-01 RX ADMIN — FENTANYL CITRATE 25 MCG: 50 INJECTION, SOLUTION INTRAMUSCULAR; INTRAVENOUS at 09:21

## 2021-01-01 RX ADMIN — Medication 5 ML: at 15:15

## 2021-01-01 RX ADMIN — FUROSEMIDE 20 MG: 10 INJECTION, SOLUTION INTRAVENOUS at 15:33

## 2021-01-01 RX ADMIN — CEFAZOLIN 1 G: 1 INJECTION, POWDER, FOR SOLUTION INTRAMUSCULAR; INTRAVENOUS at 11:31

## 2021-01-01 RX ADMIN — ACETAMINOPHEN 1000 MG: 500 TABLET, FILM COATED ORAL at 08:54

## 2021-01-01 RX ADMIN — LABETALOL 20 MG/4 ML (5 MG/ML) INTRAVENOUS SYRINGE 5 MG: at 21:47

## 2021-01-01 RX ADMIN — BENZONATATE 100 MG: 100 CAPSULE ORAL at 19:52

## 2021-01-01 RX ADMIN — HYDROMORPHONE HYDROCHLORIDE 4 MG: 2 TABLET ORAL at 00:17

## 2021-01-01 RX ADMIN — ACETAMINOPHEN 975 MG: 325 TABLET, FILM COATED ORAL at 16:05

## 2021-01-01 RX ADMIN — HYDROMORPHONE HYDROCHLORIDE 0.3 MG: 1 INJECTION, SOLUTION INTRAMUSCULAR; INTRAVENOUS; SUBCUTANEOUS at 03:25

## 2021-01-01 RX ADMIN — PANTOPRAZOLE SODIUM 40 MG: 40 INJECTION, POWDER, FOR SOLUTION INTRAVENOUS at 09:12

## 2021-01-01 RX ADMIN — FENTANYL CITRATE 25 MCG: 50 INJECTION, SOLUTION INTRAMUSCULAR; INTRAVENOUS at 09:26

## 2021-01-01 RX ADMIN — ONDANSETRON 4 MG: 2 INJECTION INTRAMUSCULAR; INTRAVENOUS at 15:53

## 2021-01-01 RX ADMIN — PREDNISONE 40 MG: 20 TABLET ORAL at 08:16

## 2021-01-01 RX ADMIN — ACETAMINOPHEN 975 MG: 325 TABLET, FILM COATED ORAL at 16:53

## 2021-01-01 RX ADMIN — TRANEXAMIC ACID 500 MG: 100 INJECTION, SOLUTION INTRAVENOUS at 22:01

## 2021-01-01 RX ADMIN — BISACODYL 10 MG: 10 SUPPOSITORY RECTAL at 00:45

## 2021-01-01 RX ADMIN — DESMOPRESSIN ACETATE 27 MCG: 4 INJECTION, SOLUTION INTRAVENOUS; SUBCUTANEOUS at 22:55

## 2021-01-01 RX ADMIN — HYDROMORPHONE HYDROCHLORIDE 0.5 MG: 1 INJECTION, SOLUTION INTRAMUSCULAR; INTRAVENOUS; SUBCUTANEOUS at 05:58

## 2021-01-01 RX ADMIN — BUMETANIDE 2 MG: 0.25 INJECTION INTRAMUSCULAR; INTRAVENOUS at 12:35

## 2021-01-01 RX ADMIN — LEVETIRACETAM 500 MG: 500 TABLET, FILM COATED ORAL at 20:37

## 2021-01-01 RX ADMIN — SODIUM CHLORIDE, PRESERVATIVE FREE 5 ML: 5 INJECTION INTRAVENOUS at 11:57

## 2021-01-01 RX ADMIN — HYDROMORPHONE HYDROCHLORIDE 4 MG: 2 TABLET ORAL at 14:44

## 2021-01-01 RX ADMIN — PROPOFOL 125 MCG/KG/MIN: 10 INJECTION, EMULSION INTRAVENOUS at 12:08

## 2021-01-01 RX ADMIN — HYDROMORPHONE HYDROCHLORIDE 2 MG: 2 TABLET ORAL at 13:21

## 2021-01-01 RX ADMIN — LEVETIRACETAM 1000 MG: 100 SOLUTION ORAL at 21:25

## 2021-01-01 RX ADMIN — TAMSULOSIN HYDROCHLORIDE 0.4 MG: 0.4 CAPSULE ORAL at 07:53

## 2021-01-01 RX ADMIN — HYDROMORPHONE HYDROCHLORIDE 0.2 MG: 0.2 INJECTION, SOLUTION INTRAMUSCULAR; INTRAVENOUS; SUBCUTANEOUS at 13:40

## 2021-01-01 RX ADMIN — FENTANYL CITRATE 50 MCG: 50 INJECTION, SOLUTION INTRAMUSCULAR; INTRAVENOUS at 21:26

## 2021-01-01 RX ADMIN — BENZONATATE 100 MG: 100 CAPSULE ORAL at 17:05

## 2021-01-01 RX ADMIN — PANTOPRAZOLE SODIUM 40 MG: 40 TABLET, DELAYED RELEASE ORAL at 07:53

## 2021-01-01 RX ADMIN — IOPAMIDOL 111 ML: 755 INJECTION, SOLUTION INTRAVENOUS at 10:26

## 2021-01-01 RX ADMIN — HYDROMORPHONE HYDROCHLORIDE 0.4 MG: 0.2 INJECTION, SOLUTION INTRAMUSCULAR; INTRAVENOUS; SUBCUTANEOUS at 09:36

## 2021-01-01 RX ADMIN — HYDROMORPHONE HYDROCHLORIDE 4 MG: 2 TABLET ORAL at 01:33

## 2021-01-01 RX ADMIN — SODIUM CHLORIDE 1000 ML: 9 INJECTION, SOLUTION INTRAVENOUS at 15:11

## 2021-01-01 RX ADMIN — LABETALOL 20 MG/4 ML (5 MG/ML) INTRAVENOUS SYRINGE 10 MG: at 21:51

## 2021-01-01 RX ADMIN — HYDROMORPHONE HYDROCHLORIDE 0.2 MG: 1 INJECTION, SOLUTION INTRAMUSCULAR; INTRAVENOUS; SUBCUTANEOUS at 15:55

## 2021-01-01 RX ADMIN — SODIUM CHLORIDE 250 ML: 9 INJECTION, SOLUTION INTRAVENOUS at 01:49

## 2021-01-01 RX ADMIN — MORPHINE SULFATE 2 MG: 2 INJECTION, SOLUTION INTRAMUSCULAR; INTRAVENOUS at 16:46

## 2021-01-01 RX ADMIN — HYDROMORPHONE HYDROCHLORIDE 4 MG: 2 TABLET ORAL at 13:28

## 2021-01-01 RX ADMIN — IPRATROPIUM BROMIDE AND ALBUTEROL SULFATE 3 ML: .5; 3 SOLUTION RESPIRATORY (INHALATION) at 07:33

## 2021-01-01 RX ADMIN — ONDANSETRON 4 MG: 4 TABLET, ORALLY DISINTEGRATING ORAL at 19:26

## 2021-01-01 RX ADMIN — ATORVASTATIN CALCIUM 10 MG: 10 TABLET, FILM COATED ORAL at 09:38

## 2021-01-01 RX ADMIN — Medication 0.2 MG: at 20:44

## 2021-01-01 RX ADMIN — SODIUM CHLORIDE 250 ML: 9 INJECTION, SOLUTION INTRAVENOUS at 10:13

## 2021-01-01 RX ADMIN — PIPERACILLIN SODIUM AND TAZOBACTAM SODIUM 3.38 G: 3; .375 INJECTION, POWDER, LYOPHILIZED, FOR SOLUTION INTRAVENOUS at 11:12

## 2021-01-01 RX ADMIN — Medication 5 ML: at 12:31

## 2021-01-01 RX ADMIN — DIPHENHYDRAMINE HYDROCHLORIDE 25 MG: 50 INJECTION INTRAMUSCULAR; INTRAVENOUS at 15:54

## 2021-01-01 RX ADMIN — CEFAZOLIN 1 G: 1 INJECTION, POWDER, FOR SOLUTION INTRAMUSCULAR; INTRAVENOUS at 02:09

## 2021-01-01 RX ADMIN — HYDROMORPHONE HYDROCHLORIDE 2 MG: 2 TABLET ORAL at 18:35

## 2021-01-01 RX ADMIN — MIDAZOLAM HYDROCHLORIDE 0.5 MG: 1 INJECTION, SOLUTION INTRAMUSCULAR; INTRAVENOUS at 09:21

## 2021-01-01 RX ADMIN — HYDROMORPHONE HYDROCHLORIDE 4 MG: 2 TABLET ORAL at 02:15

## 2021-01-01 RX ADMIN — AZACITIDINE 160 MG: 100 INJECTION, POWDER, LYOPHILIZED, FOR SOLUTION INTRAVENOUS; SUBCUTANEOUS at 10:39

## 2021-01-01 RX ADMIN — HYDROMORPHONE HYDROCHLORIDE 4 MG: 2 TABLET ORAL at 16:10

## 2021-01-01 RX ADMIN — FENTANYL CITRATE 25 MCG: 50 INJECTION, SOLUTION INTRAMUSCULAR; INTRAVENOUS at 20:34

## 2021-01-01 RX ADMIN — HYDROMORPHONE HYDROCHLORIDE 0.4 MG: 0.2 INJECTION, SOLUTION INTRAMUSCULAR; INTRAVENOUS; SUBCUTANEOUS at 02:28

## 2021-01-01 RX ADMIN — HYDROMORPHONE HYDROCHLORIDE 4 MG: 2 TABLET ORAL at 14:40

## 2021-01-01 RX ADMIN — PHENYLEPHRINE HYDROCHLORIDE 100 MCG: 10 INJECTION INTRAVENOUS at 10:42

## 2021-01-01 RX ADMIN — DIPHENHYDRAMINE HYDROCHLORIDE 25 MG: 25 CAPSULE ORAL at 08:38

## 2021-01-01 RX ADMIN — HYDROMORPHONE HYDROCHLORIDE 0.4 MG: 0.2 INJECTION, SOLUTION INTRAMUSCULAR; INTRAVENOUS; SUBCUTANEOUS at 06:07

## 2021-01-01 RX ADMIN — LORAZEPAM 0.5 MG: 0.5 TABLET ORAL at 18:47

## 2021-01-01 RX ADMIN — LEVETIRACETAM 500 MG: 500 TABLET, FILM COATED ORAL at 08:23

## 2021-01-01 RX ADMIN — TAMSULOSIN HYDROCHLORIDE 0.4 MG: 0.4 CAPSULE ORAL at 08:07

## 2021-01-01 RX ADMIN — LEVETIRACETAM 500 MG: 500 TABLET, FILM COATED ORAL at 08:26

## 2021-01-01 RX ADMIN — HYDROMORPHONE HYDROCHLORIDE 4 MG: 2 TABLET ORAL at 22:43

## 2021-01-01 RX ADMIN — PIPERACILLIN SODIUM AND TAZOBACTAM SODIUM 3.38 G: 3; .375 INJECTION, POWDER, LYOPHILIZED, FOR SOLUTION INTRAVENOUS at 17:58

## 2021-01-01 RX ADMIN — ACETAMINOPHEN 975 MG: 325 TABLET, FILM COATED ORAL at 09:37

## 2021-01-01 RX ADMIN — ACETAMINOPHEN 1000 MG: 500 TABLET, FILM COATED ORAL at 22:24

## 2021-01-01 RX ADMIN — HYDROMORPHONE HYDROCHLORIDE 0.4 MG: 0.2 INJECTION, SOLUTION INTRAMUSCULAR; INTRAVENOUS; SUBCUTANEOUS at 01:16

## 2021-01-01 RX ADMIN — ACETAMINOPHEN 975 MG: 325 TABLET, FILM COATED ORAL at 16:54

## 2021-01-01 RX ADMIN — ROCURONIUM BROMIDE 40 MG: 10 INJECTION INTRAVENOUS at 20:53

## 2021-01-01 RX ADMIN — LIDOCAINE HYDROCHLORIDE 80 MG: 20 INJECTION, SOLUTION INFILTRATION; PERINEURAL at 20:53

## 2021-01-01 RX ADMIN — PANTOPRAZOLE SODIUM 40 MG: 40 TABLET, DELAYED RELEASE ORAL at 09:22

## 2021-01-01 RX ADMIN — Medication 10 ML: at 08:34

## 2021-01-01 RX ADMIN — IPRATROPIUM BROMIDE AND ALBUTEROL SULFATE 3 ML: .5; 3 SOLUTION RESPIRATORY (INHALATION) at 11:12

## 2021-01-01 RX ADMIN — DOCUSATE SODIUM 100 MG: 100 CAPSULE, LIQUID FILLED ORAL at 08:58

## 2021-01-01 RX ADMIN — HYDROMORPHONE HYDROCHLORIDE 2 MG: 2 TABLET ORAL at 18:04

## 2021-01-01 RX ADMIN — GUAIFENESIN 10 ML: 200 SOLUTION ORAL at 20:07

## 2021-01-01 RX ADMIN — LEVETIRACETAM 500 MG: 500 TABLET, FILM COATED ORAL at 19:49

## 2021-01-01 RX ADMIN — TRANEXAMIC ACID 500 MG: 100 INJECTION, SOLUTION INTRAVENOUS at 03:35

## 2021-01-01 RX ADMIN — ACETAMINOPHEN 1000 MG: 500 TABLET, FILM COATED ORAL at 07:53

## 2021-01-01 RX ADMIN — ALLOPURINOL 200 MG: 100 TABLET ORAL at 08:25

## 2021-01-01 RX ADMIN — DEXMEDETOMIDINE HYDROCHLORIDE 8 MCG: 100 INJECTION, SOLUTION INTRAVENOUS at 12:16

## 2021-01-01 RX ADMIN — PIPERACILLIN SODIUM AND TAZOBACTAM SODIUM 3.38 G: 3; .375 INJECTION, POWDER, LYOPHILIZED, FOR SOLUTION INTRAVENOUS at 06:10

## 2021-01-01 RX ADMIN — LIDOCAINE HYDROCHLORIDE 4 ML: 10 INJECTION, SOLUTION INFILTRATION; PERINEURAL at 09:42

## 2021-01-01 RX ADMIN — SODIUM CHLORIDE 1000 ML: 9 INJECTION, SOLUTION INTRAVENOUS at 15:51

## 2021-01-01 RX ADMIN — TAMSULOSIN HYDROCHLORIDE 0.4 MG: 0.4 CAPSULE ORAL at 08:52

## 2021-01-01 RX ADMIN — IPRATROPIUM BROMIDE AND ALBUTEROL SULFATE 3 ML: .5; 3 SOLUTION RESPIRATORY (INHALATION) at 15:32

## 2021-01-01 RX ADMIN — SODIUM CHLORIDE: 9 INJECTION, SOLUTION INTRAVENOUS at 02:45

## 2021-01-01 RX ADMIN — SODIUM CHLORIDE, POTASSIUM CHLORIDE, SODIUM LACTATE AND CALCIUM CHLORIDE: 600; 310; 30; 20 INJECTION, SOLUTION INTRAVENOUS at 10:19

## 2021-01-01 RX ADMIN — HYDROMORPHONE HYDROCHLORIDE 2 MG: 2 TABLET ORAL at 09:12

## 2021-01-01 RX ADMIN — LEVETIRACETAM 500 MG: 500 TABLET, FILM COATED ORAL at 08:54

## 2021-01-01 RX ADMIN — ALLOPURINOL 200 MG: 100 TABLET ORAL at 09:02

## 2021-01-01 RX ADMIN — HYDROMORPHONE HYDROCHLORIDE 4 MG: 2 TABLET ORAL at 07:53

## 2021-01-01 RX ADMIN — DIPHENHYDRAMINE HYDROCHLORIDE 25 MG: 25 CAPSULE ORAL at 09:29

## 2021-01-01 RX ADMIN — PREDNISONE 5 MG: 5 TABLET ORAL at 09:02

## 2021-01-01 RX ADMIN — PROPOFOL 200 MG: 10 INJECTION, EMULSION INTRAVENOUS at 20:53

## 2021-01-01 RX ADMIN — LEVETIRACETAM 500 MG: 500 TABLET, FILM COATED ORAL at 20:58

## 2021-01-01 RX ADMIN — SENNOSIDES AND DOCUSATE SODIUM 2 TABLET: 8.6; 5 TABLET ORAL at 18:08

## 2021-01-01 RX ADMIN — FLUTICASONE FUROATE 1 PUFF: 100 POWDER RESPIRATORY (INHALATION) at 08:08

## 2021-01-01 RX ADMIN — SENNOSIDES AND DOCUSATE SODIUM 1 TABLET: 8.6; 5 TABLET ORAL at 09:37

## 2021-01-01 RX ADMIN — SENNOSIDES AND DOCUSATE SODIUM 1 TABLET: 8.6; 5 TABLET ORAL at 00:09

## 2021-01-01 RX ADMIN — HYDROMORPHONE HYDROCHLORIDE 1 MG: 2 TABLET ORAL at 02:29

## 2021-01-01 RX ADMIN — HYDROMORPHONE HYDROCHLORIDE 4 MG: 2 TABLET ORAL at 05:25

## 2021-01-01 RX ADMIN — FENTANYL CITRATE 100 MCG: 50 INJECTION, SOLUTION INTRAMUSCULAR; INTRAVENOUS at 20:53

## 2021-01-01 RX ADMIN — HYDROMORPHONE HYDROCHLORIDE 0.2 MG: 0.2 INJECTION, SOLUTION INTRAMUSCULAR; INTRAVENOUS; SUBCUTANEOUS at 01:50

## 2021-01-01 RX ADMIN — HYDROMORPHONE HYDROCHLORIDE 4 MG: 2 TABLET ORAL at 15:43

## 2021-01-01 RX ADMIN — BENZONATATE 100 MG: 100 CAPSULE ORAL at 11:32

## 2021-01-01 RX ADMIN — LEVETIRACETAM 500 MG: 500 TABLET, FILM COATED ORAL at 19:41

## 2021-01-01 RX ADMIN — HYDROMORPHONE HYDROCHLORIDE 4 MG: 2 TABLET ORAL at 18:49

## 2021-01-01 RX ADMIN — SODIUM CHLORIDE: 9 INJECTION, SOLUTION INTRAVENOUS at 17:34

## 2021-01-01 RX ADMIN — ONDANSETRON 4 MG: 4 TABLET, ORALLY DISINTEGRATING ORAL at 18:20

## 2021-01-01 RX ADMIN — SODIUM CHLORIDE 250 ML: 9 INJECTION, SOLUTION INTRAVENOUS at 09:08

## 2021-01-01 RX ADMIN — HYDROMORPHONE HYDROCHLORIDE 4 MG: 2 TABLET ORAL at 08:07

## 2021-01-01 RX ADMIN — ACETAMINOPHEN 975 MG: 325 TABLET, FILM COATED ORAL at 19:40

## 2021-01-01 RX ADMIN — PREDNISONE 5 MG: 5 TABLET ORAL at 08:51

## 2021-01-01 RX ADMIN — HYDROMORPHONE HYDROCHLORIDE 4 MG: 2 TABLET ORAL at 01:15

## 2021-01-01 RX ADMIN — HYDROMORPHONE HYDROCHLORIDE 4 MG: 2 TABLET ORAL at 13:59

## 2021-01-01 RX ADMIN — DOCUSATE SODIUM 100 MG: 100 CAPSULE, LIQUID FILLED ORAL at 21:04

## 2021-01-01 RX ADMIN — HYDROMORPHONE HYDROCHLORIDE 0.2 MG: 0.2 INJECTION, SOLUTION INTRAMUSCULAR; INTRAVENOUS; SUBCUTANEOUS at 18:39

## 2021-01-01 RX ADMIN — OMEPRAZOLE 20 MG: 20 CAPSULE, DELAYED RELEASE ORAL at 09:37

## 2021-01-01 RX ADMIN — GLYCOPYRROLATE 0.9 MG: 0.2 INJECTION, SOLUTION INTRAMUSCULAR; INTRAVENOUS at 21:57

## 2021-01-01 RX ADMIN — OMEPRAZOLE 20 MG: 20 CAPSULE, DELAYED RELEASE ORAL at 08:58

## 2021-01-01 RX ADMIN — PHENYLEPHRINE HYDROCHLORIDE 100 MCG: 10 INJECTION INTRAVENOUS at 10:40

## 2021-01-01 RX ADMIN — OXYCODONE HYDROCHLORIDE 10 MG: 5 TABLET ORAL at 11:36

## 2021-01-01 RX ADMIN — Medication 5 ML: at 14:36

## 2021-01-01 RX ADMIN — PROPOFOL 40 MG: 10 INJECTION, EMULSION INTRAVENOUS at 20:41

## 2021-01-01 RX ADMIN — ONDANSETRON 4 MG: 2 INJECTION INTRAMUSCULAR; INTRAVENOUS at 12:34

## 2021-01-01 RX ADMIN — HYDROMORPHONE HYDROCHLORIDE 4 MG: 2 TABLET ORAL at 10:49

## 2021-01-01 RX ADMIN — TAMSULOSIN HYDROCHLORIDE 0.4 MG: 0.4 CAPSULE ORAL at 09:19

## 2021-01-01 RX ADMIN — OXYCODONE HYDROCHLORIDE 10 MG: 5 TABLET ORAL at 04:00

## 2021-01-01 RX ADMIN — ONDANSETRON 4 MG: 4 TABLET, ORALLY DISINTEGRATING ORAL at 04:57

## 2021-01-01 RX ADMIN — LEVETIRACETAM 500 MG: 500 TABLET, FILM COATED ORAL at 07:53

## 2021-01-01 RX ADMIN — HYDROMORPHONE HYDROCHLORIDE 0.4 MG: 0.2 INJECTION, SOLUTION INTRAMUSCULAR; INTRAVENOUS; SUBCUTANEOUS at 12:24

## 2021-01-01 RX ADMIN — PIPERACILLIN SODIUM AND TAZOBACTAM SODIUM 3.38 G: 3; .375 INJECTION, POWDER, LYOPHILIZED, FOR SOLUTION INTRAVENOUS at 04:33

## 2021-01-01 RX ADMIN — TAMSULOSIN HYDROCHLORIDE 0.4 MG: 0.4 CAPSULE ORAL at 09:12

## 2021-01-01 RX ADMIN — MORPHINE SULFATE 2 MG: 2 INJECTION, SOLUTION INTRAMUSCULAR; INTRAVENOUS at 16:13

## 2021-01-01 RX ADMIN — GUAIFENESIN AND DEXTROMETHORPHAN 5 ML: 100; 10 SYRUP ORAL at 18:35

## 2021-01-01 RX ADMIN — LEVETIRACETAM 500 MG: 500 TABLET, FILM COATED ORAL at 19:36

## 2021-01-01 RX ADMIN — ROCURONIUM BROMIDE 10 MG: 10 INJECTION INTRAVENOUS at 21:33

## 2021-01-01 RX ADMIN — SODIUM CHLORIDE: 9 INJECTION, SOLUTION INTRAVENOUS at 01:20

## 2021-01-01 RX ADMIN — LIDOCAINE HYDROCHLORIDE 10 ML: 10; .005 INJECTION, SOLUTION EPIDURAL; INFILTRATION; INTRACAUDAL; PERINEURAL at 09:44

## 2021-01-01 RX ADMIN — ONDANSETRON 4 MG: 2 INJECTION INTRAMUSCULAR; INTRAVENOUS at 21:41

## 2021-01-01 RX ADMIN — DEXAMETHASONE SODIUM PHOSPHATE: 10 INJECTION, SOLUTION INTRAMUSCULAR; INTRAVENOUS at 10:13

## 2021-01-01 RX ADMIN — SODIUM CHLORIDE, POTASSIUM CHLORIDE, SODIUM LACTATE AND CALCIUM CHLORIDE: 600; 310; 30; 20 INJECTION, SOLUTION INTRAVENOUS at 20:30

## 2021-01-01 RX ADMIN — BENZONATATE 100 MG: 100 CAPSULE ORAL at 00:41

## 2021-01-01 RX ADMIN — AZACITIDINE 160 MG: 100 INJECTION, POWDER, LYOPHILIZED, FOR SOLUTION INTRAVENOUS; SUBCUTANEOUS at 14:53

## 2021-01-01 RX ADMIN — LEVETIRACETAM 500 MG: 500 TABLET, FILM COATED ORAL at 09:22

## 2021-01-01 ASSESSMENT — ACTIVITIES OF DAILY LIVING (ADL)
ADLS_ACUITY_SCORE: 12
ADLS_ACUITY_SCORE: 13
ADLS_ACUITY_SCORE: 13
ADLS_ACUITY_SCORE: 10
ADLS_ACUITY_SCORE: 13
ADLS_ACUITY_SCORE: 15
ADLS_ACUITY_SCORE: 13
ADLS_ACUITY_SCORE: 15
ADLS_ACUITY_SCORE: 20
ADLS_ACUITY_SCORE: 13
ADLS_ACUITY_SCORE: 15
ADLS_ACUITY_SCORE: 20
ADLS_ACUITY_SCORE: 20
ADLS_ACUITY_SCORE: 9
WALKING_OR_CLIMBING_STAIRS_DIFFICULTY: NO
ADLS_ACUITY_SCORE: 11
ADLS_ACUITY_SCORE: 11
ADLS_ACUITY_SCORE: 10
ADLS_ACUITY_SCORE: 13
DIFFICULTY_EATING/SWALLOWING: NO
ADLS_ACUITY_SCORE: 10
ADLS_ACUITY_SCORE: 13
KNEEL ON THE FRONT OF YOUR KNEE: ACTIVITY IS FAIRLY DIFFICULT
ADLS_ACUITY_SCORE: 13
CURRENT_FUNCTION: NO ASSISTANCE NEEDED
ADLS_ACUITY_SCORE: 13
RISE FROM A CHAIR: ACTIVITY IS FAIRLY DIFFICULT
ADLS_ACUITY_SCORE: 10
STIFFNESS: THE SYMPTOM PREVENTS ME FROM ALL DAILY ACTIVITIES
ADLS_ACUITY_SCORE: 13
ADLS_ACUITY_SCORE: 15
ADLS_ACUITY_SCORE: 20
ADLS_ACUITY_SCORE: 10
ADLS_ACUITY_SCORE: 10
ADLS_ACUITY_SCORE: 13
ADLS_ACUITY_SCORE: 15
ADLS_ACUITY_SCORE: 19
WEAR_GLASSES_OR_BLIND: YES
ADLS_ACUITY_SCORE: 11
ADLS_ACUITY_SCORE: 11
SIT WITH YOUR KNEE BENT: ACTIVITY IS VERY DIFFICULT
ADLS_ACUITY_SCORE: 11
ADLS_ACUITY_SCORE: 13
ADLS_ACUITY_SCORE: 13
ADLS_ACUITY_SCORE: 15
ADLS_ACUITY_SCORE: 13
ADLS_ACUITY_SCORE: 15
GO UP STAIRS: ACTIVITY IS VERY DIFFICULT
ADLS_ACUITY_SCORE: 9
ADLS_ACUITY_SCORE: 13
ADLS_ACUITY_SCORE: 13
ADLS_ACUITY_SCORE: 10
ADLS_ACUITY_SCORE: 20
ADLS_ACUITY_SCORE: 15
ADLS_ACUITY_SCORE: 15
WALK: ACTIVITY IS VERY DIFFICULT
ADLS_ACUITY_SCORE: 15
ADLS_ACUITY_SCORE: 13
ADLS_ACUITY_SCORE: 11
ADLS_ACUITY_SCORE: 10
ADLS_ACUITY_SCORE: 13
CONCENTRATING,_REMEMBERING_OR_MAKING_DECISIONS_DIFFICULTY: YES
ADLS_ACUITY_SCORE: 10
ADLS_ACUITY_SCORE: 13
DIFFICULTY_COMMUNICATING: NO
ADLS_ACUITY_SCORE: 15
ADLS_ACUITY_SCORE: 13
ADLS_ACUITY_SCORE: 13
ADLS_ACUITY_SCORE: 15
FALL_HISTORY_WITHIN_LAST_SIX_MONTHS: NO
ADLS_ACUITY_SCORE: 9
ADLS_ACUITY_SCORE: 13
ADLS_ACUITY_SCORE: 15
ADLS_ACUITY_SCORE: 13
TOILETING_ISSUES: NO
ADLS_ACUITY_SCORE: 15
ADLS_ACUITY_SCORE: 20
ADLS_ACUITY_SCORE: 13
ADLS_ACUITY_SCORE: 13
ADLS_ACUITY_SCORE: 15
ADLS_ACUITY_SCORE: 13
AS_A_RESULT_OF_YOUR_KNEE_INJURY,_HOW_WOULD_YOU_RATE_YOUR_CURRENT_LEVEL_OF_DAILY_ACTIVITY?: SEVERELY ABNORMAL
ADLS_ACUITY_SCORE: 11
ADLS_ACUITY_SCORE: 13
SWELLING: THE SYMPTOM PREVENTS ME FROM ALL DAILY ACTIVITIES
ADLS_ACUITY_SCORE: 10
ADLS_ACUITY_SCORE: 9
ADLS_ACUITY_SCORE: 13
ADLS_ACUITY_SCORE: 11
ADLS_ACUITY_SCORE: 13
ADLS_ACUITY_SCORE: 13
ADLS_ACUITY_SCORE: 10
ADLS_ACUITY_SCORE: 13
KNEE_ACTIVITY_OF_DAILY_LIVING_SCORE: 17.14
ADLS_ACUITY_SCORE: 13
ADLS_ACUITY_SCORE: 13
ADLS_ACUITY_SCORE: 12
ADLS_ACUITY_SCORE: 13
ADLS_ACUITY_SCORE: 13
ADLS_ACUITY_SCORE: 10
ADLS_ACUITY_SCORE: 13
ADLS_ACUITY_SCORE: 13
ADLS_ACUITY_SCORE: 15
ADLS_ACUITY_SCORE: 13
RAW_SCORE: 12
ADLS_ACUITY_SCORE: 13
ADLS_ACUITY_SCORE: 15
ADLS_ACUITY_SCORE: 15
ADLS_ACUITY_SCORE: 13
ADLS_ACUITY_SCORE: 19
ADLS_ACUITY_SCORE: 11
ADLS_ACUITY_SCORE: 9
ADLS_ACUITY_SCORE: 11
ADLS_ACUITY_SCORE: 13
ADLS_ACUITY_SCORE: 10
ADLS_ACUITY_SCORE: 17
ADLS_ACUITY_SCORE: 13
ADLS_ACUITY_SCORE: 13
ADLS_ACUITY_SCORE: 10
ADLS_ACUITY_SCORE: 13
CURRENT_FUNCTION: NO ASSISTANCE NEEDED
ADLS_ACUITY_SCORE: 11
ADLS_ACUITY_SCORE: 13
STAND: ACTIVITY IS FAIRLY DIFFICULT
ADLS_ACUITY_SCORE: 13
ADLS_ACUITY_SCORE: 11
ADLS_ACUITY_SCORE: 15
ADLS_ACUITY_SCORE: 13
ADLS_ACUITY_SCORE: 11
ADLS_ACUITY_SCORE: 13
ADLS_ACUITY_SCORE: 13
ADLS_ACUITY_SCORE: 9
ADLS_ACUITY_SCORE: 13
HOW_WOULD_YOU_RATE_THE_CURRENT_FUNCTION_OF_YOUR_KNEE_DURING_YOUR_USUAL_DAILY_ACTIVITIES_ON_A_SCALE_FROM_0_TO_100_WITH_100_BEING_YOUR_LEVEL_OF_KNEE_FUNCTION_PRIOR_TO_YOUR_INJURY_AND_0_BEING_THE_INABILITY_TO_PERFORM_ANY_OF_YOUR_USUAL_DAILY_ACTIVITIES?: 10
ADLS_ACUITY_SCORE: 13
WEAKNESS: THE SYMPTOM AFFECTS MY ACTIVITY SEVERELY
ADLS_ACUITY_SCORE: 11
ADLS_ACUITY_SCORE: 13
ADLS_ACUITY_SCORE: 15
ADLS_ACUITY_SCORE: 15
ADLS_ACUITY_SCORE: 13
ADLS_ACUITY_SCORE: 15
ADLS_ACUITY_SCORE: 13
ADLS_ACUITY_SCORE: 10
ADLS_ACUITY_SCORE: 10
ADLS_ACUITY_SCORE: 15
ADLS_ACUITY_SCORE: 13
ADLS_ACUITY_SCORE: 12
ADLS_ACUITY_SCORE: 13
ADLS_ACUITY_SCORE: 13
ADLS_ACUITY_SCORE: 15
DRESSING/BATHING_DIFFICULTY: NO
GO DOWN STAIRS: ACTIVITY IS VERY DIFFICULT
ADLS_ACUITY_SCORE: 15
GIVING WAY, BUCKLING OR SHIFTING OF KNEE: THE SYMPTOM AFFECTS MY ACTIVITY SEVERELY
ADLS_ACUITY_SCORE: 15
ADLS_ACUITY_SCORE: 13
ADLS_ACUITY_SCORE: 10
ADLS_ACUITY_SCORE: 13
ADLS_ACUITY_SCORE: 10
ADLS_ACUITY_SCORE: 15
ADLS_ACUITY_SCORE: 14
ADLS_ACUITY_SCORE: 13
HOW_WOULD_YOU_RATE_THE_OVERALL_FUNCTION_OF_YOUR_KNEE_DURING_YOUR_USUAL_DAILY_ACTIVITIES?: SEVERELY ABNORMAL
ADLS_ACUITY_SCORE: 14
ADLS_ACUITY_SCORE: 13
ADLS_ACUITY_SCORE: 11
ADLS_ACUITY_SCORE: 10
KNEE_ACTIVITY_OF_DAILY_LIVING_SUM: 12
ADLS_ACUITY_SCORE: 13
SQUAT: I AM UNABLE TO DO THE ACTIVITY
ADLS_ACUITY_SCORE: 13
ADLS_ACUITY_SCORE: 11
ADLS_ACUITY_SCORE: 11
ADLS_ACUITY_SCORE: 13
ADLS_ACUITY_SCORE: 13
ADLS_ACUITY_SCORE: 20
PAIN: THE SYMPTOM PREVENTS ME FROM ALL DAILY ACTIVITIES
ADLS_ACUITY_SCORE: 13
DOING_ERRANDS_INDEPENDENTLY_DIFFICULTY: YES
ADLS_ACUITY_SCORE: 14
ADLS_ACUITY_SCORE: 10
ADLS_ACUITY_SCORE: 13
LIMPING: THE SYMPTOM PREVENTS ME FROM ALL DAILY ACTIVITIES
ADLS_ACUITY_SCORE: 13
ADLS_ACUITY_SCORE: 15
ADLS_ACUITY_SCORE: 15
ADLS_ACUITY_SCORE: 13
ADLS_ACUITY_SCORE: 13
ADLS_ACUITY_SCORE: 11
ADLS_ACUITY_SCORE: 11
ADLS_ACUITY_SCORE: 14
ADLS_ACUITY_SCORE: 13
DEPENDENT_IADLS:: TRANSPORTATION
ADLS_ACUITY_SCORE: 13
ADLS_ACUITY_SCORE: 15
ADLS_ACUITY_SCORE: 13

## 2021-01-01 ASSESSMENT — PAIN SCALES - GENERAL
PAINLEVEL: NO PAIN (0)
PAINLEVEL: MODERATE PAIN (5)
PAINLEVEL: NO PAIN (0)
PAINLEVEL: SEVERE PAIN (6)
PAINLEVEL: NO PAIN (0)
PAINLEVEL: SEVERE PAIN (7)
PAINLEVEL: MODERATE PAIN (4)

## 2021-01-01 ASSESSMENT — MIFFLIN-ST. JEOR
SCORE: 1480
SCORE: 1552.8
SCORE: 1459
SCORE: 1416.27
SCORE: 1461.18
SCORE: 1511.08
SCORE: 1487
SCORE: 1415
SCORE: 1457.55
SCORE: 1466
SCORE: 1506
SCORE: 1496
SCORE: 1552.8
SCORE: 1463
SCORE: 1439.41
SCORE: 1448.48

## 2021-01-01 ASSESSMENT — ENCOUNTER SYMPTOMS
FATIGUE: 1
DYSRHYTHMIAS: 0
CONFUSION: 1
HEADACHES: 1
FEVER: 0
VOMITING: 1
COUGH: 1
SEIZURES: 0
SHORTNESS OF BREATH: 1
ABDOMINAL PAIN: 0
SPEECH DIFFICULTY: 1
VOMITING: 0
DIZZINESS: 1

## 2021-01-01 ASSESSMENT — LIFESTYLE VARIABLES
TOBACCO_USE: 1
TOBACCO_USE: 1

## 2021-01-20 NOTE — LETTER
1/20/2021         RE: Yvette Gastelum  [address]  [facility] [address]        Dear Colleague,    Thank you for referring your patient, Yvette Gastelum, to the [facility]. Please see a copy of my visit note below.    Medical Assistant Note:  Yvette Gastelum presents today for lab draw.    Patient seen by provider today: No.   present during visit today: Not Applicable.    Concerns: No Concerns.    Procedure:  Lab draw site: RAC, Needle type: BF, Gauge: 21. Gauze and coban applied    Post Assessment:  Labs drawn without difficulty: Yes.    Discharge Plan:  Departure Mode: Ambulatory.    Face to Face Time: 5.    Amelia Chowdhury [facility]                Again, thank you for allowing me to participate in the care of your patient.        Sincerely,         Lab Draw

## 2021-01-20 NOTE — PROGRESS NOTES
Medical Assistant Note:  Yvette Gastelum presents today for lab draw.    Patient seen by provider today: No.   present during visit today: Not Applicable.    Concerns: No Concerns.    Procedure:  Lab draw site: RAC, Needle type: BF, Gauge: 21. Gauze and coban applied    Post Assessment:  Labs drawn without difficulty: Yes.    Discharge Plan:  Departure Mode: Ambulatory.    Face to Face Time: 5.    Amelia Chowdhury CMA

## 2021-01-21 NOTE — PROGRESS NOTES
Yvette is a 70 year old who is being evaluated via a billable video visit.      How would you like to obtain your AVS? MyChart  If the video visit is dropped, the invitation should be resent by: Text to cell phone: [phone]  Will anyone else be joining your video visit? No      Video Start Time:   Video-Visit Details    Type of service:  Video Visit    Video End Time:   Originating Location (pt. Location): Home    Distant Location (provider location):  [facility] [place]     Platform used for Video Visit: Dipti Chowdhury CMA

## 2021-01-21 NOTE — PATIENT INSTRUCTIONS
1.  Bone marrow biopsy in 1 to 2 weeks.  2.  See me 2 weeks after bone marrow biopsy.      Babysitting the week of 2/1/21-2/5/21 per patient.  Would like it scheduled the week of February 8.    Scheduled

## 2021-01-21 NOTE — Clinical Note
1/21/2021         RE: Yvette Gastelum  [address]  [facility] [address]        Dear Colleague,    Thank you for referring your patient, Yvette Gastelum, to the [facility]. Please see a copy of my visit note below.    Yvette is a 70 year old who is being evaluated via a billable video visit.      How would you like to obtain your AVS? MyChart  If the video visit is dropped, the invitation should be resent by: Text to cell phone: [phone]  Will anyone else be joining your video visit? No  {If patient encounters technical issues they should call [phone]}    Video Start Time:   Video-Visit Details    Type of service:  Video Visit    Video End Time:   Originating Location (pt. Location): Home    Distant Location (provider location):  [facility]     Platform used for Video Visit: Dipti Chowdhury CMA        Visit Date:   01/21/2021     ONCOLOGY HISTORY: Ms. Gastelum is a female with small cell lung cancer.    1.  CT chest angiogram on 08/21/2018 for hemoptysis revealed right lower lobe pulmonary mass invading the right lower lobe pulmonary artery and mild mediastinal lymphadenopathy.   -CT abdomen and pelvis on 08/22/2018 did not reveal any evidence of metastatic disease.   -Brain MRI on 08/30/2019 did not reveal any intracranial metastasis.   -CT-guided biopsy of right lung mass on 08/31/2018 revealed high-grade neuroendocrine carcinoma, favor small cell carcinoma.   -PET scan in 08/2019 did not reveal any evidence of metastatic disease.      2.  She had limited stage small cell lung cancer.  She received concurrent chemoradiation with 4 cycles of platinum and etoposide between 09/17/2018 and 12/10/2018. Patient received 5940 cGy in 33 fractions between 10/11/2018 and 11/28/2018.   -Prophylactic cranial radiation between 01/29/2019 and 02/11/2019.      3.  CT chest on 08/21/2019 revealed a new 0.6 cm pulmonary nodule in  right lower lobe suspicious for metastatic lesion.  There are 2 additional indeterminate nodular opacities in the right lower lobe posteriorly which are stable.  There is a mildly enlarged subcarinal lymph node which is stable.   -PET scan on 10/28/2019 revealed 0.8 cm right lower lobe nodule which is hypermetabolic and suspicious for malignancy.  No other areas of abnormal uptake.   -SBRT to RLL nodule between 11/19/19 and 11/27/19. 4500 cGy.    4. On 01/20/2021:  -WBC of 3.6, hemoglobin of 9.8 and platelet of 60. MCV of 86.  -Normal iron, folate, vitamin B12, SPEP and LDH.     SUBJECTIVE:  Ms. Gastelum is a 70-year-old female with small cell lung cancer treated with chemoradiation and PCI.  She is in remission from it.      Recent labs had revealed anemia and thrombocytopenia.  We wanted to follow it.  I am concerned regarding developing MDS.      The patient says her overall condition is stable.  She has some fatigue, which would go along with her age.  No headache.  No dizziness.  No neck pain.  No chest pain.  No shortness of breath at rest.  Occasional cough.  No abdominal pain, nausea or vomiting.  Appetite is fairly good.  No bleeding from any site.  No fever, chills or night sweats.  No recurrent infection.      All other review of systems negative.      PHYSICAL EXAMINATION:   GENERAL:  She is alert, oriented x 3.  She is not in any distress.   RESPIRATORY:  No cough.  No labored breathing.    The rest of a comprehensive physical examination is deferred due to public health emergency video visit restrictions.      LABORATORY DATA:  Reviewed.      ASSESSMENT:    1.  Limited stage small cell lung cancer.  The patient is in remission.   2.  Pancytopenia.      PLAN:   1.  The patient is doing well from lung cancer.  No evidence of recurrence.  Recent CT scan in 12/2020 did not reveal any disease.   2.  Discussed regarding her pancytopenia.  I explained to her I am concerned that she might be developing  myelodysplastic syndrome.  We checked multiple labs including iron, vitamin B12, folate and SPEP, which are essentially normal.  Discussed regarding MDS.  Only way to diagnose would be bone marrow biopsy.  Procedure was explained.  She is agreeable for it.  Bone marrow biopsy under sedation will be scheduled.  I will see her in about 2 weeks after bone marrow biopsy.  If MDS is confirmed, we will discuss regarding treatment.   3.  She had a few questions, which were all answered.         INOCENTE SABA MD             D: 2021   T: 2021   MT: YONATAN      Name:     BISHNU LEY   MRN:      [id number]        Account:      [id number]   :      1950           Visit Date:   2021      Document: [id number]      Video visit time of 20 minutes.    Visit Date:   2021      SUBJECTIVE:  Ms. Ley is a 70-year-old female with small cell lung cancer, treated with chemoradiation and PCI.  She is in remission from it.      Recent labs had revealed anemia and thrombocytopenia.  We wanted to follow it.  I am concerned regarding developing MDS.      The patient says her overall condition is stable.  She has some fatigue, which would go along with her age.  No headache.  No dizziness.  No neck pain.  No chest pain.  No shortness of breath at rest.  Occasional cough.  No abdominal pain, nausea or vomiting.  Appetite fairly good.  No bleeding from any site.  No fever, chills, night sweats.  No recurrent infection.      All other review of systems negative.      PHYSICAL EXAMINATION:   GENERAL:  She is alert, oriented x3.  She is not in any distress.   RESPIRATORY:  No cough.  No labored breathing.      The rest of a comprehensive physical examination is deferred due to public health emergency video visit restrictions.      LABORATORY DATA:  Reviewed.      ASSESSMENT:  A 70-year-old female with:     1.  Limited stage small cell lung cancer.  The patient is in remission.   2.  Pancytopenia.      PLAN:   1.   The patient is doing well from lung cancer.  No evidence of recurrence.  Recent CT scan in 2020 did not reveal any disease.   2.  Discussed regarding her pancytopenia.  I explained to her I am concerned that she might be developing myelodysplastic syndrome.  We checked multiple labs including iron, vitamin B12, folate and SPEP, which are essentially normal.  Discussed regarding MDS.  Only way to diagnose would be bone marrow biopsy.  Procedure was explained.  She is agreeable for it.  Bone marrow biopsy under sedation will be scheduled.  I will see her in about 2 weeks after bone marrow biopsy.  If MDS is confirmed, we will discuss regarding treatment.   3.  She had a few questions, which were all answered.         INOCENTE SABA MD             D: 2021   T: 2021   MT: YONATAN      Name:     BISHNU LEY   MRN:      [id number]        Account:      [id number]   :      1950           Visit Date:   2021      Document: [id number]        Again, thank you for allowing me to participate in the care of your patient.        Sincerely,        Inocente Saba MD

## 2021-01-21 NOTE — PROGRESS NOTES
Visit Date:   01/21/2021     ONCOLOGY HISTORY: Ms. Gastelum is a female with small cell lung cancer.    1.  CT chest angiogram on 08/21/2018 for hemoptysis revealed right lower lobe pulmonary mass invading the right lower lobe pulmonary artery and mild mediastinal lymphadenopathy.   -CT abdomen and pelvis on 08/22/2018 did not reveal any evidence of metastatic disease.   -Brain MRI on 08/30/2019 did not reveal any intracranial metastasis.   -CT-guided biopsy of right lung mass on 08/31/2018 revealed high-grade neuroendocrine carcinoma, favor small cell carcinoma.   -PET scan in 08/2019 did not reveal any evidence of metastatic disease.      2.  She had limited stage small cell lung cancer.  She received concurrent chemoradiation with 4 cycles of platinum and etoposide between 09/17/2018 and 12/10/2018. Patient received 5940 cGy in 33 fractions between 10/11/2018 and 11/28/2018.   -Prophylactic cranial radiation between 01/29/2019 and 02/11/2019.      3.  CT chest on 08/21/2019 revealed a new 0.6 cm pulmonary nodule in right lower lobe suspicious for metastatic lesion.  There are 2 additional indeterminate nodular opacities in the right lower lobe posteriorly which are stable.  There is a mildly enlarged subcarinal lymph node which is stable.   -PET scan on 10/28/2019 revealed 0.8 cm right lower lobe nodule which is hypermetabolic and suspicious for malignancy.  No other areas of abnormal uptake.   -SBRT to RLL nodule between 11/19/19 and 11/27/19. 4500 cGy.    4. On 01/20/2021:  -WBC of 3.6, hemoglobin of 9.8 and platelet of 60. MCV of 86.  -Normal iron, folate, vitamin B12, SPEP and LDH.     SUBJECTIVE:  Ms. Gastelum is a 70-year-old female with small cell lung cancer treated with chemoradiation and PCI.  She is in remission from it.      Recent labs had revealed anemia and thrombocytopenia.  We wanted to follow it.  I am concerned regarding developing MDS.      The patient says her overall condition is stable.  She  has some fatigue, which would go along with her age.  No headache.  No dizziness.  No neck pain.  No chest pain.  No shortness of breath at rest.  Occasional cough.  No abdominal pain, nausea or vomiting.  Appetite is fairly good.  No bleeding from any site.  No fever, chills or night sweats.  No recurrent infection.      All other review of systems negative.      PHYSICAL EXAMINATION:   GENERAL:  She is alert, oriented x 3.  She is not in any distress.   RESPIRATORY:  No cough.  No labored breathing.    The rest of a comprehensive physical examination is deferred due to public [facility] emergency video visit restrictions.      LABORATORY DATA:  Reviewed.      ASSESSMENT:    1.  Limited stage small cell lung cancer.  The patient is in remission.   2.  Pancytopenia.      PLAN:   1.  The patient is doing well from lung cancer.  No evidence of recurrence.  Recent CT scan in 2020 did not reveal any disease.   2.  Discussed regarding her pancytopenia.  I explained to her I am concerned that she might be developing myelodysplastic syndrome.  We checked multiple labs including iron, vitamin B12, folate and SPEP, which are essentially normal.  Discussed regarding MDS.  Only way to diagnose would be bone marrow biopsy.  Procedure was explained.  She is agreeable for it.  Bone marrow biopsy under sedation will be scheduled.  I will see her in about 2 weeks after bone marrow biopsy.  If MDS is confirmed, we will discuss regarding treatment.   3.  She had a few questions, which were all answered.         INOCENTE SABA MD             D: 2021   T: 2021   MT: YONATAN      Name:     BISHNU LEY   MRN:      [id number]        Account:      [id number]   :      1950           Visit Date:   2021      Document: [id number]      Video visit time of 20 minutes.

## 2021-01-21 NOTE — Clinical Note
1/21/2021         RE: Yvette Gastelum  [address]  [facility] [address]        Dear Colleague,    Thank you for referring your patient, Yvette Gastelum, to the [facility]. Please see a copy of my visit note below.    Yvette is a 70 year old who is being evaluated via a billable video visit.      How would you like to obtain your AVS? MyChart  If the video visit is dropped, the invitation should be resent by: Text to cell phone: [phone]  Will anyone else be joining your video visit? No  {If patient encounters technical issues they should call [phone]}    Video Start Time:   Video-Visit Details    Type of service:  Video Visit    Video End Time:   Originating Location (pt. Location): Home    Distant Location (provider location):  [facility]     Platform used for Video Visit: Dipti Chowdhury CMA        Visit Date:   01/21/2021     ONCOLOGY HISTORY: Ms. Gastelum is a female with small cell lung cancer.    1.  CT chest angiogram on 08/21/2018 for hemoptysis revealed right lower lobe pulmonary mass invading the right lower lobe pulmonary artery and mild mediastinal lymphadenopathy.   -CT abdomen and pelvis on 08/22/2018 did not reveal any evidence of metastatic disease.   -Brain MRI on 08/30/2019 did not reveal any intracranial metastasis.   -CT-guided biopsy of right lung mass on 08/31/2018 revealed high-grade neuroendocrine carcinoma, favor small cell carcinoma.   -PET scan in 08/2019 did not reveal any evidence of metastatic disease.      2.  She had limited stage small cell lung cancer.  She received concurrent chemoradiation with 4 cycles of platinum and etoposide between 09/17/2018 and 12/10/2018. Patient received 5940 cGy in 33 fractions between 10/11/2018 and 11/28/2018.   -Prophylactic cranial radiation between 01/29/2019 and 02/11/2019.      3.  CT chest on 08/21/2019 revealed a new 0.6 cm pulmonary nodule in  right lower lobe suspicious for metastatic lesion.  There are 2 additional indeterminate nodular opacities in the right lower lobe posteriorly which are stable.  There is a mildly enlarged subcarinal lymph node which is stable.   -PET scan on 10/28/2019 revealed 0.8 cm right lower lobe nodule which is hypermetabolic and suspicious for malignancy.  No other areas of abnormal uptake.   -SBRT to RLL nodule between 11/19/19 and 11/27/19. 4500 cGy.    4. On 01/20/2021:  -WBC of 3.6, hemoglobin of 9.8 and platelet of 60. MCV of 86.  -Normal iron, folate, vitamin B12, SPEP and LDH.     SUBJECTIVE:  Ms. Gastelum is a 70-year-old female with small cell lung cancer treated with chemoradiation and PCI.  She is in remission from it.      Recent labs had revealed anemia and thrombocytopenia.  We wanted to follow it.  I am concerned regarding developing MDS.      The patient says her overall condition is stable.  She has some fatigue, which would go along with her age.  No headache.  No dizziness.  No neck pain.  No chest pain.  No shortness of breath at rest.  Occasional cough.  No abdominal pain, nausea or vomiting.  Appetite is fairly good.  No bleeding from any site.  No fever, chills or night sweats.  No recurrent infection.      All other review of systems negative.      PHYSICAL EXAMINATION:   GENERAL:  She is alert, oriented x 3.  She is not in any distress.   RESPIRATORY:  No cough.  No labored breathing.    The rest of a comprehensive physical examination is deferred due to public health emergency video visit restrictions.      LABORATORY DATA:  Reviewed.      ASSESSMENT:    1.  Limited stage small cell lung cancer.  The patient is in remission.   2.  Pancytopenia.      PLAN:   1.  The patient is doing well from lung cancer.  No evidence of recurrence.  Recent CT scan in 12/2020 did not reveal any disease.   2.  Discussed regarding her pancytopenia.  I explained to her I am concerned that she might be developing  myelodysplastic syndrome.  We checked multiple labs including iron, vitamin B12, folate and SPEP, which are essentially normal.  Discussed regarding MDS.  Only way to diagnose would be bone marrow biopsy.  Procedure was explained.  She is agreeable for it.  Bone marrow biopsy under sedation will be scheduled.  I will see her in about 2 weeks after bone marrow biopsy.  If MDS is confirmed, we will discuss regarding treatment.   3.  She had a few questions, which were all answered.         INOCENTE SABA MD             D: 2021   T: 2021   MT: YONATAN      Name:     BISHNU LEY   MRN:      [id number]        Account:      [id number]   :      1950           Visit Date:   2021      Document: [id number]      Video visit time of 20 minutes.    Visit Date:   2021      SUBJECTIVE:  Ms. eLy is a 70-year-old female with small cell lung cancer, treated with chemoradiation and PCI.  She is in remission from it.      Recent labs had revealed anemia and thrombocytopenia.  We wanted to follow it.  I am concerned regarding developing MDS.      The patient says her overall condition is stable.  She has some fatigue, which would go along with her age.  No headache.  No dizziness.  No neck pain.  No chest pain.  No shortness of breath at rest.  Occasional cough.  No abdominal pain, nausea or vomiting.  Appetite fairly good.  No bleeding from any site.  No fever, chills, night sweats.  No recurrent infection.      All other review of systems negative.      PHYSICAL EXAMINATION:   GENERAL:  She is alert, oriented x3.  She is not in any distress.   RESPIRATORY:  No cough.  No labored breathing.      The rest of a comprehensive physical examination is deferred due to public health emergency video visit restrictions.      LABORATORY DATA:  Reviewed.      ASSESSMENT:  A 70-year-old female with:     1.  Limited stage small cell lung cancer.  The patient is in remission.   2.  Pancytopenia.      PLAN:   1.   The patient is doing well from lung cancer.  No evidence of recurrence.  Recent CT scan in 2020 did not reveal any disease.   2.  Discussed regarding her pancytopenia.  I explained to her I am concerned that she might be developing myelodysplastic syndrome.  We checked multiple labs including iron, vitamin B12, folate and SPEP, which are essentially normal.  Discussed regarding MDS.  Only way to diagnose would be bone marrow biopsy.  Procedure was explained.  She is agreeable for it.  Bone marrow biopsy under sedation will be scheduled.  I will see her in about 2 weeks after bone marrow biopsy.  If MDS is confirmed, we will discuss regarding treatment.   3.  She had a few questions, which were all answered.         INOCENTE SABA MD             D: 2021   T: 2021   MT: YONATAN      Name:     BISHNU LEY   MRN:      [id number]        Account:      [id number]   :      1950           Visit Date:   2021      Document: [id number]        Again, thank you for allowing me to participate in the care of your patient.        Sincerely,        Inocente Saba MD

## 2021-02-10 NOTE — ANESTHESIA POSTPROCEDURE EVALUATION
Patient: Yvette Gastelum    Procedure(s):  BONE MARROW BIOPSY    Diagnosis:Acquired pancytopenia (H) [D61.818]  Diagnosis Additional Information: No value filed.    Anesthesia Type:  MAC    Note:  Disposition: Outpatient   Postop Pain Control: Uneventful            Sign Out: Well controlled pain   PONV: No   Neuro/Psych: Uneventful            Sign Out: Acceptable/Baseline neuro status   Airway/Respiratory: Uneventful            Sign Out: Acceptable/Baseline resp. status   CV/Hemodynamics: Uneventful            Sign Out: Acceptable CV status   Other NRE: NONE   DID A NON-ROUTINE EVENT OCCUR? No         Last vitals:  Vitals:    02/10/21 0921 02/10/21 1055   BP: 107/72 (!) 88/49   Pulse: 93 95   Resp: 16 15   Temp: 36.3  C (97.3  F)    SpO2: 99% 96%       Last vitals prior to Anesthesia Care Transfer:  CRNA VITALS  2/10/2021 1020 - 2/10/2021 1104      2/10/2021             Resp Rate (set):  10          Electronically Signed By: Marli Gee MD  February 10, 2021  11:04 AM

## 2021-02-10 NOTE — ANESTHESIA PREPROCEDURE EVALUATION
Anesthesia Pre-Procedure Evaluation    Patient: Yvette Gastelum   MRN: [id number] : 1950        Preoperative Diagnosis: Acquired pancytopenia (H) [D61.818]   Procedure : Procedure(s):  BONE MARROW BIOPSY     Past Medical History:   Diagnosis Date     Cancer (H)     Lung cancer       Hypertension      Kidney stones      Obese      Recurrent UTI      S/P CL-BSO      Sepsis (H)     secondary to uti       Past Surgical History:   Procedure Laterality Date     COLONOSCOPY       COLONOSCOPY N/A 2/10/2016    Procedure: COMBINED COLONOSCOPY, SINGLE OR MULTIPLE BIOPSY/POLYPECTOMY BY BIOPSY;  Surgeon: Antonia Jiménez MD;  Location:  GI     EYE SURGERY      CATARACT EXTRACTION RIGHT & LEFT     GYN SURGERY      BILATERAL TUBAL LIGATION, CL, LAPAROSCOPIC LEFT SALPINGO-OOPHORECTOMY     HEAD & NECK SURGERY      WISDOM TEETH EXTRACTION     INSERT PORT VASCULAR ACCESS N/A 2018    Procedure: INSERT PORT VASCULAR ACCESS;  POWER PORT PLACEMENT;  Surgeon: Todd Norris MD;  Location:  SD     REMOVE PORT VASCULAR ACCESS N/A 2019    Procedure: REMOVAL, VASCULAR ACCESS PORT;  Surgeon: Todd Norris MD;  Location:  OR      Allergies   Allergen Reactions     No Known Allergies       Social History     Tobacco Use     Smoking status: Former Smoker     Packs/day: 1.00     Years: 50.00     Pack years: 50.00     Types: Cigarettes     Start date: 10/1965     Quit date: 2015     Years since quittin.4     Smokeless tobacco: Never Used   Substance Use Topics     Alcohol use: No     Alcohol/week: 0.0 standard drinks      Wt Readings from Last 1 Encounters:   21 95.3 kg (210 lb)        Anesthesia Evaluation   Pt has had prior anesthetic.     No history of anesthetic complications       ROS/MED HX  ENT/Pulmonary: Comment: H/O bronchitis    (+) tobacco use, Past use,  (-) sleep apnea   Neurologic: Comment: Brain metastases s/p radiation      Cardiovascular:     (+) Dyslipidemia  hypertension-----    METS/Exercise Tolerance:     Hematologic: Comments: pancytopenia    (+) anemia,     Musculoskeletal:       GI/Hepatic:    (-) GERD   Renal/Genitourinary:     (+) Nephrolithiasis ,     Endo:     (+) Obesity,     Psychiatric/Substance Use:       Infectious Disease:       Malignancy:   (+) Malignancy, History of Lung.    Other:            Physical Exam    Airway        Mallampati: II   TM distance: > 3 FB   Neck ROM: full   Mouth opening: > 3 cm    Respiratory Devices and Support         Dental       (+) upper dentures      Cardiovascular   cardiovascular exam normal          Pulmonary   pulmonary exam normal                OUTSIDE LABS:  CBC:   Lab Results   Component Value Date    WBC 3.6 (L) 01/20/2021    WBC 4.0 12/17/2020    HGB 9.8 (L) 01/20/2021    HGB 9.7 (L) 12/17/2020    HCT 32.5 (L) 01/20/2021    HCT 31.5 (L) 12/17/2020    PLT 60 (L) 01/20/2021    PLT 59 (L) 12/17/2020     BMP:   Lab Results   Component Value Date     12/17/2020     10/02/2019    POTASSIUM 4.3 12/17/2020    POTASSIUM 4.2 10/02/2019    CHLORIDE 112 (H) 12/17/2020    CHLORIDE 108 10/02/2019    CO2 22 12/17/2020    CO2 22 10/02/2019    BUN 23 12/17/2020    BUN 12 10/02/2019    CR 1.16 (H) 12/17/2020    CR 1.19 (H) 10/02/2019    GLC 92 12/17/2020    GLC 86 10/02/2019     COAGS:   Lab Results   Component Value Date    PTT 33 08/21/2018    INR 1.03 08/21/2018     POC: No results found for: BGM, HCG, HCGS  HEPATIC:   Lab Results   Component Value Date    ALBUMIN 3.8 12/17/2020    PROTTOTAL 7.4 12/17/2020    ALT 20 12/17/2020    AST 35 12/17/2020    ALKPHOS 64 12/17/2020    BILITOTAL 1.0 12/17/2020     OTHER:   Lab Results   Component Value Date    LACT 2.0 12/16/2018    RUSTY 9.3 12/17/2020    MAG 1.7 03/11/2019    TSH 1.65 07/15/2020    T4 0.95 07/15/2020    CRP 4.5 10/01/2020       Anesthesia Plan    ASA Status:  3   NPO Status:  NPO Appropriate    Anesthesia Type: MAC.     - Reason for MAC: Deep or markedly  invasive procedure (G8)              Consents    Anesthesia Plan(s) and associated risks, benefits, and realistic alternatives discussed. Questions answered and patient/representative(s) expressed understanding.     - Discussed with:  Patient         Postoperative Care    Pain management: Multi-modal analgesia.   PONV prophylaxis: Ondansetron (or other 5HT-3)     Comments:                Marli Gee MD

## 2021-02-10 NOTE — ANESTHESIA CARE TRANSFER NOTE
Patient: Yvette Gastelum    Procedure(s):  BONE MARROW BIOPSY    Diagnosis: Acquired pancytopenia (H) [D61.818]  Diagnosis Additional Information: No value filed.    Anesthesia Type:   MAC     Note:    Oropharynx: oropharynx clear of all foreign objects  Level of Consciousness: awake  Oxygen Supplementation: room air    Independent Airway: airway patency satisfactory and stable  Dentition: dentition unchanged      Patient transferred to: Phase II    Handoff Report: Identifed the Patient, Identified the Reponsible Provider, Reviewed the pertinent medical history, Discussed the surgical course, Reviewed Intra-OP anesthesia mangement and issues during anesthesia, Set expectations for post-procedure period and Allowed opportunity for questions and acknowledgement of understanding      Vitals: (Last set prior to Anesthesia Care Transfer)  CRNA VITALS  2/10/2021 1020 - 2/10/2021 1054      2/10/2021             Resp Rate (set):  10        Electronically Signed By: ЕКАТЕРИНА Kwong CRNA  February 10, 2021  10:54 AM

## 2021-02-10 NOTE — PROCEDURES
The patient was positively identified and informed consent was obtained (see the completed Affirmation of Consent for Bone Marrow Aspiration and/or Biopsy Procedure(s) form in the patient's chart). The patient was placed in the prone position and the bony landmarks of the pelvis were identified. Medical staff reconfirmed the patient's name, date of birth and procedure. The skin over the posterior iliac crest was scrubbed and draped in a sterile fashion. The local area of the procedure was anesthetized with a total of 8 mL of 1% Lidocaine and a small incision was made.  The patient did receive conscious sedation.    Trephine bone marrow core(s) was/were obtained from the left posterior iliac crest. Bone marrow aspirate was not obtained ( dry tap). Additional cores were obtained to be sent for immunophenotyping and Cytogenentic studies as needed    Direct pressure was applied to the biopsy site with sterile gauze. The biopsy site was cleaned with alcohol and a sterile dressing was placed over the biopsy incision using a pressure bandage. The patient was then placed in the supine position to maintain pressure on the biopsy site. Post-procedure wound care instructions, including routine dressing instructions and analgesia, were given to the patient. The procedure was completed without complication.

## 2021-02-24 NOTE — LETTER
2/24/2021         RE: Yvette Gastelum  [address]  [facility] [address]        Dear Colleague,    Thank you for referring your patient, Yvette Gastelum, to the [facility]. Please see a copy of my visit note below.    Yvette is a 70 year old who is being evaluated via a billable video visit.      How would you like to obtain your AVS? MyChart  If the video visit is dropped, the invitation should be resent by: Send to e-mail at: [email or web address]  Will anyone else be joining your video visit? No      Video Start Time   Video-Visit Details    Type of service:  Video Visit    Video End Time   Originating Location (pt. Location): Home    Distant Location (provider location):  [facility]     Platform used for Video Visit: [employer]     Medication Refill BENZONATATE    Amelia Chowdhury CMA        Visit Date:   02/24/2021     ONCOLOGY HISTORY: Ms. Gastelum is a female with small cell lung cancer.    1.  CT chest angiogram on 08/21/2018 for hemoptysis revealed right lower lobe pulmonary mass invading the right lower lobe pulmonary artery and mild mediastinal lymphadenopathy.   -CT abdomen and pelvis on 08/22/2018 did not reveal any evidence of metastatic disease.   -Brain MRI on 08/30/2019 did not reveal any intracranial metastasis.   -CT-guided biopsy of right lung mass on 08/31/2018 revealed high-grade neuroendocrine carcinoma, favor small cell carcinoma.   -PET scan in 08/2019 did not reveal any evidence of metastatic disease.      2.  She had limited stage small cell lung cancer.  She received concurrent chemoradiation with 4 cycles of platinum and etoposide between 09/17/2018 and 12/10/2018. Patient received 5940 cGy in 33 fractions between 10/11/2018 and 11/28/2018.   -Prophylactic cranial radiation between 01/29/2019 and 02/11/2019.      3.  CT chest on 08/21/2019 revealed a new 0.6 cm pulmonary nodule in right lower lobe suspicious for  metastatic lesion.  There are 2 additional indeterminate nodular opacities in the right lower lobe posteriorly which are stable.  There is a mildly enlarged subcarinal lymph node which is stable.   -PET scan on 10/28/2019 revealed 0.8 cm right lower lobe nodule which is hypermetabolic and suspicious for malignancy.  No other areas of abnormal uptake.   -SBRT to RLL nodule between 11/19/19 and 11/27/19. 4500 cGy.     4. On 01/20/2021:  -WBC of 3.6, hemoglobin of 9.8 and platelet of 60. MCV of 86.  -Normal iron, folate, vitamin B12, SPEP and LDH.    5. Bone marrow biopsy was done on 02/10/2021.  It was a dry tap.  Bone marrow revealed hypercellular marrow with 50% cellularity.  There was marked erythroid hyperplasia.  Mild decrease in granulocytic precursors.  Adequate megakaryocyte.  No MDS.  Mild increase in reticulin fibers.  No evidence of metastatic tumor.  Less than 5% blasts.      SUBJECTIVE:  Ms. Gastelum is a 70-year-old female with limited stage small cell lung cancer.  She is in remission from it.      The patient recently developed pancytopenia.  Further investigations were ordered.      1.  Multiple labs were done on 01/20/2021.   -Normal iron and saturation index.   -Normal vitamin B12.   -Normal folate.   -Normal LDH.   -SPEP is normal.   2.  Bone marrow biopsy was done on 02/10/2021.  It was a dry tap.  Bone marrow revealed hypercellular marrow with 50% cellularity.  There was marked erythroid hyperplasia.  Mild decrease in granulocytic precursors.  Adequate megakaryocyte.  No MDS.  Mild increase in reticulin fibers.  No evidence of metastatic tumor.  Less than 5% blasts.  Cytogenetics pending.      The patient has chronic cough.  This is due to radiation-induced lung fibrosis.  She has been seen by a pulmonologist.  At one time, she was on oral prednisone, which had helped.  The patient uses Tessalon, which helps.      She has fatigue.  No headache.  No dizziness.  No chest pain.  Because of cough,  sometime sleep is not good.  She gets more tired.  No nausea or vomiting.  No bleeding.  No recurrent infection.      All other review of systems negative.      PHYSICAL EXAMINATION:  She is alert, oriented x 3.  She is not in any distress.  She was having cough.  No labored breathing.    The rest of a comprehensive physical examination deferred due to [facility] emergency video visit restrictions.      ASSESSMENT:   1.  A 70-year-old female with pancytopenia. Cause of pancytopenia not clear.   2.  Small cell lung cancer in remission.   3.  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      PLAN:   1.  Labs and bone marrow biopsy were reviewed with the patient.  I explained to her there is no evidence of malignancy on the bone marrow biopsy.  Sample size was small.  It was a dry tap.      Cause of pancytopenia is not clear.  We will continue to monitor CBC.  If there is worsening pancytopenia, we will repeat a bone marrow biopsy.  She is agreeable for it.      I advised her to see a physician if she has fever, infection, bleeding, easy bruising or any other concerns.      2.  She has chronic cough.  She will continue on Tessalon.  She will follow up with pulmonologist as needed.      3.  For lung cancer, we will continue to monitor her with CT scan about every 6 months.      4.  I will see her in a month.  Advised her to call us with any questions or concerns.         INOCENTE SABA MD             D: 2021   T: 2021   MT: IRASEMA      Name:     BISHNU LEY   MRN:      [id number]        Account:      [id number]   :      1950           Visit Date:   2021      Document: [id number]      Video time of 25 minutes.  Video started at 9:35 AM and completed at 10 AM.    This office note has been dictated.          Again, thank you for allowing me to participate in the care of your patient.        Sincerely,        Inocente Saba MD

## 2021-02-24 NOTE — PROGRESS NOTES
Visit Date:   02/24/2021     ONCOLOGY HISTORY: Ms. Gastelum is a female with small cell lung cancer.    1.  CT chest angiogram on 08/21/2018 for hemoptysis revealed right lower lobe pulmonary mass invading the right lower lobe pulmonary artery and mild mediastinal lymphadenopathy.   -CT abdomen and pelvis on 08/22/2018 did not reveal any evidence of metastatic disease.   -Brain MRI on 08/30/2019 did not reveal any intracranial metastasis.   -CT-guided biopsy of right lung mass on 08/31/2018 revealed high-grade neuroendocrine carcinoma, favor small cell carcinoma.   -PET scan in 08/2019 did not reveal any evidence of metastatic disease.      2.  She had limited stage small cell lung cancer.  She received concurrent chemoradiation with 4 cycles of platinum and etoposide between 09/17/2018 and 12/10/2018. Patient received 5940 cGy in 33 fractions between 10/11/2018 and 11/28/2018.   -Prophylactic cranial radiation between 01/29/2019 and 02/11/2019.      3.  CT chest on 08/21/2019 revealed a new 0.6 cm pulmonary nodule in right lower lobe suspicious for metastatic lesion.  There are 2 additional indeterminate nodular opacities in the right lower lobe posteriorly which are stable.  There is a mildly enlarged subcarinal lymph node which is stable.   -PET scan on 10/28/2019 revealed 0.8 cm right lower lobe nodule which is hypermetabolic and suspicious for malignancy.  No other areas of abnormal uptake.   -SBRT to RLL nodule between 11/19/19 and 11/27/19. 4500 cGy.     4. On 01/20/2021:  -WBC of 3.6, hemoglobin of 9.8 and platelet of 60. MCV of 86.  -Normal iron, folate, vitamin B12, SPEP and LDH.    5. Bone marrow biopsy was done on 02/10/2021.  It was a dry tap.  Bone marrow revealed hypercellular marrow with 50% cellularity.  There was marked erythroid hyperplasia.  Mild decrease in granulocytic precursors.  Adequate megakaryocyte.  No MDS.  Mild increase in reticulin fibers.  No evidence of metastatic tumor.  Less than  5% blasts.      SUBJECTIVE:  Ms. Gastelum is a 70-year-old female with limited stage small cell lung cancer.  She is in remission from it.      The patient recently developed pancytopenia.  Further investigations were ordered.      1.  Multiple labs were done on 01/20/2021.   -Normal iron and saturation index.   -Normal vitamin B12.   -Normal folate.   -Normal LDH.   -SPEP is normal.   2.  Bone marrow biopsy was done on 02/10/2021.  It was a dry tap.  Bone marrow revealed hypercellular marrow with 50% cellularity.  There was marked erythroid hyperplasia.  Mild decrease in granulocytic precursors.  Adequate megakaryocyte.  No MDS.  Mild increase in reticulin fibers.  No evidence of metastatic tumor.  Less than 5% blasts.  Cytogenetics pending.      The patient has chronic cough.  This is due to radiation-induced lung fibrosis.  She has been seen by a pulmonologist.  At one time, she was on oral prednisone, which had helped.  The patient uses Tessalon, which helps.      She has fatigue.  No headache.  No dizziness.  No chest pain.  Because of cough, sometime sleep is not good.  She gets more tired.  No nausea or vomiting.  No bleeding.  No recurrent infection.      All other review of systems negative.      PHYSICAL EXAMINATION:  She is alert, oriented x 3.  She is not in any distress.  She was having cough.  No labored breathing.    The rest of a comprehensive physical examination deferred due to public health emergency video visit restrictions.      ASSESSMENT:   1.  A 70-year-old female with pancytopenia. Cause of pancytopenia not clear.   2.  Small cell lung cancer in remission.   3.  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      PLAN:   1.  Labs and bone marrow biopsy were reviewed with the patient.  I explained to her there is no evidence of malignancy on the bone marrow biopsy.  Sample size was small.  It was a dry tap.      Cause of pancytopenia is not clear.  We will continue to monitor CBC.  If  there is worsening pancytopenia, we will repeat a bone marrow biopsy.  She is agreeable for it.      I advised her to see a physician if she has fever, infection, bleeding, easy bruising or any other concerns.      2.  She has chronic cough.  She will continue on Tessalon.  She will follow up with pulmonologist as needed.      3.  For lung cancer, we will continue to monitor her with CT scan about every 6 months.      4.  I will see her in a month.  Advised her to call us with any questions or concerns.         INOCENTE SABA MD             D: 2021   T: 2021   MT: IRASEMA      Name:     BISHNU LEY   MRN:      [id number]        Account:      [id number]   :      1950           Visit Date:   2021      Document: [id number]      Video time of 25 minutes.  Video started at 9:35 AM and completed at 10 AM.

## 2021-02-24 NOTE — LETTER
2/24/2021         RE: Yvette Gastelum  [address]  [facility] [address]        Dear Colleague,    Thank you for referring your patient, Yvette Gastelum, to the [facility]. Please see a copy of my visit note below.    Yvette is a 70 year old who is being evaluated via a billable video visit.      How would you like to obtain your AVS? MyChart  If the video visit is dropped, the invitation should be resent by: Send to e-mail at: [email or web address]  Will anyone else be joining your video visit? No      Video Start Time   Video-Visit Details    Type of service:  Video Visit    Video End Time   Originating Location (pt. Location): Home    Distant Location (provider location):  [facility]     Platform used for Video Visit: [employer]     Medication Refill BENZONATATE    Amelia Chowdhury CMA        Visit Date:   02/24/2021     ONCOLOGY HISTORY: Ms. Gastelum is a female with small cell lung cancer.    1.  CT chest angiogram on 08/21/2018 for hemoptysis revealed right lower lobe pulmonary mass invading the right lower lobe pulmonary artery and mild mediastinal lymphadenopathy.   -CT abdomen and pelvis on 08/22/2018 did not reveal any evidence of metastatic disease.   -Brain MRI on 08/30/2019 did not reveal any intracranial metastasis.   -CT-guided biopsy of right lung mass on 08/31/2018 revealed high-grade neuroendocrine carcinoma, favor small cell carcinoma.   -PET scan in 08/2019 did not reveal any evidence of metastatic disease.      2.  She had limited stage small cell lung cancer.  She received concurrent chemoradiation with 4 cycles of platinum and etoposide between 09/17/2018 and 12/10/2018. Patient received 5940 cGy in 33 fractions between 10/11/2018 and 11/28/2018.   -Prophylactic cranial radiation between 01/29/2019 and 02/11/2019.      3.  CT chest on 08/21/2019 revealed a new 0.6 cm pulmonary nodule in right lower lobe suspicious for  metastatic lesion.  There are 2 additional indeterminate nodular opacities in the right lower lobe posteriorly which are stable.  There is a mildly enlarged subcarinal lymph node which is stable.   -PET scan on 10/28/2019 revealed 0.8 cm right lower lobe nodule which is hypermetabolic and suspicious for malignancy.  No other areas of abnormal uptake.   -SBRT to RLL nodule between 11/19/19 and 11/27/19. 4500 cGy.     4. On 01/20/2021:  -WBC of 3.6, hemoglobin of 9.8 and platelet of 60. MCV of 86.  -Normal iron, folate, vitamin B12, SPEP and LDH.    5. Bone marrow biopsy was done on 02/10/2021.  It was a dry tap.  Bone marrow revealed hypercellular marrow with 50% cellularity.  There was marked erythroid hyperplasia.  Mild decrease in granulocytic precursors.  Adequate megakaryocyte.  No MDS.  Mild increase in reticulin fibers.  No evidence of metastatic tumor.  Less than 5% blasts.      SUBJECTIVE:  Ms. Gastelum is a 70-year-old female with limited stage small cell lung cancer.  She is in remission from it.      The patient recently developed pancytopenia.  Further investigations were ordered.      1.  Multiple labs were done on 01/20/2021.   -Normal iron and saturation index.   -Normal vitamin B12.   -Normal folate.   -Normal LDH.   -SPEP is normal.   2.  Bone marrow biopsy was done on 02/10/2021.  It was a dry tap.  Bone marrow revealed hypercellular marrow with 50% cellularity.  There was marked erythroid hyperplasia.  Mild decrease in granulocytic precursors.  Adequate megakaryocyte.  No MDS.  Mild increase in reticulin fibers.  No evidence of metastatic tumor.  Less than 5% blasts.  Cytogenetics pending.      The patient has chronic cough.  This is due to radiation-induced lung fibrosis.  She has been seen by a pulmonologist.  At one time, she was on oral prednisone, which had helped.  The patient uses Tessalon, which helps.      She has fatigue.  No headache.  No dizziness.  No chest pain.  Because of cough,  sometime sleep is not good.  She gets more tired.  No nausea or vomiting.  No bleeding.  No recurrent infection.      All other review of systems negative.      PHYSICAL EXAMINATION:  She is alert, oriented x 3.  She is not in any distress.  She was having cough.  No labored breathing.    The rest of a comprehensive physical examination deferred due to [facility] emergency video visit restrictions.      ASSESSMENT:   1.  A 70-year-old female with pancytopenia. Cause of pancytopenia not clear.   2.  Small cell lung cancer in remission.   3.  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      PLAN:   1.  Labs and bone marrow biopsy were reviewed with the patient.  I explained to her there is no evidence of malignancy on the bone marrow biopsy.  Sample size was small.  It was a dry tap.      Cause of pancytopenia is not clear.  We will continue to monitor CBC.  If there is worsening pancytopenia, we will repeat a bone marrow biopsy.  She is agreeable for it.      I advised her to see a physician if she has fever, infection, bleeding, easy bruising or any other concerns.      2.  She has chronic cough.  She will continue on Tessalon.  She will follow up with pulmonologist as needed.      3.  For lung cancer, we will continue to monitor her with CT scan about every 6 months.      4.  I will see her in a month.  Advised her to call us with any questions or concerns.         INOCENTE SABA MD             D: 2021   T: 2021   MT: IRASEMA      Name:     BISHNU LEY   MRN:      [id number]        Account:      [id number]   :      1950           Visit Date:   2021      Document: [id number]      Video time of 25 minutes.  Video started at 9:35 AM and completed at 10 AM.    This office note has been dictated.          Again, thank you for allowing me to participate in the care of your patient.        Sincerely,        Inocente Saba MD

## 2021-02-24 NOTE — PROGRESS NOTES
Yvette is a 70 year old who is being evaluated via a billable video visit.      How would you like to obtain your AVS? MyChart  If the video visit is dropped, the invitation should be resent by: Send to e-mail at: [email or web address]  Will anyone else be joining your video visit? No      Video Start Time   Video-Visit Details    Type of service:  Video Visit    Video End Time   Originating Location (pt. Location): Home    Distant Location (provider location):  [facility]     Platform used for Video Visit: [employer]     Medication Refill BENZONATATE    Amelia Chowdhury CMA

## 2021-02-25 NOTE — TELEPHONE ENCOUNTER
Received fax from [employer] stating that benzonatate is not covered by pts plan. Alternate drug therapy is required. No option for PA. Please send another drug to [facility] [address] in [place]    Routed to Uziel Luz

## 2021-03-17 NOTE — PATIENT INSTRUCTIONS
(Z00.00) Routine general medical examination at a health care facility  (primary encounter diagnosis)  Comment: For routine exam, we will draw labs as ordered, cholesterol, diabetes mellitus check, liver function, renal function, and request mammogram -  [facility] (also performs diagnostic mammogram, ultrasound and biopsy) [phone].  We will also update vaccination history.  Plan: MA SCREENING DIGITAL BILAT - Future  (s+30)            (Z13.220) Screening for hyperlipidemia  Comment: Check fasting lipid panel   Plan:     (E78.5) Hyperlipidemia LDL goal <130  Comment: continue atorvastatin   Plan: Lipid panel reflex to direct LDL Fasting,         atorvastatin (LIPITOR) 10 MG tablet            (K21.00) Gastroesophageal reflux disease with esophagitis, unspecified whether hemorrhage  Comment: continue omeprazole   Plan: omeprazole (PRILOSEC) 20 MG DR capsule            (T66.XXXS) Adverse effect of radiation, sequela  Comment: omeprazole referred   Plan: omeprazole (PRILOSEC) 20 MG DR capsule            (M17.0) Primary osteoarthritis of both knees  Comment: recommend X-ray today and referral to pain clinic  Plan: XR Knee AP Standing Bilateral, PAIN MANAGEMENT         REFERRAL            (I10) Benign essential hypertension  Comment: blood pressure is excellent  Plan: Albumin Random Urine Quantitative with Creat         Ratio            (Z12.31) Encounter for screening mammogram for malignant neoplasm of breast   Comment:  [facility] (also performs diagnostic mammogram, ultrasound and biopsy) [phone].   Plan: MA SCREENING DIGITAL BILAT - Future  (s+30)

## 2021-03-18 NOTE — TELEPHONE ENCOUNTER
Pain Management Center Referral      1. Confirmed address with patient? Yes  2. Confirmed phone number with patient? Yes  3. Confirmed referring provider? Yes  4. Is the PCP the same as the referring provider? Yes  5. Has the patient been to any previous pain clinics? No  (If yes, send MARIANNE with welcome letter)  6. Which insurance are we to bill for this appointment?  [facility]    7. Informed pt of cancellation (48 hour) policy? Yes    REGARDING OPIOID MEDICATIONS: We will always address appropriateness of opioid pain medications, but we generally will not automatically take on a prescribing role. When we do take on prescribing of opioids for chronic pain, it is in collaboration with the referring physician for an intermediate period of time (months), with an expectation that the primary physician or provider will assume the prescribing role if medications are effective at stable doses with demonstrated compliance. Therefore, please do not assume that your prescribing responsibilities end on the day of pain clinic consultation.  8. Informed pt of prescribing policy? Yes    9.Please be aware that once you are established with a pain provider and location, you will need to continue have all future visits with that provider and location. It is best to determine what location is the most convenient for you and schedule with that one.    ** PATIENT INFORMED OF THIS POLICY Yes      9. Referring Provider: Arie House     10. Criteria for Triage Eval:   -Missed/Failed 1st appointment? N/A     -Medication Focused? N/A     -Mental Health Concerns? (e.g. Recent psych hospitalization/snap shot)? N/A     -Active substance abuse? N/A     -Patient behaviors (e.g. Offensive language/raised voice)? N/A    Carleen MARINO    [facility]

## 2021-03-19 NOTE — RESULT ENCOUNTER NOTE
Brady Crawford,    I have had the opportunity to review your recent results and an interpretation is as follows:  Your X-ray shows findings consistent with arthritis      Sincerely,  Arie House MD

## 2021-03-19 NOTE — RESULT ENCOUNTER NOTE
Brady Crawford,    I had the opportunity to review your recent labs and a summary of your labs reads as follows:    Your comprehensive metabolic panel showed stable renal function, and normal fasting blood glucose indicating no evidence of diabetes mellitus.  There was an isolated elevation of your AST level.  We should consider rechecking this again in the next visit  Your fasting lipid panel show  - normal HDL (good) cholesterol -as your goal is greater than 40  - low LDL (bad) cholesterol as your goal is less than 130  - stable triglyceride levels    We can follow-up again next week    Sincerely,  Arie House MD

## 2021-03-31 NOTE — PROGRESS NOTES
"  Delonte Crawford is a 70 year old who presents for the following health issues     HPI   Follow up      Primary osteoarthritis of both knees   Yvette Gastelum was not able to get into to pain clinic due to lack of availability.  She is reluctant to take an oral opiate pain medication.  She has been advised to avoid oral NSAIDs.      Review of Systems   Constitutional, HEENT, cardiovascular, pulmonary, GI, , musculoskeletal, neuro, skin, endocrine and psych systems are negative, except as otherwise noted.      Objective    /63 (BP Location: Right arm, Patient Position: Chair, Cuff Size: Adult Large)   Pulse 85   Temp 97  F (36.1  C) (Oral)   Ht 1.778 m (5' 10\")   Wt 95.3 kg (210 lb)   SpO2 99%   Breastfeeding No   BMI 30.13 kg/m    There is no height or weight on file to calculate BMI.  Physical Exam   GENERAL: healthy, alert and no distress  EYES: Eyes grossly normal to inspection   MS: full range of motion of knees, no swelling  SKIN: no suspicious lesions or rashes  NEURO: Normal strength and tone, mentation intact and speech normal  PSYCH: mentation appears normal, affect normal/bright    Patient Instructions   (M17.0) Primary osteoarthritis of both knees  (primary encounter diagnosis)  Comment: We will start a trial of topical NSAIDs - diclofenac gel 1% apply four times per day and see if an earlier appointment could be made in the pain clinic.  Referral to physical therapy also placed   Plan: diclofenac (VOLTAREN) 1 % topical gel                        "

## 2021-03-31 NOTE — PATIENT INSTRUCTIONS
(M17.0) Primary osteoarthritis of both knees  (primary encounter diagnosis)  Comment: We will start a trial of topical NSAIDs - diclofenac gel 1% apply four times per day and see if an earlier appointment could be made in the pain clinic.  Referral to physical therapy also placed   Plan: diclofenac (VOLTAREN) 1 % topical gel

## 2021-04-02 NOTE — PROGRESS NOTES
Medical Assistant Note:  Yvette Gastelum presents today for blood draw.    Patient seen by provider today: No.   present during visit today: Not Applicable.    Concerns: No Concerns.    Procedure:  Lab draw site: rac, Needle type: bf, Gauge: 23.    Post Assessment:  Labs drawn without difficulty: Yes.    Discharge Plan:  Departure Mode: Ambulatory.    Face to Face Time: 5 min  .    Kim Plata, CMA

## 2021-04-05 NOTE — LETTER
4/5/2021         RE: Yvette Gastelum  [address]  [facility] [address]        Dear Colleague,    Thank you for referring your patient, Yvette Gastelum, to the [facility]. Please see a copy of my visit note below.    Yvette is a 70 year old who is being evaluated via a billable video visit.      How would you like to obtain your AVS? [employer]      Send text to: [phone]    Will anyone else be joining your video visit? No      Video Start Time:   Video-Visit Details    Type of service:  Video Visit    Video End Time:    Originating Location (pt. Location): Home    Distant Location (provider location):  [facility]     Platform used for Video Visit: Richard    Visit Date:   04/05/2021     ONCOLOGY HISTORY: Ms. Gastelum is a female with small cell lung cancer.    1.  CT chest angiogram on 08/21/2018 for hemoptysis revealed right lower lobe pulmonary mass invading the right lower lobe pulmonary artery and mild mediastinal lymphadenopathy.   -CT abdomen and pelvis on 08/22/2018 did not reveal any evidence of metastatic disease.   -Brain MRI on 08/30/2019 did not reveal any intracranial metastasis.   -CT-guided biopsy of right lung mass on 08/31/2018 revealed high-grade neuroendocrine carcinoma, favor small cell carcinoma.   -PET scan in 08/2019 did not reveal any evidence of metastatic disease.      2.  She had limited stage small cell lung cancer.  She received concurrent chemoradiation with 4 cycles of platinum and etoposide between 09/17/2018 and 12/10/2018. Patient received 5940 cGy in 33 fractions between 10/11/2018 and 11/28/2018.   -Prophylactic cranial radiation between 01/29/2019 and 02/11/2019.      3.  CT chest on 08/21/2019 revealed a new 0.6 cm pulmonary nodule in right lower lobe suspicious for metastatic lesion.  There are 2 additional indeterminate nodular opacities in the right lower lobe posteriorly which are stable.  There is a  mildly enlarged subcarinal lymph node which is stable.   -PET scan on 10/28/2019 revealed 0.8 cm right lower lobe nodule which is hypermetabolic and suspicious for malignancy.  No other areas of abnormal uptake.   -SBRT to RLL nodule between 11/19/19 and 11/27/19. 4500 cGy.     4. On 01/20/2021:  -WBC of 3.6, hemoglobin of 9.8 and platelet of 60. MCV of 86.  -Normal iron, folate, vitamin B12, SPEP and LDH.     5. Bone marrow biopsy on 02/10/2021 was a dry tap.  Bone marrow revealed hypercellular marrow with 50% cellularity.  There was marked erythroid hyperplasia.  Mild decrease in granulocytic precursors.  Adequate megakaryocyte.  No MDS.  Mild increase in reticulin fibers.  No evidence of metastatic tumor.  Less than 5% blasts.      SUBJECTIVE:  Ms. Gastelum is a 70-year-old female with small cell lung cancer diagnosed in 2018.  She is in remission from it.      The patient has developed pancytopenia.  A bone marrow biopsy in 02/20/2021 was a dry tap.      The patient overall is doing good.  No headache.  No dizziness.  No chest pain.  No shortness of breath at rest.  She does have chronic cough.  This is secondary to radiation-induced lung fibrosis.  No abdominal pain, nausea or vomiting.  Appetite is good.  No urinary or bowel complaints.  No bleeding.  No fever, chills or night sweats.  No recurrent infection.        All other review of systems negative.      PHYSICAL EXAMINATION:   GENERAL:  She is alert, oriented x 3.  not in any distress.   RESPIRATORY:  No cough.  No labored breathing.   The rest of a comprehensive physical examination deferred due to public health emergency video visit restriction.      LABORATORY DATA:  CBC reviewed.      ASSESSMENT:   1.  A 70-year-old female with small cell lung cancer.  No evidence of disease.   2.  Pancytopenia.   3.  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      PLAN:   1.  The patient is stable from lung cancer.  No clinical suspicion of recurrence.  She  is scheduled for followup CT scan in mid-.     2.  Discussed regarding pancytopenia.  It is not improving.  I am worried about primary bone marrow pathology like MDS or myelofibrosis. Discussed regarding a repeat bone marrow biopsy.  She is agreeable for it.  We will schedule it under sedation.  I am hoping they can get a good sample.     3.  I will see her in a month to review the bone marrow biopsy result.  I advised her to call us with any questions or concerns.         INOCENTE SABA MD             D: 2021   T: 2021   MT: FOREIGN      Name:     BISHNU LEY   MRN:      [id number]        Account:      [id number]   :      1950           Visit Date:   2021      Document: [id number]      Video visit time of 6 minutes.  Video started at 2:15 PM and ended at 2:21 PM.  Another 10 minutes is spent to review of charts and investigations today.    This office note has been dictated.          Again, thank you for allowing me to participate in the care of your patient.        Sincerely,        Inocente Saba MD

## 2021-04-05 NOTE — H&P (VIEW-ONLY)
Visit Date:   04/05/2021     ONCOLOGY HISTORY: Ms. Gastelum is a female with small cell lung cancer.    1.  CT chest angiogram on 08/21/2018 for hemoptysis revealed right lower lobe pulmonary mass invading the right lower lobe pulmonary artery and mild mediastinal lymphadenopathy.   -CT abdomen and pelvis on 08/22/2018 did not reveal any evidence of metastatic disease.   -Brain MRI on 08/30/2019 did not reveal any intracranial metastasis.   -CT-guided biopsy of right lung mass on 08/31/2018 revealed high-grade neuroendocrine carcinoma, favor small cell carcinoma.   -PET scan in 08/2019 did not reveal any evidence of metastatic disease.      2.  She had limited stage small cell lung cancer.  She received concurrent chemoradiation with 4 cycles of platinum and etoposide between 09/17/2018 and 12/10/2018. Patient received 5940 cGy in 33 fractions between 10/11/2018 and 11/28/2018.   -Prophylactic cranial radiation between 01/29/2019 and 02/11/2019.      3.  CT chest on 08/21/2019 revealed a new 0.6 cm pulmonary nodule in right lower lobe suspicious for metastatic lesion.  There are 2 additional indeterminate nodular opacities in the right lower lobe posteriorly which are stable.  There is a mildly enlarged subcarinal lymph node which is stable.   -PET scan on 10/28/2019 revealed 0.8 cm right lower lobe nodule which is hypermetabolic and suspicious for malignancy.  No other areas of abnormal uptake.   -SBRT to RLL nodule between 11/19/19 and 11/27/19. 4500 cGy.     4. On 01/20/2021:  -WBC of 3.6, hemoglobin of 9.8 and platelet of 60. MCV of 86.  -Normal iron, folate, vitamin B12, SPEP and LDH.     5. Bone marrow biopsy on 02/10/2021 was a dry tap.  Bone marrow revealed hypercellular marrow with 50% cellularity.  There was marked erythroid hyperplasia.  Mild decrease in granulocytic precursors.  Adequate megakaryocyte.  No MDS.  Mild increase in reticulin fibers.  No evidence of metastatic tumor.  Less than 5% blasts.       SUBJECTIVE:  Ms. Gastelum is a 70-year-old female with small cell lung cancer diagnosed in 2018.  She is in remission from it.      The patient has developed pancytopenia.  A bone marrow biopsy in 2021 was a dry tap.      The patient overall is doing good.  No headache.  No dizziness.  No chest pain.  No shortness of breath at rest.  She does have chronic cough.  This is secondary to radiation-induced lung fibrosis.  No abdominal pain, nausea or vomiting.  Appetite is good.  No urinary or bowel complaints.  No bleeding.  No fever, chills or night sweats.  No recurrent infection.        All other review of systems negative.      PHYSICAL EXAMINATION:   GENERAL:  She is alert, oriented x 3.  not in any distress.   RESPIRATORY:  No cough.  No labored breathing.   The rest of a comprehensive physical examination deferred due to [facility] emergency video visit restriction.      LABORATORY DATA:  CBC reviewed.      ASSESSMENT:   1.  A 70-year-old female with small cell lung cancer.  No evidence of disease.   2.  Pancytopenia.   3.  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      PLAN:   1.  The patient is stable from lung cancer.  No clinical suspicion of recurrence.  She is scheduled for followup CT scan in mid-.     2.  Discussed regarding pancytopenia.  It is not improving.  I am worried about primary bone marrow pathology like MDS or myelofibrosis. Discussed regarding a repeat bone marrow biopsy.  She is agreeable for it.  We will schedule it under sedation.  I am hoping they can get a good sample.     3.  I will see her in a month to review the bone marrow biopsy result.  I advised her to call us with any questions or concerns.         INOCENTE SABA MD             D: 2021   T: 2021   MT: FOREIGN      Name:     BISHNU GASTELUM   MRN:      [id number]        Account:      [id number]   :      1950           Visit Date:   2021      Document: [id number]      Video visit  time of 6 minutes.  Video started at 2:15 PM and ended at 2:21 PM.  Another 10 minutes is spent to review of charts and investigations today.

## 2021-04-05 NOTE — PROGRESS NOTES
Yvette is a 70 year old who is being evaluated via a billable video visit.      How would you like to obtain your AVS? [employer]      Send text to: [phone]    Will anyone else be joining your video visit? No      Video Start Time:   Video-Visit Details    Type of service:  Video Visit    Video End Time:    Originating Location (pt. Location): Home    Distant Location (provider location):  [facility]     Platform used for Video Visit: Richard

## 2021-04-05 NOTE — LETTER
4/5/2021         RE: Yvette Gastelum  [address]  [place] [address]        Dear Colleague,    Thank you for referring your patient, Yvette Gastelum, to the [facility]. Please see a copy of my visit note below.    Yvette is a 70 year old who is being evaluated via a billable video visit.      How would you like to obtain your AVS? [employer]      Send text to: [phone]    Will anyone else be joining your video visit? No      Video Start Time:   Video-Visit Details    Type of service:  Video Visit    Video End Time:    Originating Location (pt. Location): Home    Distant Location (provider location):  [facility]     Platform used for Video Visit: Richard    Visit Date:   04/05/2021     ONCOLOGY HISTORY: Ms. Gastelum is a female with small cell lung cancer.    1.  CT chest angiogram on 08/21/2018 for hemoptysis revealed right lower lobe pulmonary mass invading the right lower lobe pulmonary artery and mild mediastinal lymphadenopathy.   -CT abdomen and pelvis on 08/22/2018 did not reveal any evidence of metastatic disease.   -Brain MRI on 08/30/2019 did not reveal any intracranial metastasis.   -CT-guided biopsy of right lung mass on 08/31/2018 revealed high-grade neuroendocrine carcinoma, favor small cell carcinoma.   -PET scan in 08/2019 did not reveal any evidence of metastatic disease.      2.  She had limited stage small cell lung cancer.  She received concurrent chemoradiation with 4 cycles of platinum and etoposide between 09/17/2018 and 12/10/2018. Patient received 5940 cGy in 33 fractions between 10/11/2018 and 11/28/2018.   -Prophylactic cranial radiation between 01/29/2019 and 02/11/2019.      3.  CT chest on 08/21/2019 revealed a new 0.6 cm pulmonary nodule in right lower lobe suspicious for metastatic lesion.  There are 2 additional indeterminate nodular opacities in the right lower lobe posteriorly which are stable.  There is a  mildly enlarged subcarinal lymph node which is stable.   -PET scan on 10/28/2019 revealed 0.8 cm right lower lobe nodule which is hypermetabolic and suspicious for malignancy.  No other areas of abnormal uptake.   -SBRT to RLL nodule between 11/19/19 and 11/27/19. 4500 cGy.     4. On 01/20/2021:  -WBC of 3.6, hemoglobin of 9.8 and platelet of 60. MCV of 86.  -Normal iron, folate, vitamin B12, SPEP and LDH.     5. Bone marrow biopsy on 02/10/2021 was a dry tap.  Bone marrow revealed hypercellular marrow with 50% cellularity.  There was marked erythroid hyperplasia.  Mild decrease in granulocytic precursors.  Adequate megakaryocyte.  No MDS.  Mild increase in reticulin fibers.  No evidence of metastatic tumor.  Less than 5% blasts.      SUBJECTIVE:  Ms. Gastelum is a 70-year-old female with small cell lung cancer diagnosed in 2018.  She is in remission from it.      The patient has developed pancytopenia.  A bone marrow biopsy in 02/20/2021 was a dry tap.      The patient overall is doing good.  No headache.  No dizziness.  No chest pain.  No shortness of breath at rest.  She does have chronic cough.  This is secondary to radiation-induced lung fibrosis.  No abdominal pain, nausea or vomiting.  Appetite is good.  No urinary or bowel complaints.  No bleeding.  No fever, chills or night sweats.  No recurrent infection.        All other review of systems negative.      PHYSICAL EXAMINATION:   GENERAL:  She is alert, oriented x 3.  not in any distress.   RESPIRATORY:  No cough.  No labored breathing.   The rest of a comprehensive physical examination deferred due to public health emergency video visit restriction.      LABORATORY DATA:  CBC reviewed.      ASSESSMENT:   1.  A 70-year-old female with small cell lung cancer.  No evidence of disease.   2.  Pancytopenia.   3.  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      PLAN:   1.  The patient is stable from lung cancer.  No clinical suspicion of recurrence.  She  is scheduled for followup CT scan in mid-.     2.  Discussed regarding pancytopenia.  It is not improving.  I am worried about primary bone marrow pathology like MDS or myelofibrosis. Discussed regarding a repeat bone marrow biopsy.  She is agreeable for it.  We will schedule it under sedation.  I am hoping they can get a good sample.     3.  I will see her in a month to review the bone marrow biopsy result.  I advised her to call us with any questions or concerns.         INOCENTE SABA MD             D: 2021   T: 2021   MT: FOREIGN      Name:     BISHNU LEY   MRN:      [id number]        Account:      [id number]   :      1950           Visit Date:   2021      Document: [id number]      Video visit time of 6 minutes.  Video started at 2:15 PM and ended at 2:21 PM.  Another 10 minutes is spent to review of charts and investigations today.    This office note has been dictated.          Again, thank you for allowing me to participate in the care of your patient.        Sincerely,        Inocente Saba MD

## 2021-04-05 NOTE — PROGRESS NOTES
Visit Date:   04/05/2021     ONCOLOGY HISTORY: Ms. Gastelum is a female with small cell lung cancer.    1.  CT chest angiogram on 08/21/2018 for hemoptysis revealed right lower lobe pulmonary mass invading the right lower lobe pulmonary artery and mild mediastinal lymphadenopathy.   -CT abdomen and pelvis on 08/22/2018 did not reveal any evidence of metastatic disease.   -Brain MRI on 08/30/2019 did not reveal any intracranial metastasis.   -CT-guided biopsy of right lung mass on 08/31/2018 revealed high-grade neuroendocrine carcinoma, favor small cell carcinoma.   -PET scan in 08/2019 did not reveal any evidence of metastatic disease.      2.  She had limited stage small cell lung cancer.  She received concurrent chemoradiation with 4 cycles of platinum and etoposide between 09/17/2018 and 12/10/2018. Patient received 5940 cGy in 33 fractions between 10/11/2018 and 11/28/2018.   -Prophylactic cranial radiation between 01/29/2019 and 02/11/2019.      3.  CT chest on 08/21/2019 revealed a new 0.6 cm pulmonary nodule in right lower lobe suspicious for metastatic lesion.  There are 2 additional indeterminate nodular opacities in the right lower lobe posteriorly which are stable.  There is a mildly enlarged subcarinal lymph node which is stable.   -PET scan on 10/28/2019 revealed 0.8 cm right lower lobe nodule which is hypermetabolic and suspicious for malignancy.  No other areas of abnormal uptake.   -SBRT to RLL nodule between 11/19/19 and 11/27/19. 4500 cGy.     4. On 01/20/2021:  -WBC of 3.6, hemoglobin of 9.8 and platelet of 60. MCV of 86.  -Normal iron, folate, vitamin B12, SPEP and LDH.     5. Bone marrow biopsy on 02/10/2021 was a dry tap.  Bone marrow revealed hypercellular marrow with 50% cellularity.  There was marked erythroid hyperplasia.  Mild decrease in granulocytic precursors.  Adequate megakaryocyte.  No MDS.  Mild increase in reticulin fibers.  No evidence of metastatic tumor.  Less than 5% blasts.       SUBJECTIVE:  Ms. Gastelum is a 70-year-old female with small cell lung cancer diagnosed in 2018.  She is in remission from it.      The patient has developed pancytopenia.  A bone marrow biopsy in 2021 was a dry tap.      The patient overall is doing good.  No headache.  No dizziness.  No chest pain.  No shortness of breath at rest.  She does have chronic cough.  This is secondary to radiation-induced lung fibrosis.  No abdominal pain, nausea or vomiting.  Appetite is good.  No urinary or bowel complaints.  No bleeding.  No fever, chills or night sweats.  No recurrent infection.        All other review of systems negative.      PHYSICAL EXAMINATION:   GENERAL:  She is alert, oriented x 3.  not in any distress.   RESPIRATORY:  No cough.  No labored breathing.   The rest of a comprehensive physical examination deferred due to [facility] emergency video visit restriction.      LABORATORY DATA:  CBC reviewed.      ASSESSMENT:   1.  A 70-year-old female with small cell lung cancer.  No evidence of disease.   2.  Pancytopenia.   3.  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      PLAN:   1.  The patient is stable from lung cancer.  No clinical suspicion of recurrence.  She is scheduled for followup CT scan in mid-.     2.  Discussed regarding pancytopenia.  It is not improving.  I am worried about primary bone marrow pathology like MDS or myelofibrosis. Discussed regarding a repeat bone marrow biopsy.  She is agreeable for it.  We will schedule it under sedation.  I am hoping they can get a good sample.     3.  I will see her in a month to review the bone marrow biopsy result.  I advised her to call us with any questions or concerns.         INOCENTE SABA MD             D: 2021   T: 2021   MT: FOREIGN      Name:     BISHNU GASTELUM   MRN:      [id number]        Account:      [id number]   :      1950           Visit Date:   2021      Document: [id number]      Video visit  time of 6 minutes.  Video started at 2:15 PM and ended at 2:21 PM.  Another 10 minutes is spent to review of charts and investigations today.

## 2021-04-19 NOTE — ANESTHESIA PREPROCEDURE EVALUATION
Anesthesia Pre-Procedure Evaluation    Patient: Yvette Gastelum   MRN: [id number] : 1950        Preoperative Diagnosis: Acquired pancytopenia (H) [D61.818]   Procedure : Procedure(s):  BIOPSY, BONE MARROW     Past Medical History:   Diagnosis Date     Cancer (H)     Lung cancer       Hypertension      Kidney stones      Obese      Recurrent UTI      S/P CL-BSO      Sepsis (H)     secondary to uti       Past Surgical History:   Procedure Laterality Date     BONE MARROW BIOPSY, BONE SPECIMEN, NEEDLE/TROCAR N/A 2/10/2021    Procedure: BONE MARROW BIOPSY;  Surgeon: Earlene Garcia MD;  Location:  GI     COLONOSCOPY       COLONOSCOPY N/A 2/10/2016    Procedure: COMBINED COLONOSCOPY, SINGLE OR MULTIPLE BIOPSY/POLYPECTOMY BY BIOPSY;  Surgeon: Antonia Jiménez MD;  Location:  GI     EYE SURGERY      CATARACT EXTRACTION RIGHT & LEFT     GYN SURGERY      BILATERAL TUBAL LIGATION, CL, LAPAROSCOPIC LEFT SALPINGO-OOPHORECTOMY     HEAD & NECK SURGERY      WISDOM TEETH EXTRACTION     INSERT PORT VASCULAR ACCESS N/A 2018    Procedure: INSERT PORT VASCULAR ACCESS;  POWER PORT PLACEMENT;  Surgeon: Todd Norris MD;  Location:  [facility]     REMOVE PORT VASCULAR ACCESS N/A 2019    Procedure: REMOVAL, VASCULAR ACCESS PORT;  Surgeon: Todd Norris MD;  Location:  OR      Allergies   Allergen Reactions     No Known Allergies       Social History     Tobacco Use     Smoking status: Former Smoker     Packs/day: 1.00     Years: 50.00     Pack years: 50.00     Types: Cigarettes     Start date: 10/1965     Quit date: 2015     Years since quittin.6     Smokeless tobacco: Never Used   Substance Use Topics     Alcohol use: No     Alcohol/week: 0.0 standard drinks      Wt Readings from Last 1 Encounters:   21 95.3 kg (210 lb)        Anesthesia Evaluation   Pt has had prior anesthetic. Type: General.    No history of anesthetic complications (awareness during last bone marrow bx)        ROS/MED HX  ENT/Pulmonary: Comment: bronchitis    (+) tobacco use, Past use,  (-) asthma and sleep apnea   Neurologic:     (+) no peripheral neuropathy  (-) no seizures and no CVA   Cardiovascular:     (+) hypertension----- (-) CAD and arrhythmias   METS/Exercise Tolerance: 4 - Raking leaves, gardening    Hematologic: Comments: pancytopenia      Musculoskeletal:       GI/Hepatic:    (-) GERD   Renal/Genitourinary:     (+) renal disease, type: CRI,     Endo:     (+) Obesity,  (-) Type II DM and thyroid disease   Psychiatric/Substance Use:       Infectious Disease:    (-) Recent Fever   Malignancy:   (+) Malignancy, History of Lung.    Other:            Physical Exam    Airway  airway exam normal      Mallampati: II   TM distance: > 3 FB   Neck ROM: full   Mouth opening: > 3 cm    Respiratory Devices and Support         Dental  no notable dental history     (+) upper dentures      Cardiovascular   cardiovascular exam normal          Pulmonary   pulmonary exam normal                OUTSIDE LABS:  CBC:   Lab Results   Component Value Date    WBC 3.9 (L) 04/02/2021    WBC 3.2 (L) 02/10/2021    HGB 9.9 (L) 04/02/2021    HGB 8.9 (L) 02/10/2021    HCT 33.5 (L) 04/02/2021    HCT 30.4 (L) 02/10/2021    PLT 57 (L) 04/02/2021    PLT 51 (L) 02/10/2021     BMP:   Lab Results   Component Value Date     03/17/2021     12/17/2020    POTASSIUM 4.4 03/17/2021    POTASSIUM 4.3 12/17/2020    CHLORIDE 108 03/17/2021    CHLORIDE 112 (H) 12/17/2020    CO2 23 03/17/2021    CO2 22 12/17/2020    BUN 24 03/17/2021    BUN 23 12/17/2020    CR 1.26 (H) 03/17/2021    CR 1.16 (H) 12/17/2020     (H) 03/17/2021    GLC 92 12/17/2020     COAGS:   Lab Results   Component Value Date    PTT 33 08/21/2018    INR 1.03 08/21/2018     POC: No results found for: BGM, HCG, HCGS  HEPATIC:   Lab Results   Component Value Date    ALBUMIN 4.3 03/17/2021    PROTTOTAL 8.3 03/17/2021    ALT 24 03/17/2021    AST 49 (H) 03/17/2021    ALKPHOS 83  03/17/2021    BILITOTAL 1.0 03/17/2021     OTHER:   Lab Results   Component Value Date    LACT 2.0 12/16/2018    RUSTY 9.6 03/17/2021    MAG 1.7 03/11/2019    TSH 1.65 07/15/2020    T4 0.95 07/15/2020    CRP 4.5 10/01/2020       Anesthesia Plan    ASA Status:  3   NPO Status:  NPO Appropriate    Anesthesia Type: MAC.              Consents    Anesthesia Plan(s) and associated risks, benefits, and realistic alternatives discussed. Questions answered and patient/representative(s) expressed understanding.     - Discussed with:  Patient      - Specific Concerns: Specific risks discussed (but not limited to): Possibility of intraoperative awareness..        Postoperative Care    Pain management: IV analgesics, Oral pain medications.   PONV prophylaxis: Ondansetron (or other 5HT-3), Dexamethasone or Solumedrol     Comments:                Lucien White MD

## 2021-04-19 NOTE — ANESTHESIA CARE TRANSFER NOTE
Patient: Yvette Gastelum    Procedure(s):  BIOPSY, BONE MARROW    Diagnosis: Acquired pancytopenia (H) [D61.818]  Diagnosis Additional Information: No value filed.    Anesthesia Type:   MAC     Note:    Oropharynx: oropharynx clear of all foreign objects  Level of Consciousness: awake  Oxygen Supplementation: face mask  Level of Supplemental Oxygen (L/min / FiO2): 6  Independent Airway: airway patency satisfactory and stable  Dentition: dentition unchanged  Vital Signs Stable: post-procedure vital signs reviewed and stable  Report to RN Given: handoff report given  Patient transferred to: PACU    Handoff Report: Identifed the Patient, Identified the Reponsible Provider, Reviewed the pertinent medical history, Discussed the surgical course, Reviewed Intra-OP anesthesia mangement and issues during anesthesia, Set expectations for post-procedure period and Allowed opportunity for questions and acknowledgement of understanding      Vitals: (Last set prior to Anesthesia Care Transfer)  CRNA VITALS  4/19/2021 1210 - 4/19/2021 1246      4/19/2021             Ht Rate:  98    Resp Rate (set):  10        Electronically Signed By: ЕКАТЕРИНА Gordon CRNA  April 19, 2021  12:46 PM

## 2021-04-19 NOTE — ANESTHESIA POSTPROCEDURE EVALUATION
Patient: Yvette Gastelum    Procedure(s):  BIOPSY, BONE MARROW    Diagnosis:Acquired pancytopenia (H) [D61.818]  Diagnosis Additional Information: No value filed.    Anesthesia Type:  MAC    Note:     Postop Pain Control: Uneventful            Sign Out: Well controlled pain   PONV: No   Neuro/Psych: Uneventful            Sign Out: Acceptable/Baseline neuro status   Airway/Respiratory: Uneventful            Sign Out: Acceptable/Baseline resp. status   CV/Hemodynamics: Uneventful            Sign Out: Acceptable CV status   Other NRE: NONE   DID A NON-ROUTINE EVENT OCCUR? No         Last vitals:  Vitals:    04/19/21 1301 04/19/21 1310 04/19/21 1320   BP:  97/62 112/71   Pulse: 83 81 78   Resp: 15 17 14   Temp:      SpO2: 100% 98% 99%       Last vitals prior to Anesthesia Care Transfer:  CRNA VITALS  4/19/2021 1210 - 4/19/2021 1310      4/19/2021             Ht Rate:  98    Resp Rate (set):  10          Electronically Signed By: Lucien White MD  April 19, 2021  4:39 PM

## 2021-04-19 NOTE — INTERVAL H&P NOTE
I have reviewed the virtual H&P that is linked to this encounter. The physical exam performed by anesthesia during this surgical encounter serves as the physical portion of that the virtual H&P. There are no significant changes from that history and physical as noted.   less than 40 yrs

## 2021-04-19 NOTE — OP NOTE
Bone Marrow Procedure Note    Date: 4/19/2021  Diagnosis or Indication: Pancytopenia  Location: Endoscopy suite [facility]    Premedication per physician order.    Patients identification was positively verified by: identification band and verbal assent (name and date of birth). After informed consent was obtained (see the completed Affirmation of Consent for Bone Marrow Aspiration and/or Biopsy procedures form in the patient's chart), the patient was placed in the prone position and the bony landmarks of the pelvis were identified.     Medical staff reconfirmed the patient's name, date of birth, procedure, and procedure site marking(s).  Medication was administered.(See Anesthesia documentation). The skin surface(s) over the posterior iliac crest(s) was scrubbed with Betadine and draped in a sterile fashion with sterile towels to create a sterile field.  The skin and periosteal surface(s) of the Right posterior iliac crest (RPIC) were anesthetized with a total of 3.0 mL of 1% Lidocaine and a small incision(s) was made through the skin over the corresponding site(s) with a scalpel.     Trephine bone marrow core(s) was obtained from the RPIC (# 1). Bone marrow aspirates were obtained from the RPIC (# 2) for morphology, immunophenotyping and cytogenetics, and molecular (Porcess and Hold).    Direct pressure was applied to the biopsy site(s) with sterile gauze. The biopsy site(s) was cleaned with alcohol and sterile dressing(s) was placed over the biopsy incision(s) using a pressure bandage. The patient was then placed in the supine position to maintain pressure on the biopsy site.  The patient's bed/examination table was lowered (if possible) and the railings were raised (if present).  Post-procedure wound care instructions, including routin dressing instructions and analgesia, were given to the Patient.     The patient tolerated the procedure well with no discomfort. Complications: None.    Felix Elizabeth MD

## 2021-04-19 NOTE — OR NURSING
Bone Marrow Biopsy site clean, dry and intact at discharge. Patient understands discharge instructions.

## 2021-04-21 PROBLEM — M17.0 PRIMARY OSTEOARTHRITIS OF BOTH KNEES: Status: ACTIVE | Noted: 2021-01-01

## 2021-04-21 NOTE — PROGRESS NOTES
Physical Therapy Initial Evaluation  Subjective:  Patient is referred with a long hx of gradually increasing B knee pain related to OA, L>R. She notes increased pain with WB primarily. Recent x-rays indicate medial joint space narrowing. She reports intermittent giving way and occasional swelling. Going up and down stairs is painful and she has go one at a time. When the pool at her apartment was open, she used it and thought that her knees felt better afterward.      Patient Health History  Yvette Gastelum being seen for Knees- both.       Problem occurred: ?   Pain is reported as 10/10 on pain scale.  General health as reported by patient is good.  Pertinent medical history includes: anemia, changes in bowel/bladder, cancer, kidney disease, overweight, osteoarthritis and pain at night/rest.     Medical allergies: none.   Surgeries include:  Other. Other surgery history details: Hysterectomy.    Current medications:  None.    Current occupation is Retired.   Primary job tasks include:  Computer work, driving and prolonged sitting.                  Therapist Generated HPI Evaluation         Type of problem:  Bilateral knees.    This is a chronic condition.  Condition occurred with:  Degenerative joint disease.  Where condition occurred: for unknown reasons.  Patient reports pain:  In the joint.  Pain is described as aching and is constant.  Pain is the same all the time.  Since onset symptoms are gradually worsening.  Associated symptoms:  Buckling/giving out, loss of motion/stiffness, loss of strength and edema. Symptoms are exacerbated by descending stairs, ascending stairs, bending/squatting, transfers, standing and walking  and relieved by rest.  Special tests included:  X-ray.    Barriers include:  Lives alone.    Patient Health History  Yvette Gastelum being seen for Knees- both.       Problem occurred: ?   Pain is reported as 10/10 on pain scale.  General health as reported by patient is good.  Pertinent  medical history includes: anemia, changes in bowel/bladder, cancer, kidney disease, overweight, osteoarthritis and pain at night/rest.     Medical allergies: none.   Surgeries include:  Other. Other surgery history details: Hysterectomy.    Current medications:  None.    Current occupation is Retired.   Primary job tasks include:  Computer work, driving and prolonged sitting.                                    Objective:  Standing Alignment:              Knee:  Genu varus L and genu varus R      Gait:    Gait Type:  Antalgic   Assistive Devices:  None  Deviations:  Hip:  Trendelenberg L and Trendelenberg RKnee:  Knee flexion decr L                                                      Knee Evaluation:  ROM:  Strength wnl knee: left hip abduction 2+/5, R hip abduction 3/5.    PROM    Hyperextension: Left: 2   Right:   Extension: Left:   Right:  2  Flexion: Left: 122    Right:  130      Strength:     Extension:  Left: 4/5   Pain:      Right: 5/5   Pain:  Flexion:  Left: 4+/5   Pain:      Right: 4+/5   Pain:    Quad Set Left: WNL    Pain:   Quad Set Right: WNL    Pain:  Ligament Testing:  Not Assessed                Special Tests: Not Assessed      Palpation:  Normal      Edema:  Normal      Functional Testing:  not assessed                  General     ROS    Assessment/Plan:    Patient is a 70 year old female with both sides knee complaints.    Patient has the following significant findings with corresponding treatment plan.                Diagnosis 1:  Bilateral knee OA  Pain -  hot/cold therapy, self management, education and home program  Decreased joint mobility - therapeutic exercise and home program  Decreased strength - therapeutic exercise, therapeutic activities and home program  Impaired balance - neuro re-education, therapeutic activities and home program  Impaired gait - gait training and home program  Impaired muscle performance - neuro re-education and home program  Decreased function - therapeutic  activities and home program    Therapy Evaluation Codes:   1) History comprised of:   Personal factors that impact the plan of care:      Time since onset of symptoms.    Comorbidity factors that impact the plan of care are:      Cancer, Osteoarthritis, Overweight, Pain at night/rest and Weakness.     Medications impacting care: None.  2) Examination of Body Systems comprised of:   Body structures and functions that impact the plan of care:      Knee.   Activity limitations that impact the plan of care are:      Squatting/kneeling, Stairs, Standing and Walking.  3) Clinical presentation characteristics are:   Evolving/Changing.  4) Decision-Making    Moderate complexity using standardized patient assessment instrument and/or measureable assessment of functional outcome.  Cumulative Therapy Evaluation is: Moderate complexity.    Previous and current functional limitations:  (See Goal Flow Sheet for this information)    Short term and Long term goals: (See Goal Flow Sheet for this information)     Communication ability:  Patient appears to be able to clearly communicate and understand verbal and written communication and follow directions correctly.  Treatment Explanation - The following has been discussed with the patient:   RX ordered/plan of care  Anticipated outcomes  Possible risks and side effects  This patient would benefit from PT intervention to resume normal activities.   Rehab potential is fair.    Frequency:  1 X week, once daily  Duration:  for 4 weeks  Discharge Plan:  Achieve all LTG.  Independent in home treatment program.  Reach maximal therapeutic benefit.    Please refer to the daily flowsheet for treatment today, total treatment time and time spent performing 1:1 timed codes.

## 2021-05-05 NOTE — PROGRESS NOTES
ONCOLOGY HISTORY: Ms. Gastelum is a female with small cell lung cancer.    1.  CT chest angiogram on 08/21/2018 for hemoptysis revealed right lower lobe pulmonary mass invading the right lower lobe pulmonary artery and mild mediastinal lymphadenopathy.   -CT abdomen and pelvis on 08/22/2018 did not reveal any evidence of metastatic disease.   -Brain MRI on 08/30/2019 did not reveal any intracranial metastasis.   -CT-guided biopsy of right lung mass on 08/31/2018 revealed high-grade neuroendocrine carcinoma, favor small cell carcinoma.   -PET scan in 08/2019 did not reveal any evidence of metastatic disease.      2.  She had limited stage small cell lung cancer.  She received concurrent chemoradiation with 4 cycles of platinum and etoposide between 09/17/2018 and 12/10/2018. Patient received 5940 cGy in 33 fractions between 10/11/2018 and 11/28/2018.   -Prophylactic cranial radiation between 01/29/2019 and 02/11/2019.      3.  CT chest on 08/21/2019 revealed a new 0.6 cm pulmonary nodule in right lower lobe suspicious for metastatic lesion.  There are 2 additional indeterminate nodular opacities in the right lower lobe posteriorly which are stable.  There is a mildly enlarged subcarinal lymph node which is stable.   -PET scan on 10/28/2019 revealed 0.8 cm right lower lobe nodule which is hypermetabolic and suspicious for malignancy.  No other areas of abnormal uptake.   -SBRT to RLL nodule between 11/19/19 and 11/27/19. 4500 cGy.     4. On 01/20/2021:  -WBC of 3.6, hemoglobin of 9.8 and platelet of 60. MCV of 86.  -Normal iron, folate, vitamin B12, SPEP and LDH.     5. Bone marrow biopsy was done on 02/10/2021.  It was a dry tap.  Bone marrow revealed hypercellular marrow with 50% cellularity.  There was marked erythroid hyperplasia.  Mild decrease in granulocytic precursors.  Adequate megakaryocyte.  No MDS.  Mild increase in reticulin fibers.  No evidence of metastatic tumor.  Less than 5% blasts.     SUBJECTIVE:   Ms. Gastelum is a 70-year-old female with small cell lung cancer.  She is in remission from it.     The patient has pancytopenia.  There is concern for chemotherapy-induced myelodysplastic syndrome.  Bone marrow was recommended.   Bone marrow biopsy was done on 04/19/2021.  It reveals hypercellular marrow with 50% cellularity. 2% blasts.  There is marked erythroid hyperplasia with probable mild dyserythropoiesis.  There is adequate granulocytic precursor and megakaryocytes without dysplasia.  There is increase in reticulin fibers, 28% sideroblasts.  Based on this, the patient will have sideroblastic anemia. SF3B1 is pending. The patient's cytogenetics are abnormal. 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  There are other abnormalities.  This raises the possibility of developing myelodysplastic syndrome.     Overall, her condition is stable.  No chest pain.  No shortness of breath.  Occasional cough.  No nausea or vomiting.  No fever or chills.     PHYSICAL EXAMINATION:  She is alert and oriented x 3.  Not in any distress.   Rest of systems not examined.     ASSESSMENT:    1.  A 70-year-old female with small cell lung cancer.  No evidence of disease.  2.  Pancytopenia.  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  3.  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     PLAN:    1.  Bone marrow biopsy was reviewed with the patient.  I explained to her that there a few abnormalities.  These are suspicious for developing myelodysplastic syndrome.  Also, her chromosome analysis is abnormal.  She likely has developing myelodysplastic syndrome.  I explained to her that this could be due to her chemotherapy.  At this time, we are not seeing enough changes in bone marrow to make a final diagnosis of MDS.  2.  At this time, we will monitor her.  I will see her in about 4-5 weeks with CBC.  When there is further worsening of cytopenia, we will again repeat a  bone marrow biopsy.  3.  She had few questions, which were all answered.  I advised her to go to the Emergency Room if she has fever, infection, bleeding from any site, easy bruising, or any other concerns.     Jennifer Luz MD           D: 2021   T: 2021   MT: ZEB     Name:     BISHNU LEY  MRN:      [id number]        Account:    [id number]   :      1950           Visit Date: 2021      Document: [id number]    Video time of 5 minutes.  Video started at 2:32 PM and completed at 2:37 PM.  Another 10 minutes is spent in review of chart and investigations today.

## 2021-05-05 NOTE — LETTER
5/5/2021         RE: Yvette Gastelum  [address]  [facility] [address]        Dear Colleague,    Thank you for referring your patient, Yvette Gastelum, to the [facility]. Please see a copy of my visit note below.    Yvette is a 70 year old who is being evaluated via a billable video visit.      How would you like to obtain your AVS? MyChart  If the video visit is dropped, the invitation should be resent by: Send to e-mail at: [email or web address]  Will anyone else be joining your video visit? No      Video Start Time:   Video-Visit Details    Type of service:  Video Visit    Video End Time:    Originating Location (pt. Location): Home    Distant Location (provider location):  [facility]     Platform used for Video Visit: [employer]peasill on Narvaron to [employer] mail order    ONCOLOGY HISTORY: Ms. Gastelum is a female with small cell lung cancer.    1.  CT chest angiogram on 08/21/2018 for hemoptysis revealed right lower lobe pulmonary mass invading the right lower lobe pulmonary artery and mild mediastinal lymphadenopathy.   -CT abdomen and pelvis on 08/22/2018 did not reveal any evidence of metastatic disease.   -Brain MRI on 08/30/2019 did not reveal any intracranial metastasis.   -CT-guided biopsy of right lung mass on 08/31/2018 revealed high-grade neuroendocrine carcinoma, favor small cell carcinoma.   -PET scan in 08/2019 did not reveal any evidence of metastatic disease.      2.  She had limited stage small cell lung cancer.  She received concurrent chemoradiation with 4 cycles of platinum and etoposide between 09/17/2018 and 12/10/2018. Patient received 5940 cGy in 33 fractions between 10/11/2018 and 11/28/2018.   -Prophylactic cranial radiation between 01/29/2019 and 02/11/2019.      3.  CT chest on 08/21/2019 revealed a new 0.6 cm pulmonary nodule in right lower lobe suspicious for metastatic lesion.  There are 2 additional indeterminate  nodular opacities in the right lower lobe posteriorly which are stable.  There is a mildly enlarged subcarinal lymph node which is stable.   -PET scan on 10/28/2019 revealed 0.8 cm right lower lobe nodule which is hypermetabolic and suspicious for malignancy.  No other areas of abnormal uptake.   -SBRT to RLL nodule between 11/19/19 and 11/27/19. 4500 cGy.     4. On 01/20/2021:  -WBC of 3.6, hemoglobin of 9.8 and platelet of 60. MCV of 86.  -Normal iron, folate, vitamin B12, SPEP and LDH.     5. Bone marrow biopsy was done on 02/10/2021.  It was a dry tap.  Bone marrow revealed hypercellular marrow with 50% cellularity.  There was marked erythroid hyperplasia.  Mild decrease in granulocytic precursors.  Adequate megakaryocyte.  No MDS.  Mild increase in reticulin fibers.  No evidence of metastatic tumor.  Less than 5% blasts.     SUBJECTIVE:  Ms. Gastelum is a 70-year-old female with small cell lung cancer.  She is in remission from it.     The patient has pancytopenia.  There is concern for chemotherapy-induced myelodysplastic syndrome.  Bone marrow was recommended.   Bone marrow biopsy was done on 04/19/2021.  It reveals hypercellular marrow with 50% cellularity. 2% blasts.  There is marked erythroid hyperplasia with probable mild dyserythropoiesis.  There is adequate granulocytic precursor and megakaryocytes without dysplasia.  There is increase in reticulin fibers, 28% sideroblasts.  Based on this, the patient will have sideroblastic anemia. SF3B1 is pending. The patient's cytogenetics are abnormal. 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  There are other abnormalities.  This raises the possibility of developing myelodysplastic syndrome.     Overall, her condition is stable.  No chest pain.  No shortness of breath.  Occasional cough.  No nausea or vomiting.  No fever or chills.     PHYSICAL EXAMINATION:  She is alert and oriented x 3.  Not in any distress.   Rest of systems not  examined.     ASSESSMENT:    1.  A 70-year-old female with small cell lung cancer.  No evidence of disease.  2.  Pancytopenia.  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  3.  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     PLAN:    1.  Bone marrow biopsy was reviewed with the patient.  I explained to her that there a few abnormalities.  These are suspicious for developing myelodysplastic syndrome.  Also, her chromosome analysis is abnormal.  She likely has developing myelodysplastic syndrome.  I explained to her that this could be due to her chemotherapy.  At this time, we are not seeing enough changes in bone marrow to make a final diagnosis of MDS.  2.  At this time, we will monitor her.  I will see her in about 4-5 weeks with CBC.  When there is further worsening of cytopenia, we will again repeat a bone marrow biopsy.  3.  She had few questions, which were all answered.  I advised her to go to the Emergency Room if she has fever, infection, bleeding from any site, easy bruising, or any other concerns.     Jennifer Luz MD           D: 2021   T: 2021   MT: [facility]     Name:     BISHNU LEY  MRN:      [id number]        Account:    [id number]   :      1950           Visit Date: 2021      Document: [id number]    Video time of 5 minutes.  Video started at 2:32 PM and completed at 2:37 PM.  Another 10 minutes is spent in review of chart and investigations today.    This office note has been dictated.          Again, thank you for allowing me to participate in the care of your patient.        Sincerely,        Jennifer Luz MD

## 2021-05-05 NOTE — PROGRESS NOTES
Yvette is a 70 year old who is being evaluated via a billable video visit.      How would you like to obtain your AVS? MyChart  If the video visit is dropped, the invitation should be resent by: Send to e-mail at: [email or web address]  Will anyone else be joining your video visit? No      Video Start Time:   Video-Visit Details    Type of service:  Video Visit    Video End Time:    Originating Location (pt. Location): Home    Distant Location (provider location):  [facility]     Platform used for Video Visit: [employer]     Refill on tessalon to [employer] mail order

## 2021-05-05 NOTE — LETTER
5/5/2021         RE: Yvette Gastelum  [address]  [facility] [address]        Dear Colleague,    Thank you for referring your patient, Yvette Gastelum, to the [facility]. Please see a copy of my visit note below.    Yvette is a 70 year old who is being evaluated via a billable video visit.      How would you like to obtain your AVS? MyChart  If the video visit is dropped, the invitation should be resent by: Send to e-mail at: [email or web address]  Will anyone else be joining your video visit? No      Video Start Time:   Video-Visit Details    Type of service:  Video Visit    Video End Time:    Originating Location (pt. Location): Home    Distant Location (provider location):  [facility]     Platform used for Video Visit: [employer] on IVFXPERTon to [employer] mail order    ONCOLOGY HISTORY: Ms. Gastelum is a female with small cell lung cancer.    1.  CT chest angiogram on 08/21/2018 for hemoptysis revealed right lower lobe pulmonary mass invading the right lower lobe pulmonary artery and mild mediastinal lymphadenopathy.   -CT abdomen and pelvis on 08/22/2018 did not reveal any evidence of metastatic disease.   -Brain MRI on 08/30/2019 did not reveal any intracranial metastasis.   -CT-guided biopsy of right lung mass on 08/31/2018 revealed high-grade neuroendocrine carcinoma, favor small cell carcinoma.   -PET scan in 08/2019 did not reveal any evidence of metastatic disease.      2.  She had limited stage small cell lung cancer.  She received concurrent chemoradiation with 4 cycles of platinum and etoposide between 09/17/2018 and 12/10/2018. Patient received 5940 cGy in 33 fractions between 10/11/2018 and 11/28/2018.   -Prophylactic cranial radiation between 01/29/2019 and 02/11/2019.      3.  CT chest on 08/21/2019 revealed a new 0.6 cm pulmonary nodule in right lower lobe suspicious for metastatic lesion.  There are 2 additional indeterminate  nodular opacities in the right lower lobe posteriorly which are stable.  There is a mildly enlarged subcarinal lymph node which is stable.   -PET scan on 10/28/2019 revealed 0.8 cm right lower lobe nodule which is hypermetabolic and suspicious for malignancy.  No other areas of abnormal uptake.   -SBRT to RLL nodule between 11/19/19 and 11/27/19. 4500 cGy.     4. On 01/20/2021:  -WBC of 3.6, hemoglobin of 9.8 and platelet of 60. MCV of 86.  -Normal iron, folate, vitamin B12, SPEP and LDH.     5. Bone marrow biopsy was done on 02/10/2021.  It was a dry tap.  Bone marrow revealed hypercellular marrow with 50% cellularity.  There was marked erythroid hyperplasia.  Mild decrease in granulocytic precursors.  Adequate megakaryocyte.  No MDS.  Mild increase in reticulin fibers.  No evidence of metastatic tumor.  Less than 5% blasts.     SUBJECTIVE:  Ms. Gastelum is a 70-year-old female with small cell lung cancer.  She is in remission from it.     The patient has pancytopenia.  There is concern for chemotherapy-induced myelodysplastic syndrome.  Bone marrow was recommended.   Bone marrow biopsy was done on 04/19/2021.  It reveals hypercellular marrow with 50% cellularity. 2% blasts.  There is marked erythroid hyperplasia with probable mild dyserythropoiesis.  There is adequate granulocytic precursor and megakaryocytes without dysplasia.  There is increase in reticulin fibers, 28% sideroblasts.  Based on this, the patient will have sideroblastic anemia. SF3B1 is pending. The patient's cytogenetics are abnormal. 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  There are other abnormalities.  This raises the possibility of developing myelodysplastic syndrome.     Overall, her condition is stable.  No chest pain.  No shortness of breath.  Occasional cough.  No nausea or vomiting.  No fever or chills.     PHYSICAL EXAMINATION:  She is alert and oriented x 3.  Not in any distress.   Rest of systems not  examined.     ASSESSMENT:    1.  A 70-year-old female with small cell lung cancer.  No evidence of disease.  2.  Pancytopenia.  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  3.  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     PLAN:    1.  Bone marrow biopsy was reviewed with the patient.  I explained to her that there a few abnormalities.  These are suspicious for developing myelodysplastic syndrome.  Also, her chromosome analysis is abnormal.  She likely has developing myelodysplastic syndrome.  I explained to her that this could be due to her chemotherapy.  At this time, we are not seeing enough changes in bone marrow to make a final diagnosis of MDS.  2.  At this time, we will monitor her.  I will see her in about 4-5 weeks with CBC.  When there is further worsening of cytopenia, we will again repeat a bone marrow biopsy.  3.  She had few questions, which were all answered.  I advised her to go to the Emergency Room if she has fever, infection, bleeding from any site, easy bruising, or any other concerns.     Jennifer Luz MD           D: 2021   T: 2021   MT: [facility]     Name:     BISHNU LEY  MRN:      [id number]        Account:    [id number]   :      1950           Visit Date: 2021      Document: [id number]    Video time of 5 minutes.  Video started at 2:32 PM and completed at 2:37 PM.  Another 10 minutes is spent in review of chart and investigations today.    This office note has been dictated.          Again, thank you for allowing me to participate in the care of your patient.        Sincerely,        Jennifer Luz MD

## 2021-06-28 NOTE — LETTER
6/28/2021         RE: Yvette Gastelum  [address]  [facility] [address]        Dear Colleague,    Thank you for referring your patient, Yvette Gastelum, to the [facility]. Please see a copy of my visit note below.    Yvette is a 70 year old who is being evaluated via a billable telephone visit.      What phone number would you like to be contacted at? [phone]  How would you like to obtain your AVS? [employer]  Phone call duration: 5 minutes    ONCOLOGY HISTORY: Ms. Gastelum is a female with small cell lung cancer.    1.  CT chest angiogram on 08/21/2018 for hemoptysis revealed right lower lobe pulmonary mass invading the right lower lobe pulmonary artery and mild mediastinal lymphadenopathy.   -CT abdomen and pelvis on 08/22/2018 did not reveal any evidence of metastatic disease.   -Brain MRI on 08/30/2019 did not reveal any intracranial metastasis.   -CT-guided biopsy of right lung mass on 08/31/2018 revealed high-grade neuroendocrine carcinoma, favor small cell carcinoma.   -PET scan in 08/2019 did not reveal any evidence of metastatic disease.      2.  She had limited stage small cell lung cancer.  She received concurrent chemoradiation with 4 cycles of platinum and etoposide between 09/17/2018 and 12/10/2018. Patient received 5940 cGy in 33 fractions between 10/11/2018 and 11/28/2018.   -Prophylactic cranial radiation between 01/29/2019 and 02/11/2019.      3.  CT chest on 08/21/2019 revealed a new 0.6 cm pulmonary nodule in right lower lobe suspicious for metastatic lesion.  There are 2 additional indeterminate nodular opacities in the right lower lobe posteriorly which are stable.  There is a mildly enlarged subcarinal lymph node which is stable.   -PET scan on 10/28/2019 revealed 0.8 cm right lower lobe nodule which is hypermetabolic and suspicious for malignancy.  No other areas of abnormal uptake.   -SBRT to RLL nodule between 11/19/19 and 11/27/19. 4500  cGy.     4. On 01/20/2021:  -WBC of 3.6, hemoglobin of 9.8 and platelet of 60. MCV of 86.  -Normal iron, folate, vitamin B12, SPEP and LDH.     5. Bone marrow biopsy was done on 02/10/2021.  It was a dry tap.  Bone marrow revealed hypercellular marrow with 50% cellularity.  There was marked erythroid hyperplasia.  Mild decrease in granulocytic precursors.  Adequate megakaryocyte.  No MDS.  Mild increase in reticulin fibers.  No evidence of metastatic tumor.  Less than 5% blasts.     6. Bone marrow biopsy was done on 04/19/2021.  It reveals hypercellular marrow with 50% cellularity. 2% blasts.  There is marked erythroid hyperplasia with probable mild dyserythropoiesis.  There is adequate granulocytic precursor and megakaryocytes without dysplasia.  There is increase in reticulin fibers, 28% sideroblasts.    -Negative for JAK2 and SF3B1.  -Cytogenetics are abnormal. 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  There are other abnormalities.  This raises the possibility of developing myelodysplastic syndrome.     SUBJECTIVE:  Ms. Gastelum is a 70-year-old female with small cell lung cancer treated with chemoradiation in 2018.  The patient is in remission.  CT chest, abdomen, and pelvis on 06/17/2021 does not reveal any evidence of malignancy.     The patient lately has been pancytopenic.  Multiple investigations have been done including 2 bone marrow biopsies.  No evidence of leukemia, lymphoma or MDS.  Cytogenetics is suspicious for evolving MDS.     Overall, her condition is stable.  She has fatigue. It is not getting worse.  No headache.  No dizziness.  No chest pain.  She has some cough.  No hemoptysis.  No shortness of breath at rest.  No abdominal pain, nausea or vomiting.  Some easy bruising.  No bleeding from ear, nose or throat.  No blood in the urine or stool.  All other review of systems is negative.     PHYSICAL EXAMINATION:    She is alert and oriented x 3.    This is a telephone visit.     LABS:   CBC on 06/17/2021 reviewed.     ASSESSMENT:    1.  A 70-year-old female with limited-stage small cell lung cancer. No evidence of disease.  2.  Pancytopenia.  3.  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     PLAN:    1.  CT scan was personally reviewed by me.  I explained to her there is no evidence of any malignancy.  She was happy to know that.     2.  CBC was reviewed.  I explained to her WBC is normal today.  Hemoglobin low, but stable.  Her platelet has decreased to 39.     Different causes of thrombocytopenia discussed.  We did 2 bone marrow biopsies.  They have not given us an answer.     I told the patient there could be a component of immune thrombocytopenia.  Discussed regarding treatment for ITP.  We will give her dexamethasone 40 mg once a day for 4 days and see if her platelets to improve.  She is agreeable for it.  Side effects of dexamethasone reviewed.     3.  I will see her in clinic in 2 weeks' time with CBC.  If her platelet counts do not improve, we may have to consider another bone marrow biopsy.     4.  She had few questions, which were all answered.  I advised her to go to emergency if she has bleeding from any site, easy bruising, blood in the stool or black stool.     TOTAL TELEPHONE VISIT TIME:  12 minutes.    This office note has been dictated.          Again, thank you for allowing me to participate in the care of your patient.        Sincerely,        Jennifer Luz MD

## 2021-06-28 NOTE — PATIENT INSTRUCTIONS
1.  Take dexamethasone 40 mg once a day for 4 days.  2.  See me in about 2 weeks time with CBC.    Please call to schedule

## 2021-06-28 NOTE — LETTER
6/28/2021         RE: Yvette Gastelum  [address]  [facility] [address]        Dear Colleague,    Thank you for referring your patient, Yvette Gastelum, to the [facility]. Please see a copy of my visit note below.    Yvette is a 70 year old who is being evaluated via a billable telephone visit.      What phone number would you like to be contacted at? [phone]  How would you like to obtain your AVS? [employer]  Phone call duration: 5 minutes    ONCOLOGY HISTORY: Ms. Gastelum is a female with small cell lung cancer.    1.  CT chest angiogram on 08/21/2018 for hemoptysis revealed right lower lobe pulmonary mass invading the right lower lobe pulmonary artery and mild mediastinal lymphadenopathy.   -CT abdomen and pelvis on 08/22/2018 did not reveal any evidence of metastatic disease.   -Brain MRI on 08/30/2019 did not reveal any intracranial metastasis.   -CT-guided biopsy of right lung mass on 08/31/2018 revealed high-grade neuroendocrine carcinoma, favor small cell carcinoma.   -PET scan in 08/2019 did not reveal any evidence of metastatic disease.      2.  She had limited stage small cell lung cancer.  She received concurrent chemoradiation with 4 cycles of platinum and etoposide between 09/17/2018 and 12/10/2018. Patient received 5940 cGy in 33 fractions between 10/11/2018 and 11/28/2018.   -Prophylactic cranial radiation between 01/29/2019 and 02/11/2019.      3.  CT chest on 08/21/2019 revealed a new 0.6 cm pulmonary nodule in right lower lobe suspicious for metastatic lesion.  There are 2 additional indeterminate nodular opacities in the right lower lobe posteriorly which are stable.  There is a mildly enlarged subcarinal lymph node which is stable.   -PET scan on 10/28/2019 revealed 0.8 cm right lower lobe nodule which is hypermetabolic and suspicious for malignancy.  No other areas of abnormal uptake.   -SBRT to RLL nodule between 11/19/19 and 11/27/19. 4500  cGy.     4. On 01/20/2021:  -WBC of 3.6, hemoglobin of 9.8 and platelet of 60. MCV of 86.  -Normal iron, folate, vitamin B12, SPEP and LDH.     5. Bone marrow biopsy was done on 02/10/2021.  It was a dry tap.  Bone marrow revealed hypercellular marrow with 50% cellularity.  There was marked erythroid hyperplasia.  Mild decrease in granulocytic precursors.  Adequate megakaryocyte.  No MDS.  Mild increase in reticulin fibers.  No evidence of metastatic tumor.  Less than 5% blasts.     6. Bone marrow biopsy was done on 04/19/2021.  It reveals hypercellular marrow with 50% cellularity. 2% blasts.  There is marked erythroid hyperplasia with probable mild dyserythropoiesis.  There is adequate granulocytic precursor and megakaryocytes without dysplasia.  There is increase in reticulin fibers, 28% sideroblasts.    -Negative for JAK2 and SF3B1.  -Cytogenetics are abnormal. 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  There are other abnormalities.  This raises the possibility of developing myelodysplastic syndrome.     SUBJECTIVE:  Ms. Gastelum is a 70-year-old female with small cell lung cancer treated with chemoradiation in 2018.  The patient is in remission.  CT chest, abdomen, and pelvis on 06/17/2021 does not reveal any evidence of malignancy.     The patient lately has been pancytopenic.  Multiple investigations have been done including 2 bone marrow biopsies.  No evidence of leukemia, lymphoma or MDS.  Cytogenetics is suspicious for evolving MDS.     Overall, her condition is stable.  She has fatigue. It is not getting worse.  No headache.  No dizziness.  No chest pain.  She has some cough.  No hemoptysis.  No shortness of breath at rest.  No abdominal pain, nausea or vomiting.  Some easy bruising.  No bleeding from ear, nose or throat.  No blood in the urine or stool.  All other review of systems is negative.     PHYSICAL EXAMINATION:    She is alert and oriented x 3.    This is a telephone visit.     LABS:   CBC on 06/17/2021 reviewed.     ASSESSMENT:    1.  A 70-year-old female with limited-stage small cell lung cancer. No evidence of disease.  2.  Pancytopenia.  3.  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     PLAN:    1.  CT scan was personally reviewed by me.  I explained to her there is no evidence of any malignancy.  She was happy to know that.     2.  CBC was reviewed.  I explained to her WBC is normal today.  Hemoglobin low, but stable.  Her platelet has decreased to 39.     Different causes of thrombocytopenia discussed.  We did 2 bone marrow biopsies.  They have not given us an answer.     I told the patient there could be a component of immune thrombocytopenia.  Discussed regarding treatment for ITP.  We will give her dexamethasone 40 mg once a day for 4 days and see if her platelets to improve.  She is agreeable for it.  Side effects of dexamethasone reviewed.     3.  I will see her in clinic in 2 weeks' time with CBC.  If her platelet counts do not improve, we may have to consider another bone marrow biopsy.     4.  She had few questions, which were all answered.  I advised her to go to emergency if she has bleeding from any site, easy bruising, blood in the stool or black stool.     TOTAL TELEPHONE VISIT TIME:  12 minutes.    This office note has been dictated.          Again, thank you for allowing me to participate in the care of your patient.        Sincerely,        Jennifer Luz MD

## 2021-06-28 NOTE — PROGRESS NOTES
Yvette is a 70 year old who is being evaluated via a billable telephone visit.      What phone number would you like to be contacted at? [phone]  How would you like to obtain your AVS? Pablo  Phone call duration: 5 minutes

## 2021-07-05 PROBLEM — S06.5XAA SDH (SUBDURAL HEMATOMA) (H): Status: ACTIVE | Noted: 2021-01-01

## 2021-07-05 NOTE — PHARMACY-ADMISSION MEDICATION HISTORY
Pharmacy Medication History  Admission medication history interview status for the 7/5/2021  admission is complete. See EPIC admission navigator for prior to admission medications     Location of Interview: Patient room  Medication history sources: Patient and Care Everywhere    Significant changes made to the medication list:    Removed:  Dexamethasone 40 mg daily x4 days. Ended 7/3/21.    In the past week, patient estimated taking medication this percent of the time: greater than 90%    Medication reconciliation completed by provider prior to medication history? No    Time spent in this activity: 10 min    Prior to Admission medications    Medication Sig Last Dose Taking? Auth Provider   Acetaminophen 325 MG CAPS Take 325-650 mg by mouth every 6 hours as needed for pain  Past Week at Unknown time Yes Reported, Patient   albuterol (PROVENTIL) (2.5 MG/3ML) 0.083% neb solution Take 1 vial (2.5 mg) by nebulization 2 times daily  Patient taking differently: Take 2.5 mg by nebulization 2 times daily as needed for shortness of breath / dyspnea or wheezing  7/4/2021 at Unknown time Yes Virginia Davidson MD   Ascorbic Acid (VITAMIN C) 500 MG CHEW Take 500 mg by mouth daily  7/5/2021 at Unknown time Yes Reported, Patient   atorvastatin (LIPITOR) 10 MG tablet Take 1 tablet (10 mg) by mouth daily 7/4/2021 at Unknown time Yes Arie House MD   benzonatate (TESSALON) 100 MG capsule Take 1 capsule (100 mg) by mouth 3 times daily as needed for cough Past Month at Unknown time Yes Jennifer Luz MD   fluticasone (FLOVENT HFA) 110 MCG/ACT inhaler Inhale 1 puff into the lungs 2 times daily 7/4/2021 at Unknown time Yes Virginia Davidson MD   magnesium 250 MG tablet Take 1 tablet by mouth daily 7/5/2021 at Unknown time Yes Reported, Patient   omeprazole (PRILOSEC) 20 MG DR capsule Take 1 capsule (20 mg) by mouth daily 7/5/2021 at Unknown time Yes Arie House MD   sodium chloride (NEBUSAL) 3 %  neb solution Take 3 mLs by nebulization 2 times daily Use with albuterol inhaler  Patient taking differently: Take 3 mLs by nebulization 2 times daily as needed for wheezing Use with albuterol inhaler Past Month at Unknown time Yes Virginia Davidson MD   vitamin B-Complex Take 1 tablet by mouth daily 7/5/2021 at Unknown time Yes Reported, Patient   Vitamin D, Cholecalciferol, 25 MCG (1000 UT) CAPS Take 25 mcg by mouth daily  7/5/2021 at Unknown time Yes Reported, Patient   diclofenac (VOLTAREN) 1 % topical gel Apply 2 g topically 4 times daily  Patient taking differently: Apply 2 g topically 4 times daily as needed for moderate pain  More than a month at Unknown time  Arie House MD   Elastic Bandages & Supports (MEDICAL COMPRESSION SOCKS) MISC 1 Package daily   Valeria Macedo DO   loperamide (IMODIUM) 2 MG capsule Take 2 mg by mouth 4 times daily as needed for diarrhea More than a month at Unknown time  Reported, Patient       The information provided in this note is only as accurate as the sources available at the time of update(s)

## 2021-07-05 NOTE — CONSULTS
NEUROSURGERY CONSULT    Neurosurgery was asked by Dr. Jorge to consult for an atraumatic subdural hematoma.    PLAN:    Ms. Gastelum exhibits large heterogeneous subdural hematoma on the right measuring 1.4 cm thick with 1.6 cm leftward midline shift, subfalcine and uncal herniation and early obstructive hydrocephalus on her most recent imaging. We feel that she would be best amendable to surgical intervention. We discussed surgical options that includes right sided craniotomy for subdural hematoma evacuation.     Dr. Dominguez will perform a the right sided craniotomy for subdural hematoma evacuation shortly. Operative suite is currently being prepared. Pre-op orders are placed.     Platelets will need to be corrected. One unit of platelets are ativly being transfused, another unit has been ordered.     We described the procedure in detail. We explained that the risks of the procedure include recurrent or residual hematomas, brain injury or stroke, bleeding, infection, and death. We described the procedure as requiring at least two overnight stays in the hospital. We also discussed the normal postoperative recovery that would include gradual increase of activity over time to include not lift anything greater than 5-10 lbs., avoid putting her head below her heart, and avoid straining and sneezing with her mouth closed. We also discussed follow-up appointments in the clinic.     From a Neurosurgical standpoint, post-operatively we feel that it would be in her best interest to be admitted to the [facility] ICU by the Medical team. She should have the head of the bed flat at all times and a systolic blood pressure maintained at 150 mmHg or less at all times. Please refrain from using any Aspirin or anti-inflammatory products.     We did review the images together. She appeared to have a good understanding of the situation, asked appropriate questions which we answered, and wished to proceed as we had previously  recommended. We also discussed signs of a worsening problem that she should seek being evaluated by staff.     Please page our on-call service for any questions or concerns.     It has been a pleasure meeting Yvette Gastelum. Thank you for having us be involved in her care.    ______________________________________________________________________    HPI:    Yvette Gastelum is a pleasant 70 year old female who presented to the [facility] Emergency Department for consultation on exacerbating dizziness, vomiting, altered balance and vision and an excruciating headache. She was apparently prescribed dexamethasone four days due to a low platelet count and since that time she has felt these symptoms getting worse. She does have known lung cancer and and has had chemotherapy and radiation in the distant past. There are no bowel or bladder changes. No other concerns are voiced.    Past Medical History:   Diagnosis Date     Cancer (H)     Lung cancer       Hypertension      Kidney stones      Obese      Recurrent UTI      S/P CL-BSO      Sepsis (H)     secondary to uti        Past Surgical History:   Procedure Laterality Date     BONE MARROW BIOPSY, BONE SPECIMEN, NEEDLE/TROCAR N/A 2/10/2021    Procedure: BONE MARROW BIOPSY;  Surgeon: Earlene Garcia MD;  Location: [facility]     BONE MARROW BIOPSY, BONE SPECIMEN, NEEDLE/TROCAR N/A 4/19/2021    Procedure: BIOPSY, BONE MARROW;  Surgeon: Felix Elizabeth MD;  Location: [facility]     COLONOSCOPY       COLONOSCOPY N/A 2/10/2016    Procedure: COMBINED COLONOSCOPY, SINGLE OR MULTIPLE BIOPSY/POLYPECTOMY BY BIOPSY;  Surgeon: Antonia Jiménez MD;  Location:  GI     EYE SURGERY      CATARACT EXTRACTION RIGHT & LEFT     GYN SURGERY      BILATERAL TUBAL LIGATION, CL, LAPAROSCOPIC LEFT SALPINGO-OOPHORECTOMY     HEAD & NECK SURGERY      WISDOM TEETH EXTRACTION     HYSTERECTOMY       INSERT PORT VASCULAR ACCESS N/A 9/14/2018    Procedure: INSERT PORT VASCULAR  "ACCESS;  POWER PORT PLACEMENT;  Surgeon: Todd Norris MD;  Location:  SD     REMOVE PORT VASCULAR ACCESS N/A 2019    Procedure: REMOVAL, VASCULAR ACCESS PORT;  Surgeon: Todd Norris MD;  Location:  OR       Allergies   Allergen Reactions     No Known Allergies        Social History     Tobacco Use     Smoking status: Former Smoker     Packs/day: 1.00     Years: 50.00     Pack years: 50.00     Types: Cigarettes     Start date: 10/1965     Quit date: 2015     Years since quittin.8     Smokeless tobacco: Never Used   Substance Use Topics     Alcohol use: No     Alcohol/week: 0.0 standard drinks       Family History   Problem Relation Age of Onset     Aneurysm Father         Aorta     Hypertension Father      Cervical Cancer Mother      Lung Cancer Brother         non smoker       Scheduled Medications      sodium chloride 0.9%  500 mL Intravenous Once     HYDROmorphone  0.5 mg Intravenous Once       Home Medications  Albuterol neb  Atorvastatin   Benzonatate   Dexamethasone   Fluticasone inhaler  Loperamide   Omeprazole       ROS: 10 point ROS neg other than the symptoms noted above in the HPI.    Vitals:    BP 99/72   Pulse 74   Temp 98.5  F (36.9  C)   Resp 16   Ht 5' 4\" (1.626 m)   SpO2 98%   BMI 36.05 kg/m    Body mass index is 36.05 kg/m .  No intake/output data recorded.    Exam:    Pt examined in ED24 with her nurse present. Pt appears comfortable and in no apparent distress, moving all extremities.     Head: Normocephalic, without obvious abnormality, atraumatic, no facial asymmetry.   Eyes: conjunctivae/corneas clear. PERRL, EOM's intact.   Throat: lips, mucosa, and tongue normal; teeth and gums normal.   Neck: supple, symmetrical, trachea midline, no adenopathy and thyroid: not enlarged, symmetric, no tenderness/mass/nodules.   Lungs: clear to auscultation bilaterally.   Heart: regular rate and rhythm.   Abdomen: soft, non-tender; bowel sounds normal; no masses, " no organomegaly.   Pulses: 2+ and symmetric.   Skin: Skin color, texture, turgor normal. No rashes or lesions.     Alert and oriented to date and time. Able to recall the current president of [place].   CN II: Able to read nametag, VF full with gross confrontation.   CN III,IV,VI: TIMO, Extraocular movements full, absent for nystagmus, absent for ptosis. Diplopia when looking left.   CN V: Full sensation in V1,V2,V3, jaw clench symmetrical.   CN VII: Face symmetrical and with equal strength, able to puff cheeks out.   CN VIII: Able to hear conversation.   CN IX: Able to push tongue against bilateral cheeks.   CN X: Palate elevates symmetrically, uvula midline.   CN XI: Elevate shoulders symmetrical, full strength when turning head from side to side.   CNXII: Tongue protrudes midline, absent for fasciculation.   Able to name 3/3 objects.   Finger to nose slow and accurate.   Rapid hand movements intact.   Absent for upper and lower extremity drift.     GCS:   Eye: eyes open spontaneously (4)   Motor: obeys commands (6)   Verbal: oriented (5)   Composite score: 15     Bilateral upper extremities 5/5. Bilateral bicep and triceps reflexes 2/4. Sensation intact throughout. Normal ROM.   Bilateral lower extremities 5/5. Normal sensation t/o bilaterally. Bilateral patellar 2/4 and achilles reflex 1/4.   Calves soft and non-tender.       Imaging: CT SCAN OF THE HEAD WITHOUT CONTRAST   7/5/2021 4:53 PM   Impression per radiology read - I have personally reviewed the images with the patient.    1. Large heterogeneous subdural hematoma on the right measuring 1.4 cm  thick with 1.6 cm leftward midline shift, subfalcine and uncal  herniation and early obstructive hydrocephalus. Low-attenuation of the  subdural hematoma may reflect active bleeding and/or thrombocytopenia.    Available labs at time of consult:       Recent Labs   Lab 07/05/21  1549   WBC 7.0   HGB 9.9*   HCT 34.6*   MCV 80   PLT 34*     Recent Labs   Lab  07/05/21  1549   WBC 7.0   HGB 9.9*   HCT 34.6*   MCV 80   PLT 34*     Recent Labs   Lab 07/05/21  1549   HGB 9.9*     Recent Labs   Lab 07/05/21  1549   AST 61*   ALT 35   ALKPHOS 62   BILITOTAL 1.7*     Recent Labs   Lab 07/05/21  1736   INR 1.09     Recent Labs   Lab 07/05/21  1549   PLT 34*     Recent Labs   Lab 07/05/21  1549   WBC 7.0         Respectfully,    RADHA Zee, PA-C  [facility] Neurosurgery  [facility]     Tel: [phone]      All imaging, physical findings, and the above plan have been reviewed with Dr. Dominguez.

## 2021-07-05 NOTE — TELEPHONE ENCOUNTER
Pt calling to get response on what to do about headache. Pt reports nothing has changed since she called 60 min ago, just looking for update. Did inquire if she is taking anything for it. States she has been using tylenol and ibuprofen for past 3 days. She does report having several episodes of vomiting last night. Denies neck pain and vision changes.     Updated care team        Geovanna Napoles  Clinic & Infusion RN  [facility]

## 2021-07-05 NOTE — TELEPHONE ENCOUNTER
[facility] Telephone Triage Note    Assessment: Patient called in to triage reporting the following symptoms: Headache since starting her Dexamethasone. Patient reports she has had a headache and balance issues since starting Dex 40mg. Her last done was Saturday 7/3, but she is still having side effects. Denies fever or any infectious symptoms. Wondering how long these will last and if Dr. Luz has any additional recommendations for her symptoms.     Recommendations: Will review with pharmacy team/Dr. Luz.      Follow-Up: Instructed patient to seek care immediately for worsening symptoms, including: fever, chest pain, shortness of breath, dizziness. Patient voiced understanding of advice and/or instructions given.       Fiordaliza Villanueva, GIOVANNIN, RN, PHN, OCN  Oncology Care Coordinator  [facility]

## 2021-07-05 NOTE — ED TRIAGE NOTES
Pt was placed on dexamethasone. Pt has been taking for past 5 days. Pt states dizzy and has had a migraine for past five days

## 2021-07-05 NOTE — ED PROVIDER NOTES
History   Chief Complaint:  Dizziness       The history is provided by the patient.      Yvette Gastelum is a 70 year old female with history of hypertension and hyperlipidemia who presents with dizziness. The patient reports that she was prescribed dexamethazone 4 days ago due to having a low platelet count and states that she has been dizzy since started taking it. She also reports that she has also had balance issues as well as vomiting since she started taking this medication. The patient states that she has also had migraines and notes that she does not usually have any headaches. She has had trouble standing up straight although she reports that she does not feel like the world is spinning when she stands. Her vision is also doubled when she looks to her left. The patient denies taking any nausea or blood thinning medication. In addition, the patient reports that she received chemo and radiation therapy 2 years which got rid of her lung cancer, but received SBRT radiation when it returned.       Review of Systems   Eyes: Positive for visual disturbance (Double vision, left side).   Gastrointestinal: Positive for vomiting.   Neurological: Positive for dizziness and headaches.       Allergies:  The patient has no known allergies.     Medications:  Albuterol neb  Atorvastatin   Benzonatate   Dexamethasone   Fluticasone inhaler  Loperamide   Omeprazole     Past Medical History:    Lung Cancer   Hypertension  Kidney Stones  Obesity  Recurrent UTI  Sepsis  Hemoptysis  Osteoarthritis  CKD  Hyperlipidemia  Anemia     Past Surgical History:    Bone Marrow biopsy x2  Colonoscopy  Cataract Extraction, left and right   Tubal Ligation  Head and Neck Surgery   Dental Surgery   Hysterectomy  Insert vascular access port    Family History:    Aneurysm, Father  Hypertension  Cervical Cancer, Mother  Lung Cancer, Brother    Social History:  PCP: Arie House MD  Presents to the ED alone    Physical Exam     Patient  "Vitals for the past 24 hrs:   BP Temp Temp src Pulse Resp SpO2 Height   07/05/21 1926 112/74 98.2  F (36.8  C) Temporal -- 20 95 % --   07/05/21 1900 114/69 -- -- 80 -- 94 % --   07/05/21 1847 104/73 98.2  F (36.8  C) Oral 86 -- 97 % --   07/05/21 1845 -- -- -- -- -- 93 % --   07/05/21 1830 109/52 -- -- 72 -- 96 % --   07/05/21 1827 101/62 98.5  F (36.9  C) -- -- 20 98 % --   07/05/21 1815 99/72 -- -- 70 20 100 % --   07/05/21 1812 99/72 98  F (36.7  C) -- 74 16 -- --   07/05/21 1800 (!) 85/57 -- -- 72 -- 99 % --   07/05/21 1745 -- -- -- -- -- 98 % --   07/05/21 1730 (!) 89/54 -- -- 78 -- 98 % --   07/05/21 1715 -- -- -- -- -- 98 % --   07/05/21 1700 93/46 -- -- 82 -- 98 % --   07/05/21 1630 91/67 -- -- 88 -- 100 % --   07/05/21 1417 101/62 98.5  F (36.9  C) Oral 94 20 97 % 1.626 m (5' 4\")       Physical Exam  Vitals signs and nursing note reviewed.   Constitutional:       Appearance: She is obese.   HENT:      Head: Normocephalic.      Nose: Nose normal.      Mouth/Throat:      Mouth: Mucous membranes are dry.   Eyes:      Pupils: Pupils are equal, round, and reactive to light.   Cardiovascular:      Rate and Rhythm: Normal rate and regular rhythm.   Pulmonary:      Effort: Pulmonary effort is normal.   Abdominal:      General: Abdomen is flat.      Palpations: Abdomen is soft.   Musculoskeletal: Normal range of motion.   Skin:     General: Skin is warm.      Capillary Refill: Capillary refill takes less than 2 seconds.   Neurological:      Mental Status: She is alert and oriented to person, place, and time.   Psychiatric:         Mood and Affect: Mood normal.         National [employer] Stroke Scale  Exam Interval: Baseline   Score    Level of consciousness: (0)   Alert, keenly responsive    LOC questions: (0)   Answers both questions correctly    LOC commands: (0)   Performs both tasks correctly    Best gaze: (0)   Normal    Visual: (0)   No visual loss    Facial palsy: (0)   Normal symmetrical " movements    Motor arm (left): (0)   No drift    Motor arm (right): (0)   No drift    Motor leg (left): (0)   No drift    Motor leg (right): (0)   No drift    Limb ataxia: (0)   Absent    Sensory: (0)   Normal- no sensory loss    Best language: (0)   Normal- no aphasia    Dysarthria: (0)   Normal    Extinction and inattention: (0)   No abnormality        Total Score:  0         Emergency Department Course     Imaging:  CT head w/o contrast:   IMPRESSION:   1. Large heterogeneous subdural hematoma on the right measuring 1.4 cm   thick with 1.6 cm leftward midline shift, subfalcine and uncal   herniation and early obstructive hydrocephalus. Low-attenuation of the   subdural hematoma may reflect active bleeding and/or thrombocytopenia.   2. Neurosurgical consultation is recommended.   Reading per radiology    Laboratory:  CBC: WBC 7.0, HGB 9.9, PLT 34 (L)   CMP: Glucose 130 (H), BUN 36 (H), Bilirubin Total 1.7 (H), AST 61 (H) o/w WNL Creatinine: 1.18 (H)    ABO/Rh Type and Screen: A Positive, Antibody negative  Blood Component: Platelet Pheresis, leuko reduced irradiated  INR: 1.09  Asymptomatic COVID-19 PCR: negative    Emergency Department Course:  Reviewed:  I reviewed the patient's nursing notes, vitals, past medical records, Care Everywhere.     Assessments:  1514 I performed an assessment and examination of the patient as noted above.      1655 I updated the patient on her labs and imaging.     1746 I updated the patient about being admitted.  Findings and plan explained to the Patient who consents to admission. Discussed the patient with Dr. Reza, who will admit the patient for further monitoring, evaluation, and treatment.     Consults:  1707 I spoke with Laurie NP regarding this patient.    1744 I spoke to the radiologist about her imaging.     1752 I spoke with Dr. Reza regarding this patient about admitign.    Interventions:  1551 NS 1L IV Bolus  1553 Toradol 15 mg IV  1553 Zofran 4 mg IV  1554 Reglan 5  mg IV  1554 Benadryl 25 mg IV    Disposition:  The patient was admitted to the hospital under the care of Dr. Reza.       Impression & Plan   Medical Decision Making:  Yvette Gastelum is a 70 year old female who presents with dizziness and severe headache.  Patient is recently placed on dexamethasone over the last 5 days due to concerns for thrombocytopenia from an unclear etiology after 2 bone marrow biopsies negative for significant myelodysplasia.  On arrival patient awake and alert due to history of thrombocytopenia and headache CT of the head was recommended for assessment without history of head trauma and was positive for significant subdural hematoma with massive midline shift and uncal herniation.  Emergency neurosurgical consult was performed in the emergency room patient was seen at the bedside by Luis Sullivan the nurse practitioner for the on-call neurosurgeon.  Recommendations are for platelet replacement in OR for evacuation of subdural.  Patient blood pressure did start to drop a little bit in the emergency room IV fluids were given patient was sent to the emergency room for urgent craniotomy and evacuation hematoma.  2 units of platelets were ordered and initiated in the emergency room due to the thrombocytopenia active bleeding of the skull.      Covid-19  Yvette Gastelum was evaluated during a global COVID-19 pandemic, which necessitated consideration that the patient might be at risk for infection with the SARS-CoV-2 virus that causes COVID-19.   Applicable protocols for evaluation were followed during the patient's care.   COVID-19 was considered as part of the patient's evaluation. The plan for testing is:  a test was obtained during this visit.       Diagnosis:    ICD-10-CM    1. SDH (subdural hematoma) (H)  S06.5X9A Case Request: CRANIOTOMY, FOR SUBDURAL HEMATOMA EVACUATION     Case Request: CRANIOTOMY, FOR SUBDURAL HEMATOMA EVACUATION     Asymptomatic SARS-CoV-2 COVID-19 Virus  (Coronavirus) by PCR     Platelets prepare order unit     CBC with platelets + differential     Blood component     Blood component       Scribe Disclosure:  I, MERARI BAUER, am serving as a scribe at 3:14 PM on 7/5/2021 to document services personally performed by Arie Jorge MD based on my observations and the provider's statements to me.        Arie Jorge MD  07/06/21 0642

## 2021-07-05 NOTE — TELEPHONE ENCOUNTER
Writer called and spoke with the patient who states she hasn't slept in 4 days  Just sits in her chair with head down because of dizziness and headache. Patient was started on a short course of Dexamethsone 40mg daily with breakfast x 4 days.  Patient;s symptoms started on Thursday and have not improved despite Saturday 7/3 being the last day of the steroid.    Writer advised the ED for the vomiting, dizziness, pain, and no sleep for 4 days and no improvement with being off of Dexamethasone .    Patient verbalized understanding.    Jeannette Moore RN

## 2021-07-05 NOTE — ANESTHESIA PREPROCEDURE EVALUATION
Anesthesia Pre-Procedure Evaluation    Patient: Yvette Gastelum   MRN: [id number] : 1950        Preoperative Diagnosis: Bleeding [R58]   Procedure : Procedure(s):  CRANIOTOMY     Past Medical History:   Diagnosis Date     Cancer (H)     Lung cancer       Hypertension      Kidney stones      Obese      Recurrent UTI      S/P CL-BSO      Sepsis (H)     secondary to uti       Past Surgical History:   Procedure Laterality Date     BONE MARROW BIOPSY, BONE SPECIMEN, NEEDLE/TROCAR N/A 2/10/2021    Procedure: BONE MARROW BIOPSY;  Surgeon: Earlene Garcia MD;  Location: [facility]     BONE MARROW BIOPSY, BONE SPECIMEN, NEEDLE/TROCAR N/A 2021    Procedure: BIOPSY, BONE MARROW;  Surgeon: Felix Elizabeth MD;  Location:  GI     COLONOSCOPY       COLONOSCOPY N/A 2/10/2016    Procedure: COMBINED COLONOSCOPY, SINGLE OR MULTIPLE BIOPSY/POLYPECTOMY BY BIOPSY;  Surgeon: Antonia Jiménez MD;  Location:  GI     EYE SURGERY      CATARACT EXTRACTION RIGHT & LEFT     GYN SURGERY      BILATERAL TUBAL LIGATION, CL, LAPAROSCOPIC LEFT SALPINGO-OOPHORECTOMY     HEAD & NECK SURGERY      WISDOM TEETH EXTRACTION     HYSTERECTOMY       INSERT PORT VASCULAR ACCESS N/A 2018    Procedure: INSERT PORT VASCULAR ACCESS;  POWER PORT PLACEMENT;  Surgeon: Todd Norris MD;  Location:  SD     REMOVE PORT VASCULAR ACCESS N/A 2019    Procedure: REMOVAL, VASCULAR ACCESS PORT;  Surgeon: Todd Norris MD;  Location:  OR      Allergies   Allergen Reactions     No Known Allergies       Social History     Tobacco Use     Smoking status: Former Smoker     Packs/day: 1.00     Years: 50.00     Pack years: 50.00     Types: Cigarettes     Start date: 10/1965     Quit date: 2015     Years since quittin.8     Smokeless tobacco: Never Used   Substance Use Topics     Alcohol use: No     Alcohol/week: 0.0 standard drinks      Wt Readings from Last 1 Encounters:   21 95.3 kg (210 lb)         Anesthesia Evaluation            ROS/MED HX  ENT/Pulmonary:     (+) MANDEEP risk factors, hypertension, obese,  (-) sleep apnea   Neurologic:       Cardiovascular:     (+) Dyslipidemia hypertension-----Previous cardiac testing   Echo: Date: 7/2020 Results:     Interpretation Summary  Mildly reduced left ventricular function, LVEF 49% based on biplane 2D  tracing.  Global peak LV longitudinal strain is averaged at -16.2%. This suggests  abnormal strain (normal <-18%).  Right ventricular function, chamber size, wall motion, and thickness are  normal.  This study was compared with the study from 4/12/19: There has been no  significant change. Specifically, LVEF is within the range of interstudy  variability and appears unchanged on direct visual comparsion.  _____________________________________________________________________________  __        Left Ventricle  Left ventricular size is normal. Left ventricular wall thickness is normal.  LVEF 49% based on biplane 2D tracing. Mildly (EF 45-50%) reduced left  ventricular function is present. Grade II or moderate diastolic dysfunction.  Global peak LV longitudinal strain is averaged at -16.2%. This suggests  abnormal strain (normal <-18%). The clinical value of assessment of global  peak longitudinal strain in the surveillance for cardiotoxicity lies in serial  measurements.     Right Ventricle  Right ventricular function, chamber size, wall motion, and thickness are  normal.     Atria  Both atria appear normal.     Mitral Valve  The mitral valve is normal. Trace mitral insufficiency is present.        Aortic Valve  Mild aortic valve calcification is present. The aortic valve is tricuspid.  Trace aortic insufficiency is present.     Tricuspid Valve  The tricuspid valve is normal. Trace tricuspid insufficiency is present. The  right ventricular systolic pressure is approximated at 35.0 mmHg plus the  right atrial pressure.     Pulmonic Valve  The pulmonic valve is normal.      Vessels  The aorta root is normal. The pulmonary artery cannot be assessed. The  inferior vena cava was normal in size with preserved respiratory variability.  IVC diameter <2.1 cm collapsing >50% with sniff suggests a normal RA pressure  of 3 mmHg.     Pericardium  No pericardial effusion is present.        Compared to Previous Study  This study was compared with the study from 4/12/19 . There has been no  significant change. Specifically, LVEF is within the range of interstudy  variability and appears unchanged on direct visual comparsion.  Stress Test: Date: Results:    ECG Reviewed: Date: Results:    Cath: Date: Results:      METS/Exercise Tolerance:     Hematologic: Comments: Thrombocytopenia;      Musculoskeletal:   (+) arthritis,     GI/Hepatic:    (-) GERD   Renal/Genitourinary:     (+) renal disease, type: CRI, Nephrolithiasis ,     Endo:     (+) Obesity,     Psychiatric/Substance Use:       Infectious Disease:       Malignancy:   (+) Malignancy, History of Lung.  Lung CA Remission status post Chemo and Radiation.        Other:            Physical Exam    Airway        Mallampati: II   TM distance: > 3 FB   Neck ROM: full   Mouth opening: > 3 cm    Respiratory Devices and Support         Dental       (+) upper dentures and lower dentures      Cardiovascular   cardiovascular exam normal       Rhythm and rate: regular     Pulmonary           breath sounds clear to auscultation           OUTSIDE LABS:  CBC:   Lab Results   Component Value Date    WBC 7.0 07/05/2021    WBC 4.1 06/17/2021    HGB 9.9 (L) 07/05/2021    HGB 9.4 (L) 06/17/2021    HCT 34.6 (L) 07/05/2021    HCT 32.2 (L) 06/17/2021    PLT 34 (LL) 07/05/2021    PLT 39 (LL) 06/17/2021     BMP:   Lab Results   Component Value Date     07/05/2021     03/17/2021    POTASSIUM 3.8 07/05/2021    POTASSIUM 4.4 03/17/2021    CHLORIDE 108 07/05/2021    CHLORIDE 108 03/17/2021    CO2 26 07/05/2021    CO2 23 03/17/2021    BUN 36 (H) 07/05/2021    BUN  24 03/17/2021    CR 1.18 (H) 07/05/2021    CR 1.26 (H) 03/17/2021     (H) 07/05/2021     (H) 03/17/2021     COAGS:   Lab Results   Component Value Date    PTT 33 08/21/2018    INR 1.09 07/05/2021     POC: No results found for: BGM, HCG, HCGS  HEPATIC:   Lab Results   Component Value Date    ALBUMIN 3.4 07/05/2021    PROTTOTAL 6.8 07/05/2021    ALT 35 07/05/2021    AST 61 (H) 07/05/2021    ALKPHOS 62 07/05/2021    BILITOTAL 1.7 (H) 07/05/2021     OTHER:   Lab Results   Component Value Date    LACT 2.0 12/16/2018    RUSTY 8.8 07/05/2021    MAG 1.7 03/11/2019    TSH 1.65 07/15/2020    T4 0.95 07/15/2020    CRP 4.5 10/01/2020       Anesthesia Plan    ASA Status:  3, emergent    NPO Status:  NPO Appropriate    Anesthesia Type: General.     - Airway: ETT   Induction: Intravenous, RSI.   Maintenance: Balanced.   Techniques and Equipment:     - Lines/Monitors: 2nd IV, Arterial Line     Consents    Anesthesia Plan(s) and associated risks, benefits, and realistic alternatives discussed. Questions answered and patient/representative(s) expressed understanding.     - Discussed with:  Patient         Postoperative Care    Pain management: IV analgesics.   PONV prophylaxis: Ondansetron (or other 5HT-3)     Comments:                SARIKA DERAS MD

## 2021-07-06 NOTE — PLAN OF CARE
Shift Summary: Pt arrived to unit @ 2330. Hypotensive overnight, managed w/750ml bolus total, now stable. CT performed this AM.   Neuro: Intact. Follows commands. PERRLA. A/o x4.   CV: Sinus rhythm. MAP >65.   Resp: Lung sounds clear. On 2L oxymask. Prefers to sit upright.   GI: BS hypoactive, no flatus, no BM. Tolerating clears.   : Shah intact, adequate output.   Pain: Managed w/ PO oxy, robaxin, tylenol and IV dilaudid.   Activity: Assist of 1 on turns. Lift on transfers.    Gtt: No gtts.   Skin: Right side scalp with staples and JPx1 intact.      Plan:  Hem/Onc to be consulted. Continue to monitor neuro status.

## 2021-07-06 NOTE — PROGRESS NOTES
"   07/06/21 1500   Quick Adds   Type of Visit Initial PT Evaluation   Living Environment   People in home alone   Current Living Arrangements apartment   Living Environment Comments Pt lives in an apt accessible with an elevator,with her small dog Misty.   Self-Care   Usual Activity Tolerance good   Current Activity Tolerance fair   Regular Exercise Yes   Activity/Exercise Type walking   Exercise Amount/Frequency daily   Equipment Currently Used at Home cane, straight  (occasionally when walking dog)   Activity/Exercise/Self-Care Comment Pt reports (I) with all ADL, IADL and mobility, including driving, shopping, med/$ management. Walks her dog several times per day. Uses a cane \"sometimes\"   Disability/Function   Concentrating, Remembering or Making Decisions Difficulty no   Difficulty Communicating other (see comments)   Walking or Climbing Stairs Difficulty yes   Walking or Climbing Stairs ambulation difficulty, requires equipment   Dressing/Bathing Difficulty no   Toileting issues no   Doing Errands Independently Difficulty (such as shopping) no   Fall history within last six months no   General Information   Onset of Illness/Injury or Date of Surgery 07/05/21   Referring Physician Pinky Reza   Patient/Family Therapy Goals Statement (PT) Pt wants to dc to home with her dog,    Pertinent History of Current Problem (include personal factors and/or comorbidities that impact the POC) Yvette Gastelum is a 70 year old female with a history of small cell lung cancer s/p chemoradiation in 2018, currently in remission, pancytopenia, HLD, GERD, HTN who presents with headache and dizziness, Large right-sided subdural hematoma with midline shift, uncal herniation and early obstructive hydrocephalus. Now POD 1 after R frontal crani for hematoma evacuation.   Existing Precautions/Restrictions fall   Weight-Bearing Status - LUE full weight-bearing   Weight-Bearing Status - RUE full weight-bearing   Weight-Bearing Status - " LLE full weight-bearing   Weight-Bearing Status - RLE full weight-bearing   General Observations post crani with drain in place   Cognition   Orientation Status (Cognition) oriented x 4   Affect/Mental Status (Cognition) WNL   Follows Commands (Cognition) WFL   Pain Assessment   Patient Currently in Pain Yes, see Vital Sign flowsheet  (HA )   Integumentary/Edema   Integumentary/Edema Comments brusing B LE, incision from crani   Strength Comprehensive (MMT)   Comment, General Manual Muscle Testing (MMT) Assessment B LE MMT 4+ to 5/5, with general fatigue from decreased activity/bedrest.  functionally WFL's   Bed Mobility   Comment (Bed Mobility) SBA sit to supine, A for set up   Transfers   Transfer Safety Comments sit <>stand with FWW with cues for safety and hand placement with CGA   Gait/Stairs (Locomotion)   Canute Level (Gait) contact guard;1 person to manage equipment   Assistive Device (Gait) walker, rolling platform   Distance in Feet (Required for LE Total Joints) 120'   Pattern (Gait) swing-through   Deviations/Abnormal Patterns (Gait) cora decreased;festinating/shuffling;gait speed decreased   Balance   Balance Comments dynamic standing balance requirs B UE support and CGA   Clinical Impression   Criteria for Skilled Therapeutic Intervention yes, treatment indicated   PT Diagnosis (PT) difficulty walking   Influenced by the following impairments post op pain, decreeased act bryan, strength and balance, decreased vision   Functional limitations due to impairments impaired functional mob I and safeyt   Clinical Presentation Evolving/Changing   Clinical Presentation Rationale 3-5 deficitis   Clinical Decision Making (Complexity) moderate complexity   Therapy Frequency (PT) Daily   Predicted Duration of Therapy Intervention (days/wks) 5 days   Planned Therapy Interventions (PT) bed mobility training;balance training;stair training;strengthening;gait training;transfer training   Anticipated Equipment  Needs at Discharge (PT) walker, rolling   Risk & Benefits of therapy have been explained evaluation/treatment results reviewed;care plan/treatment goals reviewed;risks/benefits reviewed;current/potential barriers reviewed;participants voiced agreement with care plan;participants included;patient   PT Discharge Planning    PT Discharge Recommendation (DC Rec) home with assist;home with home care physical therapy   PT Rationale for DC Rec Pt progressing well with mob, pending LOS may not need home PT, but recommend A from son and DIL for initial transition and A with household tasks   PT Brief overview of current status  Pt requires CGA with FWW for transfers and amb, SBA for bed mob   Total Evaluation Time   Total Evaluation Time (Minutes) 12   Skin WDL   Skin WDL X   Skin Color pale   Skin Temperature warm   Skin Moisture dry,flaky   Skin Elasticity slow return to original state

## 2021-07-06 NOTE — PROGRESS NOTES
"   07/06/21 1551   General Information   Onset of Illness/Injury or Date of Surgery 07/05/21   Referring Physician Libia   Patient/Family Therapy Goal Statement (SLP) Agreed to POC goal.  Goal to eat today.   Pertinent History of Current Problem Per notes: Yvette Gastelum is a 70 year old female with a history of small cell lung cancer s/p chemoradiation in 2018, currently in remission, pancytopenia, HLD, GERD, HTN who presents with headache and dizziness.  Large right-sided subdural hematoma with midline shift, uncal herniation and early obstructive hydrocephalus.      Pt is now s/p craniotomy.   General Observations Pt was alert and cooperative.  Baseline cough and hoarse voice noted in conversation.  No coughing noted directly after swallows.    Oral Motor   Oral Musculature generally intact   Dentition (Oral Motor)   Comment, Dentition (Oral Motor) No teeth; does not use dentures often  (Pt declined use of dentures for eval)   Cough/Swallow/Gag Reflex (Oral Motor)   Comment, Cough/Swallow/Gag Reflex (Oral Motor) WFL, baseline cough on phlegm   Vocal Quality/Secretion Management (Oral Motor)   Comment, Vocal Quality/Secretion Management (Oral Motor) Hoarse   General Swallowing Observations   Respiratory Support (General Swallowing Observations) none   Clinical Swallow Eval: Thin Liquid Texture Trial   Mode of Presentation, Thin Liquids straw   Volume of Liquid or Food Presented sips x 6   Oral Phase of Swallow WFL   Pharyngeal Phase of Swallow intact   Diagnostic Statement no signs of aspiration    Clinical Swallow Evaluation: Solid Food Texture Trial   Mode of Presentation, Solid self-fed   Volume of Solid Food Presented bites x 5   Oral Phase, Solid   (increased oral duration, pt using gums to \"chew\")   Pharyngeal Phase, Solid   (delay/tallking while eating)   Diagnostic Statement no signs of aspiration    Swallowing Recommendations   Diet Consistency Recommendations thin liquids  (Mechanical dental soft) "   Supervision Level for Intake distant supervision needed   Mode of Delivery Recommendations bolus size, small;slow rate of intake   Swallowing Maneuver Recommendations alternate food and liquid intake  (chew carefully, use dentures if fit well)   Monitoring/Assistance Required (Eating/Swallowing) monitor for cough or change in vocal quality with intake   Recommended Feeding/Eating Techniques (Swallow Eval) maintain upright posture during/after eating for 30 minutes   Medication Administration Recommendations, Swallowing (SLP) Pills with thin liquids   SLP Therapy Assessment/Plan   Criteria for Skilled Therapeutic Interventions Met (SLP Eval) yes   SLP Diagnosis Mild oral-pharyngeal dysphagia   Rehab Potential (SLP Eval) good, to achieve stated therapy goals   Therapy Frequency (SLP Eval) 5 times/wk   Predicted Duration of Therapy Intervention (SLP Eval) 1 week   Comment, Therapy Assessment/Plan (SLP) Mild oral-pharyngeal dysphagia was observed at bedside.  Pt presents with no dentition/infrequent use of dentures, hoarse voicing, prolonged oral phase, and swallow delay when talking.   Recommend a mechanical dental soft diet with slow rate, careful chewing, sit at 90 degrees.  Plan to continue swallow Tx to assess diet tolerance and train safe swallow strategies.   Therapy Plan Review/Discharge Plan (SLP)   Therapy Plan Review (SLP) evaluation/treatment results reviewed;care plan/treatment goals reviewed;risks/benefits reviewed;participants included;participants voiced agreement with care plan;current/potential barriers reviewed;patient   SLP Discharge Planning    SLP Discharge Recommendation (DC Rec) home   SLP Rationale for DC Rec Anticipate SLP swallow Tx goal to be met as an IP   SLP Brief overview of current status  Mild oral-pharyngeal dysphagia observed.  Rec: mechanical dental soft diet with slow rate, carefull chewing, sit at 90 degrees    Total Evaluation Time   Total Evaluation Time (Minutes) 13

## 2021-07-06 NOTE — H&P
[facility]    History and Physical  Hospitalist       Date of Admission:  7/5/2021    Assessment & Plan   Yvette Gastelum is a 70 year old female with a history of small cell lung cancer s/p chemoradiation in 2018, currently in remission, pancytopenia, HLD, GERD, HTN who presents with headache and dizziness.    Headaches and dizziness  Large right-sided subdural hematoma with midline shift, uncal herniation and early obstructive hydrocephalus  Thrombocytopenia  Patient admitted with headaches and dizziness and found to have large right-sided SDH with midline shift and uncal herniation.  No history of trauma.  Spontaneous SDH in setting of severe thrombocytopenia.  Platelet count is 34 at admission.  -Neurosurgery has been consulted.  Patient will be taken to the OR tonight for craniotomy and evacuation of SDH.  Following this she will be admitted to the ICU by medical team.  -N.p.o.  -Postoperative cares per neurosurgery  -Avoid all NSAIDs/anticoagulants/antiplatelets  -Receiving first unit of platelets in the ED.  Second unit has been ordered.    Pancytopenia  Follows with Dr. Luz with [facility].  Has had multiple investigations for pancytopenia.  Recently placed on 4-day course of dexamethasone for thrombocytopenia with no improvement.  Admitted now with spontaneous head bleed.  -Consult to [facility]/on[facility].  -Transfuse platelets to keep platelet count above 50,000.    HLD  HTN  -Continue PTA atorvastatin.  Not on any antihypertensive medications.    GERD  -Continue PTA omeprazole    H/o small cell lung cancer  Follows with Dr. Luz.  She is s/p chemoradiation in 2018.  Had CT CAP last month that did not show any evidence of malignancy.  Is felt to be in remission.  -Continue PTA as needed albuterol nebs, fluticasone inhaler for SOB/wheezing    DVT Prophylaxis: Pneumatic Compression Devices  Code Status: Full Code  Expected discharge: > 3 days pending postoperative  "course.    Pinky Reza MD    Primary Care Physician   Arie House MD    Chief Complaint   Headaches and dizziness    History is obtained from the patient, ED provider and chart review    History of Present Illness   Yvette Gastelum is a 70 year old female with a history of small cell lung cancer s/p chemoradiation in 2018, currently in remission, pancytopenia, HLD, GERD, HTN who presents with headache and dizziness.  Patient sees Dr. Luz in oncology.  She just recently had a CT CAP that did not show any evidence of cancer.  She has been noted to be pancytopenic over the last several months and has undergone multiple investigations including 2 bone marrow biopsies.  Cytogenetics suspicious for evolving MDS.  She was placed on a 4-day course of dexamethasone 40 mg daily between 6/28-7/1 by her oncologist to see if her platelets would improve if there was a component of ITP.  Patient notes that ever since starting dexamethasone she has felt \"goofy\".  Has had dizziness and worsening headaches.  She has been off balance.  She attributed to always being side effects of the drug but symptoms got really bad today and hence presented to the ED.  Also has been having nausea and vomiting.  Also reports issues with double vision especially when she looks towards the left.  She is not on any blood thinners.  In the ED, she is noted to be slightly hypotensive with SBP in the 90s, afebrile.  Labs significant for platelet count 34, Hb 9.9, creatinine 1.18 with BUN 36, AST 61.  Head CT shows large heterogeneous SDH on the right measuring 1.4 cm thick with 1.6 cm leftward midline shift, subfalcine and uncal herniation and early obstructive hydrocephalus.  Low attenuation of SDH may reflect active bleeding and/or thrombocytopenia.  Patient denies any recent fevers, chills, sweats, chest pain, shortness of breath, bowel or bladder issues.  Patient has been given IV Benadryl, Dilaudid, Toradol, Reglan, Zofran, IVF and " 1 unit platelets in the ED.  Neurosurgery has been consulted by ED physician and she will be taken to the OR tonight.    Past Medical History    I have reviewed this patient's medical history and updated it with pertinent information if needed.   Past Medical History:   Diagnosis Date     Cancer (H)     Lung cancer       Hypertension      Kidney stones      Obese      Recurrent UTI      S/P CL-BSO      Sepsis (H)     secondary to uti        Past Surgical History   I have reviewed this patient's surgical history and updated it with pertinent information if needed.  Past Surgical History:   Procedure Laterality Date     BONE MARROW BIOPSY, BONE SPECIMEN, NEEDLE/TROCAR N/A 2/10/2021    Procedure: BONE MARROW BIOPSY;  Surgeon: Earlene Garcia MD;  Location:  GI     BONE MARROW BIOPSY, BONE SPECIMEN, NEEDLE/TROCAR N/A 4/19/2021    Procedure: BIOPSY, BONE MARROW;  Surgeon: Felix Elizabeth MD;  Location:  GI     COLONOSCOPY       COLONOSCOPY N/A 2/10/2016    Procedure: COMBINED COLONOSCOPY, SINGLE OR MULTIPLE BIOPSY/POLYPECTOMY BY BIOPSY;  Surgeon: Antonia Jiménez MD;  Location:  GI     EYE SURGERY      CATARACT EXTRACTION RIGHT & LEFT     GYN SURGERY      BILATERAL TUBAL LIGATION, CL, LAPAROSCOPIC LEFT SALPINGO-OOPHORECTOMY     HEAD & NECK SURGERY      WISDOM TEETH EXTRACTION     HYSTERECTOMY       INSERT PORT VASCULAR ACCESS N/A 9/14/2018    Procedure: INSERT PORT VASCULAR ACCESS;  POWER PORT PLACEMENT;  Surgeon: Todd Norris MD;  Location:  SD     REMOVE PORT VASCULAR ACCESS N/A 5/21/2019    Procedure: REMOVAL, VASCULAR ACCESS PORT;  Surgeon: Todd Norris MD;  Location:  OR       Prior to Admission Medications   Prior to Admission Medications   Prescriptions Last Dose Informant Patient Reported? Taking?   Acetaminophen 325 MG CAPS Past Week at Unknown time Self Yes Yes   Sig: Take 325-650 mg by mouth every 6 hours as needed for pain    Ascorbic Acid (VITAMIN C)  500 MG CHEW 2021 at Unknown time  Yes Yes   Sig: Take 500 mg by mouth daily    Elastic Bandages & Supports (MEDICAL COMPRESSION SOCKS) MISC   No No   Si Package daily   Vitamin D, Cholecalciferol, 25 MCG (1000 UT) CAPS 2021 at Unknown time  Yes Yes   Sig: Take 25 mcg by mouth daily    albuterol (PROVENTIL) (2.5 MG/3ML) 0.083% neb solution 2021 at Unknown time  No Yes   Sig: Take 1 vial (2.5 mg) by nebulization 2 times daily   Patient taking differently: Take 2.5 mg by nebulization 2 times daily as needed for shortness of breath / dyspnea or wheezing    atorvastatin (LIPITOR) 10 MG tablet 2021 at Unknown time  No Yes   Sig: Take 1 tablet (10 mg) by mouth daily   benzonatate (TESSALON) 100 MG capsule Past Month at Unknown time  No Yes   Sig: Take 1 capsule (100 mg) by mouth 3 times daily as needed for cough   diclofenac (VOLTAREN) 1 % topical gel More than a month at Unknown time  No No   Sig: Apply 2 g topically 4 times daily   Patient taking differently: Apply 2 g topically 4 times daily as needed for moderate pain    fluticasone (FLOVENT HFA) 110 MCG/ACT inhaler 2021 at Unknown time  No Yes   Sig: Inhale 1 puff into the lungs 2 times daily   loperamide (IMODIUM) 2 MG capsule More than a month at Unknown time  Yes No   Sig: Take 2 mg by mouth 4 times daily as needed for diarrhea   magnesium 250 MG tablet 2021 at Unknown time  Yes Yes   Sig: Take 1 tablet by mouth daily   omeprazole (PRILOSEC) 20 MG DR capsule 2021 at Unknown time  No Yes   Sig: Take 1 capsule (20 mg) by mouth daily   sodium chloride (NEBUSAL) 3 % neb solution Past Month at Unknown time  No Yes   Sig: Take 3 mLs by nebulization 2 times daily Use with albuterol inhaler   Patient taking differently: Take 3 mLs by nebulization 2 times daily as needed for wheezing Use with albuterol inhaler   vitamin B-Complex 2021 at Unknown time  Yes Yes   Sig: Take 1 tablet by mouth daily      Facility-Administered Medications: None      Allergies   Allergies   Allergen Reactions     No Known Allergies        Social History   I have reviewed this patient's social history and updated it with pertinent information if needed. Yvette Gastelum  reports that she quit smoking about 5 years ago. Her smoking use included cigarettes. She started smoking about 55 years ago. She has a 50.00 pack-year smoking history. She has never used smokeless tobacco. She reports that she does not drink alcohol or use drugs.    Family History   I have reviewed this patient's family history and updated it with pertinent information if needed.   Family History   Problem Relation Age of Onset     Aneurysm Father         Aorta     Hypertension Father      Cervical Cancer Mother      Lung Cancer Brother         non smoker       Review of Systems   The 10 point Review of Systems is negative other than noted in the HPI or here.     Physical Exam   Temp: 98.2  F (36.8  C) Temp src: Oral BP: 104/73 Pulse: 86   Resp: 20 SpO2: 97 % O2 Device: None (Room air)    Vital Signs with Ranges  Temp:  [98  F (36.7  C)-98.5  F (36.9  C)] 98.2  F (36.8  C)  Pulse:  [70-94] 86  Resp:  [16-20] 20  BP: ()/(46-73) 104/73  SpO2:  [93 %-100 %] 97 %  0 lbs 0 oz    Constitutional: Awake, alert, cooperative, no apparent distress, obese female.  Eyes: no icterus, EOMs intact, left-sided ptosis noted  HEENT: Moist mucous membranes  Respiratory: Clear to auscultation bilaterally, no crackles or wheezing.  Cardiovascular: Regular rate and rhythm, normal S1 and S2, and no murmur noted.  GI: Soft, non-distended, non-tender, normal bowel sounds.  Skin: No rashes, no cyanosis, 1+ pitting bilateral lower extremity edema present  Musculoskeletal: No joint swelling, erythema or tenderness.  Neurologic: Alert, oriented and engages in appropriate conversation, no facial asymmetry, left-sided ptosis noted, moving all extremities, fluent speech, diplopia when looking to the left  Psychiatric: Calm and  pleasant, no obvious anxiety or depression.     Data   Data reviewed today:  I personally reviewed CT scan with result as noted above  Recent Labs   Lab 07/05/21  1736 07/05/21  1549   WBC  --  7.0   HGB  --  9.9*   MCV  --  80   PLT  --  34*   INR 1.09  --    NA  --  138   POTASSIUM  --  3.8   CHLORIDE  --  108   CO2  --  26   BUN  --  36*   CR  --  1.18*   ANIONGAP  --  4   RUSTY  --  8.8   GLC  --  130*   ALBUMIN  --  3.4   PROTTOTAL  --  6.8   BILITOTAL  --  1.7*   ALKPHOS  --  62   ALT  --  35   AST  --  61*       Recent Results (from the past 24 hour(s))   Head CT w/o contrast    Narrative    CT SCAN OF THE HEAD WITHOUT CONTRAST   7/5/2021 4:53 PM     HISTORY: Headache, intracranial hemorrhage suspected. History of   small cell cancer, low platelets.    TECHNIQUE:  Axial images of the head and coronal reformations without  IV contrast material. Radiation dose for this scan was reduced using  automated exposure control, adjustment of the mA and/or kV according  to patient size, or iterative reconstruction technique.    COMPARISON: 1/18/2019 brain MRI    FINDINGS: Large, heterogeneous, but primarily low attenuation  holohemispheric subdural hematoma on the right with patchy areas of  increased density primarily along the superior aspect of the  collection. The hematoma measures 1.4 cm maximal thickness adjacent to  the right frontal lobe and results in 1.6 cm leftward shift of the  septum pellucidum with subfalcine and right uncal/parahippocampal  gyrus herniation over the free edge of the tentorial leaflet. There is  effacement of the right lateral ventricle and dilatation of the left  lateral ventricle with adjacent periventricular hypoattenuation  concerning for periventricular interstitial edema in the setting of  acute obstructive hydrocephalus.    The paranasal sinuses are clear. Temporal bone structures are well  aerated. No acute osseous abnormality. Prior lens surgery..      Impression    IMPRESSION:   1.  Large heterogeneous subdural hematoma on the right measuring 1.4 cm  thick with 1.6 cm leftward midline shift, subfalcine and uncal  herniation and early obstructive hydrocephalus. Low-attenuation of the  subdural hematoma may reflect active bleeding and/or thrombocytopenia.  2. Neurosurgical consultation is recommended.    Critical results were communicated to Dr. Jorge at the time of this  dictation.      CHRISTY JIMENES MD         SYSTEM ID:  [id number]

## 2021-07-06 NOTE — PROGRESS NOTES
[facility]    Medicine Progress Note - Hospitalist Service       Date of Admission:  7/5/2021  Date of Service: 07/06/2021    Assessment & Plan           Yvette Gastelum is a 70 year old female with a history of small cell lung cancer s/p chemoradiation in 2018, currently in remission, pancytopenia, HLD, GERD, HTN who presents with headache and dizziness.    Headaches and dizziness  Large right-sided subdural hematoma with midline shift, uncal herniation and early obstructive hydrocephalus  Thrombocytopenia  Patient admitted with headaches and dizziness and found to have large right-sided SDH with midline shift and uncal herniation.  No history of trauma.  Spontaneous SDH in setting of severe thrombocytopenia.  Platelet count is 34 at admission.  -Neurosurgery was consulted and now s/p craniotomy and evacuation of SDH. Received 2 unit of platelets.  Plan:  -Transfer out of ICU when okay with NSG  -Drain cares per surgery  -SLP eval  - PT / OT  -Postoperative cares per neurosurgery  -Avoid all NSAIDs/anticoagulants/antiplatelets    Pancytopenia  Follows with Dr. Luz with [facility].  Has had multiple investigations for pancytopenia.  Recently placed on 4-day course of dexamethasone for thrombocytopenia with no improvement.  Admitted now with spontaneous head bleed.  Plan:  -CBC in AM  -Consult to [facility] hematology/oncology.  -Transfuse platelets to keep platelet count above 50,000.    HLD  HTN  -Continue PTA atorvastatin.  Not on any antihypertensive medications.    GERD  -Continue PTA omeprazole    H/o small cell lung cancer  Follows with Dr. Luz.  She is s/p chemoradiation in 2018.  Had CT CAP last month that did not show any evidence of malignancy.  Is felt to be in remission.  -Continue PTA as needed albuterol nebs, fluticasone inhaler for SOB/wheezing         Diet: Advance Diet as Tolerated: Clear Liquid Diet    DVT Prophylaxis: Pneumatic Compression Devices  Pablo  Catheter: PRESENT, indication: Strict 1-2 Hour I&O  Central Lines: None  Code Status: Full Code      Disposition Plan   Expected discharge: 2 - 3 days, recommended to prior living arrangement once NSG work up completed.     The patient's care was discussed with the Bedside Nurse and Patient.    Brown Saab MD  Hospitalist Service  [facility]  Securely message with the Vocera Web Console (learn more here)  Text page via [employer] Paging/Directory      Risk Factors Present on Admission          # Hypocalcemia: Ca = 8.2 mg/dL (Ref range: 8.5 - 10.1 mg/dL) and/or iCa = N/A on admission, will replace as needed     # Thrombocytopenia: Plts = 87 10e9/L (Ref range: 150 - 450 10e9/L) on admission, will monitor for bleeding        Patient, family, interdisciplinary team involved in care and agrees with plan.  Total time - Greater than 35 min. More than 50% of time spent in direct patient care, care coordination, patient/caregiver counseling, and formalizing plan of care.     ______________________________________________________________________    Interval History     No acute events since OR  No CP/SOB  No fevers or chills  Drain with good output  No new weakness / numbness of extremities, no slurred speech  Still with headache  No nausea / vomiting    Data reviewed today: I reviewed all medications, new labs and imaging results over the last 24 hours. I personally reviewed no images or EKG's today.    Physical Exam   Vital Signs: Temp: 98.2  F (36.8  C) Temp src: Oral BP: 115/68 Pulse: 82   Resp: 11 SpO2: 99 % O2 Device: None (Room air) Oxygen Delivery: 2 LPM  Weight: 220 lbs 10.89 oz    Constitutional: no apparent distress  Respiratory: Clear to auscultation bilaterally, no crackles or wheezing.  Cardiovascular: Regular rate and rhythm, normal S1 and S2, and no murmur noted.  GI: Soft, non-distended, non-tender, normal bowel sounds.  Skin: No rashes, no cyanosis  Musculoskeletal: No joint swelling,  erythema or tenderness.  Neurologic: Alert, oriented and engages in appropriate conversation, no facial asymmetry, left-sided ptosis noted, moving all extremities, fluent speech  Psychiatric: Calm and pleasant, no obvious anxiety or depression.     Data   Recent Labs   Lab 07/06/21  0544 07/05/21  1911 07/05/21  1736 07/05/21  1549   WBC 5.7 6.1  --  7.0   HGB 7.6* 8.2*  --  9.9*   MCV 82 81  --  80   PLT 87* 63*  --  34*   INR  --   --  1.09  --      --   --  138   POTASSIUM 4.2  --   --  3.8   CHLORIDE 110*  --   --  108   CO2 23  --   --  26   BUN 29  --   --  36*   CR 1.05*  --   --  1.18*   ANIONGAP 5  --   --  4   RSUTY 8.2*  --   --  8.8   GLC 99  --   --  130*   ALBUMIN  --   --   --  3.4   PROTTOTAL  --   --   --  6.8   BILITOTAL  --   --   --  1.7*   ALKPHOS  --   --   --  62   ALT  --   --   --  35   AST  --   --   --  61*     Recent Results (from the past 24 hour(s))   Head CT w/o contrast    Narrative    CT SCAN OF THE HEAD WITHOUT CONTRAST   7/5/2021 4:53 PM     HISTORY: Headache, intracranial hemorrhage suspected. History of   small cell cancer, low platelets.    TECHNIQUE:  Axial images of the head and coronal reformations without  IV contrast material. Radiation dose for this scan was reduced using  automated exposure control, adjustment of the mA and/or kV according  to patient size, or iterative reconstruction technique.    COMPARISON: 1/18/2019 brain MRI    FINDINGS: Large, heterogeneous, but primarily low attenuation  holohemispheric subdural hematoma on the right with patchy areas of  increased density primarily along the superior aspect of the  collection. The hematoma measures 1.4 cm maximal thickness adjacent to  the right frontal lobe and results in 1.6 cm leftward shift of the  septum pellucidum with subfalcine and right uncal/parahippocampal  gyrus herniation over the free edge of the tentorial leaflet. There is  effacement of the right lateral ventricle and dilatation of the  left  lateral ventricle with adjacent periventricular hypoattenuation  concerning for periventricular interstitial edema in the setting of  acute obstructive hydrocephalus.    The paranasal sinuses are clear. Temporal bone structures are well  aerated. No acute osseous abnormality. Prior lens surgery..      Impression    IMPRESSION:   1. Large heterogeneous subdural hematoma on the right measuring 1.4 cm  thick with 1.6 cm leftward midline shift, subfalcine and uncal  herniation and early obstructive hydrocephalus. Low-attenuation of the  subdural hematoma may reflect active bleeding and/or thrombocytopenia.  2. Neurosurgical consultation is recommended.    Critical results were communicated to Dr. Jorge at the time of this  dictation.      CHRISTY JIMENES MD         SYSTEM ID:  [id number]   CT Head w/o Contrast    Narrative    EXAM: CT HEAD W/O CONTRAST  LOCATION: [facility]  DATE/TIME: 7/6/2021 5:10 AM    INDICATION: Subdural hematoma  COMPARISON: 07/05/2021  TECHNIQUE: Routine CT Head without IV contrast. Multiplanar reformats. Dose reduction techniques were used.    FINDINGS:  INTRACRANIAL CONTENTS: Interval postoperative changes evacuation of right subdural hematoma with subdural drain in place. Gas and mixed density blood collection with thickness of fluid measured at 9 mm on the current compared with 19 mm on the prior.   Decrease in mass effect with 10 mm right to left midline shift compared with 16 mm on the prior. Decrease in entrapment of left lateral ventricle. No CT evidence of acute infarct. Mild presumed chronic small vessel ischemic changes. Mild generalized   volume loss. No hydrocephalus.     VISUALIZED ORBITS/SINUSES/MASTOIDS: No intraorbital abnormality. No paranasal sinus mucosal disease. No middle ear or mastoid effusion.    BONES/SOFT TISSUES: Post right craniotomy.      Impression    IMPRESSION:  1.  Interval postoperative changes evacuation of right subdural hematoma with  subdural drain in place.  2.  Gas and mixed density blood collection with thickness of fluid measured at 9 mm on the current compared with 19 mm on the prior.  3.  Decrease in mass effect with 10 mm right to left midline shift compared with 16 mm on the prior.  4.  Decrease in entrapment of left lateral ventricle.     Medications     - MEDICATION INSTRUCTIONS -       sodium chloride 125 mL/hr at 07/06/21 0900       acetaminophen  975 mg Oral Q8H     atorvastatin  10 mg Oral Daily     docusate sodium  100 mg Oral BID     fluticasone  1 puff Inhalation BID     omeprazole  20 mg Oral Daily     polyethylene glycol  17 g Oral Daily     senna-docusate  1 tablet Oral BID     sodium chloride (PF)  3 mL Intracatheter Q8H

## 2021-07-06 NOTE — PLAN OF CARE
PT/OT: Pt with activity orders for bedrest with HOB 20 degrees. Holding therapies until activity orders upgraded. RN calling to check on activity orders and will update therapy.

## 2021-07-06 NOTE — PROGRESS NOTES
[facility]    Neurosurgery  Daily Post-Op Note    Assessment & Plan   Procedure(s):  CRANIOTOMY   1 Day Post-Op  Doing well.  Pain well-controlled.  Ok to mobilize with PT/OT    Plan:  -Advance activity as tolerated  -Continue supportive and symptomatic treatment  -Start or continue physical therapy  Keep KRISTEL for now    Tutu Covarrubias    Interval History   Stable.  Doing well.  Improving slowly.  Pain is reasonably controlled.  No fevers.     Physical Exam   Temp: 97.7  F (36.5  C) Temp src: Oral BP: 97/62 Pulse: 80   Resp: 16 SpO2: 91 % O2 Device: Oxymask Oxygen Delivery: 2 LPM  Vitals:    07/06/21 0000   Weight: 100.1 kg (220 lb 10.9 oz)     Vital Signs with Ranges  Temp:  [97.1  F (36.2  C)-98.5  F (36.9  C)] 97.7  F (36.5  C)  Pulse:  [68-94] 80  Resp:  [10-23] 16  BP: ()/(45-74) 97/62  MAP:  [51 mmHg-160 mmHg] 73 mmHg  Arterial Line BP: ()/() 78/67  SpO2:  [91 %-100 %] 91 %  I/O last 3 completed shifts:  In: 3470 [I.V.:1335; IV Piggyback:1750]  Out: 1795 [Urine:1650; Drains:145]    Alert and oriented.  Moves all extremities equally.      Incision: CDI      Medications     - MEDICATION INSTRUCTIONS -       sodium chloride 125 mL/hr at 07/06/21 0900        acetaminophen  975 mg Oral Q8H     atorvastatin  10 mg Oral Daily     docusate sodium  100 mg Oral BID     fluticasone  1 puff Inhalation BID     omeprazole  20 mg Oral Daily     polyethylene glycol  17 g Oral Daily     senna-docusate  1 tablet Oral BID     sodium chloride (PF)  3 mL Intracatheter Q8H           Tutu Covarrubias PA-C  [facility]  [address]  [address] [place] [id number]    Tel [phone]  Pager [phone]

## 2021-07-06 NOTE — ANESTHESIA POSTPROCEDURE EVALUATION
Patient: Yvette Gastelum    Procedure(s):  CRANIOTOMY    Diagnosis:Bleeding [R58]  Diagnosis Additional Information: No value filed.    Anesthesia Type:  General    Note:  Disposition: Admission   Postop Pain Control: Uneventful            Sign Out: Well controlled pain   PONV: No   Neuro/Psych: Uneventful            Sign Out: Acceptable/Baseline neuro status   Airway/Respiratory: Uneventful            Sign Out: Acceptable/Baseline resp. status   CV/Hemodynamics: Uneventful            Sign Out: Acceptable CV status; No obvious hypovolemia; No obvious fluid overload   Other NRE: NONE   DID A NON-ROUTINE EVENT OCCUR? No           Last vitals:  Vitals:    07/05/21 2240 07/05/21 2300 07/05/21 2310   BP: 102/57 101/62 100/56   Pulse: 75 70 70   Resp: 12 11 14   Temp:      SpO2: 99% 95% 95%       Last vitals prior to Anesthesia Care Transfer:  CRNA VITALS  7/5/2021 2132 - 7/5/2021 2232      7/5/2021             Pulse:  85    ART BP:  142/64    ART Mean:  97    Ht Rate:  85    SpO2:  99 %    Resp Rate (set):  10          Electronically Signed By: SARIKA DERAS MD  July 6, 2021  12:05 AM

## 2021-07-06 NOTE — ANESTHESIA PROCEDURE NOTES
Airway       Patient location during procedure: OR       Procedure Start/Stop Times: 7/5/2021 8:09 PM  Staff -        Anesthesiologist:  Milind Rivers MD       CRNA: Betzaida Cleveland APRN CRNA       Performed By: CRNA  Consent for Airway        Urgency: elective  Indications and Patient Condition       Indications for airway management: willa-procedural       Induction type:intravenous      Final Airway Details       Final airway type: endotracheal airway       Successful airway: ETT - single  Endotracheal Airway Details        ETT size (mm): 7.0       Cuffed: yes       Successful intubation technique: video laryngoscopy       VL Blade Size: Glidescope 3       Grade View of Cords: 1       Adjucts: stylet       Position: Right       Measured from: gums/teeth       Secured at (cm): 20       Bite block used: None    Post intubation assessment        Placement verified by: capnometry, equal breath sounds and chest rise        Number of attempts at approach: 1       Number of other approaches attempted: 0       Secured with: pink tape       Ease of procedure: easy       Dentition: Intact and Unchanged    Medication(s) Administered   Medication Administration Time: 7/5/2021 8:09 PM

## 2021-07-06 NOTE — ANESTHESIA PROCEDURE NOTES
Arterial Line Procedure Note  Pre-Procedure   Staff -        Anesthesiologist:  Milind Rivers MD       Performed By: anesthesiologist       Location: pre-op       Pre-Anesthestic Checklist: patient identified, IV checked, risks and benefits discussed, informed consent, monitors and equipment checked and pre-op evaluation  Timeout:       Correct Patient: Yes        Correct Procedure: Yes        Correct Site: Yes        Correct Position: Yes   Procedure   Procedure: arterial line       Laterality: left       Insertion Site: radial (Radial).  Sterile Prep        Patient Prep/Sterile Barriers: patient draped       Skin prep: Chloraprep  Insertion/Injection        Technique: Seldinger Technique        Catheter Type/Size: 20 gauge, 12 cm  Narrative         Secured by: suture       Tegaderm dressing used.  Comments:  No complications.

## 2021-07-06 NOTE — ANESTHESIA CARE TRANSFER NOTE
Patient: Yvette Gastelum    Procedure(s):  CRANIOTOMY    Diagnosis: Bleeding [R58]  Diagnosis Additional Information: No value filed.    Anesthesia Type:   General     Note:    Oropharynx: oropharynx clear of all foreign objects  Level of Consciousness: drowsy  Oxygen Supplementation: face mask  Level of Supplemental Oxygen (L/min / FiO2): 6  Independent Airway: airway patency satisfactory and stable  Dentition: dentition unchanged  Vital Signs Stable: post-procedure vital signs reviewed and stable  Report to RN Given: handoff report given  Patient transferred to: PACU  Comments: Patient Tammy to command, is comfortable.  Handoff Report: Identifed the Patient, Identified the Reponsible Provider, Reviewed the pertinent medical history, Discussed the surgical course, Reviewed Intra-OP anesthesia mangement and issues during anesthesia, Set expectations for post-procedure period and Allowed opportunity for questions and acknowledgement of understanding      Vitals: (Last set prior to Anesthesia Care Transfer)  CRNA VITALS  7/5/2021 2132 - 7/5/2021 2213      7/5/2021             Pulse:  85    ART BP:  142/64    ART Mean:  97    Ht Rate:  85    SpO2:  99 %    Resp Rate (set):  10        Electronically Signed By: ЕКАТЕРИНА Torres CRNA  July 5, 2021  10:13 PM

## 2021-07-06 NOTE — CONSULTS
Consult Date: 07/06/2021    MEDICAL ONCOLOGY CONSULTATION    This consult has been requested by Dr. Pinky Reza for lung cancer.    Ms. Gastelum is a 70-year-old female with limited-stage small cell lung cancer diagnosed in 2018.  She received concurrent chemoradiation and prophylactic cranial radiation.  The patient has done well without any evidence of recurrence.    The patient has been cytopenic.  She had 2 bone marrow biopsies done this year.  She does not reveal any evidence of leukemia, lymphoma or myelodysplastic syndrome.  Bone marrow has been mildly hypercellular.  Cytogenetics does reveal a few abnormalities.  She may be developing myeloid neoplasm.    On 06/17/2021, CBC had revealed low platelets of 39 and low hemoglobin of 9.4.  WBC was normal.  Because of thrombocytopenia, I had advised the patient to take dexamethasone 40 mg once a day for 4 days.    The patient had called our clinic yesterday complaining of headache and dizziness.  The patient was advised to go to Emergency Room. She presented to the Emergency Room on 07/05/2021.  Multiple investigations were done.  -Platelets of 34, hemoglobin of 9.9 and WBC of 7.0.  -CMP revealed minor abnormalities.  -Normal INR.  -Noncontrast CT head revealed a subdural hematoma on the right.    The patient received platelet transfusion.  With that, her platelet improved from 34 to 63.  The patient had craniotomy and evacuation of subdural hematoma and placement of a subdural drain.    I saw the patient in the ICU.  The patient has some headache.  The patient denies bleeding from any other site.  No bleeding from ear, nose or throat.  No blood in the urine or stool.    No chest pain.  No shortness of breath.  No abdominal pain.  No recurrent vomiting.  All other review of systems negative.    ALLERGIES:  REVIEWED.    MEDICATIONS:  Reviewed.    PAST MEDICAL HISTORY:     1.  Limited-stage small cell lung cancer.  2.  Chronic kidney disease.  3.  Renal stone.  4.   CL and BSO.  5.  Hyperlipidemia.  6.  Hypertension.  7.  Pancytopenia.    SOCIAL HISTORY:   -She is a former smoker.    -She has not had alcohol in more than 30 years.    PHYSICAL EXAMINATION:    She is alert.  Not in any acute distress.  Rest of systems not examined.    LABORATORY DATA:  Reviewed.    IMPRESSION:     1.  A 70-year-old female with limited-stage small cell lung cancer. Patient is in a complete remission.    2.  Thrombocytopenia.  She is likely developing myelodysplastic syndrome.  3.  Normocytic anemia. She is likely developing myelodysplastic syndrome.  4.  Subdural hematoma secondary to thrombocytopenia.  5.  Other medical problems including hypertension and hyperlipidemia.    RECOMMENDATIONS:     1.  The patient has been admitted with subdural hematoma.  This is secondary to thrombocytopenia.  She had surgery done and is recovering.    2.  Discussed regarding thrombocytopenia.  She likely is developing primary bone marrow pathology like myelodysplastic syndrome.  Last bone marrow did not reveal any MDS but cytogenetics is abnormal.  I am not suspecting immune thrombocytopenia as her platelets improved with transfusion.    With regards to thrombocytopenia.  We will continue to monitor and transfuse platelets.  At this time, I would recommend keeping the platelets above 50 as she just had craniotomy done.    3.  She is anemic.  Again, this is from likely developing myelodysplastic syndrome.  We will transfuse PRBC for hemoglobin below 7 or if she is symptomatic.    4.  From lung cancer, the patient is doing well.  She remains in remission.  We will continue to monitor her as outpatient.    5.  The patient had few questions, which were all answered.  Oncology will continue to follow. Case discussed with Dr. Reza.    Thanks for the consult.      TOTAL TIME SPENT:  45 minutes.    Jennifer Luz MD        D: 07/06/2021   T: 07/06/2021   MT: SU    Name:     BISHNU LEY  MRN:       [id number]        Account:      [id number]   :      1950           Consult Date: 2021     Document: [id number]

## 2021-07-06 NOTE — PROGRESS NOTES
"   07/06/21 1407   Quick Adds   Type of Visit Initial Occupational Therapy Evaluation   Living Environment   People in home alone   Current Living Arrangements apartment   Living Environment Comments Pt lives in an apt accessible with an elevator,with her small dog Misty.   Self-Care   Usual Activity Tolerance good   Current Activity Tolerance fair   Regular Exercise Yes   Activity/Exercise Type walking   Exercise Amount/Frequency daily   Equipment Currently Used at Home cane, straight   Activity/Exercise/Self-Care Comment Pt reports (I) with all ADL, IADL and mobility, including driving, shopping, med/$ management. Walks her dog several times per day. Uses a cane \"sometimes\"   Disability/Function   Fall history within last six months no   General Information   Onset of Illness/Injury or Date of Surgery 07/05/21   Referring Physician Pinky Reza MD   Patient/Family Therapy Goal Statement (OT) Pt wants to go home, be with her dog. She does have a son and DIL that live a couple miles away to help if needed.   Additional Occupational Profile Info/Pertinent History of Current Problem Yvette Gastelum is a 70 year old female with a history of small cell lung cancer s/p chemoradiation in 2018, currently in remission, pancytopenia, HLD, GERD, HTN who presents with headache and dizziness, Large right-sided subdural hematoma with midline shift, uncal herniation and early obstructive hydrocephalus. Now POD 1 after R frontal crani for hematoma evacuation.   Existing Precautions/Restrictions fall   General Observations and Info crani with drain in place   Cognitive Status Examination   Orientation Status orientation to person, place and time   Affect/Mental Status (Cognitive) WNL   Follows Commands follows one-step commands;over 90% accuracy   Cognitive Status Comments Pt answered questions and conversed appropriately. No overt concerns with cognition on eval.   Visual Perception   Visual Impairment/Limitations " "corrective lenses for reading  (L eye: \"lazy eye\" since birth)   Visual Acuity able to read clock across room   Visual Attention attended to both sides   Visual Motor Impairment convergence, left   Impact of Vision Impairment on Function (Vision) seems to function WNL   Sensory   Sensory Quick Adds No deficits were identified   Sensory Comments denies any sensation changes   Pain Assessment   Patient Currently in Pain Yes, see Vital Sign flowsheet  (9/10 headache)   Integumentary/Edema   Integumentary/Edema no deficits were identifed   Posture   Posture Comments WNL for age   Range of Motion Comprehensive   Comment, General Range of Motion BUE full AROM   Strength Comprehensive (MMT)   Comment, General Manual Muscle Testing (MMT) Assessment 4+/5 BUE; BLE seem mildly deconditioned with bedrest after surgery   Muscle Tone Assessment   Muscle Tone Quick Adds No deficits were identified   Coordination   Coordination Comments coordination on LUE mildly impaired; impaired reach to target   Bed Mobility   Comment (Bed Mobility) Cues and CGA supine to EOB   Transfers   Transfer Comments CGA sit<>stand   Balance   Balance Comments CGA to take steps bed to chair with cane   Activities of Daily Living   BADL Assessment/Intervention lower body dressing   Lower Body Dressing Assessment/Training   [place] Level (Lower Body Dressing) maximum assist (25% patient effort)  (pt requests assist due to HA)   Clinical Impression   Criteria for Skilled Therapeutic Interventions Met (OT) yes   OT Diagnosis Impaired ADL and IADL post R frontal crani due to SDH   OT Problem List-Impairments impacting ADL problems related to;activity tolerance impaired;balance;coordination;cognition;mobility;strength;post-surgical precautions;pain   ADL comments/analysis will assess cognition   Assessment of Occupational Performance 3-5 Performance Deficits   Identified Performance Deficits ADL, IADL, mobility   Planned Therapy Interventions (OT) ADL " retraining;transfer training;cognition;neuromuscular re-education;strengthening   Clinical Decision Making Complexity (OT) moderate complexity   Therapy Frequency (OT) Daily   Predicted Duration of Therapy 1 week   Anticipated Equipment Needs Upon Discharge (OT) shower chair;walker, rolling;raised toilet seat;reacher   Risk & Benefits of therapy have been explained evaluation/treatment results reviewed;care plan/treatment goals reviewed;risks/benefits reviewed;current/potential barriers reviewed;participants voiced agreement with care plan;participants included;patient   OT Discharge Planning    OT Discharge Recommendation (DC Rec) home with home care occupational therapy;Home with assist   OT Rationale for DC Rec Pt is progressing well with ADL, mobility and cognition. Anticipate that she may be appropriate for DC home with support from her son and DIL and home therapy vs OP therapy at discharge. Will continue to assess.   OT Brief overview of current status  CGA bed mobility; CGA sit<>stand, CGA bed<>chair.    Total Evaluation Time (Minutes)   Total Evaluation Time (Minutes) 20

## 2021-07-07 NOTE — PROGRESS NOTES
Patient vital signs stable, pain medication plan effective for head ache. Patient retaing urine, voided 100 ml, bladder scan after 550. Straight cathed x one. Md called and aware, sent UA. At 1900 complained of bladder and vaginal pain, Md called and aware.

## 2021-07-07 NOTE — PROGRESS NOTES
[facility]    Neurosurgery  Daily Progress Note    Assessment & Plan   Procedure(s):  CRANIOTOMY   -2 Days Post-Op  Patient reports feeling better today compared to yesterday. Previous headache and nausea improving. Able to move all extremities and follow commands. Incision site CDI. KRISTEL drain with 25ml output overnight. Oncology following and provided recommendations for thrombocytopenia; appreciate assistance. Platelets 73 today.     Plan:  -Advance activity as tolerated  -Continue therapies   -Pain control measures as needed   -Advance diet as tolerated  -Routine wound care  -Appreciate assistance from specialties     Discussed with Dr. Dominguez     ADDENDUM:  KRISTEL drain discontinued today. Suture placed. Suture removal in 10-14 days.     Kaity Lund CNP  [facility] Neurosurgery  [facility]  [address]  [place] [address]  Tel [phone]  Pager [phone]      Interval History   Stable     Physical Exam   Temp: 98.2  F (36.8  C) Temp src: Oral BP: 108/68 Pulse: 80   Resp: 12 SpO2: 98 % O2 Device: Nasal cannula Oxygen Delivery: 2 LPM  Vitals:    07/06/21 0000 07/07/21 0500   Weight: 220 lb 10.9 oz (100.1 kg) 218 lb 7.6 oz (99.1 kg)     Vital Signs with Ranges  Temp:  [98.2  F (36.8  C)-98.5  F (36.9  C)] 98.2  F (36.8  C)  Pulse:  [72-92] 80  Resp:  [10-29] 12  BP: ()/(57-93) 108/68  SpO2:  [90 %-100 %] 98 %  I/O last 3 completed shifts:  In: 4075 [P.O.:325; I.V.:2750; IV Piggyback:1000]  Out: 1985 [Urine:1875; Drains:110]    Mental status:  Alert and Oriented x 3, speech is fluent.  Cranial nerves:  II-XII intact.   Motor:  Moves all extremities. Strength is 5/5 throughout the upper and lower extremities  Sensation:  Intact  Coordination:  Smooth finger to nose testing.   Negative pronator drift.    Incision: CDI      Medications     - MEDICATION INSTRUCTIONS -       sodium chloride 125 mL/hr at 07/07/21 0120        acetaminophen  975  mg Oral Q8H     atorvastatin  10 mg Oral Daily     docusate sodium  100 mg Oral BID     fluticasone  1 puff Inhalation BID     omeprazole  20 mg Oral Daily     polyethylene glycol  17 g Oral Daily     senna-docusate  1 tablet Oral BID     sodium chloride (PF)  3 mL Intracatheter Q8H       Kaity Lund [facility] Neurosurgery   [address] [place] [address], [place] [address]  Tel [phone]  Pager [phone]

## 2021-07-07 NOTE — PLAN OF CARE
PT: Attempted to see for PT and pt reports she is having a bad day with a terrible headache, nausea, and discomfort in abdomen that is similar to a UTI. Pt just took meds and wants to wait on ambulation or OOB activity. Will continue to follow

## 2021-07-07 NOTE — PROGRESS NOTES
Service Date: 07/07/2021    SUBJECTIVE:  Ms. Gastelum is a 70-year-old female with limited-stage small cell lung cancer diagnosed in 2018.  She is in remission from it.    The patient has cytopenia.  She mainly has anemia and thrombocytopenia.  Bone marrow biopsy has been done.  She likely is developing myelodysplastic syndrome.    The patient was admitted with subdural hematoma.  She had craniotomy and evacuation of subdural hematoma.    The patient has some headache.  No chest pain.  No shortness of breath. No cough.  No nausea or vomiting.  She is having some urinary problems.  She had Shah catheter which was causing irritation.  Catheter was removed.  Now, she is not able to urinate.  Nurses may do straight catheterization.    She has not been having any fever.  She has not been bleeding from any site.    For thrombocytopenia, she received platelet transfusion.  Her platelets have improved.  She is remaining stable.  Today it is 73.    PHYSICAL EXAMINATION:    GENERAL:  She is alert and oriented x 3.  VITAL SIGNS:  Reviewed.  Not in any distress.  Rest of systems not examined.    LABS:  Reviewed.    ASSESSMENT:     1.  A 70-year-old female with limited-stage small cell lung cancer. No evidence of disease.  2.  Thrombocytopenia.  She likely is developing myelodysplastic syndrome.  3.  Anemia secondary to developing myelodysplastic syndrome.  4.  Subdural hematoma status post craniotomy and evacuation of hematoma.    PLAN:     1.  CBC was reviewed with her.  I explained to her that WBC is normal.  Her platelets are remaining stable at 73. As this has not decreased, this goes against immune thrombocytopenia.  Her hemoglobin is stable at 7.5.    At this time, we will continue to monitor CBC.  She will be transfused for hemoglobin below 7 and platelets below 50.  Because of her subdural hematoma, we will want to maintain platelets above 50 for now.    2.  The patient had a few questions, which were all answered.   Oncology will continue to follow.      Jennifer Luz MD        D: 2021   T: 2021   MT: SU    Name:     BISHNU LEYWin  MRN:      [id number]        Account:      [id number]   :      1950           Service Date: 2021       Document: [id number]

## 2021-07-07 NOTE — PROGRESS NOTES
[facility]    Medicine Progress Note - Hospitalist Service       Date of Admission:  7/5/2021  Date of Service: 07/07/2021    Assessment & Plan           Yvette Gastelum is a 70 year old female with a history of small cell lung cancer s/p chemoradiation in 2018, currently in remission, pancytopenia, HLD, GERD, HTN who presents with headache and dizziness.    Large right-sided subdural hematoma with midline shift, uncal herniation and early obstructive hydrocephalus  Patient admitted with headaches and dizziness and found to have large right-sided SDH with midline shift and uncal herniation.  No history of trauma.  Spontaneous SDH in setting of severe thrombocytopenia.  Platelet count is 34 at admission.  -Neurosurgery was consulted and now s/p craniotomy and evacuation of SDH. Received 2 unit of platelets.  Plan:  -Transfer out of ICU when okay with NSG -> hopefully tomorrow  -Drain cares per surgery -> removed today  -SLP following   - Maintain normotension  - PT / OT  -Postoperative cares per neurosurgery  -Avoid all NSAIDs/anticoagulants/antiplatelets    Pancytopenia  Thrombocytopenia  Follows with Dr. Luz with [facility].  Has had multiple investigations for pancytopenia.  Recently placed on 4-day course of dexamethasone for thrombocytopenia with no improvement.  Admitted now with spontaneous head bleed.  Plan:  -CBC in AM  -Consulted [facility] hematology/oncology -> feels she likely is developing primary bone marrow pathology like myelodysplastic syndrome.   -Transfuse platelets to keep platelet count above 50,000.    HLD  HTN  -Continue PTA atorvastatin.  Not on any antihypertensive medications.    GERD  -Continue PTA omeprazole    H/o small cell lung cancer  Follows with Dr. Luz.  She is s/p chemoradiation in 2018.  Had CT CAP last month that did not show any evidence of malignancy.  Is felt to be in remission.  -Continue PTA as needed albuterol nebs, fluticasone inhaler  for SOB/wheezing         Diet: Combination Diet Mechanical/Dental Soft Diet    DVT Prophylaxis: Pneumatic Compression Devices  Shah Catheter: Not present  Central Lines: None  Code Status: Full Code      Disposition Plan   Expected discharge: 2 - 3 days, recommended to prior living arrangement once NSG work up completed.     The patient's care was discussed with the Bedside Nurse and Patient.    Brown Saab MD  Hospitalist Service  [facility]  Securely message with the Vocera Web Console (learn more here)  Text page via [employer] Paging/Directory      Risk Factors Present on Admission                  Patient, family, interdisciplinary team involved in care and agrees with plan.  Total time - Greater than 35 min. More than 50% of time spent in direct patient care, care coordination, patient/caregiver counseling, and formalizing plan of care.     ______________________________________________________________________    Interval History     No acute events overnight  Today -> reports bad day with a 7/10 headache, some nausea but no vomiting and discomfort in abdomen that she us worried is from a UTI.   No CP/SOB  No fevers or chills  No new weakness / numbness of extremities, no slurred speech  No new complaints    Data reviewed today: I reviewed all medications, new labs and imaging results over the last 24 hours. I personally reviewed no images or EKG's today.    Physical Exam   Vital Signs: Temp: 98.2  F (36.8  C) Temp src: Oral BP: 127/72 Pulse: 82   Resp: 19 SpO2: 97 % O2 Device: None (Room air) Oxygen Delivery: 2 LPM  Weight: 218 lbs 7.61 oz    Constitutional: no apparent distress  Respiratory: Clear to auscultation bilaterally, no crackles or wheezing.  Cardiovascular: Regular rate and rhythm, normal S1 and S2, and no murmur noted.  GI: Soft, non-distended, non-tender, normal bowel sounds.  Skin: No rashes, no cyanosis  Musculoskeletal: No joint swelling, erythema or  tenderness.  Neurologic: Alert, oriented and engages in appropriate conversation, no facial asymmetry, left-sided ptosis noted, moving all extremities, fluent speech  Psychiatric: Calm and pleasant, no obvious anxiety or depression.     Data   Recent Labs   Lab 07/07/21  1228 07/07/21  0634 07/07/21  0011 07/06/21  0544 07/06/21  0544 07/05/21  1911 07/05/21  1736 07/05/21  1549   WBC  --  5.7  --   --  5.7 6.1  --  7.0   HGB  --  7.5*  --   --  7.6* 8.2*  --  9.9*   MCV  --  79  --   --  82 81  --  80   PLT  --  73*  --   --  87* 63*  --  34*   INR  --   --   --   --   --   --  1.09  --    * 134 133   < > 138  --   --  138   POTASSIUM  --  3.9  --   --  4.2  --   --  3.8   CHLORIDE  --  105  --   --  110*  --   --  108   CO2  --  25  --   --  23  --   --  26   BUN  --  18  --   --  29  --   --  36*   CR  --  0.76  --   --  1.05*  --   --  1.18*   ANIONGAP  --  4  --   --  5  --   --  4   RUSTY  --  8.1*  --   --  8.2*  --   --  8.8   GLC  --  105*  --   --  99  --   --  130*   ALBUMIN  --   --   --   --   --   --   --  3.4   PROTTOTAL  --   --   --   --   --   --   --  6.8   BILITOTAL  --   --   --   --   --   --   --  1.7*   ALKPHOS  --   --   --   --   --   --   --  62   ALT  --   --   --   --   --   --   --  35   AST  --   --   --   --   --   --   --  61*    < > = values in this interval not displayed.     No results found for this or any previous visit (from the past 24 hour(s)).  Medications     - MEDICATION INSTRUCTIONS -       sodium chloride 125 mL/hr at 07/07/21 0120       acetaminophen  975 mg Oral Q8H     atorvastatin  10 mg Oral Daily     docusate sodium  100 mg Oral BID     fluticasone  1 puff Inhalation BID     omeprazole  20 mg Oral Daily     polyethylene glycol  17 g Oral Daily     senna-docusate  1 tablet Oral BID     sodium chloride (PF)  3 mL Intracatheter Q8H     tamsulosin  0.4 mg Oral Daily

## 2021-07-08 NOTE — PLAN OF CARE
OT: Attempted to see pt x3. On first attempted RN reported pt had severe headache and was about to receive medication, on 2nd attempt pt not available, on on third attempt pt about to transfer from ICU to 73. OT will continue to follow.

## 2021-07-08 NOTE — PROGRESS NOTES
[facility]    Neurosurgery  Daily Post-Op Note    Assessment & Plan   Procedure(s):  CRANIOTOMY   3 Days Post-Op  Continues with 8/10 headache, improved to 6/10 with medication.   Alert and oriented.       Plan:  -Advance activity as tolerated  -Continue supportive and symptomatic treatment  -Start or continue physical therapy      Tutu Covarrubias    Interval History   Stable.  Doing well.  Improving slowly.  Pain is reasonably controlled.  No fevers.     Physical Exam   Temp: 98  F (36.7  C) Temp src: Oral BP: 116/69 Pulse: 93   Resp: 14 SpO2: 99 % O2 Device: Nasal cannula Oxygen Delivery: 3 LPM  Vitals:    07/06/21 0000 07/07/21 0500 07/08/21 0400   Weight: 100.1 kg (220 lb 10.9 oz) 99.1 kg (218 lb 7.6 oz) 91 kg (200 lb 9.9 oz)     Vital Signs with Ranges  Temp:  [97.8  F (36.6  C)-98.2  F (36.8  C)] 98  F (36.7  C)  Pulse:  [80-98] 93  Resp:  [8-25] 14  BP: ()/() 116/69  SpO2:  [83 %-100 %] 99 %  I/O last 3 completed shifts:  In: 4820 [P.O.:690; I.V.:4130]  Out: 2275 [Urine:2235; Drains:40]    Alert and oriented.  Moves all extremities equally.  Reflexes symmetrical.     Incision: CDI      Medications     - MEDICATION INSTRUCTIONS -       sodium chloride       sodium chloride 125 mL/hr at 07/08/21 0407        acetaminophen  975 mg Oral Q8H     atorvastatin  10 mg Oral Daily     docusate sodium  100 mg Oral BID     fluticasone  1 puff Inhalation BID     omeprazole  20 mg Oral Daily     polyethylene glycol  17 g Oral Daily     senna-docusate  1 tablet Oral BID     sodium chloride (PF)  3 mL Intracatheter Q8H     tamsulosin  0.4 mg Oral Daily           Tutu Covarrubias PA-C  [facility]surgery  [facility]  [address] [place] [address]  [address]  [place] [address]    Tel [phone]  Pager [phone]

## 2021-07-08 NOTE — PROGRESS NOTES
[facility]    Medicine Progress Note - Hospitalist Service       Date of Admission:  7/5/2021  Date of Service: 07/08/2021    Assessment & Plan           Yvette Gastelum is a 70 year old female with a history of small cell lung cancer s/p chemoradiation in 2018, currently in remission, pancytopenia, HLD, GERD, HTN who presents with headache and dizziness.    Large right-sided subdural hematoma with midline shift, uncal herniation and early obstructive hydrocephalus  Patient admitted with headaches and dizziness and found to have large right-sided SDH with midline shift and uncal herniation.  No history of trauma.  Spontaneous SDH in setting of severe thrombocytopenia.  Platelet count is 34 at admission.  -Neurosurgery was consulted and now s/p craniotomy and evacuation of SDH. Received 2 unit of platelets.  Plan:  - Transfer to gen neuro today  - SLP following   - Maintain normotension  - PT / OT  - Postoperative cares per neurosurgery  - Avoid all NSAIDs/anticoagulants/antiplatelets  - IVF for hyponatremia  - Daily BMP    Pancytopenia  Thrombocytopenia  Follows with Dr. Luz with [facility].  Has had multiple investigations for pancytopenia.  Recently placed on 4-day course of dexamethasone for thrombocytopenia with no improvement.  Admitted now with spontaneous head bleed.  Plan:  -CBC in AM  -Consulted [facility]/oncology -> feels she likely is developing primary bone marrow pathology like myelodysplastic syndrome.   -Transfuse platelets to keep platelet count above 50,000.    HLD  HTN  -Continue PTA atorvastatin.  Not on any antihypertensive medications.    GERD  -Continue PTA omeprazole    H/o small cell lung cancer  Follows with Dr. Luz.  She is s/p chemoradiation in 2018.  Had CT CAP last month that did not show any evidence of malignancy.  Is felt to be in remission.  -Continue PTA as needed albuterol nebs, fluticasone inhaler for SOB/wheezing    Urinary  Retention  Assessment: UA not suggestive of infection. Flomax was started this admission. Ultimately needed garces catheter. She reports this has been ongoing problem  Plan:  - Garces cath  - Urology eval as outpatient.        Diet: Combination Diet Regular Diet Adult    DVT Prophylaxis: Pneumatic Compression Devices  Garces Catheter: PRESENT, indication: Retention, Strict 1-2 Hour I&O  Central Lines: None  Code Status: Full Code      Disposition Plan   Expected discharge: 2 - 3 days, recommended to prior living arrangement once NSG work up completed.     The patient's care was discussed with the Bedside Nurse and Patient.    Brown Saab MD  Hospitalist Service  [facility]  Securely message with the Vocera Web Console (learn more here)  Text page via eReceipts Paging/Directory      Risk Factors Present on Admission                  Patient, family, interdisciplinary team involved in care and agrees with plan.  Total time - Greater than 35 min. More than 50% of time spent in direct patient care, care coordination, patient/caregiver counseling, and formalizing plan of care.     ______________________________________________________________________    Interval History     No acute events overnight  Today -> headache improved  Garces catheter placed with improvement in lower abdominal pain  No CP/SOB  No fevers or chills  No new weakness / numbness of extremities, no slurred speech  No new complaints    Data reviewed today: I reviewed all medications, new labs and imaging results over the last 24 hours. I personally reviewed no images or EKG's today.    Physical Exam   Vital Signs: Temp: 98.6  F (37  C) Temp src: Oral BP: 97/63 Pulse: 110   Resp: 16 SpO2: 95 % O2 Device: None (Room air) Oxygen Delivery: 2 LPM  Weight: 200 lbs 9.9 oz    Constitutional: no apparent distress  Respiratory: Clear to auscultation bilaterally, no crackles or wheezing.  Cardiovascular: Regular rate and rhythm, normal S1 and  S2, and no murmur noted.  GI: Soft, non-distended, non-tender, normal bowel sounds.  Skin: No rashes, no cyanosis  Musculoskeletal: No joint swelling, erythema or tenderness.  Neurologic: Alert, oriented and engages in appropriate conversation, no facial asymmetry, left-sided ptosis noted, moving all extremities, fluent speech  Psychiatric: Calm and pleasant, no obvious anxiety or depression.     Data   Recent Labs   Lab 07/08/21  1208 07/08/21  0606 07/08/21  0041 07/07/21  0634 07/07/21  0634 07/06/21  0544 07/06/21  0544 07/05/21  1736 07/05/21  1736 07/05/21  1549   WBC  --  6.9  --   --  5.7  --  5.7   < >  --  7.0   HGB  --  7.7*  --   --  7.5*  --  7.6*   < >  --  9.9*   MCV  --  78  --   --  79  --  82   < >  --  80   PLT  --  58*  --   --  73*  --  87*   < >  --  34*   INR  --   --   --   --   --   --   --   --  1.09  --    * 131* 129*   < > 134   < > 138  --   --  138   POTASSIUM  --  3.3*  --   --  3.9  --  4.2  --   --  3.8   CHLORIDE  --  101  --   --  105  --  110*  --   --  108   CO2  --  24  --   --  25  --  23  --   --  26   BUN  --  14  --   --  18  --  29  --   --  36*   CR  --  0.79  --   --  0.76  --  1.05*  --   --  1.18*   ANIONGAP  --  6  --   --  4  --  5  --   --  4   RUSTY  --  8.5  --   --  8.1*  --  8.2*  --   --  8.8   GLC  --  104*  --   --  105*  --  99  --   --  130*   ALBUMIN  --   --   --   --   --   --   --   --   --  3.4   PROTTOTAL  --   --   --   --   --   --   --   --   --  6.8   BILITOTAL  --   --   --   --   --   --   --   --   --  1.7*   ALKPHOS  --   --   --   --   --   --   --   --   --  62   ALT  --   --   --   --   --   --   --   --   --  35   AST  --   --   --   --   --   --   --   --   --  61*    < > = values in this interval not displayed.     Recent Results (from the past 24 hour(s))   XR Abdomen 2 Views    Narrative    XR ABDOMEN TWO VIEWS   7/7/2021 9:44 PM     HISTORY: Nausea, constipation, urine retention over the past 24+  hours.    COMPARISON: None.       Impression    IMPRESSION: Nonobstructive bowel gas pattern. No free air. No abnormal  calcifications can be seen.    TALIA LUCAS MD         SYSTEM ID:  LORIE     Medications     - MEDICATION INSTRUCTIONS -       sodium chloride 75 mL/hr at 07/08/21 1148     sodium chloride Stopped (07/08/21 1112)       acetaminophen  975 mg Oral Q8H     atorvastatin  10 mg Oral Daily     docusate sodium  100 mg Oral BID     fluticasone  1 puff Inhalation BID     omeprazole  20 mg Oral Daily     polyethylene glycol  17 g Oral Daily     senna-docusate  1 tablet Oral BID     sodium chloride (PF)  3 mL Intracatheter Q8H     tamsulosin  0.4 mg Oral Daily

## 2021-07-08 NOTE — PLAN OF CARE
Pt is A&O neurologically intact.  OOB with SBA uses cane and SBA of 1.  At start of shift, pt was having continued c/o abdominal and lower mid back pain along with burning and pain with attempts to void.  Pt having urinary retention and post void residuals showed up to and above 300ml.  Order to place Shah for retention.  Abd Xry 2 view completed.  Shah placed with brisk urine output overnight and relief of burning and pain with voiding.  Suppository given with flatus but no BM, Enema given with medium hard/loose stool and up to this point, resolution of back pain.  Sodiums on low side. Na+ running at 125ml/hr.

## 2021-07-08 NOTE — PROGRESS NOTES
Service Date: 07/08/2021    SUBJECTIVE:  Ms. Gastelum is a 70-year-old female with limited-stage small cell lung cancer.  The patient is in remission from it.    The patient has anemia and thrombocytopenia.  Multiple workup has been done.  She is likely developing myelodysplastic syndrome.    The patient admitted because of subdural hematoma. She had a craniotomy and evacuation of hematoma done.    The patient has headache.  Neurosurgery is following.  The patient is on pain medication, which helps with the headache. Some dizziness.  No chest pain or shortness of breath.  No vomiting.  Some nausea.  The patient not having bleeding from any site.    PHYSICAL EXAMINATION:    GENERAL:  She is alert and oriented x 3.  VITAL SIGNS:  Reviewed.  Rest of systems not examined.    LABS:  Reviewed.    ASSESSMENT:    1.  A 70-year-old female with limited-stage small cell lung cancer in complete remission.  2.  Subdural hematoma, status post craniotomy and evacuation of hematoma.  3.  Headache.  4.  Anemia and thrombocytopenia.  She is likely developing myelodysplastic syndrome.    PLAN:    1.  CBC was reviewed.  I explained to her that her hemoglobin and platelet are low.  When she came in, her platelet was 34.  She did get platelet transfusion and it had improved to 87.  Now it is down to 58.  It will further go down.    She will be transfused for platelet below 50.  Given her subdural hematoma, plan is to maintain her platelet above 50 at this time.    The patient is also anemic.  She should be transfused for hemoglobin below 7 or she is symptomatic.    I explained to her that her thrombocytopenia and anemia is likely from developing myelodysplastic syndrome.  The patient in future, we may have to repeat another bone marrow biopsy.    2.  From lung cancer, she is doing well.  Periodically, she will have CT scan done as outpatient.    3.  The patient has some headache.  Management as per Neurosurgery team.    4.  The patient  had few questions, which were all answered.  Oncology will continue to follow. Her son, Anton, had called. Spoke to him and updated him on her condition.    Jennifer Luz MD        D: 2021   T: 2021   MT: bibiana    Name:     BISHNU LEY  MRN:      [id number]        Account:      [id number]   :      1950           Service Date: 2021       Document: [id number]

## 2021-07-08 NOTE — PROGRESS NOTES
"New orders placed by Dr. Abraham to place Shah for urinary retention along with an Abd Xray 2 view.  Prior to Xry pt voided 50ml.  Xry completed and upon return to ICU patient agreed to Shah placement.  Pt bladder scanned for 212-300ml as was noted with bladder scan results prior to first call to Dr. Abraham.  Using sterile technique pt was prepped and cleaned along with assistance of Sabiha FERRER for Shah placement.  16fr. Catheter placed without difficulty with 225ml clear yellow urine returned.  Of note, patient also states,\"I have IBS D and normally my stool is loose and I haven't had a BM since before admission.\"  Pt has been taking stool softeners.  Plan to hold softeners this evening.  Will continue to monitor closely.    "

## 2021-07-08 NOTE — PLAN OF CARE
Speech Language Therapy Discharge Summary    Reason for therapy discharge:    All goals and outcomes met, no further needs identified.    Progress towards therapy goal(s). See goals on Care Plan in [facility] electronic health record for goal details.  Goals met    Therapy recommendation(s):    No further therapy is recommended. Discharge from SLP services as goals are met - continue regular diet and thin liquids (self select soft as needed). General safe swallow precautions

## 2021-07-08 NOTE — PLAN OF CARE
Patient here POD 3 crani for SDH, patient alert x 4, c/o headache and some nausea, dilaudid po for pain. Patient up in room with cane. Patient will have garces in for couple weeks per hospitalist for retention

## 2021-07-08 NOTE — PROVIDER NOTIFICATION
"Page out to Hospitalist regarding continued patient complaints of \"burning and pain with peeing. Along with nausea and lower middle back pain.\"  Per patient these symptoms have been ongoing and similar to her prior experience with UTI's.  Pt also endorses retention and hesitency/frequency and being unable to completely empty bladder.  At 2000 pt voided 150ml post void residual patient noted to have 320ml.  Text page sent out to covering MD.  Pt medicated with PRN Zofran and Dilaudid for back pain.      "

## 2021-07-08 NOTE — PLAN OF CARE
Neuro: Intact. Baseline Left eye droop. C/O headache. Reduced to 6/10 with PRN dilaudid.  Cardio: NSR  Resp: LS clear. Occasionally on 2 L NC when sleeping  GI: Passing gas. Feels to need to have a BM. Declines Bowel medication  : Shah patent  Activity: Sat in chair and worked with PT  Skin: Scattered bruises.  Due to transfer to station [id number]

## 2021-07-09 NOTE — PLAN OF CARE
Date/Time: July 8, 2021    Reason for Admission:POD 4 crani for SDH; tx from ICU on 7/7    Cognitive/Mentation:x4  Neuros/CMS:intact ex L eye droop - baseline, postop headache  VS:VSS on RA;  B/P: 149/87, T: 97.8, P: 101, R: 16     GI:denies n/v   :garces in place and patent, on flomax.   Pain:c/o postop headache; dilaudid given q3h overnight for pain control     Skin:scattered bruises, R crani incision with stables  - GRAHAM  Activity: x1 with GB + cane  Diet:regular    Discharge:pending     End of shift summary:no events overnight.

## 2021-07-09 NOTE — PROGRESS NOTES
Patient here POD# 3 crani for SDH, A/Ox 4, c/o headache, dilaudid PO for pain. Patient up in room with cane/SBA.Incision with staples GRAHAM. Patient will have garces in for couple of weeks per hospitalist for retention.

## 2021-07-09 NOTE — PROGRESS NOTES
[facility]    Neurosurgery  Daily Note    Assessment & Plan   Procedure(s):  CRANIOTOMY   4 Days Post-Op   70 year old with history of small cell lung CA s/p chemoradiation in 2018 currently in remission, pancytopenia presented 07/05 with large subacute and mixed density right frontal temporal and parietal SDH with mass effect and shift who is POD4 s/p right frontal craniotomy for evacuation of subacute SDH, placement of SD drain with Dr. Dominguez.   Currently headaches are improving, working with therapies, incision c/d/i.     Plan:  -Advance activity as tolerated  -Continue supportive and symptomatic treatment  -Start or continue physical therapy  -Pain control measures  -Advance diet as tolerated  -Routine wound care  -Discharge pending adequate pain management and therapies  -Plans reviewed with Dr. Dominguez  -Call with questions    Active Problems:    SDH (subdural hematoma) (H)    Interval History   Stable.    Physical Exam   Temp: 98.3  F (36.8  C) Temp src: Oral BP: 115/73 Pulse: 92   Resp: 17 SpO2: 100 % O2 Device: None (Room air) Oxygen Delivery: 2 LPM  Vitals:    07/06/21 0000 07/07/21 0500 07/08/21 0400   Weight: 220 lb 10.9 oz (100.1 kg) 218 lb 7.6 oz (99.1 kg) 200 lb 9.9 oz (91 kg)     Vital Signs with Ranges  Temp:  [97.8  F (36.6  C)-98.6  F (37  C)] 98.3  F (36.8  C)  Pulse:  [] 92  Resp:  [13-27] 17  BP: ()/(47-87) 115/73  SpO2:  [93 %-100 %] 100 %  I/O last 3 completed shifts:  In: 711.25 [I.V.:711.25]  Out: 1700 [Urine:1700]    Awake, alert, sitting on toilet, nad  Right sided incision c/d/i with staples in place   Left eyelid droop, stable per patient. Otherwise II-XII grossly intact         Medications     - MEDICATION INSTRUCTIONS -       sodium chloride 75 mL/hr at 07/09/21 0129     sodium chloride Stopped (07/08/21 1112)        atorvastatin  10 mg Oral Daily     docusate sodium  100 mg Oral BID     fluticasone  1 puff Inhalation BID     omeprazole  20 mg  Oral Daily     polyethylene glycol  17 g Oral Daily     senna-docusate  1 tablet Oral BID     sodium chloride (PF)  3 mL Intracatheter Q8H     tamsulosin  0.4 mg Oral Daily       Plans discussed with Dr. Dominguez who was in agreement with plans    Isabell CHAMBERS [facility] Neurosurgery  [address] [place] [address]    Tel [phone]  Pager [phone]

## 2021-07-09 NOTE — PROGRESS NOTES
Chart reviewed.    1.  A 70-year-old female with limited-stage small cell lung cancer in complete remission  --CT scan follow-up as outpatient with Dr. Luz.  2.  Subdural hematoma, status post craniotomy and evacuation of hematoma  3.  Microcytic anemia   --Trend down in Hgb at least partially attributable to recent surgery/blood loss.  --BMBx from 4/19/21 showed marked erythroid hyperplasia with mild dyserthropoiesis; no dysplasia but increase in reticulin fibers.   --Repeat BMBx after further recovery from surgery to evaluate for possible development of MDS.  --Check iron studies.  --Transfuse for Hgb < 7 g/dL  4. Thrombocytopenia  --Platelets also trending down.  --S/p 4-day course of dexamethasone with no response in platelets, less likely ITP. Platelets responded to transfusion.  --Repeat BMBx after further recovery from surgery to evaluate for possible development of MDS.  --Transfuse platelets to keep >= 50,000 given recent neurosurgery.    Ina Duggan MD  Hematology/Oncology  [facility]

## 2021-07-09 NOTE — PLAN OF CARE
Reason for Admission: R SDH. POD #4 R craniotomy    Cognitive/Mentation: A/Ox 4  Neuros/CMS: Intact ex L eye droop at baseline  VS: stable.  GI: BS active, + flatus, last BM 7/8/2021. Continent.  : garces  Pulmonary: LS clear.  Pain: yes. Managed pain with PRN dilaudid.      Drains: PIV  Skin: intact ex bruising and incision  Activity: Assist x 1 with GB and cane.  Diet: Reg with thin liquids. Takes pills whole.     Therapies recs: TCU  Discharge: pending    End of shift summary: Patient stable throughout shift. Platelets infused for platelet count of 47,000. Gave PRN dilaudid for incisional pain. Gave PRN Zofran for nausea with relief.

## 2021-07-09 NOTE — CONSULTS
Care Management Initial Consult    General Information  Assessment completed with: Patient,    Type of CM/SW Visit: Initial Assessment    Primary Care Provider verified and updated as needed: Yes   Readmission within the last 30 days: no previous admission in last 30 days      Reason for Consult: discharge planning  Advance Care Planning: Advance Care Planning Reviewed: no concerns identified          Communication Assessment  Patient's communication style: spoken language (English or Bilingual)    Hearing Difficulty or Deaf: no   Wear Glasses or Blind: yes    Cognitive  Cognitive/Neuro/Behavioral: WDL  Level of Consciousness: alert  Arousal Level: opens eyes spontaneously  Orientation: oriented x 4  Mood/Behavior: calm, cooperative  Best Language: 0 - No aphasia  Speech: clear, logical    Living Environment:   People in home: alone     Current living Arrangements: apartment      Able to return to prior arrangements: yes       Family/Social Support:  Care provided by: self  Provides care for: no one  Marital Status:   Children, Neighbor          Description of Support System: Supportive         Current Resources:   Patient receiving home care services: No     Community Resources: None  Equipment currently used at home: cane, straight(occasionally when walking dog)  Supplies currently used at home:      Employment/Financial:  Employment Status: retired        Financial Concerns: No concerns identified        Lifestyle & Psychosocial Needs:        Socioeconomic History     Marital status:      Spouse name: Not on file     Number of children: Not on file     Years of education: Not on file     Highest education level: Not on file     Tobacco Use     Smoking status: Former Smoker     Packs/day: 1.00     Years: 50.00     Pack years: 50.00     Types: Cigarettes     Start date: 10/1965     Quit date: 2015     Years since quittin.8     Smokeless tobacco: Never Used   Substance and Sexual Activity      Alcohol use: No     Alcohol/week: 0.0 standard drinks     Drug use: No     Sexual activity: Not Currently     Partners: Male       Functional Status:  Prior to admission patient needed assistance:    no    Mental Health Status:      n/a    Chemical Dependency Status:      n/a    Values/Beliefs:  Spiritual, Cultural Beliefs, Jehovah's witness Practices, Values that affect care: no               Additional Information:  CM consulted for discharge planning.  Met with patient.  She lives alone in an apartment.  She states her sons are helpful and has many neighbors at her apartment who help her.  One of her sons will transport her home at discharge.  We reviewed the recommendation for home care PT/OT and she was in agreement.  She agrees with referral to [facility].  She may need to discharge with garces catheter, although she is hoping catheter will be able to be removed.  Would recommend she also have [facility] nursing.  She verified her PCP, but prefers to follow up with her oncologist.    Referral emailed to [facility].  They currently estimate SOC for nursing on 7/12 or 7/13 if discharged over the weekend.    Tona Benjamin RN, BSN, PHN  Inpatient Care Coordination  [facility]  Phone: [phone]

## 2021-07-09 NOTE — PROGRESS NOTES
[facility]    Medicine Progress Note - Hospitalist Service       Date of Admission:  7/5/2021  Date of Service: 07/09/2021    Assessment & Plan           Yvette Gastelum is a 70 year old female with a history of small cell lung cancer s/p chemoradiation in 2018, currently in remission, pancytopenia, HLD, GERD, HTN who presents with headache and dizziness.    Large right-sided subdural hematoma with midline shift, uncal herniation and early obstructive hydrocephalus  Patient admitted with headaches and dizziness and found to have large right-sided SDH with midline shift and uncal herniation.  No history of trauma.  Spontaneous SDH in setting of severe thrombocytopenia.  Platelet count is 34 at admission.  -Neurosurgery was consulted and now s/p craniotomy and evacuation of SDH. Received 2 unit of platelets.  Plan:  - SLP following   - Maintain normotension  - PT / OT  - Postoperative cares per neurosurgery  - Avoid all NSAIDs/anticoagulants/antiplatelets  - IVF for hyponatremia  - Daily BMP    Pancytopenia  Thrombocytopenia  Follows with Dr. Luz with [facility].  Has had multiple investigations for pancytopenia.  Recently placed on 4-day course of dexamethasone for thrombocytopenia with no improvement.  Admitted now with spontaneous head bleed.  Plan:  -CBC in AM  -Consulted [facility] -> feels she likely is developing primary bone marrow pathology like myelodysplastic syndrome.   -Transfuse platelets to keep platelet count above 50,000 -> will transfuse 1U today    HLD  HTN  -Continue PTA atorvastatin.  Not on any antihypertensive medications.    GERD  -Continue PTA omeprazole    H/o small cell lung cancer  Follows with Dr. Luz.  She is s/p chemoradiation in 2018.  Had CT CAP last month that did not show any evidence of malignancy.  Is felt to be in remission.  -Continue PTA as needed albuterol nebs, fluticasone inhaler for SOB/wheezing    Urinary  Retention  Assessment: UA not suggestive of infection. Flomax was started this admission. Ultimately needed garces catheter. She reports this has been ongoing problem  Plan:  - Garces cath  - Urology eval as outpatient.        Diet: Combination Diet Regular Diet Adult  Diet    DVT Prophylaxis: Pneumatic Compression Devices  Garces Catheter: PRESENT, indication: Retention, Strict 1-2 Hour I&O  Central Lines: None  Code Status: Full Code      Disposition Plan   Expected discharge: 2 - 3 days, recommended to prior living arrangement once NSG work up completed.     The patient's care was discussed with the Bedside Nurse and Patient.    Brown Saab MD  Hospitalist Service  [facility]  Securely message with the Vocera Web Console (learn more here)  Text page via [employer] Paging/Directory      Risk Factors Present on Admission                   Patient, family, interdisciplinary team involved in care and agrees with plan.  Total time - Greater than 35 min. More than 50% of time spent in direct patient care, care coordination, patient/caregiver counseling, and formalizing plan of care.     ______________________________________________________________________    Interval History     No acute events overnight  Today -> headache improving  No CP/SOB  No fevers or chills  No new weakness / numbness of extremities, no slurred speech  No new complaints    Data reviewed today: I reviewed all medications, new labs and imaging results over the last 24 hours. I personally reviewed no images or EKG's today.    Physical Exam   Vital Signs: Temp: 98.2  F (36.8  C) Temp src: Oral BP: 115/72 Pulse: 96   Resp: 17 SpO2: 96 % O2 Device: None (Room air)    Weight: 200 lbs 9.9 oz    Constitutional: no apparent distress  Respiratory: Clear to auscultation bilaterally, no crackles or wheezing.  Cardiovascular: Regular rate and rhythm, normal S1 and S2, and no murmur noted.  GI: Soft, non-distended, non-tender, normal bowel  sounds.  Skin: No rashes, no cyanosis  Musculoskeletal: No joint swelling, erythema or tenderness.  Neurologic: Alert, oriented and engages in appropriate conversation, no facial asymmetry, left-sided ptosis noted, moving all extremities, fluent speech  Psychiatric: Calm and pleasant, no obvious anxiety or depression.     Data   Recent Labs   Lab 07/09/21  1159 07/09/21  0709 07/09/21  0009 07/08/21  1813 07/08/21  0606 07/08/21  0606 07/07/21  0634 07/07/21  0634 07/05/21  1736 07/05/21  1736 07/05/21  1549   WBC  --  7.2  --   --   --  6.9  --  5.7   < >  --  7.0   HGB  --  7.0*  --   --   --  7.7*  --  7.5*   < >  --  9.9*   MCV  --  77*  --   --   --  78  --  79   < >  --  80   PLT  --  47*  --   --   --  58*  --  73*   < >  --  34*   INR  --   --   --   --   --   --   --   --   --  1.09  --     133 133 134   < > 131*   < > 134   < >  --  138   POTASSIUM  --  3.6  --  3.5  --  3.3*  --  3.9   < >  --  3.8   CHLORIDE  --  102  --   --   --  101  --  105   < >  --  108   CO2  --  25  --   --   --  24  --  25   < >  --  26   BUN  --  13  --   --   --  14  --  18   < >  --  36*   CR  --  0.80  --   --   --  0.79  --  0.76   < >  --  1.18*   ANIONGAP  --  6  --   --   --  6  --  4   < >  --  4   RUSTY  --  8.5  --   --   --  8.5  --  8.1*   < >  --  8.8   GLC  --  119*  --   --   --  104*  --  105*   < >  --  130*   ALBUMIN  --   --   --   --   --   --   --   --   --   --  3.4   PROTTOTAL  --   --   --   --   --   --   --   --   --   --  6.8   BILITOTAL  --   --   --   --   --   --   --   --   --   --  1.7*   ALKPHOS  --   --   --   --   --   --   --   --   --   --  62   ALT  --   --   --   --   --   --   --   --   --   --  35   AST  --   --   --   --   --   --   --   --   --   --  61*    < > = values in this interval not displayed.     No results found for this or any previous visit (from the past 24 hour(s)).  Medications     - MEDICATION INSTRUCTIONS -       sodium chloride 75 mL/hr at 07/09/21 0129     sodium  chloride Stopped (07/08/21 1112)       atorvastatin  10 mg Oral Daily     docusate sodium  100 mg Oral BID     fluticasone  1 puff Inhalation BID     omeprazole  20 mg Oral Daily     polyethylene glycol  17 g Oral Daily     senna-docusate  1 tablet Oral BID     sodium chloride (PF)  3 mL Intracatheter Q8H     tamsulosin  0.4 mg Oral Daily

## 2021-07-10 NOTE — PROGRESS NOTES
ONCOLOGY HISTORY: Ms. Gastelum is a female with small cell lung cancer.    1.  CT chest angiogram on 08/21/2018 for hemoptysis revealed right lower lobe pulmonary mass invading the right lower lobe pulmonary artery and mild mediastinal lymphadenopathy.   -CT abdomen and pelvis on 08/22/2018 did not reveal any evidence of metastatic disease.   -Brain MRI on 08/30/2019 did not reveal any intracranial metastasis.   -CT-guided biopsy of right lung mass on 08/31/2018 revealed high-grade neuroendocrine carcinoma, favor small cell carcinoma.   -PET scan in 08/2019 did not reveal any evidence of metastatic disease.      2.  She had limited stage small cell lung cancer.  She received concurrent chemoradiation with 4 cycles of platinum and etoposide between 09/17/2018 and 12/10/2018. Patient received 5940 cGy in 33 fractions between 10/11/2018 and 11/28/2018.   -Prophylactic cranial radiation between 01/29/2019 and 02/11/2019.      3.  CT chest on 08/21/2019 revealed a new 0.6 cm pulmonary nodule in right lower lobe suspicious for metastatic lesion.  There are 2 additional indeterminate nodular opacities in the right lower lobe posteriorly which are stable.  There is a mildly enlarged subcarinal lymph node which is stable.   -PET scan on 10/28/2019 revealed 0.8 cm right lower lobe nodule which is hypermetabolic and suspicious for malignancy.  No other areas of abnormal uptake.   -SBRT to RLL nodule between 11/19/19 and 11/27/19. 4500 cGy.     4. On 01/20/2021:  -WBC of 3.6, hemoglobin of 9.8 and platelet of 60. MCV of 86.  -Normal iron, folate, vitamin B12, SPEP and LDH.     5. Bone marrow biopsy was done on 02/10/2021.  It was a dry tap.  Bone marrow revealed hypercellular marrow with 50% cellularity.  There was marked erythroid hyperplasia.  Mild decrease in granulocytic precursors.  Adequate megakaryocyte.  No MDS.  Mild increase in reticulin fibers.  No evidence of metastatic tumor.  Less than 5% blasts.     6. Bone marrow  biopsy was done on 04/19/2021.  It reveals hypercellular marrow with 50% cellularity. 2% blasts.  There is marked erythroid hyperplasia with probable mild dyserythropoiesis.  There is adequate granulocytic precursor and megakaryocytes without dysplasia.  There is increase in reticulin fibers, 28% sideroblasts.    -Negative for JAK2 and SF3B1.  -Cytogenetics are abnormal. 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  There are other abnormalities.  This raises the possibility of developing myelodysplastic syndrome.     SUBJECTIVE:  Ms. Gastelum is a 70-year-old female with small cell lung cancer treated with chemoradiation in 2018.  The patient is in remission.  CT chest, abdomen, and pelvis on 06/17/2021 does not reveal any evidence of malignancy.     The patient lately has been pancytopenic.  Multiple investigations have been done including 2 bone marrow biopsies.  No evidence of leukemia, lymphoma or MDS.  Cytogenetics is suspicious for evolving MDS.     Overall, her condition is stable.  She has fatigue. It is not getting worse.  No headache.  No dizziness.  No chest pain.  She has some cough.  No hemoptysis.  No shortness of breath at rest.  No abdominal pain, nausea or vomiting.  Some easy bruising.  No bleeding from ear, nose or throat.  No blood in the urine or stool.  All other review of systems is negative.     PHYSICAL EXAMINATION:    She is alert and oriented x 3.    This is a telephone visit.     LABS:  CBC on 06/17/2021 reviewed.     ASSESSMENT:    1.  A 70-year-old female with limited-stage small cell lung cancer. No evidence of disease.  2.  Pancytopenia.  3.  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     PLAN:    1.  CT scan was personally reviewed by me.  I explained to her there is no evidence of any malignancy.  She was happy to know that.     2.  CBC was reviewed.  I explained to her WBC is normal today.  Hemoglobin low, but stable.  Her platelet has decreased to 39.     Different  causes of thrombocytopenia discussed.  We did 2 bone marrow biopsies.  They have not given us an answer.     I told the patient there could be a component of immune thrombocytopenia.  Discussed regarding treatment for ITP.  We will give her dexamethasone 40 mg once a day for 4 days and see if her platelets to improve.  She is agreeable for it.  Side effects of dexamethasone reviewed.     3.  I will see her in clinic in 2 weeks' time with CBC.  If her platelet counts do not improve, we may have to consider another bone marrow biopsy.     4.  She had few questions, which were all answered.  I advised her to go to emergency if she has bleeding from any site, easy bruising, blood in the stool or black stool.     TOTAL TELEPHONE VISIT TIME:  12 minutes.

## 2021-07-10 NOTE — PROGRESS NOTES
Care Management Discharge Note    Discharge Date: 07/10/21       Discharge Disposition: Home, Home Care    Discharge Services:      Discharge DME:      Discharge Transportation: family or friend will provide    Private pay costs discussed: Not applicable    Patient/family educated on Medicare website which has current facility and service quality ratings: yes    Education Provided on the Discharge Plan:    Persons Notified of Discharge Plans: RN, patient  Patient/Family in Agreement with the Plan: yes    Handoff Referral Completed: yes    Additional Information:  Patient discharging with HHPT.  [facility] is unable to do PT SOC until 7/27.  Let patient know that a different agency will need to be found.  [facility] accepts referral and is able to do SOC within 48 hours.  Orders faxed to [phone].    Tona Benjamin RN

## 2021-07-10 NOTE — DISCHARGE SUMMARY
Pt discharged home approximately 16:15. Discharge instructions and medications reviewed with pt. AVS signed and all questions answered. Medications sent with pt and receipt signed. IV removed. No changes noted.

## 2021-07-10 NOTE — PROGRESS NOTES
[facility]    Neurosurgery  Daily Note    Assessment & Plan   Procedure(s):  CRANIOTOMY   5 Days Post-Op  70 year old with history of small cell lung CA s/p chemoradiation in 2018 currently in remission, pancytopenia presented 07/05 with large subacute and mixed density right frontal temporal and parietal SDH with mass effect and shift who is POD5 s/p right frontal craniotomy for evacuation of subacute SDH, placement of SD drain with Dr. Dominguez. Incision c/d/i. Ready for discharge.      Plan:  -Cleared for discharge from NSGY standpoint. All follow ups have been made. Incisional cares and red flag s/s reviewed with patient.     -Advance activity as tolerated  -Continue supportive and symptomatic treatment  -Start or continue physical therapy  -Pain control measures  -Advance diet as tolerated  -Routine wound care  -Discharge pending adequate pain management and therapies  -Plans reviewed with Dr. Rao  -Call with questions    Active Problems:    SDH (subdural hematoma) (H)      Isabell L. Hindt    Interval History   Stable.    Physical Exam   Temp: 99.1  F (37.3  C) Temp src: Oral BP: 117/71 Pulse: 96   Resp: 16 SpO2: 96 % O2 Device: None (Room air)    Vitals:    07/07/21 0500 07/08/21 0400 07/10/21 0540   Weight: 218 lb 7.6 oz (99.1 kg) 200 lb 9.9 oz (91 kg) 210 lb 5.1 oz (95.4 kg)     Vital Signs with Ranges  Temp:  [98  F (36.7  C)-99.1  F (37.3  C)] 99.1  F (37.3  C)  Pulse:  [55-99] 96  Resp:  [15-17] 16  BP: (108-123)/(48-75) 117/71  SpO2:  [96 %-99 %] 96 %  I/O last 3 completed shifts:  In: -   Out: 1825 [Urine:1825]    Awake, alert, nad   Left eyelid droop slightly improved when compared to 7/09  Pupils react   5/5 motor strength throughout       Medications     - MEDICATION INSTRUCTIONS -          atorvastatin  10 mg Oral Daily     docusate sodium  100 mg Oral BID     fluticasone  1 puff Inhalation BID     omeprazole  20 mg Oral Daily     polyethylene glycol  17 g Oral Daily      senna-docusate  1 tablet Oral BID     sodium chloride (PF)  3 mL Intracatheter Q8H     tamsulosin  0.4 mg Oral Daily       Plans discussed with Dr. Rao who was in agreement with jax [facility]  [address] [address]    Tel [phone]  Pager [phone]

## 2021-07-10 NOTE — DISCHARGE SUMMARY
[facility]  Hospitalist Discharge Summary      Date of Admission:  7/5/2021  Date of Discharge:  7/10/2021  Discharging Provider: Brown Saab MD      Discharge Diagnoses     SDH    Follow-ups Needed After Discharge   Follow-up Appointments     Follow-up and recommended labs and tests       Your Neurosurgical follow up appointments have been scheduled. You may   call [phone] to make, confirm or change your follow-up Neurosurgery   appointment dates and/or times.         Follow-up and recommended labs and tests       Your Neurosurgical follow up appointments need to be scheduled. You may   call [phone] to make, confirm or change your follow-up Neurosurgery   appointment dates and/or times.         Follow-up and recommended labs and tests       Follow up with primary care provider, Arie House MD, within   7 days for hospital follow- up.  The following labs/tests are recommended:   CBC.           Unresulted Labs Ordered in the Past 30 Days of this Admission     Date and Time Order Name Status Description    7/5/2021 1855 Platelets prepare order unit In process     7/5/2021 1701 Platelets prepare order unit In process         Discharge Disposition   Discharged to home  Condition at discharge: Stable    Hospital Course              Yvette Gastelum is a 70 year old female with a history of small cell lung cancer s/p chemoradiation in 2018, currently in remission, pancytopenia, HLD, GERD, HTN who presents with headache and dizziness.    Large right-sided subdural hematoma with midline shift, uncal herniation and early obstructive hydrocephalus  Patient admitted with headaches and dizziness and found to have large right-sided SDH with midline shift and uncal herniation.  No history of trauma.  Spontaneous SDH in setting of severe thrombocytopenia.  Platelet count is 34 at admission.  -Neurosurgery was consulted and now s/p craniotomy and evacuation of SDH. Received 2 unit of  platelets.    Pancytopenia  Thrombocytopenia  Anemia and Thrombocytopenia, History of Pancytopenia  Follows with Dr. Luz with [facility].  Has had multiple investigations for pancytopenia.  Recently placed on 4-day course of dexamethasone for thrombocytopenia with no improvement.  Admitted now with spontaneous head bleed.  Plan:  -Consulted [facility] [facility]/on[facility] -> feels she likely is developing primary bone marrow pathology like myelodysplastic syndrome.   -Transfuse platelets to keep platelet count above 50,000 -> was transfuse 1U 07/09 in addition to 2U prior surgery  - Oncology follow up as outpatient.     HLD  HTN  -Continue PTA atorvastatin.  Not on any antihypertensive medications.    GERD  -Continue PTA omeprazole    H/o small cell lung cancer  Follows with Dr. Luz.  She is s/p chemoradiation in 2018.  Had CT CAP last month that did not show any evidence of malignancy.  Is felt to be in remission.  -Continue PTA as needed albuterol nebs, fluticasone inhaler for SOB/wheezing    Urinary Retention  Assessment: UA not suggestive of infection. Flomax was started this admission. Ultimately needed garces catheter. She reports this has been ongoing problem  Plan:  - Garces cath discontinued  - Flomax started  - Urology eval as outpatient.       Consultations This Hospital Stay   PHYSICAL THERAPY ADULT IP CONSULT  OCCUPATIONAL THERAPY ADULT IP CONSULT  SPEECH LANGUAGE PATH ADULT IP CONSULT  CARE MANAGEMENT / SOCIAL WORK IP CONSULT  SMOKING CESSATION PROGRAM IP CONSULT  PHARMACY IP CONSULT  NEUROSURGERY IP CONSULT  HEMATOLOGY & ONCOLOGY IP CONSULT    Code Status   Full Code    Time Spent on this Encounter   I, Brown Saab MD, personally saw the patient today and spent greater than 30 minutes discharging this patient.       Brown Saab MD  [place] [address] [address]  [facility] [place] [address]  Phone:  [phone]  ______________________________________________________________________    Physical Exam   Vital Signs: Temp: 98.3  F (36.8  C) Temp src: Oral BP: 98/66 Pulse: 107   Resp: 16 SpO2: 96 % O2 Device: None (Room air)    Weight: 210 lbs 5.1 oz       Primary Care Physician   Arie House MD    Discharge Orders      Home Care PT Referral for Hospital Discharge      Reason for your hospital stay    Right frontal craniotomy for evacuation of subacute subdural hematoma; placement of subdural drain     Follow-up and recommended labs and tests     Your Neurosurgical follow up appointments have been scheduled. You may call [phone] to make, confirm or change your follow-up Neurosurgery appointment dates and/or times.     Activity    Your activity upon discharge: Discharge Instructions:  Limit lifting to 10 pounds until follow up visit.    Ok to shampoo hair, but do not scrub or submerge incision under water until evaluated post operatively in clinic.    Ok to walk as tolerated, avoid bedrest.    No contact sports until after follow up visit.  No high impact activities such as running/jogging, snowmobile or 4 gillis riding or any other recreational vehicles.     Call the office at [phone] for increasing redness, swelling or pus draining from the incision, increased pain, or any other questions and concerns.    Go to ER with any seizure activity, mental status change (increasing confusion), difficulty with speech, or increasing or acute weakness.     Wound care and dressings    Instructions to care for your wound at home: ice to area for comfort, keep wound clean and dry and may get incision wet in shower but do not soak or scrub. Staples to be removed at 2 weeks post operatively.     Follow-up and recommended labs and tests     Your Neurosurgical follow up appointments need to be scheduled. You may call [phone] to make, confirm or change your follow-up Neurosurgery appointment dates and/or  times.     Activity    Your activity upon discharge: Discharge Instructions:  Limit lifting to 10 pounds until follow up visit.    Ok to shampoo hair, but do not scrub or submerge incision under water until evaluated post operatively in clinic.    Ok to walk as tolerated, avoid bedrest.    No contact sports until after follow up visit.  No high impact activities such as running/jogging, snowmobile or 4 gillis riding or any other recreational vehicles.     Call the office at [phone] for increasing redness, swelling or pus draining from the incision, increased pain, or any other questions and concerns.    Go to ER with any seizure activity, mental status change (increasing confusion), difficulty with speech, or increasing or acute weakness.     Wound care and dressings    Instructions to care for your wound at home: ice to area for comfort, keep wound clean and dry and may get incision wet in shower but do not soak or scrub.     Reason for your hospital stay    You had a head bleed that needed surgical intervention.     Follow-up and recommended labs and tests     Follow up with primary care provider, Arie House MD, within 7 days for hospital follow- up.  The following labs/tests are recommended: CBC.     When to contact your care team    Call your primary doctor if you have any of the following: increasing headache / blurry vision / Fever > 101F.     MD face to face encounter    Documentation of Face to Face and Certification for Home Health Services    I certify that patient: Yvette Gastelum is under my care and that I, or a nurse practitioner or physician's assistant working with me, had a face-to-face encounter that meets the physician face-to-face encounter requirements with this patient on: 7/10/2021.    This encounter with the patient was in whole, or in part, for the following medical condition, which is the primary reason for home health care: physical deconditioning .    I certify that,  based on my findings, the following services are medically necessary home health services: Physical Therapy.    My clinical findings support the need for the above services because: Physical Therapy Services are needed to assess and treat the following functional impairments: physical deconditoning.    Further, I certify that my clinical findings support that this patient is homebound (i.e. absences from home require considerable and taxing effort and are for medical reasons or Denominational services or infrequently or of short duration when for other reasons) because: Requires assistance of another person or specialized equipment to access medical services because patient: Has prohibitive pain during ambulation...    Based on the above findings. I certify that this patient is confined to the home and needs intermittent skilled nursing care, physical therapy and/or speech therapy.  The patient is under my care, and I have initiated the establishment of the plan of care.  This patient will be followed by a physician who will periodically review the plan of care.  Physician/Provider to provide follow up care: Arie House    Attending [facility] physician (the Medicare certified [place] provider): Brown Saab MD  Physician Signature: See electronic signature associated with these discharge orders.  Date: 7/10/2021     Diet    Follow this diet upon discharge: Advance to a regular diet as tolerated       Significant Results and Procedures   Most Recent 3 CBC's:  Recent Labs   Lab Test 07/10/21  0548 07/09/21  0709 07/08/21  0606   WBC 4.8 7.2 6.9   HGB 7.4* 7.0* 7.7*   MCV 79 77* 78   PLT 91* 47* 58*     Most Recent 3 BMP's:  Recent Labs   Lab Test 07/10/21  1210 07/10/21  0548 07/10/21  0029 07/09/21  0709 07/09/21  0709 07/08/21  1813 07/08/21  1813 07/08/21  0606 07/08/21  0606    134 133   < > 133   < > 134   < > 131*   POTASSIUM  --  3.9  --   --  3.6  --  3.5  --  3.3*   CHLORIDE  --  104  --   --  102   --   --   --  101   CO2  --  26  --   --  25  --   --   --  24   BUN  --  14  --   --  13  --   --   --  14   CR  --  0.84  --   --  0.80  --   --   --  0.79   ANIONGAP  --  4  --   --  6  --   --   --  6   RUSTY  --  8.8  --   --  8.5  --   --   --  8.5   GLC  --  110*  --   --  119*  --   --   --  104*    < > = values in this interval not displayed.     Most Recent 2 LFT's:  Recent Labs   Lab Test 07/05/21  1549 03/17/21  1553   AST 61* 49*   ALT 35 24   ALKPHOS 62 83   BILITOTAL 1.7* 1.0     Most Recent 3 INR's:  Recent Labs   Lab Test 07/05/21  1736 08/21/18  1743   INR 1.09 1.03     Most Recent Urinalysis:  Recent Labs   Lab Test 07/07/21  0855 01/25/16  0939   COLOR Light Yellow Yellow   APPEARANCE Clear Clear   URINEGLC Negative Negative   URINEBILI Negative Negative   URINEKETONE Negative Negative   SG 1.015 <=1.005   UBLD Negative Negative   URINEPH 6.0 5.0   PROTEIN Negative Negative   UROBILINOGEN  --  0.2   NITRITE Negative Negative   LEUKEST Negative Negative   RBCU 7*  --    WBCU 6*  --    ,   Results for orders placed or performed during the hospital encounter of 07/05/21   Head CT w/o contrast    Narrative    CT SCAN OF THE HEAD WITHOUT CONTRAST   7/5/2021 4:53 PM     HISTORY: Headache, intracranial hemorrhage suspected. History of   small cell cancer, low platelets.    TECHNIQUE:  Axial images of the head and coronal reformations without  IV contrast material. Radiation dose for this scan was reduced using  automated exposure control, adjustment of the mA and/or kV according  to patient size, or iterative reconstruction technique.    COMPARISON: 1/18/2019 brain MRI    FINDINGS: Large, heterogeneous, but primarily low attenuation  holohemispheric subdural hematoma on the right with patchy areas of  increased density primarily along the superior aspect of the  collection. The hematoma measures 1.4 cm maximal thickness adjacent to  the right frontal lobe and results in 1.6 cm leftward shift of  the  septum pellucidum with subfalcine and right uncal/parahippocampal  gyrus herniation over the free edge of the tentorial leaflet. There is  effacement of the right lateral ventricle and dilatation of the left  lateral ventricle with adjacent periventricular hypoattenuation  concerning for periventricular interstitial edema in the setting of  acute obstructive hydrocephalus.    The paranasal sinuses are clear. Temporal bone structures are well  aerated. No acute osseous abnormality. Prior lens surgery..      Impression    IMPRESSION:   1. Large heterogeneous subdural hematoma on the right measuring 1.4 cm  thick with 1.6 cm leftward midline shift, subfalcine and uncal  herniation and early obstructive hydrocephalus. Low-attenuation of the  subdural hematoma may reflect active bleeding and/or thrombocytopenia.  2. Neurosurgical consultation is recommended.    Critical results were communicated to Dr. Jorge at the time of this  dictation.      CHRISTY JIMENES MD         SYSTEM ID:  [id number]   CT Head w/o Contrast    Narrative    EXAM: CT HEAD W/O CONTRAST  LOCATION: [facility]  DATE/TIME: 7/6/2021 5:10 AM    INDICATION: Subdural hematoma  COMPARISON: 07/05/2021  TECHNIQUE: Routine CT Head without IV contrast. Multiplanar reformats. Dose reduction techniques were used.    FINDINGS:  INTRACRANIAL CONTENTS: Interval postoperative changes evacuation of right subdural hematoma with subdural drain in place. Gas and mixed density blood collection with thickness of fluid measured at 9 mm on the current compared with 19 mm on the prior.   Decrease in mass effect with 10 mm right to left midline shift compared with 16 mm on the prior. Decrease in entrapment of left lateral ventricle. No CT evidence of acute infarct. Mild presumed chronic small vessel ischemic changes. Mild generalized   volume loss. No hydrocephalus.     VISUALIZED ORBITS/SINUSES/MASTOIDS: No intraorbital abnormality. No paranasal sinus  mucosal disease. No middle ear or mastoid effusion.    BONES/SOFT TISSUES: Post right craniotomy.      Impression    IMPRESSION:  1.  Interval postoperative changes evacuation of right subdural hematoma with subdural drain in place.  2.  Gas and mixed density blood collection with thickness of fluid measured at 9 mm on the current compared with 19 mm on the prior.  3.  Decrease in mass effect with 10 mm right to left midline shift compared with 16 mm on the prior.  4.  Decrease in entrapment of left lateral ventricle.   XR Abdomen 2 Views    Narrative    XR ABDOMEN TWO VIEWS   7/7/2021 9:44 PM     HISTORY: Nausea, constipation, urine retention over the past 24+  hours.    COMPARISON: None.      Impression    IMPRESSION: Nonobstructive bowel gas pattern. No free air. No abnormal  calcifications can be seen.    TALIA LUCAS MD         SYSTEM ID:  [id number]       Discharge Medications   Current Discharge Medication List      START taking these medications    Details   docusate sodium (COLACE) 100 MG capsule Take 1 capsule (100 mg) by mouth 2 times daily as needed for constipation  Qty: 20 capsule, Refills: 0    Associated Diagnoses: SDH (subdural hematoma) (H)      methocarbamol (ROBAXIN) 500 MG tablet Take 1 tablet (500 mg) by mouth 4 times daily as needed for muscle spasms  Qty: 40 tablet, Refills: 0    Associated Diagnoses: SDH (subdural hematoma) (H)      oxyCODONE (ROXICODONE) 5 MG tablet Take 1 tablet (5 mg) by mouth every 6 hours as needed for pain  Qty: 40 tablet, Refills: 0    Associated Diagnoses: SDH (subdural hematoma) (H)      tamsulosin (FLOMAX) 0.4 MG capsule Take 1 capsule (0.4 mg) by mouth daily  Qty: 30 capsule, Refills: 0    Associated Diagnoses: Kidney stones         CONTINUE these medications which have NOT CHANGED    Details   Acetaminophen 325 MG CAPS Take 325-650 mg by mouth every 6 hours as needed for pain       albuterol (PROVENTIL) (2.5 MG/3ML) 0.083% neb solution Take 1 vial (2.5 mg) by  nebulization 2 times daily  Qty: 1 Box, Refills: 11    Associated Diagnoses: Hypoxia; SOB (shortness of breath); Cough      Ascorbic Acid (VITAMIN C) 500 MG CHEW Take 500 mg by mouth daily       atorvastatin (LIPITOR) 10 MG tablet Take 1 tablet (10 mg) by mouth daily  Qty: 90 tablet, Refills: 3    Associated Diagnoses: Hyperlipidemia LDL goal <130      benzonatate (TESSALON) 100 MG capsule Take 1 capsule (100 mg) by mouth 3 times daily as needed for cough  Qty: 90 capsule, Refills: 3    Associated Diagnoses: Cough      fluticasone (FLOVENT HFA) 110 MCG/ACT inhaler Inhale 1 puff into the lungs 2 times daily  Qty: 1 Inhaler, Refills: 11    Associated Diagnoses: Radiation pneumonitis (H)      magnesium 250 MG tablet Take 1 tablet by mouth daily      omeprazole (PRILOSEC) 20 MG DR capsule Take 1 capsule (20 mg) by mouth daily  Qty: 90 capsule, Refills: 3    Associated Diagnoses: Gastroesophageal reflux disease with esophagitis, unspecified whether hemorrhage; Adverse effect of radiation, sequela      sodium chloride (NEBUSAL) 3 % neb solution Take 3 mLs by nebulization 2 times daily Use with albuterol inhaler  Qty: 90 mL, Refills: 4    Associated Diagnoses: Cough      vitamin B-Complex Take 1 tablet by mouth daily      Vitamin D, Cholecalciferol, 25 MCG (1000 UT) CAPS Take 25 mcg by mouth daily       diclofenac (VOLTAREN) 1 % topical gel Apply 2 g topically 4 times daily  Qty: 300 g, Refills: 3    Associated Diagnoses: Primary osteoarthritis of both knees      Elastic Bandages & Supports (MEDICAL COMPRESSION SOCKS) MISC 1 Package daily  Qty: 2 each, Refills: 1    Associated Diagnoses: Benign essential hypertension; Gastroesophageal reflux disease with esophagitis; Adverse effect of radiation, sequela; Tachycardia; Orthostatic hypotension      loperamide (IMODIUM) 2 MG capsule Take 2 mg by mouth 4 times daily as needed for diarrhea           Allergies   Allergies   Allergen Reactions     No Known Allergies

## 2021-07-10 NOTE — PROGRESS NOTES
Chart reviewed.     1.  A 70-year-old female with limited-stage small cell lung cancer in complete remission  --CT scan follow-up as outpatient with Dr. Luz.  2.  Subdural hematoma, status post craniotomy and evacuation of hematoma  3.  Iron deficiency anemia   --Hgb stable.  --BMBx from 4/19/21 showed marked erythroid hyperplasia with mild dyserthropoiesis; no dysplasia but increase in reticulin fibers.   --Repeat BMBx after further recovery from surgery to evaluate for possible development of MDS. This can be done as outpatient per Dr. Luz's discretion.  --Iron deficiency by studies -- start ferrous sulfate 1 tab daily.  --No indication for RBC transfusion today.  4. Thrombocytopenia  --Platelets improved to 91,000 after transfusion.  --S/p 4-day course of dexamethasone with no response in platelets, less likely ITP. Platelets responded to transfusion.  --Repeat BMBx after further recovery from surgery to evaluate for possible development of MDS. Arrange as outpatient per Dr. Luz's discretion.    Okay to discharge from heme/onc standpoint.    If discharged today, will have  contact patient with outpatient follow-up with Dr. Luz and CBC check in the upcoming week.     Ina Duggan MD  Hematology/Oncology  [facility]

## 2021-07-10 NOTE — PLAN OF CARE
"Occupational Therapy Discharge Summary    Reason for therapy discharge:    Discharged to home with home therapy.    Progress towards therapy goal(s). See goals on Care Plan in [facility] electronic health record for goal details.  Goals partially met.  Barriers to achieving goals:   discharge from facility.    Therapy recommendation(s):    Continued therapy is recommended.  Rationale/Recommendations:  pt would benefit from home therapy to make recommemdation to increase I and safety with ADL\"s and IADL's. Home therapy indicated as pt would require additional person to leave and would require taxing effort.      "

## 2021-07-10 NOTE — PLAN OF CARE
Pt here with a R. SDH, now POD #5 from a R. crani. A&Ox4. Neurologically intact ex: intermittent headache. Incision GRAHAM, staples present. VSS on RA, parameters to keep SBP <140. Regular diet, thin liquids. Takes pills whole. Up with SBA using GB/cane. Shah in place with adequate output. PIV SL. Headache reported overnight, managed well with PRN dilaudid and ice. Pt scoring green on the Aggression Stop Light Tool. Discharge pending, possibly home today with OP therapies.

## 2021-07-10 NOTE — DISCHARGE INSTRUCTIONS
HOMECARE NOTE:   Your doctor has ordered home care to help you after your hospital stay.  The staff will contact you to schedule your first visit.  This service will be provided by [facility]  If you have any question, or have not received a call within 48 hours of discharge, please call them at [phone].  *please see homecare quality ratings for all homecares in your area at www.medicare.gov

## 2021-07-11 NOTE — PLAN OF CARE
Physical Therapy Discharge Summary    Reason for therapy discharge:    Discharged to home with home therapy.    Progress towards therapy goal(s). See goals on Care Plan in [facility] electronic health record for goal details.  Goals partially met.  Barriers to achieving goals:   discharge from facility.    Therapy recommendation(s):    Continued therapy is recommended.  Rationale/Recommendations:  HHPT to progress strenght, endurance and independence with mobility.

## 2021-07-12 NOTE — TELEPHONE ENCOUNTER
Chief Complaint: Sdh (Subdural Hematoma) (H), SAT 10-JUL-2021, 0 / 1    Pt ph: [phone]    
ED/Discharge Protocol    See CC notes     
17-Feb-2021 18:34

## 2021-07-12 NOTE — PROGRESS NOTES
Writer called and LVM. for post hospitalization of Subdural hematoma post craniotomy.     Date of Admission:  7/5/2021  Date of Discharge:  7/10/2021  Discharging Provider: Brown Saab MD      Patient returned to call and she states Oxycodone is not managing her pain . She states that Dilaudid worked for her in the hospital. Writer will send a message to Dr. Luz to see if Dilaudid could be ordered for patient.    Patient also has significant swelling her feet and ankles . I explained and instructed patient to elevate feet above the heart and that the IVf's received while in the hospital is what caused the swelling , most likely and it will take time. If not improve call PCP and also discuss with Dr. Luz this Thursday at appointment.

## 2021-07-12 NOTE — TELEPHONE ENCOUNTER
Patient calling to request Dilaudid 4mg for pain. States she was discharged on Oxycodone, but the Oxycodone causes her to be sick to her stomach and does not provide adequate pain control. Reports she was taking Dilaudid in the hospital with good pain relief. Would like Rx sent to [facility]. Has follow-up scheduled with Dr. Luz 7/15 but can't wait until then for her pain meds.     Will route to Dr. Luz to review/advise if appropriate.    Fiordaliza Villanueva, GIOVANNIN, RN, PHN, OCN  Oncology Care Coordinator  [facility]

## 2021-07-13 NOTE — TELEPHONE ENCOUNTER
Reason for call: Pt's son Luis M called to ask if it is normal for his mother to be lethargic and not moving around much and when she does she needs assistance. He would like to speak to a nurse to see if she needs to be seen. Please call him back at [phone]. Thank you

## 2021-07-13 NOTE — PHARMACY-ADMISSION MEDICATION HISTORY
Pharmacy Medication History  Admission medication history interview status for the 7/13/2021  admission is complete. See EPIC admission navigator for prior to admission medications     Location of Interview: Patient room  Medication history sources: Patient's family/friend (2 family members in room who were familiar with medications) and discharge from hospital 7/10/21    Significant changes made to the medication list:  -Added back oxycodone as plan is to change to hydromorphone because upset stomach & pain not controlled with oxycodone. Hydromorphone Rx was picked up morning 7/13 but did not have chance to start. She still took the oxycodone this morning for pain.    In the past week, patient estimated taking medication this percent of the time: greater than 90%    Additional medication history information:   -Family unaware of new prescription for tamsulosin when discharged 7/10/21 so she has not started yet.  -Family does not think she took usual daily meds this morning, just the oxycodone and methocarbamol.    Medication reconciliation completed by provider prior to medication history? No    Time spent in this activity: 10 min    Prior to Admission medications    Medication Sig Last Dose Taking? Auth Provider   Acetaminophen 325 MG CAPS Take 325-650 mg by mouth every 6 hours as needed for pain  prn Yes Reported, Patient   albuterol (PROVENTIL) (2.5 MG/3ML) 0.083% neb solution Take 1 vial (2.5 mg) by nebulization 2 times daily  Patient taking differently: Take 2.5 mg by nebulization 2 times daily as needed for shortness of breath / dyspnea or wheezing  prn Yes Virginia Davidson MD   Ascorbic Acid (VITAMIN C) 500 MG CHEW Take 500 mg by mouth daily   Yes Reported, Patient   atorvastatin (LIPITOR) 10 MG tablet Take 1 tablet (10 mg) by mouth daily  Yes Arie House MD   benzonatate (TESSALON) 100 MG capsule Take 1 capsule (100 mg) by mouth 3 times daily as needed for cough prn Yes Sukh  MD Jennifer   docusate sodium (COLACE) 100 MG capsule Take 1 capsule (100 mg) by mouth 2 times daily as needed for constipation prn Yes Isabell Root PA-C   fluticasone (FLOVENT HFA) 110 MCG/ACT inhaler Inhale 1 puff into the lungs 2 times daily  Yes Virginia Davidson MD   loperamide (IMODIUM) 2 MG capsule Take 2 mg by mouth 4 times daily as needed for diarrhea prn Yes Reported, Patient   magnesium 250 MG tablet Take 1 tablet by mouth daily  Yes Reported, Patient   methocarbamol (ROBAXIN) 500 MG tablet Take 1 tablet (500 mg) by mouth 4 times daily as needed for muscle spasms 7/13/21 at am Yes Isabell Root PA-C   omeprazole (PRILOSEC) 20 MG DR capsule Take 1 capsule (20 mg) by mouth daily  Yes Arie House MD   oxyCODONE (ROXICODONE) 5 MG tablet Take 5 mg by mouth every 6 hours as needed for severe pain 7/13/2021 at am Yes Unknown, Entered By History   sodium chloride (NEBUSAL) 3 % neb solution Take 3 mLs by nebulization 2 times daily Use with albuterol inhaler  Patient taking differently: Take 3 mLs by nebulization 2 times daily as needed for wheezing Use with albuterol inhaler prn Yes Virginia Davidson MD   vitamin B-Complex Take 1 tablet by mouth daily  Yes Reported, Patient   Vitamin D, Cholecalciferol, 25 MCG (1000 UT) CAPS Take 25 mcg by mouth daily   Yes Reported, Patient   diclofenac (VOLTAREN) 1 % topical gel Apply 2 g topically 4 times daily  Patient taking differently: Apply 2 g topically 4 times daily as needed for moderate pain  Unsure if using  Arie House MD   Elastic Bandages & Supports (MEDICAL COMPRESSION SOCKS) MISC 1 Package daily   Valeria Macedo DO   HYDROmorphone (DILAUDID) 2 MG tablet Take 1 to 2 tablet every 6 hours as needed for pain. New Rx at hasn't started  Jennifer Luz MD   tamsulosin (FLOMAX) 0.4 MG capsule Take 1 capsule (0.4 mg) by mouth daily  Patient not taking: Reported on 7/13/2021 Not Taking at unaware new Rx   Brown Saab MD       The information provided in this note is only as accurate as the sources available at the time of update(s)

## 2021-07-13 NOTE — CONSULTS
[facility]    Neurosurgery Consultation     Date of Admission:  7/13/2021  Date of Consult (When I saw the patient): 07/13/21    Assessment & Plan   Yvette Gastelum is a 70 year old female who was admitted on 7/13/2021. I was asked to see the patient for subdural hematoma.    Active Problems:    Recurrent subdural hematoma    Assessment: increasing subdural hematoma with significant mass effect resulting in subfalcine and some uncal herniation    Plan:   -Hematology consult for platelets  -Tentatively plan to return to OR tomorrow with Dr. Dominguez  -Agree with admission to ICU via hospitalist  -SBP <150  -No anticoagulation or anti-inflammatories  -Continue to closely monitor neuro status, may require surgical intervention sooner if neuro status changes    I have discussed the following assessment and plan with Dr. Dominguez who is in agreement with initial plan and will follow up with further consultation recommendations.    Leyla Marquez CNP  [facility]  [place] [address]    Tel [phone]  Pager [phone]        Code Status    Prior    Reason for Consult   Reason for consult: I was asked by Dr. Magaña to evaluate this patient for recurrent SDH s/p craniotomy for SDH 7/5/2021.    Primary Care Physician   Arie House MD    Chief Complaint   Headache, lethargy, confusion    History is obtained from the patient, family, and EMR.    History of Present Illness   Yvette Gastelum is a 70 year old female with a history of subdural hematoma s/p craniotomy on 7/5/2021, anemia, and thrombocytopenia who presents with headache, lethargy, confusion, and slurred speech which began yesterday afternoon. She presented to the ED via EMS for further evaluation. Imaging revealed an increasing right subdural hematoma with increasing mass effect resulting in subfalcine and some uncal herniation. She is not  on any anticoagulation. INR 1.09, PTT 35, platelet 53. Today she was seen in the ED. She is alert to voice and follows commands. She is oriented to person, place, and month/year. She has slurred speech, slight left facial droop, and slight left drift. She is able to move all extremities equally. She reports a headache and some photophobia which is not new. She denies dizziness, nausea/vomiting, and weakness.    Past Medical History   I have reviewed this patient's medical history and updated it with pertinent information if needed.   Past Medical History:   Diagnosis Date     Cancer (H)     Lung cancer       Hypertension      Kidney stones      Obese      Recurrent UTI      S/P CL-BSO      Sepsis (H)     secondary to uti        Past Surgical History   I have reviewed this patient's surgical history and updated it with pertinent information if needed.  Past Surgical History:   Procedure Laterality Date     BONE MARROW BIOPSY, BONE SPECIMEN, NEEDLE/TROCAR N/A 2/10/2021    Procedure: BONE MARROW BIOPSY;  Surgeon: Earlene Garcia MD;  Location: [facility]     BONE MARROW BIOPSY, BONE SPECIMEN, NEEDLE/TROCAR N/A 4/19/2021    Procedure: BIOPSY, BONE MARROW;  Surgeon: Felix Elizabeth MD;  Location:  GI     COLONOSCOPY       COLONOSCOPY N/A 2/10/2016    Procedure: COMBINED COLONOSCOPY, SINGLE OR MULTIPLE BIOPSY/POLYPECTOMY BY BIOPSY;  Surgeon: Antonia Jiménez MD;  Location:  GI     CRANIOTOMY Right 7/5/2021    Procedure: CRANIOTOMY;  Surgeon: Puma Dominguez MD;  Location:  OR     EYE SURGERY      CATARACT EXTRACTION RIGHT & LEFT     GYN SURGERY      BILATERAL TUBAL LIGATION, CL, LAPAROSCOPIC LEFT SALPINGO-OOPHORECTOMY     HEAD & NECK SURGERY      WISDOM TEETH EXTRACTION     HYSTERECTOMY       INSERT PORT VASCULAR ACCESS N/A 9/14/2018    Procedure: INSERT PORT VASCULAR ACCESS;  POWER PORT PLACEMENT;  Surgeon: Todd Norris MD;  Location:  SD     REMOVE PORT VASCULAR ACCESS  N/A 2019    Procedure: REMOVAL, VASCULAR ACCESS PORT;  Surgeon: Todd Norris MD;  Location:  OR       Prior to Admission Medications   Prior to Admission Medications   Prescriptions Last Dose Informant Patient Reported? Taking?   Acetaminophen 325 MG CAPS  Self Yes No   Sig: Take 325-650 mg by mouth every 6 hours as needed for pain    Ascorbic Acid (VITAMIN C) 500 MG CHEW   Yes No   Sig: Take 500 mg by mouth daily    Elastic Bandages & Supports (MEDICAL COMPRESSION SOCKS) MISC   No No   Si Package daily   HYDROmorphone (DILAUDID) 2 MG tablet   No No   Sig: Take 1 to 2 tablet every 6 hours as needed for pain.   Vitamin D, Cholecalciferol, 25 MCG (1000 UT) CAPS   Yes No   Sig: Take 25 mcg by mouth daily    albuterol (PROVENTIL) (2.5 MG/3ML) 0.083% neb solution   No No   Sig: Take 1 vial (2.5 mg) by nebulization 2 times daily   Patient taking differently: Take 2.5 mg by nebulization 2 times daily as needed for shortness of breath / dyspnea or wheezing    atorvastatin (LIPITOR) 10 MG tablet   No No   Sig: Take 1 tablet (10 mg) by mouth daily   benzonatate (TESSALON) 100 MG capsule   No No   Sig: Take 1 capsule (100 mg) by mouth 3 times daily as needed for cough   diclofenac (VOLTAREN) 1 % topical gel   No No   Sig: Apply 2 g topically 4 times daily   Patient taking differently: Apply 2 g topically 4 times daily as needed for moderate pain    docusate sodium (COLACE) 100 MG capsule   No No   Sig: Take 1 capsule (100 mg) by mouth 2 times daily as needed for constipation   fluticasone (FLOVENT HFA) 110 MCG/ACT inhaler   No No   Sig: Inhale 1 puff into the lungs 2 times daily   loperamide (IMODIUM) 2 MG capsule   Yes No   Sig: Take 2 mg by mouth 4 times daily as needed for diarrhea   magnesium 250 MG tablet   Yes No   Sig: Take 1 tablet by mouth daily   methocarbamol (ROBAXIN) 500 MG tablet   No No   Sig: Take 1 tablet (500 mg) by mouth 4 times daily as needed for muscle spasms   omeprazole (PRILOSEC)  "20 MG DR capsule   No No   Sig: Take 1 capsule (20 mg) by mouth daily   sodium chloride (NEBUSAL) 3 % neb solution   No No   Sig: Take 3 mLs by nebulization 2 times daily Use with albuterol inhaler   Patient taking differently: Take 3 mLs by nebulization 2 times daily as needed for wheezing Use with albuterol inhaler   tamsulosin (FLOMAX) 0.4 MG capsule   No No   Sig: Take 1 capsule (0.4 mg) by mouth daily   vitamin B-Complex   Yes No   Sig: Take 1 tablet by mouth daily      Facility-Administered Medications: None     Allergies   Allergies   Allergen Reactions     No Known Allergies        Social History   I have reviewed this patient's social history and updated it with pertinent information if needed. Yvette Gastelum  reports that she quit smoking about 5 years ago. Her smoking use included cigarettes. She started smoking about 55 years ago. She has a 50.00 pack-year smoking history. She has never used smokeless tobacco. She reports that she does not drink alcohol and does not use drugs.    Family History   I have reviewed this patient's family history and updated it with pertinent information if needed.   Family History   Problem Relation Age of Onset     Aneurysm Father         Aorta     Hypertension Father      Cervical Cancer Mother      Lung Cancer Brother         non smoker       Review of Systems   10 point ROS negative except noted above.    Physical Exam     Temp src: Oral BP: (!) 158/84 Pulse: 98   Resp: 13 SpO2: 97 % O2 Device: None (Room air)    Vital Signs with Ranges  Pulse:  [92-99] 98  Resp:  [10-26] 13  BP: (149-158)/(83-93) 158/84  SpO2:  [96 %-99 %] 97 %  221 lbs 12.8 oz     , Blood pressure (!) 158/84, pulse 98, resp. rate 13, height 5' 4\" (1.626 m), weight 221 lb 12.8 oz (100.6 kg), SpO2 97 %, not currently breastfeeding.  221 lbs 12.8 oz  HEENT:  Normocephalic, atraumatic.  PERRLA.  EOM s intact.   Heart:  No peripheral edema  Lungs:  No SOB  Skin:  Warm and dry, good capillary refill. " Incision intact with staples.  Extremities:  Good radial and dorsalis pedis pulses bilaterally, no edema, cyanosis or clubbing.    NEUROLOGICAL EXAMINATION:   Mental status:  Alert and Oriented x 3, slurred speech.  Cranial nerves:  II-XII intact except slight left facial droop.  Motor:  Strength is 5/5 throughout the upper and lower extremities  Sensation:  intact  Reflexes:   Negative Babinski.  Negative Clonus.    Coordination:  Smooth finger to nose testing.   Slight left drift.      Data     CBC RESULTS:   Recent Labs   Lab Test 07/13/21  1421   WBC 10.1   RBC 3.43*   HGB 7.7*   HCT 26.9*   MCV 78   MCH 22.4*   MCHC 28.6*   RDW 27.5*   PLT 53*     Basic Metabolic Panel:  Lab Results   Component Value Date     07/13/2021     07/10/2021      Lab Results   Component Value Date    POTASSIUM 3.4 07/13/2021    POTASSIUM 3.9 07/10/2021     Lab Results   Component Value Date    CHLORIDE 102 07/13/2021    CHLORIDE 104 07/10/2021     Lab Results   Component Value Date    RUSTY 9.5 07/13/2021    RUSTY 8.8 07/10/2021     Lab Results   Component Value Date    CO2 28 07/13/2021    CO2 26 07/10/2021     Lab Results   Component Value Date    BUN 15 07/13/2021    BUN 14 07/10/2021     Lab Results   Component Value Date    CR 0.94 07/13/2021    CR 0.84 07/10/2021     Lab Results   Component Value Date     07/13/2021     07/10/2021     INR:  Lab Results   Component Value Date    INR 1.09 07/13/2021    INR 1.09 07/05/2021    INR 1.03 08/21/2018

## 2021-07-13 NOTE — ED NOTES
Bed: ST02  Expected date:   Expected time:   Means of arrival:   Comments:  Joanne - 70 f Stroke alert eta 6617

## 2021-07-13 NOTE — CONSULTS
"      [facility]    Stroke Telephone Note    I was called by Octavio Magaña on 07/13/21 regarding patient Yvette Gastelum. The patient is a 70 year old female who was recently in the hospital earlier in the month with a subdural hematoma that was evacuated by neurosurgery. She returns with hadache since yesterday as well as L side weakness and mild L facial droop and slurred speech. BP is in 140-150 range systolic.    Stroke Code Data (for stroke code without tele)  Stroke code activated 07/13/21   1417   Stroke provider first response  07/13/21   1417            Last known normal 07/12/21   1559        Time of discovery   (or onset of symptoms) 07/12/21   1600   Head CT read by Stroke Neuro Dr/Provider 07/13/21   1434   Was stroke code de-escalated? No      presence of contraindications for both intravenous and intra-arterial stroke treatments       Imaging Findings   CT head shows reaccumulation of the subdural. Midline shift present. CTA shows no stenosis or occlusion    Thrombolytic Treatment   Not given due to alternative dx---- this isnot stroke.    Endovascular Treatment  Not initiated due to absence of proximal vessel occlusion    Impression  Subdural hematoma    Recommendations   - Consult neurosurgery stat  - icu admission and q1 hour neuro checks  - Consider brain mri depending on clinical course    My recommendations are based on the information provided over the phone by Yvette Gastelum's in-person providers. They are not intended to replace the clinical judgment of her in-person providers. I was not requested to personally see or examine the patient at this time.    Tess Ibrahim PA-C  Neurology  07/13/2021 3:39 PM  To page me or covering stroke neurology team member, click here: AMCOM   Choose \"On Call\" tab at top, then search dropdown box for \"Neurology Adult\", select location, press Enter, then look for stroke/neuro ICU/telestroke.        "

## 2021-07-13 NOTE — PROGRESS NOTES
Clinic Care Coordination Contact  [facility]/Voicemail    Referral Source: IP Report  Clinical Data: Care Coordinator Outreach    Chart Review: Referral from a discharge.  [facility] 7/5/21-7/10/21  IP for Right frontal craniotomy for evacuation of subacute subdural  hematoma; placement of subdural drain.  Medication Changes- Yes  Follow up with PCP within 7 days    Reason for Referral: Care Transition: Home care discharge  Additional pertinent details: [facility]     Outreach attempted x 1.  Left message on patient's voicemail with call back information and requested return call.  Plan: Care Coordinator will try to reach patient again in 1-2 business days.    SHAUN Cortez  Clinic Care Coordination  [facility]: [facility] [place]  Phone: [phone]

## 2021-07-13 NOTE — TELEPHONE ENCOUNTER
Attempted to reach out to patient's son Luis M, no answer. Left voice message for call clinic back to further discuss.     It appears that patient was brought into the ED for evaluation.     Patient is s/p Right frontal craniotomy for evacuation of subacute subdural hematoma; placement of subdural drain on 7/5/21 with Dr. Dominguez.     Spoke to and updated Dr. Dominguez and care team regarding patient.

## 2021-07-13 NOTE — TELEPHONE ENCOUNTER
"Patient's son Luis M calling as a follow-up to recent neurosurgery and placement of subdural drain. Luis M reports he was told to contact Dr. Luz if any concerns after recent hospitalization and surgery.     Luis M reports Yvette is having increased pain and \"out of it\" today. Luis M is concerned this could be related to her recent surgery or that she could have a bleed.  He is unsure if she took too much pain medications. Has Oxy at home but pt reported not using yesterday. New Rx for Dilaudid requested, but patient has not picked up yet from Luis M's report.  Denies focal weakness, facial drooping. Overall weak and had episode of incontinence.     Paged Dr. Luz at 1228 for recommendations, follow-up with Neurosurg team vs ED for evaluation?     Will route to Neurosurg team to review/advise as well.    Addendum: Discussed with Dr. Luz who recommends ED for assessment and to r/o bleed. Writer updated Luis M on recommendations. Luis M reports he is unable to transport/move pt and so he will call EMS now.     Will route to [facility] for follow-up pending ED evaluation.    Fiordaliza Villanueva, BSN, RN, PHN, OCN  Oncology Care Coordinator  [facility]      "

## 2021-07-13 NOTE — ED TRIAGE NOTES
Pt arrives by ambulance from home where her son noted pt to have some slurring of speech and pt reported a headache that began yesterday at 1600. Pt recently had neurosurgery (approx 7/4). On arrival pt has left sided weakness and mild left facial droop with some slurred speech.

## 2021-07-13 NOTE — H&P
[facility]    History and Physical - Hospitalist Service       Date of Admission:  7/13/2021    Assessment & Plan      Yvette Gastelum is a 70 year old female with a history of small cell lung cancer s/p chemoradiation in 2018, currently in remission, pancytopenia, HLD, GERD, HTN who was admitted from 7/5 to 7/10 with headache, dysarthria and left- sided weakness.     At presentation, P 99, /93 > 140/87, RR 10, O2 99%.   Labs shows stable thrombocytopenia 53 and anemia 7.7 (MCV 78), Lytes, Cr wnl. Glucose 120.      Head CT without contrast:   IMPRESSION: Increasing right hemispheric subdural hematoma with  increasing mass effect resulting in subfalcine and some uncal  herniation.      Recurrent R subdural hematoma with 1.7 cm R > L midline shift (up from 0.9 cm), with evidence uncal and subfalcine herniation  -  No history of trauma.  Spontaneous SDH in setting of severe thrombocytopenia. Pt count 53 at admission. Review of most recent hospitalization Plts count maintained mostly around 50, with a high of 91.   - 2 units platelets ordered now. Follow plts 30 minutes after each ordered transfusion and continue to give 2 units until platelets > 100.    - Neurosurgery evaluated in the ED and recommended admission to ICU with close monitoring  - Discussed with Dr. Bush. He recommends we get the platelets above 100 prior to going to the OR.   - Keep SBP < 150  - q 1 hour neuro check while awake, q 2 hours while sleeping. Discussed with neurosurgery decline in mental status after dilaudid, and they returned to assess in ED. Will follow closely overnight.   - Continue to hold all anticoagulants and anti-platelets  - CT in the AM sooner if there are acute neurological changes.   - For HA, I have ordered tylenol. I have not ordered further IV dilaudid. Please page MD to discuss current status prior so we can track changes.     Pancytopenia  Thrombocytopenia  Suspect secondary to  MDS  Follows with Dr. Luz with [facility].  Has had multiple investigations for pancytopenia.  Recently placed on 4-day course of dexamethasone for thrombocytopenia with no improvement.   Plan:  - Consult [facility] and discussed with Dr. Luz.   - During last admission received 2 units of plts 37 > 60's prior to surgery. Goal was to keep plts above 50 per Dr. Luz post surgery.   - Given recurrent active intracranial bleeding goal plts > 100.    - Oncology follow up as outpatient. Plan will be to keep plts > 100 x 1 month now that we are seeing recurrent bleeding with plts .>50.      HLD  HTN  - Holding PTA atorvastatin.  Not on any antihypertensive medications.     GERD  - Holding PTA omeprazole  - Ordered protonix IV 40 mg daily.      H/o small cell lung cancer, in remission.   Follows with Dr. Luz.  She is s/p chemoradiation in 2018.  Had CT CAP last month that did not show any evidence of malignancy.  - Continue PTA as needed albuterol nebs, fluticasone inhaler for SOB/wheezing     Urinary Retention  Flomax was started at last hospitalization. Ultimately needed garces catheter. This was removed prior to discharge.   - Continue flomax   - Urology eval as outpatient.         Diet: NPO for Medical/Clinical Reasons Except for: No Exceptions    DVT Prophylaxis: Pneumatic Compression Devices  Garces Catheter: Not present  Central Lines: None  Code Status:   Full code.     Disposition Plan   Admitted to inpatient anticipate at least 3 day stay. .      The patient's care was discussed with the Bedside Nurse, Patient's Family and hematology, neurosurgery Consultants and ED team.     Roxane Merritt MD  [facility]  Securely message with the Vocera Web Console (learn more here)  Text page via [employer] Paging/Directory      ______________________________________________________________________    Chief Complaint   Found down unable to talk.     Unable to obtain a history  from the patient due to mental status. History obtained from ER physician and sons at [facility].     History of Present Illness   Yvette Gastelum is a 70 year old female with a history of small cell lung cancer s/p chemoradiation in 2018, currently in remission, pancytopenia, HLD, GERD, HTN who was admitted from 7/5 to 7/10 with headache, dysarthria and left- sided weakness.     Per son, after being discharged to the hospital at home at home she seemed to have progressive decline.  She began complaining of a headache which seemed to get worse and she was more more lethargic.  Today when her son went to go see her she was naked and had slurred speech and possibly some left sided facial weakness.  Per EMS report she was cognitively intact and able to answer questions.  She was complaining of headache.  She denied any nausea or vomiting or abdominal pain in the ED.  There is no known history of fall.     At presentation, P 99, /93 > 140/87, RR 10, O2 99%.   Labs shows stable thrombocytopenia 53 and anemia 7.7 (MCV 78), Lytes, Cr wnl. Glucose 120.      Head CT without contrast:   IMPRESSION: Increasing right hemispheric subdural hematoma with  increasing mass effect resulting in subfalcine and some uncal  herniation.     In the ED she received 0.2 mg of hydromorphone and had a decrease in her mental status more lethargic.  Still opening her eyes and following simple commands but no longer raising her legs to command.  Exam reveals pupils are equal round and reactive to light.  She has a mild left pronator drift.  Otherwise her face appears symmetric.      Review of Systems    Review of systems not obtained due to patient factors - mental status    Past Medical History    I have reviewed this patient's medical history and updated it with pertinent information if needed.   Past Medical History:   Diagnosis Date     Cancer (H)     Lung cancer       Hypertension      Kidney stones      Obese       Recurrent UTI      S/P CL-BSO      Sepsis (H)     secondary to uti        Past Surgical History   I have reviewed this patient's surgical history and updated it with pertinent information if needed.  Past Surgical History:   Procedure Laterality Date     BONE MARROW BIOPSY, BONE SPECIMEN, NEEDLE/TROCAR N/A 2/10/2021    Procedure: BONE MARROW BIOPSY;  Surgeon: Earlene Garcia MD;  Location:  GI     BONE MARROW BIOPSY, BONE SPECIMEN, NEEDLE/TROCAR N/A 4/19/2021    Procedure: BIOPSY, BONE MARROW;  Surgeon: Felix Elizabeth MD;  Location:  GI     COLONOSCOPY       COLONOSCOPY N/A 2/10/2016    Procedure: COMBINED COLONOSCOPY, SINGLE OR MULTIPLE BIOPSY/POLYPECTOMY BY BIOPSY;  Surgeon: Antonia Jiménez MD;  Location:  GI     CRANIOTOMY Right 7/5/2021    Procedure: CRANIOTOMY;  Surgeon: Puma Dominguez MD;  Location:  OR     EYE SURGERY      CATARACT EXTRACTION RIGHT & LEFT     GYN SURGERY      BILATERAL TUBAL LIGATION, CL, LAPAROSCOPIC LEFT SALPINGO-OOPHORECTOMY     HEAD & NECK SURGERY      WISDOM TEETH EXTRACTION     HYSTERECTOMY       INSERT PORT VASCULAR ACCESS N/A 9/14/2018    Procedure: INSERT PORT VASCULAR ACCESS;  POWER PORT PLACEMENT;  Surgeon: Todd Norris MD;  Location:  SD     REMOVE PORT VASCULAR ACCESS N/A 5/21/2019    Procedure: REMOVAL, VASCULAR ACCESS PORT;  Surgeon: Todd Norris MD;  Location:  OR       Social History   I have reviewed this patient's social history and updated it with pertinent information if needed.  She discharged to home. She was staying in her apartment, using a cane. Could not leave the apartment without assist. She had most meals ordered in. She was able to prepare simple things like toast.   She no longer smokes, rarely drinks.   Social History     Tobacco Use     Smoking status: Former Smoker     Packs/day: 1.00     Years: 50.00     Pack years: 50.00     Types: Cigarettes     Start date: 10/1965     Quit date:  2015     Years since quittin.8     Smokeless tobacco: Never Used   Substance Use Topics     Alcohol use: No     Alcohol/week: 0.0 standard drinks     Drug use: No       Family History   I have reviewed this patient's family history and updated it with pertinent information if needed.  Family History   Problem Relation Age of Onset     Aneurysm Father         Aorta     Hypertension Father      Cervical Cancer Mother      Lung Cancer Brother         non smoker       Prior to Admission Medications   Prior to Admission Medications   Prescriptions Last Dose Informant Patient Reported? Taking?   Acetaminophen 325 MG CAPS prn Self Yes Yes   Sig: Take 325-650 mg by mouth every 6 hours as needed for pain    Ascorbic Acid (VITAMIN C) 500 MG CHEW   Yes Yes   Sig: Take 500 mg by mouth daily    Elastic Bandages & Supports (MEDICAL COMPRESSION SOCKS) MISC   No No   Si Package daily   HYDROmorphone (DILAUDID) 2 MG tablet New Rx at hasn't started  No No   Sig: Take 1 to 2 tablet every 6 hours as needed for pain.   Vitamin D, Cholecalciferol, 25 MCG (1000 UT) CAPS   Yes Yes   Sig: Take 25 mcg by mouth daily    albuterol (PROVENTIL) (2.5 MG/3ML) 0.083% neb solution prn  No Yes   Sig: Take 1 vial (2.5 mg) by nebulization 2 times daily   Patient taking differently: Take 2.5 mg by nebulization 2 times daily as needed for shortness of breath / dyspnea or wheezing    atorvastatin (LIPITOR) 10 MG tablet   No Yes   Sig: Take 1 tablet (10 mg) by mouth daily   benzonatate (TESSALON) 100 MG capsule prn  No Yes   Sig: Take 1 capsule (100 mg) by mouth 3 times daily as needed for cough   diclofenac (VOLTAREN) 1 % topical gel Unsure if using  No No   Sig: Apply 2 g topically 4 times daily   Patient taking differently: Apply 2 g topically 4 times daily as needed for moderate pain    docusate sodium (COLACE) 100 MG capsule prn  No Yes   Sig: Take 1 capsule (100 mg) by mouth 2 times daily as needed for constipation   fluticasone (FLOVENT  HFA) 110 MCG/ACT inhaler   No Yes   Sig: Inhale 1 puff into the lungs 2 times daily   loperamide (IMODIUM) 2 MG capsule prn  Yes Yes   Sig: Take 2 mg by mouth 4 times daily as needed for diarrhea   magnesium 250 MG tablet   Yes Yes   Sig: Take 1 tablet by mouth daily   methocarbamol (ROBAXIN) 500 MG tablet 7/13/2021 at am  No Yes   Sig: Take 1 tablet (500 mg) by mouth 4 times daily as needed for muscle spasms   omeprazole (PRILOSEC) 20 MG DR capsule   No Yes   Sig: Take 1 capsule (20 mg) by mouth daily   oxyCODONE (ROXICODONE) 5 MG tablet 7/13/2021 at am  Yes Yes   Sig: Take 5 mg by mouth every 6 hours as needed for severe pain   sodium chloride (NEBUSAL) 3 % neb solution prn  No Yes   Sig: Take 3 mLs by nebulization 2 times daily Use with albuterol inhaler   Patient taking differently: Take 3 mLs by nebulization 2 times daily as needed for wheezing Use with albuterol inhaler   tamsulosin (FLOMAX) 0.4 MG capsule Not Taking at unaware new Rx  No No   Sig: Take 1 capsule (0.4 mg) by mouth daily   Patient not taking: Reported on 7/13/2021   vitamin B-Complex   Yes Yes   Sig: Take 1 tablet by mouth daily      Facility-Administered Medications: None     Allergies   Allergies   Allergen Reactions     No Known Allergies        Physical Exam   Vital Signs:   Temp src: Oral BP: (!) 140/87 Pulse: 99   Resp: 19 SpO2: 98 % O2 Device: None (Room air)    Weight: 221 lbs 12.8 oz    Constitutional:   lethargic, no apparent distress, and appears stated age     Eyes:   Lids and lashes normal, extra ocular muscles intact, sclera clear, conjunctiva normal     ENT:   Normocephalic, without obvious abnormality, atramatic,     Neck:   Supple, symmetrical, trachea midline, no adenopathy, thyroid symmetric, not enlarged and no tenderness, skin normal     Lungs:   No increased work of breathing, good air exchange, clear to auscultation bilaterally, no crackles or wheezing     Cardiovascular:   Regular rate and rhythm, normal S1 and S2, no S3  or S4, and no murmur noted. Extremities are warm. Trace to 1+ edema.      Abdomen:   Normal bowel sounds, soft, non-distended, non-tender, no masses palpated, no hepatosplenomegally     Musculoskeletal:   There is no redness, warmth, or swelling of the joints. Normal build, central obesity.      Neurologic:   Lethargic, continually drifts off during my exam, unable to answer questions of orientation but can name her son in the room.    Speech dysarthrc. Follows commands   Face symmetric   Horizontal gaze normal. PERRL  Possible pronator drift on left. Will not raise either leg to command. .      Neuropsychiatric:   General: normal, calm and normal eye contact     Skin:   No excessive bruising. No bleeding, redness, warmth, or swelling and no rashes         Data   Data reviewed today: I reviewed all medications, new labs and imaging results over the last 24 hours. I personally reviewed the CT head image(s) showing as above.    Recent Labs   Lab 07/13/21  1421 07/10/21  1210 07/10/21  0548 07/09/21  0709   WBC 10.1  --  4.8 7.2   HGB 7.7*  --  7.4* 7.0*   MCV 78  --  79 77*   PLT 53*  --  91* 47*   INR 1.09  --   --   --     134 134 133   POTASSIUM 3.4  --  3.9 3.6   CHLORIDE 102  --  104 102   CO2 28  --  26 25   BUN 15  --  14 13   CR 0.94  --  0.84 0.80   ANIONGAP 6  --  4 6   RUSTY 9.5  --  8.8 8.5   *  --  110* 119*   TROPONIN 0.032  --   --   --      Recent Results (from the past 24 hour(s))   CT Head w/o Contrast   Result Value    Radiologist flags Increasing subdural hematoma with significant mass (AA)    Narrative    CT SCAN OF THE HEAD WITHOUT CONTRAST   7/13/2021 2:34 PM     HISTORY: Transient ischemic attack (TIA). Code Stroke. Slurred speech.    TECHNIQUE: Axial images of the head and coronal reformations without  IV contrast material. Radiation dose for this scan was reduced using  automated exposure control, adjustment of the mA and/or kV according  to patient size, or iterative  reconstruction technique.    COMPARISON: 7/6/2021.    FINDINGS: A right-sided subdural hematoma is noted. There has been  previous craniotomy. The subdural fluid collection is increased in  size currently measuring up to 1.4 cm in thickness as compared to 0.8  cm in thickness. There is currently 1.7 cm of right-to-left midline  shift as compared to 0.9 cm. There is also increasing dilatation of  the left temporal horn, consistent with some uncal herniation in  addition to the subfalcine herniation. There is no evidence for  parenchymal hemorrhage. No evidence for any acute ischemic infarct.  Ventricles are small due to the mass effect. Paranasal sinuses and  mastoid air cells are clear. There is no evidence for fracture.      Impression    IMPRESSION: Increasing right hemispheric subdural hematoma with  increasing mass effect resulting in subfalcine and some uncal  herniation.     [Critical Result: Increasing subdural hematoma with significant mass  effect.]    Finding was identified on 7/13/2021 2:38 PM.     Dr. Magaña was contacted by me on 7/13/2021 2:48 PM and verbalized  understanding of the critical result.     NOLBERTO CHAN MD         SYSTEM ID:  [id number]   CTA Head Neck with Contrast    Narrative    CTA  HEAD AND NECK WITH CONTRAST 7/13/2021 2:34 PM     HISTORY: Transient ischemic attack (TIA); Code stroke. Slurred speech.    TECHNIQUE: Axial images were obtained through the head and neck with  intravenous contrast. 70 mL of Isovue-370 was given. Multiplanar  reconstructions were performed. 3-D reconstructions off a workstation  for CT angiography were also acquired. Carotid stenoses were evaluated  by comparing the caliber of the proximal internal carotid artery to  the caliber of the distal internal carotid artery. Radiation dose for  this scan was reduced using automated exposure control, adjustment of  the mA and/or kV according to patient size, or iterative  reconstruction  technique.    FINDINGS:    Brachiocephalic vessels: Mild calcific plaque. No stenosis.    Right carotid system: Minimal calcific plaque at the bifurcation. No  stenosis.    Left carotid system: Minimal calcific plaque at the bifurcation. No  stenosis.    Right vertebral artery: Normal.    Left vertebral artery: Normal.    [place]: There is mild calcific plaque in the carotid siphon  regions bilaterally without stenosis. The basilar artery is patent.  The proximal anterior, middle, and posterior cerebral arteries are  patent. There is significant mass effect from a large right-sided  subdural hematoma.      Impression    IMPRESSION:  1. Mild atherosclerotic disease involving several vessels without  stenosis.  2. No evidence for significant stenosis, thromboembolism, large vessel  occlusion, dissection, or aneurysm.     CT Head Perfusion w Contrast    Narrative    CT HEAD PERFUSION WITH CONTRAST July 13, 2021 2:53 PM     HISTORY: Transient ischemic attack (TIA). Code Stroke. Slurred speech.    TECHNIQUE: Axial images were obtained through the brain with  intravenous contrast. 50 mL of Isovue 350 was given. Exam was  performed for CT brain perfusion imaging. Radiation dose for this scan  was reduced using automated exposure control, adjustment of the mA  and/or kV according to patient size, or iterative reconstruction  technique.    FINDINGS: Cerebral blood flow, cerebral blood volume, and mean transit  time maps are relatively symmetric except for the mass effect caused  by right-sided subdural hematoma. There is no evidence for ischemia or  infarct.      Impression    IMPRESSION: Negative CT brain perfusion imaging.    NOLBERTO CHAN MD         SYSTEM ID:  [id number]

## 2021-07-13 NOTE — PLAN OF CARE
Patient admitted to ICU, sleepy , oriented x 4 ,arouses to repeated stimulus, speech clear , pupils equal and reactive , son states this is her baseline but gets worse when she gets tired, moving all extremities equally, awaiting stat  lab platelets,

## 2021-07-13 NOTE — ED PROVIDER NOTES
History   Chief Complaint:  Altered Mental Status       The history is provided by the patient and the EMS personnel.      Yvette Gastelum is a 70 year old female with history of CKD, hypertension, small cell lung cancer, and subdural hematoma who presents with slurred speech and cranial pressure. On 7/5/21, the patient was admitted to the hospital for a large right-sided subdural hematoma with midline shift, uncal herniation, and early obstructive hydrocephalus. She then underwent a right frontal craniotomy for evacuation of the hematoma as well as placement of a subdural drain. The patient was instructed to follow up with her PCP, Arie House MD within seven days. It is not assumed that the patient suffered any neurological damage following the surgery.     EMS states that about 22 hours prior to her ED arrival, she began to experience global mild pressure. Today, her son began to notice that she was experiencing some slurred speech, possible confusion, as well as worsening pressure. While en route to the ED, EMS reports that she seemed cognitively okay and was able to answer questions. EMS arrived to the ED with the patient at 1410. She was still complaining of increased pressure deeper in her head while in the ED. Additionally, she reports she feels her speech is still slurred. She denies vomiting or abdominal pain. Of note, the patient is not on any blood thinners.     Review of Systems   Gastrointestinal: Negative for abdominal pain and vomiting.   Neurological: Positive for speech difficulty (slurred speech).   Psychiatric/Behavioral: Positive for confusion.   All other systems reviewed and are negative.      Allergies:  The patient has no known allergies.     Medications:  Proventil  Lipitor  Benzonatate  Colace  Fluticasone  Imodium  Robaxin  Omeprazole  Flomax    Past Medical History:    Hypertension  Kidney stones  Recurrent UTI  Sepsis  Hemoptysis  SDH  Primary osteoarthritis of both  "knees  Antineoplastic chemotherapy induced pancytopenia  Small cell lung cancer  CKD  Hyperlipidemia  Anemia  Chronic bronchitis     Past Surgical History:    Craniotomy  Hysterectomy  [place] teeth extraction  Cataract extraction right and left  Insert port vascular access  Bilateral tubal ligation, CL, laparoscopic left Salpingo-Oophorectomy  Vascular access port removal     Family History:    Father: aortic aneurysm, hypertension  Mother: cervical cancer  Brother: lung cancer    Social History:  The patient arrived to the ED via EMS. She currently lives with her son.    Physical Exam     Patient Vitals for the past 24 hrs:   BP Temp Temp src Pulse Resp SpO2 Height Weight   07/13/21 1845 133/86 -- -- 93 -- -- -- --   07/13/21 1830 129/79 -- -- 92 15 95 % -- --   07/13/21 1815 137/78 -- -- 93 16 95 % -- --   07/13/21 1800 127/83 98.8  F (37.1  C) Axillary 93 17 97 % -- --   07/13/21 1745 (!) 147/86 -- -- 95 12 95 % -- --   07/13/21 1730 137/83 -- -- 91 12 100 % -- 96.1 kg (211 lb 13.8 oz)   07/13/21 1712 139/79 99.1  F (37.3  C) -- 95 14 -- -- --   07/13/21 1709 139/79 -- -- 97 21 100 % -- --   07/13/21 1700 134/83 -- -- 104 15 100 % -- --   07/13/21 1645 (!) 122/90 99.2  F (37.3  C) -- 94 14 100 % -- --   07/13/21 1630 127/84 -- -- 93 16 99 % -- --   07/13/21 1615 (!) 146/80 -- -- 93 15 100 % -- --   07/13/21 1600 133/76 -- -- 95 9 99 % -- --   07/13/21 1545 (!) 142/88 -- -- 94 25 97 % -- --   07/13/21 1530 (!) 141/83 -- -- 95 14 98 % -- --   07/13/21 1515 (!) 140/87 -- -- 99 19 98 % -- --   07/13/21 1500 (!) 158/84 -- -- 98 13 97 % -- --   07/13/21 1449 -- -- Oral -- -- -- 1.626 m (5' 4\") 100.6 kg (221 lb 12.8 oz)   07/13/21 1445 (!) 149/83 -- -- 98 26 98 % -- --   07/13/21 1437 (!) 150/84 -- -- 92 22 98 % -- --   07/13/21 1427 (!) 150/92 -- -- 99 26 96 % -- --   07/13/21 1425 (!) 154/93 -- -- 99 10 99 % -- --       Physical Exam  General: Resting comfortably on the gurney, there is slight slurring of the " speech  Head:  The scalp, face, and head appear normal except there is a right temporal scalp staple   line from recent craniotomy  Eyes:  The pupils are equal, round, and reactive to light    There is no nystagmus    Extraocular muscles are intact    Conjunctivae and sclerae are normal    There is slight left superior eyelid droopiness   ENT:    The nose is normal    Pinnae are normal    The oropharynx is normal    Uvula is in the midline    There is mild left flattening of the nasal labial fold  Neck:  Normal range of motion    There is no rigidity noted    There is no midline cervical spine pain/tenderness    Trachea is in the midline    No mass is detected  CV:  Regular rate and underlying rhythm     Normal S1/S2, no S3/S4    No pathological murmur detected  Resp:  Lungs are clear    There is no tachypnea    Non-labored    No rales    No wheezing   GI:  Abdomen is soft, there is no rigidity    No distension    No tympani    No rebound tenderness     Non-surgical without peritoneal features  MS:  Normal muscular tone    Symmetric motor strength, possible mild left lower extremity weakness 4/5    No major joint effusions    No asymmetric leg swelling, no calf tenderness  Skin:  No rash or acute skin lesions noted  Neuro: Speech is mildly dysarthric, no aphasia see NIHSS below    The patient has a left facial droop including flattening of the left nasolabial fold and some mild weakness in the left eyelid musculature superiorly.  Psych:  Awake. Alert.      Normal affect.  Appropriate interactions.  Lymph: No anterior cervical lymphadenopathy noted    National [employer] Stroke Scale  Exam Interval: Baseline   Score    Level of consciousness: (0)   Alert, keenly responsive    LOC questions: (0)   Answers both questions correctly    LOC commands: (0)   Performs both tasks correctly    Best gaze: (0)   Normal    Visual: (0)   No visual loss    Facial palsy: (1)   Minor paralysis (flat nasolabial fold, smile  asymmetry)    Motor arm (left): (0)   No drift    Motor arm (right): (0)   No drift    Motor leg (left): (1)   Drift    Motor leg (right): (0)   No drift    Limb ataxia: (0)   Absent    Sensory: (0)   Normal- no sensory loss    Best language: (0)   Normal- no aphasia    Dysarthria: (1)   Mild to moderate dysarthria    Extinction and inattention: (0)   No abnormality        Total Score:  3         Emergency Department Course   ECG  ECG taken at 1448, ECG read at 1500  Normal sinus rhythm. Low voltage QRS. Incomplete right bundle branch block. Prolonged QT. Abnormal ECG.    No significant change as compared to prior, dated 03/11/2019.  Rate 94 bpm. VT interval 164 ms. QRS duration 94 ms. QT/QTc 384/480 ms. P-R-T axes 58 12 57.     Imaging:  CT Head w/o Contrast  Increasing right hemispheric subdural hematoma with  increasing mass effect resulting in subfalcine and some uncal  herniation.   As per radiology.     CTA Head Neck with Contrast  1. Mild atherosclerotic disease involving several vessels without  stenosis.  2. No evidence for significant stenosis, thromboembolism, large vessel  occlusion, dissection, or aneurysm.  As per radiology.    CT Head Perfusion w Contrast  Negative CT brain perfusion imaging.  As per radiology.       Laboratory:  CBC: WBC 10.1, HGB 7.7 (L), PLT 53 (L)    BMP: Glucose 120 (H) o/w WNL (Creatinine 0.94)     INR: 1.09    Partial thromboplastin time: 35    Troponin (Collected 1421): 0.032    Asymptomatic COVID19 Virus PCR by nasopharyngeal swab: negative    ABO/Rh type and screen: A positive      Emergency Department Course:    1408 I arrived to the room and began awaiting the patient's arrival.     1410 The patient arrived and I gained a history from the patient as well as EMS.     1414 I called a tier 2 code stroke.     1415 The nurse measured the patient's blood pressure at 144/89.    1420 The patient was taken to CT.    1442 I spoke with Dr. Savage, neuroradiology who informed me the  patient has a recurrent bleed.     1447 I spoke with Leyla Marquez PA-C , neurosurgery.    1525 I respoke with Leyla Marquez regarding the patient's hospital admission.     1534 I talked to Dr. Roxane Merritt, hospitalist, who agreed to accept the patient.     1541 I rechecked the patient to check her current status as well to explain her results.     1555 Dilaudid 0.2 mg IV    1556 Zofran 4 mg IV    1645 285 mL transfused pheresed platelets     1731 The patient was admitted to [facility] ICU under the care of Dr. Roxane Merritt, hospitalist.       Impression & Plan     Medical Decision Making:  This patient presents with a history of recent neurosurgical management of a right-sided subdural hematoma.  Patient presents with increasing headache and now slurred speech and left facial droop.  The patient does not have aphasia.  She is noted to have some mild left leg weakness.  CT scan reveals recurrent significant right-sided subdural hematoma with midline shift as noted above.  Neurosurgery was consulted emergently.  The patient was transfused for her thrombocytopenia.  2 units of platelets have been ordered.  I discussed the immediacy of surgery with the physician assistant and Roxane Merritt MD spoke with the attending neurosurgeon.  The neurosurgeon, Dr. Dominguez, indicated that he wished to have the platelet count of around 100,000 (per secondhand report) to ensure that there is no recurrent bleeding in the operating room.  They will closely monitor the patient in the intensive care unit.  Dr. Merritt spoke with hematology as well.  At this juncture the patient will be placed in the intensive care unit for close monitoring of her neuro status, and the exact timing of the surgery is still pending.    Critical Care Time: was 95 minutes for this patient excluding procedures.  The patient underwent a code stroke activation, she had multiple reassessments in the emergency department, and emergent platelet  transfusion was required.  Her neuro status did not deteriorate while in the emergency department.    Covid-19  Yvette Gastelum was evaluated during a global COVID-19 pandemic, which necessitated consideration that the patient might be at risk for infection with the SARS-CoV-2 virus that causes COVID-19.   Applicable protocols for evaluation were followed during the patient's care.   COVID-19 was considered as part of the patient's evaluation. The plan for testing is:  a test was obtained during this visit.    Diagnosis:    ICD-10-CM   1. SDH (subdural hematoma) (H)  S06.5X9A           Scribe Disclosure:  I, Olinda Crow, am serving as a scribe on 7/13/2021 at 3:06 PM to personally document services performed by Octavio Magaña MD based on my observations and the provider's statements to me.            Octavio Magaña MD  07/13/21 1908

## 2021-07-14 PROBLEM — E66.01 MORBID OBESITY (H): Status: RESOLVED | Noted: 2019-02-26 | Resolved: 2021-01-01

## 2021-07-14 PROBLEM — D69.6 THROMBOCYTOPENIA (H): Status: ACTIVE | Noted: 2021-01-01

## 2021-07-14 PROBLEM — D61.810 ANTINEOPLASTIC CHEMOTHERAPY INDUCED PANCYTOPENIA (H): Status: ACTIVE | Noted: 2019-10-02

## 2021-07-14 PROBLEM — K21.9 GERD (GASTROESOPHAGEAL REFLUX DISEASE): Status: ACTIVE | Noted: 2021-01-01

## 2021-07-14 PROBLEM — T45.1X5A ANTINEOPLASTIC CHEMOTHERAPY INDUCED PANCYTOPENIA (H): Status: ACTIVE | Noted: 2019-10-02

## 2021-07-14 NOTE — OR NURSING
Son Kvng Santo gave verbal telephone consent for the right frontal craniotomy with two witnesses.

## 2021-07-14 NOTE — PLAN OF CARE
Pt sent down to OR for R crani on 7/13/2021 around 2000, arrived back to ICU around 2345. Pt was agitated/confused/impulisive, resolved by around 0200. Pt occasionally lethargic, but cooperative with neuro checks. Pt is A/O x 4, still has L facial droop, slurring of words, 5/5 strength in all four extremities. Pt requested garces be removed, was removed around 0200, is DTV. Prefers to sleep up in a chair, transferred with A x 1 with GB. Pt passed initial dysphagia screen, is tolerating CL liquid diet. Will complain of occasional HA, relief with prn dilaudid.

## 2021-07-14 NOTE — OP NOTE
Name of procedure: Right frontotemporal craniotomy for evacuation of recurrent subacute subdural hematoma; placement of subdural drain    Preoperative diagnosis: Large subacute subdural hematoma with marked mass-effect and midline shift with incipient herniation.    Postoperative diagnosis: Same    Surgeon: Puma Dominguez MD    First assistant: Anne Marie Guerrero PA-C    Material forwarded to the laboratory for examination: None    EBL: 10 cc    Indications for the procedure: Patient is a 70-year-old woman previously operated 1 week ago for a large subacute subdural hematoma with marked mass-effect thought to be secondary to thrombocytopenia.  Surgery was uncomplicated and postoperatively the patient did well with CT evidence of resolved hematoma.  Drain was pulled and the patient was discharged in satisfactory neurologic condition.  Subsequent CT once again several days later demonstrated no evidence of recurrent or residual hematoma of significance.  Unfortunately, she presented earlier today to the emergency room with severe headache present since this morning.  She was once again found to be thrombocytopenic and recent outpatient laboratories demonstrated a platelet count of 43,000 several days ago.  Her noncontrast CT scan demonstrated a large recurrent hematoma with a subacute and acute component.  There was marked mass-effect and midline shift of nearly 2 cm with incipient subfalcine and transtentorial herniation.  Decision was made to proceed with platelet transfusion and then to the operating room.  Preoperative platelet count was 98,000.    Description of the procedure: Patient's previous surgical staples were removed without difficulty and her scalp was again shaved and sterilely prepped and draped in the usual fashion.  Her curvilinear incision was reopened with the Metzenbaum scissors and a self-retaining retractor was placed.  2 previous titanium plates were elevated with the bone flap and removed.   There was immediate return of maroon-colored and brownish colored fluid under high pressure.  This was allowed to drain and subsequently formed hematoma was removed from over the lateral frontal, superior and anterior temporal and parietal lobe without difficulty.  Subdural space was copiously irrigated with slightly warm sterile saline.  Return came back negative.  There were 2 very small bleeding points noted over the superior right temporal lobe which were easily controlled with bipolar cautery.  A 10 Telugu fluted drain was then placed in the subdural space and the cranial flap was returned and secured in place using 4 new 4 mm titanium screws placed in predrilled openings.  Wound was again copiously irrigated with Ancef containing irrigation and retractors were removed.  Galea was reapproximated with a single layer of interrupted inverted 2-0 Vicryl suture.  Skin was closed with surgical staples and the drain was sewn to the skin surface using a 2-0 silk suture.  Sterile dressing was applied and patient was then returned to the supine position and transported to recovery room following extubation.    There was no evidence of brain injury at the time of exposure.  The brain itself filled most of the void created by the recurrent subacute subdural hematoma and had risen to within a centimeter of the undersurface of the dura at the conclusion of the procedure.

## 2021-07-14 NOTE — PROGRESS NOTES
Report given to pre-op, pt down to OR on monitor. Report passed to receiving nurse, son called and given an update.

## 2021-07-14 NOTE — ANESTHESIA PROCEDURE NOTES
Airway       Patient location during procedure: OR       Procedure Start/Stop Times: 7/13/2021 8:57 PM  Staff -        Anesthesiologist:  Tez Corona MD       CRNA: Betzaida Cleveland APRN CRNA       Performed By: CRNA  Consent for Airway        Urgency: elective  Indications and Patient Condition       Indications for airway management: willa-procedural       Induction type:intravenous       Mask difficulty assessment: 1 - vent by mask    Final Airway Details       Final airway type: endotracheal airway       Successful airway: ETT - single  Endotracheal Airway Details        ETT size (mm): 7.0       Cuffed: yes       Successful intubation technique: direct laryngoscopy       DL Blade Type: Lopez 2       Grade View of Cords: 1       Adjucts: stylet       Position: Right       Measured from: gums/teeth       Secured at (cm): 21       Bite block used: None    Post intubation assessment        Placement verified by: capnometry, equal breath sounds and chest rise        Number of attempts at approach: 1       Number of other approaches attempted: 0       Secured with: pink tape       Ease of procedure: easy       Dentition: Intact and Unchanged

## 2021-07-14 NOTE — ANESTHESIA CARE TRANSFER NOTE
Patient: Yvette Gastelum    Procedure(s):  Right frontal craniotomy for evacuation of recurrent subdural hematoma    Diagnosis: SDH (subdural hematoma) (H) [S06.5X9A]  Diagnosis Additional Information: No value filed.    Anesthesia Type:   General     Note:    Oropharynx: oropharynx clear of all foreign objects  Level of Consciousness: drowsy  Oxygen Supplementation: face mask  Level of Supplemental Oxygen (L/min / FiO2): 6  Independent Airway: airway patency satisfactory and stable  Dentition: dentition unchanged  Vital Signs Stable: post-procedure vital signs reviewed and stable  Report to RN Given: handoff report given  Patient transferred to: PACU  Comments: Tammy, patient appears comfortable  Handoff Report: Identifed the Patient, Identified the Reponsible Provider, Reviewed the pertinent medical history, Discussed the surgical course, Reviewed Intra-OP anesthesia mangement and issues during anesthesia, Set expectations for post-procedure period and Allowed opportunity for questions and acknowledgement of understanding      Vitals: (Last set prior to Anesthesia Care Transfer)  CRNA VITALS  7/13/2021 2138 - 7/13/2021 2218      7/13/2021             NIBP:  138/82    NIBP Mean:  109        Electronically Signed By: ЕКАТЕРИНА Torres CRNA  July 13, 2021  10:18 PM

## 2021-07-14 NOTE — CONSULTS
Consult Date: 07/13/2021    This consult has been requested by Dr. Roxane Merritt and Kusum Guerrero, [facility] for recurrent subdural hematoma in a patient with a history of lung cancer.    Mrs. Gastelum is a 70-year-old female who was diagnosed with a limited-stage small cell lung cancer in 08/2018.  The patient was treated with concurrent chemoradiation followed by prophylactic cranial radiation.  The patient has been complete remission.  Last CT chest, abdomen, and pelvis on 06/17/2021 did not reveal any evidence of malignancy.    The patient subsequently developed cytopenia.  She mainly has anemia and thrombocytopenia.  Multiple investigations have been done including bone marrow biopsy twice.  Last bone marrow biopsy was in 04/2021.  There was mild dyserythropoiesis.  Megakaryocytes were normal without any dysplasia.  There is cytogenetics abnormality raising the possibility of developing myelodysplastic syndrome.  The patient was being monitored as outpatient and had not been requiring any transfusions.    The patient was given a trial of oral dexamethasone end of 06/2021.  Her platelet did not respond to it.  This goes against immune thrombocytopenia.    The patient was admitted on 07/05/2021 with right subdural hematoma.  The patient was having headache and dizziness.  CT scan revealed a subdural hematoma.  On admission, platelet was low at 34.  She received platelet transfusion, and her platelet responded well.  This again goes against immune thrombocytopenia.  The patient had a right frontal craniotomy and evacuation of subdural hematoma on 07/05/2021.  The patient was discharged home on 07/10/2021.  On day of discharge, her platelet was 91.    The patient was brought to the Emergency Room today because of worsening headache and some mental status change.  Multiple investigations were done.  -CBC revealed hemoglobin of 7.7 with platelet of 53.  Normal WBC.  -Normal INR and PTT.  -Normal electrolytes.  -COVID-19  negative.  -CT head revealed increasing right hemispheric subdural hematoma with increasing mass effect.    The patient admitted to the hospital for further management.  I saw her in the ICU.  Sons were at the bedside.  The patient has received platelet transfusion.    The patient was sleepy because of pain medication.  As per the sons, patient has not been having any bleeding from any other site.  No bleeding from ear, nose or throat.  She has not complained of any blood in the urine or stool.  As far as they know, there was no head trauma.    ALLERGIES:  REVIEWED.    MEDICATIONS:  Reviewed.    PAST MEDICAL HISTORY:    1.  Limited-stage small cell lung cancer, in remission.  2.  Chronic kidney disease.  3.  Recent hospitalization for subdural hematoma.  4.  Hyperlipidemia.  5.  Hypertension.  6.  Cytopenia.  Likely she is developing myelodysplastic syndrome.  7.  BSO.  8.  Renal stone.    SOCIAL HISTORY:    -She is a former smoker.  -Quit alcohol many years ago.    PHYSICAL EXAMINATION:    The patient was sleepy from pain medication.  She is not in any respiratory distress.  Rest of systems not examined.    LABORATORY DATA:  Reviewed.    IMPRESSION:    1.  A 70-year-old female with recurrent subdural hematoma.  2.  Thrombocytopenia.  This is likely from developing myelodysplastic syndrome.  3.  Normocytic anemia.  This is likely from developing myelodysplastic syndrome.  4.  Limited-stage small cell lung cancer in remission.    RECOMMENDATIONS:    1.  The patient has been admitted with subdural hematoma.  Dr. Dominguez From Neurosurgery has evaluated the patient.  The patient will be undergoing craniotomy and evacuation of hematoma once her platelets are better.    Case discussed with Dr. Dominguez.  Aim is to get platelet around 100 before surgery.  After surgery, he would like the patient's platelet to be kept around 100 for next few weeks.  Reason for maintaining higher platelet is recurrent subdural  hematoma.    The patient has received a platelet transfusion.  Platelet will be rechecked.  Further platelet transfusion will be given to get it above 100.    After discharge, plan will be for patient to come to clinic 3 times a week for CBC and platelet transfusion for the next few weeks.  We will plan on maintaining her platelet around 100.      2.  Sons were at the bedside.  They had multiple questions.  I explained to them that the patient's thrombocytopenia and anemia are secondary to likely developing myelodysplastic syndrome.  Last bone marrow biopsy had revealed 50% cellularity.  There were adequate megakaryocytes without dysplasia.  Cytogenetics are abnormal, which goes in favor of developing myelodysplastic syndrome.  I am not suspecting immune thrombocytopenia as the patient did not respond to dexamethasone and also she responds very well to platelet transfusion.    I explained to them that we will try to monitor her platelet frequently and keep it around 100 for next few weeks.  Hopefully after that, risk of recurrent subdural hematoma will decrease.    3.  The patient is anemic.  She will be transfused for hemoglobin below 7 or if she gets symptomatic.    4.  Case discussed with Dr. Roxane Merritt and Dr. Dominguez.  Hematology/Oncology will continue to follow.    Thanks for the consult.    TOTAL TIME SPENT:  60 minutes.    Jennifer Luz MD        D: 2021   T: 2021   MT: [facility]    Name:     BISHNU LEY  MRN:      [id number]        Account:      [id number]   :      1950           Consult Date: 2021     Document: [id number]

## 2021-07-14 NOTE — ANESTHESIA POSTPROCEDURE EVALUATION
Patient: Yvette Gastelum    Procedure(s):  Right frontal craniotomy for evacuation of recurrent subdural hematoma    Diagnosis:SDH (subdural hematoma) (H) [S06.5X9A]  Diagnosis Additional Information: No value filed.    Anesthesia Type:  General    Note:  Disposition: Inpatient   Postop Pain Control: Uneventful            Sign Out: Well controlled pain   PONV: No   Neuro/Psych: Uneventful            Sign Out: Acceptable/Baseline neuro status   Airway/Respiratory: Uneventful            Sign Out: Acceptable/Baseline resp. status   CV/Hemodynamics: Uneventful            Sign Out: Acceptable CV status   Other NRE: NONE   DID A NON-ROUTINE EVENT OCCUR? No           Last vitals:  Vitals:    07/13/21 2000 07/13/21 2005 07/13/21 2020   BP: (!) 159/70 (!) 143/75 (!) 132/90   Pulse: 86 86    Resp: 23 20 18   Temp:      SpO2: 96% 97% 91%       Last vitals prior to Anesthesia Care Transfer:  CRNA VITALS  7/13/2021 2138 - 7/13/2021 2233      7/13/2021             NIBP:  138/82    NIBP Mean:  109          Electronically Signed By: Tez Corona MD  July 13, 2021  10:33 PM

## 2021-07-14 NOTE — PROGRESS NOTES
[facility]    Neurosurgery  Daily Progress Note    Assessment & Plan   Procedure(s):  Right frontal craniotomy for evacuation of recurrent subdural hematoma   -1 Day Post-Op  Patient is somewhat lethargic, but awakens to voice and cooperative during exam. Able to follow commands and moves all extremities. Denies headache, dizziness, nausea. Continued slight left facial droop. Incision CDI. KRISTEL drain with 45ml output overnight. Hemoglobin 6.9 and Platelets 124 this AM.     CT HEAD WITHOUT CONTRAST 07/14/2021, 5:43 AM  IMPRESSION:  1.  Interval placement of a right-sided subdural drain, with decreased volume of the right hemispheric subdural hemorrhage, now measuring approximately 8 mm in maximal thickness.  2.  Decreased associated mass effect with 9 mm right to left midline shift and decreased right uncal herniation.  3.  Decreased dilatation of the left temporal horn    Plan:  - Hospitalist ordered 1 unit RBC for hemoglobin 6.9. Repeat labs to follow. Appreciate assistance.   - Continue KRISTEL drain and abx for now  - Continue Keppra  - Routine wound care. Staple removal at 3 weeks post op.  - Hematology following. Goal platelet count 100. Appreciate assistance.   - Will continue to follow.     Discussed with Dr. Alberto Lund, ANGEL  [facility] Neurosurgery  [facility]  [address] [place] [address] [address] [place] [address]  Tel [phone]  Pager [phone]      Interval History   Stable     Physical Exam   Temp: 97.5  F (36.4  C) Temp src: Oral BP: 125/70 Pulse: 72   Resp: 13 SpO2: 94 % O2 Device: Nasal cannula Oxygen Delivery: 4 LPM  Vitals:    07/13/21 1449 07/13/21 1730 07/14/21 0600   Weight: 221 lb 12.8 oz (100.6 kg) 211 lb 13.8 oz (96.1 kg) 214 lb 15.2 oz (97.5 kg)     Vital Signs with Ranges  Temp:  [97.5  F (36.4  C)-99.2  F (37.3  C)] 97.5  F (36.4  C)  Pulse:  [] 72  Resp:  [9-103] 13  BP: ()/() 125/70  SpO2:  [91 %-100 %] 94  %  I/O last 3 completed shifts:  In: 3143 [P.O.:240; I.V.:2150]  Out: 1080 [Urine:1025; Drains:45; Blood:10]    Mental status:  Alert and Oriented x 3, slurred speech.   Cranial nerves:  II-XII intact except slight left facial droop.   Motor:  Moves all extremities. Strength is 5/5 throughout the upper and lower extremities  Sensation:  Intact  Coordination:  Smooth finger to nose testing. Slight left drift.   Incision: CDI      Medications     niCARdipine       sodium chloride 75 mL/hr at 07/14/21 0000        ceFAZolin  1 g Intravenous Q8H     fluticasone  1 puff Inhalation Daily     levETIRAcetam  500 mg Oral BID     pantoprazole (PROTONIX) IV  40 mg Intravenous Daily with breakfast     sodium chloride (PF)  3 mL Intracatheter Q8H     tamsulosin  0.4 mg Oral Daily       Kaity Lund CNP  [facility] Neurosurgery   [address] [address]  Tel [phone]  Pager [phone]

## 2021-07-14 NOTE — ANESTHESIA PREPROCEDURE EVALUATION
Anesthesia Pre-Procedure Evaluation    Patient: Yvette Gastelum   MRN: [id number] : 1950        Preoperative Diagnosis: SDH (subdural hematoma) (H) [S06.5X9A]   Procedure : Procedure(s):  Right frontal craniotomy for evacuation of recurrent subdural hematoma     Past Medical History:   Diagnosis Date     Cancer (H)     Lung cancer       Hypertension      Kidney stones      Obese      Recurrent UTI      S/P CL-BSO      Sepsis (H)     secondary to uti       Past Surgical History:   Procedure Laterality Date     BONE MARROW BIOPSY, BONE SPECIMEN, NEEDLE/TROCAR N/A 2/10/2021    Procedure: BONE MARROW BIOPSY;  Surgeon: Earlene Garcia MD;  Location:  GI     BONE MARROW BIOPSY, BONE SPECIMEN, NEEDLE/TROCAR N/A 2021    Procedure: BIOPSY, BONE MARROW;  Surgeon: Felix Elizabeth MD;  Location:  GI     COLONOSCOPY       COLONOSCOPY N/A 2/10/2016    Procedure: COMBINED COLONOSCOPY, SINGLE OR MULTIPLE BIOPSY/POLYPECTOMY BY BIOPSY;  Surgeon: Antonia Jiménez MD;  Location:  GI     CRANIOTOMY Right 2021    Procedure: CRANIOTOMY;  Surgeon: Puma Dominguez MD;  Location:  OR     EYE SURGERY      CATARACT EXTRACTION RIGHT & LEFT     GYN SURGERY      BILATERAL TUBAL LIGATION, CL, LAPAROSCOPIC LEFT SALPINGO-OOPHORECTOMY     HEAD & NECK SURGERY      WISDOM TEETH EXTRACTION     HYSTERECTOMY       INSERT PORT VASCULAR ACCESS N/A 2018    Procedure: INSERT PORT VASCULAR ACCESS;  POWER PORT PLACEMENT;  Surgeon: Todd Norris MD;  Location:  SD     REMOVE PORT VASCULAR ACCESS N/A 2019    Procedure: REMOVAL, VASCULAR ACCESS PORT;  Surgeon: Todd Norris MD;  Location:  OR      Allergies   Allergen Reactions     No Known Allergies       Social History     Tobacco Use     Smoking status: Former Smoker     Packs/day: 1.00     Years: 50.00     Pack years: 50.00     Types: Cigarettes     Start date: 10/1965     Quit date: 2015     Years since  quittin.8     Smokeless tobacco: Never Used   Substance Use Topics     Alcohol use: No     Alcohol/week: 0.0 standard drinks      Wt Readings from Last 1 Encounters:   21 96.1 kg (211 lb 13.8 oz)        Anesthesia Evaluation            ROS/MED HX  ENT/Pulmonary:     (+) MANDEEP risk factors, hypertension, obese,  (-) sleep apnea   Neurologic:       Cardiovascular:     (+) Dyslipidemia hypertension-----Previous cardiac testing   Echo: Date: 2020 Results:     Interpretation Summary  Mildly reduced left ventricular function, LVEF 49% based on biplane 2D  tracing.  Global peak LV longitudinal strain is averaged at -16.2%. This suggests  abnormal strain (normal <-18%).  Right ventricular function, chamber size, wall motion, and thickness are  normal.  This study was compared with the study from 19: There has been no  significant change. Specifically, LVEF is within the range of interstudy  variability and appears unchanged on direct visual comparsion.  _____________________________________________________________________________  __        Left Ventricle  Left ventricular size is normal. Left ventricular wall thickness is normal.  LVEF 49% based on biplane 2D tracing. Mildly (EF 45-50%) reduced left  ventricular function is present. Grade II or moderate diastolic dysfunction.  Global peak LV longitudinal strain is averaged at -16.2%. This suggests  abnormal strain (normal <-18%). The clinical value of assessment of global  peak longitudinal strain in the surveillance for cardiotoxicity lies in serial  measurements.     Right Ventricle  Right ventricular function, chamber size, wall motion, and thickness are  normal.     Atria  Both atria appear normal.     Mitral Valve  The mitral valve is normal. Trace mitral insufficiency is present.        Aortic Valve  Mild aortic valve calcification is present. The aortic valve is tricuspid.  Trace aortic insufficiency is present.     Tricuspid Valve  The tricuspid  valve is normal. Trace tricuspid insufficiency is present. The  right ventricular systolic pressure is approximated at 35.0 mmHg plus the  right atrial pressure.     Pulmonic Valve  The pulmonic valve is normal.     Vessels  The aorta root is normal. The pulmonary artery cannot be assessed. The  inferior vena cava was normal in size with preserved respiratory variability.  IVC diameter <2.1 cm collapsing >50% with sniff suggests a normal RA pressure  of 3 mmHg.     Pericardium  No pericardial effusion is present.        Compared to Previous Study  This study was compared with the study from 4/12/19 . There has been no  significant change. Specifically, LVEF is within the range of interstudy  variability and appears unchanged on direct visual comparsion.  Stress Test: Date: Results:    ECG Reviewed: Date: Results:    Cath: Date: Results:      METS/Exercise Tolerance:     Hematologic: Comments: Thrombocytopenia;      Musculoskeletal:   (+) arthritis,     GI/Hepatic:    (-) GERD   Renal/Genitourinary:     (+) renal disease, type: CRI, Nephrolithiasis ,     Endo:     (+) Obesity,     Psychiatric/Substance Use:       Infectious Disease:       Malignancy:   (+) Malignancy, History of Lung.  Lung CA Remission status post Chemo and Radiation.        Other:            Physical Exam    Airway        Mallampati: II   TM distance: > 3 FB   Neck ROM: full   Mouth opening: > 3 cm    Respiratory Devices and Support         Dental       (+) upper dentures and lower dentures      Cardiovascular   cardiovascular exam normal       Rhythm and rate: regular     Pulmonary           breath sounds clear to auscultation           OUTSIDE LABS:  CBC:   Lab Results   Component Value Date    WBC 10.1 07/13/2021    WBC 4.8 07/10/2021    HGB 7.7 (L) 07/13/2021    HGB 7.4 (L) 07/10/2021    HCT 26.9 (L) 07/13/2021    HCT 24.9 (L) 07/10/2021    PLT 98 (L) 07/13/2021    PLT 53 (L) 07/13/2021     BMP:   Lab Results   Component Value Date      07/13/2021     07/10/2021    POTASSIUM 3.4 07/13/2021    POTASSIUM 3.9 07/10/2021    CHLORIDE 102 07/13/2021    CHLORIDE 104 07/10/2021    CO2 28 07/13/2021    CO2 26 07/10/2021    BUN 15 07/13/2021    BUN 14 07/10/2021    CR 0.94 07/13/2021    CR 0.84 07/10/2021     (H) 07/13/2021     (H) 07/13/2021     COAGS:   Lab Results   Component Value Date    PTT 35 07/13/2021    INR 1.09 07/13/2021     POC:   Lab Results   Component Value Date     (H) 07/09/2021     HEPATIC:   Lab Results   Component Value Date    ALBUMIN 3.4 07/05/2021    PROTTOTAL 6.8 07/05/2021    ALT 35 07/05/2021    AST 61 (H) 07/05/2021    ALKPHOS 62 07/05/2021    BILITOTAL 1.7 (H) 07/05/2021     OTHER:   Lab Results   Component Value Date    LACT 2.0 12/16/2018    RUSTY 9.5 07/13/2021    MAG 1.7 03/11/2019    TSH 1.65 07/15/2020    T4 0.95 07/15/2020    CRP 4.5 10/01/2020       Anesthesia Plan    ASA Status:  4, emergent    NPO Status:  NPO Appropriate    Anesthesia Type: General.     - Airway: ETT   Induction: Intravenous, RSI, Propofol.   Maintenance: Balanced.   Techniques and Equipment:     - Lines/Monitors: 2nd IV     Consents    Anesthesia Plan(s) and associated risks, benefits, and realistic alternatives discussed. Questions answered and patient/representative(s) expressed understanding.     - Discussed with:  Patient      - Extended Intubation/Ventilatory Support Discussed: Yes.         Postoperative Care    Pain management: IV analgesics.   PONV prophylaxis: Ondansetron (or other 5HT-3), Dexamethasone or Solumedrol, Background Propofol Infusion     Comments:                Tez Corona MD

## 2021-07-14 NOTE — PROGRESS NOTES
Clinic Care Coordination Contact  The Community Health Worker planned to call for a Care Coordination outreach to offer the CC program.    Did not contact patient.    Per Chart Review, patient is currently admitted at  [facility] as of 7/13/21.    SHAUN Cortez  Clinic Care Coordination  [facility] Clinics: [facility], [place], [place], and [facility]  Phone: [phone]

## 2021-07-15 NOTE — PROGRESS NOTES
Platelets infused. Dr. Root paged regarding a recheck on platelets. Orders for a recheck for 7/16 AM labs already in. No recheck tonight needed.

## 2021-07-15 NOTE — PLAN OF CARE
Pt here POD 2 from a frontal repeat crani for an evacuation of a recurrent SDH. A&O. Neuros intact ex for some slurred speech (baseline and maybe r/t no teeth). Some nausea at times with movement. VSS. Tele SR. Regular diet, thin liquids. Takes pills whole. Up with A1/SBA to commode or bathroom. Headache pain, Dilaudid 4 mg PO x 1 and ice. Pt scoring green on the Aggression Stop Light Tool. Discharge pending improvement. Neurosurgery following.  Platelets replaced in AM--recheck tomorrow in AM. Goal is  >100.  No home meds  Patient belongings brought from ICU: None

## 2021-07-15 NOTE — PROGRESS NOTES
Service Date: 07/14/2021    SUBJECTIVE:  Ms. Ley is a 70-year-old female with history of limited-stage small cell lung cancer.  She is in remission from it.  The patient has anemia and thrombocytopenia.  Bone marrow biopsy is suspicious for developing myelodysplastic syndrome.    The patient admitted with recurrent subdural hematoma.  On admission, her platelets were 53.  The patient received platelet transfusion.  She has responded very well to transfusion.  The patient underwent a right craniotomy and evacuation of subdural hematoma.  CT head done today reveals decrease in volume of subdural hematoma.    I met with the patient.  Sons were at the bedside.  The patient was awake and alert.  Some headache.  She is getting pain medication.  She has mild cough, which is nonproductive.  Some nausea.  No vomiting.  No bleeding from any site.    Her platelets are being monitored.  It is remaining around 125.  The patient's hemoglobin decreased. She has received 1 unit of PRBC transfusion.    PHYSICAL EXAMINATION:  GENERAL:  She was alert.  Not in distress.  Rest of systems not examined.    LABS:  Reviewed.    ASSESSMENT:  1.  A 70-year-old female with thrombocytopenia.  Platelets have improved with transfusion.  2.  Anemia.  3.  Recurrent subdural hematoma, status post craniotomy and evacuation of hematoma.  4.  Small cell lung cancer in remission.    PLAN:  1.  CBC reviewed with the patient and her sons.  I explained to her that platelets are around 125.  Aim is to keep her platelets around 100 for the next few weeks.  2.  The patient is anemic.  She will be transfused for hemoglobin below 7.  3.  The patient has some cough.  We will start her on Robitussin.  4.  Sons had a few questions, which were all answered.  5.  Hematology/Oncology will continue to follow.    TOTAL TIME SPENT:  25 minutes.    Jennifer Luz MD        D: 07/14/2021   T: 07/14/2021   MT: emiliano    Name:     BISHNU LEY  MRN:       [id number]        Account:      [id number]   :      1950           Service Date: 2021       Document: [id number]

## 2021-07-15 NOTE — PLAN OF CARE
Neuros unchanged. Sr with good bp within parameters. Pp+ skin warm but pale. Nonproductive cough-prn meds given. Bowels active. Voiding spontaneously. Pain managed with prn oral pain meds with some iv breakthrough meds given. Skin hygeien promoted. Incision WDL. KRISTEL with minimal drainage. All questions answered as able.

## 2021-07-15 NOTE — PROGRESS NOTES
[facility]    Hospitalist Progress Note    Assessment & Plan   70 year old female who was admitted on 7/13/2021 with large right SDH which was symptomatic:    Impression:   Principal Problem:    Large Right SDH w Shift -- S/P Drain 7/13/21   -- stable      Anemia    Thrombocytopenia (H)     Antineoplastic chemotherapy induced pancytopenia (H)     Small cell lung cancer -- S/P Chemo in 2018   -- transfuse Plt's today to keep >100K    Active Problems:    Benign essential hypertension   -- will discontinue Nicardipine drip, switch to PRN Hydralazine on neuro floor       CKD (chronic kidney disease) stage 3, GFR 30-59 ml/min    Plan:  Continue postop care    DVT Prophylaxis: Pneumatic Compression Devices  Code Status: Full Code    Disposition: Expected discharge in 3 days, ?discharge to TCU if needed (PT and OT eval)    Justin Hernandez Dk  Pager [phone]  Cell Phone [phone]  Text Page (7am to 6pm)  (35 min total)    Interval History   Feels OK, speech back to normal and weakness resolved.     Physical Exam   Temp: 98.3  F (36.8  C) Temp src: Oral BP: 116/69 Pulse: 82   Resp: 15 SpO2: 98 % O2 Device: Nasal cannula Oxygen Delivery: 4 LPM  Vitals:    07/13/21 1730 07/14/21 0600 07/15/21 0600   Weight: 96.1 kg (211 lb 13.8 oz) 97.5 kg (214 lb 15.2 oz) 98.2 kg (216 lb 7.9 oz)     Vital Signs with Ranges  Temp:  [97.9  F (36.6  C)-98.3  F (36.8  C)] 98.3  F (36.8  C)  Pulse:  [] 82  Resp:  [11-44] 15  BP: (108-137)/(55-79) 116/69  SpO2:  [22 %-100 %] 98 %  I/O last 3 completed shifts:  In: 3030 [P.O.:730; I.V.:2000]  Out: 115 [Drains:115]    # Pain Assessment:  Current Pain Score 7/15/2021   Patient currently in pain? yes   Pain score (0-10) -   Pain location -   Pain descriptors -   - Yvette is experiencing pain due to craniotomy (jam hole). Pain management was discussed and the plan was created in a collaborative fashion.  Yvette's response to the current recommendations:  engaged  - Please see the plan for pain management as documented above    Constitutional: Awake, alert, cooperative, no apparent distress  Respiratory: Clear to auscultation bilaterally, no crackles or wheezing  Cardiovascular: Regular rate and rhythm, normal S1 and S2, and no murmur noted  GI: Normal bowel sounds, soft, non-distended, non-tender  Extrem: No calf tenderness, no ankle edema  Neuro: Ox3, no focal motor or sensory deficits,  4+/5 bilaterally    Medications     sodium chloride 75 mL/hr at 07/14/21 0000       ceFAZolin  1 g Intravenous Q8H     fluticasone  1 puff Inhalation Daily     levETIRAcetam  500 mg Oral BID     pantoprazole  40 mg Oral QAM AC     sodium chloride (PF)  3 mL Intracatheter Q8H     tamsulosin  0.4 mg Oral Daily       Data   Recent Labs   Lab 07/15/21  0625 07/14/21  1947 07/14/21  1435 07/14/21  0950 07/14/21  0634 07/14/21  0314 07/14/21  0313 07/13/21  1906 07/13/21  1746 07/13/21  1421 07/10/21  1210 07/10/21  0548 07/10/21  0548   WBC 8.5  --   --   --  9.5  --  10.1  --   --  10.1  --    < > 4.8   HGB 7.5*  --  7.4*  --  6.9*  --  7.0*  --    < > 7.7*  --    < > 7.4*   MCV 79  --   --   --  80  --  80  --   --  78  --    < > 79   PLT 86*  --   --   --  124*  --  129*  129* 98*  --  53*  --    < > 91*   INR  --   --   --   --   --   --   --   --   --  1.09  --   --   --    *  --   --   --   --   --   --   --   --  136 134  --  134   POTASSIUM 3.5  --   --   --   --   --   --   --   --  3.4  --   --  3.9   CHLORIDE 99  --   --   --   --   --   --   --   --  102  --   --  104   CO2 29  --   --   --   --   --   --   --   --  28  --   --  26   BUN 16  --   --   --   --   --   --   --   --  15  --   --  14   CR 0.79  --   --   --   --   --   --   --   --  0.94  --   --  0.84   ANIONGAP 4  --   --   --   --   --   --   --   --  6  --   --  4   RUSTY 8.4*  --   --   --   --   --   --   --   --  9.5  --   --  8.8   * 98  --   --   --  138*  --   --   --  120*  --    < >  110*   TROPONIN  --   --   --  <0.015  --   --  0.018 0.032  --  0.032  --    < >  --     < > = values in this interval not displayed.       Imaging:   No results found for this or any previous visit (from the past 24 hour(s)).

## 2021-07-15 NOTE — PROGRESS NOTES
hospitalist notified regarding platelets being less than Hem/NS recommendation goal. Platelets ordered. Discussed pain regimen and goals. Discussed with Neurosurgery plans for neuros less frequent and transfer to floor ok'd. hospitalist paged.

## 2021-07-15 NOTE — PROGRESS NOTES
"Neurosurgery Progress     Dx:     POD #2 s/p right frontal craniotomy for evacuation of recurrent subdural hematoma.     Neuro stable.     TODAY'S PLAN:   Ms. Gastelum is overall is doing well. From our perspective, she may transfer out of the ICU to the floor when Medical feels that it is appropriate. We can certainly remove the drain and discontinue the abx today.     Per nursing note, \"hospitalist notified regarding platelets being less than Hem/NS recommendation goal. Platelets ordered. Discussed pain regimen and goals. Discussed with Neurosurgery plans for neuros less frequent and transfer to floor ok'd. hospitalist paged.\"     In the event that patient's symptoms worsen or change we would appreciate being contacted. We did discuss signs of a worsening problem that she should seek being evaluated.     We did review the above information with the patient whom agrees with the plan and did verbalize understanding.   ________________________________________________________________     Ms. Gastelum overall feels well and denies any significant discomfort.     /77   Pulse 85   Temp 98  F (36.7  C) (Axillary)   Resp 13   Ht 5' 4\" (1.626 m)   Wt 216 lb 7.9 oz (98.2 kg)   SpO2 98%   BMI 37.16 kg/m       Pt in bed. Appears comfortable and in no apparent distress, moving all extremities.   CN II-XII intact, alert and appropriate with conversation and following commands.   Bilateral upper and lower extremities with appropriate strength. Sensation intact throughout. Acceptable ROM.   Calves soft and non-tender bilaterally.     All pertinent labs and updated imaging reviewed in EPIC.     Results for BISHNU GASTELUM (MRN [id number]) as of 7/15/2021 12:48   Ref. Range 7/15/2021 06:25   Platelet Count Latest Ref Range: 150 - 450 10e3/uL 86 (L)     Results for BISHNU GASTELUM (MRN [id number]) as of 7/15/2021 12:48   Ref. Range 7/15/2021 06:25   Hemoglobin Latest Ref Range: 11.7 - 15.7 g/dL 7.5 (L)       Feliberto " ROLANDO Sullivan   Neurosurgery   [phone] (P)

## 2021-07-15 NOTE — PLAN OF CARE
Neuro: intact, baseline slurred speech  CV: NSR, normotensive  Resp: 4L NC, LS diminished  GI: reg diet  : voids in BSC  Skin: scattered bruising, incision to head  Lines: na  Access: PIV x2  Gtts: NS @ 75  Other: dilaudid prn for pain

## 2021-07-16 NOTE — PROGRESS NOTES
Clinic Care Coordination Contact  The Community Health Worker planned to call for a Care Coordination outreach to offer the CC program.    Did not contact patient.    Per Chart Review, patient is still admitted at  [facility] as of 7/13/21.    SHAUN Cortez  Clinic Care Coordination  [facility] Clinics: [facility], [place], [facility], and [facility]  Phone: [phone]

## 2021-07-16 NOTE — PLAN OF CARE
POD 3 for repeat R crani evac for recurrent SDH. Neuros with slurred speech baseline, r/t to missing teeth, L eye deviation at baseline. Dizziness and nausea with movement. VSS on 2L, tmax 99.7 decreased with encouraged use of IS, Sbp elevated but within parameters of <150. Tele NSR. Regular diet with thin liquids. Takes pills whole with water. Up with A1 gb. Continent of bladder, no BM. C/o headache and incisional pain 8-9/10 decreased with cold packs and PRN dilaudid. Pt scoring green on the Aggression Stop Light Tool. Plan for platelet recheck this AM, goal to be > 100. Discharge plan pending.

## 2021-07-16 NOTE — PLAN OF CARE
Reason for Admission: POD #3 of R craniotomy d/t SDH    Cognitive/Mentation: A/Ox 4  Neuros/CMS: Intact ex slurred speech and L eye deviation  VS: stable. Tele: SR/ST.  GI: BS active, + flatus. Continent.  : voiding. Continent.  Pulmonary: LS clear.  Pain: yes. Managing pain with PO x 3 and IV x1 Dilaudid.     Drains: PIV  Skin: intact ex incision  Activity: Assist x SB with GB.  Diet: Reg with thin liquids. Takes pills whole.     Therapies recs: pending  Discharge: pending    End of shift summary: Patient stable throughout shift. Platelets transfused d/t platelet count of 95. Managing pain with dilaudid and ice.

## 2021-07-16 NOTE — PLAN OF CARE
Alert and oriented x4. VSS. Pain helped with dilaudid PO x1. Voiding well. Platelets about to be infused. Neuros slurred speech due to missing teeth, and left eye deviation, other neuros intact. Incision CDI. Plan to continue to monitor tonight.

## 2021-07-16 NOTE — PROGRESS NOTES
[facility]  Hospitalist Progress Note for 7/16/2021       Assessment and Plan:   70 year old female who was admitted on 7/13/2021 with large right SDH which was symptomatic:     Large Right SDH w Shift -- S/P Drain 7/13/21   Recurrent subdural hematoma   - transferred out of the ICU on 7/15, stable  - plan per neurosurgery  - Hematology recommending to keep platelets > 100  - last platelet transfusion  7/15       Thrombocytopenia (H)   Anemia- stable   Antineoplastic chemotherapy induced pancytopenia (H)   Small cell lung cancer -- S/P Chemo in 2018  Appreciate hematology/oncology consult , recommendations   - Hgb 6.4 on 7/14-> 7.5 7/15-> 8.1 today  - s/p platelet transfusion 7/15 for platelets 86.  - platelets today 101, repeat platelets at noon 95k, transfuse platelets today & prn for platelets < 100K  - Plan to keep >100K & monitor      Mild hyponatremia- asymptomatic  132 on 7/15-> 130 today   -  serum osmolality sl low 272 . ur sodium & osmolality pending   - start fluid restriction 1500 ml/day & monitor      Benign essential hypertension  - will discontinue Nicardipine drip, switch to PRN Hydralazine on neuro floor       CKD (chronic kidney disease) stage 3, GFR 30-59 ml/min        DVT Prophylaxis: Pneumatic Compression Devices  Code Status: Full Code     Disposition: Expected discharge in 3 days, ?discharge to TCU if needed (PT and OT eval)       Mitali Zimmer MD.  Hospitalist [phone] (7am -6 pm)               Interval History:   C/o continued headache- neurosurgery aware of it. Some nausea, no vomiting               Medications:       fluticasone  1 puff Inhalation Daily     levETIRAcetam  500 mg Oral BID     pantoprazole  40 mg Oral QAM AC     sodium chloride (PF)  3 mL Intracatheter Q8H     tamsulosin  0.4 mg Oral Daily     albuterol, benzonatate, glucose **OR** dextrose **OR** glucagon, guaiFENesin-dextromethorphan, hydrALAZINE, HYDROmorphone, HYDROmorphone, lidocaine 4%,  "lidocaine (buffered or not buffered), naloxone **OR** naloxone **OR** naloxone **OR** naloxone, ondansetron **OR** ondansetron, polyethylene glycol, senna-docusate **OR** senna-docusate, sodium chloride (PF)               Physical Exam:   Blood pressure 123/80, pulse 95, temperature 98.5  F (36.9  C), temperature source Oral, resp. rate 16, height 1.626 m (5' 4\"), weight 98.2 kg (216 lb 7.9 oz), SpO2 95 %, not currently breastfeeding.  Wt Readings from Last 4 Encounters:   07/15/21 98.2 kg (216 lb 7.9 oz)   07/10/21 95.4 kg (210 lb 5.1 oz)   21 95.3 kg (210 lb)   21 95.3 kg (210 lb)         Vital Sign Ranges  Temperature Temp  Av.3  F (36.8  C)  Min: 97.7  F (36.5  C)  Max: 99.7  F (37.6  C)   Blood pressure Systolic (24hrs), Av , Min:118 , Max:149        Diastolic (24hrs), Av, Min:73, Max:95      Pulse Pulse  Av  Min: 85  Max: 108   Respirations Resp  Av.1  Min: 13  Max: 34   Pulse oximetry SpO2  Av.3 %  Min: 92 %  Max: 100 %         Intake/Output Summary (Last 24 hours) at 2021 0834  Last data filed at 7/15/2021 1400  Gross per 24 hour   Intake 920 ml   Output 25 ml   Net 895 ml       Constitutional: Awake, alert, cooperative, no apparent distress   Lungs: Clear to auscultation bilaterally, no crackles or wheezing   Cardiovascular: Regular rate and rhythm, normal S1 and S2, and no murmur noted   Abdomen: Normal bowel sounds, soft, non-distended, non-tender   Skin: No rashes, no cyanosis, no edema               Data:   All laboratory data reviewed    "

## 2021-07-16 NOTE — PROGRESS NOTES
[facility]    Neurosurgery  Daily Note    Assessment & Plan   Procedure(s):  Right frontal craniotomy for evacuation of recurrent subdural hematoma   3 Days Post-Op  POD #2 s/p right frontal craniotomy for evacuation of recurrent subdural hematoma. Admits to headaches and nausea. Denies vomiting, paresthesias, new weakness.     Head CT 7/14/21  EXAM: CT HEAD WITHOUT CONTRAST  LOCATION: [facility]  DATE/TIME: 07/14/2021, 5:43 AM     INDICATION: Subdural hemorrhage.  COMPARISON: 07/13/2021.  TECHNIQUE: Routine CT Head without IV contrast. Multiplanar reformats. Dose reduction techniques were used.     FINDINGS:  INTRACRANIAL CONTENTS: Interval placement of a right-sided subdural drain. The right hemispheric subdural hemorrhage measures approximately 8 mm in maximal thickness. Mass effect on the underlying parenchyma has decreased, with right to left midline   shift measuring 9 mm, previously 17 mm. Decreased effacement of the right lateral ventricle. Decreased dilatation of the left temporal horn. Decreased right uncal herniation. No CT evidence of acute infarct. Moderate presumed chronic small vessel   ischemic changes. Mild generalized parenchymal volume loss.      VISUALIZED ORBITS/SINUSES/MASTOIDS: Prior bilateral cataract surgery. Visualized portions of the orbits are otherwise unremarkable. No paranasal sinus mucosal disease. No middle ear or mastoid effusion.     BONES/SOFT TISSUES: Right parietal craniotomy and jam hole.                                                                      IMPRESSION:  1.  Interval placement of a right-sided subdural drain, with decreased volume of the right hemispheric subdural hemorrhage, now measuring approximately 8 mm in maximal thickness.  2.  Decreased associated mass effect with 9 mm right to left midline shift and decreased right uncal herniation.  3.  Decreased dilatation of the left temporal horn.    Plan:  -Advance activity  as tolerated  -Continue supportive and symptomatic treatment  -Start or continue physical therapy  -Pain control measures  -Advance diet as tolerated  -Routine wound care  -Plans reviewed with Dr. Dominguez   -Call with questions       Principal Problem:    Large Right SDH w Shift -- S/P Drain 7/13/21  Active Problems:    Anemia    Benign essential hypertension    Hyperlipidemia    CKD (chronic kidney disease) stage 3, GFR 30-59 ml/min    Small cell lung cancer (H)    Antineoplastic chemotherapy induced pancytopenia (H)    Thrombocytopenia (H)    GERD (gastroesophageal reflux disease)      Isabell Root    Interval History   Stable.     Physical Exam   Temp: 98.5  F (36.9  C) Temp src: Oral BP: 123/80 Pulse: 95   Resp: 16 SpO2: 95 % O2 Device: None (Room air) Oxygen Delivery: 2 LPM  Vitals:    07/13/21 1730 07/14/21 0600 07/15/21 0600   Weight: 211 lb 13.8 oz (96.1 kg) 214 lb 15.2 oz (97.5 kg) 216 lb 7.9 oz (98.2 kg)     Vital Signs with Ranges  Temp:  [97.7  F (36.5  C)-99.7  F (37.6  C)] 98.5  F (36.9  C)  Pulse:  [] 95  Resp:  [13-34] 16  BP: (123-149)/(73-95) 123/80  SpO2:  [92 %-100 %] 95 %  I/O last 3 completed shifts:  In: 1280 [P.O.:600; I.V.:380]  Out: 25 [Drains:25]    Awake, alert, appropriate   Left eyelid droop  TRUONG symmetrically     Medications        fluticasone  1 puff Inhalation Daily     levETIRAcetam  500 mg Oral BID     pantoprazole  40 mg Oral QAM AC     sodium chloride (PF)  3 mL Intracatheter Q8H     tamsulosin  0.4 mg Oral Daily       Plans discussed with Dr. Dominguez who was in agreement with plans    Isabell Root PA-C  [facility]  [address]  [address] [place] [id number]    Tel [phone]  Pager [phone]

## 2021-07-16 NOTE — PROGRESS NOTES
Service Date: 07/15/2021    SUBJECTIVE:  Ms. Gastelum is a 70-year-old female with recurrent subdural hematoma.  The patient had a craniotomy and evacuation of hematoma on 07/13/2021.  Postop CT scan reveals improvement.    The patient has thrombocytopenia.  This is likely from developing myelodysplastic syndrome.  The patient had small cell lung cancer for which she had chemotherapy.  That has likely caused developing myelodysplastic syndrome.    Because of recurrent subdural hematoma, plan is to keep her platelet around 100.  Her platelet is 86 today, and she will be getting platelet transfusion.    The patient has headache.  Her speech is better today.  No chest pain or shortness of breath.  She does have chronic cough.  No abdominal pain, nausea or vomiting.  No bleeding from any site.    PHYSICAL EXAMINATION:    GENERAL:  She was alert and oriented x 3.   NEUROLOGIC:  No facial droop.  Speech was better.  SKIN:  No petechiae.  Rest of the systems not examined.    LABS:  Reviewed.    ASSESSMENT:    1.  A 70-year-old female with recurrent subdural hematoma, status post craniotomy and evacuation of hematoma.  2.  Thrombocytopenia.  This is likely from developing myelodysplastic syndrome.  3.  Anemia from developing myelodysplastic syndrome.   4.  Limited-stage small cell lung cancer, status post chemoradiation.  No evidence of disease.    PLAN:    1.  The patient is recovering from her surgery.  She has been having headache.  She is on pain medication, which will be continued.  Management of headache as per the Neurosurgery team.  2.  Labs were reviewed.  Her platelet is lower.  She will be getting platelet transfusion.  Aim is to keep her platelet above 100 for the next few weeks.  She is anemic.  She will be transfused for hemoglobin below 7.  3.  The patient had few questions, which were all answered.  Hematology/Oncology will continue to follow.    Jennifer Luz MD        D: 07/15/2021   T: 07/15/2021   MT:  [facility]    Name:     BISHNU LEY  MRN:      [id number]        Account:      [id number]   :      1950           Service Date: 07/15/2021       Document: [id number]

## 2021-07-16 NOTE — PROGRESS NOTES
Pt transferred approx. 1535 with flying squad RN. VSS. No concerns at this time. Pt belongings all transferred with pt including but not limited to cell phone and .

## 2021-07-16 NOTE — PROGRESS NOTES
Chart check    Patient with recurrent subdural hematoma post craniotomy  Recovering from surgery  Transfuse for hemoglobin 7 or below or symptomatic    Platelets are 101- trending up      ЕКАТЕРИНА Green [facility]  [facility]

## 2021-07-17 NOTE — PROGRESS NOTES
[facility]    Hospitalist Progress Note    Assessment & Plan   70 year old female who was admitted on 7/13/2021 with Yvette BROTHERS Nirav is a 70 year old female with a history of small cell lung cancer s/p chemoradiation in 2018, currently in remission, pancytopenia, HLD, GERD, HTN who was admitted from 7/5 to 7/10 with headache, dysarthria and left- sided weakness.   Found to have  large right SDH which was symptomatic:       At presentation, P 99, /93 > 140/87, RR 10, O2 99%.   Labs shows stable thrombocytopenia 53 and anemia 7.7 (MCV 78), Lytes, Cr wnl. Glucose 120.       Head CT without contrast:   IMPRESSION: Increasing right hemispheric subdural hematoma withincreasing mass effect resulting in subfalcine and some uncal  herniation.     Hospital course:      Large Right SDH w Shift -- S/P Drain 7/13/21   Recurrent subdural hematoma   -POD 4.   - transferred out of the ICU on 7/15, stable  - plan per neurosurgery  - Hematology recommending to keep platelets > 100  - last platelet transfusion  7/15     Plan;   -keep Hb >7 and platelet >100K per oncology  - oncology following  - neurosurgery following.   -OT, PT following. Home with home care rec'd  -ADAT,   - pain control.   -discharge in 2-3 days  -keppra  -prn hydralazine for sbp >140.   -prn dilaudid  - bowel meds   -stop iv dilauidid            Thrombocytopenia (H)   Anemia- stable   Antineoplastic chemotherapy induced pancytopenia (H)   Small cell lung cancer -- S/P Chemo in 2018  Appreciate hematology/oncology consult , recommendations   - Hgb 6.4 on 7/14-> 7.5 7/15-> 8.1 ---> 7.6 today  - s/p platelet transfusion 7/15 for platelets 86.  - platelets today 10 and stable. 104 today,  -transfuse platelets 7/15 for 86.   - prn platelet transfusion for platelets < 100K per oncology  - Plan to keep >100K & monitor  -transfuse for Hb <7 per oncology  -daily blood counts/platelet count    Right 1st MTP pain  New pain overnight  No hx of  trauma or gout  Focal redness, warmth and very tender R 1st MTP  Remainder of RLE ortho exam negative  No nsaids given intracranial hemmorage and thrombocytopenia    Plan-  Ortho consult-possible arthrocentesis but unlikely given thrombocytopenia, doubt septic jt. Afebrile.   Uric acid now and in 2-4 weeks   Xray R foot r/o fracture  Check procal  Likely course of prednisone          Mild hyponatremia- asymptomatic  132 on 7/15-> 130 today   -  serum osmolality sl low 272 . ur sodium & osmolality pending   - start fluid restriction 1500 ml/day & monitor  -Na up to 130 today. May rise further with K replacement  - am bmp       Benign essential hypertension  - will discontinue Nicardipine drip, switch to PRN Hydralazine on neuro floor   -sbp has been at goal      CKD (chronic kidney disease) stage 3, GFR 30-59 ml/min   K 3.3  Cr 0.79---> 1.0 today.   Am bmp    Hypokalemia  Mild. Replace per protocol. Am bmp     DVT Prophylaxis: Pneumatic Compression Devices  Code Status: Full Code     Disposition: Expected discharge in 3 days, ?discharge to TCU if needed (PT and OT eval)  OT recommends home with assist, home care OT   PT rec home with assist, home PT  Home Rn      Chris Gallegos MD  Text Page  (7am to 6pm)  Interval History   Comfortable.   No complaints except R toe pain, new overnight.   No n/v/d/abd pain. No HA currently  Appetite reduced.     -Data reviewed today: I reviewed all new labs and imaging results over the last 24 hours. I personally reviewed labs and imaging last 24hours.     Physical Exam   Temp: 99.3  F (37.4  C) Temp src: Oral BP: (!) 86/57 (Left Arm.) Pulse: 113 (after ambulation)   Resp: 16 SpO2: 94 % (after ambulation) O2 Device: None (Room air) Oxygen Delivery: 2 LPM  Vitals:    07/13/21 1730 07/14/21 0600 07/15/21 0600   Weight: 96.1 kg (211 lb 13.8 oz) 97.5 kg (214 lb 15.2 oz) 98.2 kg (216 lb 7.9 oz)     Vital Signs with Ranges  Temp:  [98.2  F (36.8  C)-99.9  F (37.7  C)] 99.3  F (37.4   C)  Pulse:  [] 113  Resp:  [15-18] 16  BP: ()/(49-73) 86/57  SpO2:  [91 %-99 %] 94 %  I/O last 3 completed shifts:  In: 486 [P.O.:200]  Out: -     Constitutional: Up in bed eating  Nad,   Respiratory: CTAB,  Cardiovascular: RRR no r/g/m  GI: soft, nd, nt  Skin/Integumen: no rash or edema  Ortho: right 1st MTP swollen, red, very tender  Neuro: alert, fully oriented, nl speech and mentation, strength 5/5 extrem X 4.      Medications       fluticasone  1 puff Inhalation Daily     levETIRAcetam  500 mg Oral BID     pantoprazole  40 mg Oral QAM AC     sodium chloride (PF)  3 mL Intracatheter Q8H     tamsulosin  0.4 mg Oral Daily       Data   Recent Labs   Lab 07/17/21  0812 07/16/21  1235 07/16/21  0609 07/15/21  0625 07/14/21  1947 07/14/21  0950 07/14/21  0314 07/14/21  0313 07/13/21  2316 07/13/21  1906 07/13/21  1421 07/13/21  1421   WBC 6.3 7.2  --  8.5  --   --    < > 10.1  --   --    < > 10.1   HGB 7.6* 8.1*  --  7.5*  --   --    < > 7.0*  --   --    < > 7.7*   MCV 80 78  --  79  --   --    < > 80  --   --    < > 78   * 95* 101* 86*  --   --    < > 129*  129*  --  98*   < > 53*   INR  --   --   --   --   --   --   --   --   --   --   --  1.09    130*  --  132*  --   --   --   --   --   --   --  136   POTASSIUM 3.3*  --   --  3.5  --   --   --   --   --   --   --  3.4   CHLORIDE 101  --   --  99  --   --   --   --   --   --   --  102   CO2 31  --   --  29  --   --   --   --   --   --   --  28   BUN 22  --   --  16  --   --   --   --   --   --   --  15   CR 1.02  --   --  0.79  --   --   --   --   --   --   --  0.94   ANIONGAP 2*  --   --  4  --   --   --   --   --   --   --  6   RUSTY 9.0  --   --  8.4*  --   --   --   --   --   --   --  9.5   GLC 98  --   --  113* 98  --   --   --    < >  --   --  120*   TROPONIN  --   --   --   --   --  <0.015  --  0.018  --  0.032  --  0.032    < > = values in this interval not displayed.       Imaging:   No results found for this or any previous visit  (from the past 24 hour(s)).

## 2021-07-17 NOTE — PLAN OF CARE
POD #4 of R craniotomy d/t SDH. A&Ox4. Neuro's intact except for slurred speech and slight left eye deviation. VSS on 2l of oxygen over night. C/o pain, prn IV dilaudid x1 and ice packs. LS clear, some cough in early am, prn tessalon. Urinary frequency, bladder scan 161, UA cultures pending. Right scalp staples intact, GRAHAM, no drainage. Tele SR. Tolerates regular diet, and 1500ml fluid restrictions. Ambulates SBA with GB. Pt received last evening platelets. Discharge pending

## 2021-07-17 NOTE — PROGRESS NOTES
07/17/21 1135   Quick Adds   Type of Visit Initial PT Evaluation   Living Environment   People in home alone   Current Living Arrangements apartment   Home Accessibility no concerns   Transportation Anticipated family or friend will provide   Living Environment Comments Patient reports having an elevator.  Usually has a dog that she takes out but son is currently watching dog.    Self-Care   Usual Activity Tolerance moderate   Current Activity Tolerance fair   Regular Exercise No   Equipment Currently Used at Home none   Activity/Exercise/Self-Care Comment Patient reports she has a SEC and WW but she usually does not use those.  She typically walks around by holding furniture and walls. Difficult to assess from patient what her baseline has been.  States she is not walking as well as she was a month ago but unable to compare to two weeks ago.  States she can drive but hasn't been.  States she has a tub shower and sometimes she steps into it and takes a shower and sometimes she takes a sponge bath depending how she is feeling.  Unable to shed light into how her mobility has changed as of late.     Disability/Function   Fall history within last six months no   Change in Functional Status Since Onset of Current Illness/Injury yes   General Information   Onset of Illness/Injury or Date of Surgery 07/13/21   Referring Physician Isabell Root PA-C   Patient/Family Therapy Goals Statement (PT) Patient is adamant that she will be discharging to each of her son's homes for a week each where someone will be with her 24/7.    Pertinent History of Current Problem (include personal factors and/or comorbidities that impact the POC) Admitted SDH.  Currently POD #4 after craniotomy s/p drain   Existing Precautions/Restrictions fall   General Observations In bathroom sitting on commode with nursing present.  Agreeable to participate.    Cognition   Orientation Status (Cognition) oriented x 3   Affect/Mental Status (Cognition)  low arousal/lethargic   Follows Commands (Cognition) follows one-step commands;over 90% accuracy   Behavioral Issues unable/difficult to assess   Safety Deficit (Cognition) insight into deficits/self-awareness   Cognitive Status Comments Patient adamant that she wants to discharge to her sons' and wants to do so ASAP.  Appears safer in ambultion with WW but insists in not using one but, instead, reaching for furniture and walls.    Pain Assessment   Patient Currently in Pain Yes, see Vital Sign flowsheet  (HA rated at 8/10)   Posture    Posture Forward head position   Range of Motion (ROM)   ROM Quick Adds ROM WFL   Strength   Strength Comments On MMT L/E's at 3/5; patient not providing any resistance to therapist despite curing   Bed Mobility   Comment (Bed Mobility) Sit to supine with modified independence ( HOB raised and use of bedrails)   Transfers   Transfer Safety Comments Sit to and from stand with CGA; widened BOO upon coming to stance.     Gait/Stairs (Locomotion)   Comment (Gait/Stairs) Patient ambulated 3' for eval without AD with widened BOO and reaching for walls with CGA to Min A at times.     Balance   Balance Comments Reaching for walls/furniture to steady self.  WIth WW appeared more steady.  No gross LOB noted.     Sensory Examination   Sensory Perception patient reports no sensory changes   Coordination   Coordination no deficits were identified   Muscle Tone   Muscle Tone no deficits were identified   Clinical Impression   Criteria for Skilled Therapeutic Intervention yes, treatment indicated   PT Diagnosis (PT) impaired functional mobility   Influenced by the following impairments left eye deviation (baseline), generalized deconditioning, weakness, post SDH imparirment   Functional limitations due to impairments decreased independence and safety in functional mobility   Clinical Presentation Evolving/Changing   Clinical Presentation Rationale per clinical judgment   Clinical Decision Making  (Complexity) moderate complexity   Therapy Frequency (PT) Daily   Predicted Duration of Therapy Intervention (days/wks) 4 days   Planned Therapy Interventions (PT) balance training;bed mobility training;gait training;home exercise program;neuromuscular re-education;patient/family education;stair training;strengthening;transfer training;progressive activity/exercise  (has stairs and sons' homes)   Anticipated Equipment Needs at Discharge (PT)   (has WW and SEC)   Risk & Benefits of therapy have been explained evaluation/treatment results reviewed;care plan/treatment goals reviewed;risks/benefits reviewed;current/potential barriers reviewed;participants voiced agreement with care plan;patient   PT Discharge Planning    PT Discharge Recommendation (DC Rec) home with assist;home with home care physical therapy;Transitional Care Facility   PT Rationale for DC Rec Given patient's unsteady gait and declining to use AD, recommend 24/7 supervision for all gait.  Patient reports she plans to stay with each of her three sons for one week each in their homes.  States they have wives and children and someone will be with her 24/7.  Agree with this plan with added recommendation of home therapy (patient states they cannot get her to OP therapy) unless 24/7 supervision is not available as stated.  If not, would recommend TCU.    (Requiring CGA to Min A for mobility; unstable on feet)   PT Brief overview of current status  Patient is requiring CGA to Min A for mobiity without an AD; heavily reaches for walls and furniture.  States this is the way she has been and prefers to ambulate at home despite having a SEC and WW at home.    Total Evaluation Time   Total Evaluation Time (Minutes) 15

## 2021-07-17 NOTE — PROGRESS NOTES
Hematology:    Chart reviewed.  Platelet count is 104K this morning.  Transfusion, as per parameters, keeping above 100K for the next few weeks due to recurrent subdural hematoma s/p craniotomy, keeping hemoglobin >7.      She will follow-up with Dr. Luz after discharge.

## 2021-07-17 NOTE — PLAN OF CARE
Patient remains neurologically intact, cms intact. Vitally stable throughout shift. Patient complains of continuous 8/10 pain, PRN dilaudid administered, pain reduced after administration. Incision is clean dry and intact with staples. Patient tolerating a regular diet. Stand by assist. Pending discharge. Will continue to monitor and assess.

## 2021-07-17 NOTE — PROGRESS NOTES
Consult received.    70F with lung cancer s/p chemo with SDH, new onset 1st MTP pain and swelling.    No labs to suggest infection, overwhelming likelihood is gout.    XR show no fracture.    No NSAIDs in setting of SDH.  Could try steroids to see response - recommend course of prednisone.      Will see within 24h.    Neville Altamirano MD  [facility]

## 2021-07-17 NOTE — PROGRESS NOTES
"   07/17/21 1327   Quick Adds   Type of Visit Initial Occupational Therapy Evaluation   Living Environment   People in home alone   Current Living Arrangements apartment   Home Accessibility no concerns   Transportation Anticipated family or friend will provide   Living Environment Comments Pt lives in an apt that is accessible with her small dog that her son is caring for right now   Self-Care   Usual Activity Tolerance moderate   Current Activity Tolerance fair   Equipment Currently Used at Home cane, straight   Activity/Exercise/Self-Care Comment Pt with limited information about PLOF, seems guarded with answers. reports indep with self-cares and mobility at baseline. Gets groceries delivered. States, \"I don't take my meds, they make me feel sick\" when asked about med management. States she can drive but doesnt.    Disability/Function   Fall history within last six months no   General Information   Onset of Illness/Injury or Date of Surgery 07/13/21   Referring Physician Isabell Root PA-C   Patient/Family Therapy Goal Statement (OT) \"get out of here\"   Additional Occupational Profile Info/Pertinent History of Current Problem Admitted SDH.  Currently POD #4 after craniotomy s/p drain   Existing Precautions/Restrictions fall   Cognitive Status Examination   Orientation Status orientation to person, place and time   Affect/Mental Status (Cognitive) WNL;flat/blunted affect   Follows Commands follows one-step commands;over 90% accuracy   Cognitive Status Comments Pt with eyes closed frequently. Gives short answers to questions. Limited information given. Orientated to situation.    Visual Perception   Visual Impairment/Limitations corrective lenses for reading  (lazy eye at baseline)   Sensory   Sensory Quick Adds No deficits were identified   Integumentary/Edema   Integumentary/Edema no deficits were identifed   Range of Motion Comprehensive   General Range of Motion no range of motion deficits identified "   Strength Comprehensive (MMT)   General Manual Muscle Testing (MMT) Assessment upper extremity strength deficits identified   Comment, General Manual Muscle Testing (MMT) Assessment Generalized weakness, 4/5 B UE   Coordination   Coordination Comments Appears intact during g/h at sink   Bed Mobility   Bed Mobility supine-sit;sit-supine   Supine-Sit [place] (Bed Mobility) supervision   Sit-Supine [place] (Bed Mobility) supervision   Transfers   Transfers sit-stand transfer;toilet transfer   Transfer Comments CGA, declines use of FWW   Balance   Balance Comments Unstready gait, significant lateral sway that pt says is baseline d/t knee pain. Constant furniture surfing   Activities of Daily Living   BADL Assessment lower body dressing;grooming;toileting   Lower Body Dressing Assessment   [place] Level (Lower Body Dressing) moderate assist (50% patient effort)   Grooming Assessment   [place] Level (Grooming) supervision   Position (Grooming) sink side   Toileting   [place] Level (Toileting) supervision   Clinical Impression   Criteria for Skilled Therapeutic Interventions Met (OT) yes;meets criteria;skilled treatment is necessary   OT Diagnosis Impaired ADL and IADL post R frontal crani due to SDH   OT Problem List-Impairments impacting ADL problems related to;activity tolerance impaired;balance;coordination;cognition;mobility;strength;post-surgical precautions;pain   Assessment of Occupational Performance 3-5 Performance Deficits   Identified Performance Deficits ADL, IADL, mobility   Planned Therapy Interventions (OT) ADL retraining;transfer training;cognition;neuromuscular re-education;strengthening   Clinical Decision Making Complexity (OT) moderate complexity   Therapy Frequency (OT) Daily   Predicted Duration of Therapy 6 days   Anticipated Equipment Needs Upon Discharge (OT) shower chair;walker, rolling;raised toilet seat;reacher   Risk & Benefits of therapy have been explained  evaluation/treatment results reviewed;care plan/treatment goals reviewed   OT Discharge Planning    OT Discharge Recommendation (DC Rec) Home with assist;home with home care occupational therapy;[facility]ute [facility]hab [facility]-Motivated patient will benefit from intensive, interdisciplinary therapy.  Anticipate will be able to tolerate 3 hours of therapy per day   OT Rationale for DC Rec Pt required CGA for mobility in room. Decreased activity tolerance for functional tasks. Unknown baseline as pt reports constant furniture surfing and unsteady gait is 'baseline'. Would benefit from [facility] for increased rehab services but pt pt declining at this time and says she will be going home with her sons.    Total Evaluation Time (Minutes)   Total Evaluation Time (Minutes) 10

## 2021-07-17 NOTE — PROGRESS NOTES
[facility]     Neurosurgery  Daily Note        Assessment & Plan     Procedure(s):  Right frontal craniotomy for evacuation of recurrent subdural hematoma   4 Days Post-Op  POD #4 s/p right frontal craniotomy for evacuation of recurrent subdural hematoma. Admits to headaches but states no worse. Denies vomiting, paresthesias, new weakness.      On exam, she's awake sitting up on the edge of the bed.  She has a left pronator drift and left upper extremity weakness.  Her left eye deviates inward and she states that's chronic.     Plt ct 104k  Hgb 7.6    Head CT 7/14/21  EXAM: CT HEAD WITHOUT CONTRAST  LOCATION: [facility]  DATE/TIME: 07/14/2021, 5:43 AM    INDICATION: Subdural hemorrhage.  COMPARISON: 07/13/2021.  TECHNIQUE: Routine CT Head without IV contrast. Multiplanar reformats. Dose reduction techniques were used.     FINDINGS:  INTRACRANIAL CONTENTS: Interval placement of a right-sided subdural drain. The right hemispheric subdural hemorrhage measures approximately 8 mm in maximal thickness. Mass effect on the underlying parenchyma has decreased, with right to left midline   shift measuring 9 mm, previously 17 mm. Decreased effacement of the right lateral ventricle. Decreased dilatation of the left temporal horn. Decreased right uncal herniation. No CT evidence of acute infarct. Moderate presumed chronic small vessel   ischemic changes. Mild generalized parenchymal volume loss.      VISUALIZED ORBITS/SINUSES/MASTOIDS: Prior bilateral cataract surgery. Visualized portions of the orbits are otherwise unremarkable. No paranasal sinus mucosal disease. No middle ear or mastoid effusion.     BONES/SOFT TISSUES: Right parietal craniotomy and jam hole.                                                                      IMPRESSION:  1.  Interval placement of a right-sided subdural drain, with decreased volume of the right hemispheric subdural hemorrhage, now measuring  approximately 8 mm in maximal thickness.  2.  Decreased associated mass effect with 9 mm right to left midline shift and decreased right uncal herniation.  3.  Decreased dilatation of the left temporal horn.     Plan:  -Advance activity as tolerated  -Continue supportive and symptomatic treatment  -Start or continue physical therapy  -Pain control measures  -Advance diet as tolerated  -Routine wound care  -Continue Kelsey Guerrero MPAS  [facility] Neurosurgery  [address] [address]    Tel [phone]  Pager [phone]

## 2021-07-18 NOTE — CONSULTS
Orthopedic Surgery Consult / History and Physical    Yvette Gastelum MRN# [id number]   Age: 70 year old YOB: 1950     Date of Admission:   7/13/2021  Date of Consult: 07/18/21   Location:    [facility]             Assessment and Plan:   Assessment:  Suspected right great toe MTP gout     Plan:  1. Prednisone initiated yesterday, would continue with this.  2. Unable to have NSAIDs in setting of subdural hematoma  3. Postop shoe appropriate to help immobilize the toe when ambulating.  4. Weight-bear as tolerated right lower extremity  5. If symptoms not improving with prednisone, could try an intra-articular steroid injection.  Would wait 1-2 days for prednisone effect first.             Chief Complaint:   Right great toe pain           History of Present Illness:   This patient is a 70 year old female with a significant past medical history of small cell lung cancer status post chemoradiation currently in remission, and currently admitted with a right subdural hematoma, now postop day 5 from evacuation.     She yesterday developed right great toe pain.  No history of gout previously.  No symptoms of infection.  No fevers or chills.  No radiation of the pain.  Focal in the right great toe.  No numbness or tingling associated.    After starting prednisone yesterday, pain somewhat improved, though still bothersome.  Pain isolated to around the right great toe MTP joint.               Past Medical History:   Lung cancer in remission  Past Medical History:   Diagnosis Date     Cancer (H)     Lung cancer       Hypertension      Kidney stones      Obese      Recurrent UTI      S/P CL-BSO      Sepsis (H)     secondary to uti             Past Surgical History:     Past Surgical History:   Procedure Laterality Date     BONE MARROW BIOPSY, BONE SPECIMEN, NEEDLE/TROCAR N/A 2/10/2021    Procedure: BONE MARROW BIOPSY;  Surgeon: Earlene Garcia MD;  Location:  GI     BONE MARROW  BIOPSY, BONE SPECIMEN, NEEDLE/TROCAR N/A 2021    Procedure: BIOPSY, BONE MARROW;  Surgeon: Felix Elizabeth MD;  Location:  GI     COLONOSCOPY       COLONOSCOPY N/A 2/10/2016    Procedure: COMBINED COLONOSCOPY, SINGLE OR MULTIPLE BIOPSY/POLYPECTOMY BY BIOPSY;  Surgeon: Antonia Jiménez MD;  Location:  GI     CRANIOTOMY Right 2021    Procedure: CRANIOTOMY;  Surgeon: Puma Dominguez MD;  Location:  OR     CRANIOTOMY, EVACUATE HEMATOMA SUBDURAL, COMBINED Right 2021    Procedure: Right frontal craniotomy for evacuation of recurrent subdural hematoma;  Surgeon: Puma Dominguez MD;  Location:  OR     EYE SURGERY      CATARACT EXTRACTION RIGHT & LEFT     GYN SURGERY      BILATERAL TUBAL LIGATION, CL, LAPAROSCOPIC LEFT SALPINGO-OOPHORECTOMY     HEAD & NECK SURGERY      WISDOM TEETH EXTRACTION     HYSTERECTOMY       INSERT PORT VASCULAR ACCESS N/A 2018    Procedure: INSERT PORT VASCULAR ACCESS;  POWER PORT PLACEMENT;  Surgeon: Todd Norris MD;  Location: [facility]     REMOVE PORT VASCULAR ACCESS N/A 2019    Procedure: REMOVAL, VASCULAR ACCESS PORT;  Surgeon: Todd Norris MD;  Location: [facility]            Social History:   Smoking: None    Social History     Tobacco Use     Smoking status: Former Smoker     Packs/day: 1.00     Years: 50.00     Pack years: 50.00     Types: Cigarettes     Start date: 10/1965     Quit date: 2015     Years since quittin.8     Smokeless tobacco: Never Used   Substance Use Topics     Alcohol use: No     Alcohol/week: 0.0 standard drinks            Family History:   Denies family history of bleeding or clotting disorders  Family History   Problem Relation Age of Onset     Aneurysm Father         Aorta     Hypertension Father      Cervical Cancer Mother      Lung Cancer Brother         non smoker            Allergies:     Allergies   Allergen Reactions     No Known Allergies             Medications:    Anticoagulants: None  Medications Prior to Admission   Medication Sig Dispense Refill Last Dose     Acetaminophen 325 MG CAPS Take 325-650 mg by mouth every 6 hours as needed for pain    prn     albuterol (PROVENTIL) (2.5 MG/3ML) 0.083% neb solution Take 1 vial (2.5 mg) by nebulization 2 times daily (Patient taking differently: Take 2.5 mg by nebulization 2 times daily as needed for shortness of breath / dyspnea or wheezing ) 1 Box 11 prn     Ascorbic Acid (VITAMIN C) 500 MG CHEW Take 500 mg by mouth daily         atorvastatin (LIPITOR) 10 MG tablet Take 1 tablet (10 mg) by mouth daily 90 tablet 3      benzonatate (TESSALON) 100 MG capsule Take 1 capsule (100 mg) by mouth 3 times daily as needed for cough 90 capsule 3 prn     docusate sodium (COLACE) 100 MG capsule Take 1 capsule (100 mg) by mouth 2 times daily as needed for constipation 20 capsule 0 prn     fluticasone (FLOVENT HFA) 110 MCG/ACT inhaler Inhale 1 puff into the lungs 2 times daily 1 Inhaler 11      loperamide (IMODIUM) 2 MG capsule Take 2 mg by mouth 4 times daily as needed for diarrhea   prn     magnesium 250 MG tablet Take 1 tablet by mouth daily        omeprazole (PRILOSEC) 20 MG DR capsule Take 1 capsule (20 mg) by mouth daily 90 capsule 3      oxyCODONE (ROXICODONE) 5 MG tablet Take 5 mg by mouth every 6 hours as needed for severe pain   7/13/2021 at am     sodium chloride (NEBUSAL) 3 % neb solution Take 3 mLs by nebulization 2 times daily Use with albuterol inhaler (Patient taking differently: Take 3 mLs by nebulization 2 times daily as needed for wheezing Use with albuterol inhaler) 90 mL 4 prn     vitamin B-Complex Take 1 tablet by mouth daily        Vitamin D, Cholecalciferol, 25 MCG (1000 UT) CAPS Take 25 mcg by mouth daily         Elastic Bandages & Supports (MEDICAL COMPRESSION SOCKS) MISC 1 Package daily 2 each 1      HYDROmorphone (DILAUDID) 2 MG tablet Take 1 to 2 tablet every 6 hours as needed for pain. 20 tablet 0 New Rx at hasn't  started     tamsulosin (FLOMAX) 0.4 MG capsule Take 1 capsule (0.4 mg) by mouth daily (Patient not taking: Reported on 7/13/2021) 30 capsule 0 Not Taking at unaware new Rx     [DISCONTINUED] diclofenac (VOLTAREN) 1 % topical gel Apply 2 g topically 4 times daily (Patient taking differently: Apply 2 g topically 4 times daily as needed for moderate pain ) 300 g 3 Unsure if using     [DISCONTINUED] methocarbamol (ROBAXIN) 500 MG tablet Take 1 tablet (500 mg) by mouth 4 times daily as needed for muscle spasms 40 tablet 0 7/13/2021 at am             Review of Systems:   Recovering from evacuation of subdural hematoma.  Feels like her strength is good in the upper and lower extremities at present.  Otherwise A 10 point review of systems was performed, and was negative except as noted in HPI.         Physical Exam:     Patient Vitals for the past 8 hrs:   BP Temp Temp src Pulse Resp SpO2   07/18/21 0405 109/66 97.6  F (36.4  C) Oral 77 18 97 %   07/18/21 0000 97/62 97.4  F (36.3  C) Oral 90 19 99 %       General: awake, alert, cooperative, no apparent distress, appears stated age  HEENT: normal  Respiratory: breathing non-labored  Cardiovascular: skin warm and well perfused  Skin: no rashes or lesions  Abdomen:  non-distended  Lymph:  No abnormal swelling of uninjured extremities  Heme:  No petechiae  Neurological: CN II-XII grossly intact  Musculoskeletal:     RLE   Skin C/D/I   Minimal erythema around great toe MTP   Tenderness to palpation present of great toe MTP   Motor:  TA, GS 5/5    Able to wiggle great toe, some associated pain with it   SILT on SP, DP, S, S, and T nerve territories   Circulation: palpable DP and TP, foot warm             Data:   X-ray right foot: No fractures.            Neville Altamirano MD  Orthopaedic Spine Surgery  [facility]

## 2021-07-18 NOTE — PROGRESS NOTES
[facility]     Neurosurgery  Daily Note        Assessment & Plan     Procedure(s):  Right frontal craniotomy for evacuation of recurrent subdural hematoma   POD #5 s/p right frontal craniotomy for evacuation of recurrent subdural hematoma. States she's feeling well today without headache just slight discomfort at incision.       On exam, she's awake sitting up on the edge of the bed.  She has a left pronator drift and left upper extremity weakness.  Her left eye deviates inward and she states that's chronic.      Plt ct 88k  Hgb 8.4     Head CT 7/14/21  EXAM: CT HEAD WITHOUT CONTRAST  LOCATION: [facility]  DATE/TIME: 07/14/2021, 5:43 AM     INDICATION: Subdural hemorrhage.  COMPARISON: 07/13/2021.  TECHNIQUE: Routine CT Head without IV contrast. Multiplanar reformats. Dose reduction techniques were used.     FINDINGS:  INTRACRANIAL CONTENTS: Interval placement of a right-sided subdural drain. The right hemispheric subdural hemorrhage measures approximately 8 mm in maximal thickness. Mass effect on the underlying parenchyma has decreased, with right to left midline   shift measuring 9 mm, previously 17 mm. Decreased effacement of the right lateral ventricle. Decreased dilatation of the left temporal horn. Decreased right uncal herniation. No CT evidence of acute infarct. Moderate presumed chronic small vessel   ischemic changes. Mild generalized parenchymal volume loss.      VISUALIZED ORBITS/SINUSES/MASTOIDS: Prior bilateral cataract surgery. Visualized portions of the orbits are otherwise unremarkable. No paranasal sinus mucosal disease. No middle ear or mastoid effusion.     BONES/SOFT TISSUES: Right parietal craniotomy and jam hole.                                                                      IMPRESSION:  1.  Interval placement of a right-sided subdural drain, with decreased volume of the right hemispheric subdural hemorrhage, now measuring approximately 8 mm in  maximal thickness.  2.  Decreased associated mass effect with 9 mm right to left midline shift and decreased right uncal herniation.  3.  Decreased dilatation of the left temporal horn.     Plan:  -Will need plt transfusion today to maintain plt ct above 100k  -Continue supportive and symptomatic treatment  -Start or continue physical therapy  -Pain control measures  -Advance diet as tolerated  -Routine wound care  -Continue Kelsey Guerrero MPAS  [facility] Neurosurgery  [address] [address]     Tel [phone]  Pager [phone]

## 2021-07-18 NOTE — PLAN OF CARE
POD #5 of R craniotomy d/t SDH. A&Ox4. Vitals: BP soft and on 2L O2 NC. Parameters SBP < 140. Tele: SR. Up with SBA with GB and walker. Regular diet with 1500mL fluid restriction. C/o head pain, managed with PRN PO Dilaudid and ice applied. Neuros intact ex slurred speech and left eye deviation. Right scalp incision WDL, GRAHAM with staples CMS intact. LS clear. SOB. Voiding bathroom. BM x2, loose. IV-SL. Discharge pending. Continue to monitor.

## 2021-07-18 NOTE — PLAN OF CARE
Alert and oriented x4. VSS. Headache has been getting better today in fact this afternoon pt said she had no headache. Still having rt toe pain likely gout. Pain helped with dilaudid and tylenol. Tele NSR. Neuros intact except baseline slurred speech and left eye deviation. Voiding well. 1 unit of platelets give this shift and recheck was good. Plan to continue to monitor tonight.

## 2021-07-18 NOTE — PROGRESS NOTES
[facility]    Hospitalist Progress Note    Assessment & Plan   70 year old female who was admitted on 7/13/2021 with Yvette BROTHERS Nirav is a 70 year old female with a history of small cell lung cancer s/p chemoradiation in 2018, currently in remission, pancytopenia, HLD, GERD, HTN who was admitted from 7/5 to 7/10 with headache, dysarthria and left- sided weakness.   Found to have  large right SDH which was symptomatic:       At presentation, P 99, /93 > 140/87, RR 10, O2 99%.   Labs shows stable thrombocytopenia 53 and anemia 7.7 (MCV 78), Lytes, Cr wnl. Glucose 120.       Head CT without contrast:   IMPRESSION: Increasing right hemispheric subdural hematoma withincreasing mass effect resulting in subfalcine and some uncal  herniation.     Hospital course:      Large Right SDH w Shift -- S/P Drain 7/13/21   Recurrent subdural hematoma   -POD 5.   - transferred out of the ICU on 7/15, stable  - plan per neurosurgery  - Hematology recommending to keep platelets > 100  - last platelet transfusion  7/15, 7/18   -Hb stable. Wbc nl.      -Platelets 88,000.  No bleeding.  Patient feeling well.  States this is the best day she has had in the hospital yet.      Plan;   -Transfusion now.  Recheck platelets this afternoon.  -keep Hb >7 and platelet >100K per oncology  - oncology following  - neurosurgery following.   -OT, PT following. Home with home care rec'd  -ADAT,   - pain control.   -discharge in 2-3 days  -keppra  -prn hydralazine for sbp >140.   -prn dilaudid  - bowel meds   -stop iv dilauidid            Thrombocytopenia (H)   Anemia- stable   Antineoplastic chemotherapy induced pancytopenia (H)   Small cell lung cancer -- S/P Chemo in 2018  Appreciate hematology/oncology consult , recommendations   - Hgb 6.4 on 7/14-> 7.5 7/15-> 8.1 ---> 7.6 today  - s/p platelet transfusion 7/15 for platelets 86.  - platelets today 10 and stable. 104 today,  -transfuse platelets 7/15 for 86 and 7/18 for  88K.   - prn platelet transfusion for platelets < 100K per oncology  - Plan to keep >100K & monitor  -transfuse for Hb <7 per oncology  -daily blood counts/platelet count  -platelet transfusion today for plat ct 88K.   - follow up tomorrow bld cts  -Platelet transfusion today.  Patient has a standing orders for platelet transfusion    Right 1st MTP pain suspect acute gout.  New pain overnight  No hx of trauma or gout  Focal redness, warmth and very tender R 1st MTP  Remainder of RLE ortho exam negative  No nsaids given intracranial hemmorage and thrombocytopenia  Seen by ortho, agree with pred for likely gout.   XR foot negative.   -She had Ortho input.  Doubt septic joint.  Agree with gout.  -NSAIDs contraindicated.  Started on prednisone 20 mg daily.  Calcitonin normal.  -uric acid at 6.2.  -Today patient states pain is slightly worse.  Right first MTP seems slightly more swollen and red.  Tenderness stable.    Plan-  -increase prednisone to 40 mg daily for 5 days no may be prudent to taper her over 9 days.  -We will need a uric acid level once she has complete resolution of her gout.  -Uric acid level less than 6.  -With PCP regarding maintenance therapy with allopurinol.          Mild hyponatremia- asymptomatic  132 on 7/15-> 130 today   -  serum osmolality sl low 272 . ur sodium & osmolality pending   - start fluid restriction 1500 ml/day & monitor  -Na up to 130 today. May rise further with K replacement  - am bmp       Benign essential hypertension  - will discontinue Nicardipine drip, switch to PRN Hydralazine on neuro floor   -sbp has been at goal      CKD (chronic kidney disease) stage 3, GFR 30-59 ml/min   K 3.3  Cr 0.79---> 1.0 today.  Will renal function  Am bmp    Hypokalemia  Mild. Replace per protocol. Am bmp,  will give a dose of potassium today     DVT Prophylaxis: Pneumatic Compression Devices  Code Status: Full Code     Disposition: Expected discharge in 3 days, ?discharge to TCU if needed (PT  and OT eval)  OT recommends home with assist, home care OT   PT rec home with assist, home PT  Home Rn    This care plan with patient and patient's son at the bedside and RN.    Chris Gallegos MD  Text Page  (7am to 6pm)  Interval History   Feeling much better.  She states this is the best day she has had yet in the hospital.  Headache seems essentially resolved.  No chest pain or shortness of breath.  Taking p.o.    Right first great toe however seems slightly more painful.  Is any other joint pains.    -Data reviewed today: I reviewed all new labs and imaging results over the last 24 hours. I personally reviewed labs and imaging last 24hours.     Physical Exam   Temp: 98.3  F (36.8  C) Temp src: Oral BP: 110/58 Pulse: 95   Resp: 16 SpO2: 95 % O2 Device: Nasal cannula Oxygen Delivery: 2 LPM  Vitals:    07/13/21 1730 07/14/21 0600 07/15/21 0600   Weight: 96.1 kg (211 lb 13.8 oz) 97.5 kg (214 lb 15.2 oz) 98.2 kg (216 lb 7.9 oz)     Vital Signs with Ranges  Temp:  [97.4  F (36.3  C)-99.1  F (37.3  C)] 98.3  F (36.8  C)  Pulse:  [] 95  Resp:  [16-19] 16  BP: ()/(55-70) 110/58  SpO2:  [94 %-100 %] 95 %  I/O last 3 completed shifts:  In: 240 [P.O.:240]  Out: 250 [Urine:250]    Constitutional: Up in bed appears more comfortable.  Alert  Nad,   Respiratory: CTAB,  Cardiovascular: RRR no r/g/m  GI: soft, nd, nt  Skin/Integumen: no rash or edema  Ortho: right 1st MTP swollen, red, very tender-more swollen today.  Neuro: alert, fully oriented, nl speech and mentation, strength 5/5 extrem X 4.      Medications       acetaminophen  1,000 mg Oral TID     fluticasone  1 puff Inhalation Daily     levETIRAcetam  500 mg Oral BID     pantoprazole  40 mg Oral QAM AC     predniSONE  20 mg Oral Daily     sodium chloride (PF)  3 mL Intracatheter Q8H     tamsulosin  0.4 mg Oral Daily       Data   Recent Labs   Lab 07/18/21  0744 07/17/21  2007 07/17/21  0812 07/16/21  1235 07/15/21  0625 07/14/21  1435 07/14/21  0950  07/14/21  0314 07/14/21  0313 07/13/21  2316 07/13/21  1906 07/13/21  1421   WBC 6.6  --  6.3 7.2 8.5  --   --    < > 10.1  --   --  10.1   HGB 8.4*  --  7.6* 8.1* 7.5*  --   --    < > 7.0*  --   --  7.7*   MCV 81  --  80 78 79  --   --    < > 80  --   --  78   PLT 88*  --  104* 95* 86*  --   --    < > 129*  129*  --  98* 53*   INR  --   --   --   --   --   --   --   --   --   --   --  1.09     --  134 130* 132*  --   --   --   --   --   --  136   POTASSIUM 3.4 3.2* 3.3*  --  3.5  --   --   --   --   --   --  3.4   CHLORIDE 104  --  101  --  99  --   --   --   --   --   --  102   CO2 26  --  31  --  29  --   --   --   --   --   --  28   BUN 27  --  22  --  16  --   --   --   --   --   --  15   CR 1.02  --  1.02  --  0.79  --   --   --   --   --   --  0.94   ANIONGAP 5  --  2*  --  4  --   --   --   --   --   --  6   RUSTY 9.6  --  9.0  --  8.4*  --   --   --   --   --   --  9.5   *  --  98  --  113*   < >  --   --   --    < >  --  120*   TROPONIN  --   --   --   --   --   --  <0.015  --  0.018  --  0.032 0.032    < > = values in this interval not displayed.       Imaging:   Recent Results (from the past 24 hour(s))   XR Foot Right G/E 3 Views    Narrative    EXAM: XR FOOT RIGHT G/E 3 VIEWS  LOCATION: [facility]  DATE/TIME: 07/17/2021, 2:44 PM    INDICATION: New onset pain and redness right first MTP joint. Rule out fracture.  COMPARISON: None.      Impression    IMPRESSION: Degenerative change at the first MTP joint and IP joint of the great toe. Accessory os navicularis. No evidence for fracture. No erosive changes are identified.

## 2021-07-18 NOTE — PROGRESS NOTES
Hematology:     Chart reviewed.  Platelet count is 88K this morning.  Transfusion, as per parameters, keeping above 100K for the next few weeks due to recurrent subdural hematoma s/p craniotomy, keeping hemoglobin >7.       She will follow-up with Dr. Luz after discharge.

## 2021-07-18 NOTE — PLAN OF CARE
Reason for Admission: POD #4 of R craniotomy d/t SDH     Cognitive/Mentation: A/Ox 4  Neuros/CMS: Intact ex slurred speech and L eye deviation  VS: stable. Tele: SR/ST.  GI: BS active, + flatus. Last BM 7/17/21 Continent.  : voiding. Continent.  Pulmonary: LS clear.  Pain: yes. Managing pain with tylenol, dilaudid, and ice     Drains: PIV  Skin: intact ex incision  Activity: Assist x SB with GBW.  Diet: Reg with thin liquids. Takes pills whole.      Therapies recs: home with assist  Discharge: pending     End of shift summary: Patient stable throughout shift. Patient had large BM.

## 2021-07-18 NOTE — PROVIDER NOTIFICATION
"Paged Marie Braden: \"Jennie4 G.J. Patient's Potassium resulted came back at 3.2 and the orders for greater than 3.4. Can you order a potassium replacement? Thanks!   Denise  [phone]\"  "

## 2021-07-19 NOTE — PROGRESS NOTES
[facility]    Hospitalist Progress Note    Assessment & Plan   Yvette Gastelum is a 70 year old female who was admitted on 7/13/2021 with a history of small cell lung cancer s/p chemoradiation in 2018, currently in remission, pancytopenia, HLD, GERD, HTN who was admitted from 7/5 to 7/10 with headache, dysarthria and left- sided weakness.   Found to have large right SDH which was symptomatic s/p drain 7/13/21    Hospital course:      Large Right SDH w Shift -- S/P Drain 7/13/21   Recurrent subdural hematoma   - transferred out of the ICU on 7/15, stable  - plan per neurosurgery  - Hematology recommending to keep platelets > 100  - last platelet transfusion  7/15, 7/18, 7/19  -Hb stable. Wbc nl.    Plan;   -keep Hb >7 and platelet >100K per oncology  - oncology following  - neurosurgery following.   -OT, PT following. Home with home care rec'd  -ADAT   - pain control.   -discharge in 2-3 days pending NSX clearance  -keppra  -prn hydralazine for sbp >140.   -prn dilaudid  - bowel meds      Thrombocytopenia    Anemia- stable   Antineoplastic chemotherapy induced pancytopenia (H)   Small cell lung cancer -- S/P Chemo in 2018  Appreciate hematology/oncology consult , recommendations   - Hgb 6.4 on 7/14-> 7.5 7/15-> 8.1 ---> 7.6 today  - s/p platelet transfusion 7/15 for platelets 86.  -transfuse platelets 7/15 for 86 and 7/18 for 88K, 7/19 for 93K  - prn platelet transfusion for platelets < 100K per oncology  - Plan to keep >100K & monitor  -transfuse for Hb <7 per oncology  -daily blood counts/platelet count    Right 1st MTP pain suspect acute gout.  Started 7/17  No hx of trauma or gout  Focal redness, warmth and very tender R 1st MTP  Remainder of RLE ortho exam negative  No nsaids given intracranial hemmorage and thrombocytopenia  Seen by ortho, agree with pred for likely gout.   XR foot negative.   -She had Ortho input.  Doubt septic joint.  Agree with gout.  -NSAIDs  contraindicated.  -Started on prednisone 20 mg daily.  Calcitonin normal.  -uric acid at 6.2.  Plan-  -increase prednisone to 40 mg daily for 5 days no may be prudent to taper her over 9 days.  -We will need a uric acid level once she has complete resolution of her gout.  -Uric acid level less than 6.  -With PCP regarding maintenance therapy with allopurinol.     Mild hyponatremia- asymptomatic, resolved  - follow       Benign essential hypertension  - monitor      CKD (chronic kidney disease) stage 3, GFR 30-59 ml/min  Am bmp    Hypokalemia  Mild. Replace per protocol.     DVT Prophylaxis: Pneumatic Compression Devices  Code Status: Full Code     Disposition: Expected discharge in 2-3 pending stable platelets  OT recommends home with assist, home care OT   PT rec home with assist, home PT  Home Rn    This care plan with patient  and RN.    Mohan Fabian MD  Text Page  (7am to 6pm)  35min spent with >50% face to face is discussion of plan re platelets, transfusions, follow up, dispo  Interval History   Feeling okay. Willing to stay. Happy she is getting up to bathroom without assist. Pain in foot is ongoing. Getting platelets today.     -Data reviewed today: I reviewed all new labs and imaging results over the last 24 hours. I personally reviewed labs and imaging last 24hours.     Physical Exam   Temp: 98.4  F (36.9  C) Temp src: Oral BP: 124/76 Pulse: 86   Resp: 16 SpO2: 97 % O2 Device: None (Room air)    Vitals:    07/13/21 1730 07/14/21 0600 07/15/21 0600   Weight: 96.1 kg (211 lb 13.8 oz) 97.5 kg (214 lb 15.2 oz) 98.2 kg (216 lb 7.9 oz)     Vital Signs with Ranges  Temp:  [97.4  F (36.3  C)-98.4  F (36.9  C)] 98.4  F (36.9  C)  Pulse:  [81-98] 86  Resp:  [16-17] 16  BP: (104-130)/(60-84) 124/76  SpO2:  [96 %-99 %] 97 %  I/O last 3 completed shifts:  In: 930 [P.O.:930]  Out: -     Constitutional: Up in bed appears more comfortable.  Alert  Nad,   Respiratory: CTAB,  Cardiovascular: RRR no r/g/m  GI: soft,  nd, nt  Skin/Integumen: no rash or edema  Ortho: right 1st MTP swollen, red, tender  Neuro: alert, fully oriented, nl speech and mentation, strength 5/5 extrem X 4.      Medications       acetaminophen  1,000 mg Oral TID     fluticasone  1 puff Inhalation Daily     levETIRAcetam  500 mg Oral BID     pantoprazole  40 mg Oral QAM AC     predniSONE  40 mg Oral Daily     sodium chloride (PF)  3 mL Intracatheter Q8H     tamsulosin  0.4 mg Oral Daily       Data   Recent Labs   Lab 07/19/21  0808 07/18/21  1617 07/18/21  0744 07/17/21 2007 07/17/21  0812 07/14/21  1435 07/14/21  0950 07/14/21  0314 07/14/21  0313 07/13/21  2316 07/13/21  1906 07/13/21  1421   WBC 5.1  --  6.6  --  6.3   < >  --    < > 10.1  --   --  10.1   HGB 8.3*  --  8.4*  --  7.6*  --   --    < > 7.0*  --   --  7.7*   MCV 82  --  81  --  80   < >  --    < > 80  --   --  78   PLT 93* 105* 88*  --  104*   < >  --    < > 129*  129*  --  98* 53*   INR  --   --   --   --   --   --   --   --   --   --   --  1.09     --  135  --  134   < >  --   --   --   --   --  136   POTASSIUM 3.3*  --  3.4 3.2* 3.3*   < >  --   --   --   --   --  3.4   CHLORIDE 106  --  104  --  101   < >  --   --   --   --   --  102   CO2 27  --  26  --  31   < >  --   --   --   --   --  28   BUN 26  --  27  --  22   < >  --   --   --   --   --  15   CR 1.01  --  1.02  --  1.02   < >  --   --   --   --   --  0.94   ANIONGAP 6  --  5  --  2*   < >  --   --   --   --   --  6   RUSTY 9.6  --  9.6  --  9.0   < >  --   --   --   --   --  9.5   *  --  126*  --  98   < >  --   --   --    < >  --  120*   TROPONIN  --   --   --   --   --   --  <0.015  --  0.018  --  0.032 0.032    < > = values in this interval not displayed.       Imaging:   No results found for this or any previous visit (from the past 24 hour(s)).

## 2021-07-19 NOTE — PROGRESS NOTES
Pt is now POD #6 from a R. Craniotomy for a recurrent SDH. A/O x4. Neurologically intact ex: generalized weakness, slurred speech d/t missing teeth and a baseline L. Eye deviation. Head incision with staples  GRAHAM. VSS on RA, parameters to keep SBP <140. Regular diet, thin liquids with a fluid restriction of 1,500 mL. Takes pills whole. Up with SBA  /GBW. Pt reporting intermittent headache managed well with PRN PO dilaudid. Pt scoring green on the Aggression Stop Light Tool. Discharge:to TCU possibly tomorrow.

## 2021-07-19 NOTE — PROGRESS NOTES
Clinic Care Coordination Contact  The Community Health Worker planned to call for a Care Coordination outreach to offer the CC program.    Did not contact patient.    Per Chart Review, patient is still admitted at  [facility] as of 7/13/21.    SHAUN Cortez  Clinic Care Coordination  [facility]: [facility], [facility], [facility], and [facility]  Phone: [phone]

## 2021-07-19 NOTE — PROGRESS NOTES
A/O x 4, vss, a-febrile, c/o headache tylenol/po dilaudid and cold packs helping, up with  SBA to the bathroom, neuros are intact, tele SR, platelets 93 today, 1 Unit transfused today, re-check at 1630, incision CDI, staples are intact, discharge pending.

## 2021-07-19 NOTE — PLAN OF CARE
Pt is now POD #6 from a R. Craniotomy for a recurrent SDH. Alert, oriented x4. Neurologically intact ex: generalized weakness, slurred speech d/t missing teeth and a baseline L. Eye deviation. Head incision GRAHAM, staples present. VSS on RA, parameters to keep SBP <140. Tele NSR. Regular diet, thin liquids with a fluid restriction of 1,500 mL. Takes pills whole. Up with SBA using GB/walker. Voiding adequately, had one BM overnight. Pt reporting intermittent headache managed well with PRN PO dilaudid. Pt scoring green on the Aggression Stop Light Tool. Discharge home with OP therapies pending.

## 2021-07-19 NOTE — PROGRESS NOTES
Orthopedic Surgery  Right great toe pain: probable gout    Patient reports slight improvement in her right great toe pain vs yesterday.  Continues to have pain with weight bearing    Exam:  Erythema and swelling at right great toe present  TTP 1st MTP joint on right  Sensation and pulses intact      Plan:  Continue prednisone  Post-op shoe ordered via orthotics.  To be worn when weight bearing, off in bed.    WBAT right LE  Elevate and ice prn    Maile Villela PA-C on 7/19/2021 at 2:35 PM

## 2021-07-19 NOTE — PROGRESS NOTES
S:  We received an order for a Post op shoe room 704-01.  O:  I met with the patient and she is sitting up on the edge of the bed alert.  I donned a size small Post op shoe on the R foot.  A:  The shoe fits adequate and is accommodating the R foot.  P:  The patient will wear the shoe when up out of bed.  G:  Provide Post Op shoe to accommodate R foot.  Alex BARRON

## 2021-07-19 NOTE — PROGRESS NOTES
"[facility]    Neurosurgery Progress Note    Date of Service (when I saw the patient): 07/19/2021     Assessment & Plan   Yvette Gastelum is a 70 year old female who was admitted on 7/13/2021. She is s/p right frontal craniotomy for evacuation of recurrent subdural hematoma. Today she was seen sitting at the edge of bed. She reports a mild-moderate headache. She denies any other complaints. She continues to have slight left upper extremity drift and weakness.     Principal Problem:    Large Right SDH w Shift -- S/P Drain 7/13/21    Assessment: s/p right frontal craniotomy    Plan:   -Will need platelet transfusion today to maintain platelet count above 100k  -Continue supportive and symptomatic treatment  -Start or continue therapy  -Continue pain control measures  -Routine wound care  -Continue Kelsey Marquez CNP  [facility]  [facility]  [address]  [address]  [place] [address]    Tel [phone]  Pager [phone]      Interval History   Stable    Physical Exam   Temp: 97.4  F (36.3  C) Temp src: Oral BP: 130/80 Pulse: 83   Resp: 17 SpO2: 97 % O2 Device: None (Room air) Oxygen Delivery: 2 LPM  Vitals:    07/13/21 1730 07/14/21 0600 07/15/21 0600   Weight: 211 lb 13.8 oz (96.1 kg) 214 lb 15.2 oz (97.5 kg) 216 lb 7.9 oz (98.2 kg)     Vital Signs with Ranges  Temp:  [97.4  F (36.3  C)-98.3  F (36.8  C)] 97.4  F (36.3  C)  Pulse:  [83-98] 83  Resp:  [16-17] 17  BP: (104-130)/(58-80) 130/80  SpO2:  [95 %-98 %] 97 %  I/O last 3 completed shifts:  In: 450 [P.O.:450]  Out: -      , Blood pressure 130/80, pulse 83, temperature 97.4  F (36.3  C), temperature source Oral, resp. rate 17, height 5' 4\" (1.626 m), weight 216 lb 7.9 oz (98.2 kg), SpO2 97 %, not currently breastfeeding.  216 lbs 7.87 oz  HEENT:  Normocephalic.  PERRLA.    Heart:  No peripheral edema  Lungs:  No SOB  Skin:  Warm and dry, good capillary refill. Wound " intact.  Extremities:  Good radial and dorsalis pedis pulses bilaterally, no edema, cyanosis or clubbing.    NEUROLOGICAL EXAMINATION:   Mental status:  Alert and Oriented x 3, speech is fluent.  Motor:  Strength is 5/5 throughout the upper and lower extremities except mild LUE weakness  Sensation:  intact  Reflexes:  Negative Babinski.  Negative Clonus.    Coordination:  Smooth finger to nose testing.   Slight left pronator drift.     Medications       acetaminophen  1,000 mg Oral TID     fluticasone  1 puff Inhalation Daily     levETIRAcetam  500 mg Oral BID     pantoprazole  40 mg Oral QAM AC     predniSONE  40 mg Oral Daily     sodium chloride (PF)  3 mL Intracatheter Q8H     tamsulosin  0.4 mg Oral Daily       Data     CBC RESULTS:   Recent Labs   Lab Test 07/19/21  0808   WBC 5.1   RBC 3.55*   HGB 8.3*   HCT 29.0*   MCV 82   MCH 23.4*   MCHC 28.6*   RDW 25.3*   PLT 93*     Basic Metabolic Panel:  Lab Results   Component Value Date     07/19/2021     07/10/2021      Lab Results   Component Value Date    POTASSIUM 3.3 07/19/2021    POTASSIUM 3.9 07/10/2021     Lab Results   Component Value Date    CHLORIDE 106 07/19/2021    CHLORIDE 104 07/10/2021     Lab Results   Component Value Date    RUSTY 9.6 07/19/2021    RUSTY 8.8 07/10/2021     Lab Results   Component Value Date    CO2 27 07/19/2021    CO2 26 07/10/2021     Lab Results   Component Value Date    BUN 26 07/19/2021    BUN 14 07/10/2021     Lab Results   Component Value Date    CR 1.01 07/19/2021    CR 0.84 07/10/2021     Lab Results   Component Value Date     07/19/2021     07/10/2021     INR:  Lab Results   Component Value Date    INR 1.09 07/13/2021    INR 1.09 07/05/2021    INR 1.03 08/21/2018

## 2021-07-19 NOTE — CONSULTS
Care Management Initial Consult    General Information  Assessment completed with: Patient,    Type of CM/SW Visit: Initial Assessment    Primary Care Provider verified and updated as needed: Yes   Readmission within the last 30 days: previous discharge plan unsuccessful   Return Category: Exacerbation of disease  Reason for Consult: discharge planning  Advance Care Planning: Advance Care Planning Reviewed: no concerns identified          Communication Assessment  Patient's communication style: spoken language (English or Bilingual)    Hearing Difficulty or Deaf: no   Wear Glasses or Blind: yes    Cognitive  Cognitive/Neuro/Behavioral: WDL  Level of Consciousness: alert  Arousal Level: opens eyes spontaneously  Orientation: oriented x 4  Mood/Behavior: calm, cooperative  Best Language: 0 - No aphasia  Speech: slurred (D/t missing teeth)    Living Environment:   People in home: alone     Current living Arrangements: apartment      Able to return to prior arrangements: yes (24/7 supervision recommended)       Family/Social Support:  Care provided by: self  Provides care for: no one  Marital Status:   Children, Neighbor          Description of Support System: Supportive    Support Assessment: Adequate family and caregiver support    Current Resources:   Patient receiving home care services: No     Community Resources: None  Equipment currently used at home: cane, straight  Supplies currently used at home:      Employment/Financial:  Employment Status: retired        Financial Concerns: No concerns identified           Lifestyle & Psychosocial Needs:  Social Determinants of Health     Tobacco Use: Medium Risk     Smoking Tobacco Use: Former Smoker     Smokeless Tobacco Use: Never Used   Alcohol Use:      Frequency of Alcohol Consumption:      Average Number of Drinks:      Frequency of Binge Drinking:    Financial Resource Strain:      Difficulty of Paying Living Expenses:    Food Insecurity:      Worried About  Running Out of Food in the Last Year:      Ran Out of Food in the Last Year:    Transportation Needs:      Lack of Transportation (Medical):      Lack of Transportation (Non-Medical):    Physical Activity:      Days of Exercise per Week:      Minutes of Exercise per Session:    Stress:      Feeling of Stress :    Social Connections:      Frequency of Communication with Friends and Family:      Frequency of Social Gatherings with Friends and Family:      Attends Episcopal Services:      Active Member of Clubs or Organizations:      Attends Club or Organization Meetings:      Marital Status:    Intimate Partner Violence:      Fear of Current or Ex-Partner:      Emotionally Abused:      Physically Abused:      Sexually Abused:    Depression: Not at risk     PHQ-2 Score: 0   Housing Stability:      Unable to Pay for Housing in the Last Year:      Number of Places Lived in the Last Year:      Unstable Housing in the Last Year:        Functional Status:  Prior to admission patient needed assistance:    no    Mental Health Status:      n/a    Chemical Dependency Status:      n/a    Values/Beliefs:  Spiritual, Cultural Beliefs, Episcopal Practices, Values that affect care: no               Additional Information:  CM consulted for discharge planning.  Patient was readmitted due to recurrent SDH and underwent craniotomy.  After previous discharge she was to begin home care services but was readmitted before services were to begin.  Met with patient and reviewed current recommendations of home care and 24/7 supervision.  Patient lives alone in an apartment in [place].  She has 3 sons whom she describes as supportive.  Her plan is to stay with one of her sons at discharge but has not decided which one yet.  She may spend a week at each son's home.  One lives in [place], one in [place] and one near [place].  She is in agreement with home care and would liked [facility].  She will be talking to at least one of her son's later  today and will let RNCC know when she has decided which home she will discharge to.  I let her know we will need to have the address in order to secure home care services.    Referral emailed to [facility].    Addendum @ 1812:  Received a call from patient's son Luis M.  He indicated he thought there was a plan for his mother to to to TCU at discharge.  He and brothers are not able to provide 24/7 assist at this time as they have plans to be out of town, but after TCU would be able to provide assist.  He would like to speak with SW.    Tona Benjamin RN, BSN, PHN  Inpatient Care Coordination  [facility]  Phone: [phone]

## 2021-07-19 NOTE — PROGRESS NOTES
Care Management Follow Up    Length of Stay (days): 6    Expected Discharge Date: 07/20/2021     Concerns to be Addressed: discharge planning     Patient plan of care discussed at interdisciplinary rounds: Yes    Anticipated Discharge Disposition: Home, Home Care     Anticipated Discharge Services: TCU TBD   Anticipated Discharge DME:      Patient/family educated on Medicare website which has current facility and service quality ratings: no  Education Provided on the Discharge Plan:  Yes. Pt is agreeable to discharge to TCU as she is not able to go directly to her son's home.  She is anticipating a week or so for rehab and states she lives in a small apartment so she doesn't need to be able to walk great distanceso  Patient/Family in Agreement with the Plan: yes    Referrals Placed by CM/SW: Homecare, Internal Clinic Care Coordination  Private pay costs discussed: additional cost of $48.00 a day for private room discussed. Pt leaning toward private room as she is up to the bathroom frequently.   Pt clinically accepted at the [facility].  SW called and left message with pt's son Richardson.  Additional Information:  [facility] to update TCU for insurance auth prior to discharge.    SIMRAN Burgess  [facility]  [phone]

## 2021-07-20 NOTE — PLAN OF CARE
Pt is POD #7 from R craniotomy for recurrent SDH. A/Ox4. Generalized weakness, slurred speech due to missing teeth. Baseline left eye deviation. Head incision, staples, GRAHAM. VSS on RA. SBA with gait belt and walker, fall risk. Tele NSR. Complaints of headache pain managed with PRN PO dilaudid. Discharge to TCU pending progress.

## 2021-07-20 NOTE — PROGRESS NOTES
"BRIEF NUTRITION ASSESSMENT      REASON FOR ASSESSMENT:  Yvette Gastelum is a 70 year old female seen by Registered Dietitian for [facility]    NUTRITION HISTORY:  Deferred - potential for discharge today/tomorrow.     CURRENT DIET AND INTAKE:  Diet:  Regular + 1500 mL fluid restriction                Patient is consistently consuming % of meals.    Breakfast this morning was pancakes x 2, pederson x 2, Greek yogurt, Rice Krispies, English muffin, and apple juice.    Lunch today was Greek yogurt, cottage cheese, pudding, and apple juice.     ANTHROPOMETRICS:  Height: 5' 4\"  Weight:  98.2 kg (216#)(7/15)  Body mass index is 37.16 kg/m    Weight Status: Obesity Grade II BMI 35-39.9  IBW:  54.5 kg   %IBW: 180%  Weight History:   Wt Readings from Last 10 Encounters:   07/15/21 98.2 kg (216 lb 7.9 oz)   07/10/21 95.4 kg (210 lb 5.1 oz)   03/31/21 95.3 kg (210 lb)   03/17/21 95.3 kg (210 lb)   02/24/21 95.3 kg (210 lb)   02/10/21 95.3 kg (210 lb)   01/21/21 95.3 kg (210 lb)   09/25/20 96.2 kg (212 lb)   02/04/20 97.6 kg (215 lb 3.2 oz)   11/07/19 94.3 kg (208 lb)     No weight loss noted     LABS:  Labs noted    MALNUTRITION:  Visual Nutrition Focused Physical Assessment (NFPA) not completed due to restrictions on face-to-face patient care during COVID-19 Pandemic. Do not suspect muscle/fat losses.  Patient does not meet two of the criteria necessary for diagnosing malnutrition.     NUTRITION INTERVENTION:  Nutrition Diagnosis:  No nutrition diagnosis at this time.    Implementation:  Nutrition Education:  Per Provider order if indicated.    FOLLOW UP/MONITORING:   Will re-evaluate in 7 - 10 days, or sooner, if re-consulted.    Cathy Manrique RD, LD, [facility]   Clinical Dietitian - [facility]             "

## 2021-07-20 NOTE — PLAN OF CARE
Reason for Admission: POD 7 R crani    Cognitive/Mentation: A/Ox 4  Neuros/CMS: Intact ex L eye deviation (baseline)  VS: stable. Tele: NSR.  GI: BS Active x4, passing flatus, last BM 7/20/21. Continent.  : Voiding without difficulty. Continent.  Pulmonary: LS Clear. Nonlabored breathing  Pain: reports 5/10 headache, receiving PRN diluadid.     Drains: n/a  Skin: incision R head GRAHAM, no drainage.  Activity: Assist x 1 with GB/W.  Diet: Regular with thin liquids. 1500ml restriction. Takes pills whole with water.     Therapies recs: TCU  Discharge: Pending platelets    End of shift summary: PT will discharge to TCU when platelets are stable. Received 1 unit platelets today.

## 2021-07-20 NOTE — PROGRESS NOTES
Service Date: 2021    SUBJECTIVE:  Ms. Ley is a 70-year-old female with limited-stage small cell lung cancer in remission.  The patient was admitted with a recurrent subdural hematoma for which she had surgery.    The patient has thrombocytopenia.  She is likely developing myelodysplastic syndrome.  We are transfusing to keep her platelets at 100.    The patient is doing well.  Her headache has improved significantly.  No chest pain.  No shortness of breath.  No bleeding from any site.    LABORATORY:  Reviewed.    ASSESSMENT:    1.  A 70-year-old female with thrombocytopenia.  2.  Recurrent subdural hematoma.  3.  Small cell lung cancer in remission.  4.  Anemia.    PLAN:    1.  The patient is doing better.  Headache is better.  No bleeding from any site.  2.  Her platelet is 89 today.  She will get a unit of platelet transfusion. When discharged, she will come to Oncology Clinic three times a week and have transfusion as needed.  3.  She is anemic with hemoglobin of 7.9.  She should be transfused for hemoglobin below 7.  4.  She had a few questions, which were all answered.    Jennifer Luz MD        D: 2021   T: 2021   MT: DC    Name:     BISHNU LEY  MRN:      [id number]        Account:      [id number]   :      1950           Service Date: 2021       Document: [id number]

## 2021-07-20 NOTE — PROGRESS NOTES
Orthopedic Surgery  Right great toe pain: probable gout     Patient reports continued improvement in her right great toe pain vs yesterday.  Post op shoe is helping pain when. weight bearing     Exam:  Erythema and swelling at right great toe decreasing  TTP 1st MTP joint on right decreased   Sensation and pulses intact        Plan:  Continue prednisone per medicine  Post-op shoe ordered via orthotics.  To be worn when weight bearing, off in bed.    WBAT right LE  Elevate and ice prn  Ortho will sign off, call if change/worsening symptoms.     Maile Villela PA-C on 7/20/2021 at 1:51 PM

## 2021-07-20 NOTE — PROGRESS NOTES
Care Management Follow Up    Length of Stay (days): 7    Expected Discharge Date: 07/20/2021     Concerns to be Addressed: discharge planning     Patient plan of care discussed at interdisciplinary rounds: Yes    Anticipated Discharge Disposition: Transitional Care at [facility]  Disposition Comments: discharge postponed until 7/21  Anticipated Discharge Services:  Inpatient rehab  Anticipated Discharge DME:      Patient/family educated on Medicare website which has current facility and service quality ratings: yes  Education Provided on the Discharge Plan:  yes  Patient/Family in Agreement with the Plan: yes    Referrals Placed by CM/SW: Homecare, Internal Clinic Care Coordination  Private pay costs discussed: private room/amenity fees were discussed and agreed to on admission    Additional Information:   Pt was scheduled for discharge this afternoon tentatively but discharge is delayed until tomorrow as pt is not medically ready for discharge today    Brianna at [facility] updated.  SIMRAN Mckeon  [facility]  [phone]

## 2021-07-20 NOTE — PROGRESS NOTES
Service Date: 2021    SUBJECTIVE:  Ms. Ley is a 70-year-old female with history of limited-stage small cell lung cancer, status post concurrent chemoradiation and prophylactic cranial radiation.  She is in remission from it.  Because of chemotherapy, she is likely developing MDS.  She has pancytopenia.    The patient was admitted because of recurrent subdural hematoma.  She had craniotomy and evacuation of hematoma.  She is doing well from it.  She is not bleeding from any site.  She does have a headache, which is controlled with ice pack and Dilaudid.    We are keeping her platelets around 100.  She is getting platelet transfusion as needed.    No visual problems.  No chest pain.  No shortness of breath.  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  Some nausea.  No vomiting.  Appetite is fairly good.    PHYSICAL EXAMINATION:    GENERAL:  She was alert and oriented x 3. Not in distress.   Rest of the system not examined.    LABS:  Reviewed.    ASSESSMENT:    1.  A 70-year-old female with recurrent subdural hematoma status post surgery.  2.  Thrombocytopenia responding well to platelet transfusion.  3.  Anemia.  4.  Small cell lung cancer in remission.    PLAN:    1.  The patient clinically is improving.  Headache is better.  The patient has anemia and thrombocytopenia from likely myelodysplastic syndrome.  We will continue to transfuse to keep platelets above 100.  PRBC transfused for hemoglobin below 7 or if she is symptomatic.  2.  After discharge, she will follow up in Oncology Clinic and have labs checked three times a week and transfusion will be given as needed.  3.  She had a few questions, which were all answered.    Jennifer Luz MD        D: 2021   T: 2021   MT: CARMEN    Name:     BISHNU LEY  MRN:      [id number]        Account:      [id number]   :      1950           Service Date: 2021       Document: [id number]

## 2021-07-20 NOTE — PROGRESS NOTES
VSS, A/O, ambulates SBA w/walker and GB.  Neuro checks remain unchanged, L eye deviation continues at baseline.  Tele NSR.  Platelets infused, tolerated well.  Potassium replaced, redraw WDL, next redraw in AM.  Stapled to R head with some edema, no drainage.  Discharge plan pending platelets.

## 2021-07-20 NOTE — PROGRESS NOTES
"[facility]    Neurosurgery Progress Note    Date of Service (when I saw the patient): 07/20/2021     Assessment & Plan   Yvette Gastelum is a 70 year old female who was admitted on 7/13/2021. She is s/p right frontal craniotomy for evacuation of recurrent subdural hematoma. Today she was seen lying in bed. She reports a moderate headache. She denies any other complaints. Left upper extremity drift and weakness improving.    Principal Problem:    Large Right SDH w Shift -- S/P Drain 7/13/21    Assessment: s/p right frontal craniotomy    Plan:   -Keep platelets >100, was 113 yesterday after transfusion  -Continue supportive and symptomatic treatment  -Start or continue therapy  -Continue pain control measures  -Routine wound care  -Continue Kelsey Marquez CNP  [facility] Neurosurgery  [facility]  [address]  [address]  [place] [address]    Tel [phone]  Pager [phone]      Interval History   Stable    Physical Exam   Temp: 98  F (36.7  C) Temp src: Oral BP: 116/74 Pulse: 86   Resp: 16 SpO2: 97 % O2 Device: None (Room air)    Vitals:    07/13/21 1730 07/14/21 0600 07/15/21 0600   Weight: 96.1 kg (211 lb 13.8 oz) 97.5 kg (214 lb 15.2 oz) 98.2 kg (216 lb 7.9 oz)     Vital Signs with Ranges  Temp:  [98  F (36.7  C)-98.4  F (36.9  C)] 98  F (36.7  C)  Pulse:  [81-92] 86  Resp:  [16] 16  BP: (112-132)/(71-84) 116/74  SpO2:  [96 %-99 %] 97 %  I/O last 3 completed shifts:  In: 480 [P.O.:480]  Out: -      , Blood pressure 116/74, pulse 86, temperature 98  F (36.7  C), temperature source Oral, resp. rate 16, height 1.626 m (5' 4\"), weight 98.2 kg (216 lb 7.9 oz), SpO2 97 %, not currently breastfeeding.  216 lbs 7.87 oz  HEENT:  Normocephalic.  PERRLA.    Heart:  No peripheral edema  Lungs:  No SOB  Skin:  Warm and dry, good capillary refill. Wound intact.  Extremities:  Good radial and dorsalis pedis pulses bilaterally, no edema, cyanosis or " clubbing.    NEUROLOGICAL EXAMINATION:   Mental status:  Alert and Oriented x 3, speech is fluent.  Motor:  Strength is 5/5 throughout the upper and lower extremities except mild LUE weakness  Sensation:  intact  Reflexes:  Negative Babinski.  Negative Clonus.    Coordination:  Smooth finger to nose testing.   Slight left pronator drift.     Medications       acetaminophen  1,000 mg Oral TID     fluticasone  1 puff Inhalation Daily     levETIRAcetam  500 mg Oral BID     pantoprazole  40 mg Oral QAM AC     predniSONE  40 mg Oral Daily     sodium chloride (PF)  3 mL Intracatheter Q8H     tamsulosin  0.4 mg Oral Daily       Data     CBC RESULTS:   Recent Labs   Lab Test 07/19/21  0808   WBC 5.1   RBC 3.55*   HGB 8.3*   HCT 29.0*   MCV 82   MCH 23.4*   MCHC 28.6*   RDW 25.3*   PLT 93*     Basic Metabolic Panel:  Lab Results   Component Value Date     07/19/2021     07/10/2021      Lab Results   Component Value Date    POTASSIUM 3.3 07/19/2021    POTASSIUM 3.9 07/10/2021     Lab Results   Component Value Date    CHLORIDE 106 07/19/2021    CHLORIDE 104 07/10/2021     Lab Results   Component Value Date    RUSTY 9.6 07/19/2021    RUSTY 8.8 07/10/2021     Lab Results   Component Value Date    CO2 27 07/19/2021    CO2 26 07/10/2021     Lab Results   Component Value Date    BUN 26 07/19/2021    BUN 14 07/10/2021     Lab Results   Component Value Date    CR 1.01 07/19/2021    CR 0.84 07/10/2021     Lab Results   Component Value Date     07/19/2021     07/10/2021     INR:  Lab Results   Component Value Date    INR 1.09 07/13/2021    INR 1.09 07/05/2021    INR 1.03 08/21/2018

## 2021-07-21 NOTE — PROGRESS NOTES
Clinic Care Coordination Contact  The Community Health Worker planned to call for a Care Coordination outreach to offer the CC program.     Did not contact patient.     Per Chart Review, patient is still admitted at  [facility] as of 7/13/21.     SHAUN Cortez  Clinic Care Coordination  [facility]: [facility], [facility], [facility], and [facility]  Phone: [phone]

## 2021-07-21 NOTE — PROGRESS NOTES
"Paged Dr. Guadarrama: \"FYI. Per SW. Patient's son doesn't feel he will be able to care for her at home. The plan is for the patient to go to [facility] tomorrow. Thanks!\"  "

## 2021-07-21 NOTE — PLAN OF CARE
Care Plan Nursing Note    Patient Information  Name: Yvette Gastelum  Age: 70 year old  Reason for admission: postop day 8, Right frontal craniotomy for evacuation of recurrent subdural hematoma.    Patient Assessment   DATE & TIME: 07/20/2021, [id number]   Cognitive Concerns/ Orientation : A & O times four. Neuro assessment WDL.   BEHAVIOR & AGGRESSION TOOL COLOR: calm  ABNL VS/O2: VSS, room air  MOBILITY: Stand by assist  PAIN MANAGMENT: surgical incision pain, using cold pack, and pain med Per PRN  DIET: regular  BOWEL/BLADDER: continent  ABNL LAB/BG: hgb=7.9, platelet=89  DRAIN/DEVICES: PIV SL  SKIN: Incision  TESTS/PROCEDURES:  D/C DATE: Pending   OTHER IMPORTANT INFO: per report she had platelet infusion today, plan is to keep platelet >100. Continue to monitor.

## 2021-07-21 NOTE — PROGRESS NOTES
[facility]    Neurosurgery  Daily Progress Note    Assessment & Plan   Procedure(s):  Right frontal craniotomy for evacuation of recurrent subdural hematoma   -8 Days Post-Op  Patient was seen lying in bed. She reports a mild headache, but denies any other concerns at this time. Incision CDI. She is able to move all extremities, left arm weakness continues to improve.     Platelets 93  Hemoglobin 8.2     Plan:  - Goal to maintain platelets > 100. Plan for transfusion today. Hematology following. Appreciate assistance.   - Continue therapies   - Pain control measures as needed  - Routine wound care. Staple removal at 2 weeks post op.  - Continue Keppra     ADDENDUM:  Dr. Luz planning for transfusion today, discharge likely later this afternoon. Patient will follow-up with Dr. Luz's office on Friday, then Monday/Wedneday/Friday next week. Will plan for patient to follow-up with NSG clinic at 2 weeks post op for incision check, then 6 weeks post op with head CT prior. Continue Keppra for 1 month.     Discussed with Dr. Dominguez.     Kaity Tadeo CNP  [facility] [place] [address]  Tel [phone]  Pager [phone]      Interval History   Stable     Physical Exam   Temp: 97.6  F (36.4  C) Temp src: Oral BP: 100/65 Pulse: 94   Resp: 16 SpO2: 93 % O2 Device: None (Room air)    Vitals:    07/13/21 1730 07/14/21 0600 07/15/21 0600   Weight: 211 lb 13.8 oz (96.1 kg) 214 lb 15.2 oz (97.5 kg) 216 lb 7.9 oz (98.2 kg)     Vital Signs with Ranges  Temp:  [97.6  F (36.4  C)-98.2  F (36.8  C)] 97.6  F (36.4  C)  Pulse:  [82-94] 94  Resp:  [16-18] 16  BP: (100-135)/(60-79) 100/65  SpO2:  [93 %-99 %] 93 %  No intake/output data recorded.    Mental status:  Alert and Oriented x 3, speech is fluent.  Cranial nerves:  II-XII intact.   Motor:  Moves all extremities. Strength is 5/5 throughout the upper and lower  extremities except mild LUE weakness.   Sensation:  Intact  Coordination:  Smooth finger to nose testing.   Negative pronator drift.    Incision: CDI     Medications        acetaminophen  1,000 mg Oral TID     fluticasone  1 puff Inhalation Daily     levETIRAcetam  500 mg Oral BID     pantoprazole  40 mg Oral QAM AC     sodium chloride (PF)  3 mL Intracatheter Q8H     tamsulosin  0.4 mg Oral Daily       Kaity Lund CNP  [facility] Neurosurgery   [address] [address]  Tel [phone]  Pager [phone]

## 2021-07-21 NOTE — PLAN OF CARE
Pt alert and oriented x4. VSS on RA. SBA w/walker and GB, steady gait.  Neuro checks remain unchanged, Slight left eye deviation at baseline. Stapled to R head with some edema, no drainage. PRN PO dilaudid given for pain. Discharge plan pending platelets.

## 2021-07-21 NOTE — PROGRESS NOTES
[facility]    Hospitalist Progress Note    Assessment & Plan   Yvette Gastelum is a 70 year old female who was admitted on 7/13/2021 with a history of small cell lung cancer s/p chemoradiation in 2018, currently in remission, pancytopenia, HLD, GERD, HTN who was admitted from 7/5 to 7/10 with headache, dysarthria and left- sided weakness.   Found to have large right SDH which was symptomatic s/p drain 7/13/21    Hospital course:      Large Right SDH w Shift -- S/P Drain 7/13/21   Recurrent subdural hematoma   - transferred out of the ICU on 7/15, stable  - plan per neurosurgery  - Hematology recommending to keep platelets > 100  - last platelet transfusion  7/15, 7/18, 7/19  -Hb stable. Wbc nl., platelets low again, transfuse another unit today.     Plan;   -keep Hb >7 and platelet >100K per oncology  - oncology following  - neurosurgery following.   -OT, PT following. Home with home care rec'd  -ADAT   - pain control.   -discharge in 2-3 days pending NSX clearance  -keppra  -prn hydralazine for sbp >140.   -prn dilaudid  - bowel meds      Thrombocytopenia    Anemia- stable   Antineoplastic chemotherapy induced pancytopenia (H)   Small cell lung cancer -- S/P Chemo in 2018  Appreciate hematology/oncology consult , recommendations   - Hgb 6.4 on 7/14-> 7.5 7/15-> 8.1 ---> 7.6 today  - s/p platelet transfusion 7/15 for platelets 86.  -transfuse platelets 7/15 for 86 and 7/18 for 88K, 7/19 for 93K  - prn platelet transfusion for platelets < 100K per oncology  - Plan to keep >100K & monitor  -transfuse for Hb <7 per oncology  -daily blood counts/platelet count    Right 1st MTP pain suspect acute gout, improving  Started 7/17  No hx of trauma or gout  Focal redness, warmth and very tender R 1st MTP  Remainder of RLE ortho exam negative  No nsaids given intracranial hemmorage and thrombocytopenia  Seen by ortho, agree with pred for likely gout.   XR foot negative.   -She had Ortho input.  Doubt  septic joint.  Agree with gout.  -NSAIDs contraindicated.  Calcitonin normal.  -uric acid at 6.2.  Plan-  -increase prednisone to 40 mg daily for 3 days, improving significantly so likely will not need prolonged course  Off prednisone at this time, pain well control  -Uric acid level less than 6.  -With PCP regarding maintenance therapy with allopurinol.     Mild hyponatremia- asymptomatic, resolved  - follow       Benign essential hypertension  - monitor      CKD (chronic kidney disease) stage 3, GFR 30-59 ml/min  Am bmp    Hypokalemia  Mild. Replace per protocol.     DVT Prophylaxis: Pneumatic Compression Devices  Code Status: Full Code     Disposition: Expected discharge plan to discharge today, but son does not think he will take care of her  At home, plan for discharge to TCU tomorrow.       This care plan with patient  and RN.    Bull Guadarrama MD  Text Page  (7am to 6pm)  35min spent with >50% face to face is discussion of plan re platelets, transfusions, follow up, dispo  Interval History   Patient feeling well, denies any chest pian, SOB, nausea, vomiting, headache or dizziness.   Wanted to go home and told me that her family can help her.     No other significant event overnight.     -Data reviewed today: I reviewed all new labs and imaging results over the last 24 hours. I personally reviewed labs and imaging last 24hours.     Physical Exam   Temp: 98.4  F (36.9  C) Temp src: Oral BP: 127/77 Pulse: 93   Resp: 16 SpO2: 98 % O2 Device: None (Room air)    Vitals:    07/13/21 1730 07/14/21 0600 07/15/21 0600   Weight: 96.1 kg (211 lb 13.8 oz) 97.5 kg (214 lb 15.2 oz) 98.2 kg (216 lb 7.9 oz)     Vital Signs with Ranges  Temp:  [97.6  F (36.4  C)-98.4  F (36.9  C)] 98.4  F (36.9  C)  Pulse:  [82-94] 93  Resp:  [16] 16  BP: (100-135)/(60-79) 127/77  SpO2:  [93 %-98 %] 98 %  I/O last 3 completed shifts:  In: 360 [P.O.:360]  Out: -     Constitutional: Up in bed appears more comfortable.  Alert  Nad,    Respiratory: CTAB,  Cardiovascular: RRR no r/g/m  GI: soft, nd, nt  Skin/Integumen: no rash or edema  Ortho: right 1st MTP red, very minimally tender,  Neuro: alert, fully oriented, nl speech and mentation, strength 5/5 extrem X 4.      Medications       acetaminophen  1,000 mg Oral TID     fluticasone  1 puff Inhalation Daily     levETIRAcetam  500 mg Oral BID     pantoprazole  40 mg Oral QAM AC     sodium chloride (PF)  3 mL Intracatheter Q8H     tamsulosin  0.4 mg Oral Daily       Data   Recent Labs   Lab 07/21/21  1049 07/20/21  1630 07/20/21  1000 07/19/21  1557 07/19/21  0808 07/18/21  0744   WBC 5.2  --  5.8  --  5.1 6.6   HGB 8.2*  --  7.9*  --  8.3* 8.4*   MCV 79  --  80  --  82 81   PLT 93*  --  89* 113* 93* 88*   NA  --   --  138  --  139 135   POTASSIUM 3.7 4.4 3.2*  --  3.3* 3.4   CHLORIDE  --   --  105  --  106 104   CO2  --   --  26  --  27 26   BUN  --   --  29  --  26 27   CR  --   --  1.16*  --  1.01 1.02   ANIONGAP  --   --  7  --  6 5   RUSTY  --   --  9.5  --  9.6 9.6   GLC  --   --  137*  --  129* 126*       Imaging:   No results found for this or any previous visit (from the past 24 hour(s)).

## 2021-07-21 NOTE — PROGRESS NOTES
Care Management Discharge Note    Discharge Date: 07/21/2021       Discharge Disposition: Transitional Care was planned but discharge plan changed today and pt is planning to discharge home with son later today.     MERCY received call from ivory Asencio asking if pt is able to discharge to [facility] and be able to go to outpatient infusion for platelets three times a week. Britany Asencio  States that he is able to provide transport to infusions but cannot stay with pt in her home    Discharge Services:  Home with family vs TCU    Discharge DME:      Discharge Transportation: family or friend will provide    Private pay costs discussed: n/a  PAS Confirmation Code:  Patient/family educated on Medicare website which has current facility and service quality ratings: yes    Education Provided on the Discharge Plan:  yes  Persons Notified of Discharge Plans: Hospitalist, YOVANNY.RN,HUC,BB    Patient/Family in Agreement with the Plan: yes    Handoff Referral Completed: No    Additional Information:  MERCY contacted Brianna at [facility] and she spoke with administration to see if pt is able to go to the outpatient platelet infusion appointments with   Son while in TCU. The appointments are ok to go to while I TCU.  MERCY contacted Luis M dodson and provided update ; voice message left.  MERCY spoke with pt and and updated her of  discussion with Luis M dodson and with [facility] admission staff.   Pt would be able to discharge to [facility] tomorrow.  Pt states that maybe she will go stay with her other son in [place] and he could bring her to her infusion appointments.  MERCY encouraged pt to have a discussion with her son regarding discharge plan.      SIMRAN Burgess  [facility]  [phone]

## 2021-07-21 NOTE — PLAN OF CARE
Right craniotomy POD 8 evacuate SDH.  Neuros - alert & oriented, making her needs known, following directions, prefers curtains pulled/lights dimmed; denying numbness/tingling; left eyelid weakness at baseline since childhood.VSS, room air/no shortness of breath noted.  Regular diet tolerated/good appetite/pills whole with water. Up with 1 assist/gait belt & walker.  Continent of urine/multiple bms today. Head discomfort on right side persists/declining tylenol scheduled - dilaudid & cold pack helpful. Pt scoring green on the Aggression Stop Light Tool. Platelets lower/infusion pending this afternoon after new IV restarted & platelets ready. Possible discharge home with family later this evening after infusion. Incision on right head/stapled and open to air/no drainage noted.

## 2021-07-21 NOTE — PROGRESS NOTES
"Hospitalist Cross Cover  7/20/2021  8:05 PM    Patient admitted with headache, large right SDH s/p drain 7/13. Paged regarding telemetry orders.  Patient is postop day 8, appears to be going home in the next day or so, has been normal sinus rhythm post operatively.    /60   Pulse 89   Temp 98.1  F (36.7  C) (Oral)   Resp 16   Ht 1.626 m (5' 4\")   Wt 98.2 kg (216 lb 7.9 oz)   SpO2 94%   BMI 37.16 kg/m      Plan:  --Discontinue telemetry      ЕКАТЕРИНА [facility]  Hospitalist Service  House Officer  Pager: [phone] (7a - 6p)      "

## 2021-07-21 NOTE — PROGRESS NOTES
Service Date: 2021    SUBJECTIVE:  Ms. Ley is a 70-year-old female with chronic thrombocytopenia.  This is likely from developing myelodysplastic syndrome.  The patient admitted with recurrent subdural hematoma.  She had surgery done.  She is getting platelet transfusion.  Surgeons want the platelet to be above 100 for a few weeks.    The patient is doing well.  Her headaches have improved.  No bleeding from any site.  No dizziness.  No chest pain.  No shortness of breath.  No nausea or vomiting.    PHYSICAL EXAMINATION:    GENERAL:  She is alert and oriented x 3.  Not in any distress.  VITAL SIGNS:  Reviewed.   Rest of the system not examined.    LABORATORY DATA:  Reviewed.    ASSESSMENT:    1.  A 70-year-old female with chronic thrombocytopenia.  2.  Recurrent subdural hematoma status post surgery.  3.  Anemia.  4.  Small cell lung cancer in remission.    PLAN:     1.  The patient clinically is stable.  Headache has improved a lot.  She is not bleeding from any site.  She likely will be going home or to TCU.  2.  Her platelets today are 93.  She will get a transfusion as her surgeons want platelet to be above 100.  The patient is always going to be thrombocytopenic. For next few weeks, we will have her come to Oncology Clinic 3 times a week for CBC and transfusion will be given as needed.  3.  The patient is anemic.  Anemia is stable.  Anemia and thrombocytopenia are likely from developing myelodysplastic syndrome.  When cytopenias get worse, we will plan on repeat bone marrow biopsy.  4.  The patient had a few questions, which were all answered.  Case discussed with Dr. Guadarrama.      Jennifer Luz MD        D: 2021   T: 2021   MT: [id number]    Name:     BISHNU LEY  MRN:      [id number]        Account:      [id number]   :      1950           Service Date: 2021       Document: [id number]

## 2021-07-21 NOTE — PROGRESS NOTES
Care Management Follow Up    Length of Stay (days): 8    Expected Discharge Date: 07/21/2021     Concerns to be Addressed: discharge planning     Patient plan of care discussed at interdisciplinary rounds: Yes    Anticipated Discharge Disposition: Transitional Care  Disposition Comments: discharge postponed until 7/21  Anticipated Discharge Services:    Anticipated Discharge DME:      Patient/family educated on Medicare website which has current facility and service quality ratings: yes  Education Provided on the Discharge Plan:    Patient/Family in Agreement with the Plan: yes    Referrals Placed by CM/SW: Homecare, Internal Clinic Care Coordination  Private pay costs discussed: Not applicable    Additional Information:  Patient was to discharge to TCU but was informed by nursing that now the plan changed to home.  Patient would need HC set up and 24/7 assist.  Discussed this with patient.  She stated her family (has 3 sons) would stay with her at her home.  She indicated they were discussing a plan.  Was also informed by nursing that patient will need appointments at Oncology 3 X per week for labs and possible platelet transfusions.    Contacted patient's son Hema  Relayed the above information.  He was not aware of her going home with this plan. He indicated the family  unable to provide 24/7 assist at home and his preference is still for her to go to TCU.  He is aware that she will need to follow up in Oncology clinic 3 times per week.  He stated if she went to TCU he would be able to provide transportation to these appointments.  He then spoke with  who will reach out to [facility] to see if they are still able to accept her.    Tona Benjamin RN, BSN, PHN  Inpatient Care Coordination  [facility]  Phone: [phone]

## 2021-07-21 NOTE — DISCHARGE SUMMARY
[facility]    Discharge Summary  Hospitalist    Date of Admission:  7/13/2021  Date of Discharge:  7/22/2021  Discharging Provider: Bull Guadarrama MD  Date of Service (when I saw the patient): 07/22/21    Discharge Diagnoses   Please refer below     History of Present Illness   Yvette Gastelum is an 70 year old female who presented with SDH    Hospital Course      Yvette Gastelum is a 70 year old female who was admitted on 7/13/2021 with a history of small cell lung cancer s/p chemoradiation in 2018, currently in remission, pancytopenia, HLD, GERD, HTN who was admitted from 7/5 to 7/10 with headache, dysarthria and left- sided weakness.   Found to have large right SDH which was symptomatic s/p drain 7/13/21     Final Discharge Diagnosis and Hospital Course       Large Right SDH w Shift -- S/P Drain 7/13/21   Recurrent subdural hematoma   - transferred out of the ICU on 7/15, stable  - plan per neurosurgery  - Hematology recommending to keep platelets > 100  - last platelet transfusion  7/15, 7/18, 7/19, 7/21  -Hb stable. Wbc nl, Platelets 104,000 on day of discharge.     Patient is doing well at this time, NS and oncology has clear her for discharge.  She will remain on Keppra for seizure prophylaxis and follow up with NS in clinic.  She will need every other day CBC check and follow up with Dr Luz.   She denies any chest pain, SOB, fever, chills, nausea, vomiting, headache, dizziness,  Numbness or tingling at time of discharge. She is able to walk with therapy without any  Problem.          Thrombocytopenia    Anemia- stable   Antineoplastic chemotherapy induced pancytopenia (H)   Small cell lung cancer -- S/P Chemo in 2018  Appreciate hematology/oncology consult , recommendations   - Hgb 6.4 on 7/14-> 7.5 7/15-> 8.1 ---> 7.6 today  - s/p platelet transfusion 7/15 for platelets 86.  -transfuse platelets 7/15 for 86 and 7/18 for 88K, 7/19 for 93K  - prn platelet transfusion for platelets <  100K per oncology  - Plan to keep >100K & monitor  -transfuse for Hb <7 per oncology  -blood counts/platelet count every other day      Right 1st MTP pain suspect acute gout, treated  Treated with prednisone        Mild hyponatremia- asymptomatic, resolved  - follow       Benign essential hypertension  - monitor      CKD (chronic kidney disease) stage 3, GFR 30-59 ml/min  Am bmp     Hypokalemia  Replaced.      Discharge to TCU in stable condition.      Bull Guadarrama MD, MD    Significant Results and Procedures    7/13/2021  Name of procedure: Right frontotemporal craniotomy for evacuation of recurrent subacute subdural hematoma; placement of subdural drain     Preoperative diagnosis: Large subacute subdural hematoma with marked mass-effect and midline shift with incipient herniation.     Postoperative diagnosis: Same     Surgeon: Puma Dominguez MD     First assistant: Anne Marie Guerrero PA-C     Material forwarded to the laboratory for examination: None     EBL: 10 cc    Pending Results   These results will be followed up by PCP  Unresulted Labs Ordered in the Past 30 Days of this Admission     Date and Time Order Name Status Description    7/5/2021  6:55 PM Platelets prepare order unit In process     7/5/2021  5:01 PM Platelets prepare order unit In process           Code Status   Full Code       Primary Care Physician   Arie House MD    Physical Exam   Temp: 98.4  F (36.9  C) Temp src: Oral BP: 127/77 Pulse: 93   Resp: 16 SpO2: 98 % O2 Device: None (Room air)    Vitals:    07/13/21 1730 07/14/21 0600 07/15/21 0600   Weight: 96.1 kg (211 lb 13.8 oz) 97.5 kg (214 lb 15.2 oz) 98.2 kg (216 lb 7.9 oz)     Vital Signs with Ranges  Temp:  [97.6  F (36.4  C)-98.4  F (36.9  C)] 98.4  F (36.9  C)  Pulse:  [82-94] 93  Resp:  [16] 16  BP: (100-135)/(60-79) 127/77  SpO2:  [93 %-98 %] 98 %  I/O last 3 completed shifts:  In: 360 [P.O.:360]  Out: -     Constitutional: awake, alert, cooperative, no apparent distress,  and appears stated age  Eyes: Lids and lashes normal, pupils equal, round and reactive to light, extra ocular muscles intact, sclera clear, conjunctiva normal  Hematologic / Lymphatic: no cervical lymphadenopathy  Respiratory: No increased work of breathing, good air exchange, clear to auscultation bilaterally, no crackles or wheezing  Cardiovascular: Normal apical impulse, regular rate and rhythm, normal S1 and S2, no S3 or S4, and no murmur noted  GI: No scars, normal bowel sounds, soft, non-distended, non-tender, no masses palpated, no hepatosplenomegally  Skin: no bruising or bleeding  Musculoskeletal: no lower extremity pitting edema present  Neurologic: no focal deficit.     Discharge Disposition   Discharged to home  Condition at discharge: Stable    Consultations This Hospital Stay   NEUROSURGERY IP CONSULT  HEMATOLOGY & ONCOLOGY IP CONSULT  PHYSICAL THERAPY ADULT IP CONSULT  OCCUPATIONAL THERAPY ADULT IP CONSULT  ORTHOPEDIC SURGERY IP CONSULT  ORTHOPEDIC SURGERY IP CONSULT  CARE MANAGEMENT / SOCIAL WORK IP CONSULT  ORTHOSIS EXTREMITY LOWER REFERRAL IP CONSULT    Time Spent on this Encounter   IBull MD, personally saw the patient today and spent greater than 30 minutes discharging this patient.    Discharge Orders      CT Head w/o contrast*     Home care nursing referral      Home Care PT Referral for Hospital Discharge      Home Care OT Referral for Hospital Discharge      Reason for your hospital stay    Large Right Subdural hematoma with Shift -- S/P Drain placement by neurosurgeons 7/13/21   Recurrent subdural hematoma   Gout Right great toe  Low blood counts from recent chemotherapy     Follow-up and recommended labs and tests     1. Follow up with primary oncologist with blood test as directed  2. Follow up with primary care doctor 1 week regarding gout right great toe  3. Follow up with neurosurgery team as directed.     Activity    Your activity upon discharge: activity as tolerated     MD  face to face encounter    Documentation of Face to Face and Certification for Home Health Services    I certify that patient: Yvette Gastelum is under my care and that I, or a nurse practitioner or physician's assistant working with me, had a face-to-face encounter that meets the physician face-to-face encounter requirements with this patient on: 7/17/2021.    This encounter with the patient was in whole, or in part, for the following medical condition, which is the primary reason for home health care: lung cancer, subdural hematoma s/p neurosurgical evacuation.    I certify that, based on my findings, the following services are medically necessary home health services: Nursing, Occupational Therapy, and Physical Therapy.    My clinical findings support the need for the above services because: Nurse is needed: medications, gout, hospitalization., Occupational Therapy Services are needed to assess and treat cognitive ability and address ADL safety due to impairment in eval of cognition, home safety eval., and Physical Therapy Services are needed to assess and treat the following functional impairments: deconditioned state, gait/balance.    Further, I certify that my clinical findings support that this patient is homebound (i.e. absences from home require considerable and taxing effort and are for medical reasons or [employer] services or infrequently or of short duration when for other reasons) because: Requires assistance of another person or specialized equipment to access medical services because patient: Requires supervision of another for safe transfer...    Based on the above findings. I certify that this patient is confined to the home and needs intermittent skilled nursing care, physical therapy and/or speech therapy.  The patient is under my care, and I have initiated the establishment of the plan of care.  This patient will be followed by a physician who will periodically review the plan of  care.  Physician/Provider to provide follow up care: Arie House    Attending hospital physician (the Medicare certified [place] provider): Chris Gallegos MD  Physician Signature: See electronic signature associated with these discharge orders.  Date: 7/17/2021     Follow-up and recommended labs and tests     Follow up with primary care provider, Arie House MD, within 7 days for hospital follow- up.  The following labs/tests are recommended: cbc, bmp.     Activity    Your activity upon discharge: activity as tolerated     Activity    Discharge Instructions:  Limit lifting to 10 pounds until follow up visit.    No NSAIDs until follow-up visit.     Ok to shampoo hair, but do not scrub or submerge incision under water until evaluated post operatively in clinic.    Ok to walk as tolerated, avoid bedrest.    No contact sports until after follow up visit.  No high impact activities such as running/jogging, snowmobile or 4 gillis riding or any other recreational vehicles.     Call the office at [phone] for increasing redness, swelling or pus draining from the incision, increased pain, or any other questions and concerns.    Go to ER with any seizure activity, mental status change (increasing confusion), difficulty with speech, or increasing or acute weakness.     Follow-up and recommended labs and tests     Please follow up at [facility] at 2 weeks post op for incision check, then 6 weeks post op with head CT prior .  Please call the clinic at [phone] for scheduling.     Wound care and dressings    Keep your incision clean and dry at all times. Okay to shower on and allow water to run over incision, pat dry after shower.  No bathing, swimming, or submerging in water.  Call immediately or go to the ER if any drainage occurs, or you develop new pain, redness, or swelling.     Diet    Follow this diet upon discharge: Orders Placed This Encounter      Fluid  restriction 1500 ML FLUID      Advance Diet as Tolerated: Regular Diet Adult     Diet    Follow this diet upon discharge: Orders Placed This Encounter      Fluid restriction 1500 ML FLUID      Advance Diet as Tolerated: Regular Diet Adult      Diet     Discharge Medications   Current Discharge Medication List      START taking these medications    Details   levETIRAcetam (KEPPRA) 500 MG tablet Take 1 tablet (500 mg) by mouth 2 times daily  Qty: 60 tablet, Refills: 0    Associated Diagnoses: SDH (subdural hematoma) (H)      pantoprazole (PROTONIX) 40 MG EC tablet Take 1 tablet (40 mg) by mouth every morning (before breakfast)  Qty: 30 tablet, Refills: 0    Associated Diagnoses: SDH (subdural hematoma) (H)         CONTINUE these medications which have NOT CHANGED    Details   Acetaminophen 325 MG CAPS Take 325-650 mg by mouth every 6 hours as needed for pain       albuterol (PROVENTIL) (2.5 MG/3ML) 0.083% neb solution Take 1 vial (2.5 mg) by nebulization 2 times daily  Qty: 1 Box, Refills: 11    Associated Diagnoses: Hypoxia; SOB (shortness of breath); Cough      Ascorbic Acid (VITAMIN C) 500 MG CHEW Take 500 mg by mouth daily       atorvastatin (LIPITOR) 10 MG tablet Take 1 tablet (10 mg) by mouth daily  Qty: 90 tablet, Refills: 3    Associated Diagnoses: Hyperlipidemia LDL goal <130      benzonatate (TESSALON) 100 MG capsule Take 1 capsule (100 mg) by mouth 3 times daily as needed for cough  Qty: 90 capsule, Refills: 3    Associated Diagnoses: Cough      docusate sodium (COLACE) 100 MG capsule Take 1 capsule (100 mg) by mouth 2 times daily as needed for constipation  Qty: 20 capsule, Refills: 0    Associated Diagnoses: SDH (subdural hematoma) (H)      fluticasone (FLOVENT HFA) 110 MCG/ACT inhaler Inhale 1 puff into the lungs 2 times daily  Qty: 1 Inhaler, Refills: 11    Associated Diagnoses: Radiation pneumonitis (H)      loperamide (IMODIUM) 2 MG capsule Take 2 mg by mouth 4 times daily as needed for diarrhea       magnesium 250 MG tablet Take 1 tablet by mouth daily      omeprazole (PRILOSEC) 20 MG DR capsule Take 1 capsule (20 mg) by mouth daily  Qty: 90 capsule, Refills: 3    Associated Diagnoses: Gastroesophageal reflux disease with esophagitis, unspecified whether hemorrhage; Adverse effect of radiation, sequela      sodium chloride (NEBUSAL) 3 % neb solution Take 3 mLs by nebulization 2 times daily Use with albuterol inhaler  Qty: 90 mL, Refills: 4    Associated Diagnoses: Cough      vitamin B-Complex Take 1 tablet by mouth daily      Vitamin D, Cholecalciferol, 25 MCG (1000 UT) CAPS Take 25 mcg by mouth daily       Elastic Bandages & Supports (MEDICAL COMPRESSION SOCKS) MISC 1 Package daily  Qty: 2 each, Refills: 1    Associated Diagnoses: Benign essential hypertension; Gastroesophageal reflux disease with esophagitis; Adverse effect of radiation, sequela; Tachycardia; Orthostatic hypotension      HYDROmorphone (DILAUDID) 2 MG tablet Take 1 to 2 tablet every 6 hours as needed for pain.  Qty: 20 tablet, Refills: 0    Associated Diagnoses: Small cell lung cancer, right lower lobe (H)      tamsulosin (FLOMAX) 0.4 MG capsule Take 1 capsule (0.4 mg) by mouth daily  Qty: 30 capsule, Refills: 0    Associated Diagnoses: Kidney stones         STOP taking these medications       diclofenac (VOLTAREN) 1 % topical gel Comments:   Reason for Stopping:         methocarbamol (ROBAXIN) 500 MG tablet Comments:   Reason for Stopping:         oxyCODONE (ROXICODONE) 5 MG tablet Comments:   Reason for Stopping:             Allergies   Allergies   Allergen Reactions     No Known Allergies      Data   Most Recent 3 CBC's:Recent Labs   Lab Test 07/21/21  1049 07/20/21  1000 07/19/21  1557 07/19/21  0808   WBC 5.2 5.8  --  5.1   HGB 8.2* 7.9*  --  8.3*   MCV 79 80  --  82   PLT 93* 89* 113* 93*      Most Recent 3 BMP's:  Recent Labs   Lab Test 07/21/21  1049 07/20/21  1630 07/20/21  1000 07/19/21  0808 07/18/21  0744   NA  --   --  138 139  135   POTASSIUM 3.7 4.4 3.2* 3.3* 3.4   CHLORIDE  --   --  105 106 104   CO2  --   --  26 27 26   BUN  --   --  29 26 27   CR  --   --  1.16* 1.01 1.02   ANIONGAP  --   --  7 6 5   RUSTY  --   --  9.5 9.6 9.6   GLC  --   --  137* 129* 126*     Most Recent 2 LFT's:  Recent Labs   Lab Test 07/05/21  1549 03/17/21  1553   AST 61* 49*   ALT 35 24   ALKPHOS 62 83   BILITOTAL 1.7* 1.0     Most Recent INR's and Anticoagulation Dosing History:  Anticoagulation Dose History     Recent Dosing and Labs Latest Ref Rng & Units 8/21/2018 7/5/2021    INR 0.86 - 1.14 1.03 1.09        Most Recent 3 Troponin's:  Recent Labs   Lab Test 07/14/21  0950 07/14/21  0313 07/13/21  1906   TROPONIN <0.015 0.018 0.032     Most Recent Cholesterol Panel:  Recent Labs   Lab Test 03/17/21  1553   CHOL 159   LDL 61   HDL 40*   TRIG 290*     Most Recent 6 Bacteria Isolates From Any Culture (See EPIC Reports for Culture Details):  Recent Labs   Lab Test 11/25/15  1749 11/25/15  1535 11/25/15  1530   CULT >100,000 colonies/mL Escherichia coli* No growth No growth     Most Recent TSH, T4 and A1c Labs:  Recent Labs   Lab Test 07/15/20  1402   TSH 1.65   T4 0.95     Results for orders placed or performed during the hospital encounter of 07/13/21   CTA Head Neck with Contrast    Narrative    CTA  HEAD AND NECK WITH CONTRAST 7/13/2021 2:34 PM     HISTORY: Transient ischemic attack (TIA); Code stroke. Slurred speech.    TECHNIQUE: Axial images were obtained through the head and neck with  intravenous contrast. 70 mL of Isovue-370 was given. Multiplanar  reconstructions were performed. 3-D reconstructions off a workstation  for CT angiography were also acquired. Carotid stenoses were evaluated  by comparing the caliber of the proximal internal carotid artery to  the caliber of the distal internal carotid artery. Radiation dose for  this scan was reduced using automated exposure control, adjustment of  the mA and/or kV according to patient size, or  iterative  reconstruction technique.    FINDINGS:    Brachiocephalic vessels: Mild calcific plaque. No stenosis.    Right carotid system: Minimal calcific plaque at the bifurcation. No  stenosis.    Left carotid system: Minimal calcific plaque at the bifurcation. No  stenosis.    Right vertebral artery: Normal.    Left vertebral artery: Normal.    [place] of [place]: There is mild calcific plaque in the carotid siphon  regions bilaterally without stenosis. The basilar artery is patent.  The proximal anterior, middle, and posterior cerebral arteries are  patent. There is significant mass effect from a large right-sided  subdural hematoma.      Impression    IMPRESSION:  1. Mild atherosclerotic disease involving several vessels without  stenosis.  2. No evidence for significant stenosis, thromboembolism, large vessel  occlusion, dissection, or aneurysm.    NOLBERTO CHAN MD         SYSTEM ID:  [id number]   CT Head Perfusion w Contrast    Narrative    CT HEAD PERFUSION WITH CONTRAST July 13, 2021 2:53 PM     HISTORY: Transient ischemic attack (TIA). Code Stroke. Slurred speech.    TECHNIQUE: Axial images were obtained through the brain with  intravenous contrast. 50 mL of Isovue 350 was given. Exam was  performed for CT brain perfusion imaging. Radiation dose for this scan  was reduced using automated exposure control, adjustment of the mA  and/or kV according to patient size, or iterative reconstruction  technique.    FINDINGS: Cerebral blood flow, cerebral blood volume, and mean transit  time maps are relatively symmetric except for the mass effect caused  by right-sided subdural hematoma. There is no evidence for ischemia or  infarct.      Impression    IMPRESSION: Negative CT brain perfusion imaging.    NOLBERTO CHAN MD         SYSTEM ID:  [id number]   CT Head w/o Contrast     Value    Radiologist flags Increasing subdural hematoma with significant mass (AA)    Narrative    CT SCAN OF THE HEAD WITHOUT CONTRAST   7/13/2021  2:34 PM     HISTORY: Transient ischemic attack (TIA). Code Stroke. Slurred speech.    TECHNIQUE: Axial images of the head and coronal reformations without  IV contrast material. Radiation dose for this scan was reduced using  automated exposure control, adjustment of the mA and/or kV according  to patient size, or iterative reconstruction technique.    COMPARISON: 7/6/2021.    FINDINGS: A right-sided subdural hematoma is noted. There has been  previous craniotomy. The subdural fluid collection is increased in  size currently measuring up to 1.4 cm in thickness as compared to 0.8  cm in thickness. There is currently 1.7 cm of right-to-left midline  shift as compared to 0.9 cm. There is also increasing dilatation of  the left temporal horn, consistent with some uncal herniation in  addition to the subfalcine herniation. There is no evidence for  parenchymal hemorrhage. No evidence for any acute ischemic infarct.  Ventricles are small due to the mass effect. Paranasal sinuses and  mastoid air cells are clear. There is no evidence for fracture.      Impression    IMPRESSION: Increasing right hemispheric subdural hematoma with  increasing mass effect resulting in subfalcine and some uncal  herniation.     [Critical Result: Increasing subdural hematoma with significant mass  effect.]    Finding was identified on 7/13/2021 2:38 PM.     Dr. Magaña was contacted by me on 7/13/2021 2:48 PM and verbalized  understanding of the critical result.     NOLBERTO CHAN MD         SYSTEM ID:  [id number]   CT Head w/o Contrast    Narrative    EXAM: CT HEAD WITHOUT CONTRAST  LOCATION: [facility]  DATE/TIME: 07/14/2021, 5:43 AM    INDICATION: Subdural hemorrhage.  COMPARISON: 07/13/2021.  TECHNIQUE: Routine CT Head without IV contrast. Multiplanar reformats. Dose reduction techniques were used.    FINDINGS:  INTRACRANIAL CONTENTS: Interval placement of a right-sided subdural drain. The right hemispheric subdural hemorrhage  measures approximately 8 mm in maximal thickness. Mass effect on the underlying parenchyma has decreased, with right to left midline   shift measuring 9 mm, previously 17 mm. Decreased effacement of the right lateral ventricle. Decreased dilatation of the left temporal horn. Decreased right uncal herniation. No CT evidence of acute infarct. Moderate presumed chronic small vessel   ischemic changes. Mild generalized parenchymal volume loss.     VISUALIZED ORBITS/SINUSES/MASTOIDS: Prior bilateral cataract surgery. Visualized portions of the orbits are otherwise unremarkable. No paranasal sinus mucosal disease. No middle ear or mastoid effusion.    BONES/SOFT TISSUES: Right parietal craniotomy and jam hole.      Impression    IMPRESSION:  1.  Interval placement of a right-sided subdural drain, with decreased volume of the right hemispheric subdural hemorrhage, now measuring approximately 8 mm in maximal thickness.  2.  Decreased associated mass effect with 9 mm right to left midline shift and decreased right uncal herniation.  3.  Decreased dilatation of the left temporal horn.     XR Foot Right G/E 3 Views    Narrative    EXAM: XR FOOT RIGHT G/E 3 VIEWS  LOCATION: [facility]  DATE/TIME: 07/17/2021, 2:44 PM    INDICATION: New onset pain and redness right first MTP joint. Rule out fracture.  COMPARISON: None.      Impression    IMPRESSION: Degenerative change at the first MTP joint and IP joint of the great toe. Accessory os navicularis. No evidence for fracture. No erosive changes are identified.         Most Recent 3 CBC's:Recent Labs   Lab Test 07/21/21  1049 07/20/21  1000 07/19/21  1557 07/19/21  0808   WBC 5.2 5.8  --  5.1   HGB 8.2* 7.9*  --  8.3*   MCV 79 80  --  82   PLT 93* 89* 113* 93*     Most Recent 3 BMP's:Recent Labs   Lab Test 07/21/21  1049 07/20/21  1630 07/20/21  1000 07/19/21  0808 07/18/21  0744   NA  --   --  138 139 135   POTASSIUM 3.7 4.4 3.2* 3.3* 3.4   CHLORIDE  --   --  105 106  104   CO2  --   --  26 27 26   BUN  --   --  29 26 27   CR  --   --  1.16* 1.01 1.02   ANIONGAP  --   --  7 6 5   RUSTY  --   --  9.5 9.6 9.6   GLC  --   --  137* 129* 126*     Most Recent 2 LFT's:Recent Labs   Lab Test 07/05/21  1549 03/17/21  1553   AST 61* 49*   ALT 35 24   ALKPHOS 62 83   BILITOTAL 1.7* 1.0

## 2021-07-22 NOTE — PROGRESS NOTES
Occupational Therapy Discharge Summary    Reason for therapy discharge:    Discharged to transitional care facility.    Progress towards therapy goal(s). See goals on Care Plan in [facility] electronic health record for goal details.  Goals partially met.  Barriers to achieving goals:   discharge from facility.    Therapy recommendation(s):    Continued therapy is recommended.  Rationale/Recommendations:  Increase ADL independence and safety.

## 2021-07-22 NOTE — PROGRESS NOTES
Care Management Follow Up    Length of Stay (days): 9    Expected Discharge Date: 07/22/2021     Concerns to be Addressed: discharge planning     Patient plan of care discussed at interdisciplinary rounds: Yes    Anticipated Discharge Disposition: Transitional Care  Disposition Comments: discharge postponed until 7/21  Anticipated Discharge Services:    Anticipated Discharge DME:      Patient/family educated on Medicare website which has current facility and service quality ratings: yes  Education Provided on the Discharge Plan:    Patient/Family in Agreement with the Plan: yes    Referrals Placed by CM/SW: Homecare, Internal Clinic Care Coordination  Private pay costs discussed: Not applicable    Additional Information:    [facility] left VM for [facility] admissions if pt has been accepted at their TCU for today. [facility] also asked if pt can receive platelet draw at [facility] while there. Awaiting call back.       SIMRAN Sosa   [facility]  [phone]      UPDATE@3983: Pt has been accepted at [facility] TCU for today at 1pm (w/their transport aide). Physician aware to update orders for a TCU stay. [facility] updated RN's. Will call Luis M dodson, to confirm he is taking pt tomorrow to infusion/platelet draw appt that has already been set up for pt.     UPDATE@1123: [facility] left VM for Luis M dodson, asking for call back to confirm he is taking pt to her scheduled appts tomorrow.

## 2021-07-22 NOTE — PROGRESS NOTES
Care Management Discharge Note    Discharge Date: 07/22/2021       Discharge Disposition: Transitional Care    Discharge Services:      Discharge DME:      Discharge Transportation: other (see comments)    Private pay costs discussed: Not applicable    PAS Confirmation Code: [id number]  Patient/family educated on Medicare website which has current facility and service quality ratings: no    Education Provided on the Discharge Plan:    Persons Notified of Discharge Plans: Yes  Patient/Family in Agreement with the Plan: yes    Handoff Referral Completed: Yes    Additional Information:    Orders and PAS were faxed to facility. No further  interventions anticipated at this time.    PAS-RR    D: Per DHS regulation, SW completed and submitted PAS-RR to [place] [employer] Direct Connect via the Senior LinkAge Line.  PAS-RR confirmation # is : [id number]    P: Further questions may be directed to Senior LinkAge Line at #[phone], option #4 for PAS-RR staff.          SIMRAN Sosa   [facility]  [phone]

## 2021-07-22 NOTE — PLAN OF CARE
Physical Therapy Discharge Summary    Reason for therapy discharge:    Discharged to transitional care facility.    Progress towards therapy goal(s). See goals on Care Plan in [facility] electronic health record for goal details.  Goals partially met.  Barriers to achieving goals:   discharge from facility.    Therapy recommendation(s):    Continued therapy is recommended.  Rationale/Recommendations:  to maximize funcitonal mob I and safety.

## 2021-07-22 NOTE — PROGRESS NOTES
Clinic Care Coordination Contact  Care Team Conversations    Situation: RN Clinical Product Navigator following patient from transition to acute care hospitalization to TCU.     Background: Patient recently admitted to [facility], twice in July for subdural hematoma requiring initially craniotomy with hematoma evacuation, followed by drain placement in most recent admission.     Assessment:   Prior to recent acute illnesses in July, patient was living alone and independent with all cares.    Recent therapy notes indicate recommendation for 24/7 care, so family decision was to transition to Transitional Care Unit for ongoing therapy and rehabilitation.   Patient has had ongoing pancytopenia, and required daily CBC monitoring while inpatient; recommendations were to follow every other day while in TCU and transfuse to keep platelets >100 and Hgb >7.0 .      Plan/Recommendations:    RN Clinical Product Navigator will provide update to TCU care team regarding recent platelet trends and provider notes indicating goal for platelet and Hgb.     RN Clinical Product Navigator will collaborate with patient and care team to support seamless transition from TCU back to community and assist in outpatient care planning.     Ele Soler RN  Clinical Product Navigator

## 2021-07-22 NOTE — PLAN OF CARE
Pt here POD 9 from a crani for a recurrent SDH. A&O. Neuros intact. VSS. Tele SR. Regular diet, thin liquids. Takes pills whole. Up with SBA. Denies pain. Voids frequently. One BM this shift. Pt scoring green on the Aggression Stop Light Tool. Plan for discharge to [facility]. Discharge with wheelchair ride set up for 1 pm.  No home medications  Patient belongings sent with patient at discharge

## 2021-07-22 NOTE — PLAN OF CARE
Pt here with POD #8 of crani from R SDH. A&Ox4. Neuros intact, L eye weakness at baseline. VSS. REgular diet, thin liquids. Takes pills whole. Up with 1, SBA. Denies pain. Pt scoring green on the Aggression Stop Light Tool. [facility]

## 2021-07-22 NOTE — PLAN OF CARE
Reason for Admission: POD #8 R craniotomy r/t large SDH with midline shift    Cognitive/Mentation: A/Ox 4  Neuros/CMS: Intact ex intact ex L eye droop baseline  VS: stable.  GI: BS active, + flatus, last BM 7/21/2021. Continent.  : voiding. Continent.  Pulmonary: LS clear.  Pain: yes. Managed with Dilaudid. Pt refused scheduled tylenol.     Drains: PIV  Skin: intact  Activity: Assist x SB with GB.  Diet: Reg with thin liquids. Takes pills whole.     Therapies recs: TCU  Discharge: tomorrow    End of shift summary: Patient stable throughout shift. Platelets transfused for platelet count of 93. Patient was possible going to discharge home, but family decided they couldn't provide the level of care she needed. She is scheduled to go to [facility] tomorrow.

## 2021-07-23 NOTE — PROGRESS NOTES
[facility] GERIATRIC SERVICES  PRIMARY CARE PROVIDER AND CLINIC:  Arie House MD, MD, [address] / [place] [address]  Chief Complaint   Patient presents with     Hospital F/U      [place] [facility] Record Number:  [id number]  Place of Service where encounter took place:  [facility] [[id number]]    Yvette Gastelum  is a 70 year old  (1950), admitted to the above facility from  [facility]. Hospital stay 7/13/21 through 7/22/21..   HPI:    This is a 69 yo female who presented with headache, dysarthria and left- sided weakness. Found to have large right SDH which was symptomatic s/p drain (frontotemporal craniotomy) on 7/13/21. Per ongoing pancytopenia (s/p antineoplastic chemotherapy  per SCLC in 2018), hematology recommending to keep plts >100, received several transfusions while hospitalized. Plan to have CBC EOD and f/u with Dr Luz. She was also treated for Gout with prednisone. She was then discharged to the TCU for rehab.    CODE STATUS/ADVANCE DIRECTIVES DISCUSSION:  Full Code  CPR/Full code   ALLERGIES:   Allergies   Allergen Reactions     No Known Allergies       PAST MEDICAL HISTORY:   Past Medical History:   Diagnosis Date     Cancer (H)     Lung cancer       Hypertension      Kidney stones      Obese      Recurrent UTI      S/P CL-BSO      Sepsis (H)     secondary to uti       PAST SURGICAL HISTORY:   has a past surgical history that includes colonoscopy; Colonoscopy (N/A, 2/10/2016); GYN surgery; Head and neck surgery; Eye surgery; Insert port vascular access (N/A, 9/14/2018); Remove port vascular access (N/A, 5/21/2019); Bone marrow biopsy, bone specimen, needle/trocar (N/A, 2/10/2021); Hysterectomy; Bone marrow biopsy, bone specimen, needle/trocar (N/A, 4/19/2021); Craniotomy (Right, 7/5/2021); and Craniotomy, evacuate hematoma subdural, combined (Right, 7/13/2021).  FAMILY HISTORY: family history includes Aneurysm in her father;  Cervical Cancer in her mother; Hypertension in her father; Lung Cancer in her brother.  SOCIAL HISTORY:   reports that she quit smoking about 5 years ago. Her smoking use included cigarettes. She started smoking about 55 years ago. She has a 50.00 pack-year smoking history. She has never used smokeless tobacco. She reports that she does not drink alcohol and does not use drugs.  Patient's living condition: lives alone    Post Discharge Medication Reconciliation Status: discharge medications reconciled and changed, per note/orders  Current Outpatient Medications   Medication Sig     lactobacillus rhamnosus, GG, (CULTURELL) capsule Take 1 capsule by mouth 3 times daily     Acetaminophen 325 MG CAPS Take 325-650 mg by mouth every 6 hours as needed for pain      albuterol (PROVENTIL) (2.5 MG/3ML) 0.083% neb solution Take 1 vial (2.5 mg) by nebulization 2 times daily (Patient taking differently: Take 2.5 mg by nebulization 2 times daily as needed for shortness of breath / dyspnea or wheezing )     Ascorbic Acid (VITAMIN C) 500 MG CHEW Take 500 mg by mouth daily      atorvastatin (LIPITOR) 10 MG tablet Take 1 tablet (10 mg) by mouth daily     benzonatate (TESSALON) 100 MG capsule Take 1 capsule (100 mg) by mouth 3 times daily as needed for cough (Patient taking differently: Take 100 mg by mouth 3 times daily )     docusate sodium (COLACE) 100 MG capsule Take 1 capsule (100 mg) by mouth 2 times daily as needed for constipation     Elastic Bandages & Supports (MEDICAL COMPRESSION SOCKS) MISC 1 Package daily     fluticasone (FLOVENT HFA) 110 MCG/ACT inhaler Inhale 1 puff into the lungs 2 times daily     HYDROmorphone (DILAUDID) 2 MG tablet Take 1 tablet (2 mg) by mouth every 6 hours as needed for severe pain Take 1 to 2 tablet every 6 hours as needed for pain.     levETIRAcetam (KEPPRA) 500 MG tablet Take 1 tablet (500 mg) by mouth 2 times daily     loperamide (IMODIUM) 2 MG capsule Take 2 mg by mouth 4 times daily as needed  for diarrhea     magnesium 250 MG tablet Take 1 tablet by mouth daily     omeprazole (PRILOSEC) 20 MG DR capsule Take 1 capsule (20 mg) by mouth daily     pantoprazole (PROTONIX) 40 MG EC tablet Take 1 tablet (40 mg) by mouth every morning (before breakfast)     sodium chloride (NEBUSAL) 3 % neb solution Take 3 mLs by nebulization 2 times daily Use with albuterol inhaler (Patient taking differently: Take 3 mLs by nebulization 2 times daily as needed for wheezing Use with albuterol inhaler)     tamsulosin (FLOMAX) 0.4 MG capsule Take 1 capsule (0.4 mg) by mouth daily (Patient not taking: Reported on 7/13/2021)     vitamin B-Complex Take 1 tablet by mouth daily     Vitamin D, Cholecalciferol, 25 MCG (1000 UT) CAPS Take 25 mcg by mouth daily      No current facility-administered medications for this visit.       ROS:  10 point ROS of systems including Constitutional, Eyes, Respiratory, Cardiovascular, Gastroenterology, Genitourinary, Integumentary, Musculoskeletal, Psychiatric were all negative except for pertinent positives noted in my HPI.    Vitals:  /73   Pulse 62   Temp 97.7  F (36.5  C)   Resp 16   SpO2 93%   Exam:  GENERAL APPEARANCE:  Alert, oriented, cooperative, denies pain  ENT:  Mouth and posterior oropharynx normal, moist mucous membranes, normal hearing acuity  NECK:  No adenopathy,masses or thyromegaly  RESP:  respiratory effort and palpation of chest normal, lungs clear to auscultation , no respiratory distress  CV:  Palpation and auscultation of heart done , regular rate and rhythm, no murmur, rub, or gallop, no edema  ABDOMEN:  normal bowel sounds, soft, nontender, no hepatosplenomegaly or other masses, diarrhea per IBS-D  M/S:   Able to move all extremities  SKIN:  R frontotemproal area has intact staples  NEURO:   No focal deficits  PSYCH:  oriented X 3, normal insight, judgement and memory    Lab/Diagnostic data:    Most Recent 3 CBC's:  Recent Labs   Lab Test 07/25/21  0946  07/22/21  0815 07/21/21  1049   WBC 6.2 5.3 5.2   HGB 8.5* 8.6* 8.2*   MCV 79 81 79   PLT 47* 104* 93*     Most Recent 3 BMP's:  Recent Labs   Lab Test 07/22/21  0815 07/21/21  1049 07/20/21  1630 07/20/21  1000 07/19/21  0808 07/18/21  0744   NA  --   --   --  138 139 135   POTASSIUM 3.4 3.7 4.4 3.2* 3.3* 3.4   CHLORIDE  --   --   --  105 106 104   CO2  --   --   --  26 27 26   BUN  --   --   --  29 26 27   CR  --   --   --  1.16* 1.01 1.02   ANIONGAP  --   --   --  7 6 5   RUSTY  --   --   --  9.5 9.6 9.6   GLC  --   --   --  137* 129* 126*       ASSESSMENT/PLAN:    Large Right SDH w Shift -- S/P Drain 7/13/21  Performed on 7/13, f/u with [facility] as ordered    Small cell lung cancer (H)  Diagnosed in 2018, now in remission    Antineoplastic chemotherapy induced pancytopenia (H)  Follows with Dr Luz, CBC EOD, attempting to keep Plts >100K    Seizure prophylaxis  Continue keppra 500mg BID    HA  Pain  Continue dilaudid 1-2tabs q6h PRN and tylenol PRN    Gastroesophageal reflux disease, unspecified whether esophagitis present  Continue protonix 40mg daily, omeprazole 20mg daily    IBS-D  Start culturelle 10B CC TID, continue loperamide PRN    Benign essential hypertension  Monitor    Stage 3a chronic kidney disease  Last Cr 1.16 with GFR 48, BMP on 7/26    Mild hyponatremia  Change fluid restriction to 2L/day, recheck BMP on 7/26    Iron deficiency anemia, unspecified iron deficiency anemia type  Last Hgb 8.6 on 7/22    Vit D deficiency  Continue D3 1000U daily    Asthma  cough  Continue flovent 2 puffs BID, NS neb BID, albuterol PRN, change tessalon pearls to TID    RLS  Continue magnesium 250mg daily    HLD  Continue statin    Orders:  Change tessalon to TID, culturelle, FR    Total time spent with patient visit at the [facility] nursing facility was 38 including patient visit and review of past records. Greater than 50% of total time spent with counseling and coordinating care due to trial probiotics per IBS-D, change  tessalon pearls to TID per persistent cough, lab recheck and therapy, which lasted 20 minutes.     Electronically signed by:  Octavio Robins NP

## 2021-07-25 NOTE — PROGRESS NOTES
Infusion Nursing Note:  Yvette Gastelum presents today for labs and 1 unit platelets.    Patient seen by provider today: No   present during visit today: Not Applicable.    Note: N/A.      Intravenous Access:  Peripheral IV placed. Left in place for appt tomorrow.     Treatment Conditions:  Lab Results   Component Value Date    HGB 8.5 07/25/2021    HGB 7.4 07/10/2021     Lab Results   Component Value Date    WBC 6.2 07/25/2021    WBC 4.8 07/10/2021      Lab Results   Component Value Date    ANEU 2.8 07/25/2021    ANEU 4.6 07/08/2021     Lab Results   Component Value Date    PLT 47 07/25/2021    PLT 91 07/10/2021      Results reviewed, labs MET treatment parameters, ok to proceed with treatment.  Blood transfusion consent signed 7/25/21.      Post Infusion Assessment:  Patient tolerated infusion without incident.  Site patent and intact, free from redness, edema or discomfort.  No evidence of extravasations.       Discharge Plan:   Discharge instructions reviewed with: Patient.  Patient and/or family verbalized understanding of discharge instructions and all questions answered.  AVS to patient via [employer].  Patient will return 7/26/21 for next appointment.   Patient discharged in stable condition accompanied by: son.  Departure Mode: Wheelchair.      Geovanna Napoles RN

## 2021-07-26 NOTE — DISCHARGE INSTRUCTIONS
Blood Transfusion Discharge Instructions     After you go home:      After a blood transfusion (red blood cells, platelets, plasma, cryo or granulocytes), you will need to watch for signes of a reaction for the next 48 hours.    Medicines:      You may resume all your medications            Call the provider who ordered your blood transfusion or call 911 or go to the Emergency Room if you have any signs of a reaction:      Shaking or chills    Fever - temperature above 100.0 F    Headache    Nausea    Hives    Itching    Swelling of the face or feeling flushed    Ongoing dry cough (nothing is coughed up)    Trouble breathing or wheezing    Some signs of a reaction won't show up for a few days or up to 4 weeks. These may include:      Fatigue    Dizziness    Pink or red urine    If you have questions call your provider who ordered the blood transfusion.

## 2021-07-26 NOTE — PROGRESS NOTES
Care Suites Admission Nursing Note    Patient Information  Name: Yvette Gastelum  Age: 70 year old  Reason for admission: Platelet transfusion  Care Suites arrival time: 1530    Visitor Information  Name: OZIEL    Patient Admission/Assessment   Pre-procedure assessment complete: Yes  If abnormal assessment/labs, provider notified: N/A  NPO: N/A  Medications held per instructions/orders: N/A  Consent: signed     Patient oriented to room: Yes  Education/questions answered: Yes  Plan/other: Proceed as planned    Discharge Planning  Discharge name/phone number: to [place].  Overnight post sedation caregiver: yes  Discharge location: to [facility] rehabilitation    Leon Pritchett RN     Verified with pt about consent for transfusion.  Pt verbalized her understanding and wishes to proceed.   Pt received platelet infusion without any problem.  VS stable, pt will return tomorrow for another platelet infusion.  [employer] is called for pt's ride at [phone]    Care Suites Discharge Nursing Note    Patient Information  Name: Yvette Gastelum  Age: 70 year old    Discharge Education:  Discharge instructions reviewed:yes  Additional education/resources provided: NA  Patient/patient representative verbalizes understanding: Yes  Patient discharging on new medications: N/A  Medication education completed: N/A    Discharge Plans:   Discharge location: to rehabilitation  Discharge ride contacted: Yes  Approximate discharge time: 1745    Discharge Criteria:  Discharge criteria met and vital signs stable: Yes    Patient Belongs:  Patient belongings returned to patient: Yes    Leon Pritchett RN

## 2021-07-26 NOTE — PROGRESS NOTES
PATIENT/VISITOR WELLNESS SCREENING    Step 1 Patient Screening    1. In the last month, have you been in contact with someone who was confirmed or suspected to have Coronavirus/COVID-19? No    2. Do you have the following symptoms?  Fever/Chills? No   Cough? No   Shortness of breath? No   New loss of taste or smell? No  Sore throat? No  Muscle or body aches? No  Headaches? No  Fatigue? No  Vomiting or diarrhea? No    Step 2 Visitor Screening    1. Name of Visitor (1 visitor per patient): NA      Step 3 Refer to logic grid below for actions    NO SYMPTOM(S)    ACTIONS:  1. Standard rooming process  2. Provider to assess per normal protocol  3. Implement precautions as needed and per guidelines     POSITIVE SYMPTOM(S)  If positive for ANY of the following symptoms: fever, cough, shortness of breath, rash    ACTION:  1. Continue to have the patient wear a mask   2. Room patient as soon as possible  3. Don appropriate PPE when entering room  4. Provider evaluation

## 2021-07-26 NOTE — PROGRESS NOTES
Writer received a message to set patient up for plts to keep above 100k..    Writer has set up for patient to receive platelets at Care suites today and tomorrow at 1pm.    Writer has called ARIEL Tran and provided appointment details.     Message via [employer] to be sen to patient.    Jeannette Moore RN

## 2021-07-27 NOTE — PROGRESS NOTES
.....Care Suites Admission Nursing Note    Patient Information  Name: Yvette Gastelum  Age: 70 year old  Reason for admission:  1 unit platelet infusion  Care Suites arrival time: 1500      Patient Admission/Assessment   Pre-procedure assessment complete: Yes  If abnormal assessment/labs, provider notified: Yes  NPO: N/A  Medications held per instructions/orders: N/A  Consent: obtained  If applicable, pregnancy test status: deferred  Patient oriented to room: Yes  Education/questions answered: Yes  Plan/other: administer 1 unit platelets    Discharge Planning  Discharge name/phone number: n/a  Overnight post sedation caregiver: home  Discharge location: home    Alphonse Jaimes RN

## 2021-07-27 NOTE — PATIENT INSTRUCTIONS
[facility] Discharge Orders    Name: Yvette Gastelum  : 1950  Planned Discharge Date: 21  Discharged to: previous independent home    MEDICAL FOLLOW UP  Follow up with PCP in 1-2 weeks   Follow up with Specialists   Next 5 appointments (look out 90 days)    2021 12:30 PM  Return Visit with ЕКАТЕРИНА Green CNP [facility] ) [address], [address]  [facility] [address]  [phone]   Aug 03, 2021  2:00 PM  Return Visit with Anthony Neuro Nurse  [facility] ) [address] [address]  [phone]   Aug 05, 2021 10:00 AM  Return Visit with ЕКАТЕРИНА Green CNP  [facility] ) [address], [address]  [facility] [address]  [phone]   Aug 24, 2021  2:00 PM  Return Visit with Juancarlos Ling PA-C  [facility] ) [address] [address]  [phone]          DISCHARGE MEDICATIONS:  The patient s pharmacy is authorized to dispense a 30-day supply of medications. Refill requests should be directed to the primary provider, Arie House, Levetiracetam and Tamsulosin sent to [facility]  Current Outpatient Medications   Medication Sig Dispense Refill     Acetaminophen 325 MG CAPS Take 325-650 mg by mouth every 6 hours as needed for pain        albuterol (PROVENTIL) (2.5 MG/3ML) 0.083% neb solution Take 1 vial (2.5 mg) by nebulization 2 times daily       atorvastatin (LIPITOR) 10 MG tablet Take 1 tablet (10 mg) by mouth daily 90 tablet 3     benzonatate (TESSALON) 100 MG capsule Take 1 capsule (100 mg) by mouth 3 times daily       docusate sodium (COLACE) 100 MG capsule Take  1 capsule (100 mg) by mouth 2 times daily as needed for constipation 20 capsule 0     fluticasone (FLOVENT HFA) 110 MCG/ACT inhaler Inhale 1 puff into the lungs 2 times daily 1 Inhaler 11     HYDROmorphone (DILAUDID) 2 MG tablet Take 1 tablet (2 mg) by mouth every 6 hours as needed for severe pain Take 1 to 2 tablet every 6 hours as needed for pain. 12 tablet 0     lactobacillus rhamnosus, GG, (CULTURELL) capsule Take 1 capsule by mouth 3 times daily       levETIRAcetam (KEPPRA) 500 MG tablet Take 1 tablet (500 mg) by mouth 2 times daily 60 tablet 0     loperamide (IMODIUM) 2 MG capsule Take 2 mg by mouth 4 times daily as needed for diarrhea       magnesium 250 MG tablet Take 1 tablet by mouth daily       omeprazole (PRILOSEC) 20 MG DR capsule Take 1 capsule (20 mg) by mouth daily 90 capsule 3     sodium chloride (NEBUSAL) 3 % neb solution Take 3 mLs by nebulization 2 times daily Use with albuterol inhaler 90 mL 4     tamsulosin (FLOMAX) 0.4 MG capsule Take 1 capsule (0.4 mg) by mouth daily 30 capsule 0     vitamin B-Complex Take 1 tablet by mouth daily       Vitamin D, Cholecalciferol, 25 MCG (1000 UT) CAPS Take 25 mcg by mouth daily        Ascorbic Acid (VITAMIN C) 500 MG CHEW Take 500 mg by mouth daily        Elastic Bandages & Supports (MEDICAL COMPRESSION SOCKS) MISC 1 Package daily 2 each 1     SERVICES:  Home Care:  Occupational Therapy, Physical Therapy, Registered Nurse, Home Health Aide and From:  [facility]    ЕКАТЕРИНА Pham CNP  This document was electronically signed on July 27, 2021

## 2021-07-27 NOTE — PROGRESS NOTES
Oncology/Hematology Visit Note  Jul 27, 2021    Reason for Visit: follow up of chronic thrombocytopenia    07/13/07/22-patient admitted to hospital with recurrent subdural hematoma  Needing surgery    Small cell lung cancer -diagnosed in 2018  Patient of Dr. Luz's  Treated with concurrent chemoradiation  followed by prophylactic cranial radiation  06/17/2021-CT scan reveals no evidence of malignancy    Cytopenia  Anemia and thrombocytopenia  04/21-bone marrow biopsy reveals possibility of developing myelopathic syndrome        Interval History:  Patient reports overall she is feeling well she stated that [facility] after discharge but now she is going home.   She reports feeling well denies headache dizziness seizures denies any neurological symptoms.  Denies blurred vision.  Denies fever chills sweats cough shortness of breath chest pain nausea vomiting diarrhea    Review of Systems:  14 point ROS of systems including Constitutional, Eyes, Respiratory, Cardiovascular, Gastroenterology, Genitourinary, Integumentary, Muscularskeletal, Psychiatric were all negative except for pertinent positives noted in my HPI.    Physical Examination:  General: The patient is a pleasant female in no acute distress.  BP 94/71   Pulse (!) 121   Temp 98.4  F (36.9  C) (Oral)   Resp 18   Wt 92.4 kg (203 lb 12.8 oz)   SpO2 100%   BMI 34.98 kg/m    HEENT: EOMI, PERRL. Sclerae are anicteric. Oral mucosa is pink and moist with no lesions or thrush.   Lymph: Neck is supple with no lymphadenopathy in the cervical or supraclavicular areas.   Heart: Regular rate and rhythm.   Lungs: Clear to auscultation bilaterally.   GI: Bowel sounds present, soft, nontender with no palpable hepatosplenomegaly or masses.   Extremities: No lower extremity edema noted bilaterally.   Skin: No rashes, petechiae, or bruising noted on exposed skin.    Laboratory Data:  Results for orders placed or performed in visit on 07/27/21 (from the past 24 hour(s))    CBC with Platelets & Differential    Narrative    The following orders were created for panel order CBC with Platelets & Differential.  Procedure                               Abnormality         Status                     ---------                               -----------         ------                     CBC with platelets and d...[[id number]]  Abnormal            Final result               Manual Differential[[id number]]          Abnormal            Final result                 Please view results for these tests on the individual orders.   CBC with platelets and differential   Result Value Ref Range    WBC Count 6.2 4.0 - 11.0 10e3/uL    RBC Count 3.73 (L) 3.80 - 5.20 10e6/uL    Hemoglobin 8.6 (L) 11.7 - 15.7 g/dL    Hematocrit 29.5 (L) 35.0 - 47.0 %    MCV 79 78 - 100 fL    MCH 23.1 (L) 26.5 - 33.0 pg    MCHC 29.2 (L) 31.5 - 36.5 g/dL    RDW 26.5 (H) 10.0 - 15.0 %    Platelet Count 88 (L) 150 - 450 10e3/uL   Manual Differential   Result Value Ref Range    % Neutrophils 61 %    % Lymphocytes 19 %    % Monocytes 10 %    % Eosinophils 4 %    % Basophils 0 %    % Metamyelocytes 1 %    % Myelocytes 5 %    Absolute Neutrophils 3.8 1.6 - 8.3 10e3/uL    Absolute Lymphocytes 1.2 0.8 - 5.3 10e3/uL    Absolute Monocytes 0.6 0.0 - 1.3 10e3/uL    Absolute Eosinophils 0.2 0.0 - 0.7 10e3/uL    Absolute Basophils 0.0 0.0 - 0.2 10e3/uL    Absolute Metamyelocytes 0.1 (H) <=0.0 10e3/uL    Absolute Myelocytes 0.3 (H) <=0.0 10e3/uL    RBC Morphology Confirmed RBC Indices     Platelet Assessment  Automated Count Confirmed. Platelet morphology is normal.     Automated Count Confirmed. Platelet morphology is normal.    Acanthocytes Moderate (A) None Seen    Elliptocytes Slight (A) None Seen    RBC Fragments Moderate (A) None Seen    Teardrop Cells Slight (A) None Seen         Assessment and Plan:    This is a 70-year-old female with    Small cell lung cancer -diagnosed in 2018  Patient of Dr. Luz's  Treated with concurrent  chemoradiation  followed by prophylactic cranial radiation  06/17/2021-CT scan reveals no evidence of malignancy    Recurrent subdural hematoma  07/13/07/22-patient admitted to hospital with recurrent subdural hematoma  Large right subdural hematoma status post drain on 07 /13  -Patient is asymptomatic  Continue with Keppra  Continue follow-up with neurosurgery clinic  -Patient has follow-up appointments scheduled  with neurosurgery clinic  Continue per neurosurgery recommendations follow-up with NS clinic at 2 weeks post op for incision check, then 6 weeks post op with head CT prior. Continue Keppra for 1 month.   Check CBC daily for the next few weeks  Transfuse for platelet count 100 or below  Patient advised to call 911 or go to ER in the event of headache dizziness seizures neurological deficits      Cytopenia  Anemia and thrombocytopenia  04/21-bone marrow biopsy reveals possibility of developing myelopathic syndrome  transfuse for hemoglobin less than 8  Due to recurrent subdural hematoma transfuse for platelet count 100 or below    Go to ER with any changes in health condition    Patient has follow-up appointment with me next week  Continue with daily CBC checks with platelets transfusion 100 or below  And PRBC transfusion for hemoglobin less than 8         ЕКАТЕРИНА Green [facility]     Chart documentation with Dragon Voice recognition Software. Although reviewed after completion, some words and grammatical errors may remain.

## 2021-07-27 NOTE — DISCHARGE SUMMARY
1 unit of platelets ( 227 ml) infused over 2 hours  VSS thru out   Pt tolerated infusion with No complaints   No reaction reported  Pt VERY hard IV stick - will leave IV in for now   [facility] at [facility] am - will let them assess need for new IV

## 2021-07-27 NOTE — PROGRESS NOTES
"Oncology Rooming Note    July 27, 2021 12:39 PM   Yvette Gastelum is a 70 year old female who presents for:    Chief Complaint   Patient presents with     Oncology Clinic Visit     Initial Vitals: BP 94/71   Pulse (!) 121   Temp 98.4  F (36.9  C) (Oral)   Resp 18   Wt 92.4 kg (203 lb 12.8 oz)   SpO2 100%   BMI 34.98 kg/m   Estimated body mass index is 34.98 kg/m  as calculated from the following:    Height as of 7/13/21: 1.626 m (5' 4\").    Weight as of this encounter: 92.4 kg (203 lb 12.8 oz). Body surface area is 2.04 meters squared.  Data Unavailable Comment: Data Unavailable   No LMP recorded. Patient has had a hysterectomy.  Allergies reviewed: Yes  Medications reviewed: Yes    Medications: Medication refills not needed today.  Pharmacy name entered into [employer]:    [id number] - [place], [place] - [address]  [facility] - [address], [address]  [facility] [address] [place] [facility] - [place], [place] - [address] [place] [address]    Clinical concerns: gout- bilateral feet [facility] was notified.      Shari J. Schoenberger, [facility]            "

## 2021-07-27 NOTE — LETTER
"    7/27/2021         RE: Yvette Gastelum  [address]  [place] [address]        Dear Colleague,    Thank you for referring your patient, Yvette Gastelum, to the [facility]. Please see a copy of my visit note below.    Oncology Rooming Note    July 27, 2021 12:39 PM   Yvette Gastelum is a 70 year old female who presents for:    Chief Complaint   Patient presents with     Oncology Clinic Visit     Initial Vitals: BP 94/71   Pulse (!) 121   Temp 98.4  F (36.9  C) (Oral)   Resp 18   Wt 92.4 kg (203 lb 12.8 oz)   SpO2 100%   BMI 34.98 kg/m   Estimated body mass index is 34.98 kg/m  as calculated from the following:    Height as of 7/13/21: 1.626 m (5' 4\").    Weight as of this encounter: 92.4 kg (203 lb 12.8 oz). Body surface area is 2.04 meters squared.  Data Unavailable Comment: Data Unavailable   No LMP recorded. Patient has had a hysterectomy.  Allergies reviewed: Yes  Medications reviewed: Yes    Medications: Medication refills not needed today.  Pharmacy name entered into [email or web address]:    [facility][id number] - [place], [place] - [address] AT [address] [place] [address]  [facility] - [address], [address]  [id number] [address] [facility] - [place], [place] - [address] [place] [address]    Clinical concerns: gout- bilateral feet [facility] was notified.      Shari J. Schoenberger, CMA              Oncology/Hematology Visit Note  Jul 27, 2021    Reason for Visit: follow up of chronic thrombocytopenia    07/13/07/22-patient admitted to hospital with recurrent subdural hematoma  Needing surgery    Small cell lung cancer -diagnosed in 2018  Patient of Dr. Luz's  Treated with concurrent chemoradiation  followed by prophylactic cranial radiation  06/17/2021-CT scan reveals no evidence of malignancy    Cytopenia  Anemia and thrombocytopenia  04/21-bone marrow biopsy reveals possibility of developing " myelopathic syndrome        Interval History:  Patient reports overall she is feeling well she stated that [facility] after discharge but now she is going home.   She reports feeling well denies headache dizziness seizures denies any neurological symptoms.  Denies blurred vision.  Denies fever chills sweats cough shortness of breath chest pain nausea vomiting diarrhea    Review of Systems:  14 point ROS of systems including Constitutional, Eyes, Respiratory, Cardiovascular, Gastroenterology, Genitourinary, Integumentary, Muscularskeletal, Psychiatric were all negative except for pertinent positives noted in my HPI.    Physical Examination:  General: The patient is a pleasant female in no acute distress.  BP 94/71   Pulse (!) 121   Temp 98.4  F (36.9  C) (Oral)   Resp 18   Wt 92.4 kg (203 lb 12.8 oz)   SpO2 100%   BMI 34.98 kg/m    HEENT: EOMI, PERRL. Sclerae are anicteric. Oral mucosa is pink and moist with no lesions or thrush.   Lymph: Neck is supple with no lymphadenopathy in the cervical or supraclavicular areas.   Heart: Regular rate and rhythm.   Lungs: Clear to auscultation bilaterally.   GI: Bowel sounds present, soft, nontender with no palpable hepatosplenomegaly or masses.   Extremities: No lower extremity edema noted bilaterally.   Skin: No rashes, petechiae, or bruising noted on exposed skin.    Laboratory Data:  Results for orders placed or performed in visit on 07/27/21 (from the past 24 hour(s))   CBC with Platelets & Differential    Narrative    The following orders were created for panel order CBC with Platelets & Differential.  Procedure                               Abnormality         Status                     ---------                               -----------         ------                     CBC with platelets and d...[[id number]]  Abnormal            Final result               Manual Differential[[id number]]          Abnormal            Final result                 Please view results  for these tests on the individual orders.   CBC with platelets and differential   Result Value Ref Range    WBC Count 6.2 4.0 - 11.0 10e3/uL    RBC Count 3.73 (L) 3.80 - 5.20 10e6/uL    Hemoglobin 8.6 (L) 11.7 - 15.7 g/dL    Hematocrit 29.5 (L) 35.0 - 47.0 %    MCV 79 78 - 100 fL    MCH 23.1 (L) 26.5 - 33.0 pg    MCHC 29.2 (L) 31.5 - 36.5 g/dL    RDW 26.5 (H) 10.0 - 15.0 %    Platelet Count 88 (L) 150 - 450 10e3/uL   Manual Differential   Result Value Ref Range    % Neutrophils 61 %    % Lymphocytes 19 %    % Monocytes 10 %    % Eosinophils 4 %    % Basophils 0 %    % Metamyelocytes 1 %    % Myelocytes 5 %    Absolute Neutrophils 3.8 1.6 - 8.3 10e3/uL    Absolute Lymphocytes 1.2 0.8 - 5.3 10e3/uL    Absolute Monocytes 0.6 0.0 - 1.3 10e3/uL    Absolute Eosinophils 0.2 0.0 - 0.7 10e3/uL    Absolute Basophils 0.0 0.0 - 0.2 10e3/uL    Absolute Metamyelocytes 0.1 (H) <=0.0 10e3/uL    Absolute Myelocytes 0.3 (H) <=0.0 10e3/uL    RBC Morphology Confirmed RBC Indices     Platelet Assessment  Automated Count Confirmed. Platelet morphology is normal.     Automated Count Confirmed. Platelet morphology is normal.    Acanthocytes Moderate (A) None Seen    Elliptocytes Slight (A) None Seen    RBC Fragments Moderate (A) None Seen    Teardrop Cells Slight (A) None Seen         Assessment and Plan:    This is a 70-year-old female with    Small cell lung cancer -diagnosed in 2018  Patient of Dr. Luz's  Treated with concurrent chemoradiation  followed by prophylactic cranial radiation  06/17/2021-CT scan reveals no evidence of malignancy    Recurrent subdural hematoma  07/13/07/22-patient admitted to hospital with recurrent subdural hematoma  Large right subdural hematoma status post drain on 07 /13  -Patient is asymptomatic  Continue with Keppra  Continue follow-up with neurosurgery clinic  -Patient has follow-up appointments scheduled  with neurosurgery clinic  Continue per neurosurgery recommendations follow-up with NS clinic at  2 weeks post op for incision check, then 6 weeks post op with head CT prior. Continue Keppra for 1 month.   Check CBC daily for the next few weeks  Transfuse for platelet count 100 or below  Patient advised to call 911 or go to ER in the event of headache dizziness seizures neurological deficits      Cytopenia  Anemia and thrombocytopenia  04/21-bone marrow biopsy reveals possibility of developing myelopathic syndrome  transfuse for hemoglobin less than 8  Due to recurrent subdural hematoma transfuse for platelet count 100 or below    Go to ER with any changes in health condition    Patient has follow-up appointment with me next week  Continue with daily CBC checks with platelets transfusion 100 or below  And PRBC transfusion for hemoglobin less than 8         ЕКАТЕРИНА Green CNP  [facility]     Chart documentation with Dragon Voice recognition Software. Although reviewed after completion, some words and grammatical errors may remain.            Again, thank you for allowing me to participate in the care of your patient.        Sincerely,        ЕКАТЕРИНА Green CNP

## 2021-07-27 NOTE — DISCHARGE SUMMARY
Care Suites Discharge Nursing Note    Patient Information  Name: Yvette Gastelum  Age: 70 year old    Discharge Education:  Discharge instructions reviewed: Yes  Additional education/resources provided: AVS  Patient/patient representative verbalizes understanding: Yes  Patient discharging on new medications: No  Medication education completed: N/A    Discharge Plans:   Discharge location: home  Discharge ride contacted: Yes  Approximate discharge time: 1715    Discharge Criteria:  Discharge criteria met and vital signs stable: Yes    Patient Belongs:  Patient belongings returned to patient: Yes    Alphonse Jaimes RN

## 2021-07-27 NOTE — PROGRESS NOTES
"[facility] GERIATRIC SERVICES DISCHARGE SUMMARY  PATIENT'S NAME: Yvette Gastelum  YOB: 1950  MEDICAL RECORD NUMBER:  [id number]  Place of Service where encounter took place:  [facility] [[id number]]    PRIMARY CARE PROVIDER AND CLINIC RESPONSIBLE AFTER TRANSFER:   Arie House MD, MD, [address] / [place] [address]    [facility] Provider     Transferring providers: ЕКАТЕРИНА Pham CNP, Kalin Rudolph MD  Recent Hospitalization/ED:  [facility] stay 7/13/21 to 7/22/21.  Date of SNF Admission: 7/22/21  Date of SNF (anticipated) Discharge: 7/27/21 - Patient driven  Discharged to: previous independent home  Cognitive Scores: No formal cognitive scores available to review  Physical Function: Ambulating 150 feet with 2WW. Independent with bed mobility. Modified independence for transfers. Supervision with grooming, hygiene, upper dressing and lower dressing. Moderate assistance for showering.   DME: Shower Chair    CODE STATUS/ADVANCE DIRECTIVES DISCUSSION:  Prior FULL  ALLERGIES: No known allergies    NURSING FACILITY COURSE   Summary of nursing facility stay:     This is a 70-year-old female, with a past medical history significant for small cell lung cancer s/p chemoradiation in 2018 in remission, pancytopenia, hyperlipidemia, GERD and hypertension, who was admitted to [facility] 7/13/21 through 7/22/21 due to a headache, dysarthria and left-sided weakness. Of note, was hospitalized 7/5/21 through 7/10/21  For a large right-sided subdural hematoma s/p right frontal craniotomy for evacuation and placement of subdural drain on 7/5/21. During this hospitalization, a head CT revealed \"Increasing right hemispheric subdural hematoma with increasing mass effect resulting in subfalcine and some uncal herniation\". Neurosurgery was consulted and a right frontotemporal craniotomy for evacuation of recurrent subacute " subdural hematoma and placement of subdural drain was performed on 7/13/21. Levetiracetam was initiated for seizure prophylaxis. Oncology was consulted and recommend to keep Platelets > 100. Required Platelet Transfusions during hospitalization. Treated for presumed gout flare in her right first MTP joint. A TCU stay was recommended for physical rehabilitation.    While in the TCU, patient reported the pain in her toe due to gout was too much to work with therapy. Also misses her pets at home.    Symptomatic Recurrent Right Subdural Hematoma S/P Right Frontotemporal Craniotomy for Evacuation and Placement of Subdural Drain on 7/13/21. Follow-up with Neurology Nurse on 8/3/21 for a wound check and Neurosurgery PA-C on 8/24/21 as scheduled. Continue Levetiracetam for 1 month for seizure prophylaxis.     Chronic Thrombocytopenia, Anemia, Antineoplastic Chemotherapy-Induced Pancytopenia and History of Small Cell Lung Cancer S/P Chemotherapy in 2018. Followed by Dr. Luz. Question of developing myelodysplastic syndrome. Plan is for patient to go to the Oncology Clinic 3 times per week for CBC and transfusions as needed. Goal of Platelets > 100. Takes Albuterol, Benzonatate, Sodium Chloride nebulizer and Fluticasone for her respiratory status.    Hypertension and Hyperlipidemia. Upon review of blood pressures over the past 5 days, systolic range from . Diastolic range from 58-79. Takes Atorvastatin.     Mild Hyponatremia. Noted during hospitalization, but now resolved. Sodium 139 on 7/26/21. Monitor periodically to ensure stability.     Chronic Kidney Disease Stage III. Baseline Creatinine ~ 1.1. Last Creatinine 1.39 on 7/26/21. Recommend follow-up outpatient to ensure stability.     Mild Hypokalemia. Potassium 3.4 on 7/26/21. Repeat outpatient to ensure stability.     Irritable Bowel Syndrome. Started on Culturell during TCU stay.    Urinary Retention. Noted during previous hospital stay 7/5/21 through 7/10/21 and  required garces catheter which was removed prior to hospital discharge. Started on Tamsulosin. Recommended Urology follow-up outpatient.    Discharge Medications:  Current Outpatient Medications   Medication Sig Dispense Refill     Acetaminophen 325 MG CAPS Take 325-650 mg by mouth every 6 hours as needed for pain        albuterol (PROVENTIL) (2.5 MG/3ML) 0.083% neb solution Take 1 vial (2.5 mg) by nebulization 2 times daily (Patient taking differently: Take 2.5 mg by nebulization 2 times daily as needed for shortness of breath / dyspnea or wheezing ) 1 Box 11     Ascorbic Acid (VITAMIN C) 500 MG CHEW Take 500 mg by mouth daily        atorvastatin (LIPITOR) 10 MG tablet Take 1 tablet (10 mg) by mouth daily 90 tablet 3     benzonatate (TESSALON) 100 MG capsule Take 1 capsule (100 mg) by mouth 3 times daily as needed for cough (Patient taking differently: Take 100 mg by mouth 3 times daily ) 90 capsule 3     docusate sodium (COLACE) 100 MG capsule Take 1 capsule (100 mg) by mouth 2 times daily as needed for constipation 20 capsule 0     Elastic Bandages & Supports (MEDICAL COMPRESSION SOCKS) MISC 1 Package daily 2 each 1     fluticasone (FLOVENT HFA) 110 MCG/ACT inhaler Inhale 1 puff into the lungs 2 times daily 1 Inhaler 11     HYDROmorphone (DILAUDID) 2 MG tablet Take 1 tablet (2 mg) by mouth every 6 hours as needed for severe pain Take 1 to 2 tablet every 6 hours as needed for pain. 12 tablet 0     lactobacillus rhamnosus, GG, (CULTURELL) capsule Take 1 capsule by mouth 3 times daily       levETIRAcetam (KEPPRA) 500 MG tablet Take 1 tablet (500 mg) by mouth 2 times daily 60 tablet 0     loperamide (IMODIUM) 2 MG capsule Take 2 mg by mouth 4 times daily as needed for diarrhea       magnesium 250 MG tablet Take 1 tablet by mouth daily       omeprazole (PRILOSEC) 20 MG DR capsule Take 1 capsule (20 mg) by mouth daily 90 capsule 3     pantoprazole (PROTONIX) 40 MG EC tablet Take 1 tablet (40 mg) by mouth every morning  (before breakfast) 30 tablet 0     sodium chloride (NEBUSAL) 3 % neb solution Take 3 mLs by nebulization 2 times daily Use with albuterol inhaler (Patient taking differently: Take 3 mLs by nebulization 2 times daily as needed for wheezing Use with albuterol inhaler) 90 mL 4     tamsulosin (FLOMAX) 0.4 MG capsule Take 1 capsule (0.4 mg) by mouth daily (Patient not taking: Reported on 7/13/2021) 30 capsule 0     vitamin B-Complex Take 1 tablet by mouth daily       Vitamin D, Cholecalciferol, 25 MCG (1000 UT) CAPS Take 25 mcg by mouth daily         Controlled medications:   Medication Tamsulosin, Keppra, Culturelle electronically prescribed to [facility]     Past Medical History:   Past Medical History:   Diagnosis Date     Cancer (H)     Lung cancer       Hypertension      Kidney stones      Obese      Recurrent UTI      S/P CL-BSO      Sepsis (H)     secondary to uti      Physical Exam:   Vitals: BP 98/70   Pulse 111   Temp 97.6  F (36.4  C)   Resp 18   Wt 92.4 kg (203 lb 12.8 oz)   SpO2 100%   BMI 34.98 kg/m    BMI= Body mass index is 34.98 kg/m .  GENERAL APPEARANCE:  Alert, in no distress  ENT:  Mouth and posterior oropharynx normal, moist mucous membranes  EYES:  EOM, conjunctivae, lids, pupils and irises normal  RESP:  no respiratory distress  NEURO:   Cranial nerves 2-12 are normal tested and grossly at patient's baseline  PSYCH:  affect and mood normal     SNF labs: Labs done in SNF are in [facility] EPIC. Please refer to them using EPIC/Care Everywhere.    DISCHARGE PLAN:    Follow up labs:Per Oncology    Medical Follow Up:      Follow up with Oncology as per them    Current [facility] scheduled appointments:  Next 5 appointments (look out 90 days)    Jul 27, 2021 12:30 PM  Return Visit with ЕКАТЕРИНА Green CNP  [facility] [place] ([facility] ) [address]  [facility]  [place] [address]  [phone]   Aug 03,  2021  2:00 PM  Return Visit with Anthony Neuro Nurse  [facility] [place] ([facility] ) [address] [address]  [phone]   Aug 05, 2021 10:00 AM  Return Visit with ЕКАТЕРИНА Green CNP  [facility] [place] ([facility] ) [address]  [facility] [address]  [phone]   Aug 24, 2021  2:00 PM  Return Visit with Juancarlos Ling PA-C  [facility] ) [address] [address]  [phone]           Discharge Services: Home Care:  Occupational Therapy, Physical Therapy, Registered Nurse, Home Health Aide and From:  [facility]    TOTAL DISCHARGE TIME:   Greater than 30 minutes  Electronically signed by:  ЕКАТЕРИНА Pham CNP

## 2021-07-27 NOTE — DISCHARGE INSTRUCTIONS
Platelet Transfusion Discharge Instructions     After you go home:      After a blood transfusion (red blood cells, platelets, plasma, cryo or granulocytes), you will need to watch for signes of a reaction for the next 48 hours.    Medicines:      You may resume all your medications            Call the provider who ordered your blood transfusion or call 911 or go to the Emergency Room if you have any signs of a reaction:      Shaking or chills    Fever - temperature above 100.0 F    Headache    Nausea    Hives    Itching    Swelling of the face or feeling flushed    Ongoing dry cough (nothing is coughed up)    Trouble breathing or wheezing    Some signs of a reaction won't show up for a few days or up to 4 weeks. These may include:      Fatigue    Dizziness    Pink or red urine    If you have questions call your provider who ordered the blood transfusion.

## 2021-07-28 NOTE — PROGRESS NOTES
8006 Report received from Taya Covington RN.  1600 VSS. Temp 99.4 po. Platelets all infused w/o difficulty. No transfusion reaction. IV site dc'd as site appears slightly reddened.  1610 Pt dc'd per w/c to lobby to await transportation ride. All personal belongings taken with pt.

## 2021-07-28 NOTE — TELEPHONE ENCOUNTER
Patient was recently discharged from TCU, has many frequent outpatient appointments for labs and infusions and is having to pay out of pocket for ride. She would like to apply for [employer] and has an appointment set up with PCP On 8/11/2021.     Can in-clinic staff please print this application (link to copy/paste below) and have form completed while in clinic, it then needs to be mailed and Care Coordination will follow up after that to assure it is activated and educate patient on how to utilize the ride service.     Much of the form can be completed in advance but it must have both the patient signature and provider signature (or RN familiar with patient) prior to mailing.     [email or web address]     Thank you!  Ele Soler RN  Ambulatory Care Coordnation

## 2021-07-28 NOTE — LETTER
[facility] CARE COORDINATION  [address]  [facility] [address]    July 28, 2021    Yvette Gastelum  [address]   [place] [facility] [place] [address]      Dear Yvette,    I am a clinic care coordinator who works with Arie House MD, at [facility]. I wanted to thank you for spending the time to talk with me.  Below is a description of clinic care coordination and how I can further assist you.      The clinic care coordination team is made up of a registered nurse,  and community health worker who understand the health care system. The goal of clinic care coordination is to help you manage your health and improve access to the health care system in the most efficient manner. The team can assist you in meeting your health care goals by providing education, coordinating services, strengthening the communication among your providers and supporting you with any resource needs.    Please feel free to contact me at [phone] with any questions or concerns. We are focused on providing you with the highest-quality healthcare experience possible and that all starts with you.     Sincerely,     Ele Soler RN  Clinical Product Navigator    Enclosed: I have enclosed a copy of the Complex Care Plan. This has helpful information and goals that we have talked about. Please keep this in an easy to access place to use as needed.

## 2021-07-28 NOTE — PROGRESS NOTES
Medical Assistant Note:  Yvette Gastelum presents today for lab draw.    Patient seen by provider today: No.   present during visit today: Not Applicable.    Concerns: No Concerns.    Procedure:  Lab draw site: RAC, Needle type: Butterfly, Gauge: 23.    Post Assessment:  Labs drawn without difficulty: Yes.    Discharge Plan:  Departure Mode: Ambulatory.    Face to Face Time: 4 min.    Shari Schoenberger, CMA

## 2021-07-28 NOTE — PROGRESS NOTES
"Clinic Care Coordination Contact    Clinic Care Coordination Contact  OUTREACH    Referral Information:  Referral Source: Focused Project ([facility])    Primary Diagnosis: Hematological Disorder    Chief Complaint   Patient presents with     Clinic Care Coordination - Post Hospital     Discharge from TCU     Clinic Care Coordination - Initial        Universal Utilization:   Clinic Utilization  Difficulty keeping appointments:: No  Compliance Concerns: No  No-Show Concerns: No  No PCP office visit in Past Year: No  Utilization    Hospital Admissions  6             ED Visits  2             No Show Count (past year)  2                Current as of: 7/28/2021  9:30 AM              Clinical Concerns:  Current Medical Concerns:  Patient with recently diagnosed large right Subdural Hematoma in July, requiring two acute care hospitalizations s/p drain on 7/13/2021.  [facility]  Patient requested early discharge from TCU and has returned home to her apartment.   Today, patient reports she has concerns she's developed gout, as she is having bilateral foot pain, making it painful to ambulate or put anything on her feet. She has no history of gout, but would like to address this new symptom with her PCP.   Pain also reports some \"on and off\" pain in her head- not new or concerning, but it is present at times and \"comes and goes\".      Current Behavioral Concerns:   None noted: speech is clear and articulate, patient a good historian, good recall of plan of care  Education Provided to patient: Reviewed signs and symptoms of when to call care team immediately, reviewed plan of care and importance of very close outpatient monitoring of platelets, encouraged enrollment in Home Health Care and Clinic Care Coordination.  Also discussed [employer] as patient has been paying $50/ride for medical transport.    Pain  Pain (GOAL):: Yes  Type: Acute (<3mo)  Radiating: No  Progression: " Unchanged  Description of pain: Aching, Sharp  Limitation of routine activities due to chronic pain:: Yes  Alleviating Factors: Rest  Aggravating Factors: Activity  Health Maintenance Reviewed: Not assessed  Clinical Pathway: None    Medication Management:  Medication review status: Medications reviewed and no changes reported per patient.             Functional Status:  Dependent ADLs:: Ambulation-walker  Dependent IADLs:: Transportation  Bed or wheelchair confined:: No  Mobility Status: Independent w/Device  Fallen 2 or more times in the past year?: No  Any fall with injury in the past year?: No    Living Situation:  Current living arrangement:: I live alone  Type of residence:: Apartment    Lifestyle & Psychosocial Needs:    Social Determinants of Health     Tobacco Use: Medium Risk     Smoking Tobacco Use: Former Smoker     Smokeless Tobacco Use: Never Used   Alcohol Use:      Frequency of Alcohol Consumption:      Average Number of Drinks:      Frequency of Binge Drinking:    Financial Resource Strain:      Difficulty of Paying Living Expenses:    Food Insecurity:      Worried About Running Out of Food in the Last Year:      Ran Out of Food in the Last Year:    Transportation Needs:      Lack of Transportation (Medical):      Lack of Transportation (Non-Medical):    Physical Activity:      Days of Exercise per Week:      Minutes of Exercise per Session:    Stress:      Feeling of Stress :    Social Connections:      Frequency of Communication with Friends and Family:      Frequency of Social Gatherings with Friends and Family:      Attends Yazidism Services:      Active Member of Clubs or Organizations:      Attends Club or Organization Meetings:      Marital Status:    Intimate Partner Violence:      Fear of Current or Ex-Partner:      Emotionally Abused:      Physically Abused:      Sexually Abused:    Depression: Not at risk     PHQ-2 Score: 0   Housing Stability:      Unable to Pay for Housing in the Last  Year:      Number of Places Lived in the Last Year:      Unstable Housing in the Last Year:      Inadequate nutrition (GOAL):: No  Tube Feeding: No  Inadequate activity/exercise (GOAL):: No  Significant changes in sleep pattern (GOAL): No  Transportation means:: Medical transport, Family     Buddhist or spiritual beliefs that impact treatment:: No  Mental health DX:: No  Mental health management concern (GOAL):: No  Chemical Dependency Status: No Current Concerns  Informal Support system:: Children          Resources and Interventions:  Contacted Home Health Care ([facility]) to update that patient has been discharged to home, and inquired if skilled nursing can assist in lab draws. Also offered to facilitie post-hospital appointment with PCP, patient did not want any other provider than PCP; central scheduling indicated PCP is out of office this week, next available appointment is August 11th, 2021. Confirmed this appointment and notified patient, also encouraged patient to consider interim office visit THIS Friday July 30th with PCP colleague.     Placed MTM referral for transition of care assessment.         Supplies used at home:: None  Equipment Currently Used at Home: walker, standard  Employment Status: retired         Advance Care Plan/Directive  Advanced Care Plans/Directives on file:: No  Advanced Care Plan/Directive Status: In Process    Referrals Placed: Home Care     Goals:   Goals        General     #1Improve chronic symptoms (pt-stated)      Notes - Note created  7/28/2021  9:46 AM by Ele Soler RN     Goal Statement: I want to remain at home and continue recovering from recent hospitalization.   Date goal set: July 28th, 2021  Measure of Success: Patient will remain at home, with gradual improvement in clinical condition and patient-stated return to baseline level of functioning and stamina  Barriers: Complex condition requiring very close monitoring and intervention  Strengths:  Supportive family, patient is very engaged with care team, motivated to remain outpatient  Date to Achieve By: 60 days free of hospitalization September 28th, 2021  Patient expressed understanding of goal: Patient verbalized understanding, engaged in AIDET communication behavior during encounter.       Action steps to achieve this goal  1. I will enroll in home health care and work with skilled nursing, PT, and OT   2. I will follow recommended plan of care and have routine labs and platelet infusions as needed  3. I will call my care team immediately if I have any new symptoms          #2 Transportation (pt-stated)      Notes - Note created  7/28/2021  9:47 AM by Ele Soler RN     Goal Statement: I want to enroll in an affordable transportation option to get to and from my medical appointments  Date Goal set: July 28th, 2021  Barriers: Unable to drive herself during post-hospital recovery, requires multiple appointments per week  Strengths: Very interested in completing application for [employer]  Date to Achieve By: August 30th, 2021  Patient expressed understanding of goal: Patient verbalized understanding, engaged in AIDET communication behavior during encounter.   Action steps to achieve this goal:  1. I will complete application for [employer]  2. I will work with Clinic Care Coordination team to learn how to utilize the ride service              Patient/Caregiver understanding: Patient verbalized understanding, engaged in AIDET communication behavior during encounter.    Outreach Frequency: weekly  Future Appointments              Today  PIV LAB [facility]    Tomorrow  PIV LAB [facility] [place]    Tomorrow  INFUSION CHAIR 11;  CANCER INFUSION [facility] [place]    In 2 days  PIV LAB [facility] [place]    In 6 days Nurse,  Neuro [facility]  [facility] [place]    In 1 week  PIV LAB [facility] [place]    In 1 week Jaciel Castellon APRN CNP [facility]    In 1 week  INFUSION CHAIR 9;  CANCER INFUSION NURSE [facility] [place]    In 2 weeks Arie House MD [facility], CS    In 3 weeks Juancarlos Ling PA-C [facility]          Plan:   1. Will alert PCP care team that patient would like assistance in completing forms for [employer] during 8/11/2021 office visit  2. Will mail patient Introduction to Care Coordination and Complex Care Plan  3. Will message [place] care team to please print [employer] Form and have provider complete during 8/11/21 office visit.   4. Clinic Care Coordination will continue to follow patient and collaborate with care team to promote successful outpatient plan of care.     Ele Soler RN  Clinical Product Navigator

## 2021-07-28 NOTE — LETTER
[facility]  Complex Care Plan  About Me:    Patient Name:  Yvette Gastelum    YOB: 1950  Age:         70 year old   Arnold MRN:    [id number] Telephone Information:  Home Phone [phone]   Mobile [phone]       Address:  [address]  [place] [address] Email address:  [email or web address]      Emergency Contact(s)    Name Relationship Lgl Grd Work Phone Home Phone Mobile Phone   1. ZAINAB AGUERO* Son No  [phone]   2. CHRISTINA AGUERO Son No  [phone]   3. ALEXSANDRA AGUERO Son    [phone]           Primary language:  English     needed? No   [facility] [employer]:  [phone] op. 1  Other communication barriers: Physical impairment, Utilization  Preferred Method of Communication:  Phone  Current living arrangement: I live alone  Mobility Status/ Medical Equipment: Independent w/Device    Health Maintenance  Health Maintenance Reviewed: Not assessed    My Access Plan  Medical Emergency 911   Primary Clinic Line [facility] - [phone]   24 Hour Appointment Line [phone] or  [phone]) (toll-free)   24 Hour Nurse Line [phone] (toll-free)   Preferred Urgent Care [facility], [phone]   Preferred Hospital [facility]  [phone]   Preferred [facility]acy [id number]  [place] - [address] AT [address] [place] [address]     Behavioral Health Crisis Line The National Suicide Prevention Lifeline at 1-529.668.2437 or 911             My Care Team Members  Patient Care Team       Relationship Specialty Notifications Start End    Arie House MD PCP - General Internal Medicine  12/5/15     Phone: [phone] Pager: [phone] Fax: [phone] [address] [place] [address]    Arie House MD Assigned PCP   11/13/15     Phone: [phone] Pager: [phone]  Fax: [phone]        [address] [place] [address]    Jennifer Luz MD Assigned Cancer Care Provider   10/23/20     Phone: [phone] Fax: [phone]         [facility] [address] [place] [address]    Ele Soler, RN Other (see comments) Primary Care - CC  7/22/21     RN Clinical Product Navigator            My Care Plans  Self Management and Treatment Plan  Goals and (Comments)  Goals        General     #1Improve chronic symptoms (pt-stated)      Notes - Note created  7/28/2021  9:46 AM by Ele Soler, RN     Goal Statement: I want to remain at home and continue recovering from recent hospitalization.   Date goal set: July 28th, 2021  Measure of Success: Patient will remain at home, with gradual improvement in clinical condition and patient-stated return to baseline level of functioning and stamina  Barriers: Complex condition requiring very close monitoring and intervention  Strengths: Supportive family, patient is very engaged with care team, motivated to remain outpatient  Date to Achieve By: 60 days free of hospitalization September 28th, 2021  Patient expressed understanding of goal: Patient verbalized understanding, engaged in AIDET communication behavior during encounter.       Action steps to achieve this goal  1. I will enroll in home health care and work with skilled nursing, PT, and OT   2. I will follow recommended plan of care and have routine labs and platelet infusions as needed  3. I will call my care team immediately if I have any new symptoms          #2 Transportation (pt-stated)      Notes - Note created  7/28/2021  9:47 AM by Ele Soler, RN     Goal Statement: I want to enroll in an affordable transportation option to get to and from my medical appointments  Date Goal set: July 28th, 2021  Barriers: Unable to drive herself during post-hospital recovery, requires multiple appointments per week  Strengths: Very interested in completing  application for [employer]  Date to Achieve By: August 30th, 2021  Patient expressed understanding of goal: Patient verbalized understanding, engaged in AIDET communication behavior during encounter.   Action steps to achieve this goal:  1. I will complete application for [employer]  2. I will work with Clinic Care Coordination team to learn how to utilize the ride service                      My Medical and Care Information  .    Care Coordination Start Date: 7/28/2021   Frequency of Care Coordination: weekly   Form Last Updated: 07/28/2021

## 2021-07-29 NOTE — TELEPHONE ENCOUNTER
Primary Care Clinic RN CC notified by home health care that patient has not been able to reach following discharge from TCU.     Reviewed chart: patient has required daily labs and platelets since leaving TCU.  She has appointment for lab draw and infusions Friday 7/30/21 and then again Monday 8/2/21; unsure if patient should have a weekend plan of care? [facility]entially [facility] [place] could facilitate weekend lab and platelets as an option if hem/on care team feels that is needed.     Plan: routing to hem/onc RN CC Jeannette Soler, RN  Primary Care  Ambulatory Care Coordination

## 2021-07-29 NOTE — PROGRESS NOTES
Medical Assistant Note:  Yvette Gastelum presents today for blood draw.    Patient seen by provider today: No.   present during visit today: Not Applicable.    Concerns: No Concerns.    Procedure:  Lab draw site: left hand, Needle type: bf, Gauge: 23.    Post Assessment:  Labs drawn without difficulty: Yes.    Discharge Plan:  Departure Mode: Ambulatory.    Face to Face Time: 5 min  .    Kim Plata, CMA

## 2021-07-29 NOTE — PROGRESS NOTES
Social Work Progress Note      Data/Intervention:  Patient Name:  Yvette Gastelum  /Age:  1950 (70 year old)    Reason for Follow-Up:  Yvette is a 70-year-old woman with a diagnosis of limited-stage small cell lung cancer in remission. This clinician received referral from RNCC for transportation support.     Intervention:   SW called health insurance and learned that Yvette does not have transportation benefit (Ref #[id number]). SW provided information to Yvette that her changed name is not updated with health insurance, and provided information about how to contact Social Security for name change.     SW provided Yvette with local transportation resource list, as well as application for [employer] and Help at Your Door. Yvette reported that she has paid for transportation support through end of week, but has concerns about affordability of transportation on weekend, and reported that son Luis M cannot help with rides.     Yvette to contact this clinician with transportation concerns.     Plan:  SW will continue to be available as needed for ongoing psychosocial support. Yvette knows to reach out in case of concern or need.     Please call or page if needs or concerns arise.     STUART Benedict, MaineGeneral Medical CenterSW  Direct Phone: [phone]  Pager: [phone]

## 2021-07-29 NOTE — PROGRESS NOTES
Infusion Nursing Note:  Yvette Gastelum presents today for 1 dose platelets.    Patient seen by provider today: No   present during visit today: Not Applicable.    Note: [facility] N[facility] here and spoke with pt about possible transportation benefits. Pt returns tomorrow for labs/transfusion, but she states she cannot afford the transportation for Sat/Sun. .      Intravenous Access:  Peripheral IV placed.    Treatment Conditions:  Lab Results   Component Value Date    HGB 7.9 07/29/2021    HGB 7.4 07/10/2021     Lab Results   Component Value Date    WBC 7.3 07/29/2021    WBC 4.8 07/10/2021      Lab Results   Component Value Date    ANEU 3.8 07/27/2021    ANEU 4.6 07/08/2021     Lab Results   Component Value Date    PLT 91 07/29/2021    PLT 91 07/10/2021      Results reviewed, labs MET treatment parameters, ok to proceed with treatment.  Blood transfusion consent signed 7/25/21.  Patient will receive 1 dose platelets today for platelet count of 91 d/t brain bleed.      Post Infusion Assessment:  Patient tolerated transfusion without incident.  Blood return noted pre and post infusion.  Site patent and intact, free from redness, edema or discomfort.  No evidence of extravasations.  Access discontinued per protocol.       Discharge Plan:   Discharge instructions reviewed with: Patient.  Patient and/or family verbalized understanding of discharge instructions and all questions answered.  AVS to patient via [employer].  Patient will return 7/30/21 for next appointment.   Patient discharged in stable condition accompanied by: self.  Departure Mode: Ambulatory with waker      ARIANNA Angela RN

## 2021-07-30 NOTE — NURSING NOTE
Writer received message from infusion nurse that patient is declining plts transfusion over the weekend. She does not have a ride.  is working on resources. Infusion nurse to review the risk of low plts while recovering from a brain bleed.      Patient advised immediate ER intervention with any neurological changes, and change in level of consciousness.    Jeannette Moore RN

## 2021-07-30 NOTE — TELEPHONE ENCOUNTER
MTM referral from: [place] clinic visit (referral by provider) and Transitions of Care (recent hospital discharge or ED visit)    MTM referral outreach attempt #2 on July 30, 2021 at 3:20 PM      Outcome: Patient not reachable after several attempts, will route to MTM Pharmacist/Provider as an FYI. Thank you for the referral.    Vishnu Sheets, MTM coordinator

## 2021-07-30 NOTE — TELEPHONE ENCOUNTER
Social Work Progress Note      Data/Intervention:  Patient Name:  Yvette Gastelum  /Age:  1950 (70 year old)    Reason for Follow-Up:  Yvette is a 70-year-old woman with a diagnosis of limited-stage small cell lung cancer in remission. Yvette and this clinician exchanged multiple voicemails today.      Intervention:   Yvette reported that she is not able to pay for transportation for the weekend and denied SW offer for cab voucher support for weekend appointments. Yvette reported that she has a neighbor with [employer] who is going to help with transportation next week and she is going to go through application process for [employer].     [facility] collaborated with  to Social Work Department for assistance with $50 for [employer] gift card. SW left detailed message with for Yvette with this information.    Plan:  SW will continue to be available as needed for ongoing psychosocial support. Yvette knows to reach out in case of concern or need.     Please call or page if needs or concerns arise.     STUART Benedict, LICSW  Direct Phone: [phone]  Pager: [phone]

## 2021-07-30 NOTE — PROGRESS NOTES
Infusion Nursing Note:  Yvette Gastelum presents today for platelets transfusion.    Patient seen by provider today: No   present during visit today: Not Applicable.    Note: pt states she is feeling ok, no changes or new concerns since yesterday visit.  Pt recommended she come in daily for lab and platelets transfusion d/t high risk for bleeding. However, pt is unable to have a ride for the weekend.  Review with pt that she is at risk for bleeding and any changes in her thought process, vision, or gait mean she needs to go to the ER. Pt verbalized understanding.     Intravenous Access:  Peripheral IV placed.    Treatment Conditions:  Lab Results   Component Value Date    HGB 7.5 07/30/2021    HGB 7.4 07/10/2021     Lab Results   Component Value Date    WBC 6.1 07/30/2021    WBC 4.8 07/10/2021      Lab Results   Component Value Date    ANEU 3.8 07/27/2021    ANEU 4.6 07/08/2021     Lab Results   Component Value Date    PLT 88 07/30/2021    PLT 91 07/10/2021      Results reviewed, labs MET treatment parameters, ok to proceed with treatment.  Blood transfusion consent signed 7/25/21.      Post Infusion Assessment:  Patient tolerated infusion without incident.  Blood return noted pre and post infusion.  Site patent and intact, free from redness, edema or discomfort.  No evidence of extravasations.  Access discontinued per protocol.       Discharge Plan:   Discharge instructions reviewed with: Patient.  Patient and/or family verbalized understanding of discharge instructions and all questions answered.  AVS to patient via [employer].  Patient will return 8/2/21 for next appointment.   Patient discharged in stable condition accompanied by: self.  Departure Mode: Ambulatory with walker.      Jelly Bradford RN

## 2021-07-30 NOTE — PROGRESS NOTES
Medical Assistant Note:  Yvette Gastelum presents today for blood draw.    Patient seen by provider today: No.   present during visit today: Not Applicable.    Concerns: No Concerns.    Procedure:  Lab draw site: left hand, Needle type: bf, Gauge: 23.    Post Assessment:  Labs drawn without difficulty: Yes.    Discharge Plan:  Departure Mode: Ambulatory.    Face to Face Time: 5 min.    Kim Plata, CMA

## 2021-08-02 NOTE — PROGRESS NOTES
Infusion Nursing Note:  Yvette Gastelum presents today for labs & platelets.    Patient seen by provider today: No   present during visit today: Not Applicable.    Note: N/A.      Intravenous Access:  Labs drawn without difficulty.  Peripheral IV placed.    Treatment Conditions:  Lab Results   Component Value Date    HGB 7.7 08/02/2021    HGB 7.4 07/10/2021     Lab Results   Component Value Date    WBC 5.3 08/02/2021    WBC 4.8 07/10/2021      Lab Results   Component Value Date    ANEU 3.8 07/27/2021    ANEU 4.6 07/08/2021     Lab Results   Component Value Date    PLT 44 08/02/2021    PLT 91 07/10/2021      Results reviewed, labs MET treatment parameters, ok to proceed with treatment.  Blood transfusion consent signed 7/25/21.      Post Infusion Assessment:  Patient tolerated infusion without incident.  Blood return noted pre and post infusion.  Site patent and intact, free from redness, edema or discomfort.  No evidence of extravasations.  Access discontinued per protocol.       Discharge Plan:   Discharge instructions reviewed with: Patient.  Patient and/or family verbalized understanding of discharge instructions and all questions answered.  Copy of AVS reviewed with patient and/or family.  Patient will return as scheduled for next appointment.  Patient discharged in stable condition accompanied by: self.  Departure Mode: Ambulatory.      Daily Siegel RN

## 2021-08-02 NOTE — TELEPHONE ENCOUNTER
Sent Laura sharpg.    Kristal Reyes, PharmD, [facility]  Medication Therapy Management Provider  Pager: [phone]

## 2021-08-03 NOTE — PATIENT INSTRUCTIONS
Instructions for Patient    Keep your incision clean and dry at all times.     No bathing, swimming, or submerging in water until incision is well healed.      No lifting greater than 10-15 pounds. No bending, twisting, or overhead reaching.     Keppra and resume taking for 1 month post-op for seizure prophylaxis.     Weaning from narcotic pain medications    When it is time, start weaning by extending the time between doses.     For example, if you're taking 2 tabs every 4 hours, spread it out to 2 tabs over 4.5, 5, 6 hours.     At that point you can certainly cut down to 1 tab, then wean to an as needed basis until completely done with them.    For refills, please call our clinic. A nurse will call you back to obtain a pain assessment. Please call 3-4 business days before you run out so we can ensure there is a provider available at the location you prefer for .    Call the clinic or go to Emergency Room if you develop any new pain, drainage, swelling, or fever. Go to the Emergency Room if sudden onset of severe headache, weakness, confusion, change in level of consciousness, pain, or loss of movement.    Post-operative appointments    You will need an x-ray before your 6 week post op, 3 months post op, 6 months post op, 1 year post-op appointments (    Please call clinic with any further questions or concerns    ARIANNA Durant  [facility]  [address] [address]  T:  [phone]  F:  [phone]

## 2021-08-03 NOTE — PROGRESS NOTES
Patient presents for 2 week incision nurse visit check.     DOS:7/5/2021 and 7/13/2021  Procedure: Right frontal craniotomy and evacuation of recurrent subdural hematoma  Surgeon: Puma Dominguez MD     Pain symptoms include mild, infrequent HA. Pt is not taking any medications for pain.    Encouraged icing for at least 5-7 times throughout the day for 20-30 minutes at a time, avoiding heat to the incision area. Discussed maximum dose of Acetaminophen in 24 hours, along with holding NSAIDs.    Patient is walking frequently with a walker and some difficulty, hx of gout. Legs examined in clinic; no redness, swelling, or warmth noted. Patient denies any pain in bilateral calves. Activity restrictions reinforced at this time as outlined the After Visit Summary.     Patient's appetite is poor she reports digestive issues that have been present since prior to surgery  Bowel/bladder problems? No new issues  Taking stool softeners? No  Discussed reasons for constipation post-operatively and encouraged stool softeners while taking narcotic pain medication.     Patient denies signs of infection at incision site. Cranial incision inspected. Edges well-approximated. No redness, swelling, drainage, or warmth noted. Incision prepped with ChloraPrep and staple(s)  removed without difficulty. Aftercare instructions discussed with patient.     Refills given at this appointment? Yes Pt reports she was unable to fill her KEPPRA at [facility] so she has not been taking it. RN Called pharmacy to ensure pt can  as she was supposed to take this for 1 month post-op as seizure prophylaxis.   Sent for x-rays after this appointment? No  Return to work discussed at this appointment? No    All of patient's questions addressed today. She was instructed to call with any additional questions/concerns.

## 2021-08-03 NOTE — PATIENT INSTRUCTIONS
Recommendations from today's MTM visit:                                                    MTM (medication therapy management) is a service provided by a clinical pharmacist designed to help you get the most of out of your medicines.   Today we reviewed what your medicines are for, how to know if they are working, that your medicines are safe and how to make your medicine regimen as easy as possible.      1.  Follow-up with NeuroSurgeon as planned - will need updated prescription for Keppra if you are to continue taking this.    2.  I will call you tomorrow to discuss visit today and help obtain refills on the medications she should be taking.      Follow-up: Return in about 1 day (around 8/4/2021) for Medication Therapy Management.    It was great to speak with you today.  I value your experience and would be very thankful for your time with providing feedback on our clinic survey. You may receive a survey via email or text message in the next few days.     To schedule another MTM appointment, please call the clinic directly or you may call the MTM scheduling line at [phone] or toll-free at [phone].     My Clinical Pharmacist's contact information:                                                      Please feel free to contact me with any questions or concerns you have.      Maile Quezada , Pharm D  [phone] (phone)  Medication Therapy Management Pharmacist

## 2021-08-03 NOTE — NURSING NOTE
"Yvette Gastelum is a 70 year old female who presents for:  Chief Complaint   Patient presents with     Neurologic Problem     F/U        Initial Vitals:  /83 (BP Location: Right arm, Patient Position: Sitting, Cuff Size: Adult Regular)   Pulse 105   Temp 98.5  F (36.9  C)   Ht 5' 4\" (1.626 m)   Wt 206 lb (93.4 kg)   SpO2 98%   BMI 35.36 kg/m   Estimated body mass index is 35.36 kg/m  as calculated from the following:    Height as of this encounter: 5' 4\" (1.626 m).    Weight as of this encounter: 206 lb (93.4 kg).. Body surface area is 2.05 meters squared. BP completed using cuff size: regular  Severe Pain (6)        "

## 2021-08-03 NOTE — PROGRESS NOTES
Medication Therapy Management (MTM) Encounter    ASSESSMENT:                            Medication Adherence/Access: See below for considerations    Subdural Hematoma: follow-up with Neursurgeon today as planned.  Will confirm with them what medications she should be taking.    Lung cancer/ Chronic thrombocytopenia/Anemia:  Follow-up as planned, has an appointment with the infusion center tomorrow    Gout: untreated.  Uric acid elevated, has follow-up with primary care provider next week.  Advise call the clinic if pain continues or gets worse.    GERD/IBS-diarrhea: stable    Hyperlipidemia: not currently treated, there is a risk of ICH with statin use so may be okay to stay off for the time being    Supplement: stable    Urinary Retention:  Stable off medication    PLAN:                            1.  Follow-up with NeuroSurgeon as planned - will need updated prescription for Keppra if she is to continue taking    2.  MTM will call Yvette tomorrow to discuss visit today and help obtain refills on the medications she should be taking (atorvastatin - safe to continue or should she hold?)     Follow-up:  Tomorrow morning    SUBJECTIVE/OBJECTIVE:                          Yvette Gastelum is a 70 year old female called for a transitions of care visit. She was discharged from [facility] on 7/22 for hematoma.  Discharged from [facility] on 7/26/21.    Reason for visit: Transitions of Care. Medication review    Allergies/ADRs: None  Tobacco: She reports that she quit smoking about 5 years ago. Her smoking use included cigarettes. She started smoking about 55 years ago. She has a 50.00 pack-year smoking history. She has never used smokeless tobacco.      Medication Adherence/Access:   Since she has been discharged has not had medications at home.  Paying for cabs to daily labs which is financially stressful for her; has application to apply for [employer].      Subdural Hematoma:  Prescribed Keppra - has not been able to get  this filled since being home so has not been taking.  Seeing NeuroSurgeon today for follow-up.      Recurrent subdural hematoma   - transferred out of the ICU on 7/15, stable  - plan per neurosurgery  - Hematology recommending to keep platelets > 100  - last platelet transfusion  7/15, 7/18, 7/19, 7/21  -Hb stable. Wbc nl, Platelets 104,000 on day of discharge.      Patient is doing well at this time, NS and oncology has clear her for discharge.  She will remain on Keppra for seizure prophylaxis and follow up with NS in clinic.  She will need every other day CBC check and follow up with Dr Luz.   She denies any chest pain, SOB, fever, chills, nausea, vomiting, headache, dizziness,  Numbness or tingling at time of discharge. She is able to walk with therapy without any  Problem.     Lung cancer/ Chronic thrombocytopenia/Anemia:  Was taking hydromorphone as needed while inpatient as needed, docusate as needed.  No concerns with pain at this time.    Taking Tessalon three times daily for cough.  The Tessalon is effective for her cough.  Has albuterol nebs and sodium nebs at home to take as needed.    S/P Chemo in 2018  Appreciate hematology/oncology consult , recommendations   - Hgb 6.4 on 7/14-> 7.5 7/15-> 8.1 ---> 7.6 today  - s/p platelet transfusion 7/15 for platelets 86.  -transfuse platelets 7/15 for 86 and 7/18 for 88K, 7/19 for 93K  - prn platelet transfusion for platelets < 100K per oncology  - Plan to keep >100K & monitor  -transfuse for Hb <7 per oncology  -blood counts/platelet count every other day     Gout: Had gout in right and left toe.  Has appointment with primary care provider on 8/11.  No therapy at this time.  Was treated with prednisone while inpatient unsure if this was helpful.  Uric Acid   Date Value Ref Range Status   07/17/2021 6.2 (H) 2.6 - 6.0 mg/dL Final       GERD/IBS-diarrhea: Current medications include: Prilosec (omeprazole) 20 mg once daily. Pt reports no current symptoms.  Patient  feels that current regimen is effective.   Has Imodium to take as needed.    Hyperlipidemia: Current therapy none.  Atorvastatin prescribed but she does not have this at home.  Recent Labs   Lab Test 03/17/21  1553 10/02/19  1222   CHOL 159 186   HDL 40* 44*   LDL 61 89   TRIG 290* 265*     Supplement: has stopped her supplements on her own.    Urinary Retention:  Was prescribed tamsulosin while inpatient but was unsure what this was for so has not been taking.  This has not been a concern since being home.        Post Discharge Medication Reconciliation Status: discharge medications reconciled and changed, per note/orders.    I spent 30 minutes with this patient today. All changes were made via collaborative practice agreement with PCP. A copy of the visit note was provided to the patient's primary care provider.    The patient was sent via [employer] a summary of these recommendations.     Maile Quezada , Pharm D  [phone] (phone)  Medication Therapy Management Pharmacist     Telemedicine Visit Details  Type of service:  Telephone visit  Start Time: 1100 am  End Time: 1130 am  Originating Location (patient location): Home  Distant Location (provider location):  [facility]     Medication Therapy Recommendations  Hyperlipidemia    Current Medication: atorvastatin (LIPITOR) 10 MG tablet   Rationale: Unsafe medication for the patient - Adverse medication event - Safety   Recommendation: Provide Education - Discuss ongoing use of statin with NeuroSurgeon   Status: Contact Provider - Awaiting Response         Thrombocytopenia (H)    Current Medication: levETIRAcetam (KEPPRA) 500 MG tablet   Rationale: Medication product not available - Adherence - Adherence   Recommendation: Provide Education - Patient follow-up NeuroSurgeon as planned   Status: Contact Provider - Awaiting Response

## 2021-08-03 NOTE — LETTER
"        Date: 8/3/2021    Yvette Gastelum  [address]   [place] [address]    Dear Ms. Gastelum,    Thank you for talking with me on 8/3/21 about your health and medications. Medicare s MTM (Medication Therapy Management) program helps you understand your medications and use them safely.      This letter includes an action plan (Medication Action Plan) and medication list (Personal Medication List). The action plan has steps you should take to help you get the best results from your medications. The medication list will help you keep track of your medications and how to use them the right way.       Have your action plan and medication list with you when you talk with your doctors, pharmacists, and other healthcare providers in your care team.     Ask your doctors, pharmacists, and other healthcare providers to update the action plan and medication list at every visit.     Take your medication list with you if you go to the hospital or emergency room.     Give a copy of the action plan and medication list to your family or caregivers.     If you want to talk about this letter or any of the papers with it, please call   [phone].We look forward to working with you, your doctors, and other healthcare providers to help you stay healthy through the [facility] MTM program.    Sincerely,  Maile Quezada [facility]    Enclosed: Medication Action Plan and Personal Medication List    MEDICATION ACTION PLAN FOR Yvette Gastelum,  1950     This action plan will help you get the best results from your medications if you:   1. Read \"What we talked about.\"   2. Take the steps listed in the \"What I need to do\" boxes.   3. Fill in \"What I did and when I did it.\"   4. Fill in \"My follow-up plan\" and \"Questions I want to ask.\"     Have this action plan with you when you talk with your doctors, pharmacists, and other healthcare providers in your care team. Share this with your family or caregivers " too.  DATE PREPARED: 8/3/2021  What we talked about: Keppra                                               What I need to do: confirm ongoing need of Keppra       What I did and when I did it:                                              What we talked about: medication refills                                                  What I need to do: confirm if you should be taking the other medication at this and we can help get refills       What I did and when I did it:                                                 My follow-up plan:                 Questions I want to ask:              If you have any questions about your action plan, call Maile Quezada [facility], Phone: [phone] , Monday-Friday 8-4:30pm.           PERSONAL MEDICATION LIST FOR Yvette Gastelum  1950     This medication list was made for you after we talked. We also used information from your doctor's chart.      Use blank rows to add new medications. Then fill in the dates you started using them.    Cross out medications when you no longer use them. Then write the date and why you stopped using them.    Ask your doctors, pharmacists, and other healthcare providers to update this list at every visit. Keep this list up-to-date with:       Prescription medications    Over the counter drugs     Herbals    Vitamins    Minerals      If you go to the hospital or emergency room, take this list with you. Share this with your family or caregivers too.     DATE PREPARED: 8/3/2021  Allergies or side effects: No known allergies     Medication:  ACETAMINOPHEN 325 MG PO CAPS      How I use it:  Take 325-650 mg by mouth every 6 hours as needed for pain       Why I use it:  Pain    Prescriber:  Patient Reported      Date I started using it:       Date I stopped using it:         Why I stopped using it:            Medication:  ALBUTEROL SULFATE (2.5 MG/3ML) 0.083% IN NEBU      How I use it:  Take 1 vial (2.5 mg) by nebulization 2 times daily      Why I use  it:  Shortness of Breath    Prescriber:  ЕКАТЕРИНА Pham CNP      Date I started using it:       Date I stopped using it:         Why I stopped using it:            Medication:  ATORVASTATIN CALCIUM 10 MG PO TABS      How I use it:  Take 1 tablet (10 mg) by mouth daily      Why I use it: Hyperlipidemia LDL goal <130    Prescriber:  Arie House MD      Date I started using it:       Date I stopped using it:         Why I stopped using it:            Medication:  BENZONATATE 100 MG PO CAPS      How I use it:  Take 1 capsule (100 mg) by mouth 3 times daily      Why I use it:  Cough    Prescriber:  ЕКАТРЕИНА Pham CNP      Date I started using it:       Date I stopped using it:         Why I stopped using it:            Medication:  LEVETIRACETAM 500 MG PO TABS      How I use it:  Take 1 tablet (500 mg) by mouth 2 times daily      Why I use it: SDH (subdural hematoma) (H)    Prescriber:  ЕКАТЕРИНА Pham CNP      Date I started using it:       Date I stopped using it:         Why I stopped using it:            Medication:  LOPERAMIDE HCL 2 MG PO CAPS      How I use it:  Take 2 mg by mouth 4 times daily as needed for diarrhea       Why I use it:  Diarrhea    Prescriber:  Patient Reported      Date I started using it:       Date I stopped using it:         Why I stopped using it:            Medication:  OMEPRAZOLE 20 MG PO CPDR      How I use it:  Take 1 capsule (20 mg) by mouth daily      Why I use it: Gastroesophageal reflux     Prescriber:  Arie House MD      Date I started using it:       Date I stopped using it:         Why I stopped using it:            Medication:  SODIUM CHLORIDE 3 % IN NEBU      How I use it:  Take 3 mLs by nebulization 2 times daily Use with albuterol inhaler      Why I use it:  Shortness of breath, Cough    Prescriber:  ЕКАТЕРИНА Pham CNP      Date I started using it:       Date I stopped using it:         Why I  stopped using it:            Medication:         How I use it:         Why I use it:      Prescriber:         Date I started using it:       Date I stopped using it:         Why I stopped using it:            Medication:         How I use it:         Why I use it:      Prescriber:         Date I started using it:       Date I stopped using it:         Why I stopped using it:            Medication:         How I use it:         Why I use it:      Prescriber:         Date I started using it:       Date I stopped using it:         Why I stopped using it:              Other Information:     If you have any questions about your medication list, call Maile Quezada [facility], Phone: [phone] , Monday-Friday 8-4:30pm.    According to the Paperwork Reduction Act of 1995, no persons are required to respond to a collection of information unless it displays a valid OMB control number. The valid OMB number for this information collection is [id number]. The time required to complete this information collection is estimated to average 40 minutes per response, including the time to review instructions, searching existing data resources, gather the data needed, and complete and review the information collection. If you have any comments concerning the accuracy of the time estimate(s) or suggestions for improving this form, please write to: CMS, Attn: FELICIA Reports Clearance Officer, [address] [place] [phone].

## 2021-08-04 NOTE — PROGRESS NOTES
Infusion Nursing Note:  Yvette Gastelum presents today for one unit platelets.    Patient seen by provider today: No   present during visit today: Not Applicable.    Note: N/A.      Intravenous Access:  Peripheral IV placed.    Treatment Conditions:  Lab Results   Component Value Date    HGB 7.5 08/04/2021    HGB 7.4 07/10/2021     Lab Results   Component Value Date    WBC 4.6 08/04/2021    WBC 4.8 07/10/2021      Lab Results   Component Value Date    ANEU 3.8 07/27/2021    ANEU 4.6 07/08/2021     Lab Results   Component Value Date    PLT 84 08/04/2021    PLT 91 07/10/2021      Blood transfusion consent signed 7/25/21      Post Infusion Assessment:  Patient tolerated infusion without incident.  Site patent and intact, free from redness, edema or discomfort.  No evidence of extravasations.  Access discontinued per protocol.       Discharge Plan:   AVS to patient via MYCHART.  Patient will return 8/5 for next appointment.   Patient discharged in stable condition accompanied by: self.  Departure Mode: Ambulatory with walker.      Maury Santana RN

## 2021-08-04 NOTE — PROGRESS NOTES
Clinic Care Coordination Contact    Chart Reviewed: since our last conversation, patient has completed follow up with MTM and Neurosurgery; she continues to have daily labs and frequent platelet infusions.     Patient has also had an outreach and additional transportation resources reviewed from the hem/onc SW.      PLAN:  Will send patient a message on behalf of primary care CC team to assess for any current/ongoing care needs. Will remind patient to look for [employer] application and bring to follow up appointment.     Ele Soler RN  Clinical Product Navigator

## 2021-08-05 NOTE — PROGRESS NOTES
Infusion Nursing Note:  Yvette BROTHERS Nirav presents today for platelets and blood.    Patient seen by provider today: Yes: Jaciel   present during visit today: Not Applicable.    Note: N/A.      Intravenous Access:  Labs drawn without difficulty.  Peripheral IV placed.    Treatment Conditions:  Lab Results   Component Value Date    HGB 7.1 08/05/2021    HGB 7.4 07/10/2021     Lab Results   Component Value Date    WBC 4.2 08/05/2021    WBC 4.8 07/10/2021      Lab Results   Component Value Date    ANEU 3.8 07/27/2021    ANEU 4.6 07/08/2021     Lab Results   Component Value Date    PLT 60 08/05/2021    PLT 91 07/10/2021      Results reviewed, labs MET treatment parameters, ok to proceed with treatment.  Results reviewed, labs did NOT meet treatment parameters: Hgb 7.1, give blood per Jaciel.  Blood transfusion consent signed 7/25/21.      Post Infusion Assessment:  Patient tolerated infusion without incident.  Blood return noted pre and post infusion.  Site patent and intact, free from redness, edema or discomfort.  No evidence of extravasations.  Access discontinued per protocol.       Discharge Plan:   Discharge instructions reviewed with: Patient.  Patient and/or family verbalized understanding of discharge instructions and all questions answered.  Copy of AVS reviewed with patient and/or family.  Patient will return tomorrow for next appointment.  Patient discharged in stable condition accompanied by: self.  Departure Mode: Ambulatory.      Daily Siegel RN

## 2021-08-05 NOTE — PROGRESS NOTES
Oncology/Hematology Visit Note  Aug 5, 2021    Reason for Visit: follow up of chronic thrombocytopenia    07/13/07/22-patient admitted to hospital with recurrent subdural hematoma  Needing surgery    Small cell lung cancer -diagnosed in 2018  Patient of Dr. Luz's  Treated with concurrent chemoradiation  followed by prophylactic cranial radiation  06/17/2021-CT scan reveals no evidence of malignancy    Cytopenia  Anemia and thrombocytopenia  04/21-bone marrow biopsy reveals possibility of developing myelopathic syndrome        Interval History:  Patient reports she continues to have bilateral feet pain.  Reports she was diagnosed with gout in the hospital.  She was prescribed prednisone to the hospital which she reports helped with the pain  Patient denies numbness or tingling denies edema  Denies fever chills sweats cough shortness of breath chest pain.  Patient denies bleeding  Patient denies headache dizziness seizures weakness on one side of the body  Reports she cannot come any more daily for labs therefore she would like to come 3 times per week    Review of Systems:  14 point ROS of systems including Constitutional, Eyes, Respiratory, Cardiovascular, Gastroenterology, Genitourinary, Integumentary, Muscularskeletal, Psychiatric were all negative except for pertinent positives noted in my HPI.    Physical Examination:  General: The patient is a pleasant female in no acute distress.  /67   Pulse 98   Temp 98.7  F (37.1  C) (Oral)   Resp 16   SpO2 100%   HEENT: EOMI, PERRL. Sclerae are anicteric. Oral mucosa is pink and moist with no lesions or thrush.   Lymph: Neck is supple with no lymphadenopathy in the cervical or supraclavicular areas.   Heart: Regular rate and rhythm.   Lungs: Clear to auscultation bilaterally.   GI: Bowel sounds present, soft, nontender with no palpable hepatosplenomegaly or masses.   Extremities: No lower extremity edema noted bilaterally.   Skin: No rashes, petechiae, or  bruising noted on exposed skin.    Laboratory Data:  Results for orders placed or performed in visit on 08/05/21 (from the past 24 hour(s))   Extra Tube (Secretary Draw)    Narrative    The following orders were created for panel order Extra Tube (Secretary Draw).  Procedure                               Abnormality         Status                     ---------                               -----------         ------                     Extra Green Top (Lithium...[[id number]]                      In process                 Extra Purple Top Tube[[id number]]                            In process                 Extra Blood Bank Purple ...[[id number]]                      In process                   Please view results for these tests on the individual orders.   CBC with platelets   Result Value Ref Range    WBC Count 4.2 4.0 - 11.0 10e3/uL    RBC Count 3.22 (L) 3.80 - 5.20 10e6/uL    Hemoglobin 7.1 (L) 11.7 - 15.7 g/dL    Hematocrit 25.2 (L) 35.0 - 47.0 %    MCV 78 78 - 100 fL    MCH 22.0 (L) 26.5 - 33.0 pg    MCHC 28.2 (L) 31.5 - 36.5 g/dL    RDW 26.3 (H) 10.0 - 15.0 %    Platelet Count 60 (L) 150 - 450 10e3/uL         Assessment and Plan:    This is a 70-year-old female with    Small cell lung cancer -diagnosed in 2018  Patient of Dr. Luz's  Treated with concurrent chemoradiation  followed by prophylactic cranial radiation  06/17/2021-CT scan reveals no evidence of malignancy  -Schedule with Dr. Luz next week    Recurrent subdural hematoma  07/13/07/22-patient admitted to hospital with recurrent subdural hematoma  Large right subdural hematoma status post drain on 07 /13  -Patient is asymptomatic  Continue with Keppra  Continue follow-up with neurosurgery clinic  -Patient has follow-up appointments scheduled  with neurosurgery clinic  Continue per neurosurgery recommendations follow-up with NS clinic at 2 weeks post op for incision check, then 6 weeks post op with head CT prior. Continue Keppra for 1 month.   -Patient  reports she cannot come daily anymore for CBC checks she is going to come 3 times per week  Schedule for platelet checks 3 times per week and infuse for platelet count less than 100      Cytopenia  Anemia and thrombocytopenia  04/21-bone marrow biopsy reveals possibility of developing myelopathic syndrome  transfuse for hemoglobin less than 8  Due to recurrent subdural hematoma transfuse for platelet count 100 or below  -Give 1 unit PRBC today and 1 unit of platelets today  Check iron studies    Bilateral foot pain  With recent admission patient was diagnosed with presumed acute gout  She was treated with prednisone which she reports helped with foot pain  -Check uric acid today  -Refer to podiatrist  Call our clinic or go to ER with any worsening of symptoms    Call our clinic with any changes in health condition or with questions    Addendum  Uric acid is 8.2   Start allopurinol 300 mg p.o. daily        ЕКАТЕРИНА Green [facility] [place]     Chart documentation with Dragon Voice recognition Software. Although reviewed after completion, some words and grammatical errors may remain.

## 2021-08-05 NOTE — LETTER
"    8/5/2021         RE: Yvette Gastelum  [address]  [place] [address]        Dear Colleague,    Thank you for referring your patient, Yvette Gastelum, to the [facility]. Please see a copy of my visit note below.    Oncology Rooming Note    August 5, 2021 10:58 AM   Yvette Gastelum is a 70 year old female who presents for:    Chief Complaint   Patient presents with     Oncology Clinic Visit     Initial Vitals: /67   Pulse 98   Temp 98.7  F (37.1  C) (Oral)   Resp 16   SpO2 100%  Estimated body mass index is 35.36 kg/m  as calculated from the following:    Height as of 8/3/21: 1.626 m (5' 4\").    Weight as of 8/3/21: 93.4 kg (206 lb). There is no height or weight on file to calculate BSA.  No Pain (0) Comment: Data Unavailable   No LMP recorded. Patient has had a hysterectomy.  Allergies reviewed: Yes  Medications reviewed: Yes    Medications: Medication refills not needed today.  Pharmacy name entered into [employer]:    [id number] - [place], [place] - [address] AT [address] [place] [address]  [facility] - [address], [address]  [facility] [place] [place] [facility] [address] [facility] [place], [place] - [address] [place] [address]    Clinical concerns:  NP was notified.      Kim Plata CMA              Oncology/Hematology Visit Note  Aug 5, 2021    Reason for Visit: follow up of chronic thrombocytopenia    07/13/07/22-patient admitted to hospital with recurrent subdural hematoma  Needing surgery    Small cell lung cancer -diagnosed in 2018  Patient of Dr. Luz's  Treated with concurrent chemoradiation  followed by prophylactic cranial radiation  06/17/2021-CT scan reveals no evidence of malignancy    Cytopenia  Anemia and thrombocytopenia  04/21-bone marrow biopsy reveals possibility of developing myelopathic syndrome        Interval History:  Patient reports she continues to have " bilateral feet pain.  Reports she was diagnosed with gout in the hospital.  She was prescribed prednisone to the hospital which she reports helped with the pain  Patient denies numbness or tingling denies edema  Denies fever chills sweats cough shortness of breath chest pain.  Patient denies bleeding  Patient denies headache dizziness seizures weakness on one side of the body  Reports she cannot come any more daily for labs therefore she would like to come 3 times per week    Review of Systems:  14 point ROS of systems including Constitutional, Eyes, Respiratory, Cardiovascular, Gastroenterology, Genitourinary, Integumentary, Muscularskeletal, Psychiatric were all negative except for pertinent positives noted in my HPI.    Physical Examination:  General: The patient is a pleasant female in no acute distress.  /67   Pulse 98   Temp 98.7  F (37.1  C) (Oral)   Resp 16   SpO2 100%   HEENT: EOMI, PERRL. Sclerae are anicteric. Oral mucosa is pink and moist with no lesions or thrush.   Lymph: Neck is supple with no lymphadenopathy in the cervical or supraclavicular areas.   Heart: Regular rate and rhythm.   Lungs: Clear to auscultation bilaterally.   GI: Bowel sounds present, soft, nontender with no palpable hepatosplenomegaly or masses.   Extremities: No lower extremity edema noted bilaterally.   Skin: No rashes, petechiae, or bruising noted on exposed skin.    Laboratory Data:  Results for orders placed or performed in visit on 08/05/21 (from the past 24 hour(s))   Extra Tube ([place] Draw)    Narrative    The following orders were created for panel order Extra Tube ([place] Draw).  Procedure                               Abnormality         Status                     ---------                               -----------         ------                     Extra Green Top (Lithium...[[id number]]                      In process                 Extra Purple Top Tube[[id number]]                            In process                  Extra Blood Bank Purple ...[[id number]]                      In process                   Please view results for these tests on the individual orders.   CBC with platelets   Result Value Ref Range    WBC Count 4.2 4.0 - 11.0 10e3/uL    RBC Count 3.22 (L) 3.80 - 5.20 10e6/uL    Hemoglobin 7.1 (L) 11.7 - 15.7 g/dL    Hematocrit 25.2 (L) 35.0 - 47.0 %    MCV 78 78 - 100 fL    MCH 22.0 (L) 26.5 - 33.0 pg    MCHC 28.2 (L) 31.5 - 36.5 g/dL    RDW 26.3 (H) 10.0 - 15.0 %    Platelet Count 60 (L) 150 - 450 10e3/uL         Assessment and Plan:    This is a 70-year-old female with    Small cell lung cancer -diagnosed in 2018  Patient of Dr. Luz's  Treated with concurrent chemoradiation  followed by prophylactic cranial radiation  06/17/2021-CT scan reveals no evidence of malignancy  -Schedule with Dr. Luz next week    Recurrent subdural hematoma  07/13/07/22-patient admitted to hospital with recurrent subdural hematoma  Large right subdural hematoma status post drain on 07 /13  -Patient is asymptomatic  Continue with Keppra  Continue follow-up with neurosurgery clinic  -Patient has follow-up appointments scheduled  with neurosurgery clinic  Continue per neurosurgery recommendations follow-up with NS clinic at 2 weeks post op for incision check, then 6 weeks post op with head CT prior. Continue Keppra for 1 month.   -Patient reports she cannot come daily anymore for CBC checks she is going to come 3 times per week  Schedule for platelet checks 3 times per week and infuse for platelet count less than 100      Cytopenia  Anemia and thrombocytopenia  04/21-bone marrow biopsy reveals possibility of developing myelopathic syndrome  transfuse for hemoglobin less than 8  Due to recurrent subdural hematoma transfuse for platelet count 100 or below  -Give 1 unit PRBC today and 1 unit of platelets today  Check iron studies    Bilateral foot pain  With recent admission patient was diagnosed with presumed acute gout  She  was treated with prednisone which she reports helped with foot pain  -Check uric acid today  -Refer to podiatrist  Call our clinic or go to ER with any worsening of symptoms         Call our clinic with any changes in health condition or with questions      ЕКАТЕРИНА Green CNP  [facility] [place]     Chart documentation with Dragon Voice recognition Software. Although reviewed after completion, some words and grammatical errors may remain.            Again, thank you for allowing me to participate in the care of your patient.        Sincerely,        ЕКАТЕРИНА Green CNP

## 2021-08-05 NOTE — TELEPHONE ENCOUNTER
Social Work Progress Note      Data/Intervention:  Patient Name:  Yvette Gastelum  /Age:  1950 (70 year old)    Reason for Follow-Up:  Yvette is a 70-year-old woman with a diagnosis of limited-stage small cell lung cancer in remission. Yvette calling this clinician to follow-up on transportation discussions.      Intervention:   Yvette reported that she had received this clinician's voicemail offering to meet with her today to complete [employer] application. Yvette reported that she did not to complete the application at present time as she had received approval from her care team that she can drive again. Yvette stated that she would still like to pursue [employer] support for in the future, and knows to call this clinician in case of additional need in completing provider portion. Yvette denies resource need at present, and is appreciative of social work support being available as needed.     Plan:  SW will continue to be available as needed for ongoing psychosocial support. Yvette knows to reach out in case of concern or need.     Please call or page if needs or concerns arise.     STUART Benedict, [facility] HospitalSW  Direct Phone: [phone]  Pager: [phone]

## 2021-08-05 NOTE — PROGRESS NOTES
"Oncology Rooming Note    August 5, 2021 10:58 AM   Yvette Gastelum is a 70 year old female who presents for:    Chief Complaint   Patient presents with     Oncology Clinic Visit     Initial Vitals: /67   Pulse 98   Temp 98.7  F (37.1  C) (Oral)   Resp 16   SpO2 100%  Estimated body mass index is 35.36 kg/m  as calculated from the following:    Height as of 8/3/21: 1.626 m (5' 4\").    Weight as of 8/3/21: 93.4 kg (206 lb). There is no height or weight on file to calculate BSA.  No Pain (0) Comment: Data Unavailable   No LMP recorded. Patient has had a hysterectomy.  Allergies reviewed: Yes  Medications reviewed: Yes    Medications: Medication refills not needed today.  Pharmacy name entered into [facility]:    [id number] [place], [place] - [address]  [facility] - [place], [place] - [address] [place] [address], [address]  [facility] [address] [place] [facility] [address] [facility] - [place], [place] - [address] [place] [address]    Clinical concerns:  NP was notified.      Kim Plata CMA            "

## 2021-08-06 NOTE — PROGRESS NOTES
Infusion Nursing Note:  Yvette Gastelum presents today for peripheral labs, 1 bag platelets.    Patient seen by provider today: No   present during visit today: Not Applicable.    Note: Patient reports feeling better since she had one unit PRBCs transfused yesterday. Does meet requirements for platelet transfusion but denies any symptoms of bleeding. Next week moving forward, patient to have labs/infusion 3x a week. Patient given a copy of the new schedule prior to discharge from clinic.    Intravenous Access:  Peripheral IV placed.    Treatment Conditions:  Lab Results   Component Value Date    HGB 8.0 08/06/2021    HGB 7.4 07/10/2021     Lab Results   Component Value Date    WBC 4.5 08/06/2021    WBC 4.8 07/10/2021      Lab Results   Component Value Date    ANEU 2.3 08/06/2021    ANEU 4.6 07/08/2021     Lab Results   Component Value Date    PLT 66 08/06/2021    PLT 91 07/10/2021      Lab Results   Component Value Date     07/20/2021     07/10/2021                   Lab Results   Component Value Date    POTASSIUM 3.4 07/22/2021    POTASSIUM 3.9 07/10/2021           Lab Results   Component Value Date    MAG 1.7 03/11/2019            Lab Results   Component Value Date    CR 1.16 07/20/2021    CR 0.84 07/10/2021                   Lab Results   Component Value Date    RUSTY 9.5 07/20/2021    RUSTY 8.8 07/10/2021                Lab Results   Component Value Date    BILITOTAL 1.7 07/05/2021           Lab Results   Component Value Date    ALBUMIN 3.4 07/05/2021                    Lab Results   Component Value Date    ALT 35 07/05/2021           Lab Results   Component Value Date    AST 61 07/05/2021       Results reviewed, labs MET treatment parameters, ok to proceed with treatment.    Blood consent signed: 7/25/21      Post Infusion Assessment:  Patient tolerated infusion without incident.  Blood return noted pre and post infusion.  Site patent and intact, free from redness, edema or discomfort.  No  evidence of extravasations.  Access discontinued per protocol.       Discharge Plan:   Patient declined prescription refills.  Discharge instructions reviewed with: Patient.  Patient and/or family verbalized understanding of discharge instructions and all questions answered.  Copy of AVS reviewed with patient and/or family.  Patient will return 8/9/21 for next appointment.  Patient discharged in stable condition accompanied by: self with walker.  Departure Mode: Ambulatory.      Olinda Puentes RN

## 2021-08-09 NOTE — PROGRESS NOTES
Infusion Nursing Note:  Yvetteharrison Gastelum presents today for lab/possible transfusion.    Patient seen by provider today: No   present during visit today: Not Applicable.    Note: per , starting next week, Pt will need to come in once a week for infusion and parameters for platelets transfusion will be decreased to 50k. Pt will keep this Friday appt to check her counts with possible transfusion.    Hgb 7.8-above parameters, however due to fatigue and dyspnea on exertion, decision made to transfuse 1 unit of PRBC per pt's request and DENISE Castellon     Intravenous Access:  Labs drawn without difficulty.  Peripheral IV placed.    Treatment Conditions:  Lab Results   Component Value Date    HGB 7.8 08/09/2021    HGB 7.4 07/10/2021     Lab Results   Component Value Date    WBC 4.6 08/09/2021    WBC 4.8 07/10/2021      Lab Results   Component Value Date    ANEU 1.9 08/09/2021    ANEU 4.6 07/08/2021     Lab Results   Component Value Date    PLT 57 08/09/2021    PLT 91 07/10/2021      Results reviewed, labs MET treatment parameters, ok to proceed with treatment.  Blood transfusion consent signed 7/25/21.      Post Infusion Assessment:  Patient tolerated infusion without incident.  Blood return noted pre and post infusion.  Site patent and intact, free from redness, edema or discomfort.  No evidence of extravasations.  Access discontinued per protocol.       Discharge Plan:   Discharge instructions reviewed with: Patient.  Patient and/or family verbalized understanding of discharge instructions and all questions answered.  Copy of AVS reviewed with patient and/or family.  Patient will return 8/13/21 for next appointment.  Patient discharged in stable condition accompanied by: self.  Departure Mode: Ambulatory with walker.      Jelly Bradford RN

## 2021-08-09 NOTE — PROGRESS NOTES
Per Dr. Luz  Change blood /platelet transfusions to once a week  Change parameters for platelet transfusion 50 or below  -I will change platelet order,  I will message schedulers to schedule appointments for possible blood platelet transfusion once a week and follow-up appointment with Dr. Luz this month    ЕКАТЕРИНА Green CNP

## 2021-08-11 NOTE — PROGRESS NOTES
Delonte Crawford is a 70 year old who presents for the following health issues     HPI       Hospital Follow-up Visit:    Hospital/Nursing Home/IP Rehab Facility: [facility]  Date of Admission: 7/13/2021  Date of Discharge: 7/22/2021  Reason(s) for Admission:     Large Right SDH w Shift -- S/P Drain 7/13/21   Recurrent subdural hematoma   Thrombocytopenia    Anemia- stable   Antineoplastic chemotherapy induced pancytopenia (H)   Small cell lung cancer  Right 1st MTP pain suspect acute gout, treated  Mild hyponatremia- asymptomatic, resolved  Benign essential hypertension  CKD (chronic kidney disease) stage 3, GFR 30-59 ml/min  Hypokalemia      Was your hospitalization related to COVID-19? No   Problems taking medications regularly:  None  Medication changes since discharge: Yes - updated in med list   Problems adhering to non-medication therapy:  None    Summary of hospitalization:  [facility] discharge summary reviewed  Diagnostic Tests/Treatments reviewed.  Follow up needed: hematology and oncology and neurosurgery  Other Healthcare Providers Involved in Patient s Care:         None  Update since discharge: improved. Post Discharge Medication Reconciliation: discharge medications reconciled, continue medications without change.  Plan of care communicated with patient          Large right-sided subdural hematoma with shift status post drain July 13, 2021    Yvette Gastelum was recently admitted to [facility] on July 5 for headaches and dizziness and was found to have a right-sided subdural hematoma with midline shift.  This is felt to be spontaneous in the setting of severe thrombocytopenia.  The count was down to 30 for admission.  Neurosurgery was consulted and she underwent craniotomy with evacuation of the hematoma and received platelet transfusion.  She then returned on July 13 and was transferred to the intensive care unit for somnolence.  She  was managed with neurosurgery team and a drain was placed.  She is recommended to continue with platelet transfusions to keep her platelets up over 100.  She did receive 4 transfusions during her hospitalization and her platelets were elevated 104,000 time of discharge.  She continues to follow-up with hematology for transfusions periodically most recent was on August 9.  She continues to follow-up with neurosurgery for management of her hematoma.  Her next appointment is scheduled on August 24.       Thrombocytopenia    Anemia- stable   Antineoplastic chemotherapy induced pancytopenia (H)   Small cell lung cancer    As above, she has been monitoring closely with her oncology team.  She is receiving both platelets and packed red blood cell transfusions periodically to keep platelets over 100 and hemoglobin over 7.      Right 1st MTP pain suspect acute gout, treated   She is also recently identified as having gout while in the hospital and treated with oral prednisone.  At time of discharge her symptoms have improved, but she had recurrence.  She is not currently receiving any steroids for management of this condition.  On a scale of 1-10, it was a 10 at its worst and currently it's a 5/10.  She was given a prescription of allopurinol starting on 08/05.        Review of Systems   Constitutional, HEENT, cardiovascular, pulmonary, GI, , musculoskeletal, neuro, skin, endocrine and psych systems are negative, except as otherwise noted.      Objective    /72 (BP Location: Left arm, Patient Position: Sitting, Cuff Size: Adult Large)   Pulse 104   Temp 96.9  F (36.1  C) (Temporal)   Resp 24   Wt 94 kg (207 lb 4.8 oz)   SpO2 97%   Breastfeeding No   BMI 35.58 kg/m    Body mass index is 35.58 kg/m .  Physical Exam   GENERAL: healthy, alert and no distress  EYES: Eyes grossly normal to inspection,    NECK: no adenopathy, no asymmetry, masses, or scars and thyroid normal to palpation  RESP: lungs clear to  auscultation - no rales, rhonchi or wheezes  CV: tachycardia ,   ABDOMEN: soft, nontender, no hepatosplenomegaly, no masses and bowel sounds normal  MS: walking - uses walker  SKIN: noted scar over the right termporal area   NEURO: Normal strength and tone, mentation intact and speech normal  PSYCH: mentation appears normal, affect normal/bright      Patient Instructions   (M10.9) Acute gout of right foot, unspecified cause  (primary encounter diagnosis)  Comment: We will treat empirically with prednisone 40 mg daily x 5 days and recommend review with oncology prior to starting.  Avoid allopurinol until gout attack has resolved.  Plan: predniSONE (DELTASONE) 20 MG tablet            (D50.9) Iron deficiency anemia, unspecified iron deficiency anemia type  Comment: plan to follow up in hematology for recheck this weeks  Plan:     (D69.6) Thrombocytopenia (H)  Comment: as above   Plan:       (S06.5X9A) Large Right SDH w Shift -- S/P Drain 7/13/21  Comment: Discussed need for discussion with neurosurgeon to review whether additional Keppra would be needed.   Plan:

## 2021-08-11 NOTE — PATIENT INSTRUCTIONS
(M10.9) Acute gout of right foot, unspecified cause  (primary encounter diagnosis)  Comment: We will treat empirically with prednisone 40 mg daily x 5 days and recommend review with oncology prior to starting.  Avoid allopurinol until gout attack has resolved.  Plan: predniSONE (DELTASONE) 20 MG tablet            (D50.9) Iron deficiency anemia, unspecified iron deficiency anemia type  Comment: plan to follow up in hematology for recheck this weeks  Plan:     (D69.6) Thrombocytopenia (H)  Comment: as above   Plan:       (S06.5X9A) Large Right SDH w Shift -- S/P Drain 7/13/21  Comment: Discussed need for discussion with neurosurgeon to review whether additional Keppra would be needed.   Plan:

## 2021-08-13 NOTE — PROGRESS NOTES
Infusion Nursing Note:  Yvette Gastelum presents today for Platelet transfusion.    Patient seen by provider today: No   present during visit today: Not Applicable.    Note: N/A.      Intravenous Access:  Peripheral IV placed.    Treatment Conditions:  Lab Results   Component Value Date    HGB 9.1 08/13/2021    HGB 7.4 07/10/2021     Lab Results   Component Value Date    WBC 4.0 08/13/2021    WBC 4.8 07/10/2021      Lab Results   Component Value Date    ANEU 1.9 08/09/2021    ANEU 4.6 07/08/2021     Lab Results   Component Value Date    PLT 30 08/13/2021    PLT 91 07/10/2021      Results reviewed, labs MET treatment parameters, ok to proceed with treatment-platelet transfusion  Results reviewed, labs did NOT meet treatment parameters: prbc transfusion.  Blood transfusion consent signed 7/25/21.      Post Infusion Assessment:  Patient tolerated transfusion without incident.  Site patent and intact, free from redness, edema or discomfort.  No evidence of extravasations.  Access discontinued per protocol.       Discharge Plan:   Discharge instructions reviewed with: Patient.  Patient and/or family verbalized understanding of discharge instructions and all questions answered.  Patient discharged in stable condition accompanied by: self.  Departure Mode: Ambulatory.      Tess Leonard RN

## 2021-08-18 NOTE — PROGRESS NOTES
Clinic Care Coordination Contact    Follow Up Progress Note      Assessment: Patient attended follow up appointment with PCP on 8/11/21, given course of Prednisone for acute gout, but instructed to first verify with primary oncology provider if able to take this medication; no documentation to indicate if patient communicated with her oncology team regarding this.     Additionally, noted patient has not been in for scheduled lab draw or infusion since Friday 8/13/21, on that date platelets noted to be 30.    Intervention/Education provided during outreach:   Attempted to reach patient to review:   1. If patient was able to discuss Prednisone with oncology provider team  2. How current symptoms of gout are doing  3. If patient has been able to complete [employer] transportation application  4. If any assistance is needed in coordinating additional follow up plan of care     Reached patient's voicemail, left message with this writer's call back information and requested return call.        Plan:   Will send patient Sekal AShart message as follow up to voicemail; will also CC hem/onc RN CC to inquire if patient is on appropriate frequency of schedule for her lab draws and infusions.     Care Coordinator will follow up in 2-3 business days.     Ele Soler RN  Clinical Product Navigator

## 2021-08-18 NOTE — NURSING NOTE
"Chief Complaint   Patient presents with     Pre-Op Exam        Initial BP (!) 132/94 (BP Location: Right arm, Patient Position: Chair, Cuff Size: Adult Large)  Pulse 105  Temp 97.1  F (36.2  C) (Tympanic)  Ht 5' 5\" (1.651 m)  Wt 232 lb (105.2 kg)  SpO2 96%  BMI 38.61 kg/m2 Estimated body mass index is 38.61 kg/(m^2) as calculated from the following:    Height as of this encounter: 5' 5\" (1.651 m).    Weight as of this encounter: 232 lb (105.2 kg)..    BP completed using cuff size: large  MEDICATIONS REVIEWED  SOCIAL AND FAMILY HX REVIEWED  Nayely Schmidt CMA  " Impression: Dry eye syndrome of bilateral lacrimal glands: H04.123. Plan: Dry eye syndrome requires lubrication of the eye with artificial tears and nighttime ointments or gels. In addition, topical and oral anti-inflammatory medications are usually necessary to treat the associated ocular irritation. Contact office if dry eye does not improve, worsens or blurs vision. Recommend 3-4 drops daily of Systane Balance and ointment at bedtime.

## 2021-08-19 NOTE — PROGRESS NOTES
"Patient responded to [employer] portal message and reports she is \"doing fabulous!\"; she notes her gout symptoms have improved, no new concerns and she denies the need for any assistance coordinating future care at this time.     Patient encouraged to contact this writer if any future needs or concerns arise.     Ele Soler RN  Clinical Product Navigator   "

## 2021-08-20 NOTE — PROGRESS NOTES
Infusion Nursing Note:  Yvette Gastelum presents today for peripheral labs, 1 unit PRBC, 1 bag platelets .    Patient seen by provider today: No   present during visit today: Not Applicable.    Note: Patient reports feeling fatigue since last night.  Also reports increased sob with exertion.  Denies bleeding or bruising. Hgb resulted at 7.7 with parameters to treat for Hgb<7.  Patient is symptomatic with sob and fatigue and she doesn't have an appointment to come back for possible transfusion until next Friday.  Discussed symptoms with Jaciel Castellon NP and per EWA Grissom to give one unit PRBCs today with Hgb of 7.7.  Patient agreeable to transfusion today. Tolerated well.    Intravenous Access:  Peripheral IV placed.    Treatment Conditions:  Lab Results   Component Value Date    HGB 7.7 08/20/2021    HGB 7.4 07/10/2021     Lab Results   Component Value Date    WBC 7.0 08/20/2021    WBC 4.8 07/10/2021      Lab Results   Component Value Date    ANEU 3.8 08/20/2021    ANEU 4.6 07/08/2021     Lab Results   Component Value Date    PLT 24 08/20/2021    PLT 91 07/10/2021      Results reviewed, labs MET treatment parameters, ok to proceed with treatment.  Blood transfusion consent signed 7/25/21.      Post Infusion Assessment:  Patient tolerated infusion without incident.  Blood return noted pre and post infusion.  Site patent and intact, free from redness, edema or discomfort.  No evidence of extravasations.  Access discontinued per protocol.       Discharge Plan:   Patient declined prescription refills.  Discharge instructions reviewed with: Patient.  Patient and/or family verbalized understanding of discharge instructions and all questions answered.  AVS to patient via [employer]  Patient will return 7/24/21 with Dr. Luz for next appointment.   Patient discharged in stable condition accompanied by: self.  Departure Mode: Ambulatory with walker.      Olinda Puentes RN

## 2021-08-24 NOTE — NURSING NOTE
"Yvette Gastelum is a 70 year old female who presents for:  Chief Complaint   Patient presents with     Surgical Followup     6 wk f/u; DOS: 7/13/21        Initial Vitals:  /79   Pulse 102   SpO2 99%  Estimated body mass index is 35.58 kg/m  as calculated from the following:    Height as of 8/3/21: 5' 4\" (1.626 m).    Weight as of 8/11/21: 207 lb 4.8 oz (94 kg).. There is no height or weight on file to calculate BSA. BP completed using cuff size: regular  Moderate Pain (4)    Vishnu Cota MA    "

## 2021-08-24 NOTE — LETTER
8/24/2021         RE: Yvette Gastelum  [address]  [facility] [address]        Dear Colleague,    Thank you for referring your patient, Yvette Gastelum, to the [facility] [place]. Please see a copy of my visit note below.    Neurosurgery follow-up    Ms. Gastelum is a 70-year-old female seen in follow-up about 6 weeks from a craniotomy for recurrent subdural hematoma on the right in the setting of thrombocytopenia.  She states she is feeling well today she underwent a head CT scan on August 19 which showed interval decrease in size and extent of the subdural hematoma.  Her most recent platelet level was 24,000.  She will be meeting with her hematologist later today.    Today she denies headache, neurologic symptoms.  She has had no head injuries recently.      Exam    B/P: 116/79, T: Data Unavailable, P: 102, R: Data Unavailable     Limited no acute distress  Bilateral upper extremities appropriate strength  Finger-nose slow and accurate  Negative pronator drift    Imaging    1. Interval decrease in density, thickness and extent of the subdural  collection along the right cerebral convexity since the comparison  study with a concomitant interval decrease in leftward midline shift  of the septum pellucidum from 9 mm previously to 3 mm currently. The  subdural collection has decreased in size from 7 mm maximum thickness  previously to 4 mm maximum thickness currently but has also decreased  significantly in extent.  2. No evidence for new or increasing intracranial hemorrhage.  3. Right parietal craniotomy again noted.  4. Diffuse cerebral volume loss and cerebral white matter changes  consistent with chronic small vessel ischemic disease.       Assessment    Subdural hematoma  Thrombocytopenia      Plan    We will plan to repeat her head CT scan in 6 weeks to monitor the resolution of her intracranial hemorrhage.  Platelet level will be managed by her hematologist.  If her  symptoms worsen or she has any concerns she is to present to the emergency department for a repeat head CT scan in the interim.      Total time of 30 minutes spent with the patient today in counseling and coordination of care.        Again, thank you for allowing me to participate in the care of your patient.        Sincerely,        Juancarlos Ling PA-C

## 2021-08-24 NOTE — LETTER
"    8/24/2021         RE: Yvette Gastelum  [address]  [facility] [address]        Dear Colleague,    Thank you for referring your patient, Yvette Gastelum, to the [facility]. Please see a copy of my visit note below.    Oncology Rooming Note    August 24, 2021 3:01 PM   Yvette Gastelum is a 70 year old female who presents for:    Chief Complaint   Patient presents with     Oncology Clinic Visit     Initial Vitals: There were no vitals taken for this visit. Estimated body mass index is 35.58 kg/m  as calculated from the following:    Height as of 8/3/21: 1.626 m (5' 4\").    Weight as of 8/11/21: 94 kg (207 lb 4.8 oz). There is no height or weight on file to calculate BSA.  No Pain (0) Comment: Data Unavailable   No LMP recorded. Patient has had a hysterectomy.  Allergies reviewed: Yes  Medications reviewed: Yes    Medications: Medication refills not needed today.  Pharmacy name entered into [employer]:    [id number] [place], [place] - [address]  [facility] - [address], [address]  [facility] [id number] [place] [facility] [address] [facility] - [place], [place] - [address] [place] [address]    Clinical concerns: list given to doctor.  CAROLINA was notified.      Shari J. Schoenberger, [facility]              ONCOLOGY HISTORY: Ms. Gastelum is a female with small cell lung cancer.    1.  CT chest angiogram on 08/21/2018 for hemoptysis revealed right lower lobe pulmonary mass invading the right lower lobe pulmonary artery and mild mediastinal lymphadenopathy.   -CT abdomen and pelvis on 08/22/2018 did not reveal any evidence of metastatic disease.   -Brain MRI on 08/30/2019 did not reveal any intracranial metastasis.   -CT-guided biopsy of right lung mass on 08/31/2018 revealed high-grade neuroendocrine carcinoma, favor small cell carcinoma.   -PET scan in 08/2019 did not reveal any evidence of " metastatic disease.      2.  She had limited stage small cell lung cancer.  She received concurrent chemoradiation with 4 cycles of platinum and etoposide between 09/17/2018 and 12/10/2018. Patient received 5940 cGy in 33 fractions between 10/11/2018 and 11/28/2018.   -Prophylactic cranial radiation between 01/29/2019 and 02/11/2019.      3.  CT chest on 08/21/2019 revealed a new 0.6 cm pulmonary nodule in right lower lobe suspicious for metastatic lesion.  There are 2 additional indeterminate nodular opacities in the right lower lobe posteriorly which are stable.  There is a mildly enlarged subcarinal lymph node which is stable.   -PET scan on 10/28/2019 revealed 0.8 cm right lower lobe nodule which is hypermetabolic and suspicious for malignancy.  No other areas of abnormal uptake.   -SBRT to RLL nodule between 11/19/19 and 11/27/19. 4500 cGy.     4. On 01/20/2021:  -WBC of 3.6, hemoglobin of 9.8 and platelet of 60. MCV of 86.  -Normal iron, folate, vitamin B12, SPEP and LDH.     5. Bone marrow biopsy was done on 02/10/2021.  It was a dry tap.  Bone marrow revealed hypercellular marrow with 50% cellularity.  There was marked erythroid hyperplasia.  Mild decrease in granulocytic precursors.  Adequate megakaryocyte.  No MDS.  Mild increase in reticulin fibers.  No evidence of metastatic tumor.  Less than 5% blasts.      6. Bone marrow biopsy was done on 04/19/2021.  It reveals hypercellular marrow with 50% cellularity. 2% blasts.  There is marked erythroid hyperplasia with probable mild dyserythropoiesis.  There is adequate granulocytic precursor and megakaryocytes without dysplasia.  There is increase in reticulin fibers, 28% sideroblasts.    -Negative for JAK2 and SF3B1.  -Cytogenetics are abnormal. 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  There are other abnormalities.  This raises the possibility of developing myelodysplastic syndrome.    7. Patient had recurrent subdural hematoma, likely from  thrombocytopenia.  She had evacuation of hematoma on 07/05/2021 and on 07/13/2021.    SUBJECTIVE:  Ms. Gastelum is a 70-year-old female with limited-stage small cell lung cancer.  The patient is in complete remission.     The patient had recurrent subdural hematoma.  This is likely from thrombocytopenia.  She had evacuation of hematoma on 07/05/2021 and on 07/13/2021.  Since then, the patient received multiple units of platelet transfusion.     Last week, she received both platelet and blood transfusion.  After that, she had some chills and some discomfort.  Likely she had some transfusion reaction.  She did not have any skin rash or hives.  She does not have any swelling of the face.       No headache.  No dizziness.  No chest pain.  No shortness of breath.  No nausea or vomiting.  Some cough.  No coughing up blood.  No urinary or bowel complaints.  No bleeding.  All other review of systems is negative.     PHYSICAL EXAMINATION:    GENERAL:  She is alert and oriented x 3.  VITAL SIGNS:  Reviewed.  Not in distress.   Rest of systems not examined.     LABS:  Reviewed.     ASSESSMENT:    1.  A 70-year-old female with small cell lung cancer in remission.  2.  Pancytopenia.  She likely is developing myelodysplastic syndrome.  3.  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  4.  Recurrent subdural hematoma.     PLAN:    1.  I had a long discussion with the patient regarding her pancytopenia.  She previously had 2 bone marrow biopsy.  She likely has a myelodysplastic syndrome.  Cytogenetics is abnormal.  At this time, we will continue her on supportive treatment.  We will give her platelet if it is below 20 or if is bleeding.  She will get PRBC below 7 or if she is asymptomatic.  She is agreeable for it.  The patient had some transfusion reaction last time.  With future transfusion, will give her Benadryl and Tylenol as premedications.    Bone marrow biopsy will be repeated in next few months.    2.  Discussed  regarding small cell lung cancer.  She has been doing well from it.  We will plan on getting CT scan in December.    3.  The patient had multiple questions regarding MDS, lung cancer and COVID-19 vaccine, which were all discussed.  The patient plans to get her booster COVID-19 vaccine next month.    4.  I will see her in a month.  I advised her to call us with any questions or concerns.  I advised her to go to emergency if she has chest pain, shortness of breath, severe headache, dizziness, bleeding from any site or any other concerns.     TOTAL FACE-TO-FACE TIME SPENT was 45 minutes, more than 50% of the time spent in counseling and coordination of care.    This office note has been dictated.          Again, thank you for allowing me to participate in the care of your patient.        Sincerely,        Jennifer Luz MD

## 2021-08-24 NOTE — PROGRESS NOTES
"Oncology Rooming Note    August 24, 2021 3:01 PM   Yvette Gastelum is a 70 year old female who presents for:    Chief Complaint   Patient presents with     Oncology Clinic Visit     Initial Vitals: There were no vitals taken for this visit. Estimated body mass index is 35.58 kg/m  as calculated from the following:    Height as of 8/3/21: 1.626 m (5' 4\").    Weight as of 8/11/21: 94 kg (207 lb 4.8 oz). There is no height or weight on file to calculate BSA.  No Pain (0) Comment: Data Unavailable   No LMP recorded. Patient has had a hysterectomy.  Allergies reviewed: Yes  Medications reviewed: Yes    Medications: Medication refills not needed today.  Pharmacy name entered into [facility]:    [id number] - [place], [place] - [address]  [facility] - [address]  [facility] [id number] [place] [facility] [place], [place] - [address] [facility] - [place], [place] - [address] [place] [address]    Clinical concerns: list given to doctor.  BK was notified.      Shari J. Schoenberger, [facility]            "

## 2021-08-24 NOTE — PROGRESS NOTES
Neurosurgery follow-up    Ms. Gastelum is a 70-year-old female seen in follow-up about 6 weeks from a craniotomy for recurrent subdural hematoma on the right in the setting of thrombocytopenia.  She states she is feeling well today she underwent a head CT scan on August 19 which showed interval decrease in size and extent of the subdural hematoma.  Her most recent platelet level was 24,000.  She will be meeting with her hematologist later today.    Today she denies headache, neurologic symptoms.  She has had no head injuries recently.      Exam    B/P: 116/79, T: Data Unavailable, P: 102, R: Data Unavailable     Limited no acute distress  Bilateral upper extremities appropriate strength  Finger-nose slow and accurate  Negative pronator drift    Imaging    1. Interval decrease in density, thickness and extent of the subdural  collection along the right cerebral convexity since the comparison  study with a concomitant interval decrease in leftward midline shift  of the septum pellucidum from 9 mm previously to 3 mm currently. The  subdural collection has decreased in size from 7 mm maximum thickness  previously to 4 mm maximum thickness currently but has also decreased  significantly in extent.  2. No evidence for new or increasing intracranial hemorrhage.  3. Right parietal craniotomy again noted.  4. Diffuse cerebral volume loss and cerebral white matter changes  consistent with chronic small vessel ischemic disease.       Assessment    Subdural hematoma  Thrombocytopenia      Plan    We will plan to repeat her head CT scan in 6 weeks to monitor the resolution of her intracranial hemorrhage.  Platelet level will be managed by her hematologist.  If her symptoms worsen or she has any concerns she is to present to the emergency department for a repeat head CT scan in the interim.      Total time of 30 minutes spent with the patient today in counseling and coordination of care.    
Attending Attestation (For Attendings USE Only)...

## 2021-08-27 NOTE — PROGRESS NOTES
Infusion Nursing Note:  Yvette Gastelum presents today for lab/possible transfusion.    Patient seen by provider today: No   present during visit today: Not Applicable.    Note: Pt does not qualify for transfusion today. Pt wondering if her CBC needs to be checked earlier than 9/4. In basket message sent to  and ARIANNA Marquis      Intravenous Access:  Labs drawn without difficulty.  Peripheral IV placed.    Treatment Conditions:  Lab Results   Component Value Date    HGB 8.9 08/27/2021    HGB 7.4 07/10/2021     Lab Results   Component Value Date    WBC 7.2 08/27/2021    WBC 4.8 07/10/2021      Lab Results   Component Value Date    ANEU 3.8 08/20/2021    ANEU 4.6 07/08/2021     Lab Results   Component Value Date    PLT 25 08/27/2021    PLT 91 07/10/2021      Results reviewed, labs did NOT meet treatment parameters for transfusion.      Post Infusion Assessment:  Site patent and intact, free from redness, edema or discomfort.  Access discontinued per protocol.       Discharge Plan:   Discharge instructions reviewed with: Patient.  Patient and/or family verbalized understanding of discharge instructions and all questions answered.  AVS to patient via [employer].  Patient will return 9/4/21 for next appointment.   Patient discharged in stable condition accompanied by: self.  Departure Mode: Ambulatory.      Jelly Bradford RN

## 2021-08-29 NOTE — PROGRESS NOTES
ONCOLOGY HISTORY: Ms. Gastelum is a female with small cell lung cancer.    1.  CT chest angiogram on 08/21/2018 for hemoptysis revealed right lower lobe pulmonary mass invading the right lower lobe pulmonary artery and mild mediastinal lymphadenopathy.   -CT abdomen and pelvis on 08/22/2018 did not reveal any evidence of metastatic disease.   -Brain MRI on 08/30/2019 did not reveal any intracranial metastasis.   -CT-guided biopsy of right lung mass on 08/31/2018 revealed high-grade neuroendocrine carcinoma, favor small cell carcinoma.   -PET scan in 08/2019 did not reveal any evidence of metastatic disease.      2.  She had limited stage small cell lung cancer.  She received concurrent chemoradiation with 4 cycles of platinum and etoposide between 09/17/2018 and 12/10/2018. Patient received 5940 cGy in 33 fractions between 10/11/2018 and 11/28/2018.   -Prophylactic cranial radiation between 01/29/2019 and 02/11/2019.      3.  CT chest on 08/21/2019 revealed a new 0.6 cm pulmonary nodule in right lower lobe suspicious for metastatic lesion.  There are 2 additional indeterminate nodular opacities in the right lower lobe posteriorly which are stable.  There is a mildly enlarged subcarinal lymph node which is stable.   -PET scan on 10/28/2019 revealed 0.8 cm right lower lobe nodule which is hypermetabolic and suspicious for malignancy.  No other areas of abnormal uptake.   -SBRT to RLL nodule between 11/19/19 and 11/27/19. 4500 cGy.     4. On 01/20/2021:  -WBC of 3.6, hemoglobin of 9.8 and platelet of 60. MCV of 86.  -Normal iron, folate, vitamin B12, SPEP and LDH.     5. Bone marrow biopsy was done on 02/10/2021.  It was a dry tap.  Bone marrow revealed hypercellular marrow with 50% cellularity.  There was marked erythroid hyperplasia.  Mild decrease in granulocytic precursors.  Adequate megakaryocyte.  No MDS.  Mild increase in reticulin fibers.  No evidence of metastatic tumor.  Less than 5% blasts.      6. Bone  marrow biopsy was done on 04/19/2021.  It reveals hypercellular marrow with 50% cellularity. 2% blasts.  There is marked erythroid hyperplasia with probable mild dyserythropoiesis.  There is adequate granulocytic precursor and megakaryocytes without dysplasia.  There is increase in reticulin fibers, 28% sideroblasts.    -Negative for JAK2 and SF3B1.  -Cytogenetics are abnormal. 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  There are other abnormalities.  This raises the possibility of developing myelodysplastic syndrome.    7. Patient had recurrent subdural hematoma, likely from thrombocytopenia.  She had evacuation of hematoma on 07/05/2021 and on 07/13/2021.    SUBJECTIVE:  Ms. Gastelum is a 70-year-old female with limited-stage small cell lung cancer.  The patient is in complete remission.     The patient had recurrent subdural hematoma.  This is likely from thrombocytopenia.  She had evacuation of hematoma on 07/05/2021 and on 07/13/2021.  Since then, the patient received multiple units of platelet transfusion.     Last week, she received both platelet and blood transfusion.  After that, she had some chills and some discomfort.  Likely she had some transfusion reaction.  She did not have any skin rash or hives.  She does not have any swelling of the face.       No headache.  No dizziness.  No chest pain.  No shortness of breath.  No nausea or vomiting.  Some cough.  No coughing up blood.  No urinary or bowel complaints.  No bleeding.  All other review of systems is negative.     PHYSICAL EXAMINATION:    GENERAL:  She is alert and oriented x 3.  VITAL SIGNS:  Reviewed.  Not in distress.   Rest of systems not examined.     LABS:  Reviewed.     ASSESSMENT:    1.  A 70-year-old female with small cell lung cancer in remission.  2.  Pancytopenia.  She likely is developing myelodysplastic syndrome.  3.  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  4.  Recurrent subdural hematoma.     PLAN:    1.  I had a  long discussion with the patient regarding her pancytopenia.  She previously had 2 bone marrow biopsy.  She likely has a myelodysplastic syndrome.  Cytogenetics is abnormal.  At this time, we will continue her on supportive treatment.  We will give her platelet if it is below 20 or if is bleeding.  She will get PRBC below 7 or if she is asymptomatic.  She is agreeable for it.  The patient had some transfusion reaction last time.  With future transfusion, will give her Benadryl and Tylenol as premedications.    Bone marrow biopsy will be repeated in next few months.    2.  Discussed regarding small cell lung cancer.  She has been doing well from it.  We will plan on getting CT scan in December.    3.  The patient had multiple questions regarding MDS, lung cancer and COVID-19 vaccine, which were all discussed.  The patient plans to get her booster COVID-19 vaccine next month.    4.  I will see her in a month.  I advised her to call us with any questions or concerns.  I advised her to go to emergency if she has chest pain, shortness of breath, severe headache, dizziness, bleeding from any site or any other concerns.     TOTAL FACE-TO-FACE TIME SPENT was 45 minutes, more than 50% of the time spent in counseling and coordination of care.

## 2021-08-30 NOTE — PROGRESS NOTES
Medication Therapy Management (MTM) Encounter    ASSESSMENT:                            Medication Adherence/Access: No issues identified    Subdural Hematoma:  Stable.  Follow-up as planned.     Lung cancer/ Chronic thrombocytopenia/Anemia:  Follow-up with labs and provider visits as planned.    Gout: patient may benefit from prophylaxis therapy as starting allopurinol.  Due to risk of bleeding/thrombocytopenia with colchicine and NSAID would recommend low dose prednisone 5 mg daily until uric acid <6.  Since she is having another flare, could treat the flare with prednisone 40 mg daily until symptoms improve and then taper down over 7 days to 5 mg daily and restart allopurinol at that time.    GERD/IBS-diarrhea: stable    Hyperlipidemia: stable      PLAN:                            Considerations for primary care provider:  1.  Prednisone 40 mg daily for flare symptoms improve, then taper over 7 days to 5 mg daily.  Then restart allopurinol and stay on prednisone 5 mg daily until uric acid <6.    Follow-up: Return in about 1 month (around 9/30/2021) for Medication Therapy Management.      SUBJECTIVE/OBJECTIVE:                          Yvette Gastelum is a 70 year old female called for a follow-up visit. She was referred to me from [facility].  Today's visit is a follow-up MTM visit from 8/3/21.  She was discharged from [facility] on 7/22 for hematoma.  Discharged from [facility] on 7/26/21.     Reason for visit:     Allergies/ADRs: Reviewed in chart  Tobacco: She reports that she quit smoking about 6 years ago. Her smoking use included cigarettes. She started smoking about 55 years ago. She has a 50.00 pack-year smoking history. She has never used smokeless tobacco.  Alcohol: not currently using    Medication Adherence/Access: no issues reported    Subdural Hematoma:  Stopped Keppra per NeuroSurgeon.  Has ongoing follow-up plan to monitor for changes.    Recurrent subdural hematoma   - transferred out of the ICU on 7/15,  stable  - plan per neurosurgery  - Hematology recommending to keep platelets > 100  - last platelet transfusion  7/15, 7/18, 7/19, 7/21  -Hb stable. Wbc nl, Platelets 104,000 on day of discharge.      Patient is doing well at this time, NS and oncology has clear her for discharge.  She will remain on Keppra for seizure prophylaxis and follow up with NS in clinic.  She will need every other day CBC check and follow up with Dr Luz.   She denies any chest pain, SOB, fever, chills, nausea, vomiting, headache, dizziness,  Numbness or tingling at time of discharge. She is able to walk with therapy without any  Problem.     Lung cancer/ Chronic thrombocytopenia/Anemia:      Taking Tessalon three times daily for cough.  The Tessalon is effective for her cough.  Has albuterol nebs and sodium nebs at home to take as needed; she has been using twice daily and that has been effective.  Diagnosed with myelodysplastic syndrome and having CBC weekly and transfusion as needed.    S/P Chemo in 2018  Appreciate hematology/oncology consult , recommendations   - Hgb 6.4 on 7/14-> 7.5 7/15-> 8.1 ---> 7.6 today  - s/p platelet transfusion 7/15 for platelets 86.  -transfuse platelets 7/15 for 86 and 7/18 for 88K, 7/19 for 93K  - prn platelet transfusion for platelets < 100K per oncology  - Plan to keep >100K & monitor  -transfuse for Hb <7 per oncology  -blood counts/platelet count every other day     Gout: Pain in both feet, worse in right seems to taper a day here and there.  Doesn't drink, doesn't eat meat.    Was treated with prednisone by primary care provider which helped.  Restarted the allopurinol after completing the course of prednisone and now having pain again.    Uric Acid   Date Value Ref Range Status   07/17/2021 6.2 (H) 2.6 - 6.0 mg/dL Final       GERD/IBS-diarrhea: Current medications include: Prilosec (omeprazole) 20 mg once daily. Pt reports no current symptoms.  Patient feels that current regimen is effective.   Has  Imodium to take as needed.    Hyperlipidemia: Current therapy atorvastatin 10 mg daily.    Recent Labs   Lab Test 03/17/21  1553 10/02/19  1222   CHOL 159 186   HDL 40* 44*   LDL 61 89   TRIG 290* 265*           Today's Vitals: There were no vitals taken for this visit.  ----------------      I spent 20 minutes with this patient today. All changes were made via collaborative practice agreement with Arie House MD, MD. A copy of the visit note was provided to the patient's primary care provider.    The patient was sent via [employer] a summary of these recommendations.     Maile Quezada , Pharm D  [phone] (phone)  Medication Therapy Management Pharmacist     Telemedicine Visit Details  Type of service:  Telephone visit  Start Time: 1130 am  End Time: 1150 am  Originating Location (patient location): [place]  Distant Location (provider location):  [facility]     Medication Therapy Recommendations  Acute gout of foot, unspecified cause, unspecified laterality    Current Medication: ALLOPURINOL PO   Rationale: Synergistic therapy - Needs additional medication therapy - Indication   Recommendation: Start Medication - Prednisone for flare, then continue on prednisone 5mg and allopurinol until uric acid <6   Status: Contact Provider - Awaiting Response

## 2021-08-30 NOTE — TELEPHONE ENCOUNTER
----- Message from Jeannette Moore RN sent at 8/30/2021  7:09 AM CDT -----  Regarding: FW: lab URGENT  Hi Team,    Please call to schedule patient to have peripheral lab draw today or tomorrow.    Thank you,  Kandis  ----- Message -----  From: Jennifer Luz MD  Sent: 8/28/2021  12:36 PM CDT  To: Jeannette Moore RN, Jelly Bradford RN  Subject: RE: lab                                          Please, check her platelet on Monday (08/30).    bk  ----- Message -----  From: Jelly Bradford RN  Sent: 8/27/2021  10:28 AM CDT  To: Jeannette Moore RN, Jennifer Luz MD  Subject: lab                                              Hello Dr. Sukh Crawford's platelet count today was 25k, did not qualify for transfusion as now her parameter is 20k.  Her next appt is 9/4. Pt is wondering if she can get her lab rechecked earlier than 9/4 since this is the first time for her to go without transfusion and not to check lab over a week.    Thanks  Lenore Bradford RN

## 2021-08-31 NOTE — TELEPHONE ENCOUNTER
Please see patient's mychart:    Routing refill request to provider for review/approval because:  Medication is reported/historical    Last office vist: 8/11/2021    Pended 90 day supply & 3 refills. Please Review.    Next office visit: none      Please reply back to patient, route to triage follow up, or route to team coordinators to have patient schedule an appointment.     Stevo Hodges RN  [facility]

## 2021-08-31 NOTE — PROGRESS NOTES
Medical Assistant Note:  Yvette Gastelum presents today for blood draw.    Patient seen by provider today: No.   present during visit today: Not Applicable.    Concerns: No Concerns.    Procedure:  Lab draw site: left hand, Needle type: bf, Gauge: 23.    Post Assessment:  Labs drawn without difficulty: Yes.    Discharge Plan:  Departure Mode: Ambulatory.    Face to Face Time: 5 min.    Shari Schoenberger, CMA

## 2021-08-31 NOTE — PATIENT INSTRUCTIONS
Recommendations from today's MTM visit:                                                       1.  I'm going to reach out to Dr. House about your gout flare.  You may benefit from taking prednisone again to treat the flare and then staying on a low dose to prevent recurrent flares as you allopurinol is started and begins to work.    Follow-up: Return in about 1 month (around 9/30/2021) for Medication Therapy Management.    It was great to speak with you today.  I value your experience and would be very thankful for your time with providing feedback on our clinic survey. You may receive a survey via email or text message in the next few days.     To schedule another MTM appointment, please call the clinic directly or you may call the MTM scheduling line at [phone] or toll-free at [phone].     My Clinical Pharmacist's contact information:                                                      Please feel free to contact me with any questions or concerns you have.      Maile Quezada , Pharm D  [phone] (phone)  Medication Therapy Management Pharmacist

## 2021-08-31 NOTE — TELEPHONE ENCOUNTER
DATE:  8/31/2021   TIME OF RECEIPT FROM LAB:  1521  LAB TEST:  Plt count   LAB VALUE:  28,000   RESULTS GIVEN WITH READ-BACK TO : DENISE Grissom,Dr. Luz, CCRN   TIME LAB VALUE REPORTED TO PROVIDER:   1530       Patient's last plt count on 8/27/21 25,000. Patient is scheduled for repeat plt count 9/4/21 with possible transfusion. Angela Ruiz RN,BSN,OCN

## 2021-09-01 NOTE — CONFIDENTIAL NOTE
Attempted calling  Yvette regarding her plan from Dr. Luz which states to transfuse plts if less than 20,000.No answer and no voice mail available ..  Plt count yesterday was 28,000. Patient is scheduled 9/4/21 for labs possible plt transfusion. Will send [employer] message. Routing to [facility].

## 2021-09-02 NOTE — TELEPHONE ENCOUNTER
----- Message from Arie House MD sent at 9/1/2021  8:45 AM CDT -----  Regarding: RE: Gout  Thank you for looking into this.    I think that sounds like a great plan.  Are you able to follow up with her and send in the prescriptions?    Arie House MD, MD   ----- Message -----  From: Maile Quezada, [facility]  Sent: 8/31/2021   1:16 PM CDT  To: Arie House MD  Subject: Gout                                             Hi Dr. Harsh Crawford is having another gout flare.  The prednisone helped but then flares resume on allopurinol monotherapy.  She may benefit from prophylaxis therapy when starting allopurinol, due to her risk of bleeding both colchicine and NSAIDs are not great options.  That leaves us with prednisone, not great as well but probably the least risky of the three.  Here is a possible regimen for her acute flare and re-initiation of the allopurinol.    1.  Prednisone 40 mg daily for flare symptoms improve, then taper over 7 days to 5 mg daily.  Then restart allopurinol and stay on prednisone 5 mg daily until uric acid <6.  Rechecking uric acid labs in 1 month.    I appreciate your thoughts.      Maile Quezada , Pharm D  [phone] (phone)  Medication Therapy Management Pharmacist         
Called patient to review prednisone plan.  She was able to repeat back to me directions.  Prescription sent today.    Will follow-up next month and recheck uric acid.  
I have personally seen and examined this patient.  I have fully participated in the care of this patient. I have reviewed all pertinent clinical information, including history, physical exam, plan and the Resident’s note and agree except as noted.

## 2021-09-04 NOTE — RESULT ENCOUNTER NOTE
Dear Ms. Gastelum,    Your platelet is stable at 26.    Please, call me with any questions.    Jennifer Luz MD

## 2021-09-04 NOTE — PROGRESS NOTES
Infusion Nursing Note:  Yvette Gastelum presents today for Labs and possible transfusion.    Patient seen by provider today: No   present during visit today: Not Applicable.    Note: N/A.      Intravenous Access:  Peripheral IV placed.    Treatment Conditions:  Lab Results   Component Value Date    HGB 8.1 (L) 09/04/2021    WBC 8.6 09/04/2021    ANEU 3.8 08/20/2021    PLT 26 (LL) 09/04/2021      Results reviewed, labs did NOT meet treatment parameters: Plts 28k.        Post Infusion Assessment:  Site patent and intact, free from redness, edema or discomfort.  No evidence of extravasations.  Access discontinued per protocol.       Discharge Plan:   Discharge instructions reviewed with: Patient.  Patient and/or family verbalized understanding of discharge instructions and all questions answered.  Patient discharged in stable condition accompanied by: self.  Departure Mode: Ambulatory.      Geovanna Napoles RN

## 2021-09-07 NOTE — PROGRESS NOTES
Dr. Luz sent a message requesting a second opinion at the [facility] on patient's BMB.     A referral has been placed.    Jeannette Moore RN

## 2021-09-09 NOTE — PROGRESS NOTES
Writer call [facility] pathology to confirm that they are sending specimen to [facility]  for 2nd opinion and LVM.    Jeannette Moore RN

## 2021-09-10 NOTE — PROGRESS NOTES
Writer [facility] and LVM requesting a return call.    Jeannette Moore RN

## 2021-09-11 NOTE — PROGRESS NOTES
Infusion Nursing Note:  Yvette Gastelum presents today for labs only, did not meet parameters.    Patient seen by provider today: No   present during visit today: Not Applicable.    Note: Patient did not meet parameters for PRBCs or platelets today. Patient with ongoing SOB, however she declines PRBCs today and wants to wait to see her results in a week. Patient instructed to call prior to next appointment on 9/18 if SOB worsens, verbalized understanding.      Intravenous Access:  Labs drawn without difficulty.  Peripheral IV placed.    Treatment Conditions:  Lab Results   Component Value Date    HGB 7.7 (L) 09/11/2021    WBC 10.4 09/11/2021    ANEU 5.5 09/11/2021    PLT 26 (LL) 09/11/2021      Results reviewed, labs did NOT meet treatment parameters for PRBCs or platelets.      Post Infusion Assessment:  Site patent and intact, free from redness, edema or discomfort.  No evidence of extravasations.  Access discontinued per protocol.       Discharge Plan:   Discharge instructions reviewed with: Patient.  Patient and/or family verbalized understanding of discharge instructions and all questions answered.  AVS to patient via [employer].  Patient will return 9/18/21 for next appointment.   Patient discharged in stable condition accompanied by: self.  Departure Mode: Ambulatory.      Angy Jorge RN

## 2021-09-18 NOTE — PROGRESS NOTES
Infusion Nursing Note:  Yvette Gastelum presents today for 1 unit PRBC.    Patient seen by provider today: No   present during visit today: Not Applicable.    Note: N/A.      Intravenous Access:  Labs drawn without difficulty.  Peripheral IV placed.    Treatment Conditions:  Lab Results   Component Value Date    HGB 6.9 (LL) 09/18/2021    WBC 12.9 (H) 09/18/2021    ANEU 5.5 09/11/2021    PLT 23 (LL) 09/18/2021      Results reviewed, labs MET treatment parameters, ok to proceed with treatment.  Blood transfusion consent signed 7/25/21.      Post Infusion Assessment:  Patient tolerated infusion without incident.  Blood return noted pre and post infusion.  Site patent and intact, free from redness, edema or discomfort.  No evidence of extravasations.  Access discontinued per protocol.       Discharge Plan:   Discharge instructions reviewed with: Patient.  Patient and/or family verbalized understanding of discharge instructions and all questions answered.  AVS to patient via [employer].  Patient will return 9/25/21 for next appointment.   Patient discharged in stable condition accompanied by: self.  Departure Mode: Ambulatory with cane.      Jelly Bradford RN

## 2021-09-23 NOTE — PROGRESS NOTES
Writer called to follow up on 2nd opinion of BMB and LVM requesting a return call.    Jeannette Moore RN

## 2021-09-23 NOTE — PROGRESS NOTES
Visit Date:   06/22/2020      ONCOLOGY HISTORY: Ms. Gastelum is a female with small cell lung cancer.    1.  CT chest angiogram on 08/21/2018 for hemoptysis revealed right lower lobe pulmonary mass invading the right lower lobe pulmonary artery and mild mediastinal lymphadenopathy.   -CT abdomen and pelvis on 08/22/2018 did not reveal any evidence of metastatic disease.   -Brain MRI on 08/30/2019 did not reveal any intracranial metastasis.   -CT-guided biopsy of right lung mass on 08/31/2018 revealed high-grade neuroendocrine carcinoma, favor small cell carcinoma.   -PET scan in 08/2019 did not reveal any evidence of metastatic disease.      2.  She had limited stage small cell lung cancer.  She received concurrent chemoradiation with 4 cycles of platinum and etoposide between 09/17/2018 and 12/10/2018. Patient received 5940 cGy in 33 fractions between 10/11/2018 and 11/28/2018.   -Prophylactic cranial radiation between 01/29/2019 and 02/11/2019.      3.  CT chest on 08/21/2019 revealed a new 0.6 cm pulmonary nodule in right lower lobe suspicious for metastatic lesion.  There are 2 additional indeterminate nodular opacities in the right lower lobe posteriorly which are stable.  There is a mildly enlarged subcarinal lymph node which is stable.   -PET scan on 10/28/2019 revealed 0.8 cm right lower lobe nodule which is hypermetabolic and suspicious for malignancy.  No other areas of abnormal uptake.     SUBJECTIVE:  Ms. Gastelum is a 69-year-old female with limited stage small cell lung cancer diagnosed in 2018. She is status post concurrent chemoradiation and prophylactic cranial radiation.  There has been no evidence of recurrence.      CT of the chest on 06/18/2020 does not reveal any evidence of malignancy.  There is a stable soft tissue density in the right lower lobe.  This was negative on prior PET scan.      The patient has had a cough ever since her lung cancer and its treatment.  It is not getting worse. It is  nonproductive.  She also gets short of breath on exertion.  She uses Tessalon Perles, which helps with the cough.  She does have an albuterol inhaler, but does not use it often.      REVIEW OF SYSTEMS:  No headache.  No dizziness.  No chest pain. No shortness of breath at rest.  Gets short of breath on exertion.  No abdominal pain, nausea or vomiting.  Appetite has been fairly good.  No urinary or bowel complaints.  No bleeding.  No fever, chills or night sweats.      The patient has noticed that her cough and shortness of breath gets worse during the summer months with high humidity.      All other review of systems are negative.      PHYSICAL EXAMINATION:   GENERAL:  She is alert, oriented x 3.   RESPIRATORY:  No cough.  No labored breathing.     The rest of a comprehensive physical exam is deferred due to public [facility] emergency video visit restrictions.      ASSESSMENT:   1.  A 69-year-old female with limited stage small cell lung cancer. No evidence of disease.   2.  Chronic cough from her lung cancer and its treatment.   3.  Shortness of breath on exertion.   4.  Other medical problems including hypertension and chronic kidney disease.      PLAN:   1.  I had a long discussion with the patient. CT was reviewed.  She was happy to know that there is no evidence of any malignancy.      With regard to small cell lung cancer, we will continue to monitor her.  In 6 months' time, we will get a CT of the chest, abdomen and pelvis and also labs including CBC and CMP.  She is agreeable for it.      2.  Discussed regarding her cough and shortness of breath on exertion.  This needs further evaluation.  We will set her up to see a pulmonologist.  She will continue on Tessalon Perles and also albuterol inhaler.      3. She had a few questions, which were all answered.  I advised her to see a physician if she has chest pain, coughing of blood, weight loss, lumps, worsening weakness or any other concerns.         INOCENTE  MD JAYANT             D: 2020   T: 2020   MT: BERT      Name:     BISHNU LEY   MRN:      [id number]        Account:      [id number]   :      1950           Visit Date:   2020      Document: [id number]      Video visit for 14 minutes.   Purple DH (Discharge Huddle; Vulnerable Patient)

## 2021-09-25 NOTE — PROGRESS NOTES
Infusion Nursing Note:  Yvette Gastelum presents today for labs and poss transfusion.    Patient seen by provider today: No   present during visit today: Not Applicable.    Note: N/A.      Intravenous Access:  Peripheral IV placed.    Treatment Conditions:  Lab Results   Component Value Date    HGB 7.0 (L) 09/25/2021    WBC 9.8 09/25/2021    ANEU 4.2 09/25/2021    PLT 21 (LL) 09/25/2021      Blood transfusion consent signed 7/25/21.      Post Infusion Assessment:  Patient tolerated infusion without incident.  Site patent and intact, free from redness, edema or discomfort.  No evidence of extravasations.  Access discontinued per protocol.       Discharge Plan:   Patient and/or family verbalized understanding of discharge instructions and all questions answered.  AVS to patient via [employer].  Patient will return Monday for next appointment.   Patient discharged in stable condition accompanied by: self.  Departure Mode: Ambulatory.      Carleen Bruce RN

## 2021-09-25 NOTE — PROGRESS NOTES
Lab and Infusion appt cancelled on Monday 9/27 per patient request.  Patient states she is moving that day and unable to stay anyway.  She plans on coming to see Dr. Luz at 9 though. Patient added on in 1 week to recheck labs.

## 2021-09-27 NOTE — PROGRESS NOTES
"Oncology Rooming Note    September 27, 2021 8:57 AM   Yvette Gastelum is a 70 year old female who presents for:    Chief Complaint   Patient presents with     Oncology Clinic Visit     Initial Vitals: There were no vitals taken for this visit. Estimated body mass index is 35.36 kg/m  as calculated from the following:    Height as of 8/3/21: 1.626 m (5' 4\").    Weight as of 8/24/21: 93.4 kg (206 lb). There is no height or weight on file to calculate BSA.  Data Unavailable Comment: Data Unavailable   No LMP recorded. Patient has had a hysterectomy.  Allergies reviewed: Yes  Medications reviewed: Yes    Medications: Medication refills not needed today.  Pharmacy name entered into [facility]:    [id number] - [place], [place] - [address]  [facility] - [place], [place] - [address], [address]  [id number] [place] [facility] - [place], [place] - [address] [facility] - [place], [place] - [address] [place] [address]    Clinical concerns:  doctor was notified.      Betzaida Fregoso MA            "

## 2021-09-27 NOTE — NURSING NOTE
Writer received a message to follow up on 2nd opinion of BMB a the [facility]    Writer called and spoke with [facility] [phone] and I was informed they have not received the request.     I have faxed the request again to [facility] pathology  requesting slides to be sent to Alyssa.    Writer confirmed that the fax was received by [facility] pathology dept and will call tomorrow to make sure slides were sent out.    Jeannette Moore RN

## 2021-09-27 NOTE — LETTER
"    9/27/2021         RE: Yvette Gastelum  [address]  [place] [address]        Dear Colleague,    Thank you for referring your patient, Yvette Gastelum, to the [facility]. Please see a copy of my visit note below.    Oncology Rooming Note    September 27, 2021 8:57 AM   Yvette Gastelum is a 70 year old female who presents for:    Chief Complaint   Patient presents with     Oncology Clinic Visit     Initial Vitals: There were no vitals taken for this visit. Estimated body mass index is 35.36 kg/m  as calculated from the following:    Height as of 8/3/21: 1.626 m (5' 4\").    Weight as of 8/24/21: 93.4 kg (206 lb). There is no height or weight on file to calculate BSA.  Data Unavailable Comment: Data Unavailable   No LMP recorded. Patient has had a hysterectomy.  Allergies reviewed: Yes  Medications reviewed: Yes    Medications: Medication refills not needed today.  Pharmacy name entered into "[employer]:    [id number] - [place], [place] - [address]  [facility] - [address], [address]  [id number] [address] [facility] - [place], [place] - [address] [place] [address]    Clinical concerns:  doctor was notified.      Betzaida Fregoso MA              ONCOLOGY HISTORY: Ms. Gastelum is a female with small cell lung cancer.    1.  CT chest angiogram on 08/21/2018 for hemoptysis revealed right lower lobe pulmonary mass invading the right lower lobe pulmonary artery and mild mediastinal lymphadenopathy.   -CT abdomen and pelvis on 08/22/2018 did not reveal any evidence of metastatic disease.   -Brain MRI on 08/30/2019 did not reveal any intracranial metastasis.   -CT-guided biopsy of right lung mass on 08/31/2018 revealed high-grade neuroendocrine carcinoma, favor small cell carcinoma.   -PET scan in 08/2019 did not reveal any evidence of metastatic disease.      2. "  She had limited stage small cell lung cancer.  She received concurrent chemoradiation with 4 cycles of platinum and etoposide between 09/17/2018 and 12/10/2018. Patient received 5940 cGy in 33 fractions between 10/11/2018 and 11/28/2018.   -Prophylactic cranial radiation between 01/29/2019 and 02/11/2019.      3.  CT chest on 08/21/2019 revealed a new 0.6 cm pulmonary nodule in right lower lobe suspicious for metastatic lesion.  There are 2 additional indeterminate nodular opacities in the right lower lobe posteriorly which are stable.  There is a mildly enlarged subcarinal lymph node which is stable.   -PET scan on 10/28/2019 revealed 0.8 cm right lower lobe nodule which is hypermetabolic and suspicious for malignancy.  No other areas of abnormal uptake.   -SBRT to RLL nodule between 11/19/19 and 11/27/19. 4500 cGy.     4. On 01/20/2021:  -WBC of 3.6, hemoglobin of 9.8 and platelet of 60. MCV of 86.  -Normal iron, folate, vitamin B12, SPEP and LDH.     5. Bone marrow biopsy was done on 02/10/2021.  It was a dry tap.  Bone marrow revealed hypercellular marrow with 50% cellularity.  There was marked erythroid hyperplasia.  Mild decrease in granulocytic precursors.  Adequate megakaryocyte.  No MDS.  Mild increase in reticulin fibers.  No evidence of metastatic tumor.  Less than 5% blasts.      6. Bone marrow biopsy was done on 04/19/2021.  It reveals hypercellular marrow with 50% cellularity. 2% blasts.  There is marked erythroid hyperplasia with probable mild dyserythropoiesis.  There is adequate granulocytic precursor and megakaryocytes without dysplasia.  There is increase in reticulin fibers, 28% sideroblasts.    -Negative for JAK2 and SF3B1.  -Cytogenetics are abnormal. 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  There are other abnormalities.  This raises the possibility of developing myelodysplastic syndrome.     7. Patient had recurrent subdural hematoma, likely from thrombocytopenia.  She had  evacuation of hematoma on 07/05/2021 and on 07/13/2021.    SUBJECTIVE:  Ms. Gastelum is a 70-year-old female with history of small cell lung cancer.  She is doing well without any evidence of recurrence.     The patient's main problem has been cytopenia.  She is anemic and thrombocytopenic.  She had 2 bone marrow biopsies done.  She is likely developing myelodysplastic syndrome.     The patient had recurrent subdural hematoma.  It was drained.  Followup CT scan is planned.     The patient has fatigue.  No headache or dizziness.  No visual problem.  No chest pain.  No shortness of breath at rest.  She gets short of breath on minimal exertion.  No nausea or vomiting.  Appetite is good.  No urinary complaint.  No bowel problem.  No blood in the urine or stools.  No fever or chills.     The patient said that main problem is her fatigue.  She gets tired very easily.  She also gets short of breath.     PHYSICAL EXAMINATION:    GENERAL:  She is alert, oriented x3.  Not in any distress.  VITAL SIGNS:  Reviewed.   The rest of the systems not examined.     LABORATORY DATA:  Reviewed.     ASSESSMENT:    1.  A 50-year-old female with limited-stage small cell lung cancer.  No evidence of disease.  2.  Normocytic anemia.   3.  Thrombocytopenia.  4.  Recurrent subdural hematoma.  5.  Chronic cough.  6.  Fatigue.  7.  Shortness of breath on exertion.     PLAN:     1.  From lung cancer, patient clinically is stable.  No evidence of recurrence.  Last CT scan in 06/2021 did not reveal any evidence of malignancy.  2.  Discussed regarding her thrombocytopenia.  It has gotten worse.  She will get platelet transfusion.  Aim is to keep the platelet above 20.  We discussed regarding anemia.  She did receive PRBC transfusion on Saturday.  Her hemoglobin did not improve significantly.  The patient had 2 bone marrow biopsies done.  The patient likely is developing myelodysplastic syndrome.  We will get a second opinion on the bone marrow  biopsy.  If it shows MDS, we will consider starting her on treatment.  We can start her on Vidaza or decitabine.  If second opinion bone marrow does not reveal MDS, we will consider repeating a bone marrow biopsy.  The patient has been requiring PRBC transfusion.  We will consider starting her on Aranesp/Procrit.  This will be considered after second opinion and bone marrow is available.  3.  The patient is advised to be very careful.  I have advised to avoid trauma/fall.  I advised her to go to emergency room if she has headache, dizziness, visual problems, chest pain, shortness of breath, or any other concerns.  4.  She will have CBC checked weekly and transfusion will be given as needed.  I will see her in 4 weeks' time.  I advised her to call us with any questions or concerns.     TOTAL FACE-TO-FACE TIME SPENT:  45 minutes; more than 50% of the time was spent in counseling and coordination of care.    This office note has been dictated.          Again, thank you for allowing me to participate in the care of your patient.        Sincerely,        Jennifer Luz MD

## 2021-09-27 NOTE — TELEPHONE ENCOUNTER
Contacted patient to inform her that platelets are 15 and Dr. Luz would like to her receive a platelet transfusion. Pt stated she is moving today and can not come back, but can come tomorrow. Pt scheduled for platelet transfusion at 8am tomorrow.  ARIANNA Alvarado

## 2021-09-28 NOTE — NURSING NOTE
Writer confirmed with [facility] pathology that slides are being sent out today for a 2nd opinion at the  of .    Jeannette Moore RN

## 2021-09-28 NOTE — PROGRESS NOTES
Infusion Nursing Note:  Yvette Gastelum presents today for platelet infusion.    Patient seen by provider today: No   present during visit today: Not Applicable.    Note: no new concerns.      Intravenous Access:  Peripheral IV placed.    Treatment Conditions:  Lab Results   Component Value Date    HGB 7.3 (L) 09/27/2021    WBC 8.5 09/27/2021    ANEU 4.2 09/25/2021    PLT 15 (LL) 09/27/2021      Results reviewed, labs MET treatment parameters, ok to proceed with treatment.      Post Infusion Assessment:  Patient tolerated infusion without incident.  Blood return noted pre and post infusion.  Site patent and intact, free from redness, edema or discomfort.  No evidence of extravasations.  Access discontinued per protocol.       Discharge Plan:   Discharge instructions reviewed with: Patient.  Patient and/or family verbalized understanding of discharge instructions and all questions answered.  Copy of AVS reviewed with patient and/or family.  Patient will return 10/2 for next appointment.  Patient discharged in stable condition accompanied by: self.  Departure Mode: Ambulatory.      Kesha Whipple RN

## 2021-10-02 NOTE — PROGRESS NOTES
Infusion Nursing Note:  Yvette Gastelum presents today for labs, possible transfusions.    Patient seen by provider today: No   present during visit today: Not Applicable.    Note: N/A.      Intravenous Access:  Labs drawn without difficulty.  Peripheral IV placed.    Treatment Conditions:  Lab Results   Component Value Date    HGB 6.6 (LL) 10/02/2021    WBC 7.1 10/02/2021    ANEU 4.2 09/25/2021    PLT 23 (LL) 10/02/2021      Blood transfusion consent signed 7/25/21.      Post Infusion Assessment:  Patient tolerated infusion without incident.  Blood return noted pre and post infusion.  Site patent and intact, free from redness, edema or discomfort.  No evidence of extravasations.  Access discontinued per protocol.       Discharge Plan:   Discharge instructions reviewed with: Patient.  Patient and/or family verbalized understanding of discharge instructions and all questions answered.  AVS to patient via [employer].  Patient will return 10/9 for next appointment.   Patient discharged in stable condition accompanied by: self.  Departure Mode: Ambulatory.      Maury Santana RN

## 2021-10-03 NOTE — TELEPHONE ENCOUNTER
Spoke to patient. Told her that bone marrow was reviewed at [facility]. Their diagnosis is MDS. Will see her in clinic in next few days to discuss about treatment.

## 2021-10-03 NOTE — PROGRESS NOTES
ONCOLOGY HISTORY: Ms. Gastelum is a female with small cell lung cancer.    1.  CT chest angiogram on 08/21/2018 for hemoptysis revealed right lower lobe pulmonary mass invading the right lower lobe pulmonary artery and mild mediastinal lymphadenopathy.   -CT abdomen and pelvis on 08/22/2018 did not reveal any evidence of metastatic disease.   -Brain MRI on 08/30/2019 did not reveal any intracranial metastasis.   -CT-guided biopsy of right lung mass on 08/31/2018 revealed high-grade neuroendocrine carcinoma, favor small cell carcinoma.   -PET scan in 08/2019 did not reveal any evidence of metastatic disease.      2.  She had limited stage small cell lung cancer.  She received concurrent chemoradiation with 4 cycles of platinum and etoposide between 09/17/2018 and 12/10/2018. Patient received 5940 cGy in 33 fractions between 10/11/2018 and 11/28/2018.   -Prophylactic cranial radiation between 01/29/2019 and 02/11/2019.      3.  CT chest on 08/21/2019 revealed a new 0.6 cm pulmonary nodule in right lower lobe suspicious for metastatic lesion.  There are 2 additional indeterminate nodular opacities in the right lower lobe posteriorly which are stable.  There is a mildly enlarged subcarinal lymph node which is stable.   -PET scan on 10/28/2019 revealed 0.8 cm right lower lobe nodule which is hypermetabolic and suspicious for malignancy.  No other areas of abnormal uptake.   -SBRT to RLL nodule between 11/19/19 and 11/27/19. 4500 cGy.     4. On 01/20/2021:  -WBC of 3.6, hemoglobin of 9.8 and platelet of 60. MCV of 86.  -Normal iron, folate, vitamin B12, SPEP and LDH.     5. Bone marrow biopsy was done on 02/10/2021.  It was a dry tap.  Bone marrow revealed hypercellular marrow with 50% cellularity.  There was marked erythroid hyperplasia.  Mild decrease in granulocytic precursors.  Adequate megakaryocyte.  No MDS.  Mild increase in reticulin fibers.  No evidence of metastatic tumor.  Less than 5% blasts.      6. Bone  marrow biopsy was done on 04/19/2021.  It reveals hypercellular marrow with 50% cellularity. 2% blasts.  There is marked erythroid hyperplasia with probable mild dyserythropoiesis.  There is adequate granulocytic precursor and megakaryocytes without dysplasia.  There is increase in reticulin fibers, 28% sideroblasts.    -Negative for JAK2 and SF3B1.  -Cytogenetics are abnormal. 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  There are other abnormalities.  This raises the possibility of developing myelodysplastic syndrome.     7. Patient had recurrent subdural hematoma, likely from thrombocytopenia.  She had evacuation of hematoma on 07/05/2021 and on 07/13/2021.    SUBJECTIVE:  Ms. Gastelum is a 70-year-old female with history of small cell lung cancer.  She is doing well without any evidence of recurrence.     The patient's main problem has been cytopenia.  She is anemic and thrombocytopenic.  She had 2 bone marrow biopsies done.  She is likely developing myelodysplastic syndrome.     The patient had recurrent subdural hematoma.  It was drained.  Followup CT scan is planned.     The patient has fatigue.  No headache or dizziness.  No visual problem.  No chest pain.  No shortness of breath at rest.  She gets short of breath on minimal exertion.  No nausea or vomiting.  Appetite is good.  No urinary complaint.  No bowel problem.  No blood in the urine or stools.  No fever or chills.     The patient said that main problem is her fatigue.  She gets tired very easily.  She also gets short of breath.     PHYSICAL EXAMINATION:    GENERAL:  She is alert, oriented x3.  Not in any distress.  VITAL SIGNS:  Reviewed.   The rest of the systems not examined.     LABORATORY DATA:  Reviewed.     ASSESSMENT:    1.  A 50-year-old female with limited-stage small cell lung cancer.  No evidence of disease.  2.  Normocytic anemia.   3.  Thrombocytopenia.  4.  Recurrent subdural hematoma.  5.  Chronic cough.  6.  Fatigue.  7.   Shortness of breath on exertion.     PLAN:     1.  From lung cancer, patient clinically is stable.  No evidence of recurrence.  Last CT scan in 06/2021 did not reveal any evidence of malignancy.  2.  Discussed regarding her thrombocytopenia.  It has gotten worse.  She will get platelet transfusion.  Aim is to keep the platelet above 20.  We discussed regarding anemia.  She did receive PRBC transfusion on Saturday.  Her hemoglobin did not improve significantly.  The patient had 2 bone marrow biopsies done.  The patient likely is developing myelodysplastic syndrome.  We will get a second opinion on the bone marrow biopsy.  If it shows MDS, we will consider starting her on treatment.  We can start her on Vidaza or decitabine.  If second opinion bone marrow does not reveal MDS, we will consider repeating a bone marrow biopsy.  The patient has been requiring PRBC transfusion.  We will consider starting her on Aranesp/Procrit.  This will be considered after second opinion and bone marrow is available.  3.  The patient is advised to be very careful.  I have advised to avoid trauma/fall.  I advised her to go to emergency room if she has headache, dizziness, visual problems, chest pain, shortness of breath, or any other concerns.  4.  She will have CBC checked weekly and transfusion will be given as needed.  I will see her in 4 weeks' time.  I advised her to call us with any questions or concerns.     TOTAL FACE-TO-FACE TIME SPENT:  45 minutes; more than 50% of the time was spent in counseling and coordination of care.

## 2021-10-05 PROBLEM — D46.9 MDS (MYELODYSPLASTIC SYNDROME) (H): Status: ACTIVE | Noted: 2021-01-01

## 2021-10-05 NOTE — TELEPHONE ENCOUNTER
Pt called for pre-procedure/covid. Pt has a covid test ordered but not scheduled and had the covid line number left on VM.  Pt had a message left with all information and our unit number to call with any questions.

## 2021-10-05 NOTE — PROGRESS NOTES
EDUCATION CHEMOTHERAPY INFUSION    Discussed with patient information regarding Vidaza infusions. Went over side affects of medications, as self care while on chemotherapy.   Informed patient and family when he should call the triage nurse at clinic or seek medical attention if you have chills and/or temperature greater than or equal to 100.5, uncontrolled nausea/vomiting, diarrhea, constipation, dizziness, shortness of breath, chest pain, heart palpitations, weakness or any other new or concerning symptoms, questions or concerns.  You can not have any live virus vaccines prior to or during treatment or up to 6 months post infusion.  If you have an upcoming surgery, medical procedure or dental procedure during treatment, this should be discussed with your ordering physician and your surgeon/dentist.  If you are having any concerning symptom, if you are unsure if you should get your next infusion or wish to speak to a provider before your next infusion, please call your care coordinator or triage nurse at your clinic to notify them so we can adequately serve you.     Writer also review briefly port placement. Patient has had one previously.    Jeannette Moore RN

## 2021-10-05 NOTE — LETTER
"    10/5/2021         RE: Yvette Gastelum  [address]  [place] [address]        Dear Colleague,    Thank you for referring your patient, Yvette Gastelum, to the [facility]. Please see a copy of my visit note below.    Oncology Rooming Note    October 5, 2021 9:08 AM   Yvette Gastelum is a 70 year old female who presents for:    Chief Complaint   Patient presents with     Oncology Clinic Visit     Small cell lung cancer     Initial Vitals: /82   Pulse 106   Temp 98.4  F (36.9  C) (Oral)   Resp 16   Ht 1.626 m (5' 4\")   Wt 95.6 kg (210 lb 12.8 oz)   SpO2 100%   BMI 36.18 kg/m   Estimated body mass index is 36.18 kg/m  as calculated from the following:    Height as of this encounter: 1.626 m (5' 4\").    Weight as of this encounter: 95.6 kg (210 lb 12.8 oz). Body surface area is 2.08 meters squared.  No Pain (0) Comment: Data Unavailable   No LMP recorded. Patient has had a hysterectomy.  Allergies reviewed: Yes  Medications reviewed: Yes    Medications: Medication refills not needed today.  Pharmacy name entered into [facility]:    [id number] - [facility] [address]  [facility] - [address], [address]  [facility] [id number] [place] [facility] [address] [facility] - [place], [place] - [address] [place] [address]    Clinical concerns: None       Ni Forbes MA                EDUCATION CHEMOTHERAPY INFUSION    Discussed with patient information regarding Vidaza infusions. Went over side affects of medications, as self care while on chemotherapy.   Informed patient and family when he should call the triage nurse at clinic or seek medical attention if you have chills and/or temperature greater than or equal to 100.5, uncontrolled nausea/vomiting, diarrhea, constipation, dizziness, shortness of breath, chest pain, heart palpitations, weakness or any other new or " concerning symptoms, questions or concerns.  You can not have any live virus vaccines prior to or during treatment or up to 6 months post infusion.  If you have an upcoming surgery, medical procedure or dental procedure during treatment, this should be discussed with your ordering physician and your surgeon/dentist.  If you are having any concerning symptom, if you are unsure if you should get your next infusion or wish to speak to a provider before your next infusion, please call your care coordinator or triage nurse at your clinic to notify them so we can adequately serve you.     Writer also review briefly port placement. Patient has had one previously.    Jeannette Moore, ARIANNA          ONCOLOGY HISTORY: Ms. Gastelum is a female with small cell lung cancer.    1.  CT chest angiogram on 08/21/2018 for hemoptysis revealed right lower lobe pulmonary mass invading the right lower lobe pulmonary artery and mild mediastinal lymphadenopathy.   -CT abdomen and pelvis on 08/22/2018 did not reveal any evidence of metastatic disease.   -Brain MRI on 08/30/2019 did not reveal any intracranial metastasis.   -CT-guided biopsy of right lung mass on 08/31/2018 revealed high-grade neuroendocrine carcinoma, favor small cell carcinoma.   -PET scan in 08/2019 did not reveal any evidence of metastatic disease.      2.  She had limited stage small cell lung cancer.  She received concurrent chemoradiation with 4 cycles of platinum and etoposide between 09/17/2018 and 12/10/2018. Patient received 5940 cGy in 33 fractions between 10/11/2018 and 11/28/2018.   -Prophylactic cranial radiation between 01/29/2019 and 02/11/2019.      3.  CT chest on 08/21/2019 revealed a new 0.6 cm pulmonary nodule in right lower lobe suspicious for metastatic lesion.  There are 2 additional indeterminate nodular opacities in the right lower lobe posteriorly which are stable.  There is a mildly enlarged subcarinal lymph node which is stable.   -PET scan on  10/28/2019 revealed 0.8 cm right lower lobe nodule which is hypermetabolic and suspicious for malignancy.  No other areas of abnormal uptake.   -SBRT to RLL nodule between 11/19/19 and 11/27/19. 4500 cGy.     HEMATOLOGY HISTORY:  1. On 01/20/2021:  -WBC of 3.6, hemoglobin of 9.8 and platelet of 60. MCV of 86.  -Normal iron, folate, vitamin B12, SPEP and LDH.     2. Bone marrow biopsy on 02/10/2021 was a dry tap.  Bone marrow revealed hypercellular marrow with 50% cellularity.  There was marked erythroid hyperplasia.  Mild decrease in granulocytic precursors.  Adequate megakaryocyte.  No MDS.  Mild increase in reticulin fibers.  No evidence of metastatic tumor.  Less than 5% blasts.     3. Bone marrow biopsy on 04/19/2021 reveals hypercellular marrow with 50% cellularity. 2% blasts.  There is marked erythroid hyperplasia with probable mild dyserythropoiesis.  There is adequate granulocytic precursor and megakaryocytes without dysplasia.  There is increase in reticulin fibers, 28% sideroblasts.    -Negative for JAK2 and SF3B1.  -Cytogenetics are abnormal. 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  There are other abnormalities.    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 Patient had recurrent subdural hematoma, likely from thrombocytopenia.  She had evacuation of hematoma on 07/05/2021 and on 07/13/2021.    5. Bone marrow reviewed at [facility] . Myelodysplastic syndrome, 2% blasts. Bone marrow fibrosis (MF) grade 1.    SUBJECTIVE:  Ms. Gastelum is a 70-year-old female with limited-stage small cell lung cancer diagnosed in 2018.  She received concurrent chemoradiation and prophylactic cranial radiation.  She is in remission from it.  Because of her radiation, she does have some lung fibrosis and chronic cough.     The patient has been having anemia and thrombocytopenia.  She had 2 bone marrow biopsies done, which were suspicious of developing myelodysplastic syndrome.     Bone  marrow biopsy was sent for a second opinion to the [facility].  Bone marrow on 04/19/2021 reveals myelodysplastic syndrome with 2% blasts.  There is grade 1 bone marrow fibrosis.     The patient's main problem has been cytopenia.  She is anemic and thrombocytopenic.  She has been requiring transfusions frequently.     No headache.  Occasional dizziness.  No chest pain or shortness of breath.  She has chronic cough.  No nausea or vomiting. No bleeding.  No fever or chills.      All other review of systems is negative.     PHYSICAL EXAMINATION:    GENERAL:  She is alert and oriented x3.  Not in any distress.   Rest of systems not examined.     ASSESSMENT:  1.  A 70-year-old female with therapy-related myelodysplastic syndrome.  2.  Limited-stage small cell lung cancer in remission.  3.  Subdural hematoma, status post evacuation.  4.  Chronic cough.     PLAN:     1.  I had a long discussion with the patient.  Her son was on the phone.  Bone marrow was reviewed with them.  I explained to them that bone marrow was sent for a second opinion.  Their diagnosis is myelodysplastic syndrome. I discussed regarding myelodysplastic syndrome.  Pathophysiology was discussed.  The patient falls into high risk category.     2.  Discussed regarding treatment.  We discussed regarding bone marrow transplant.  The patient is a 70-year-old patient with multiple medical problems including lung cancer in last 3 years. She is not a candidate for transplant.      I discussed regarding treatment with Vidaza or decitabine.  After discussion, plan is to start her on Vidaza.  Regimen was discussed.  Side effects were discussed.  She will get it for 7 days every 28 days.      I explained to the patient that Vidaza is not a cure.  It will help to improve MDS.  Hopefully, her transfusion need will decrease.     The patient is agreeable for Vidaza.  She will be started in the next 1-2 weeks.     3.  Discussed regarding port placement.   She is agreeable for it.  That will be scheduled.    4.  She will have weekly CBC and transfusion given as needed.    5.  I will see her with cycle 2.  In between, she will see our nurse practitioner.  I advised her to go to emergency room if she has fever, chills, infection, worsening weakness, or any other concerns.     TOTAL FACE-TO-FACE TIME SPENT:  45 minutes, more than 50% of the time spent in counseling and coordination of care.    This office note has been dictated.          Again, thank you for allowing me to participate in the care of your patient.        Sincerely,        Jennifer Luz MD

## 2021-10-05 NOTE — PATIENT INSTRUCTIONS
1. Side-effect of vidaza.  2. Port placement by IR.  3. Start vidaza in next few days.  4. See Jaciel Castellon when she come start vidaza.  5. See me with cycle 2.

## 2021-10-05 NOTE — PROGRESS NOTES
"Oncology Rooming Note    October 5, 2021 9:08 AM   Yvette Gastelum is a 70 year old female who presents for:    Chief Complaint   Patient presents with     Oncology Clinic Visit     Small cell lung cancer     Initial Vitals: /82   Pulse 106   Temp 98.4  F (36.9  C) (Oral)   Resp 16   Ht 1.626 m (5' 4\")   Wt 95.6 kg (210 lb 12.8 oz)   SpO2 100%   BMI 36.18 kg/m   Estimated body mass index is 36.18 kg/m  as calculated from the following:    Height as of this encounter: 1.626 m (5' 4\").    Weight as of this encounter: 95.6 kg (210 lb 12.8 oz). Body surface area is 2.08 meters squared.  No Pain (0) Comment: Data Unavailable   No LMP recorded. Patient has had a hysterectomy.  Allergies reviewed: Yes  Medications reviewed: Yes    Medications: Medication refills not needed today.  Pharmacy name entered into [facility]:    [facility] #[id number] - [place], [place] - [address]  [facility] - [place], [place] - [address] [address], [address]  [facility] [id number] [place] [facility] - [place], [place] - [address] [facility] - [place], [place] - [address] [place] [address]    Clinical concerns: None       Ni Forbes MA              "

## 2021-10-06 NOTE — PROGRESS NOTES
All scripts have been sent to [facility] pharmacy. Patient is aware and will  scripts on Saturday.    Jeannette Moore RN

## 2021-10-07 PROBLEM — Z91.89 AT RISK FOR NAUSEA AND VOMITING: Status: ACTIVE | Noted: 2021-01-01

## 2021-10-07 PROBLEM — Z51.11 ENCOUNTER FOR ANTINEOPLASTIC CHEMOTHERAPY: Status: ACTIVE | Noted: 2021-01-01

## 2021-10-07 NOTE — TELEPHONE ENCOUNTER
Uric Acid   Date Value Ref Range Status   08/05/2021 8.2 (H) 2.6 - 6.0 mg/dL Final     Creatinine   Date Value Ref Range Status   07/26/2021 1.39 (H) 0.52 - 1.04 mg/dL Final   07/10/2021 0.84 0.52 - 1.04 mg/dL Final     Please refill as appropriate.  Thank you,    Shira Laguna RN  [facility]

## 2021-10-09 NOTE — PROGRESS NOTES
ONCOLOGY HISTORY: Ms. Gastelum is a female with small cell lung cancer.    1.  CT chest angiogram on 08/21/2018 for hemoptysis revealed right lower lobe pulmonary mass invading the right lower lobe pulmonary artery and mild mediastinal lymphadenopathy.   -CT abdomen and pelvis on 08/22/2018 did not reveal any evidence of metastatic disease.   -Brain MRI on 08/30/2019 did not reveal any intracranial metastasis.   -CT-guided biopsy of right lung mass on 08/31/2018 revealed high-grade neuroendocrine carcinoma, favor small cell carcinoma.   -PET scan in 08/2019 did not reveal any evidence of metastatic disease.      2.  She had limited stage small cell lung cancer.  She received concurrent chemoradiation with 4 cycles of platinum and etoposide between 09/17/2018 and 12/10/2018. Patient received 5940 cGy in 33 fractions between 10/11/2018 and 11/28/2018.   -Prophylactic cranial radiation between 01/29/2019 and 02/11/2019.      3.  CT chest on 08/21/2019 revealed a new 0.6 cm pulmonary nodule in right lower lobe suspicious for metastatic lesion.  There are 2 additional indeterminate nodular opacities in the right lower lobe posteriorly which are stable.  There is a mildly enlarged subcarinal lymph node which is stable.   -PET scan on 10/28/2019 revealed 0.8 cm right lower lobe nodule which is hypermetabolic and suspicious for malignancy.  No other areas of abnormal uptake.   -SBRT to RLL nodule between 11/19/19 and 11/27/19. 4500 cGy.     HEMATOLOGY HISTORY:  1. On 01/20/2021:  -WBC of 3.6, hemoglobin of 9.8 and platelet of 60. MCV of 86.  -Normal iron, folate, vitamin B12, SPEP and LDH.     2. Bone marrow biopsy on 02/10/2021 was a dry tap.  Bone marrow revealed hypercellular marrow with 50% cellularity.  There was marked erythroid hyperplasia.  Mild decrease in granulocytic precursors.  Adequate megakaryocyte.  No MDS.  Mild increase in reticulin fibers.  No evidence of metastatic tumor.  Less than 5% blasts.     3. Bone  marrow biopsy on 04/19/2021 reveals hypercellular marrow with 50% cellularity. 2% blasts.  There is marked erythroid hyperplasia with probable mild dyserythropoiesis.  There is adequate granulocytic precursor and megakaryocytes without dysplasia.  There is increase in reticulin fibers, 28% sideroblasts.    -Negative for JAK2 and SF3B1.  -Cytogenetics are abnormal. 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  There are other abnormalities.    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 Patient had recurrent subdural hematoma, likely from thrombocytopenia.  She had evacuation of hematoma on 07/05/2021 and on 07/13/2021.    5. Bone marrow reviewed at [facility] . Myelodysplastic syndrome, 2% blasts. Bone marrow fibrosis (MF) grade 1.    SUBJECTIVE:  Ms. Gastelum is a 70-year-old female with limited-stage small cell lung cancer diagnosed in 2018.  She received concurrent chemoradiation and prophylactic cranial radiation.  She is in remission from it.  Because of her radiation, she does have some lung fibrosis and chronic cough.     The patient has been having anemia and thrombocytopenia.  She had 2 bone marrow biopsies done, which were suspicious of developing myelodysplastic syndrome.     Bone marrow biopsy was sent for a second opinion to the [facility].  Bone marrow on 04/19/2021 reveals myelodysplastic syndrome with 2% blasts.  There is grade 1 bone marrow fibrosis.     The patient's main problem has been cytopenia.  She is anemic and thrombocytopenic.  She has been requiring transfusions frequently.     No headache.  Occasional dizziness.  No chest pain or shortness of breath.  She has chronic cough.  No nausea or vomiting. No bleeding.  No fever or chills.      All other review of systems is negative.     PHYSICAL EXAMINATION:    GENERAL:  She is alert and oriented x3.  Not in any distress.   Rest of systems not examined.     ASSESSMENT:  1.  A 70-year-old female  with therapy-related myelodysplastic syndrome.  2.  Limited-stage small cell lung cancer in remission.  3.  Subdural hematoma, status post evacuation.  4.  Chronic cough.     PLAN:     1.  I had a long discussion with the patient.  Her son was on the phone.  Bone marrow was reviewed with them.  I explained to them that bone marrow was sent for a second opinion.  Their diagnosis is myelodysplastic syndrome. I discussed regarding myelodysplastic syndrome.  Pathophysiology was discussed.  The patient falls into high risk category.     2.  Discussed regarding treatment.  We discussed regarding bone marrow transplant.  The patient is a 70-year-old patient with multiple medical problems including lung cancer in last 3 years. She is not a candidate for transplant.      I discussed regarding treatment with Vidaza or decitabine.  After discussion, plan is to start her on Vidaza.  Regimen was discussed.  Side effects were discussed.  She will get it for 7 days every 28 days.      I explained to the patient that Vidaza is not a cure.  It will help to improve MDS.  Hopefully, her transfusion need will decrease.     The patient is agreeable for Vidaza.  She will be started in the next 1-2 weeks.     3.  Discussed regarding port placement.  She is agreeable for it.  That will be scheduled.    4.  She will have weekly CBC and transfusion given as needed.    5.  I will see her with cycle 2.  In between, she will see our nurse practitioner.  I advised her to go to emergency room if she has fever, chills, infection, worsening weakness, or any other concerns.     TOTAL FACE-TO-FACE TIME SPENT:  45 minutes, more than 50% of the time spent in counseling and coordination of care.

## 2021-10-09 NOTE — PROGRESS NOTES
Infusion Nursing Note:  Yvette Gastelum presents today for labs and possible transfusion.    Patient seen by provider today: No   present during visit today: Not Applicable.    Note: Pt reports she is having a port placement Monday at [facility].    Intravenous Access:  Labs drawn without difficulty.  Peripheral IV placed.    Treatment Conditions:  Lab Results   Component Value Date    HGB 7.1 (L) 10/09/2021    WBC 9.6 10/09/2021    ANEU 4.0 10/09/2021    PLT 16 (LL) 10/09/2021      Results reviewed, labs MET treatment parameters, ok to proceed with treatment.  Pt will have platelet transfusion today  Blood transfusion consent signed 7/25/21.      Post Infusion Assessment:  Patient tolerated infusion without incident.  Site patent and intact, free from redness, edema or discomfort.  No evidence of extravasations.  Access discontinued per protocol.       Discharge Plan:   Discharge instructions reviewed with: Patient.  Patient and/or family verbalized understanding of discharge instructions and all questions answered.  AVS to patient via [email or web address].  Patient will return Monday for next appointment.   Patient discharged in stable condition accompanied by: self.  Departure Mode: Ambulatory.      ARIANNA Cox RN

## 2021-10-11 PROBLEM — Z95.828 PORT-A-CATH IN PLACE: Status: ACTIVE | Noted: 2021-01-01

## 2021-10-11 NOTE — PROGRESS NOTES
Care Suites Admission Nursing Note    Patient Information  Name: Yvette Gastelum  Age: 70 year old  Reason for admission: Port placement  Care Suites arrival time: 08    Visitor Information  Name: Luli  Informed of visitor restrictions: Yes  1 visitor allowed per patient   Visitor must screen negative for COVID symptoms   Visitor must wear a mask  Waiting rooms closed to visitors    Patient Admission/Assessment   Pre-procedure assessment complete: Yes  If abnormal assessment/labs, provider notified: Yes Taye notified of abnormal labs  NPO: Yes  Medications held per instructions/orders: Yes  Consent: obtained  If applicable, pregnancy test status: deferred  Patient oriented to room: Yes  Education/questions answered: Yes  Plan/other: Reviewed plan for today questions answered.    Discharge Planning  Discharge name/phone number: Luli  Overnight post sedation caregiver: Friend  Discharge location: home    Janeth Suárez RN

## 2021-10-11 NOTE — PROGRESS NOTES
Take Home Medication Teaching    Patient was educated on the following oral medications: ondansetron, prochlorperazine, lorazepam on October 11, 2021. Teaching provided to patient included indication, dose, administration, adverse effects and side effect management. Written materials were provided and patient was given the opportunity to ask questions. Patient verbalized understanding of the information presented.     Cuca Goldstein PharmD  October 11, 2021

## 2021-10-11 NOTE — PRE-PROCEDURE
GENERAL PRE-PROCEDURE:   Procedure:  Right chest port placement    Written consent obtained?: Yes    Risks and benefits: Risks, benefits and alternatives were discussed    Consent given by:  Patient  Patient states understanding of procedure being performed: Yes    Patient's understanding of procedure matches consent: Yes    Procedure consent matches procedure scheduled: Yes    Expected level of sedation:  Moderate  Appropriately NPO:  Yes  ASA Class:  3  Mallampati  :  Grade 2- soft palate, base of uvula, tonsillar pillars, and portion of posterior pharyngeal wall visible  Lungs:  Lungs clear with good breath sounds bilaterally  Heart:  Normal heart sounds and rate  History & Physical reviewed:  History and physical reviewed and no updates needed  Statement of review:  I have reviewed the lab findings, diagnostic data, medications, and the plan for sedation

## 2021-10-11 NOTE — PROGRESS NOTES
Care Suites Post Procedure Note    Patient Information  Name: Yvette Gastelum  Age: 70 year old    Post Procedure  Time patient returned to Care Suites:   Concerns/abnormal assessment: 0941  If abnormal assessment, provider notified: N/A  Plan/Other: Right chest site CDI, Right neck site CDI, derma-bond to both sites CDI. No bruising/bleeding/hematoma. VSS. Taking H2O, declined other fluids or crackers. Pt given number to see if she could rearrange her infusion for today.    Janeth Suárez RN

## 2021-10-11 NOTE — PROGRESS NOTES
Infusion Nursing Note:  Yvette Gastelum presents today for C1D1 Vidaza, Blood transfusion.    Patient seen by provider today: No   present during visit today: Not Applicable.    Note: pt had port placed today in IR.  Pharmacist met with pt for take home medi    Intravenous Access:  Implanted Port.    Treatment Conditions:  Lab Results   Component Value Date    HGB 6.8 (LL) 10/11/2021    WBC 10.9 10/11/2021    ANEU 4.0 10/09/2021    PLT 22 (LL) 10/11/2021      Lab Results   Component Value Date     10/09/2021    POTASSIUM 4.2 10/09/2021    MAG 1.7 03/11/2019    CR 1.23 (H) 10/09/2021    RUSTY 8.8 10/09/2021    BILITOTAL 0.9 10/09/2021    ALBUMIN 3.5 10/09/2021    ALT 12 10/09/2021    AST 25 10/09/2021     Results reviewed, labs MET treatment parameters, ok to proceed with treatment--Vidaza, 1 unit prbc. Did not meet parameters for  Platelet transfusion  Blood transfusion consent signed 7/25/21.  BSA 2.06.      Post Infusion Assessment:  Patient tolerated infusion and transfusion without incident.  Blood return noted pre and post infusion.  Site patent and intact, free from redness, edema or discomfort.  No evidence of extravasations.       Discharge Plan:   Discharge instructions reviewed with: Patient.  Patient and/or family verbalized understanding of discharge instructions and all questions answered.  Copy of AVS reviewed with patient and/or family.  Patient will return tomorrow for next appointment.  Patient discharged in stable condition accompanied by: self.  Departure Mode: Ambulatory.      Tess Leonard RN

## 2021-10-11 NOTE — IR NOTE
Interventional Radiology Intra-procedural Nursing Note    Patient Name: Yvette Gastelum  Medical Record Number: [id number]  Today's Date: October 11, 2021    Start Time: 0929  End of procedure time: 0946  Procedure: port placement  Report given to: RN, Care suites  Time pt departs:  0955  : n/a    Other Notes:     Patient arrives to Ir suite 2. Identification confirmed and consent verified. Patient was then assisted onto procedure table, positioned safely and connected to monitoring equipment. Access site right internal jugular and chest was prepped and patient draped under sterile technique.     Versed 1.5mg IV  Fentanyl 75mcg IV  Ancef 2g IV  NS 100ml IV    Patient tolerated procedure well. VSS. Patient alert, respirations regular and unlabored, no c/o pain at this time.   Procedure access site right internal jugular is c/d/i with suture and dermabond.    Patient transferred back to Care suites in stable condition accompanied by myself.      Helen Mckoy RN  Interventional Radiology

## 2021-10-11 NOTE — PROGRESS NOTES
"  Interventional Radiology - Pre-Procedure Note:  10/11/2021    Procedure Requested: Port placement  Requested by: Dr Luz    History and Physical Reviewed: H&P documented within 30 days (by Dr Luz on 10/5/21).  I have personally reviewed the patient's medical history and have updated the medical record as necessary.    Brief HPI: Yvette Gastelum is a 70 year old female with h/o small cell lung CA and new dx of myelodysplastic syndrome on bone marrow biopsy. As a result, patient referred for port placement. Has h/o left chest port placement for prior chemotherapy which had to be removed due to thromboembolic event. Was placed on left to accommodate radiation therapy. No complaints today. Lab values noted.     IMAGING:  CT C/A/P w 6/17/21:  \"IMPRESSION:  1.  No evidence of recurrent or metastatic disease in the chest,  abdomen, or pelvis. No change from previous.\"    NPO: YES since [place]  ANTICOAGULANTS: NONE  ANTIBIOTICS: Ancef 2g IV for prophylaxis    ALLERGIES  Allergies   Allergen Reactions     No Known Allergies          LABS:  INR   Date Value Ref Range Status   10/11/2021 1.15 0.85 - 1.15 Final     Comment:     Effective 7/11/2021, the reference range for this assay has changed.   07/05/2021 1.09 0.86 - 1.14 Final      Hemoglobin   Date Value Ref Range Status   10/11/2021 6.8 (LL) 11.7 - 15.7 g/dL Final   07/10/2021 7.4 (L) 11.7 - 15.7 g/dL Final   ]  Platelet Count   Date Value Ref Range Status   10/11/2021 22 (LL) 150 - 450 10e3/uL Final   07/10/2021 91 (L) 150 - 450 10e9/L Final     Creatinine   Date Value Ref Range Status   10/09/2021 1.23 (H) 0.52 - 1.04 mg/dL Final   07/10/2021 0.84 0.52 - 1.04 mg/dL Final     Potassium   Date Value Ref Range Status   10/09/2021 4.2 3.4 - 5.3 mmol/L Final   07/10/2021 3.9 3.4 - 5.3 mmol/L Final         EXAM:  BP (!) 133/91 (BP Location: Right arm)   Pulse (!) 121   Temp 97.9  F (36.6  C) (Oral)   Resp 18   SpO2 98%   General:  Stable.  In no acute distress.  "   Neuro:  A&O x 3. Moves all extremities equally.  Neck: No gross JVD  Heart: RRR  Lungs: No increased work of breathing  Chest: Scarring consistent with left chest port placement    Pre-Sedation Code Status Assessment:  Code Status: Full code.         ASSESSMENT/PLAN:   Myelodysplastic syndrome  Anemia  Thrombocytopenia  H/O SCC lung    - Right chest port placement today as scheduled    Procedure, risks/benefits, details, alternatives, and sedation reviewed with patient and patient verbalized understanding. All questions answered. OK to proceed with above radiology procedure.     Bryn Yan PA-C  Interventional Radiology  [phone]      Total time spent on the date of the encounter is 45 minutes, including time spent counseling the patient, performing a medically appropriate evaluation, reviewing prior medical history, ordering medications and tests, documenting clinical information in the medical record, and communication of results.

## 2021-10-11 NOTE — DISCHARGE INSTRUCTIONS
Port Insertion Discharge Instructions     After you go home:      Have an adult stay with you for the first 6 hours    You may resume your normal diet       For 24 hours - due to the sedation you received:    Relax and take it easy    Do NOT make any important or legal decisions    Do NOT drive or operate machines at home or at work    Do NOT drink alcohol    Care of Incision sites:      You have a liquid adhesive covering on your incisions. Do not remove the adhesive residue. It will come off on it's own.    You may shower tomorrow.    You may cover the wound with a bandaid if needed for comfort.      Activity       Avoid heavy lifting (greater than 10 pounds) or the overuse of your shoulder for 3 days    Bleeding:      If you start bleeding from the incision sites in your chest or neck - or have swelling in your neck, sit down and press on the site for 5-10 minutes.     If bleeding has not stopped after 10 minutes, call your provider.        Call 911 right away if you have heavy bleeding or bleeding that does not stop.      Medicines:      You may resume all medications    Resume your Warfarin/Coumadin at your regular dose today. Follow up with your provider to have your INR rechecked    Resume your Platelet Inhibitors and Aspirin tomorrow at your regular dose    For minor pain, you may take Acetaminophen (Tylenol) or Ibuprofen (Advil)    Call the provider who ordered this procedure if:      You have swelling in your neck or over your port site    The incision area is red, swollen, hot or tender    You have chills or a fever greater than 101 F (38 C)    Any questions or concerns    Call  911 or go to the Emergency Room if you have:      Severe chest pain or trouble breathing    Bleeding that you cannot control    Additional Information:      Your port may be accessed right away.     You will need to have your port flushed every 30 days or after each use.      If you have questions call:          Gurmeet Lowery  Radiology Dept @ [phone]      The provider who performed your procedure was _________________.

## 2021-10-11 NOTE — PROGRESS NOTES
Care Suites Discharge Nursing Note    Patient Information  Name: Yvette Gastelum  Age: 70 year old    Discharge Education:  Discharge instructions reviewed: Yes  Additional education/resources provided: NA  Patient/patient representative verbalizes understanding: Yes  Patient discharging on new medications: No  Medication education completed: N/A    Discharge Plans:   Discharge location: home  Discharge ride contacted: N/A waiting with pt  Approximate discharge time: 1050    Discharge Criteria:  Discharge criteria met and vital signs stable: Yes    Patient Belongs:  Patient belongings returned to patient: Yes    aJneth Suárez RN

## 2021-10-11 NOTE — PROCEDURES
[facility]    Procedure: Port placement    Date/Time: 10/11/2021 10:33 AM  Performed by: Ned Arzola  Authorized by: Ned Arzola     UNIVERSAL PROTOCOL   Site Marked: NA  Prior Images Obtained and Reviewed:  Yes  Required items: Required blood products, implants, devices and special equipment available    Patient identity confirmed:  Verbally with patient, arm band, provided demographic data and hospital-assigned identification number  Patient was reevaluated immediately before administering moderate or deep sedation or anesthesia  Confirmation Checklist:  Patient's identity using two indicators, relevant allergies, procedure was appropriate and matched the consent or emergent situation and correct equipment/implants were available  Time out: Immediately prior to the procedure a time out was called    Universal Protocol: the Joint Commission Universal Protocol was followed    Preparation: Patient was prepped and draped in usual sterile fashion           ANESTHESIA    Anesthesia: Local infiltration  Local Anesthetic:  Lidocaine 1% without epinephrine      SEDATION    Patient Sedated: Yes    Sedation Type:  Moderate (conscious) sedation  Vital signs: Vital signs monitored during sedation    See dictated procedure note for full details.  Findings: Rt internal jugular port    Specimens: none    Complications: None    Condition: Stable    Plan: May use port    PROCEDURE   Patient Tolerance:  Patient tolerated the procedure well with no immediate complications    Length of time physician/provider present for 1:1 monitoring during sedation: 25

## 2021-10-12 NOTE — PROGRESS NOTES
Infusion Nursing Note:  Yvette Gastelum presents today for C1D2 Vidaza.    Patient seen by provider today: No   present during visit today: Not Applicable.    Note: no new concerns.      Intravenous Access:  Implanted Port.    Treatment Conditions:  Lab Results   Component Value Date    HGB 6.8 (LL) 10/11/2021    WBC 10.9 10/11/2021    ANEU 4.0 10/09/2021    PLT 22 (LL) 10/11/2021      Lab Results   Component Value Date     10/09/2021    POTASSIUM 4.2 10/09/2021    MAG 1.7 03/11/2019    CR 1.23 (H) 10/09/2021    RUSTY 8.8 10/09/2021    BILITOTAL 0.9 10/09/2021    ALBUMIN 3.5 10/09/2021    ALT 12 10/09/2021    AST 25 10/09/2021     Results reviewed, labs MET treatment parameters, ok to proceed with treatment.      Post Infusion Assessment:  Patient tolerated infusion without incident.  Blood return noted pre and post infusion.  Site patent and intact, free from redness, edema or discomfort.  No evidence of extravasations.       Discharge Plan:   Discharge instructions reviewed with: Patient.  Patient and/or family verbalized understanding of discharge instructions and all questions answered.  AVS to patient via [employer].  Patient will return 10/13 for next appointment.   Patient discharged in stable condition accompanied by: self.  Departure Mode: Ambulatory.      Kesha Whipple RN

## 2021-10-13 NOTE — PROGRESS NOTES
Infusion Nursing Note:  Yvette Gastelum presents today for C1D3 vidaza   Patient seen by provider today: No   present during visit today: Not Applicable.    Note: N/A.      Intravenous Access:  Implanted Port.    Treatment Conditions:  Lab Results   Component Value Date    HGB 6.8 (LL) 10/11/2021    WBC 10.9 10/11/2021    ANEU 4.0 10/09/2021    PLT 22 (LL) 10/11/2021      Lab Results   Component Value Date     10/09/2021    POTASSIUM 4.2 10/09/2021    MAG 1.7 03/11/2019    CR 1.23 (H) 10/09/2021    RUSTY 8.8 10/09/2021    BILITOTAL 0.9 10/09/2021    ALBUMIN 3.5 10/09/2021    ALT 12 10/09/2021    AST 25 10/09/2021     Results reviewed, labs MET treatment parameters, ok to proceed with treatment.      Post Infusion Assessment:  Patient tolerated infusion without incident.  Blood return noted pre and post infusion.  Site patent and intact, free from redness, edema or discomfort.  No evidence of extravasations.       Discharge Plan:   [facility]  Patient will return 10/14 for next appointment.   Patient discharged in stable condition accompanied by: self.  Departure Mode: Ambulatory.      Maury Santana RN

## 2021-10-14 NOTE — PROGRESS NOTES
Infusion Nursing Note:  Yvette Gastelmu presents today for C1D4 Vidaza/ platelets and 1 unit PRBC .    Patient seen by provider today: No   present during visit today: Not Applicable.    Note: N/A.      Intravenous Access:  Labs drawn without difficulty.  Implanted Port.    Treatment Conditions:  Lab Results   Component Value Date    HGB 6.8 (LL) 10/14/2021    WBC 6.2 10/14/2021    ANEU 4.0 10/09/2021    PLT 8 (LL) 10/14/2021      Lab Results   Component Value Date     10/09/2021    POTASSIUM 4.2 10/09/2021    MAG 1.7 03/11/2019    CR 1.23 (H) 10/09/2021    RUSTY 8.8 10/09/2021    BILITOTAL 0.9 10/09/2021    ALBUMIN 3.5 10/09/2021    ALT 12 10/09/2021    AST 25 10/09/2021     Results reviewed, labs MET treatment parameters, ok to proceed with treatment.  Blood transfusion consent signed 7/25/21.      Post Infusion Assessment:  Patient tolerated infusion without incident.  Blood return noted pre and post infusion.  Site patent and intact, free from redness, edema or discomfort.  No evidence of extravasations.  Access discontinued per protocol.       Discharge Plan:   Discharge instructions reviewed with: Patient.  Patient and/or family verbalized understanding of discharge instructions and all questions answered.  AVS to patient via [employer].  Patient will return 10/15/21 for next appointment.   Patient discharged in stable condition accompanied by: self.  Departure Mode: Ambulatory.      Jelly Bradford RN

## 2021-10-15 NOTE — PROGRESS NOTES
Infusion Nursing Note:  Yvette Gastelum presents today for C1D4 Vidaza.    Patient seen by provider today: No   present during visit today: Not Applicable.    Note: No New concerns or complaints today.      Intravenous Access:  Implanted Port.    Treatment Conditions:  Lab Results   Component Value Date    HGB 6.8 (LL) 10/14/2021    WBC 6.2 10/14/2021    ANEU 4.0 10/09/2021    PLT 8 (LL) 10/14/2021      Lab Results   Component Value Date     10/09/2021    POTASSIUM 4.2 10/09/2021    MAG 1.7 03/11/2019    CR 1.23 (H) 10/09/2021    RUSTY 8.8 10/09/2021    BILITOTAL 0.9 10/09/2021    ALBUMIN 3.5 10/09/2021    ALT 12 10/09/2021    AST 25 10/09/2021     Results reviewed, labs MET treatment parameters, ok to proceed with treatment.      Post Infusion Assessment:  Patient tolerated infusion without incident.  Blood return noted pre and post infusion.  Site patent and intact, free from redness, edema or discomfort.  No evidence of extravasations.  Access discontinued per protocol.       Discharge Plan:   Patient declined prescription refills.  Discharge instructions reviewed with: Patient.  Patient and/or family verbalized understanding of discharge instructions and all questions answered.  AVS to patient via [employer].  Patient will return 10/18 for next appointment.   Patient discharged in stable condition accompanied by: self.  Departure Mode: Ambulatory.      Octavio Gutierrez RN

## 2021-10-18 NOTE — PROGRESS NOTES
Infusion Nursing Note:  Yvette Gastelum presents today for C1D8 Vidaza/platelets transfusion.    Patient seen by provider today: No   present during visit today: Not Applicable.    Note: pt reports she had a hard time with leg swelling, headache and chest pressure/pain over the weekend, which was 24 hour after she received platelets and blood transfusio. She has had the same symptoms every time she had both blood product on same day.  Pt felt much better this morning, no leg swelling noted, denied headache or chest pain.      Intravenous Access:  Labs drawn without difficulty.  Implanted Port.    Treatment Conditions:  Results reviewed-platelet 7K- labs MET treatment parameters.  Blood transfusion consent signed 7/25/21.      Post Infusion Assessment:  Patient tolerated infusion without incident.  Blood return noted pre and post infusion.  Site patent and intact, free from redness, edema or discomfort.  No evidence of extravasations.   Port left accessed for tomorrow.      Discharge Plan:   Discharge instructions reviewed with: Patient.  Patient and/or family verbalized understanding of discharge instructions and all questions answered.  AVS to patient via [employer].  Patient will return 10/19/21 for next appointment.   Patient discharged in stable condition accompanied by: self.  Departure Mode: Ambulatory.      Jelly Bradford RN

## 2021-10-19 NOTE — PROGRESS NOTES
Infusion Nursing Note:  Yvette Gastelum presents today for C9D1 Vidaza.    Patient seen by provider today: No   present during visit today: Not Applicable.    Note: N/A.      Intravenous Access:  Implanted Port.    Treatment Conditions:  Lab Results   Component Value Date    HGB 8.9 (L) 10/18/2021    WBC 6.2 10/18/2021    ANEU 4.0 10/09/2021    PLT 7 (LL) 10/18/2021      Lab Results   Component Value Date     10/09/2021    POTASSIUM 4.2 10/09/2021    MAG 1.7 03/11/2019    CR 1.23 (H) 10/09/2021    RUSTY 8.8 10/09/2021    BILITOTAL 0.9 10/09/2021    ALBUMIN 3.5 10/09/2021    ALT 12 10/09/2021    AST 25 10/09/2021     Results reviewed, labs MET treatment parameters, ok to proceed with treatment.      Post Infusion Assessment:  Patient tolerated infusion without incident.  Blood return noted pre and post infusion.  Site patent and intact, free from redness, edema or discomfort.  No evidence of extravasations.  Access discontinued per protocol.       Discharge Plan:   Patient declined prescription refills.  Discharge instructions reviewed with: Patient.  Patient and/or family verbalized understanding of discharge instructions and all questions answered.  AVS to patient via [employer].  Patient will return 10/21 for next appointment.   Patient discharged in stable condition accompanied by: self.  Departure Mode: Ambulatory.      Octavio Gutierrez RN

## 2021-10-21 NOTE — PROGRESS NOTES
Infusion Nursing Note:  Yvette Gastelum presents today for labs, platelet transfusion.    Patient seen by provider today: No   present during visit today: Not Applicable.    Note: N/A.      Intravenous Access:  Labs drawn without difficulty.  Implanted Port.    Treatment Conditions:  Lab Results   Component Value Date    HGB 8.4 (L) 10/21/2021    WBC 12.8 (H) 10/21/2021    ANEU 4.0 10/09/2021    PLT 6 (LL) 10/21/2021      Results reviewed, labs MET treatment parameters, ok to proceed with treatment.  Blood transfusion consent signed 7/25/21.      Post Infusion Assessment:  Patient tolerated infusion without incident.  Blood return noted pre and post infusion.  Site patent and intact, free from redness, edema or discomfort.  No evidence of extravasations.  Access discontinued per protocol.       Discharge Plan:   Discharge instructions reviewed with: Patient.  Patient and/or family verbalized understanding of discharge instructions and all questions answered.  Patient discharged in stable condition accompanied by: self.  Departure Mode: Ambulatory.      Daily Siegel RN

## 2021-10-25 NOTE — PROGRESS NOTES
Infusion Nursing Note:  Yvette Gastelum presents today for labs, possible transfusion.    Patient seen by provider today: No   present during visit today: Not Applicable.    Note: Pt reports feeling weak yesterday and today.  Pt given platelet transfusion today for platelet count 2.  Discussed with Dr. Luz- he would like pt to come 3 times a week for possible transfusion.  Pt schedule updated and AVS given to pt.     Intravenous Access:  Labs drawn without difficulty.  Implanted Port.    Treatment Conditions:  Lab Results   Component Value Date    HGB 7.8 (L) 10/25/2021    WBC 23.5 (H) 10/25/2021    ANEU 4.0 10/09/2021    PLT 2 (LL) 10/25/2021      Results reviewed, labs MET treatment parameters, ok to proceed with treatment- platelet transfusion today.  Blood transfusion consent signed 7/25/21.      Post Infusion Assessment:  Patient tolerated infusion without incident.  Blood return noted pre and post infusion.  Site patent and intact, free from redness, edema or discomfort.  No evidence of extravasations.  Access discontinued per protocol.       Discharge Plan:   Patient declined prescription refills.  Discharge instructions reviewed with: Patient.  Patient and/or family verbalized understanding of discharge instructions and all questions answered.  AVS to patient via [employer].  Patient will return 10/27/21 for next appointment.   Patient discharged in stable condition accompanied by: self.  Departure Mode: Ambulatory.      Reema Olmos RN

## 2021-10-25 NOTE — TELEPHONE ENCOUNTER
Routing refill request to provider for review/approval because:  Drug not on the [facility] refill protocol     Monika CAVANAUGH RN  EP Triage

## 2021-10-27 PROBLEM — D46.9 MYELODYSPLASTIC SYNDROME (H): Status: ACTIVE | Noted: 2021-01-01

## 2021-10-27 NOTE — ED NOTES
"RN entered room to explain abnormal lab values and that both platelets and blood would need to be transfused to correct these levels. Pt adamantly refused to have both platelets and blood transfused in the same day stating \"everytime they have done that I feel terrible, like I am having a heart attack for 3 days\". RN attempted to explain the severity of the situation, discussed possible ramifications including death may occur. Pt still refused.  RN shared pt concerns with MD and was present when MD discussed situation and concern for pt well being. Pt still refused.   "

## 2021-10-27 NOTE — ED TRIAGE NOTES
PT BIBA from home. Pt has hx of myelodysplastic syndrome. Pt was at infusion center on Monday and had platelets and was scheduled to have blood transfusion on Tuesday. Appointment was cancelled as pt was not feeling well. Pt was not able to have transfusion today d/t weakness. Pt bp low and pulse elevated. bg 123. sats for EMS 80s on RA. Placed on  2L NC

## 2021-10-27 NOTE — ED NOTES
"RN entered room to prepare to give 2nd unit of platelets. Pt refused stating \"I want a room first. You can't do anything until I get a room\". RN attempted to explain the need for 2nd unit and pt still refused. ED MD and hospitalist updated.   "

## 2021-10-27 NOTE — ED PROVIDER NOTES
History   Chief Complaint:  Generalized Weakness and Hemoptysis     The history is provided by the patient and medical records.      Yvette Gastelum is a 71 year old female with history of chronic kidney disease, thrombocytopenia, hypertension, myelodysplastic syndrome and small cell lung cancer (in remission since Nov 2019) who presents with generalized weakness and hemoptysis. The patient has a history of myelodysplastic syndrome. She received platelets 2 days ago and was scheduled for a blood transfusion yesterday, however she was not able to get to the appointment due to extreme weakness and inability to ambulate and get up on her own. She states today she had a sudden onset of hemoptysis as well as worsening weakness and shortness of breath. She states she has worsening shortness of breath with exertion. Unknown if her chronic leg swelling has worsened over the past few days. She denies any fevers or chest pain. Notes she has a history of small cell lung cancer treated with chemo radiation until November 2019.     Review of Systems   Constitutional: Positive for fatigue. Negative for fever.   Respiratory: Positive for cough (blood) and shortness of breath.    Cardiovascular: Negative for chest pain.   All other systems reviewed and are negative.    Allergies:  The patient has no known allergies.     Medications:  Proventil   Zyloprim  Lipitor  Tessalon  Imodium  Ativan  Prilosec  Zofran  Deltasone  Compazine  Nebusal    Past Medical History:     Cancer   Hypertension  Kidney stones  Obesity  Chronic kidney disease, stage 3  Right lower lobe lung mass  Small cell lung cancer  Antineoplastic chemotherapy induced pancytopenia  Osteoarthritis of both knees  Thrombocytopenia  Large right SDH with shift  GERD  Myelodysplastic syndrome  Port-A-Cath in place      Past Surgical History:    Bone marrow biopsy  Colonoscopy  Craniotomy, right  Cataract extraction, bilateral  Hysterectomy and salpingectomy   [place] teeth  "extraction  Port placement     Family History:    Mother - Cervical Cancer  Father - Aneurysm, Hypertension  Brother - Lung Cancer    Social History:  The patient arrived to the emergency department via EMS.    Physical Exam     Patient Vitals for the past 24 hrs:   BP Temp Temp src Pulse Resp SpO2 Height Weight   10/27/21 1715 -- -- -- 113 (!) 36 -- -- --   10/27/21 1700 101/65 98.2  F (36.8  C) Oral 115 28 98 % -- --   10/27/21 1653 99/57 98.2  F (36.8  C) Oral 114 30 98 % -- --   10/27/21 1648 96/54 98.4  F (36.9  C) Oral 115 (!) 31 100 % -- --   10/27/21 1643 98/51 98.2  F (36.8  C) Oral 115 (!) 32 99 % -- --   10/27/21 1545 91/79 -- -- 117 16 96 % -- --   10/27/21 1530 (!) 83/53 -- -- (!) 121 (!) 31 94 % -- --   10/27/21 1433 (!) 82/64 98.3  F (36.8  C) Oral (!) 124 28 94 % 1.626 m (5' 4\") 91.6 kg (202 lb)       Physical Exam  General/Appearance: appears stated age, well-groomed, appears pale and in mild respiratory distress  Eyes: EOMI, no scleral injection, no icterus  ENT: MMM  Neck: supple, nl ROM, no stiffness  Cardiovascular: tachy but regular, nl S1S2, no m/r/g, 2+ pulses in all 4 extremities, cap refill <2sec  Respiratory: CTAB, good air movement throughout, no wheezes/rhonchi/rales, no increased WOB, no retractions  Back: no lesions  GI: abd soft, non-distended, nttp,  no HSM, no rebound, no guarding, nl BS  MSK: TRUONG, good tone, no bony abnormality  Skin: warm and well-perfused, no rash, no edema, BLE ecchymosis, nl turgor  Neuro: GCS 15, alert and oriented, no gross focal neuro deficits  Psych: interacts appropriately  Heme: no petechia    Emergency Department Course   ECG  ECG obtained at 1445, ECG read at 1630  Sinus tachycardia  Low voltage QRS  Borderline ECG   No significant change as compared to prior, dated 07/13/21.  Rate 123 bpm. ND interval 142 ms. QRS duration 78 ms. QT/QTc 320/458 ms. P-R-T axes 52 0 62.     Imaging:  XR Chest Port 1 View  Right chest port noted, the tubing appears " pointed  cephalad which is presumably malposition. No acute infiltrates.  Reading per radiology.    CT Chest without Contrast  1.  Right lower lobe consolidation and trace right pleural effusion, likely secondary to pneumonia. A follow-up radiograph after treatment is recommended to ensure resolution.  2.  Mild emphysema.  3.  Mild splenomegaly.  Reading per radiology.    Laboratory:  CBC (Collected 1505): WBC 20.6 (H), HGB 5.8 (L!), PLT 1 (L!)   Manual Differential: Absolute Lymphocytes 12.2 (H), Absolute Monocytes 3.5 (H), Absolute Metamyelocytes 0.2 (H), Absolute Blasts 0.6 (H), Bite Cells Slight (A), RBC Fragments Slight (A), Smudge Cells Present (A), Teardrop Cells Slight (A), o/w WNL  BMP: CO2 19 (L), Urea Nitrogen 69 (H), Creatinine 3.70 (H), Glucose 120 (H), GFR 12 (L), o/w WNL   Lactic acid (result time 1528) 1.1   Troponin (Collected 1505): <0.015  Nt probnp inpatient: 2325 (H)  ABO RH Type and Screen: A positive, antibody negative     Symptomatic Influenza A/B & SARS-CoV2 (COVID19) Virus PCR Multiplex: negative      Emergency Department Course:  Reviewed:  I reviewed nursing notes, vitals, past medical history and Care Everywhere    Assessments:  1500 I obtained history and examined the patient as noted above.   1600 I rechecked the patient and explained findings. I explained to the patient her very concerning findings of her platelets of count of 1 and her hemoglobin in the fives. I expressed the emergent nature of giving her both platelets as well as PRBC transfusion. She is adamantly refusing these. She states she is willing to get 1 bag of one of them but will not consent to getting more than that. She is very concerned as when he has received both simultaneously in the past she has gone into congestive heart failure. She understands the risks of not receiving both. I explained my concern regarding the clear stress her heart is under with her shortness of breath, tension, tachycardia and the  importance of doing both but again she understands these risks and is continuing to deny these transfusions. This cessation was witnessed by her nurse as well as mary Millan.    Consults:  1637 I spoke to Dr. Fabian of the hospitalist service who accepts the patient for admission.     Interventions:  1511 0.9% NaCl bolus 1000 mL IV  1756 Cyklokapron 100 mg/mL inhalation, 500 mg nebulization     Disposition:  The patient was admitted to the hospital under the care of Dr. Fabian.     Impression & Plan   Medical Decision Making:  This patient is a 71-year-old female with multiple medical problems, most notably myelodysplastic syndrome and small cell lung cancer in remission who presents today with generalized weakness, shortness of breath, some mild hemoptysis.  She is tachycardic and hypotensive.  She is satting well but does appear to have mild respiratory distress.  Initially my concern was for something such as a PE or infection however as her labs came back her hemoglobin is only 5.8 and her platelets are 1.  I suspect this is the cause of her shortness of breath as well as her hemoptysis.  Unfortunately I cannot get a PE study as her GFR is markedly low.  Again, though, I have lower suspicion for PE now that we have these blood test results.  I tried to explain to the patient the importance of both platelets and packed red blood cell transfusions today as her heart is clearly under stress with the mild hypotension and tachycardia.  She is aware of the dangers of not doing both but is adamant that she only received 1 bag of blood products per day.  She understands she may be risking significant morbidity or mentality but she does have the capacity to make this choice and clearly understands it.  With her platelets of 1 I have opted to give her platelets today.  I have also considered infection as a cause of her hypotension and tachycardia however have lower suspicion again given the obvious platelets and  hemoglobin abnormality as well as the fact that she does not have fever.  She will be admitted to the hospitalist service for continued work-up and management.    Addendum: CT suggests pneumonia.  Patient is afebrile.  Even though she is got vital sign abnormalities and an elevated white count we suspect this appearance of a pneumonia is actually the site of bleed and therefore, due to the need to be judicious with her fluids, are holding off on antibiotics for now.  This was discussed with the admitting hospitalist.    Diagnosis:    ICD-10-CM    1. Thrombocytopenia (H)  D69.6    2. Anemia, unspecified type  D64.9    3. Myelodysplastic syndrome (H)  D46.9      Scribe Disclosure:  I, Maile Bazan, am serving as a scribe at 2:59 PM on 10/27/2021 to document services personally performed by Shayla Andersen MD based on my observations and the provider's statements to me.     Shayla Andersen MD  10/27/21 1924

## 2021-10-27 NOTE — TELEPHONE ENCOUNTER
Can we schedule Yvette for virtual visit Friday to discuss need for additional prednisone?    Arie House MD, MD

## 2021-10-27 NOTE — ED NOTES
Assisted with boosting the patient. Patient declined interventions when discussing with the Dr. Andersen regarding her results.

## 2021-10-27 NOTE — PROGRESS NOTES
received a call from patient's son, Kvng, that patient is too weak to drive herself to the appointments today.     Kvng is requesting a call from Dr. Luz to determine when she will improve  or what should be done.      called the patient who NO SHOWED to her appointments to assess further the patient and symptom she is having and LVM requesting a return     Jeannette Moore RN

## 2021-10-27 NOTE — PHARMACY-ADMISSION MEDICATION HISTORY
Pharmacy Medication History  Admission medication history interview status for the 10/27/2021  admission is complete. See EPIC admission navigator for prior to admission medications     Location of Interview: Patient room  Medication history sources: Patient, Surescripts and Care Everywhere    Significant changes made to the medication list:  None    In the past week, patient estimated taking medication this percent of the time: greater than 90%    Additional medication history information:   Patient has not had any medications today.   She is currently on prednisone 5 mg daily and states she ran out of her prednisone and needs it refilled.     Medication reconciliation completed by provider prior to medication history? No    Time spent in this activity: 10 mins    Prior to Admission medications    Medication Sig Last Dose Taking? Auth Provider   acetaminophen (TYLENOL) 325 MG tablet Take 325-650 mg by mouth every 6 hours as needed for pain   Yes Reported, Patient   albuterol (PROVENTIL) (2.5 MG/3ML) 0.083% neb solution Take 1 vial (2.5 mg) by nebulization 2 times daily 10/26/2021 at Unknown time Yes Umu Jackson APRN CNP   allopurinol (ZYLOPRIM) 300 MG tablet Take 1 tablet (300 mg) by mouth daily 10/26/2021 at Unknown time Yes Arie House MD   atorvastatin (LIPITOR) 10 MG tablet Take 1 tablet (10 mg) by mouth daily 10/26/2021 at Unknown time Yes Arie House MD   benzonatate (TESSALON) 100 MG capsule Take 1 capsule (100 mg) by mouth 3 times daily as needed for cough  Yes Arie House MD   loperamide (IMODIUM) 2 MG capsule Take 2 mg by mouth 4 times daily as needed for diarrhea   Yes Reported, Patient   LORazepam (ATIVAN) 0.5 MG tablet Take 1 tablet (0.5 mg) by mouth every 4 hours as needed (Anxiety, Nausea/Vomiting or Sleep)  Yes Jennifer Luz MD   omeprazole (PRILOSEC) 20 MG DR capsule Take 1 capsule (20 mg) by mouth daily 10/26/2021 at Unknown time Yes Harsh  Arie Cunningham MD   ondansetron (ZOFRAN) 8 MG tablet Take 1 tablet (8 mg) by mouth every 8 hours as needed (Nausea/Vomiting)  Yes Jennifer Luz MD   predniSONE (DELTASONE) 5 MG tablet Take 40 mg daily for 5 day, then reduce by 5 mg daily until you are taking  5 mg daily.  Continue 5 mg daily. 10/26/2021 at Unknown time Yes Arie House MD   prochlorperazine (COMPAZINE) 10 MG tablet Take 1 tablet (10 mg) by mouth every 6 hours as needed (Nausea/Vomiting)  Yes Jennifer Luz MD   sodium chloride (NEBUSAL) 3 % neb solution Take 3 mLs by nebulization 2 times daily Use with albuterol inhaler 10/26/2021 at Unknown time Yes Umu Jackson APRN CNP   Elastic Bandages & Supports (MEDICAL COMPRESSION SOCKS) MISC 1 Package daily   Valeria Macedo,        The information provided in this note is only as accurate as the sources available at the time of update(s)

## 2021-10-27 NOTE — ED NOTES
[facility]  ED Nurse Handoff Report    ED Chief complaint: Generalized Weakness and Hemoptysis      ED Diagnosis:   Final diagnoses:   Thrombocytopenia (H)   Anemia, unspecified type   Myelodysplastic syndrome (H)       Code Status: Full Code    Allergies:   Allergies   Allergen Reactions     No Known Allergies        Patient Story: Pt with Hx of thrombocytopenia, myelodysplastic syndrome and small cell lung cancer presents with abnormal labs and weakness. She received platelets 2 days ago and was scheduled for a blood transfusion yesterday, however she was not able to get to the appointment due to extreme weakness and inability to ambulate and get up on her own. She states today she had a sudden onset of hemoptysis as well as worsening weakness and shortness of breath.  Focused Assessment:    Abnormal Labs Reviewed   BASIC METABOLIC PANEL - Abnormal; Notable for the following components:       Result Value    Carbon Dioxide (CO2) 19 (*)     Urea Nitrogen 69 (*)     Creatinine 3.70 (*)     Glucose 120 (*)     GFR Estimate 12 (*)     All other components within normal limits   NT PROBNP INPATIENT - Abnormal; Notable for the following components:    N terminal Pro BNP Inpatient 2,325 (*)     All other components within normal limits   CBC WITH PLATELETS AND DIFFERENTIAL - Abnormal; Notable for the following components:    WBC Count 20.6 (*)     RBC Count 2.17 (*)     Hemoglobin 5.8 (*)     Hematocrit 18.8 (*)     MCHC 30.9 (*)     RDW 20.7 (*)     Platelet Count 1 (*)     NRBCs per 100 WBC 2 (*)     All other components within normal limits   DIFFERENTIAL - Abnormal; Notable for the following components:    Absolute Lymphocytes 12.2 (*)     Absolute Monocytes 3.5 (*)     Absolute Metamyelocytes 0.2 (*)     Absolute Blasts 0.6 (*)     Bite Cells Slight (*)     RBC Fragments Slight (*)     Smudge Cells Present (*)     Teardrop Cells Slight (*)     All other components within normal limits     Chest CT  w/o contrast   Final Result   IMPRESSION:    1.  Right lower lobe consolidation and trace right pleural effusion, likely secondary to pneumonia. A follow-up radiograph after treatment is recommended to ensure resolution.   2.  Mild emphysema.   3.  Mild splenomegaly.         XR Chest Port 1 View   Final Result   Abnormal   IMPRESSION: Right chest port noted, the tubing appears pointed   cephalad which is presumably malposition. No acute infiltrates.      [Access Center: See impression]      This report will be copied to the [facility] to ensure a   provider acknowledges the finding. Access Center is available Monday   through Friday 8am-3:30 pm.       BRANDT BARNEY MD            SYSTEM ID:  [id number]            Treatments and/or interventions provided: Platelets, IVF,   Patient's response to treatments and/or interventions: No change    To be done/followed up on inpatient unit:  See inpatient orders     Does this patient have any cognitive concerns?: A&O    Activity level - Baseline/Home:  Walker  Activity Level - Current:   Total Care    Patient's Preferred language: English   Needed?: No    Isolation: None  Infection: Not Applicable  Patient tested for COVID 19 prior to admission: YES  Bariatric?: No    Vital Signs:   Vitals:    10/27/21 1648 10/27/21 1653 10/27/21 1700 10/27/21 1715   BP: 96/54 99/57 101/65    Pulse: 115 114 115 113   Resp: (!) 31 30 28 (!) 36   Temp: 98.4  F (36.9  C) 98.2  F (36.8  C) 98.2  F (36.8  C)    TempSrc: Oral Oral Oral    SpO2: 100% 98% 98%    Weight:       Height:           Cardiac Rhythm:     Was the PSS-3 completed:   Yes  What interventions are required if any?               Family Comments: No family present  OBS brochure/video discussed/provided to patient/family: N/A        For the majority of the shift this patient's behavior was Green.   Behavioral interventions performed were None.    ED NURSE PHONE NUMBER: [phone]

## 2021-10-27 NOTE — ED NOTES
Bed: ED20  Expected date:   Expected time:   Means of arrival:   [phone] [phone] [facility] et[facility] [id number]

## 2021-10-27 NOTE — PROGRESS NOTES
Writer spoke with Dr. Luz who called the patient's son and spoke Luis M.     Dr. Luz and I were updated that patient was in the ED as she has started to cough up blood.    Will continue to follow for inpatient status.    Jeannette Moore RN

## 2021-10-27 NOTE — H&P
[facility]    History and Physical - Hospitalist Service       Date of Admission:  10/27/2021    Assessment & Plan      Yvette Gastelum is a 71 year old female admitted on 10/27/2021. She presents with hemoptysis    Active hemoptysis, likely minor but could rapidly transition to major hemoptysis  Concerned that she is bleeding from the original site of cancer noted in the right lower lobe.  On initial diagnosis in 2018 she had hemoptysis and had malignancy infiltrating the pulmonary vessels.  CT chest 10/27/2021 demonstrates a right lower lobe infiltrate, concerning for active bleed.  -Admit to inpatient  -Consult pulmonary  - mg 3 times daily x5 days, may discontinue early if hemoptysis resolves or per pulmonary recommendations  -[facility] status  -Attempt to avoid intubation as able, encourage pulmonary toileting and frequent coughing to clear airways as needed.  -Management of cytopenias as discussed elsewhere    Severe thrombocytopenia  Platelet 1K on 10/27/2021.  Due to MDS  -Transfuse to platelet count greater than 50 K initially, if ongoing bleeding consider transfusion to greater than 100 K  -Patient has been declining rapid transfusion, risks and benefits of this was discussed with her including death, suffocating and her own blood, respiratory arrest    Anemia, severe, symptomatic  Due to MDS.  Hemoglobin 5.8 on presentation.  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  Recently started on Vidaza.  -Consult to hematology oncology for assistance in management    TAMARA, severe  CKD3  Unclear cause. Possible pre-renal from poor PO  in last several days with associated anemia, and ongoing bleeding  -ua  -got IVF in ED, encourage PO, will get additional fluids with blood products  -follow BMP    HLD  -hold statin for now    Hx of spontaneous SDH 2/2 thrombocytopenia  - monitor    Malpositioned port-a-cath  - will need to follow up outpatient for findings on CT and CXR 10/27    Goals of care  Patient reports that she wishes to be full code.  She understands that if she is actively hemorrhaging into her lungs and has a respiratory arrest, that any resuscitative efforts may be futile.  Discussed this with the son as well.  Patient on multiple occasions has indicated that she does not wish to get infusions too rapidly due to chest pain and apparent volume overload symptoms associated with prior infusions.  ED providers and admitting provider have been extremely timur regarding the risk of mortality, morbidity associated with this decision.  She will need ongoing discussions to encourage her to comply with recommendations if restorative care remains her goal of care.       Diet:   regular  DVT Prophylaxis: Pneumatic Compression Devices  Shah Catheter: Not present  Central Lines: None  Code Status:   FULL CODE    Clinically Significant Risk Factors Present on Admission              # Thrombocytopenia: Plts = 1 10e3/uL (Ref range: 150 - 450 10e3/uL) on admission, will monitor for bleeding      Disposition Plan   Expected discharge: TBD recommended to TBD once off O2, not bleeding, iimproved platelets, .     The patient's care was discussed with the Bedside Nurse, Patient and ED MD Team, and son Luis M by phone.    Mohan Fabian MD  [facility]  Securely message with the [employer] Web Console (learn more here)  Text page via [employer] Paging/Directory      ______________________________________________________________________    Chief Complaint   Hemoptysis    History is obtained from the patient, electronic health record and  emergency department physician    History of Present Illness   Yvette Gastelum is a 71 year old female who has a history of small cell lung cancer in remission, transfusion dependent MDS who presents to the hospital on 10/27/21 with 1 day of hemoptysis.  She reports that she was feeling at her baseline on a.m. of 10/27/21.  Baseline for her is chronic fatigue, significant dyspnea on exertion.  She noted early a.m. that she had a cough, which she initially thought was due to productive phlegm.  As the cough was ongoing she went and checked her sputum in the bathroom and found that it was frankly bloody.  She then decided to proceed to the emergency department.  She notes that her shortness of breath has gotten progressively worse through the day.    In the emergency department on the care of Dr. Andersen, case discussed with her.  Labs are notable for profound thrombocytopenia (1K platelets), anemia, elevated creatinine 3.7, proBNP of 2.3K.  Patient did receive 1 L of NS due to hypotension and tachycardia.  1 unit of platelets was ordered as patient refused to accept more than that.  Discussed with Dr. Andersen, and recommended CT chest, inhaled TXA.  After discussion with patient did order additional 1 unit of platelets.  Discussed with son regarding recommendations for ongoing platelet infusion until bleeding stops or platelets are greater than 50 K.  He plans to discuss this with his mother.    Review of Systems    The 10 point Review of Systems is negative other than noted in the HPI or here.     Past Medical History    I have reviewed this patient's medical history and updated it with pertinent information if needed.   Past Medical History:   Diagnosis Date     Cancer (H)     Lung cancer       Hypertension      Kidney stones      Obese      Recurrent UTI      S/P CL-BSO      Sepsis (H)     secondary to uti        Past Surgical History   I have reviewed this patient's surgical history and updated it with  pertinent information if needed.  Past Surgical History:   Procedure Laterality Date     BONE MARROW BIOPSY, BONE SPECIMEN, NEEDLE/TROCAR N/A 2/10/2021    Procedure: BONE MARROW BIOPSY;  Surgeon: Earlene Garcia MD;  Location: [facility]     BONE MARROW BIOPSY, BONE SPECIMEN, NEEDLE/TROCAR N/A 2021    Procedure: BIOPSY, BONE MARROW;  Surgeon: Felix Elizabeth MD;  Location:  GI     COLONOSCOPY       COLONOSCOPY N/A 2/10/2016    Procedure: COMBINED COLONOSCOPY, SINGLE OR MULTIPLE BIOPSY/POLYPECTOMY BY BIOPSY;  Surgeon: Antonia Jiménez MD;  Location:  GI     CRANIOTOMY Right 2021    Procedure: CRANIOTOMY;  Surgeon: Puma Dominguez MD;  Location:  OR     CRANIOTOMY, EVACUATE HEMATOMA SUBDURAL, COMBINED Right 2021    Procedure: Right frontal craniotomy for evacuation of recurrent subdural hematoma;  Surgeon: Puma Dominguez MD;  Location:  OR     EYE SURGERY      CATARACT EXTRACTION RIGHT & LEFT     GYN SURGERY      BILATERAL TUBAL LIGATION, CL, LAPAROSCOPIC LEFT SALPINGO-OOPHORECTOMY     HEAD & NECK SURGERY      WISDOM TEETH EXTRACTION     HYSTERECTOMY       INSERT PORT VASCULAR ACCESS N/A 2018    Procedure: INSERT PORT VASCULAR ACCESS;  POWER PORT PLACEMENT;  Surgeon: Todd Norris MD;  Location: [facility]     IR CHEST PORT PLACEMENT > 5 YRS OF AGE  10/11/2021     REMOVE PORT VASCULAR ACCESS N/A 2019    Procedure: REMOVAL, VASCULAR ACCESS PORT;  Surgeon: Todd Norris MD;  Location:  OR       Social History   I have reviewed this patient's social history and updated it with pertinent information if needed.  Social History     Tobacco Use     Smoking status: Former Smoker     Packs/day: 1.00     Years: 50.00     Pack years: 50.00     Types: Cigarettes     Start date: 10/1965     Quit date: 2015     Years since quittin.1     Smokeless tobacco: Never Used   Substance Use Topics     Alcohol use: No     Alcohol/week: 0.0  standard drinks     Drug use: No       Family History   I have reviewed this patient's family history and updated it with pertinent information if needed.  Family History   Problem Relation Age of Onset     Aneurysm Father         Aorta     Hypertension Father      Cervical Cancer Mother      Lung Cancer Brother         non smoker       Prior to Admission Medications   Prior to Admission Medications   Prescriptions Last Dose Informant Patient Reported? Taking?   Acetaminophen 325 MG CAPS  Self Yes No   Sig: Take 325-650 mg by mouth every 6 hours as needed for pain    Elastic Bandages & Supports (MEDICAL COMPRESSION SOCKS) MISC   No No   Si Package daily   LORazepam (ATIVAN) 0.5 MG tablet   No No   Sig: Take 1 tablet (0.5 mg) by mouth every 4 hours as needed (Anxiety, Nausea/Vomiting or Sleep)   albuterol (PROVENTIL) (2.5 MG/3ML) 0.083% neb solution   No No   Sig: Take 1 vial (2.5 mg) by nebulization 2 times daily   allopurinol (ZYLOPRIM) 300 MG tablet   No No   Sig: Take 1 tablet (300 mg) by mouth daily   atorvastatin (LIPITOR) 10 MG tablet   No No   Sig: Take 1 tablet (10 mg) by mouth daily   benzonatate (TESSALON) 100 MG capsule   Yes No   Sig: Take 1 capsule (100 mg) by mouth 3 times daily as needed for cough   loperamide (IMODIUM) 2 MG capsule   Yes No   Sig: Take 2 mg by mouth 4 times daily as needed for diarrhea    omeprazole (PRILOSEC) 20 MG DR capsule   No No   Sig: Take 1 capsule (20 mg) by mouth daily   ondansetron (ZOFRAN) 8 MG tablet   No No   Sig: Take 1 tablet (8 mg) by mouth every 8 hours as needed (Nausea/Vomiting)   predniSONE (DELTASONE) 5 MG tablet   No No   Sig: Take 40 mg daily for 5 day, then reduce by 5 mg daily until you are taking  5 mg daily.  Continue 5 mg daily.   prochlorperazine (COMPAZINE) 10 MG tablet   No No   Sig: Take 1 tablet (10 mg) by mouth every 6 hours as needed (Nausea/Vomiting)   sodium chloride (NEBUSAL) 3 % neb solution   No No   Sig: Take 3 mLs by nebulization 2 times  daily Use with albuterol inhaler      Facility-Administered Medications: None     Allergies   Allergies   Allergen Reactions     No Known Allergies        Physical Exam   Vital Signs: Temp: 98.3  F (36.8  C) Temp src: Oral BP: 91/79 Pulse: 117   Resp: 16 SpO2: 96 % O2 Device: None (Room air)    Weight: 202 lbs 0 oz    Constitutional: Awake, alert, cooperative, mild increased WOB.  Eyes: Conjunctiva and pupils examined and normal.  HEENT: Moist mucous membranes, normal dentition.  Respiratory: tachypnea, mild increased WOB, r base crackles. Frequent cough productive of blood  Cardiovascular: tachy rate and rhythm, normal S1 and S2, and no murmur noted.  GI: Soft, non-distended, non-tender, normal bowel sounds.  Skin: No rashes, no cyanosis, no edema noted on exposed skin.  Musculoskeletal: No joint swelling, erythema or tenderness. No gross bony abnormalities  Neurologic: Cranial nerves 2-12 grossly intact, normal strength and sensation.  Psychiatric: Alert, oriented to person, place and time, no obvious anxiety or depression.      Data   Data reviewed today: I reviewed all medications, new labs and imaging results over the last 24 hours. I personally reviewed the EKG tracing showing sinus tach, the chest x-ray image(s) showing malpositioned portacath and the chest CT image(s) showing right base infiltrate.    Recent Labs   Lab 10/27/21  1505 10/25/21  1252 10/21/21  0921   WBC 20.6* 23.5* 12.8*   HGB 5.8* 7.8* 8.4*   MCV 87 86 86   PLT 1* 2* 6*     --   --    POTASSIUM 4.5  --   --    CHLORIDE 106  --   --    CO2 19*  --   --    BUN 69*  --   --    CR 3.70*  --   --    ANIONGAP 11  --   --    RUSTY 8.7  --   --    *  --   --    TROPONIN <0.015  --   --      Most Recent 3 CBC's:Recent Labs   Lab Test 10/27/21  1505 10/25/21  1252 10/21/21  0921   WBC 20.6* 23.5* 12.8*   HGB 5.8* 7.8* 8.4*   MCV 87 86 86   PLT 1* 2* 6*     Most Recent 3 BMP's:Recent Labs   Lab Test 10/27/21  1505 10/09/21  0806  07/26/21  1016    136 139   POTASSIUM 4.5 4.2 3.4   CHLORIDE 106 106 108   CO2 19* 21 26   BUN 69* 24 42*   CR 3.70* 1.23* 1.39*   ANIONGAP 11 9 5   RUSTY 8.7 8.8 9.5   * 120* 99     Recent Results (from the past 24 hour(s))   XR Chest Port 1 View   Result Value    Radiologist flags See impression (Urgent)    Narrative    CHEST ONE VIEW  10/27/2021 4:35 PM     HISTORY: SOB    COMPARISON: December 16, 2018      Impression    IMPRESSION: Right chest port noted, the tubing appears pointed  cephalad which is presumably malposition. No acute infiltrates.    [Access Center: See impression]    This report will be copied to the [facility] to ensure a  provider acknowledges the finding. [facility] Center is available Monday  through Friday 8am-3:30 pm.     BRANDT BARNEY MD         SYSTEM ID:  [id number]   Chest CT w/o contrast    Narrative    EXAM: CT CHEST W/O CONTRAST  LOCATION: [facility]  DATE/TIME: 10/27/2021 5:08 PM    INDICATION: Hemoptysis.  COMPARISON: None.  TECHNIQUE: CT chest without IV contrast. Multiplanar reformats were obtained. Dose reduction techniques were used.  CONTRAST: None.    FINDINGS:   LUNGS AND PLEURA: Right lower lobe patchy consolidative opacity measuring 5.8 x 3.5 cm (series 4, image 180). Small right pleural effusion. Mild centrilobular emphysema.    MEDIASTINUM/AXILLAE: Right infrahilar calcified lymph node, likely the sequela of prior granulomatous disease. No lymphadenopathy. No thoracic aortic aneurysm. No pericardial effusion.    CORONARY ARTERY CALCIFICATION: Mild.    UPPER ABDOMEN: Splenomegaly.    MUSCULOSKELETAL: Unremarkable.      Impression    IMPRESSION:   1.  Right lower lobe consolidation and trace right pleural effusion, likely secondary to pneumonia. A follow-up radiograph after treatment is recommended to ensure resolution.  2.  Mild emphysema.  3.  Mild splenomegaly.

## 2021-10-28 NOTE — PHARMACY-VANCOMYCIN DOSING SERVICE
"Pharmacy Vancomycin Initial Note  Date of Service 2021  Patient's  1950  71 year old, female    Indication: [facility] Pneumonia    Current estimated CrCl = Estimated Creatinine Clearance: 22.4 mL/min (A) (based on SCr of 2.53 mg/dL (H)).    Creatinine for last 3 days  10/27/2021:  3:05 PM Creatinine 3.70 mg/dL  10/28/2021:  5:57 AM Creatinine 2.53 mg/dL    Recent Vancomycin Level(s) for last 3 days  No results found for requested labs within last 72 hours.      Vancomycin IV Administrations (past 72 hours)      No vancomycin orders with administrations in past 72 hours.                Nephrotoxins and other renal medications (From now, onward)    Start     Dose/Rate Route Frequency Ordered Stop    10/28/21 1830  vancomycin 2000 mg in 0.9% NaCl 500 ml intermittent infusion 2,000 mg      2,000 mg  over 2 Hours Intravenous ONCE 10/28/21 1817      10/28/21 1730  piperacillin-tazobactam (ZOSYN) 3.375 g vial to attach to  mL bag     Note to Pharmacy: For [facility], [facility] and [facility]: For Zosyn-naive patients, use the \"Zosyn initial dose + extended infusion\" order panel.    3.375 g  over 30 Minutes Intravenous EVERY 6 HOURS 10/28/21 1726            Contrast Orders - past 72 hours (72h ago, onward)    None        Loading dose: 2000 mg at 19:00 10/28/2021.  Regimen: 750 mg IV every 24 hours.  Start time: 18:55 on 10/28/2021  Exposure target: AUC24 (range)400-600 mg/L.hr   AUC24,ss: 498 mg/L.hr  Probability of AUC24 > 400: 74 %  Ctrough,ss: 17.4 mg/L  Probability of Ctrough,ss > 20: 36 %  Probability of nephrotoxicity (Lodise TRAV ): 13 %            Plan:  1. Start vancomycin  2000 mg IV LD x1   2. Vancomycin monitoring method: AUC  3. Vancomycin therapeutic monitoring goal: 400-600 mg*h/L  4. Pharmacy will check vancomycin levels as appropriate in 1-3 Days.    5. Serum creatinine levels will be ordered daily for the first week of therapy and at least twice weekly for subsequent weeks.      Shobha " Maikel, [facility]

## 2021-10-28 NOTE — PROGRESS NOTES
[facility]    Hospitalist Progress Note      Assessment & Plan   Yvette Gastelum is a 71 year old female admitted on 10/27/2021  with hemoptysis    Active hemoptysis, likely minor but could rapidly transition to major hemoptysis  Concerned that she is bleeding from the original site of cancer noted in the right lower lobe.  On initial diagnosis in 2018 she had hemoptysis and had malignancy infiltrating the pulmonary vessels.  CT chest 10/27/2021 demonstrates a right lower lobe infiltrate, concerning for active bleed.  -Consult pulmonary; do not feel patient is a candidate for bronchoscopy, consider IR if needed for embolization however concern regarding thrombocytopenia.  - mg 3 times daily x5 days, may discontinue early if hemoptysis resolves or per pulmonary recommendations  - encourage pulmonary toileting and frequent coughing to clear airways as needed.  -Management of cytopenias, see below     Severe thrombocytopenia  Platelet 1K on 10/27/2021.  Due to MDS  -Transfuse to platelet count greater than 50 K initially, if ongoing bleeding consider transfusion to greater than 100 K  -Patient has been declining rapid transfusion, risks and benefits of this was discussed with her including death, suffocating and her own blood, respiratory arrest, received 2 units platelets follow-up platelet 40 K, recheck in a.m.  -Oncology/hematology consult pending     Anemia, severe, symptomatic  Due to MDS.  Hemoglobin 5.8 on presentation.  Has been having fatigue and shortness of breath in the previous weeks  -We will need transfusion to increase this number, however with patient declining too many transfusions at a time, will hold off on RBC transfusion for the time being and preferentially treat thrombocytopenia  -Decline in hemoglobin to 4.3, ordered 2 units PRBC, recheck CBC in the a.m.  At this time she reports only scant hemoptysis.  -Oncology/hematology consult pending     MDS  Small cell  lung cancer, in remission  MDS due to treatment for lung cancer.  Is currently transfusion dependent.  Recently started on Vidaza.  -Consult to hematology oncology for assistance in management     TAMARA, severe  CKD3  Unclear cause. Possible pre-renal from poor PO in last several days with associated anemia, and ongoing bleeding  -ua generally bland .  -got IVF in ED, encourage PO, will get additional fluids with blood products  -Nephrology recommends gentle IVF with LR at 50 cc an hour, encourage oral intake, strict I's/O, daily BMP.     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  She understands that if she is actively hemorrhaging into her lungs and has a respiratory arrest, that any resuscitative efforts may be futile.  Discussed this with the son as well.  Patient on multiple occasions has indicated that she does not wish to get infusions too rapidly due to chest pain and apparent volume overload symptoms associated with prior infusions.  ED providers and admitting provider have been extremely timur regarding the risk of mortality, morbidity associated with this decision.  She will need ongoing discussions to encourage her to comply with recommendations if restorative care remains her goal of care.          DVT Prophylaxis: Pneumatic Compression Devices  Code Status: Full Code  Expected discharge: TBD in complex presentation with acute hemoptysis, severe thrombocytopenia and anemia 2' MDS and new TAMARA.     Total unit/floor time 35 minutes:  time consisted of the following assuming Patient care in complex Patient with acute hemoptysis, severe thrombocytopenia and anemia 2' MDS and new TAMARA, examination of patient, review of records including labs, imaging results, medications, interdisciplinary notes and  completing documentation; > 50% Counseling/discussion with Patient and 2 sons regarding current condition including hemoptysis, thrombocytopenia, anemia and role of Specialist and Coordination of Care time with Nursing  and Specialists, Nephrology, Pulmonary regarding TAMARA and hemoptysis care plan, management and surveillance.     Sorin Tracey MD  Text Page (7am - 6pm, M-F)    Interval History   This morning patient notes only scant hemoptysis, occasional cough, no dyspnea, no other bleeding reported i.e. epistaxis, gum bleeding, petechiae.  Received 2 units PRBC and platelets.      -Data reviewed today: I reviewed all new labs and imaging results over the last 24 hours.      Physical Exam   Temp: 98  F (36.7  C) Temp src: Oral BP: 118/69 Pulse: 117   Resp: (!) 7 SpO2: (!) 62 % O2 Device: Nasal cannula Oxygen Delivery: 2 LPM  Vitals:    10/27/21 1433   Weight: 91.6 kg (202 lb)     Vital Signs with Ranges  Temp:  [97.3  F (36.3  C)-101.7  F (38.7  C)] 98  F (36.7  C)  Pulse:  [113-138] 117  Resp:  [7-46] 7  BP: ()/(38-92) 118/69  SpO2:  [62 %-100 %] 62 %  I/O last 3 completed shifts:  In: 2646 [P.O.:720; IV Piggyback:1000]  Out: 150 [Urine:150]    General/Constitutional:   NAD, alert, calm, cooperative    HEENT/Head Exam:  atraumatic  Eyes:  PERRL, no conjunctivits  Mouth/Oral Pharynx:  Buccal mucosa WNL  Chest/Respiratory:  Air exchange bilateral lung fields; no rales or wheeze. Respiration nonlabored on RA; occasional spits up sputum; no hemoptysis.  Cardiovascular: Tachycardic, no murmur appreciated.  LE edema trace  Gastrointestinal/Abdomen:  soft, nontender,  no rebound, guarding or other peritoneal signs.  Musculoskeletal:  extremities warm, dry, noncyanotic; no acitve synovitis.  Neuro.  Gross motor tested, nonfocal, sensory intact  Psych oriented, affect calm   Skin:  No rash noted on gross exam    Medications     lactated ringers 50 mL/hr at 10/28/21 1331       allopurinol  200 mg Oral Daily      pantoprazole  40 mg Oral Daily     predniSONE  5 mg Oral Daily     sodium chloride (PF)  3 mL Intracatheter Q8H     sodium chloride (PF)  3 mL Intracatheter Q8H     sodium chloride (PF)  3 mL Intracatheter Q8H     tranexamic acid  500 mg Nebulization Q8H       Data   Recent Labs   Lab 10/28/21  0557 10/28/21  0108 10/27/21  1508 10/27/21  1505 10/25/21  1252 10/25/21  1252   WBC 21.8*  --   --  20.6*  --  23.5*   HGB 4.3*  --   --  5.8*  --  7.8*   MCV 87  --   --  87  --  86   PLT 40* 34*  --  1*   < > 2*   INR 1.46*  --  1.38*  --   --   --      --   --  136  --   --    POTASSIUM 4.6  --   --  4.5  --   --    CHLORIDE 107  --   --  106  --   --    CO2 20  --   --  19*  --   --    BUN 73*  --   --  69*  --   --    CR 2.53*  --   --  3.70*  --   --    ANIONGAP 9  --   --  11  --   --    URSTY 8.4*  --   --  8.7  --   --    *  --   --  120*  --   --    TROPONIN  --   --   --  <0.015  --   --     < > = values in this interval not displayed.       Recent Results (from the past 24 hour(s))   XR Chest Port 1 View   Result Value    Radiologist flags See impression (Urgent)    Narrative    CHEST ONE VIEW  10/27/2021 4:35 PM     HISTORY: SOB    COMPARISON: December 16, 2018      Impression    IMPRESSION: Right chest port noted, the tubing appears pointed  cephalad which is presumably malposition. No acute infiltrates.    [Access Center: See impression]    This report will be copied to the [facility] to ensure a  provider acknowledges the finding. Access Center is available Monday  through Friday 8am-3:30 pm.     BRANDT BARNEY MD         SYSTEM ID:  [id number]   Chest CT w/o contrast    Narrative    EXAM: CT CHEST W/O CONTRAST  LOCATION: [facility]  DATE/TIME: 10/27/2021 5:08 PM    INDICATION: Hemoptysis.  COMPARISON: None.  TECHNIQUE: CT chest without IV contrast. Multiplanar reformats were obtained. Dose reduction techniques were used.  CONTRAST: None.    FINDINGS:   LUNGS AND  PLEURA: Right lower lobe patchy consolidative opacity measuring 5.8 x 3.5 cm (series 4, image 180). Small right pleural effusion. Mild centrilobular emphysema.    MEDIASTINUM/AXILLAE: Right infrahilar calcified lymph node, likely the sequela of prior granulomatous disease. No lymphadenopathy. No thoracic aortic aneurysm. No pericardial effusion.    CORONARY ARTERY CALCIFICATION: Mild.    UPPER ABDOMEN: Splenomegaly.    MUSCULOSKELETAL: Unremarkable.      Impression    IMPRESSION:   1.  Right lower lobe consolidation and trace right pleural effusion, likely secondary to pneumonia. A follow-up radiograph after treatment is recommended to ensure resolution.  2.  Mild emphysema.  3.  Mild splenomegaly.     CT Head w/o Contrast    Narrative    EXAM: CT HEAD W/O CONTRAST  LOCATION: [facility]  DATE/TIME: 10/27/2021 8:18 PM    INDICATION: Cerebral hemorrhage suspected. Generalized weakness.  COMPARISON: 10/01/2021.  TECHNIQUE: Routine CT Head without IV contrast. Multiplanar reformats. Dose reduction techniques were used.    FINDINGS:  INTRACRANIAL CONTENTS: Unchanged thin low-attenuation subdural collection along the right frontoparietal convexity. No acute hemorrhage or mass effect. No CT evidence of acute infarct. Moderate presumed chronic small vessel ischemic changes. Mild   generalized volume loss. No hydrocephalus.     VISUALIZED ORBITS/SINUSES/MASTOIDS: Prior bilateral cataract surgery. Visualized portions of the orbits are otherwise unremarkable. Small retention cyst right maxillary sinus. The paranasal sinuses are otherwise clear. No middle ear or mastoid effusion.    BONES/SOFT TISSUES: No acute abnormality. Right-sided craniotomy.      Impression    IMPRESSION:  1.  No CT evidence for acute intracranial process.  2.  Unchanged thin low-attenuation subdural collection along the right frontoparietal convexity.  3.  Brain atrophy and presumed chronic microvascular ischemic changes as  above.

## 2021-10-28 NOTE — PLAN OF CARE
A+Ox4 but agitated. Pt is tachycardic in 120s, RR in 30s, BP soft at times, responsive to fluids, T max of 101.7. Lung sounds with expiratory wheezes throughout and crackles in R lobes. Dyspneic on exertion. Shallow breathing, IS to 250. Bowels active, flatus+, no BM. Purwick in place, low UOP. Skin has generalized bruising. 2 units of platelets transfused this shift. Denies pain. Tolerating diet. No hemoptysis.

## 2021-10-28 NOTE — CONSULTS
Nephrology Initial Consult  October 28, 2021      Yvette Gastelum MRN:[id number] YOB: 1950  Date of Admission:10/27/2021  Primary care provider: Arie House  Requesting physician: Mohan Fabian MD    ASSESSMENT AND RECOMMENDATIONS:   1 myelodysplastic syndrome  2 severe anemia and thrombocytopenia  3 hemoptysis secondary to #2  4 acute renal failure-Baseline creatinine 1-1.2.  3.7 on presentation secondary to prerenal TAMARA with severe anemia and poor oral intake.  Quickly resolving with IV hydration and blood transfusion.  Urinalysis relatively bland with 1+ protein and a concentrated urine sample.  Nonoliguric.  5 metabolic acidosis-secondary to renal failure.  Improving.    Recommendation-  -hematology consult  -PRBC transfusion per primary team/hematology  -Gentle IV hydration with LR at 50 cc an hour.  Encourage oral intake.  Monitor for signs of volume overload/high-output failure  -Daily renal function panel.  Strict I's/O    Thank you for the consult. Will continue to follow along with you .        Clyde Carter MD  [facility] - Nephrology   [phone]        REASON FOR CONSULT: Acute renal failure    HISTORY OF PRESENT ILLNESS:  Yvette Gastelum is a 71 year old female with past medical history of small cell lung cancer status post chemoradiation and in remission since 2019, myelodysplastic syndrome requiring frequent transfusion for which she follows with Dr. Luz who presented to hospital on 10/27/2021 with hemoptysis and was found to have severe anemia with hemoglobin of 5.8 and severe thrombocytopenia with platelet 1000.    Her creatinine at baseline is around 1-1.2.  It is up to 3.7 on presentation.  With IV hydration and blood transfusion, it has decreased to 2.5 now.  Electrolytes are fine.  Bicarb is 20.  Potassium is 4.6.  Hemoglobin this morning was further lower at 4.3.  She is receiving more blood transfusion.  Platelets been transfused to a  level of 40.  She got 1 dose of DDAVP she is nonoliguric.  Reports poor appetite for several days prior to presentation although she was drinking fluid.  Denies any NSAID use.  No IV contrast.  Denies gross hematuria or blood in the stool.    PAST MEDICAL HISTORY:  Reviewed with patient on 10/28/2021  and is as listed in HPI.       MEDICATIONS:  PTA Meds  Prior to Admission medications    Medication Sig Last Dose Taking? Auth Provider   acetaminophen (TYLENOL) 325 MG tablet Take 325-650 mg by mouth every 6 hours as needed for pain   Yes Reported, Patient   albuterol (PROVENTIL) (2.5 MG/3ML) 0.083% neb solution Take 1 vial (2.5 mg) by nebulization 2 times daily 10/26/2021 at Unknown time Yes Umu Jackson APRN CNP   allopurinol (ZYLOPRIM) 300 MG tablet Take 1 tablet (300 mg) by mouth daily 10/26/2021 at Unknown time Yes Arie House MD   atorvastatin (LIPITOR) 10 MG tablet Take 1 tablet (10 mg) by mouth daily 10/26/2021 at Unknown time Yes Arie House MD   benzonatate (TESSALON) 100 MG capsule Take 1 capsule (100 mg) by mouth 3 times daily as needed for cough  Yes Arie House MD   loperamide (IMODIUM) 2 MG capsule Take 2 mg by mouth 4 times daily as needed for diarrhea   Yes Reported, Patient   LORazepam (ATIVAN) 0.5 MG tablet Take 1 tablet (0.5 mg) by mouth every 4 hours as needed (Anxiety, Nausea/Vomiting or Sleep)  Yes Jennifer Luz MD   omeprazole (PRILOSEC) 20 MG DR capsule Take 1 capsule (20 mg) by mouth daily 10/26/2021 at Unknown time Yes Arie House MD   ondansetron (ZOFRAN) 8 MG tablet Take 1 tablet (8 mg) by mouth every 8 hours as needed (Nausea/Vomiting)  Yes Jennifer Luz MD   predniSONE (DELTASONE) 5 MG tablet Take 40 mg daily for 5 day, then reduce by 5 mg daily until you are taking  5 mg daily.  Continue 5 mg daily. 10/26/2021 at Unknown time Yes Arie House MD   prochlorperazine (COMPAZINE) 10 MG tablet Take 1 tablet (10  "mg) by mouth every 6 hours as needed (Nausea/Vomiting)  Yes Jennifer Luz MD   sodium chloride (NEBUSAL) 3 % neb solution Take 3 mLs by nebulization 2 times daily Use with albuterol inhaler 10/26/2021 at Unknown time Yes Umu Jackson APRN CNP   Elastic Bandages & Supports (MEDICAL COMPRESSION SOCKS) MISC 1 Package daily   Valeria Macedo DO      Current Meds    allopurinol  200 mg Oral Daily     pantoprazole  40 mg Oral Daily     predniSONE  5 mg Oral Daily     sodium chloride (PF)  3 mL Intracatheter Q8H     sodium chloride (PF)  3 mL Intracatheter Q8H     sodium chloride (PF)  3 mL Intracatheter Q8H     tranexamic acid  500 mg Nebulization Q8H     Infusion Meds      ALLERGIES:    Allergies   Allergen Reactions     No Known Allergies        REVIEW OF SYSTEMS:  A comprehensive of systems was negative except as noted above.    SOCIAL HISTORY:   Reviewed with patient on 10/28/2021     FAMILY MEDICAL HISTORY:   Family History   Problem Relation Age of Onset     Aneurysm Father         Aorta     Hypertension Father      Cervical Cancer Mother      Lung Cancer Brother         non smoker     Reviewed with patient on 10/28/2021     PHYSICAL EXAM:   Temp  Av.9  F (37.2  C)  Min: 97.3  F (36.3  C)  Max: 101.7  F (38.7  C)      Pulse  Av.1  Min: 113  Max: 138 Resp  Av.6  Min: 16  Max: 46  SpO2  Av.4 %  Min: 86 %  Max: 100 %       /67 (BP Location: Left arm)   Pulse 117   Temp 98  F (36.7  C)   Resp (!) 32   Ht 1.626 m (5' 4\")   Wt 91.6 kg (202 lb)   SpO2 95%   BMI 34.67 kg/m        Admit Weight: 91.6 kg (202 lb)     GENERAL APPEARANCE: no distress,  awake  EYES: no scleral icterus, pupils equal . Pallor +  HENT: NC/AT,  mouth  without ulcers or lesions  Lymphatics: no cervical or supraclavicular LAD  Endo: no moon facies, no goiter  Pulmonary: lungs clear to auscultation with equal breath sounds bilaterally, no clubbing  CV: regular rhythm, normal rate, no rub   - " JVP -   - Edema-  GI: soft, nontender,   MS: no evidence of inflammation in joints  : no garces  SKIN: no rash, warm, dry, no cyanosis  NEURO: face symmetric, no asterixis     LABS:   CMP  Recent Labs   Lab 10/28/21  0557 10/27/21  1505    136   POTASSIUM 4.6 4.5   CHLORIDE 107 106   CO2 20 19*   ANIONGAP 9 11   * 120*   BUN 73* 69*   CR 2.53* 3.70*   GFRESTIMATED 18* 12*   RUSTY 8.4* 8.7     CBC  Recent Labs   Lab 10/28/21  0557 10/28/21  0108 10/27/21  1505 10/25/21  1252   HGB 4.3*  --  5.8* 7.8*   WBC 21.8*  --  20.6* 23.5*   RBC 1.55*  --  2.17* 2.93*   HCT 13.4*  --  18.8* 25.2*   MCV 87  --  87 86   MCH 27.7  --  26.7 26.6   MCHC 32.1  --  30.9* 31.0*   RDW 22.5*  --  20.7* 20.9*   PLT 40* 34* 1* 2*     INR  Recent Labs   Lab 10/28/21  0557 10/27/21  1508   INR 1.46* 1.38*     ABGNo lab results found in last 7 days.   URINE STUDIES  Recent Labs   Lab Test 10/28/21  0127 07/07/21  0855 01/25/16  0939 12/04/15  1302 11/25/15  1749 11/25/15  1749   COLOR Yellow Light Yellow Yellow Yellow   < > [place]   APPEARANCE Slightly Cloudy* Clear Clear Clear   < > Cloudy   URINEGLC Negative Negative Negative Negative   < > Negative   URINEBILI Negative Negative Negative Negative   < > Moderate  This is an unconfirmed screening test result. A positive result may be false.  *   URINEKETONE Negative Negative Negative Negative   < > Trace*   SG 1.024 1.015 <=1.005 1.020   < > >1.030   UBLD Large* Negative Negative Negative   < > Small*   URINEPH 5.5 6.0 5.0 6.0   < > 5.5   PROTEIN 50 * Negative Negative Negative   < > >=300*   UROBILINOGEN  --   --  0.2 0.2  --  1.0   NITRITE Negative Negative Negative Negative   < > Positive*   LEUKEST Negative Negative Negative Negative   < > Trace*   RBCU 7* 7*  --   --   --  O - 2   WBCU 7* 6*  --   --   --  10-25*    < > = values in this interval not displayed.     No lab results found.  PTH  No lab results found.  IRON STUDIES  Recent Labs   Lab Test 08/05/21  1055  07/10/21  0548 01/20/21  0926 12/04/15  1302   IRON 38 34* 133 53    238* 301 325   IRONSAT 14* 14* 44 16   EDILSON  --   --  482* 261*       IMAGING:  Personally reviewed the images and findings are as listed in HPI     Clyde Carter MD

## 2021-10-28 NOTE — CONSULTS
PULMONOLOGY CONSULTATION  Date of service: 10/28/2021    [facility]    _____________________________________________________    Yvette Gastelum  71 year old female  [id number]  [address]  [facility] [address]    Primary Care Provider:  Arie House  Admission Date: 10/27/2021  Hospital Attending Physician:  Mohan Fabian MD  ________________________________________    CHIEF COMPLAINT : I was asked to see this patient by Dr. Fabian for evaluation of hemoptysis, small cell lung cancer, acute hypoxic resp failure, abnormal chest imaging   Informant: EHR and patient    HISTORY OF PRESENT ILLNESS      Yvette Gastelum is a 71 year old female who has a history of small cell lung cancer in remission, transfusion dependent MDS who presented to the hospital on 10/27/21 with 1 day of timur hemoptysis.  She reports that she was feeling at her baseline on a.m. of 10/27/21. She noted early a.m. that she had a cough, which she initially thought was due to productive phlegm.  As the cough was ongoing she went and checked her sputum in the bathroom and found that it was frankly bloody.  She then decided to proceed to the emergency department.  She notes that her shortness of breath has gotten progressively worse through the day. Seen in the ED and receiving various transfusions. Pt concerned about rate of infusion. This am, feels quite weak. Reports further hemoptysis since admission. Denies fevers or chills, denies aspiration. Recent port placement 10/11. Denies any hematemesis, melena or hematuria.        HOME MEDICATIONS     Medications Prior to Admission   Medication Sig Dispense Refill Last Dose     acetaminophen (TYLENOL) 325 MG tablet Take 325-650 mg by mouth every 6 hours as needed for pain         albuterol (PROVENTIL) (2.5 MG/3ML) 0.083% neb solution Take 1 vial (2.5 mg) by nebulization 2 times daily   10/26/2021 at Unknown time     allopurinol (ZYLOPRIM) 300 MG tablet  Take 1 tablet (300 mg) by mouth daily 90 tablet 3 10/26/2021 at Unknown time     atorvastatin (LIPITOR) 10 MG tablet Take 1 tablet (10 mg) by mouth daily 90 tablet 3 10/26/2021 at Unknown time     benzonatate (TESSALON) 100 MG capsule Take 1 capsule (100 mg) by mouth 3 times daily as needed for cough        loperamide (IMODIUM) 2 MG capsule Take 2 mg by mouth 4 times daily as needed for diarrhea         LORazepam (ATIVAN) 0.5 MG tablet Take 1 tablet (0.5 mg) by mouth every 4 hours as needed (Anxiety, Nausea/Vomiting or Sleep) 30 tablet 5      omeprazole (PRILOSEC) 20 MG DR capsule Take 1 capsule (20 mg) by mouth daily 90 capsule 3 10/26/2021 at Unknown time     ondansetron (ZOFRAN) 8 MG tablet Take 1 tablet (8 mg) by mouth every 8 hours as needed (Nausea/Vomiting) 10 tablet 5      predniSONE (DELTASONE) 5 MG tablet Take 40 mg daily for 5 day, then reduce by 5 mg daily until you are taking  5 mg daily.  Continue 5 mg daily. 111 tablet 1 10/26/2021 at Unknown time     prochlorperazine (COMPAZINE) 10 MG tablet Take 1 tablet (10 mg) by mouth every 6 hours as needed (Nausea/Vomiting) 30 tablet 5      sodium chloride (NEBUSAL) 3 % neb solution Take 3 mLs by nebulization 2 times daily Use with albuterol inhaler 90 mL 4 10/26/2021 at Unknown time     Elastic Bandages & Supports (MEDICAL COMPRESSION SOCKS) MISC 1 Package daily 2 each 1        PAST MEDICAL HISTORY      Past Medical History:   Diagnosis Date     Cancer (H)     Lung cancer       Hypertension      Kidney stones      Obese      Recurrent UTI      S/P CL-BSO      Sepsis (H)     secondary to uti      Past Surgical History:   Procedure Laterality Date     BONE MARROW BIOPSY, BONE SPECIMEN, NEEDLE/TROCAR N/A 2/10/2021    Procedure: BONE MARROW BIOPSY;  Surgeon: Earlene Garcia MD;  Location:  GI     BONE MARROW BIOPSY, BONE SPECIMEN, NEEDLE/TROCAR N/A 4/19/2021    Procedure: BIOPSY, BONE MARROW;  Surgeon: Felix Elizabeth MD;  Location:  GI      COLONOSCOPY       COLONOSCOPY N/A 2/10/2016    Procedure: COMBINED COLONOSCOPY, SINGLE OR MULTIPLE BIOPSY/POLYPECTOMY BY BIOPSY;  Surgeon: Antonia Jiménez MD;  Location:  GI     CRANIOTOMY Right 2021    Procedure: CRANIOTOMY;  Surgeon: Puma Dominguez MD;  Location:  OR     CRANIOTOMY, EVACUATE HEMATOMA SUBDURAL, COMBINED Right 2021    Procedure: Right frontal craniotomy for evacuation of recurrent subdural hematoma;  Surgeon: Puma Dominguez MD;  Location:  OR     EYE SURGERY      CATARACT EXTRACTION RIGHT & LEFT     GYN SURGERY      BILATERAL TUBAL LIGATION, CL, LAPAROSCOPIC LEFT SALPINGO-OOPHORECTOMY     HEAD & NECK SURGERY      WISDOM TEETH EXTRACTION     HYSTERECTOMY       INSERT PORT VASCULAR ACCESS N/A 2018    Procedure: INSERT PORT VASCULAR ACCESS;  POWER PORT PLACEMENT;  Surgeon: Todd Norris MD;  Location:  SD     IR CHEST PORT PLACEMENT > 5 YRS OF AGE  10/11/2021     REMOVE PORT VASCULAR ACCESS N/A 2019    Procedure: REMOVAL, VASCULAR ACCESS PORT;  Surgeon: Todd Norris MD;  Location:  OR       ALLERGIES     Allergies   Allergen Reactions     No Known Allergies        SOCIAL / SUBSTANCE HISTORY     Social History     Socioeconomic History     Marital status:      Spouse name: Not on file     Number of children: Not on file     Years of education: Not on file     Highest education level: Not on file   Occupational History     Not on file   Tobacco Use     Smoking status: Former Smoker     Packs/day: 1.00     Years: 50.00     Pack years: 50.00     Types: Cigarettes     Start date: 10/1965     Quit date: 2015     Years since quittin.1     Smokeless tobacco: Never Used   Substance and Sexual Activity     Alcohol use: No     Alcohol/week: 0.0 standard drinks     Drug use: No     Sexual activity: Not Currently     Partners: Male   Other Topics Concern     Parent/sibling w/ CABG, MI or angioplasty before 65F 55M? Not  "Asked   Social History Narrative    Single, 3 adult sons    Retired - previously employed as industrial consultant for [employer]      Social Determinants of Health     Financial Resource Strain:      Difficulty of Paying Living Expenses:    Food Insecurity:      Worried About Running Out of Food in the Last Year:      Ran Out of Food in the Last Year:    Transportation Needs:      Lack of Transportation (Medical):      Lack of Transportation (Non-Medical):    Physical Activity:      Days of Exercise per Week:      Minutes of Exercise per Session:    Stress:      Feeling of Stress :    Social Connections:      Frequency of Communication with Friends and Family:      Frequency of Social Gatherings with Friends and Family:      Attends Restorationism Services:      Active Member of Clubs or Organizations:      Attends Club or Organization Meetings:      Marital Status:    Intimate Partner Violence:      Fear of Current or Ex-Partner:      Emotionally Abused:      Physically Abused:      Sexually Abused:        FAMILY HISTORY     Family History   Problem Relation Age of Onset     Aneurysm Father         Aorta     Hypertension Father      Cervical Cancer Mother      Lung Cancer Brother         non smoker       REVIEW OF SYSTEMS   A comprehensive review of systems was negative except for items noted in HPI/Subjective.    PHYSICAL EXAMINATION   Temp (24hrs), Av  F (37.2  C), Min:97.3  F (36.3  C), Max:101.7  F (38.7  C)    Vital signs:  Temp: 97.9  F (36.6  C) Temp src: Oral BP: 92/53 Pulse: (!) 125   Resp: (!) 33 SpO2: 98 % O2 Device: Nasal cannula Oxygen Delivery: 3 LPM Height: 162.6 cm (5' 4\") Weight: 91.6 kg (202 lb)  Estimated body mass index is 34.67 kg/m  as calculated from the following:    Height as of this encounter: 1.626 m (5' 4\").    Weight as of this encounter: 91.6 kg (202 lb).        I/O last 3 completed shifts:  In: 2296 [P.O.:720; IV Piggyback:1000]  Out: 150 [Urine:150]    CONSTITUTIONAL/GENERAL " APPEARANCE: Alert female. Pale, weak appearing  PSYCHIATRIC: Pleasant and appropriate mood and affect.   EARS, NOSE,THROAT,MOUTH: External ears and nose overall normal. Normal oral mucosa.   NECK: Neck appearance normal. No neck masses and the thyroid not enlarged.   RESPIRATORY: Non-labored effort. Dec, no wheezing  CARDIOVASCULAR: S1, S2, regular  ABDOMEN: round, soft  LYMPHATIC: no cervical lymphadenopathy.  SKIN: Skin color was normal.     LABORATORY ASSESSMENT    Arterial Blood GasNo lab results found in last 7 days.  CBC  Recent Labs   Lab 10/28/21  0557 10/28/21  0108 10/27/21  1505 10/25/21  1252 10/21/21  0921 10/21/21  0921   WBC 21.8*  --  20.6* 23.5*  --  12.8*   RBC 1.55*  --  2.17* 2.93*  --  3.12*   HGB 4.3*  --  5.8* 7.8*  --  8.4*   HCT 13.4*  --  18.8* 25.2*  --  26.9*   MCV 87  --  87 86  --  86   MCH 27.7  --  26.7 26.6  --  26.9   MCHC 32.1  --  30.9* 31.0*  --  31.2*   RDW 22.5*  --  20.7* 20.9*  --  22.0*   PLT 40* 34* 1* 2*   < > 6*    < > = values in this interval not displayed.     BMP  Recent Labs   Lab 10/28/21  0557 10/27/21  1505    136   POTASSIUM 4.6 4.5   CHLORIDE 107 106   RUSTY 8.4* 8.7   CO2 20 19*   BUN 73* 69*   CR 2.53* 3.70*   * 120*     INR  Recent Labs   Lab 10/28/21  0557 10/27/21  1508   INR 1.46* 1.38*      BNPNo lab results found in last 7 days.  VENOUS BLOOD GASESNo lab results found in last 7 days.      Additional labs and/or comments:     IMAGING      CT chest 10/27  1.  Right lower lobe consolidation and trace right pleural effusion, likely secondary to pneumonia. A follow-up radiograph after treatment is recommended to ensure resolution.  2.  Mild emphysema.  3.  Mild splenomegaly.       PFT & OTHER TESTING       ASSESSMENT / PLAN      Pulmonary diagnoses:  Abnl CT/CXR R91.8  Abnl PFTs R94.2  COPD J44.9  Cough R05  Hemoptysis R04.2  Lung CA C34.90  Aquiles depend history Z87.891  Resp fail acute J96.00      ASSESSMENT :  Yvette Gastelum is a 71 year old  female who has a history of small cell lung cancer in remission, transfusion dependent MDS who presented to the hospital on 10/27/21 with 1 day of timur hemoptysis.  CT chest with inc dense infiltrate RLL, likely the source of the hemoptysis. Currently, no active ongoing bleeding and clotting complicated by MDS and uremia. Bronch possible but risk prohibitive and would require intubation and unlikely to be therapeutic.         PLAN:     Continue TXA    Cytopenias per hospitalist / hematology    Wean o2 as able, goal sat ~ 90%    No role for empiric bronch. Could consider IR intervention for embolization if life-threatening bleed    Follow volume status closely given TAMARA as well as needed for blood products    Prn bronchodilators    Challenging situation. Will follow      Han Tellez  [facility]  Office: [phone]  Pager: [phone]

## 2021-10-28 NOTE — CONSULTS
[facility]    Hematology/Oncology Consult Note      Date of Admission:  10/27/2021  Date of Consultation:  10/28/21  Reason for Consultation: Acute anemia and thrombocytopenia, history of MDS and small cell lung carcinoma      ASSESSMENT/PLAN:  Yvette Gastelum is a 71 year old female well-known to our clinic for history of limited stage lung cancer status post concurrent chemoradiation with cisplatin and etoposide and prophylactic cranial irradiation completed in 2018.  She had recurrence of her also lung cancer in August 2019 and received definitive SBRT.  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  She was found to have less than 2 to 5% blasts.  She last received 5 days of cycle 1 day 8 on October 18, 2021.  She is admitted now for profound anemia as well as thrombocytopenia.  She is also found to have an elevated white cell count of 21.8, coagulopathy, and acute renal dysfunction.    1) Acute, severe anemia and thrombocytopenia with hemoptysis with underlying high risk MDS  -In the setting of renal dysfunction and coagulopathy, concern for acute hemolysis. Repeat CBC, CMP, coag studies, peripheral smear, retic, and hapto pending. May need to send for Coomb's testing.  -Likely due to vidaza-induced cytopenias/hemolysis vs transformation of disease.   -Transfuse leukoreduced blood products: PRBC for Hb </= 7 or PLTs </=50K given acute hemoptysis. Can decrease PLT transfusion threshold to </= 30K if hemoptysis is stable.  -Check CBC q12.  -Continue inhaled TXA.    2) Elevated WBC  -See plan above.  -See RLL infiltrate below to assess for infectious etiology.    3) Coagulopathy  -See plan above.     4) Acute kidney injury  -Possibly due to TLS with vidaza therapy, although atypical in MDS.  -Check uric acid.  -Continue allopurinol.      5) Right lower lobe infiltrate  -With elevated WBC and patient's immunocompromised state, would recommend starting coverage for HCAP. Paged primary team to discuss. Awaiting a call back.    6) History of limited stage small cell lung cancer  -CT chest as stated below without evidence of new disease.    7) VTE prophylaxis  -Hold chemoprophylaxis given acute bleed and profound cytopenias.    Thank you for the consultation. We will continue to follow.    Kira Whipple,   Hematology/Oncology  [facility]        HISTORY OF PRESENT ILLNESS: Yvette Gastelum is a 71 year old female well-known to our clinic for history of limited stage lung cancer status post concurrent chemoradiation with cisplatin and etoposide and prophylactic cranial irradiation completed in 2018.  She had recurrence of her also lung cancer in August 2019 and received definitive SBRT.  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  She was found to have less than 2 to 5% blasts.  She last received 5 days of cycle 1 day 8 on October 18, 2021.  She is admitted now for profound anemia as well as thrombocytopenia.  She is also found to have an elevated white cell count of 21.8, coagulopathy, and acute renal dysfunction.    Patient underwent lab draw in office on October 25, 2021 with a platelet count returning of 2000.  She had received 1 unit platelet transfusion with this.  Her platelet count returned at 1000 on October 27, 2021, and she was referred to the emergency department.  Additionally, patient had acute 24-hour onset of large-volume hemoptysis.      When she arrived in the emergency department, white blood cell count was 21.8 thousand, hemoglobin 4.3 and platelets 40.  Her INR is found to be 1.46 and creatinine of 2.53.  Patient has thus far received 2 units of packed  red blood cells and 2 units of a pheresis platelets.  She is no longer having gross evidence of bleed.  She has dried blood around her oral labia but no active bleed.  She is being treated with tranexamic acid.      In recent weeks, she notes 20 lb weight loss and intermittent night sweats. She lives at home alone and continues to perform ADLs, though notes she has had significant fatigue and shortness of breath.     REVIEW OF SYSTEMS:   A 14 point ROS was reviewed with pertinent positives and negatives in the HPI.      MEDICATIONS:  Current Facility-Administered Medications   Medication     acetaminophen (TYLENOL) tablet 650 mg    Or     acetaminophen (TYLENOL) Suppository 650 mg     allopurinol (ZYLOPRIM) tablet 200 mg     lactated ringers infusion     lidocaine (LMX4) cream     lidocaine 1 % 0.1-1 mL     LORazepam (ATIVAN) tablet 0.5 mg     melatonin tablet 1 mg     nitroGLYcerin (NITROSTAT) sublingual tablet 0.4 mg     ondansetron (ZOFRAN-ODT) ODT tab 4 mg    Or     ondansetron (ZOFRAN) injection 4 mg     pantoprazole (PROTONIX) EC tablet 40 mg     predniSONE (DELTASONE) tablet 5 mg     prochlorperazine (COMPAZINE) injection 5 mg    Or     prochlorperazine (COMPAZINE) tablet 5 mg    Or     prochlorperazine (COMPAZINE) suppository 12.5 mg     sodium chloride (PF) 0.9% PF flush 3 mL     sodium chloride (PF) 0.9% PF flush 3 mL     sodium chloride (PF) 0.9% PF flush 3 mL     sodium chloride (PF) 0.9% PF flush 3 mL     sodium chloride (PF) 0.9% PF flush 3 mL     sodium chloride (PF) 0.9% PF flush 3 mL     tranexamic acid (CYKLOKAPRON) 100 mg/ml inhalation solution 500 mg         ALLERGIES:  Allergies   Allergen Reactions     No Known Allergies          PAST MEDICAL HISTORY:  Past Medical History:   Diagnosis Date     Cancer (H)     Lung cancer       Hypertension      Kidney stones      Obese      Recurrent UTI      S/P CL-BSO      Sepsis (H)     secondary to uti          PAST SURGICAL HISTORY:  Past Surgical  History:   Procedure Laterality Date     BONE MARROW BIOPSY, BONE SPECIMEN, NEEDLE/TROCAR N/A 2/10/2021    Procedure: BONE MARROW BIOPSY;  Surgeon: Earlene Garcia MD;  Location:  GI     BONE MARROW BIOPSY, BONE SPECIMEN, NEEDLE/TROCAR N/A 2021    Procedure: BIOPSY, BONE MARROW;  Surgeon: Felix Elizabeth MD;  Location:  GI     COLONOSCOPY       COLONOSCOPY N/A 2/10/2016    Procedure: COMBINED COLONOSCOPY, SINGLE OR MULTIPLE BIOPSY/POLYPECTOMY BY BIOPSY;  Surgeon: Antonai Jiménez MD;  Location:  GI     CRANIOTOMY Right 2021    Procedure: CRANIOTOMY;  Surgeon: Puma Dominguez MD;  Location:  OR     CRANIOTOMY, EVACUATE HEMATOMA SUBDURAL, COMBINED Right 2021    Procedure: Right frontal craniotomy for evacuation of recurrent subdural hematoma;  Surgeon: Puma Dominguez MD;  Location:  OR     EYE SURGERY      CATARACT EXTRACTION RIGHT & LEFT     GYN SURGERY      BILATERAL TUBAL LIGATION, CL, LAPAROSCOPIC LEFT SALPINGO-OOPHORECTOMY     HEAD & NECK SURGERY      WISDOM TEETH EXTRACTION     HYSTERECTOMY       INSERT PORT VASCULAR ACCESS N/A 2018    Procedure: INSERT PORT VASCULAR ACCESS;  POWER PORT PLACEMENT;  Surgeon: Todd Norris MD;  Location: [facility]     IR CHEST PORT PLACEMENT > 5 YRS OF AGE  10/11/2021     REMOVE PORT VASCULAR ACCESS N/A 2019    Procedure: REMOVAL, VASCULAR ACCESS PORT;  Surgeon: Todd Norris MD;  Location: [facility]         SOCIAL HISTORY:  Social History     Socioeconomic History     Marital status:      Spouse name: Not on file     Number of children: Not on file     Years of education: Not on file     Highest education level: Not on file   Occupational History     Not on file   Tobacco Use     Smoking status: Former Smoker     Packs/day: 1.00     Years: 50.00     Pack years: 50.00     Types: Cigarettes     Start date: 10/1965     Quit date: 2015     Years since quittin.1     Smokeless  "tobacco: Never Used   Substance and Sexual Activity     Alcohol use: No     Alcohol/week: 0.0 standard drinks     Drug use: No     Sexual activity: Not Currently     Partners: Male   Other Topics Concern     Parent/sibling w/ CABG, MI or angioplasty before 65F 55M? Not Asked   Social History Narrative    Single, 3 adult sons    Retired - previously employed as industrial consultant for [employer]      Social Determinants of Health     Financial Resource Strain:      Difficulty of Paying Living Expenses:    Food Insecurity:      Worried About Running Out of Food in the Last Year:      Ran Out of Food in the Last Year:    Transportation Needs:      Lack of Transportation (Medical):      Lack of Transportation (Non-Medical):    Physical Activity:      Days of Exercise per Week:      Minutes of Exercise per Session:    Stress:      Feeling of Stress :    Social Connections:      Frequency of Communication with Friends and Family:      Frequency of Social Gatherings with Friends and Family:      Attends Hoahaoism Services:      Active Member of Clubs or Organizations:      Attends Club or Organization Meetings:      Marital Status:    Intimate Partner Violence:      Fear of Current or Ex-Partner:      Emotionally Abused:      Physically Abused:      Sexually Abused:          FAMILY HISTORY:  Family History   Problem Relation Age of Onset     Aneurysm Father         Aorta     Hypertension Father      Cervical Cancer Mother      Lung Cancer Brother         non smoker         PHYSICAL EXAM:  Vital signs:  Temp: 98  F (36.7  C) Temp src: Oral BP: 118/69 Pulse: 117   Resp: (!) 7 SpO2: (!) 62 % O2 Device: Nasal cannula Oxygen Delivery: 2 LPM Height: 162.6 cm (5' 4\") Weight: 91.6 kg (202 lb)  Estimated body mass index is 34.67 kg/m  as calculated from the following:    Height as of this encounter: 1.626 m (5' 4\").    Weight as of this encounter: 91.6 kg (202 lb).    ECO  GENERAL/CONSTITUTIONAL: Seated upright in bed. " Labored breathing, conversational dyspnea.  EYES: Pupils are equal, round, and react to light and accommodation. Extraocular movements intact. Slight B/L scleral icterus.  ENT/MOUTH: Neck supple. Oropharynx clear, no mucositis. She has dried blood of the oral labia, no active bleed/hemoptysis.  LYMPH: No anterior cervical, posterior cervical, supraclavicular, axillary or inguinal adenopathy.   RESPIRATORY: Clear to auscultation bilaterally. Diminished RLL. No crackles or wheezing.   CARDIOVASCULAR: Regular rate and rhythm without murmurs, gallops, or rubs.  GASTROINTESTINAL: No hepatosplenomegaly, masses, or tenderness. The patient has normal bowel sounds. No guarding.  No distention.  MUSCULOSKELETAL: Warm and well-perfused, no cyanosis, clubbing, or edema.  NEUROLOGIC: Cranial nerves II-XII are intact. Alert, oriented, answers questions appropriately.  INTEGUMENTARY: Patient has pallor.         LABS:  CBC RESULTS:   Recent Labs   Lab Test 10/28/21  0557   WBC 21.8*   RBC 1.55*   HGB 4.3*   HCT 13.4*   MCV 87   MCH 27.7   MCHC 32.1   RDW 22.5*   PLT 40*       Recent Labs   Lab Test 10/28/21  0557 10/27/21  1505    136   POTASSIUM 4.6 4.5   CHLORIDE 107 106   CO2 20 19*   ANIONGAP 9 11   * 120*   BUN 73* 69*   CR 2.53* 3.70*   RUSTY 8.4* 8.7         PATHOLOGY:      IMAGING:  EXAM: CT CHEST W/O CONTRAST  DATE/TIME: 10/27/2021                             IMPRESSION:   1.  Right lower lobe consolidation and trace right pleural effusion, likely secondary to pneumonia. A follow-up radiograph after treatment is recommended to ensure resolution.  2.  Mild emphysema.  3.  Mild splenomegaly.      EXAM: CT HEAD W/O CONTRAST  DATE/TIME: 10/27/2021                                                               IMPRESSION:  1.  No CT evidence for acute intracranial process.  2.  Unchanged thin low-attenuation subdural collection along the right frontoparietal convexity.  3.  Brain atrophy and presumed chronic  microvascular ischemic changes as above.        Thank you for the opportunity to participate in this patient's care.  Please call with any questions.    Kira Whipple DO  Hematology/Oncology  [facility]

## 2021-10-28 NOTE — PLAN OF CARE
IMC status. A&O, VSS ex 2L nc & tachycardic. Tele Sinus tach.  Lung sounds diminished/ crackles w/ expiratory wheezes. + dry cough noted- robitussin ordered PRN. Bowel sounds active, +small mucus like stool.Adequate urine output, pt complains of hesitancy and pain (See previous note). R chest port infusing LR @ 50ml/hr. Ambulates assist x1. Up to chair & bedside commode today. Regular diet w/ no appetite. Denies pain. 2 units of PRBC given, Recheck Hgb in AM.

## 2021-10-28 NOTE — PROGRESS NOTES
Clinical Product Navigator RN reviewed chart; following patient for 90 day post TCU discharge/transition.  Review results: patient was readmitted through the ED yesterday, on 10/27/21 due to hemoptysis and thrombocytopenia.  Her oncology team is aware and will also monitor admission progress.     This writer will monitor for discharge disposition, if returns to TCU setting will engage in collaboration with care team to support interdisciplinary ambulatory care planning.     Ele Soler RN/Clinical Product Navigator

## 2021-10-28 NOTE — PROVIDER NOTIFICATION
Will give additional 1u platelets now  Will order recheck platelets after that unit  Give DDAVP IV once now for uremia  Add on INR  Platelets likely remain too low to consider IR.    Discussed with house team and ICU.    Mohan Fabian MD

## 2021-10-28 NOTE — ED NOTES
RN entered room to round on pt. Pt noted to be confused, attempting to remove equipment, attempting to get out of bed. RN had round on pt approx 20 mins prior and pt was alert and orientated. MD Fabian alerted, stat head CT ordered. Pt transported with Tech, resource RN, and ER MD present.

## 2021-10-28 NOTE — PROGRESS NOTES
Xcoverage: paged by RN regarding Hb 4.9; she received 2 units of platelets since admission; plts count 40 this am; 2 units PRBCs ordered; Hem/Onc consulted.    Blanca Patterson MD

## 2021-10-28 NOTE — PROVIDER NOTIFICATION
Paged Dr. Tracey      Pt complaining of painful urination/ thinks she has a bladder infection. Do you want to get a urine sample?  -- Will add urine culture to already collected urinal sample

## 2021-10-29 NOTE — PLAN OF CARE
A+Ox4 VSS ex tachycardic and tachypneic. on 2L O2. Lung sounds w/ expiratory wheezes. Tele Sinus tachycardia. Robitussin given for cough. Bowels active, flatus+, no BM. Voiding adequately, purewick in place. Denies pain. Up w/ 1. Tolerating diet.

## 2021-10-29 NOTE — PROVIDER NOTIFICATION
MD Notification    Notified Person: MD    Notified Person Name: Kyung    Notification Date/Time: 10/29/21 0805    Notification Interaction: Web Paging    Purpose of Notification: Critical Value: Platlet 2. No s/s bleeding. Significant difficulty breathing. Thanks!    Orders Received: Plaetlet transfusions ordered    Comments:

## 2021-10-29 NOTE — PROGRESS NOTES
[facility]    Hematology/Oncology Follow-up Note      Today's Date: 10/29/21  Date of Admission:  10/27/2021  Reason for Consult:  Acute anemia and thrombocytopenia, history of MDS and small cell lung carcinoma      ASSESSMENT/PLAN:  Yvette Gastelum is a 71 year old female well-known to our clinic for history of limited stage lung cancer status post concurrent chemoradiation with cisplatin and etoposide and prophylactic cranial irradiation completed in 2018.  She had recurrence of her also lung cancer in August 2019 and received definitive SBRT.  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  She was found to have less than 2 to 5% blasts.  She was started on azacitidine for MDS with cycle 1 given 10/11/21-10/19/21.  She is admitted now for profound anemia as well as thrombocytopenia.  She is also found to have an elevated white cell count of 21.8, coagulopathy, and acute renal dysfunction.     1) Acute, severe anemia and thrombocytopenia with hemoptysis with underlying high risk MDS  -In the setting of renal dysfunction and coagulopathy, concern for acute hemolysis.  -likely secondary to recent azacitidine in setting of high-risk MDS.  Hemoglobin has improved with transfusions.  Platelet count also responded to platelet transfusion, but dropped to 2K again this morning.  She is receiving more PRBC and platelets today.  -Transfuse leukoreduced blood products: PRBC for Hb </= 7 or PLTs </=50K given acute hemoptysis. Can decrease PLT transfusion threshold to </= 30K if hemoptysis is stable.  -continue to monitor CBC q12.  -On inhaled TXA.  -pulmonology following  -primary oncologist is Dr. Luz - will need to schedule follow-up after discharge     2) Elevated WBC: could be reactive, infectious  -See plan above.  -on antibiotics for possible  pneumonia     3) Acute kidney injury: nephrology following, felt to be prerenal with severe anemia and poor oral intake.  It is improving with IV hydration and blood transfusion.  She is on allopurinol for tumor lysis prophylaxis, but less common in MDS.  Low suspicion for TTP.  -nephrology following     5) Right lower lobe infiltrate:  -With elevated WBC and patient's immunocompromised state, she is on antibiotics for possible pneumonia.     6) History of limited stage small cell lung cancer  -CT chest without evidence of new disease.     7) VTE prophylaxis  -Hold chemoprophylaxis given acute bleed and profound cytopenias.    We will continue to follow.            INTERIM HISTORY: Yvette was seen in her room.  She says that she is feeling better.  She says that the last time she coughed up blood was yesterday.         MEDICATIONS:  Current Facility-Administered Medications   Medication     0.9% sodium chloride BOLUS     0.9% sodium chloride BOLUS     acetaminophen (TYLENOL) tablet 650 mg    Or     acetaminophen (TYLENOL) Suppository 650 mg     acetaminophen (TYLENOL) tablet 650 mg     allopurinol (ZYLOPRIM) tablet 200 mg     guaiFENesin (ROBITUSSIN) 20 mg/mL solution 10 mL     [Held by provider] lactated ringers infusion     Lidocaine (LIDOCARE) 4 % Patch 1 patch     lidocaine (LMX4) cream     lidocaine 1 % 0.1-1 mL     lidocaine patch in PLACE     LORazepam (ATIVAN) tablet 0.5 mg     melatonin tablet 1 mg     nitroGLYcerin (NITROSTAT) sublingual tablet 0.4 mg     ondansetron (ZOFRAN-ODT) ODT tab 4 mg    Or     ondansetron (ZOFRAN) injection 4 mg     pantoprazole (PROTONIX) EC tablet 40 mg     piperacillin-tazobactam (ZOSYN) 3.375 g vial to attach to  mL bag     predniSONE (DELTASONE) tablet 5 mg     prochlorperazine (COMPAZINE) injection 5 mg    Or     prochlorperazine (COMPAZINE) tablet 5 mg    Or     prochlorperazine (COMPAZINE) suppository 12.5 mg     sodium chloride (PF) 0.9% PF flush 3 mL     sodium  "chloride (PF) 0.9% PF flush 3 mL     tranexamic acid (CYKLOKAPRON) 100 mg/ml inhalation solution 500 mg     vancomycin (VANCOCIN) 750 mg in sodium chloride 0.9 % 250 mL intermittent infusion           ALLERGIES:  Allergies   Allergen Reactions     No Known Allergies          PHYSICAL EXAM:  Vital signs:  Temp: 98.4  F (36.9  C) Temp src: Oral BP: 109/63 Pulse: 105   Resp: (!) 31 SpO2: 93 % O2 Device: Nasal cannula Oxygen Delivery: 2 LPM Height: 162.6 cm (5' 4\") Weight: 91.6 kg (202 lb)  Estimated body mass index is 34.67 kg/m  as calculated from the following:    Height as of this encounter: 1.626 m (5' 4\").    Weight as of this encounter: 91.6 kg (202 lb).    GENERAL/CONSTITUTIONAL: No acute distress. Sitting up in chair.  EYES: No scleral icterus.  NEUROLOGIC: Alert, oriented, answers questions appropriately.  INTEGUMENTARY: No jaundice.      LABS:  CBC RESULTS:   Recent Labs   Lab Test 10/29/21  0655   WBC 16.6*   RBC 2.41*   HGB 6.8*   HCT 20.9*   MCV 87   MCH 28.2   MCHC 32.5   RDW 17.5*   PLT 2*       Recent Labs   Lab Test 10/29/21  0655 10/28/21  0557    136   POTASSIUM 4.3 4.6   CHLORIDE 112* 107   CO2 19* 20   ANIONGAP 9 9   * 127*   BUN 61* 73*   CR 1.69* 2.53*   RUSTY 8.6 8.4*       Shayla Rai MD  Hematology/Oncology  [facility]    "

## 2021-10-29 NOTE — PROVIDER NOTIFICATION
MD Notification    Notified Person: MD    Notified Person Name: Kyung    Notification Date/Time: 10/19/21 0745    Notification Interaction: Critical: HgB: 6.8. Thanks!    Purpose of Notification:    Orders Received: Blood transfusion ordered    Comments:

## 2021-10-29 NOTE — PROVIDER NOTIFICATION
"MD Notification    Notified Person: MD    Notified Person Name: Kyung    Notification Date/Time: 1048    Notification Interaction: page    Purpose of Notification: \"blood bank called, you ordered radiated PRBC and they think that was an error. If it wasnt let me know. If it was reorder please normal PRBC.\"    Orders Received: changed orders    Comments: blood bank called and said Mateo did not discontinue previous order.       "

## 2021-10-29 NOTE — PROGRESS NOTES
[facility]    Hospitalist Progress Note      Assessment & Plan   Yvette Gastelum is a 71 year old female admitted on 10/27/2021  with hemoptysis    Active hemoptysis, likely minor but could rapidly transition to major hemoptysis  Concerned that she is bleeding from the original site of cancer noted in the right lower lobe.  On initial diagnosis in 2018 she had hemoptysis and had malignancy infiltrating the pulmonary vessels.  CT chest 10/27/2021 demonstrates a right lower lobe infiltrate, concerning for active bleed.  -Consult pulmonary; do not feel patient is a candidate for bronchoscopy, consider IR if needed for embolization however concern regarding thrombocytopenia.  - mg 3 times daily x5 days, may discontinue early if hemoptysis resolves or per pulmonary recommendations  - encourage pulmonary toileting and frequent coughing to clear airways as needed.  -Persistent dyspnea, supplemental O2 requirements serially increased, serial increase in BNP 5.2K given multiple PRBC, platelet transfusion and IVF, requiring additional transfusion this morning, hold IVF.  -Admission leukocytosis 20.6, CXR with RLL infiltrate, procalcitonin 4.71, persistent cough,, started on empiric IV antibiotics for HCAP per HemeOnc recommendations including Zosyn and Vanco.  Given TAMARA renal dose Vanco, check swab for MRSA and if negative consider narrowing antibiotics and DC Vanco. Swab for MRSA pending  -Management of cytopenias, see below     Severe thrombocytopenia    Due to MDS  -Transfuse to platelet count greater than 50 K initially, if ongoing bleeding consider transfusion to greater than 100 K  -Patient has been declining rapid transfusion, risks and benefits of this was discussed with her including death, suffocating and her own blood, respiratory arrest, received 2 units platelets on admission; follow-up platelet 40 K,   -Platelet count this morning 2K rapid decline the last 24 hours after 2 units  platelet transfusion.  Given hemoptysis, transfuse another 2 units today, recheck CBC in AM.     Anemia, severe, symptomatic  Due to MDS.  Hemoglobin 5.8 on presentation.  Has been having fatigue and shortness of breath in the previous weeks  -Decline in hemoglobin to 4.3, ordered 2 units PRBC  -Hemoglobin this morning 6.8 after receiving 2 units PRBC yesterday.  As above platelet count only 2K, receiving 2 units platelets, then 1 unit PRBC.  Given transfusions, dyspnea with rising BNP, hold IVF.    MDS  Small cell lung cancer, in remission  MDS due to treatment for lung cancer.  Is currently transfusion dependent.  Recently started on Vidaza.  -Consult to hematology oncology.     TAMARA, severe  CKD3  Unclear cause. Possible pre-renal from poor PO in last several days with associated anemia, and ongoing bleeding  -ua generally bland .  -got IVF in ED, encourage PO, will get additional fluids with blood products  -Nephrology recommends gentle IVF with LR at 50 cc an hour, encourage oral intake, strict I's/O, daily BMP.  -Given persistent hypoxia requiring supplemental O2, dyspnea, rising BNP and additional 2 units of platelets and 1 unit PRBC today hold IVF.  -Daily BMP.     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  She understands that if she is actively hemorrhaging into her lungs and has a respiratory arrest, that any resuscitative efforts may be futile.  Discussed this with the son as well.  Patient on multiple occasions has indicated that she does not wish to get infusions too rapidly due to chest pain and apparent volume overload symptoms associated with prior infusions.  ED providers and admitting provider have been extremely timur regarding the risk of mortality, morbidity associated with this decision.   She will need ongoing discussions to encourage her to comply with recommendations if restorative care remains her goal of care.      DVT Prophylaxis: Pneumatic Compression Devices  Code Status: Full Code  Expected discharge: TBD in complex presentation with acute hemoptysis, severe thrombocytopenia and anemia 2' MDS and new TAMARA.     Total critical care time 35 minutes with panic level platelet count 2K and hemoglobin 6.8, acute hemoptysis and hypoxia.  Initiate acute transfusion initially 2 units platelets followed by 1 unit PRBC, hold IVF, monitor renal function/BMP, continue empiric IV antibiotics, Zosyn and Vanco, monitor swab for MRSA, considering narrowing antibiotics with DC Vanco given renal compromise.    Sorin Tracey MD  Text Page (7am - 6pm, M-F)    Interval History   Patient with persistent dyspnea and cough, as above panic level platelet count 2K, hemoglobin 6.8.  Ongoing O2 desat.      -Data reviewed today: I reviewed all new labs and imaging results over the last 24 hours.      Physical Exam   Temp: 98.4  F (36.9  C) Temp src: Oral BP: (!) 81/51 Pulse: 105   Resp: 25 SpO2: 97 % O2 Device: Nasal cannula Oxygen Delivery: 2 LPM  Vitals:    10/27/21 1433   Weight: 91.6 kg (202 lb)     Vital Signs with Ranges  Temp:  [97.7  F (36.5  C)-99.1  F (37.3  C)] 98.4  F (36.9  C)  Pulse:  [102-121] 105  Resp:  [9-56] 25  BP: ()/(37-89) 81/51  SpO2:  [93 %-99 %] 97 %  I/O last 3 completed shifts:  In: 1870 [P.O.:1220]  Out: 475 [Urine:475]    General/Constitutional:   NAD, alert, calm, cooperative     HEENT/Head Exam:  atraumatic  Eyes:  PERRL, no conjunctivits  Mouth/Oral Pharynx:  Buccal mucosa WNL  Chest/Respiratory:  Air exchange bilateral lung fields; no rales or wheeze. Respiration nonlabored now on O2 per NC;  occasional spits up sputum;  Cardiovascular: Tachycardic, no murmur appreciated.  LE edema trace  Gastrointestinal/Abdomen:  soft, nontender,  no rebound, guarding or other peritoneal  signs.  Musculoskeletal:  extremities warm, dry, noncyanotic; no acitve synovitis.  Neuro.  Gross motor tested, nonfocal, sensory intact  Psych oriented, affect calm   Skin:  No rash noted on gross exam    Medications     [Held by provider] lactated ringers Stopped (10/29/21 0903)       acetaminophen  650 mg Oral TID     allopurinol  200 mg Oral Daily     lidocaine  1 patch Transdermal Q24H     lidocaine   Transdermal Q8H     pantoprazole  40 mg Oral Daily     piperacillin-tazobactam  3.375 g Intravenous Q6H     predniSONE  5 mg Oral Daily     sodium chloride (PF)  3 mL Intracatheter Q8H     tranexamic acid  500 mg Nebulization Q8H     vancomycin  750 mg Intravenous Q24H       Data   Recent Labs   Lab 10/29/21  0655 10/28/21  0557 10/28/21  0108 10/27/21  1508 10/27/21  1505 10/27/21  1505   WBC 16.6* 21.8*  --   --   --  20.6*   HGB 6.8* 4.3*  --   --   --  5.8*   MCV 87 87  --   --   --  87   PLT 2* 40* 34*  --    < > 1*   INR  --  1.46*  --  1.38*  --   --     136  --   --   --  136   POTASSIUM 4.3 4.6  --   --   --  4.5   CHLORIDE 112* 107  --   --   --  106   CO2 19* 20  --   --   --  19*   BUN 61* 73*  --   --   --  69*   CR 1.69* 2.53*  --   --   --  3.70*   ANIONGAP 9 9  --   --   --  11   RUSTY 8.6 8.4*  --   --   --  8.7   * 127*  --   --   --  120*   TROPONIN  --   --   --   --   --  <0.015    < > = values in this interval not displayed.

## 2021-10-29 NOTE — PROGRESS NOTES
Nephrology Progress Note  10/29/2021         ASSESSMENT AND RECOMMENDATIONS:   1 myelodysplastic syndrome -status post cycle 1 chemo with Vidaza completed on on October 18, 2021  2 severe anemia and thrombocytopenia -attributed to recent treatment with Vidaza  3 hemoptysis secondary to #2  4 acute renal failure-Baseline creatinine 1-1.2.  3.7 on presentation secondary to prerenal TAMARA with severe anemia and poor oral intake.  Quickly resolving with IV hydration and blood transfusion.  Urinalysis relatively bland with 1+ protein and a concentrated urine sample.  Nonoliguric.  -Noted concern for tumor lysis syndrome.  Patient remains on allopurinol.  Uric acid is slightly high.  Phosphorus is normal with normal calcium.  Unlikely to be profound TLS, especially in MDS.  Work-up intravascular hemolysis underway per hematology.  Given soft improvement in renal function with volume repletion, TAMARA unlikely to be driven byMAHA.  Urinalysis is fairly bland.    5 metabolic acidosis-secondary to renal failure.       Recommendation-  -PRBC transfusion per primary team/hematology  -Agree with holding LR.  If any signs of volume overload with repeated transfusions, please give IV Lasix 40 mg  -Daily renal function panel.  Strict I's/O      Clyde Carter MD  [facility] - Nephrology   [phone]      Interval History :   Seen / examined.   No acute issues overnight.  Tired.  Did not get much sleep.  Hemoglobin up to 6.8 this morning.  Getting platelet transfusion.  Platelet count down to 2 again.  Creatinine improved to 1.7 with transfusion and IV fluid.  IV fluids on hold now.  Nonoliguric.    Review of Systems:   A 4 point review of systems was negative except as noted above.  Notably: poor appetite. no nausea or vomiting or diarrhea.  no confusion,  no fever or chills  Cough +    Physical Exam:   I/O last 3 completed shifts:  In: 850 [P.O.:500]  Out: 475 [Urine:475]    GENERAL APPEARANCE: alert and no distress,  coughing   EYES:  no scleral icterus, pupils equal  PULM: lungs clear to auscultation, equal air movement, no cyanosis or clubbing  CV: regular rhythm, normal rate, no rub     -JVP -     -edema -   GI: soft, non tender,   NEURO: mentation intact and speech normal, no asterixis       Labs:   All labs reviewed by me  Electrolytes/Renal - Recent Labs   Lab Test 10/29/21  0655 10/28/21  0557 10/27/21  1505 03/26/19  0921 03/11/19  1152 12/16/18  0918 08/23/18  0647 08/22/18  0715    136 136   < > 139   < >  --  142   POTASSIUM 4.3 4.6 4.5   < > 3.4   < >  --  3.6   CHLORIDE 112* 107 106   < > 104   < >  --  108   CO2 19* 20 19*   < > 26   < >  --  26   BUN 61* 73* 69*   < > 26   < >  --  15   CR 1.69* 2.53* 3.70*   < > 1.52*   < >  --  0.96   * 127* 120*   < > 107*   < >  --  104*   RUSTY 8.6 8.4* 8.7   < > 9.2   < >  --  8.5   MAG  --   --   --   --  1.7  --  2.3 2.0   PHOS 4.0  --   --   --   --   --   --   --     < > = values in this interval not displayed.       CBC -   Recent Labs   Lab Test 10/29/21  0655 10/28/21  0557 10/28/21  0108 10/27/21  1505 10/27/21  1505   WBC 16.6* 21.8*  --   --  20.6*   HGB 6.8* 4.3*  --   --  5.8*   PLT 2* 40* 34*   < > 1*    < > = values in this interval not displayed.       LFTs -   Recent Labs   Lab Test 10/09/21  0830 07/05/21  1549 03/17/21  1553   ALKPHOS 70 62 83   BILITOTAL 0.9 1.7* 1.0   ALT 12 35 24   AST 25 61* 49*   PROTTOTAL 7.9 6.8 8.3   ALBUMIN 3.5 3.4 4.3       Iron Panel -   Recent Labs   Lab Test 08/05/21  1056 07/10/21  0548 01/20/21  0926   IRON 38 34* 133   IRONSAT 14* 14* 44   EDILSON  --   --  482*         Current Medications:    allopurinol  200 mg Oral Daily     lidocaine  1 patch Transdermal Q24H     lidocaine   Transdermal Q8H     pantoprazole  40 mg Oral Daily     piperacillin-tazobactam  3.375 g Intravenous Q6H     predniSONE  5 mg Oral Daily     sodium chloride (PF)  3 mL Intracatheter Q8H     tranexamic acid  500 mg Nebulization Q8H      vancomycin  750 mg Intravenous Q24H       [Held by provider] lactated ringers Stopped (10/29/21 0903)     Clyde Carter MD

## 2021-10-29 NOTE — PROVIDER NOTIFICATION
MD Notification    Notified Person: MD    Notified Person Name: Kyung    Notification Date/Time: 0922    Notification Interaction: page    Purpose of Notification: sepsis fired. Did not order labs.     Orders Received: agreed not to order sepsis protocol.     Comments:

## 2021-10-30 NOTE — PROGRESS NOTES
[facility]    Hematology/Oncology Follow-up Note      Today's Date: 10/29/21  Date of Admission:  10/27/2021  Reason for Consult:  Acute anemia and thrombocytopenia, history of MDS and small cell lung carcinoma      ASSESSMENT/PLAN:  Yvette Gastelum is a 71 year old female well-known to our clinic for history of limited stage lung cancer status post concurrent chemoradiation with cisplatin and etoposide and prophylactic cranial irradiation completed in 2018.  She had recurrence of her also lung cancer in August 2019 and received definitive SBRT.  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  She was found to have less than 2 to 5% blasts.  She was started on azacitidine for MDS with cycle 1 given 10/11/21-10/19/21.  She is admitted now for profound anemia as well as thrombocytopenia.  She is also found to have an elevated white cell count of 21.8, coagulopathy, and acute renal dysfunction.     1) Acute, severe anemia and thrombocytopenia with hemoptysis with underlying high risk MDS  -In the setting of renal dysfunction and coagulopathy, concern for acute hemolysis.  -likely secondary to recent azacitidine in setting of high-risk MDS.  Hemoglobin has improved with transfusions.  Platelet count also responded to platelet transfusion, but she remains transfusion-dependent.  She is receiving platelets today.  -Transfuse leukoreduced blood products: PRBC for Hb </= 7 or PLTs </=50K given acute hemoptysis. Can decrease PLT transfusion threshold to </= 30K if hemoptysis is stable.  -continue to monitor CBC q12.  -On inhaled TXA.  -pulmonology following  -primary oncologist is Dr. Luz - will need to schedule follow-up after discharge     2) Elevated WBC: could be reactive, infectious  -See plan above.  -on antibiotics for possible  "pneumonia     3) Acute kidney injury: nephrology following, felt to be prerenal with severe anemia and poor oral intake.  It is improving with IV hydration and blood transfusion.  She is on allopurinol for tumor lysis prophylaxis, but less common in MDS.  Low suspicion for TTP.  -nephrology following     5) Right lower lobe infiltrate:  -With elevated WBC and patient's immunocompromised state, she is on antibiotics for possible pneumonia.     6) History of limited stage small cell lung cancer  -CT chest without evidence of new disease.     7) VTE prophylaxis  -Hold chemoprophylaxis given acute bleed and profound cytopenias.    We will continue to follow.      INTERIM HISTORY: Yvette was seen in her room.  She says that she is feeling better.  No new bleeding.  No new changes.  Kind of \"same old same old\".       MEDICATIONS:  Current Facility-Administered Medications   Medication     0.9% sodium chloride BOLUS     0.9% sodium chloride BOLUS     0.9% sodium chloride BOLUS     acetaminophen (TYLENOL) tablet 650 mg    Or     acetaminophen (TYLENOL) Suppository 650 mg     acetaminophen (TYLENOL) tablet 650 mg     albuterol (PROVENTIL) neb solution 2.5 mg     allopurinol (ZYLOPRIM) tablet 200 mg     benzonatate (TESSALON) capsule 100 mg     guaiFENesin (ROBITUSSIN) 20 mg/mL solution 10 mL     heparin 100 UNIT/ML injection 5-10 mL     heparin lock flush 10 UNIT/ML injection 5-10 mL     heparin lock flush 10 UNIT/ML injection 5-10 mL     HYDROmorphone (DILAUDID) half-tab 1 mg     HYDROmorphone (DILAUDID) injection 0.1-0.2 mg     ipratropium - albuterol 0.5 mg/2.5 mg/3 mL (DUONEB) neb solution 3 mL     [Held by provider] lactated ringers infusion     Lidocaine (LIDOCARE) 4 % Patch 1 patch     lidocaine (LMX4) cream     lidocaine 1 % 0.1-1 mL     lidocaine patch in PLACE     LORazepam (ATIVAN) tablet 0.5 mg     melatonin tablet 1 mg     naloxone (NARCAN) injection 0.2 mg    Or     naloxone (NARCAN) injection 0.4 mg    Or     " "naloxone (NARCAN) injection 0.2 mg    Or     naloxone (NARCAN) injection 0.4 mg     nitroGLYcerin (NITROSTAT) sublingual tablet 0.4 mg     ondansetron (ZOFRAN-ODT) ODT tab 4 mg    Or     ondansetron (ZOFRAN) injection 4 mg     pantoprazole (PROTONIX) EC tablet 40 mg     piperacillin-tazobactam (ZOSYN) 3.375 g vial to attach to  mL bag     predniSONE (DELTASONE) tablet 5 mg     prochlorperazine (COMPAZINE) injection 5 mg    Or     prochlorperazine (COMPAZINE) tablet 5 mg    Or     prochlorperazine (COMPAZINE) suppository 12.5 mg     sodium chloride (PF) 0.9% PF flush 10-20 mL     sodium chloride (PF) 0.9% PF flush 10-20 mL     sodium chloride (PF) 0.9% PF flush 10-20 mL     sodium chloride (PF) 0.9% PF flush 3 mL     sodium chloride (PF) 0.9% PF flush 3 mL     tranexamic acid (CYKLOKAPRON) 100 mg/ml inhalation solution 500 mg     vancomycin (VANCOCIN) 750 mg in sodium chloride 0.9 % 250 mL intermittent infusion           ALLERGIES:  Allergies   Allergen Reactions     No Known Allergies          PHYSICAL EXAM:  Vital signs:  Temp: 98.3  F (36.8  C) Temp src: Oral BP: 99/73 Pulse: 111   Resp: 26 SpO2: 99 % O2 Device: None (Room air) Oxygen Delivery: 5 LPM Height: 162.6 cm (5' 4\") Weight: 91.6 kg (202 lb)  Estimated body mass index is 34.67 kg/m  as calculated from the following:    Height as of this encounter: 1.626 m (5' 4\").    Weight as of this encounter: 91.6 kg (202 lb).    GENERAL/CONSTITUTIONAL: No acute distress. Sitting up in chair.  EYES: No scleral icterus.  NEUROLOGIC: Alert, oriented, answers questions appropriately.  INTEGUMENTARY: No jaundice.      LABS:  CBC RESULTS:   Recent Labs   Lab Test 10/29/21  0655   WBC 16.6*   RBC 2.41*   HGB 6.8*   HCT 20.9*   MCV 87   MCH 28.2   MCHC 32.5   RDW 17.5*   PLT 2*       Recent Labs   Lab Test 10/29/21  0655 10/28/21  0557    136   POTASSIUM 4.3 4.6   CHLORIDE 112* 107   CO2 19* 20   ANIONGAP 9 9   * 127*   BUN 61* 73*   CR 1.69* 2.53*   RUSTY 8.6 " 8.4*     Lalo Martínez MD.

## 2021-10-30 NOTE — PLAN OF CARE
Alert and oriented x4. Vital signs stable on 2-4L O2 via nasal cannula, BP soft. Up with SBA. Tolerating diet. Lungs sounds, wheezes expiratory, dyspnea on exertion. BS+. BM-. Voiding via purewick, low urine output; Anuj NP verbally notified while in with patient. Pain managed with PRN Dilaudid and Tylenol. Denies nausea. Tele Sinus tach. Patient had scant hemoptysis, refusing tranexamic neb d/t increase in work of breathing.

## 2021-10-30 NOTE — PROGRESS NOTES
Nephrology Progress Note  10/30/2021         ASSESSMENT AND RECOMMENDATIONS:   1 myelodysplastic syndrome -status post cycle 1 chemo with Vidaza completed on on October 18, 2021  2 severe anemia and thrombocytopenia -attributed to recent treatment with Vidaza  3 hemoptysis secondary to #2  4 acute renal failure-Baseline creatinine 1-1.2.  3.7 on presentation secondary to prerenal TAMARA with severe anemia and poor oral intake.  Quickly improved with IV hydration and blood transfusion.  Urinalysis relatively bland with 1+ protein and a concentrated urine sample.  Nonoliguric.  -Noted concern for tumor lysis syndrome.  Patient remains on allopurinol.  Uric acid is slightly high.  Phosphorus is normal with normal calcium.  Unlikely to be profound TLS, especially in MDS.  Work-up intravascular hemolysis underway per hematology.  Given soft improvement in renal function with volume repletion, TAMARA unlikely to be driven byMAHA.  Urinalysis is fairly bland.    5 metabolic acidosis-secondary to renal failure.       Recommendation-  -concern regarding progressive volume overload.  Blood pressure soft.  Will give 20 mg IV Lasix and monitor response.  -PRBC transfusion per primary team/hematology  -Daily renal function panel.  Strict I's/O      Clyde Carter MD  [facility] - Nephrology   [phone]      Interval History :   Seen / examined.   Overnight events noted. Describes pleuritic pain on rt base corresponding with pleural effusion on chest x-ray.  Mild shortness of breath.  Creatinine similar.  Urine concentrated and urine volume lower.  More edematous.  Hemoglobin stable.  Platelet again down to 1.  2 units transfused this morning.  Review of Systems:   A 4 point review of systems was negative except as noted above.  Notably: poor appetite. no nausea or vomiting or diarrhea.  no confusion,  no fever or chills  Cough +    Physical Exam:   I/O last 3 completed shifts:  In: 1380 [P.O.:720; I.V.:10]  Out: 300  [Urine:300]    GENERAL APPEARANCE: alert and no distress, coughing   EYES:  no scleral icterus, pupils equal  PULM: Diminished breath sounds at bases  CV: regular rhythm, normal rate, no rub     -JVP -     -edema +  GI: soft, non tender,   NEURO: mentation intact and speech normal, no asterixis       Labs:   All labs reviewed by me  Electrolytes/Renal -   Recent Labs   Lab Test 10/30/21  0621 10/29/21  0655 10/28/21  0557 03/26/19  0921 03/11/19  1152 12/16/18  0918 08/23/18  0647 08/22/18  0715    140 136   < > 139   < >  --  142   POTASSIUM 4.3 4.3 4.6   < > 3.4   < >  --  3.6   CHLORIDE 108 112* 107   < > 104   < >  --  108   CO2 21 19* 20   < > 26   < >  --  26   BUN 63* 61* 73*   < > 26   < >  --  15   CR 1.73* 1.69* 2.53*   < > 1.52*   < >  --  0.96   * 133* 127*   < > 107*   < >  --  104*   RUSTY 8.7 8.6 8.4*   < > 9.2   < >  --  8.5   MAG  --   --   --   --  1.7  --  2.3 2.0   PHOS  --  4.0  --   --   --   --   --   --     < > = values in this interval not displayed.       CBC -   Recent Labs   Lab Test 10/30/21  0621 10/29/21  2127 10/29/21  0655 10/28/21  0557 10/28/21  0557   WBC 12.6*  --  16.6*  --  21.8*   HGB 7.6* 7.3* 6.8*   < > 4.3*   PLT 1*  --  2*  --  40*    < > = values in this interval not displayed.       LFTs -   Recent Labs   Lab Test 10/09/21  0830 07/05/21  1549 03/17/21  1553   ALKPHOS 70 62 83   BILITOTAL 0.9 1.7* 1.0   ALT 12 35 24   AST 25 61* 49*   PROTTOTAL 7.9 6.8 8.3   ALBUMIN 3.5 3.4 4.3       Iron Panel -   Recent Labs   Lab Test 08/05/21  1056 07/10/21  0548 01/20/21  0926   IRON 38 34* 133   IRONSAT 14* 14* 44   EDILSON  --   --  482*         Current Medications:    acetaminophen  650 mg Oral TID     allopurinol  200 mg Oral Daily     heparin  5-10 mL Intracatheter Q28 Days     heparin lock flush  5-10 mL Intracatheter Q24H     lidocaine  1 patch Transdermal Q24H     lidocaine   Transdermal Q8H     pantoprazole  40 mg Oral Daily     piperacillin-tazobactam  3.375 g  Intravenous Q6H     predniSONE  5 mg Oral Daily     sodium chloride (PF)  10-20 mL Intracatheter Q28 Days     sodium chloride (PF)  3 mL Intracatheter Q8H     tranexamic acid  500 mg Nebulization Q8H     vancomycin  750 mg Intravenous Q18H       [Held by provider] lactated ringers Stopped (10/29/21 0903)     Clyde Carter MD

## 2021-10-30 NOTE — PROVIDER NOTIFICATION
MD Notification    Notified Person: MD    Notified Person Name: Kyung    Notification Date/Time: 10/30 1738    Notification Interaction: web page    Purpose of Notification: FYI platelets up to 7, Hgb down to 7.0, checked VBGs and look ok, will restart abx and run slowly.    Orders Received:    Comments: Spoke with hospitalist about possibility of Bipap overnight, MD does not recommend given lung cancer.

## 2021-10-30 NOTE — PROGRESS NOTES
"[facility]    Hospitalist Progress Note      Assessment & Plan   Yvette Gastelum is a 71 year old female admitted on 10/27/2021  with hemoptysis    Active hemoptysis, likely minor but could rapidly transition to major hemoptysis  Concerned that she is bleeding from the original site of cancer noted in the right lower lobe.  On initial diagnosis in 2018 she had hemoptysis and had malignancy infiltrating the pulmonary vessels.  CT chest 10/27/2021 demonstrates a right lower lobe infiltrate, concerning for active bleed.  -Consult pulmonary; do not feel patient is a candidate for bronchoscopy, consider IR if needed for embolization however concern regarding thrombocytopenia.  - mg 3 times daily x5 days, may discontinue early if hemoptysis resolves or per pulmonary recommendations  - encourage pulmonary toileting and frequent coughing to clear airways as needed.  -Persistent dyspnea, supplemental O2 requirements serially increased, serial increase in BNP 5.2K given multiple PRBC, platelet transfusion and IVF, requiring additional transfusion this morning, hold IVF.  -Admission leukocytosis 20.6, CXR with RLL infiltrate, procalcitonin 4.71, persistent cough,, started on empiric IV antibiotics for HCAP per HemeOnc recommendations including Zosyn and Vanco.   -. Swab for MRSA +; continue vanco; contact isolation  -Right-sided pleuritic pain yesterday.  Chest x-ray indicates \"small pleural effusion, troponins negative, EKG without acute changes, echo pending.  Using Tylenol and p.o.prn  Dilaudid, add as needed IV hydromorphone.  -Increasing O2 requirements to 3 L.  Fluid volume from multiple transfusions.  Per nephrology given furosemide IV 20 mg, IVF held.  Recheck BMP in AM.  -Management of cytopenias, see below    ADDENDUM   Called to see patient regarding acute hypoxia requiring increased supplemental O2 to 7 L.  VSS.  Persistent cough however no increased sputum medications of mucus " plugging.  Received IV hydromorphone few hours ago for increased pain however no indication of increased sedation and hypoventilation.  Earlier this morning received IV furosemide 20 mg, SBP maintain, persistently soft.  Since this morning has received 2 units platelets, as well as volume from Zosyn and currently Vanco.  No timur hemoptysis.    Assessment acute pulmonary vascular congestion from transfusions and IVF load with abx.  Give additional IV furosemide 20 mg, start DuoNeb neb treatments, address cough.  Chest x-ray was obtained last evening for RRT because of right-sided chest pain that indicated small right-sided pleural effusion, otherwise no acute infiltrate; troponins negative, EKG without acute changes, echo pending.  Exam after IV furosemide and neb treatment, hypoxemia improved with decreased supplemental O2 requirements and neb treatment, audible wheeze present. Bilateral LE edema 2/4; symmetrical; neg homans.    -Add BNP. Close monitor I's/O, weights, may need maintenance furosemide given need for daily transfusions, continue IV antibiotics  Positive MRSA] procalcitonin 4.  Recheck BMP in AM.  -Continue Vanco/Zosyn  -Close monitor cytopenias, has required daily platelet +/-PRBC transfusions; question platelets sequestration with splenomegaly, oncology following.  -Although consider PE with tachycardia, hypoxia, and pleuritic chest pain given thrombocytopenia not a candidate for anticoagulation, and on follow-up exam this afternoon lower suspicion.  Close monitor    Severe thrombocytopenia    Due to MDS  -Transfuse to platelet count greater than 50 K initially, if ongoing bleeding consider transfusion to greater than 100 K  -Patient has been declining rapid transfusion, risks and benefits of this was discussed with her including death, suffocating and her own blood, respiratory arrest, received 2 units platelets on admission; follow-up platelet 40 K,   -Platelet count 10/29/2021 2K rapid decline the  last 24 hours after 2 units platelet transfusion.  Given hemoptysis, transfuse another 2 units  -Platelet count 10/30/2020 1K, again transfuse units, hemoglobin continued at 7.6, no increased hemoptysis.  Noted splenomegaly, unclear if plays role and thrombocytopenia.  Await oncology opinion.      Anemia, severe, symptomatic  Due to MDS exascerbated to hemoptysis. .  Hemoglobin 5.8 on presentation.  Has been having fatigue and shortness of breath in the previous weeks  -Decline in hemoglobin to 4.3, ordered 2 units PRBC  -Hemoglobin 10/29/2021 6.8 after receiving 2 units PRBC.  As above platelet count only 2K, receiving 2 units platelets, then 1 unit PRBC.  Given transfusions, dyspnea with rising BNP, hold IVF.  -See above regarding thrombocytopenia. hgb this morning 7.6.  CBC in AM.    MDS  Small cell lung cancer, in remission  MDS due to treatment for lung cancer.  Is currently transfusion dependent.  Recently started on Vidaza.  -Consult to hematology oncology.     TAMARA, severe  CKD3  Unclear cause. Possible pre-renal from poor PO in last several days with associated anemia, and ongoing bleeding  -ua generally bland .  -got IVF in ED, encourage PO, will get additional fluids with blood products  -Nephrology recommends gentle IVF with LR at 50 cc an hour, encourage oral intake, strict I's/O, daily BMP.  -Given persistent hypoxia requiring supplemental O2, dyspnea, rising BNP and additional 2 units of platelets and 1 unit PRBC hold IVF.  -Increased O2 requirements.  Daily transfusions of platelets and RBC, per nephrology trial furosemide 20 mg IV, BMP in AM.  See ADDENDUM abpve/      HLD  -hold statin for now     Hx of spontaneous SDH 2/2 thrombocytopenia  - monitor     Malpositioned port-a-cath  - will need to follow up outpatient for findings on CT and CXR 10/27     Goals of care  Patient reports that she wishes to be full code.  She understands that if she is actively hemorrhaging into her lungs and has a  respiratory arrest, that any resuscitative efforts may be futile.  Discussed this with the son as well.  Patient on multiple occasions has indicated that she does not wish to get infusions too rapidly due to chest pain and apparent volume overload symptoms associated with prior infusions.  ED providers and admitting provider have been extremely timur regarding the risk of mortality, morbidity associated with this decision.  She will need ongoing discussions to encourage her to comply with recommendations if restorative care remains her goal of care.      DVT Prophylaxis: Pneumatic Compression Devices  Code Status: Full Code  Expected discharge: TBD in complex presentation with acute hemoptysis, severe thrombocytopenia and anemia 2' MDS and new TAMARA.     Total critical care time 35 minutes, see addendum above for acute respiratory failure with hypoxia requiring increased O2 to 7 liters; RT.  Differential as above, see addendum;  lab testing; exam and plan including IV furosemide, DuoNeb, lab testing.    Sorin Tracey MD  Text Page (7am - 6pm, M-F)    Interval History   Acute dyspnea and hypoxia, persistent cough, nonproductive, remains afebrile.  No further timur hemoptysis.  Persistent right-sided pleuritic chest pain controlled with as needed pain medications.  No left-sided chest pain.    -Data reviewed today: I reviewed all new labs and imaging results over the last 24 hours.      Physical Exam   Temp: 97.7  F (36.5  C) Temp src: Oral BP: 106/69 Pulse: 109   Resp: 18 SpO2: 99 % O2 Device: None (Room air) Oxygen Delivery: 3 LPM  Vitals:    10/27/21 1433   Weight: 91.6 kg (202 lb)     Vital Signs with Ranges  Temp:  [97.6  F (36.4  C)-98.4  F (36.9  C)] 97.7  F (36.5  C)  Pulse:  [104-118] 109  Resp:  [12-42] 18  BP: ()/(43-84) 106/69  SpO2:  [91 %-100 %] 99 %  I/O last 3 completed shifts:  In: 610 [P.O.:240; I.V.:10]  Out: 700 [Urine:700]    General/Constitutional:   NAD 2' respiratory distress    HEENT/Head Exam:  atraumatic  Eyes:  PERRL, no conjunctivits  Mouth/Oral Pharynx:  Buccal mucosa WNL  Chest/Respiratory:  Air exchange bilateral lung fields;audible wheeze; supp O2.   Cardiovascular: Tachycardic, no murmur appreciated.  LE edema 2/4 symmeetrical; neg homans  Gastrointestinal/Abdomen:  soft, nontender,  no rebound, guarding or other peritoneal signs.  Musculoskeletal:  extremities warm, dry, noncyanotic; no acitve synovitis.  Neuro.  Gross motor tested, nonfocal, sensory intact  Psych oriented, affect calm   Skin:  No rash noted on gross exam    Medications     [Held by provider] lactated ringers Stopped (10/29/21 0903)       acetaminophen  650 mg Oral TID     allopurinol  200 mg Oral Daily     heparin  5-10 mL Intracatheter Q28 Days     heparin lock flush  5-10 mL Intracatheter Q24H     lidocaine  1 patch Transdermal Q24H     lidocaine   Transdermal Q8H     pantoprazole  40 mg Oral Daily     piperacillin-tazobactam  3.375 g Intravenous Q6H     predniSONE  5 mg Oral Daily     sodium chloride (PF)  10-20 mL Intracatheter Q28 Days     sodium chloride (PF)  3 mL Intracatheter Q8H     tranexamic acid  500 mg Nebulization Q8H     vancomycin  750 mg Intravenous Q18H       Data   Recent Labs   Lab 10/30/21  0621 10/30/21  0221 10/29/21  2127 10/29/21  0655 10/28/21  0557 10/28/21  0557 10/28/21  0108 10/27/21  1508 10/27/21  1505   WBC 12.6*  --   --  16.6*  --  21.8*  --   --   --    HGB 7.6*  --  7.3* 6.8*   < > 4.3*  --   --   --    MCV 89  --   --  87  --  87  --   --   --    PLT 1*  --   --  2*  --  40*   < >  --   --    INR  --   --   --   --   --  1.46*  --  1.38*  --      --   --  140  --  136  --   --   --    POTASSIUM 4.3  --   --  4.3  --  4.6  --   --   --    CHLORIDE 108  --   --  112*  --  107  --   --   --    CO2 21  --   --  19*  --  20  --   --   --    BUN 63*  --   --  61*  --  73*  --   --   --    CR 1.73*  --   --  1.69*  --  2.53*  --   --   --    ANIONGAP 8  --   --  9  --  9   --   --   --    RUSTY 8.7  --   --  8.6  --  8.4*  --   --   --    *  --   --  133*  --  127*  --   --   --    TROPONIN <0.015 <0.015 <0.015  --   --   --   --   --    < >    < > = values in this interval not displayed.

## 2021-10-30 NOTE — PROVIDER NOTIFICATION
MD Notification    Notified Person: MD    Notified Person Name: Blaise Frazier    Notification Date/Time: 10/29/21 2013    Notification Interaction: Amcom    Purpose of Notification: patient complains of 9/10 non radiating chest pain. EKG done. Would you recommend chest x-ray? Nitroglycerin PRN available, patient hypotensive.      Orders Received:     Comments: 2024 RRT called for above complaints.

## 2021-10-30 NOTE — PROVIDER NOTIFICATION
MD Notification    Notified Person: MD    Notified Person Name: Carmella Patterson    Notification Date/Time: 10/30/21 0400    Notification Interaction: Amcom    Purpose of Notification: Patient continues to report of right sided chest pain. Patient claims to have increase work of breathing.     Orders Received:    Comments:

## 2021-10-30 NOTE — PLAN OF CARE
SUMMARY: Thrombocytopenia- Hemoptysis, RLL Pneumonia, TAMARA, CKD- Contact for MRSA  DATE & TIME: 10/29/21 [id number]   Cognitive Concerns/ Orientation : A&Ox4, cooperative  BEHAVIOR & AGGRESSION TOOL COLOR: Green  ABNL VS/O2: VSS on 2L NC, BP soft  MOBILITY: +1 GB/W, up in chair x2  PAIN MANAGMENT: 8/10 back pain, Lidocaine patched and scheduled Tylenol with some relief.  DIET: Regular, poor oral intake, Ensure shakes  BOWEL/BLADDER: Incontinent at times, purewick in place  ABNL LAB/BG: HgB: 6.8 (1 unit PRBCS), Plat: 2 (2 unit platlets), Pro BNP: 5284, Pro Cyrus: 4.71, WBC: 16.6  DRAIN/DEVICES: R Chest Port SL/ sluggish, unit collect  TELEMETRY RHYTHM: NSR  SKIN: Dry\flakey, scattered bruising, R chest Port  D/C DATE: Pending  OTHER IMPORTANT INFO: Holding LR due to Platelet/ HgB

## 2021-10-30 NOTE — PROGRESS NOTES
"PULMONOLOGY PROGRESS NOTE    Date of Admission: 10/27/2021    CC/Reason for Hospital visit: hemoptysis, small cell lung cancer, acute hypoxic resp failure  SUBJECTIVE      Feeling better, no further hemoptysis, tolerating transfusions. Fatigued. On 2L o2.        ROS: A Problem Pertinent review of systems was negative except for items noted in HPI.  Past Medical, Family, and Social/Substance History has been reviewed: No interval changes.   OBJECTIVE   Vital signs:  Temp: 98.4  F (36.9  C) Temp src: Oral BP: (!) 72/43 Pulse: 106   Resp: 28 SpO2: 96 % O2 Device: Nasal cannula Oxygen Delivery: 2 LPM Height: 162.6 cm (5' 4\") Weight: 91.6 kg (202 lb)  Estimated body mass index is 34.67 kg/m  as calculated from the following:    Height as of this encounter: 1.626 m (5' 4\").    Weight as of this encounter: 91.6 kg (202 lb).        I/O last 3 completed shifts:  In: 1870 [P.O.:1220]  Out: 475 [Urine:475]      CONSTITUTIONAL/GENERAL APPEARANCE: Alert female. No Apparent Distress. Pale  PSYCHIATRIC: Pleasant and appropriate mood and affect. Oriented x 3.  EARS, NOSE,THROAT,MOUTH: External ears and nose overall normal. Normal oral mucosa.   NECK: Neck appearance normal. No neck masses and the thyroid is not enlarged.   RESPIRATORY: Non-labored effort. Dec, no wheezing  CARDIOVASCULAR: S1, S2, regular rate and rhythm    LABORATORY ASSESSMENT    Arterial Blood GasNo lab results found in last 7 days.  CBC  Recent Labs   Lab 10/29/21  0655 10/28/21  0557 10/28/21  0108 10/27/21  1505 10/25/21  1252 10/25/21  1252   WBC 16.6* 21.8*  --  20.6*  --  23.5*   RBC 2.41* 1.55*  --  2.17*  --  2.93*   HGB 6.8* 4.3*  --  5.8*  --  7.8*   HCT 20.9* 13.4*  --  18.8*  --  25.2*   MCV 87 87  --  87  --  86   MCH 28.2 27.7  --  26.7  --  26.6   MCHC 32.5 32.1  --  30.9*  --  31.0*   RDW 17.5* 22.5*  --  20.7*  --  20.9*   PLT 2* 40* 34* 1*   < > 2*    < > = values in this interval not displayed.     BMP  Recent Labs   Lab 10/29/21  0655 " 10/28/21  0557 10/27/21  1505    136 136   POTASSIUM 4.3 4.6 4.5   CHLORIDE 112* 107 106   RUSTY 8.6 8.4* 8.7   CO2 19* 20 19*   BUN 61* 73* 69*   CR 1.69* 2.53* 3.70*   * 127* 120*     INR  Recent Labs   Lab 10/28/21  0557 10/27/21  1508   INR 1.46* 1.38*      BNPNo lab results found in last 7 days.  VENOUS BLOOD GASESNo lab results found in last 7 days.      Additional labs and/or comments:     IMAGING         CT chest 10/27  1.  Right lower lobe consolidation and trace right pleural effusion, likely secondary to pneumonia. A follow-up radiograph after treatment is recommended to ensure resolution.  2.  Mild emphysema.  3.  Mild splenomegaly.       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  CT chest with inc dense infiltrate RLL, likely the source of the hemoptysis. Currently, no active ongoing bleeding and clotting complicated by MDS and uremia. Bronch possible but risk prohibitive and would require intubation and unlikely to be therapeutic. No hemoptysis for ~ 48h, tolerating transfusions.         PLAN:     Ok to stop TXA    Agree with abx, suggest 8d    Cytopenias per hospitalist / hematology    Wean o2 as able, goal sat ~ 90%    No role for empiric bronch. Could consider IR intervention for embolization if life-threatening bleed    Follow volume status closely given TAMARA as well as needed for blood products    Prn bronchodilators    Seems to have stabilized with no recent or active hemoptysis. Will followup peripherally. Please call anytime if needed        Han Tellez  [facility] / [place]  [facility]  Office: [phone]

## 2021-10-30 NOTE — CODE/RAPID RESPONSE
[facility]    House LUIS ENRIQUE RRT Note  10/29/2021   Time Called: 2024    RRT called for: Chest pain    Assessment & Plan     R sided chest pressure suspect 2/2 new small R pleural effusion in setting of recent PRBC/PLT transfusions with PMH R small cell lung cancer, TAMARA on CKD III, HFpEF (grade II DD).  Alternatively considered demand ischemia in setting of ongoing acute on chronic anemia, rebleeding in RLL, ACS, PE, MSK in setting of coughing  - Upon arrival, pt sitting upright in chair, awake, alert, in no apparent distress, benign appearing, reporting 9/10 R sided chest pressure.  Pt points to anterior mid R lobe and notes constant chest pressure, but no associated nausea, diaphoresis, SOB, nonradiating, nonreproducible, nonpleuritic.  Pt's HR 110s, SBP 80s-90s, RR 10s, O2 sats >92% on 2L O2 via NC.  Of note, pt states she has experienced similar chest pressure 2 times prior; pt attributes to receiving more than 1 transfusion in a day (received 1 PRBC, 2 PLT today)      INTERVENTIONS:  - Stat EKG obtained prior to arrival  - Will judiciously trial one time dose IV dilaudid 0.2 mg now  - Stat trop, will trend x3 total occurrences  - Stat CXR  - Will order echocardiogram   - Remains in telemetry monitoring at this time  - Discussed with pt in difficult position at this time; unable to trial nitroglycerin to see if would improve chest pressure as pt's SBPs remain soft.  In setting of soft BPs, unable to give diuretic therapy at this time.  Discussed with pt, if develops SOB or worsening chest pressure, may need to trial Bipap therapy to see if positive pressure improves overall chest pressure    At the end of the RRT pt remains overall hemodynamically stable, in no apparent distress    Discussed with and defer further cares to nursing and hospitalist     Interval History     Yvette Gastelum is a 71 year old female who was admitted on 10/27/2021 for hemoptysis.    Medical history significant  for: R sided small cell lung cancer, MDS, CKD III, HLP, SDH    Code Status: Full Code    Allergies   Allergies   Allergen Reactions     No Known Allergies        Physical Exam   Vital Signs with Ranges:  Temp:  [97.7  F (36.5  C)-98.6  F (37  C)] 98.4  F (36.9  C)  Pulse:  [102-114] 112  Resp:  [9-56] 24  BP: ()/(37-89) 94/63  SpO2:  [92 %-99 %] 92 %  I/O last 3 completed shifts:  In: 1870 [P.O.:1220]  Out: 475 [Urine:475]    Constitutional: Pt sitting upright in chair, awake, alert, in no apparent distress  Neck: No upper airway wheezes or stridor noted  Pulmonary: In no apparent respiratory distress, clear to auscultation bilateral upper lobes posteriorly, crackles noted bases R>L, no wheezes noted  Cardiovascular: Mildly tachycardic, regular rate and rhythm, normal S1S2, no murmur, rub or gallop noted  GI: Soft  Skin/Integumen: Warm, dry  Neuro: Awake, alert, clear speech, no focal neuro deficits noted  Psych:  Calm  Extremities: Moving all extremities    Data     EKG:  Interpreted by ЕКАТЕРИНА Damian CNP  Time reviewed: 2030  Symptoms at time of EKG: R sided chest pressure   Rhythm: sinus tachycardia  Rate: 100-110  Axis: Normal  Ectopy: none  Conduction: normal  ST Segments/ T Waves: No ST-T wave changes and No acute ischemic changes  Q Waves: none  Comparison to prior: Unchanged from 10/27 1445    Clinical Impression: no acute changes      Troponin:    Recent Labs   Lab Test 10/29/21  2127   TROPONIN <0.015       IMAGING: (X-ray/CT/MRI)   Recent Results (from the past 24 hour(s))   XR Chest Port 1 View    Narrative    EXAM: XR CHEST PORT 1 VIEW  LOCATION: [facility]  DATE/TIME: 10/29/2021 9:15 PM    INDICATION: Right-sided chest pressure, recent PRBC/PLT transfusions.  COMPARISON: 10/27/2021.      Impression    IMPRESSION: Right-sided Port-A-Cath unchanged with the catheter coiled in the right internal jugular vein and extending below the superior margin of the film. There is  a small right pleural effusion that is new compared to prior. No significant pleural   fluid on the left. No pneumothorax.       CBC with Diff:  Recent Labs   Lab Test 10/29/21  2127 10/29/21  0655 10/29/21  0655 10/28/21  0557 10/28/21  0557   WBC  --   --  16.6*   < > 21.8*   HGB 7.3*   < > 6.8*   < > 4.3*   MCV  --   --  87   < > 87   PLT  --   --  2*   < > 40*   INR  --   --   --   --  1.46*    < > = values in this interval not displayed.        Recent Labs   Lab 10/29/21  2127 10/28/21  0108 10/27/21  1505   LACT 1.6 1.5 1.1       Time Spent on this Encounter   I spent 30 minutes of critical care time on the unit/floor managing the care of Yvette Gastelum. Upon evaluation, this patient had a high probability of imminent or life-threatening deterioration due to chest pressure, which required my direct attention, intervention, and personal management. 100% of my time was spent at the bedside counseling the patient and/or coordinating care regarding services listed in this note.    ЕКАТЕРИНА Damian [facility] LUIS ENRIQUE

## 2021-10-30 NOTE — PROVIDER NOTIFICATION
MD Notification    Notified Person: MD    Notified Person Name: Carmella Patterson    Notification Date/Time: 10/30/21 0113    Notification Interaction: [employer]    Purpose of Notification:  Patient reported non-radiating right sided chest pain. One time dose IV dilaudid was given earlier, effective. Would you recommend PRN Dilaudid PO?    Orders Received:    Comments:

## 2021-10-30 NOTE — PROVIDER NOTIFICATION
MD Notification    Notified Person: MD    Notified Person Name: Kyung    Notification Date/Time: 10/30 0737    Notification Interaction: web page    Purpose of Notification: Platelets back down to 1 today.    Orders Received:    Comments:

## 2021-10-30 NOTE — CONSULTS
Care Management Initial Consult    General Information  Assessment completed with: Patient,    Type of CM/SW Visit: Initial Assessment    Primary Care Provider verified and updated as needed: Yes   Readmission within the last 30 days: current reason for admission unrelated to previous admission      Reason for Consult: other (see comments) (Elevated Risk Score)  Advance Care Planning: Advance Care Planning Reviewed: no concerns identified     Communication Assessment  Patient's communication style: spoken language (English or Bilingual)    Hearing Difficulty or Deaf: no   Wear Glasses or Blind: yes    Cognitive  Cognitive/Neuro/Behavioral: WDL  Level of Consciousness: alert  Arousal Level: opens eyes spontaneously  Orientation: oriented x 4  Mood/Behavior: cooperative, calm  Best Language: 0 - No aphasia  Speech: clear    Living Environment:   People in home: alone     Current living Arrangements: apartment      Able to return to prior arrangements: yes  Living Arrangement Comments:  (Lives on floor level.)    Family/Social Support:  Care provided by: self  Provides care for: no one  Marital Status:   Children          Description of Support System: Supportive, Involved    Support Assessment: Adequate social supports, Adequate family and caregiver support    Current Resources:   Patient receiving home care services: No     Community Resources: Transportation Services  Equipment currently used at home: cane, quad, cane, straight, grab bar, toilet, grab bar, tub/shower, walker, rolling  Supplies currently used at home:      Employment/Financial:  Employment Status: retired        Financial Concerns: No concerns identified     Lifestyle & Psychosocial Needs:  Social Determinants of Health     Tobacco Use: Medium Risk     Smoking Tobacco Use: Former Smoker     Smokeless Tobacco Use: Never Used   Alcohol Use:      Frequency of Alcohol Consumption:      Average Number of Drinks:      Frequency of Binge Drinking:     Financial Resource Strain:      Difficulty of Paying Living Expenses:    Food Insecurity:      Worried About Running Out of Food in the Last Year:      Ran Out of Food in the Last Year:    Transportation Needs:      Lack of Transportation (Medical):      Lack of Transportation (Non-Medical):    Physical Activity:      Days of Exercise per Week:      Minutes of Exercise per Session:    Stress:      Feeling of Stress :    Social Connections:      Frequency of Communication with Friends and Family:      Frequency of Social Gatherings with Friends and Family:      Attends Shinto Services:      Active Member of Clubs or Organizations:      Attends Club or Organization Meetings:      Marital Status:    Intimate Partner Violence:      Fear of Current or Ex-Partner:      Emotionally Abused:      Physically Abused:      Sexually Abused:    Depression: Not at risk     PHQ-2 Score: 2   Housing Stability:      Unable to Pay for Housing in the Last Year:      Number of Places Lived in the Last Year:      Unstable Housing in the Last Year:      Functional Status:  Prior to admission patient needed assistance:     Mental Health Status:        Chemical Dependency Status:        Values/Beliefs:  Spiritual, Cultural Beliefs, Shinto Practices, Values that affect care: no             Additional Information:  Per care management/social work consult for elevated risk score. Patient admitted 10/27 for Thrombocytopenia, and has an expected discharge date of 11/1. Patient reports having three sons who live relatively close to her and periodically visit. Patient lives alone in a studio appartment on the first floor with no stairs. Patient is able to drive but utilizes [employer] to get to and from appointments, and has her groceries delivered through [employer]. Patient expressed feeling down because the last time she was admitted to the hospital her dog passed away. SW validated her feelings and offered any additional  assistance. No  needed at this time.    SIMRAN Chacon

## 2021-10-30 NOTE — PROGRESS NOTES
Dr Tracey paged as pt's work of breathing and O2 needs increasing, concern for fluid overload. LS exp wheezes in all lobes. IV lasix 20 mg ordered along with duoneb, given by respiratory. Pt now back down to 3L NC, still wheezing in all lobes but work of breathing decreased. IV vanco stopped for the time being. BNP ordered. Will continue to monitor.

## 2021-10-31 NOTE — PROGRESS NOTES
[facility]    Hematology/Oncology Follow-up Note      Today's Date: 10/29/21  Date of Admission:  10/27/2021  Reason for Consult:  Acute anemia and thrombocytopenia, history of MDS and small cell lung carcinoma    ASSESSMENT/PLAN:  History of small cell lung cancer limited stage treated with concurrent chemoradiation with cisplatin etoposide and prophylactic cranial irradiation 2018.  History of local regional recurrence August 2019 treated with definitive SBRT.  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  Status post initiation of azacitidine cycle 1 October 11 through October 19.  Volume overload likely secondary to transfusion support, question taco versus trolley versus both.  Worsening kidney function acute on chronic kidney disease.  Respiratory distress with hypoxia likely secondary to volume overload and potentially diffuse alveolar hemorrhage.    Rapid decompensation from a respiratory standpoint last evening this morning.  Yvette was a bit agitated during our visit.  The rapid worsening of her shortness of breath is concerning for transfusion associated cardiac overload versus transfusion related acute lung injury versus diffuse alveolar hemorrhage.  Plan to add some Solu-Medrol in the off chance that this is diffuse alveolar hemorrhage likely associated with increased wedge pressure, capillary leak and severe thrombocytopenia.  This is a really difficult situation.  Any attempts to further diurese Yvette will likely lead to worsening kidney failure.  Fortunately she is requiring multiple units of platelets and or blood per day and we would likely be looking at some need for chronic dialysis for volume management.  How to place vascul access with severe critical thrombocytopenia as well as ongoing respiratory failurear must be taken to the larger account.  I did reach out Yvette Calderon and left of the long detailed message of the overall  clinical deterioration this morning and the need to come in.  Yvette was not in a place to talk about her respiratory decompensation this morning.  CODE STATUS and ultimate goals of cares are highly indicated and highly necessary at this time.  Obviously her situation is quite tenuous.  Big picture, she is also likely another 7 to 10 days away from count recovery and during this time we anticipate some degree of neutropenia, continue transfusion support for red blood cells and critical transfusion support from platelets.  She is having a mild response to platelet transfusions but again I think there is a fair amount of consumption ongoing leading to heavy heavy platelet requirements.  The only thing we could do from a platelet standpoint is started continuous platelet drip which is basically 4 units delivered every 6 hours to slowly or 6 units every 4 hours but again I think the big picture is 1 of a serious decline that started last evening overnight this morning.    INTERIM HISTORY: Significant respiratory decompensation overnight.  Yvette was quite agitated during our visit and needing to get to the commode.  She was demanding to be let you lifted using a Srinivasan lift to the chair.  No new hemoptysis.  She is volume overloaded.  Worsening kidney function.  Worsening hypoxia.       MEDICATIONS:  Current Facility-Administered Medications   Medication     0.9% sodium chloride BOLUS     0.9% sodium chloride BOLUS     0.9% sodium chloride BOLUS     0.9% sodium chloride BOLUS     acetaminophen (TYLENOL) tablet 650 mg    Or     acetaminophen (TYLENOL) Suppository 650 mg     acetaminophen (TYLENOL) tablet 650 mg     albuterol (PROVENTIL) neb solution 2.5 mg     atropine 1 % ophthalmic solution 2 drop     benzonatate (TESSALON) capsule 100 mg     carboxymethylcellulose PF (REFRESH PLUS) 0.5 % ophthalmic solution 1-2 drop     glycopyrrolate (ROBINUL) injection 0.2 mg     guaiFENesin (ROBITUSSIN) 20 mg/mL solution 10 mL      "heparin 100 UNIT/ML injection 5-10 mL     heparin lock flush 10 UNIT/ML injection 5-10 mL     heparin lock flush 10 UNIT/ML injection 5-10 mL     Lidocaine (LIDOCARE) 4 % Patch 1 patch     lidocaine (LMX4) cream     lidocaine 1 % 0.1-1 mL     lidocaine patch in PLACE     LORazepam (ATIVAN) injection 1 mg    Or     LORazepam (ATIVAN) tablet 1 mg     melatonin tablet 1 mg     morphine (PF) injection 1-2 mg     morphine solution 5-10 mg    Or     morphine sulfate (ROXANOL) 20 mg/mL (HIGH CONC) soln 5-10 mg     naloxone (NARCAN) injection 0.1 mg     naloxone (NARCAN) injection 0.2 mg     ondansetron (ZOFRAN-ODT) ODT tab 4 mg    Or     ondansetron (ZOFRAN) injection 4 mg     Patient may continue current oral medications     prochlorperazine (COMPAZINE) injection 5 mg    Or     prochlorperazine (COMPAZINE) tablet 5 mg    Or     prochlorperazine (COMPAZINE) suppository 12.5 mg     sodium chloride (PF) 0.9% PF flush 10-20 mL     sodium chloride (PF) 0.9% PF flush 10-20 mL     sodium chloride (PF) 0.9% PF flush 10-20 mL     sodium chloride (PF) 0.9% PF flush 3 mL     sodium chloride (PF) 0.9% PF flush 3 mL       ALLERGIES:  Allergies   Allergen Reactions     No Known Allergies        PHYSICAL EXAM:  Vital signs:  Temp: 99.8  F (37.7  C) Temp src: Axillary BP: 107/54 Pulse: (!) 138   Resp: 28 SpO2: (!) 63 % O2 Device: BiPAP/CPAP Oxygen Delivery: 4 LPM Height: 162.6 cm (5' 4\") Weight: 91.6 kg (202 lb)  Estimated body mass index is 34.67 kg/m  as calculated from the following:    Height as of this encounter: 1.626 m (5' 4\").    Weight as of this encounter: 91.6 kg (202 lb).    GENERAL/CONSTITUTIONAL: Acute respiratory distress.  RESP: Wheezing R>L; increased WOB; FM/NC in place.  CV: Tachycardic.      LABS:  CBC RESULTS:   Recent Labs   Lab Test 10/29/21  0655   WBC 16.6*   RBC 2.41*   HGB 6.8*   HCT 20.9*   MCV 87   MCH 28.2   MCHC 32.5   RDW 17.5*   PLT 2*       Recent Labs   Lab Test 10/29/21  0655 10/28/21  0557    " 136   POTASSIUM 4.3 4.6   CHLORIDE 112* 107   CO2 19* 20   ANIONGAP 9 9   * 127*   BUN 61* 73*   CR 1.69* 2.53*   RUSTY 8.6 8.4*     Lalo Martínez MD.

## 2021-10-31 NOTE — PLAN OF CARE
Pt A/Ox4. Currently on 3L NC, desats quickly with exertion. Tachycardic, BP soft. TELE ST. Pt c/o 10/10 chest pain this am, decreased with scheduled tylenol/PO dilaudid/IV dilaudid. Platelets 1 this am, 2 units infused. Low UOP, nephrology aware and ordered 20 mg IV lasix. Pt bladder scanned for 103 mL. MD paged this afternoon as pt's work of breathing increased with IV abx, more wheezing, possibly fluid overload. Add'l dose 20 mg IV lasix given and duonebs ordered, temporarily paused IV abx. Pt back to baseline but still unable to move from sitting position without struggling to breathe. PO ativan given x1 to try and help with anxiety and WOB. IV abx continued at slower rate. May need add'l respiratory assessment this evening if unable to maintain, Bipap mentioned to MD but not a good candidate given lung status. Pt needs discussion with oncology/social work/family on Monday regarding goals of care as she will likely need daily transfusions and will continue to fluid overload.

## 2021-10-31 NOTE — PROVIDER NOTIFICATION
MD Notification    Notified Person: MD    Notified Person Name: nanci Tracey    Notification Date/Time: 0705 10-31-21    Notification Interaction: text page    Purpose of Notification: Patient had 3 loose stools in the last 5 hours. Would you recommend to r/o c-diff?    Orders Received:    Comments:

## 2021-10-31 NOTE — PROGRESS NOTES
RT called to bedside to assess pt on BiPAP. Bedside RN tried taking pt off of BiPAP due to pt on comfort cares, but pt had an increased WOB and looked restless off of BiPAP. Settings decreased from BiPAP 10/5 35% to BiPAP 8/4 21% at 1729. Once pt was given more meds per bedside RN, settings were then changed to CPAP 5 21% at 1823. Pt appears to be comfortable on these settings. Plan to try to trial pt off of CPAP again then evening. Mepilex and opti foam in place. Skin intact. Alarm volume set at 10.  Will cont to monitor.  10/31/2021  Leyla Salmon, RT

## 2021-10-31 NOTE — PROGRESS NOTES
[facility]    Hospitalist Progress Note      Assessment & Plan   Yvette Gastelum is a 71 year old female admitted on 10/27/2021  with hemoptysis    Active hemoptysisConcerned that she is bleeding from the original site of cancer noted in the right lower lobe.  On initial diagnosis in 2018 she had hemoptysis and had malignancy infiltrating the pulmonary vessels.  CT chest 10/27/2021 demonstrates a right lower lobe infiltrate, concerning for active bleed.  -Consult pulmonary; do not feel patient is a candidate for bronchoscopy, consider IR if needed for embolization however concern regarding thrombocytopenia.  - mg 3 times daily x5 days, may discontinue early if hemoptysis resolves or per pulmonary recommendations  -Persistent dyspnea, supplemental O2 requirements serially increased, serial increase in BNP 5.2K given multiple PRBC, platelet transfusion and IVF, requiring additional transfusion  hold IVF.  -Admission leukocytosis 20.6, CXR with RLL infiltrate, procalcitonin 4.71, persistent cough,, started on empiric IV antibiotics for HCAP per HemeOnc recommendations including Zosyn and Vanco.   -. Swab for MRSA +; continue vanco; contact isolation    -10/31/2021 multiple calls per nursing throughout the day to see this patient for episodes of acute respiratory distress with hypoxia and acute renal failure.  Event in the morning, at that time signs of pulmonary vascular congestion given IV furosemide, neb treatment and at that time discussed with patient regarding her wishes regarding resuscitation and CODE STATUS.  It was clear that patient wished no artificial means of resuscitation including CPR and intubation.  Honoring patient's wishes, changed CODE STATUS to DNR/DNI.    Later in the morning recurrent hypoxia, she had received platelet transfusion 1 of 2 for severe recurrent thrombocytopenia with platelet count of 1000.  RRT called.  Signs of fluid overload, given IV furosemide,  neb treatment, MS, started patient with some reluctance on BiPAP.    Later in the day clinical situation deteriorated with persistent hypoxia, acute renal failure with no urine output on start of budesonide infusion.  Discussed with Nephrology.  Further discussion with Oncology who felt clinical situation was consistent with pulmonary alveolar hemorrhage due to severe thrombocytopenia and was started on Solu-Medrol.  Patient's condition unresponsive.  Discussed with Patient and her 3 sons regarding current status and progressive decline with aggressive treatment during her hospitalization including transfusions, TXA, Abx and IV Solu-Medrol.  Patient wished comfort care.  Discussed with her 3 sons who honored patient's wishes.  Changed to comfort care 10/31/2021.  Patient and sons are aware with comfort care restorative medications, further evaluation including vital signs, further diagnostic testings are discontinued.  Discontinue BiPAP as patient wishes, O2 per NC versus facemask for patient comfort.    Severe thrombocytopenia    Due to MDS  -Patient transitioned to comfort care 10/31/2021.  As part of comfort care restorative medications, further diagnostic testing discontinued.  Measures for patient comfort.    Anemia, severe, symptomatic  Due to MDS.  -Patient transitioned to comfort care 10/31/2021.  As part of comfort care restorative medications, further diagnostic testing discontinued.  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  Is currently transfusion dependent.  Recently started on Vidaza.  -Patient transitioned to comfort care 10/31/2021.  As part of comfort care restorative medications, further diagnostic testing discontinued.  Measures for patient comfort     Acute renal failure.  -Patient transitioned to comfort care 10/31/2021.  As part of comfort care restorative medications, further diagnostic testing discontinued.  Measures for  patient comfort     HLD  -Patient transitioned to comfort care 10/31/2021.  As part of comfort care restorative medications, further diagnostic testing discontinued.  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  As part of comfort care restorative medications, further diagnostic testing discontinued.  Measures for patient comfort     Malpositioned port-a-cath  -Patient transitioned to comfort care 10/31/2021.  As part of comfort care restorative medications, further diagnostic testing discontinued.  Measures for patient comfort         DVT Prophylaxis: Pneumatic Compression Devices  Code Status: No CPR- Do NOT Intubate  Expected discharge: End-of-life, on comfort care.  [place] eminent passing, to remain in hospital for end-of-life cares..    Critical Care time:  45 mins total as outlined above 10/31/21 for multiple encounters for acute respiratory failure with hypoxia and acute renal failure    Sorin Tracey MD  Text Page (7am - 6pm, M-F)    Interval History   Throughout the day episodes of acute dyspnea and air hunger, patient became increasingly agitated.  Placed on BiPAP with increasing patient agitation.  Later in the day after 3 sons arrived at bedside and her discussion with them of her wishes patient was more calm of their knowledge of her wishes for comfort care.    -Data reviewed today: I reviewed all new labs and imaging results over the last 24 hours.      Physical Exam   Temp: 99.8  F (37.7  C) Temp src: Axillary BP: 107/54 Pulse: (!) 138   Resp: 28 SpO2: (!) 63 % O2 Device: BiPAP/CPAP Oxygen Delivery: 4 LPM  Vitals:    10/27/21 1433   Weight: 91.6 kg (202 lb)     Vital Signs with Ranges  Temp:  [97.6  F (36.4  C)-99.8  F (37.7  C)] 99.8  F (37.7  C)  Pulse:  [116-138] 138  Resp:  [19-42] 28  BP: ()/(54-86) 107/54  FiO2 (%):  [40 %] 40 %  SpO2:  [63 %-99 %] 63 %  I/O last 3 completed shifts:  In: 574  [P.O.:240]  Out: 250 [Urine:250]    General/Constitutional:   NAD secondary to respiratory distress and air hunger.  HEENT/Head Exam:  atraumatic  Eyes:  PERRL, no conjunctivits  Mouth/Oral Pharynx:  Buccal mucosa WNL  Chest/Respiratory: Air exchange all lung fields, audible wheeze.  O2 initially per NC, transition to BiPAP.    Cardiovascular: Tachycardic, no murmur appreciated.  LE 3/4, symmetrical.  Mottling.    Gastrointestinal/Abdomen:  soft, nontender  Neuro. was all 4 extremities.  Psych initially agitated, then more calm, see above.      Medications     - MEDICATION INSTRUCTIONS -         acetaminophen  650 mg Oral TID     heparin  5-10 mL Intracatheter Q28 Days     heparin lock flush  5-10 mL Intracatheter Q24H     lidocaine  1 patch Transdermal Q24H     lidocaine   Transdermal Q8H     sodium chloride (PF)  10-20 mL Intracatheter Q28 Days     sodium chloride (PF)  3 mL Intracatheter Q8H       Data   Recent Labs   Lab 10/31/21  1243 10/31/21  1058 10/31/21  0645 10/30/21  1635 10/30/21  0621 10/30/21  0621 10/30/21  0221 10/29/21  2127 10/29/21  0655 10/28/21  0557 10/28/21  0108 10/27/21  1508   WBC 31.4*  --  12.2* 12.2*   < > 12.6*  --   --    < > 21.8*  --   --    HGB 5.5*  --  9.5* 7.0*   < > 7.6*  --  7.3*   < > 4.3*  --   --    MCV 92  --  88 90   < > 89  --   --    < > 87  --   --    PLT 18*  --  1* 7*   < > 1*  --   --    < > 40*   < >  --    INR  --   --   --   --   --   --   --   --   --  1.46*  --  1.38*     --  135  --   --  137  --   --    < > 136  --   --    POTASSIUM 4.4  --  4.1  --   --  4.3  --   --    < > 4.6  --   --    CHLORIDE 104  --  105  --   --  108  --   --    < > 107  --   --    CO2 18*  --  21  --   --  21  --   --    < > 20  --   --    BUN 80*  --  77*  --   --  63*  --   --    < > 73*  --   --    CR 2.23*  --  2.09*  --   --  1.73*  --   --    < > 2.53*  --   --    ANIONGAP 13  --  9  --   --  8  --   --    < > 9  --   --    RUSTY 8.7  --  8.9  --   --  8.7  --   --    < >  8.4*  --   --    * 143* 160*  --    < > 131*  --   --    < > 127*  --   --    TROPONIN  --   --   --   --   --  <0.015 <0.015 <0.015  --   --   --   --     < > = values in this interval not displayed.

## 2021-10-31 NOTE — CONSULTS
[facility]    Infectious Disease Consultation     Date of Admission:  10/27/2021  Date of Consult (When I saw the patient): 10/31/21    Assessment & Plan   Yvette Gastelum is a 71 year old female who was admitted on 10/27/2021.     Impression:  1. 71 y.o female with small cell lung cancer.   2. Transfusion dependent MDS  3. Presented with hemoptysis.   4. CT with dense RLL opacity.   5. On zosyn + vanco   6. MRSA colonized.   7. TAMARA with creat of 2    Recommendations:   I think presentation that of SCLC with hemorrhage and possible post obstructive pneumonia.   Continue on zosyn.   MRSA positive from nares does not prove MRSA pneumonia but possible, has kidney injury on vancomycin with is nephrotoxic will recommend holding, check level   linezolid an alternatives drug but can cause BM suppression with prolonged use, not clear if she needs MRSA coverage, her platelet count in 1.     Arvind Moon MD    Reason for Consult   Reason for consult: I was asked to evaluate this patient for pulmonary hemorrhage .    Primary Care Physician   Arie House MD    Chief Complaint   Hemoptysis     History is obtained from the patient and medical records    History of Present Illness   Yvette Gastelum is a 71 year old female with  small cell lung cancer in remission, transfusion dependent MDS who presented to the hospital on 10/27/21 with 1 day of timur hemoptysis.  CT chest with inc dense infiltrate RLL, likely the source of the hemoptysis    Past Medical History   I have reviewed this patient's medical history and updated it with pertinent information if needed.   Past Medical History:   Diagnosis Date     Cancer (H)     Lung cancer       Hypertension      Kidney stones      Obese      Recurrent UTI      S/P CL-BSO      Sepsis (H)     secondary to uti        Past Surgical History   I have reviewed this patient's surgical history and updated it with pertinent information if needed.  Past  Surgical History:   Procedure Laterality Date     BONE MARROW BIOPSY, BONE SPECIMEN, NEEDLE/TROCAR N/A 2/10/2021    Procedure: BONE MARROW BIOPSY;  Surgeon: Earlene Garcia MD;  Location:  GI     BONE MARROW BIOPSY, BONE SPECIMEN, NEEDLE/TROCAR N/A 2021    Procedure: BIOPSY, BONE MARROW;  Surgeon: Felix Elizabeth MD;  Location:  GI     COLONOSCOPY       COLONOSCOPY N/A 2/10/2016    Procedure: COMBINED COLONOSCOPY, SINGLE OR MULTIPLE BIOPSY/POLYPECTOMY BY BIOPSY;  Surgeon: Antonia Jiménez MD;  Location:  GI     CRANIOTOMY Right 2021    Procedure: CRANIOTOMY;  Surgeon: Puma Dominguez MD;  Location:  OR     CRANIOTOMY, EVACUATE HEMATOMA SUBDURAL, COMBINED Right 2021    Procedure: Right frontal craniotomy for evacuation of recurrent subdural hematoma;  Surgeon: Puma Dominguez MD;  Location:  OR     EYE SURGERY      CATARACT EXTRACTION RIGHT & LEFT     GYN SURGERY      BILATERAL TUBAL LIGATION, CL, LAPAROSCOPIC LEFT SALPINGO-OOPHORECTOMY     HEAD & NECK SURGERY      WISDOM TEETH EXTRACTION     HYSTERECTOMY       INSERT PORT VASCULAR ACCESS N/A 2018    Procedure: INSERT PORT VASCULAR ACCESS;  POWER PORT PLACEMENT;  Surgeon: Todd Norris MD;  Location: [facility]     IR CHEST PORT PLACEMENT > 5 YRS OF AGE  10/11/2021     REMOVE PORT VASCULAR ACCESS N/A 2019    Procedure: REMOVAL, VASCULAR ACCESS PORT;  Surgeon: Todd Norris MD;  Location:  OR       Prior to Admission Medications   Prior to Admission Medications   Prescriptions Last Dose Informant Patient Reported? Taking?   Elastic Bandages & Supports (MEDICAL COMPRESSION SOCKS) MISC  Self No No   Si Package daily   LORazepam (ATIVAN) 0.5 MG tablet  Self No Yes   Sig: Take 1 tablet (0.5 mg) by mouth every 4 hours as needed (Anxiety, Nausea/Vomiting or Sleep)   acetaminophen (TYLENOL) 325 MG tablet  Self Yes Yes   Sig: Take 325-650 mg by mouth every 6 hours as needed  for pain    albuterol (PROVENTIL) (2.5 MG/3ML) 0.083% neb solution 10/26/2021 at Unknown time Self No Yes   Sig: Take 1 vial (2.5 mg) by nebulization 2 times daily   allopurinol (ZYLOPRIM) 300 MG tablet 10/26/2021 at Unknown time Self No Yes   Sig: Take 1 tablet (300 mg) by mouth daily   atorvastatin (LIPITOR) 10 MG tablet 10/26/2021 at Unknown time Self No Yes   Sig: Take 1 tablet (10 mg) by mouth daily   benzonatate (TESSALON) 100 MG capsule  Self Yes Yes   Sig: Take 1 capsule (100 mg) by mouth 3 times daily as needed for cough   loperamide (IMODIUM) 2 MG capsule  Self Yes Yes   Sig: Take 2 mg by mouth 4 times daily as needed for diarrhea    omeprazole (PRILOSEC) 20 MG DR capsule 10/26/2021 at Unknown time Self No Yes   Sig: Take 1 capsule (20 mg) by mouth daily   ondansetron (ZOFRAN) 8 MG tablet  Self No Yes   Sig: Take 1 tablet (8 mg) by mouth every 8 hours as needed (Nausea/Vomiting)   predniSONE (DELTASONE) 5 MG tablet 10/26/2021 at Unknown time Self No Yes   Sig: Take 40 mg daily for 5 day, then reduce by 5 mg daily until you are taking  5 mg daily.  Continue 5 mg daily.   prochlorperazine (COMPAZINE) 10 MG tablet  Self No Yes   Sig: Take 1 tablet (10 mg) by mouth every 6 hours as needed (Nausea/Vomiting)   sodium chloride (NEBUSAL) 3 % neb solution 10/26/2021 at Unknown time Self No Yes   Sig: Take 3 mLs by nebulization 2 times daily Use with albuterol inhaler      Facility-Administered Medications: None     Allergies   Allergies   Allergen Reactions     No Known Allergies        Immunization History   Immunization History   Administered Date(s) Administered     COVID-19,PF,Pfizer (12+ Yrs) 03/02/2021, 03/23/2021     Influenza (High Dose) 3 valent vaccine 09/21/2016, 10/11/2017, 10/01/2018, 09/02/2019     Influenza (IIV3) PF 10/15/2015     Influenza, Quad, High Dose, Pf, 65yr+ (Fluzone HD) 09/25/2020, 10/02/2021     Pneumo Conj 13-V (2010&after) 10/11/2017     Pneumococcal 23 valent 11/27/2015     TDAP  Vaccine (Adacel) 12/04/2015       Social History   I have reviewed this patient's social history and updated it with pertinent information if needed. Yvette Gastelum  reports that she quit smoking about 6 years ago. Her smoking use included cigarettes. She started smoking about 56 years ago. She has a 50.00 pack-year smoking history. She has never used smokeless tobacco. She reports that she does not drink alcohol and does not use drugs.    Family History   I have reviewed this patient's family history and updated it with pertinent information if needed.   Family History   Problem Relation Age of Onset     Aneurysm Father         Aorta     Hypertension Father      Cervical Cancer Mother      Lung Cancer Brother         non smoker       Review of Systems   The 10 point Review of Systems is negative other than noted in the HPI or here.     Physical Exam   Temp: 97.6  F (36.4  C) Temp src: Axillary BP: 122/73 Pulse: (!) 129   Resp: (!) 42 SpO2: 97 % O2 Device: Nasal cannula Oxygen Delivery: 4 LPM  Vital Signs with Ranges  Temp:  [97.6  F (36.4  C)-98.8  F (37.1  C)] 97.6  F (36.4  C)  Pulse:  [109-130] 129  Resp:  [18-42] 42  BP: ()/(54-86) 122/73  SpO2:  [91 %-99 %] 97 %  202 lbs 0 oz  Body mass index is 34.67 kg/m .    GENERAL APPEARANCE:  Lethargic, on bipap  EYES: Eyes grossly normal to inspection, PERRL and conjunctivae and sclerae normal  NECK: no adenopathy, no asymmetry, masses, or scars and thyroid normal to palpation  RESP: diminished bilateral   CV: regular rates and rhythm, normal S1 S2, no S3 or S4 and no murmur, click or rub  LYMPHATICS: normal ant/post cervical and supraclavicular nodes  ABDOMEN: soft, nontender, without hepatosplenomegaly or masses and bowel sounds normal  MS: anasarca   SKIN: petechial rash      Data   Lab Results   Component Value Date    WBC 12.2 (H) 10/31/2021    HGB 9.5 (L) 10/31/2021    HCT 29.7 (L) 10/31/2021    PLT 1 (LL) 10/31/2021     10/31/2021    POTASSIUM 4.1  10/31/2021    CHLORIDE 105 10/31/2021    CO2 21 10/31/2021    BUN 77 (H) 10/31/2021    CR 2.09 (H) 10/31/2021     (H) 10/31/2021    DD 2.1 (H) 11/25/2015    NTBNPI 2,927 (H) 10/30/2021    TROPONIN <0.015 10/30/2021    AST 25 10/09/2021    ALT 12 10/09/2021    ALKPHOS 70 10/09/2021    BILITOTAL 0.9 10/09/2021    INR 1.46 (H) 10/28/2021     No results for input(s): CULT in the last 168 hours.  Recent Labs   Lab Test 11/25/15  1749 11/25/15  1535 11/25/15  1530   CULT >100,000 colonies/mL Escherichia coli* No growth No growth

## 2021-10-31 NOTE — PLAN OF CARE
Alert and oriented x4 with intermittent confusion. Vital signs stable on 4-7L O2 via nasal cannula, desats with exertion. BP soft. Up with SBA earlier this shift, this AM required assist of 2, GB and walker.  Lungs sounds, wheezes expiratory, crackles, dyspnea on exertion. BS+. BM-. Voiding via purewick, low urine output. Mottling observed on skin, house NP notified verbally while on unit, no orders placed. Pain managed with PRN Dilaudid. Denies nausea. Tele Sinus tach.

## 2021-10-31 NOTE — PROGRESS NOTES
Nephrology Progress Note  10/31/2021         ASSESSMENT AND RECOMMENDATIONS:   1 myelodysplastic syndrome -status post cycle 1 chemo with Vidaza completed on on October 18, 2021  2 severe anemia and thrombocytopenia -attributed to recent treatment with Vidaza  3 hemoptysis secondary to #2  4 acute renal failure-Baseline creatinine 1-1.2.  3.7 on presentation secondary to prerenal TAMARA with severe anemia and poor oral intake.  Quickly improved with IV hydration and blood transfusion.  Urinalysis relatively bland with 1+ protein and a concentrated urine sample.  Nonoliguric.  -Noted concern for tumor lysis syndrome.  Patient remains on allopurinol.  Uric acid is slightly high.  Phosphorus is normal with normal calcium.  Unlikely to be profound TLS, especially in MDS.  Work-up intravascular hemolysis underway per hematology.  Given soft improvement in renal function with volume repletion, TAMARA unlikely to be driven byMAHA.  Urinalysis is fairly bland.    -Renal function was again.  Possibly ischemic tubular injury with significant hypotensive episodes with blood pressure in 70s.  Patient also received vancomycin and duration.  Concern for progressive volume overload contributing to her worsening respiratory status.    5 metabolic acidosis-secondary to renal failure.      6 acute respiratory failure-new pleural effusion on last chest x-ray.  Worse after platelet transfusion with diffuse wheezing bilaterally.  Volume overload versus transfusion reaction.  Volume overload definitely seems to be playing a part.      Recommendation-  -Bumex 2 mg IV.  Start Bumex drip at 0.5 mg an hour.  Will hold off albumin given significant volume overload, multiple transfusions as well as serum albumin of 3.5.  -Agree with goals of care discussion.  -Further transfusion per primary team/hematology  -Daily renal function panel.  Strict I's/O      Clyde Carter MD  [facility] - Nephrology   [phone]      Interval History :    Seen / examined.   Overnight events noted.   She has had major decompensation in her clinical situation.    Her platelet remains low at 1000.   she received 1 unit transfusion this morning.  On her second unit, she started to develop respiratory distress.  RRT called.Her saturations were maintained but she had increased work of breathing with significant wheezing as well as concern for volume overload.  Subsequently placed on BiPAP which seems to help a bit.  She remains very tachycardic in sinus rhythm.  Temp of 99.8.  Edema is significantly worse.  Hemoglobin up to 9.5 ??  Got IV Lasix yesterday with transfusion without much response.  Creatinine up to 2.1 this morning.  Total urine output of 600 cc.    Review of Systems:   A 10 point review of systems was negative except as noted above.  Notably: poor appetite. no nausea or vomiting or diarrhea.  no confusion,  no fever or chills  Increased shortness of breath.    Physical Exam:   I/O last 3 completed shifts:  In: 840 [P.O.:480]  Out: 600 [Urine:600]    GENERAL APPEARANCE: Tired.  On BiPAP.  EYES:  no scleral icterus, pupils equal  HEENT-no lymphadenopathy, pallor plus  PULM: Diminished breath sounds at bases.  Bilateral diffuse wheezing  CV: regular rhythm, normal rate, no rub.   Tachycardic     -JVP -     -edema ++  GI: soft, non tender,   NEURO: mentation intact and speech normal, no asterixis   Skin-petechiae in lower extremity      Labs:   All labs reviewed by me  Electrolytes/Renal -   Recent Labs   Lab Test 10/31/21  1243 10/31/21  1058 10/31/21  0645 10/30/21  0621 10/30/21  0621 10/29/21  0655 10/29/21  0655 03/26/19  0921 03/11/19  1152 12/16/18  0918 08/23/18  0647 08/22/18  0715     --  135  --  137   < > 140   < > 139   < >  --  142   POTASSIUM 4.4  --  4.1  --  4.3   < > 4.3   < > 3.4   < >  --  3.6   CHLORIDE 104  --  105  --  108   < > 112*   < > 104   < >  --  108   CO2 18*  --  21  --  21   < > 19*   < > 26   < >  --  26   BUN 80*  --   77*  --  63*   < > 61*   < > 26   < >  --  15   CR 2.23*  --  2.09*  --  1.73*   < > 1.69*   < > 1.52*   < >  --  0.96   * 143* 160*   < > 131*   < > 133*   < > 107*   < >  --  104*   RUSTY 8.7  --  8.9  --  8.7   < > 8.6   < > 9.2   < >  --  8.5   MAG  --   --   --   --   --   --   --   --  1.7  --  2.3 2.0   PHOS  --   --   --   --   --   --  4.0  --   --   --   --   --     < > = values in this interval not displayed.       CBC -   Recent Labs   Lab Test 10/31/21  0645 10/30/21  1635 10/30/21  0621   WBC 12.2* 12.2* 12.6*   HGB 9.5* 7.0* 7.6*   PLT 1* 7* 1*       LFTs -   Recent Labs   Lab Test 10/09/21  0830 07/05/21  1549 03/17/21  1553   ALKPHOS 70 62 83   BILITOTAL 0.9 1.7* 1.0   ALT 12 35 24   AST 25 61* 49*   PROTTOTAL 7.9 6.8 8.3   ALBUMIN 3.5 3.4 4.3       Iron Panel -   Recent Labs   Lab Test 08/05/21  1056 07/10/21  0548 01/20/21  0926   IRON 38 34* 133   IRONSAT 14* 14* 44   EDILSON  --   --  482*         Current Medications:    acetaminophen  650 mg Oral TID     allopurinol  200 mg Oral Daily     heparin  5-10 mL Intracatheter Q28 Days     heparin lock flush  5-10 mL Intracatheter Q24H     ipratropium - albuterol 0.5 mg/2.5 mg/3 mL  3 mL Nebulization 4x daily     lidocaine  1 patch Transdermal Q24H     lidocaine   Transdermal Q8H     linezolid  600 mg Oral Q12H ZACK     pantoprazole  40 mg Oral Daily     piperacillin-tazobactam  3.375 g Intravenous Q6H     predniSONE  5 mg Oral Daily     sodium chloride (PF)  10-20 mL Intracatheter Q28 Days     sodium chloride (PF)  3 mL Intracatheter Q8H     tranexamic acid  500 mg Nebulization Q8H       [Held by provider] lactated ringers Stopped (10/29/21 0903)     - MEDICATION INSTRUCTIONS -       - MEDICATION INSTRUCTIONS -       Clyde Carter MD

## 2021-10-31 NOTE — CODE/RAPID RESPONSE
[facility]    RRT Note  10/31/2021   Time Called: 10: 53 AM    RRT called for: respiratory distress     Assessment & Plan   Respiratory distress, multifactorial including progressive volume overload  RRT called for respiratory distress.  Upon my arrival heart rate 130, appears sinus, and respiratory rate in the 30s.  She is able to speak in full sentences however appeared to be tiring quickly.  She initially refused BiPAP however with a significant amount of encouragement and morphine for anxiolysis she was agreeable.  Heart rate settled down into the mid 120s, respiratory rate in the upper 20s.  After several minutes she ripped the BiPAP off, heart rate went to 1 , and she became very gray.  The flank when nurses were able to replace the BiPAP and at this time Ms. Gastelum is sitting in the chair on BiPAP and she is comfortable appearing.    I discussed goals of care with 2 of Ms. Gastelum sons.  We discussed the significant amount of distress and ventilatory support requiring BiPAP.  I shared with her sons if she refuses the BiPAP I believe she will die quickly today.  At this time she is DNR/DNI which is appropriate as with a platelet count of 1000 we are surely to cause her significant harm with any resuscitation.  Her son Luis M shares with me that she has had ongoing goals of care discussion with Dr. Luz over the past 1 year.  I shared with Ms. Gastelum signs at the next 24 to 48 hours are very important to determining whether Ms. Gastelum can survive this illness.  We discussed the limitations of her low platelet count including being unable to place a dialysis catheter if she would need dialysis.    ЕКАТЕРИНА Estrada, CNP  Hospitalist Service, House Officer  [facility]     Text Page  Pager: [phone]

## 2021-11-01 NOTE — PLAN OF CARE
Pt A/O this am, on 4 L NC and in respiratory distress at beginning of shift. Inspiratory and expiratory wheezes in all lobes, crackles in bases. Attempted to have pt stand to get back in bed and unable, moved to lift room and lifted from chair into bed. Platelets 1 this am, orders to transfuse 2 units. Pt received 1.5 units and became increasingly agitated, struggling to breath, tachycardic and tachypneic. RRT called for signs of fluid overload, pt given 2 mg IV bumex and 20 mg IV lasix and platelets discontinued. Pt placed on Bipap, very resistant at first and pulling off mask, attempting to hit staff. Pt eventually able to calm down and tolerating Bipap. MD discussed goals of care with pt's 3 sons and she made her wishes clear which were to not continue treatment of any kind. Stated that she was ready to die. Pt was transitioned to comfort cares per wishes. Given PRN IV morphine and ativan for restlessness/air hunger. Pt eventually became unresponsive and eventually passed peacefully @ 1855 w/ 3 sons at bedside. House MD paged. Donation line called. Handoff given to Rae KELLER to complete required documentation.

## 2021-11-01 NOTE — PROGRESS NOTES
House [facility] Death Pronouncement    Called by nursing staff to pronounce Yvette Gastelum dead.    Physical Exam: Unresponsive to noxious stimuli, Spontaneous respirations absent, Breath sounds absent, Carotid pulse absent, Heart sounds absent, Pupillary light reflex absent and Corneal blink reflex absent    Patient was pronounced dead at 6:55 PM, 2021.    No data filed        Active Problems:    Anemia    Thrombocytopenia (H)    Myelodysplastic syndrome (H)       Infectious disease present?: YES    Communicable disease present? (examples: HIV, chicken pox, TB, Ebola, CJD) :  NO    Multi-drug resistant organism present? (example: MRSA): YES    Please consider an autopsy if any of the following exist:  NO Unexpected or unexplained death during or following any dental, medical, or surgical diagnostic treatment procedures.   NO Death of mother at or up to seven days after delivery.     NO All  and pediatric deaths.     NO Death where the cause is sufficiently obscure to delay completion of the death certificate.   NO Deaths in which autopsy would confirm a suspected illness/condition that would affect surviving family members or recipients of transplanted organs.     The following deaths must be reported to the 's Office:  NO A death that may be due entirely or in part to any factors other than natural disease (recent surgery, recent trauma, suspected abuse/neglect).   NO A death that may be an accident, suicide, or homicide.     NO Any sudden, unexpected death in which there is no prior history of significant heart disease or any other condition associated with sudden death.   NO A death under suspicious, unusual, or unexpected circumstances.    NO Any death which is apparently due to natural causes but in which the  does not have a personal physician familiar with the patient s medical history, social, or environmental situation or the circumstances of the terminal event.   NO Any  death apparently due to Sudden Infant Death Syndrome.     NO Deaths that occur during, in association with, or as consequences of a diagnostic, therapeutic, or anesthetic procedure.   NO Any death in which a fracture of a major bone has occurred within the past (6) six months.   NO A death of persons note seen by their physician within 120 days of demise.     NO Any death in which the  was an inmate of a public institution or was in the custody of Law Enforcement personnel.   NO  All unexpected deaths of children   NO Solid organ donors   NO Unidentified bodies   Pending Deaths of persons whose bodies are to be cremated or otherwise disposed of so that the bodies will later be unavailable for examination;   NO Deaths unattended by a physician outside of a licensed healthcare facility or licensed residential hospice program   NO Deaths occurring within 24 hours of arrival to a health care facility if death is unexpected.    NO Deaths associated with the decedent s employment.   NO Deaths attributed to acts of terrorism.   NO Any death in which there is uncertainty as to whether it is a medical examiner s care should be discussed with the medical investigator.      Death Certificate to be directed to Dr. Sorin Tracey, hospitalist    Body disposition: Body released to the morgue.    ЕКАТЕРИНА Zavaleta, CNP  House [facility]

## 2021-11-02 LAB
ATRIAL RATE - MUSE: 107 BPM
DIASTOLIC BLOOD PRESSURE - MUSE: NORMAL MMHG
INTERPRETATION ECG - MUSE: NORMAL
P AXIS - MUSE: 47 DEGREES
PR INTERVAL - MUSE: 150 MS
QRS DURATION - MUSE: 92 MS
QT - MUSE: 332 MS
QTC - MUSE: 443 MS
R AXIS - MUSE: -1 DEGREES
SYSTOLIC BLOOD PRESSURE - MUSE: NORMAL MMHG
T AXIS - MUSE: 61 DEGREES
VENTRICULAR RATE- MUSE: 107 BPM

## 2021-11-02 NOTE — PROGRESS NOTES
Chart reviewed; patient identified on discharge report; it appears patient passed away on 10/31/21.  Will update care team, no further outreach.    Ele Soelr RN  Clinical Product Navigator

## 2021-11-08 NOTE — DISCHARGE SUMMARY
[facility]    Discharge Summary  Hospitalist    Date of Admission:  10/27/2021  Date of Discharge:  10/31/2021  9:15 PM  Due to Death  Discharging Provider: Sorin Tracey MD  Date of Service (when I saw the patient): 10/31/21          Hospital Course   Yvette Gastelum was admitted on 10/27/2021 presenting with hemoptysis    Please see Progress Note on date of death 10/31/2021 for details and Death Pronouncement by [facility]IS [facility] on same date 10/31/2021    After multiple Rapid Responses on 10/31/2021 for acute and progressive hypoxia discussion with Patient and her 3 sons indicated her wishes to transition to comfort care.  All restorative medications and diagnostic testing were discontinued.  BiPap discontinued.  Patient passed away peacefully the evening of 10/31/2021 on comfort care with her 3 sons present.    MD Yonis  Internal Medicine  [facility] Service

## 2021-11-14 NOTE — PROGRESS NOTES
"DISCHARGE REPORT    Yvette did not return for further treatment after the last visit on 5/17/21.   Current status is unknown.  Please see information below for last known relevant information.  Patient seen for 3 visits.    SUBJECTIVE  Subjective changes noted by patient:  \"when I exerise it's tolerable, about 5/10, If I dont its 10/10\".  Has to move apartments due to renovation end of this month.  .  Current pain level is  (5-10/10).     Previous pain level was  10/10.   Changes in function: (See Goal flowsheet attached for changes in current functional level)  Adverse reaction to treatment or activity: None    OBJECTIVE  Changes noted in objective findings:   no ext lag with 10 SLR bilat. Mild L knee pain with knee bend ex     ASSESSMENT/PLAN  Diagnosis: B knee OA   Updated problem list and treatment plan:  Patient will continue to utilize HEP for any remaining deficits.   STG/LTGs have been met or progress has been made towards goals:  Please see goal flowsheet for most current information  Assessment of Progress: current status is unknown.    Last current status: Pt is progressing as expected   Self Management Plans:  HEP  I have re-evaluated this patient and find that the nature, scope, duration and intensity of the therapy is appropriate for the medical condition of the patient.  Yvette continues to require the following intervention to meet STG and LTG's:  HEP.    Recommendations:  Discharge with current home program.  Patient to follow up with MD as needed.    Please refer to the daily flowsheet for treatment today, total treatment time and time spent performing 1:1 timed codes.  "

## 2024-05-02 NOTE — PROGRESS NOTES
Venipuncture Blood Specimen Collection  Venipuncture performed in left arm  by Lupe White with good hemostasis. Patient tolerated the procedure well without complications.   05/02/24   Lupe White   [facility]    Hospitalist Progress Note    Assessment & Plan   70 year old female who was admitted on 7/13/2021 with large right SDH which was symptomatic:    Impression:   Principal Problem:    Large Right SDH w Shift -- S/P Drain 7/13/21      Anemia    Thrombocytopenia (H)     Antineoplastic chemotherapy induced pancytopenia (H)     Small cell lung cancer -- S/P Chemo in 2018   -- CBC daily, platelets PRN    Active Problems:    Benign essential hypertension   -- on Nicardipine drip      CKD (chronic kidney disease) stage 3, GFR 30-59 ml/min    Plan:  Continue postop care    DVT Prophylaxis: Pneumatic Compression Devices  Code Status: Full Code    Disposition: Expected discharge in 3-4 days, may needed TCU.     Justin Root  Pager [phone]  Cell Phone [phone]  Text Page (7am to 6pm)  (35 min total)    Interval History   Feels OK, speech back to normal and weakness resolved.     Physical Exam   Temp: 98  F (36.7  C) Temp src: Oral BP: 131/83 Pulse: 89   Resp: 29 SpO2: 99 % O2 Device: Nasal cannula Oxygen Delivery: 4 LPM  Vitals:    07/13/21 1449 07/13/21 1730 07/14/21 0600   Weight: 100.6 kg (221 lb 12.8 oz) 96.1 kg (211 lb 13.8 oz) 97.5 kg (214 lb 15.2 oz)     Vital Signs with Ranges  Temp:  [97.6  F (36.4  C)-99.2  F (37.3  C)] 98  F (36.7  C)  Pulse:  [] 89  Resp:  [9-103] 29  BP: ()/() 131/83  SpO2:  [91 %-100 %] 99 %  I/O last 3 completed shifts:  In: 3143 [P.O.:240; I.V.:2150]  Out: 1080 [Urine:1025; Drains:45; Blood:10]    # Pain Assessment:  Current Pain Score 7/14/2021   Patient currently in pain? yes   Pain score (0-10) -   Pain location -   Pain descriptors -   - Yvette is experiencing pain due to craniotomy (jam hole). Pain management was discussed and the plan was created in a collaborative fashion.  Yvette's response to the current recommendations: engaged  - Please see the plan for pain management as documented  above    Constitutional: Awake, alert, cooperative, no apparent distress  Respiratory: Clear to auscultation bilaterally, no crackles or wheezing  Cardiovascular: Regular rate and rhythm, normal S1 and S2, and no murmur noted  GI: Normal bowel sounds, soft, non-distended, non-tender  Extrem: No calf tenderness, no ankle edema  Neuro: Ox3, no focal motor or sensory deficits,  4+/5 bilaterally    Medications     niCARdipine       sodium chloride 75 mL/hr at 07/14/21 0000       ceFAZolin  1 g Intravenous Q8H     fluticasone  1 puff Inhalation Daily     levETIRAcetam  500 mg Oral BID     pantoprazole (PROTONIX) IV  40 mg Intravenous Daily with breakfast     sodium chloride (PF)  3 mL Intracatheter Q8H     tamsulosin  0.4 mg Oral Daily       Data   Recent Labs   Lab 07/14/21  0634 07/14/21  0314 07/14/21  0313 07/13/21  2316 07/13/21  1906 07/13/21  1746 07/13/21  1421 07/10/21  1210 07/10/21  0548 07/09/21  1159 07/09/21  0709   WBC 9.5  --  10.1  --   --   --  10.1  --  4.8  --  7.2   HGB 6.9*  --  7.0*  --   --   --  7.7*  --  7.4*  --  7.0*   MCV 80  --  80  --   --   --  78  --  79  --  77*   *  --  129*  129* 89* 98*  --  53*  --  91*   < > 47*   INR  --   --   --   --   --   --  1.09  --   --   --   --    NA  --   --   --   --   --   --  136 134 134  --  133   POTASSIUM  --   --   --   --   --   --  3.4  --  3.9  --  3.6   CHLORIDE  --   --   --   --   --   --  102  --  104  --  102   CO2  --   --   --   --   --   --  28  --  26  --  25   BUN  --   --   --   --   --   --  15  --  14  --  13   CR  --   --   --   --   --   --  0.94  --  0.84  --  0.80   ANIONGAP  --   --   --   --   --   --  6  --  4  --  6   RUSTY  --   --   --   --   --   --  9.5  --  8.8  --  8.5   GLC  --  138*  --   --   --  114* 120*  --  110*  --  119*   TROPONIN  --   --  0.018  --  0.032  --  0.032  --   --   --   --     < > = values in this interval not displayed.       Imaging:   Recent Results (from the past 24 hour(s))   CT  Head w/o Contrast   Result Value    Radiologist flags Increasing subdural hematoma with significant mass (AA)    Narrative    CT SCAN OF THE HEAD WITHOUT CONTRAST   7/13/2021 2:34 PM     HISTORY: Transient ischemic attack (TIA). Code Stroke. Slurred speech.    TECHNIQUE: Axial images of the head and coronal reformations without  IV contrast material. Radiation dose for this scan was reduced using  automated exposure control, adjustment of the mA and/or kV according  to patient size, or iterative reconstruction technique.    COMPARISON: 7/6/2021.    FINDINGS: A right-sided subdural hematoma is noted. There has been  previous craniotomy. The subdural fluid collection is increased in  size currently measuring up to 1.4 cm in thickness as compared to 0.8  cm in thickness. There is currently 1.7 cm of right-to-left midline  shift as compared to 0.9 cm. There is also increasing dilatation of  the left temporal horn, consistent with some uncal herniation in  addition to the subfalcine herniation. There is no evidence for  parenchymal hemorrhage. No evidence for any acute ischemic infarct.  Ventricles are small due to the mass effect. Paranasal sinuses and  mastoid air cells are clear. There is no evidence for fracture.      Impression    IMPRESSION: Increasing right hemispheric subdural hematoma with  increasing mass effect resulting in subfalcine and some uncal  herniation.     [Critical Result: Increasing subdural hematoma with significant mass  effect.]    Finding was identified on 7/13/2021 2:38 PM.     Dr. Magaña was contacted by me on 7/13/2021 2:48 PM and verbalized  understanding of the critical result.     NOLBERTO CHAN MD         SYSTEM ID:  [id number]   CTA Head Neck with Contrast    Narrative    CTA  HEAD AND NECK WITH CONTRAST 7/13/2021 2:34 PM     HISTORY: Transient ischemic attack (TIA); Code stroke. Slurred speech.    TECHNIQUE: Axial images were obtained through the head and neck with  intravenous contrast. 70  mL of Isovue-370 was given. Multiplanar  reconstructions were performed. 3-D reconstructions off a workstation  for CT angiography were also acquired. Carotid stenoses were evaluated  by comparing the caliber of the proximal internal carotid artery to  the caliber of the distal internal carotid artery. Radiation dose for  this scan was reduced using automated exposure control, adjustment of  the mA and/or kV according to patient size, or iterative  reconstruction technique.    FINDINGS:    Brachiocephalic vessels: Mild calcific plaque. No stenosis.    Right carotid system: Minimal calcific plaque at the bifurcation. No  stenosis.    Left carotid system: Minimal calcific plaque at the bifurcation. No  stenosis.    Right vertebral artery: Normal.    Left vertebral artery: Normal.    [place]: There is mild calcific plaque in the carotid siphon  regions bilaterally without stenosis. The basilar artery is patent.  The proximal anterior, middle, and posterior cerebral arteries are  patent. There is significant mass effect from a large right-sided  subdural hematoma.      Impression    IMPRESSION:  1. Mild atherosclerotic disease involving several vessels without  stenosis.  2. No evidence for significant stenosis, thromboembolism, large vessel  occlusion, dissection, or aneurysm.     CT Head Perfusion w Contrast    Narrative    CT HEAD PERFUSION WITH CONTRAST July 13, 2021 2:53 PM     HISTORY: Transient ischemic attack (TIA). Code Stroke. Slurred speech.    TECHNIQUE: Axial images were obtained through the brain with  intravenous contrast. 50 mL of Isovue 350 was given. Exam was  performed for CT brain perfusion imaging. Radiation dose for this scan  was reduced using automated exposure control, adjustment of the mA  and/or kV according to patient size, or iterative reconstruction  technique.    FINDINGS: Cerebral blood flow, cerebral blood volume, and mean transit  time maps are relatively symmetric except for  the mass effect caused  by right-sided subdural hematoma. There is no evidence for ischemia or  infarct.      Impression    IMPRESSION: Negative CT brain perfusion imaging.    NOLBERTO CHAN MD         SYSTEM ID:  [id number]   CT Head w/o Contrast    Narrative    EXAM: CT HEAD WITHOUT CONTRAST  LOCATION: [facility]  DATE/TIME: 07/14/2021, 5:43 AM    INDICATION: Subdural hemorrhage.  COMPARISON: 07/13/2021.  TECHNIQUE: Routine CT Head without IV contrast. Multiplanar reformats. Dose reduction techniques were used.    FINDINGS:  INTRACRANIAL CONTENTS: Interval placement of a right-sided subdural drain. The right hemispheric subdural hemorrhage measures approximately 8 mm in maximal thickness. Mass effect on the underlying parenchyma has decreased, with right to left midline   shift measuring 9 mm, previously 17 mm. Decreased effacement of the right lateral ventricle. Decreased dilatation of the left temporal horn. Decreased right uncal herniation. No CT evidence of acute infarct. Moderate presumed chronic small vessel   ischemic changes. Mild generalized parenchymal volume loss.     VISUALIZED ORBITS/SINUSES/MASTOIDS: Prior bilateral cataract surgery. Visualized portions of the orbits are otherwise unremarkable. No paranasal sinus mucosal disease. No middle ear or mastoid effusion.    BONES/SOFT TISSUES: Right parietal craniotomy and jam hole.      Impression    IMPRESSION:  1.  Interval placement of a right-sided subdural drain, with decreased volume of the right hemispheric subdural hemorrhage, now measuring approximately 8 mm in maximal thickness.  2.  Decreased associated mass effect with 9 mm right to left midline shift and decreased right uncal herniation.  3.  Decreased dilatation of the left temporal horn.

## (undated) DEVICE — PREP DURAPREP 06ML APL 8635

## (undated) DEVICE — DRAIN JACKSON PRATT RESERVOIR 100ML SU130-1305

## (undated) DEVICE — DRSG GAUZE 4X4" 2187

## (undated) DEVICE — NDL 19GA 1.5"

## (undated) DEVICE — SOL NACL 0.9% IRRIG 1000ML BOTTLE 2F7124

## (undated) DEVICE — LINEN GOWN OVERSIZE 5408

## (undated) DEVICE — SPONGE COTTONOID 1X3" 80-1408

## (undated) DEVICE — SU VICRYL 4-0 PS-2 18" UND J496H

## (undated) DEVICE — BLADE CLIPPER 4412A

## (undated) DEVICE — ESU GROUND PAD UNIVERSAL W/O CORD

## (undated) DEVICE — GLOVE PROTEXIS W/NEU-THERA 8.0  2D73TE80

## (undated) DEVICE — PEN MARKING SKIN

## (undated) DEVICE — SOL WATER IRRIG 1000ML BOTTLE 2F7114

## (undated) DEVICE — SU VICRYL 2-0 CT-2 CR 8X18" J726D

## (undated) DEVICE — SU NUROLON 4-0 TF CR 8X18" C584D

## (undated) DEVICE — SYR 10ML SLIP TIP W/O NDL

## (undated) DEVICE — GEL ULTRASOUND AQUASONIC 20GM 01-01

## (undated) DEVICE — DECANTER VIAL 2006S

## (undated) DEVICE — SU ETHILON 3-0 FS-1 18" 669H

## (undated) DEVICE — PACK NEURO SNE15SNFSA

## (undated) DEVICE — SPONGE COTTON BALL 1/4" W/STRING 30-00

## (undated) DEVICE — DRAIN JACKSON PRATT CHANNEL 10FR RND SIL W/TROCAR JP-2227

## (undated) DEVICE — DRSG TELFA 3X8" 1238

## (undated) DEVICE — DRAPE POUCH INSTRUMENT 1018

## (undated) DEVICE — MANIFOLD NEPTUNE 4 PORT 700-20

## (undated) DEVICE — SU VICRYL 3-0 SH 27" J316H

## (undated) DEVICE — RX SURGIFLO HEMOSTATIC MATRIX 10ML 199102S

## (undated) DEVICE — RETR ELASTIC STAYS LONE STAR BLUNT DUAL LEAD 3550-1G

## (undated) DEVICE — SOL NACL 0.9% INJ 250ML BAG 2B1322Q

## (undated) DEVICE — GLOVE PROTEXIS W/NEU-THERA 7.5  2D73TE75

## (undated) DEVICE — SPONGE SURGIFOAM 100 1974

## (undated) DEVICE — LINEN TOWEL PACK X5 5464

## (undated) DEVICE — GLOVE ESTEEM POWDER FREE SMT 8.0  2D72PT80

## (undated) DEVICE — STPL SKIN 35W ROTATING HEAD PRW35

## (undated) DEVICE — DRAPE LAP TRANSVERSE 29421

## (undated) DEVICE — GOWN IMPERVIOUS ZONED LG

## (undated) DEVICE — GLOVE PROTEXIS BLUE W/NEU-THERA 8.0  2D73EB80

## (undated) DEVICE — SU DERMABOND MINI DHVM12

## (undated) DEVICE — CUP AND LID 2PK 2OZ STERILE  SSK9006A

## (undated) DEVICE — TOOL DISSECT MIDAS MR8 F2/7CM TAPER 2.3MM DI MR8-F2/7TA23

## (undated) DEVICE — SOL NACL 0.9% IRRIG 1000ML BOTTLE 07138-09

## (undated) DEVICE — PREP CHLORAPREP W/ORANGE TINT 10.5ML 260715

## (undated) DEVICE — DRAPE STERI TOWEL SM 1000

## (undated) DEVICE — DRAPE SHEET REV FOLD 3/4 9349

## (undated) DEVICE — JELLY LUBRICATING SURGILUBE 4OZ TUBE

## (undated) DEVICE — ESU ELEC BLADE 2.75" COATED/INSULATED E1455

## (undated) DEVICE — SPECIMEN CONTAINER 60MLW/10% FORMALIN 59601

## (undated) DEVICE — TOOL DISSECT MIDAS MR8 9CM MATCH HEAD 3MM DI MR8-9MH30

## (undated) DEVICE — SU SILK 0 FSL 18" 678H

## (undated) DEVICE — DRAPE MICROSCOPE LEICA 54X150" AR8033650

## (undated) DEVICE — DECANTER BAG 2002S

## (undated) DEVICE — SUCTION FRAZIER 12FR W/HANDLE K73

## (undated) DEVICE — PACK MINOR SBA15MIFSE

## (undated) RX ORDER — LIDOCAINE HYDROCHLORIDE AND EPINEPHRINE 10; 10 MG/ML; UG/ML
INJECTION, SOLUTION INFILTRATION; PERINEURAL
Status: DISPENSED
Start: 2021-01-01

## (undated) RX ORDER — CEFAZOLIN SODIUM 2 G/100ML
INJECTION, SOLUTION INTRAVENOUS
Status: DISPENSED
Start: 2021-01-01

## (undated) RX ORDER — PROPOFOL 10 MG/ML
INJECTION, EMULSION INTRAVENOUS
Status: DISPENSED
Start: 2019-05-21

## (undated) RX ORDER — GINSENG 100 MG
CAPSULE ORAL
Status: DISPENSED
Start: 2021-01-01

## (undated) RX ORDER — PROPOFOL 10 MG/ML
INJECTION, EMULSION INTRAVENOUS
Status: DISPENSED
Start: 2021-01-01

## (undated) RX ORDER — LIDOCAINE HYDROCHLORIDE 20 MG/ML
INJECTION, SOLUTION EPIDURAL; INFILTRATION; INTRACAUDAL; PERINEURAL
Status: DISPENSED
Start: 2018-09-14

## (undated) RX ORDER — ACETAMINOPHEN 325 MG/1
TABLET ORAL
Status: DISPENSED
Start: 2021-01-01

## (undated) RX ORDER — GLYCOPYRROLATE 0.2 MG/ML
INJECTION, SOLUTION INTRAMUSCULAR; INTRAVENOUS
Status: DISPENSED
Start: 2021-01-01

## (undated) RX ORDER — NALOXONE HYDROCHLORIDE 0.4 MG/ML
INJECTION, SOLUTION INTRAMUSCULAR; INTRAVENOUS; SUBCUTANEOUS
Status: DISPENSED
Start: 2018-08-28

## (undated) RX ORDER — LIDOCAINE HYDROCHLORIDE 10 MG/ML
INJECTION, SOLUTION INFILTRATION; PERINEURAL
Status: DISPENSED
Start: 2021-01-01

## (undated) RX ORDER — NALOXONE HYDROCHLORIDE 0.4 MG/ML
INJECTION, SOLUTION INTRAMUSCULAR; INTRAVENOUS; SUBCUTANEOUS
Status: DISPENSED
Start: 2018-08-31

## (undated) RX ORDER — FENTANYL CITRATE 50 UG/ML
INJECTION, SOLUTION INTRAMUSCULAR; INTRAVENOUS
Status: DISPENSED
Start: 2018-09-14

## (undated) RX ORDER — FENTANYL CITRATE 50 UG/ML
INJECTION, SOLUTION INTRAMUSCULAR; INTRAVENOUS
Status: DISPENSED
Start: 2018-08-28

## (undated) RX ORDER — ONDANSETRON 2 MG/ML
INJECTION INTRAMUSCULAR; INTRAVENOUS
Status: DISPENSED
Start: 2021-01-01

## (undated) RX ORDER — NEOSTIGMINE METHYLSULFATE 1 MG/ML
VIAL (ML) INJECTION
Status: DISPENSED
Start: 2021-01-01

## (undated) RX ORDER — ONDANSETRON 2 MG/ML
INJECTION INTRAMUSCULAR; INTRAVENOUS
Status: DISPENSED
Start: 2018-09-14

## (undated) RX ORDER — LIDOCAINE HYDROCHLORIDE 20 MG/ML
INJECTION, SOLUTION EPIDURAL; INFILTRATION; INTRACAUDAL; PERINEURAL
Status: DISPENSED
Start: 2021-01-01

## (undated) RX ORDER — FENTANYL CITRATE 50 UG/ML
INJECTION, SOLUTION INTRAMUSCULAR; INTRAVENOUS
Status: DISPENSED
Start: 2021-01-01

## (undated) RX ORDER — LABETALOL HYDROCHLORIDE 5 MG/ML
INJECTION, SOLUTION INTRAVENOUS
Status: DISPENSED
Start: 2021-01-01

## (undated) RX ORDER — LIDOCAINE HYDROCHLORIDE 10 MG/ML
INJECTION, SOLUTION INFILTRATION; PERINEURAL
Status: DISPENSED
Start: 2019-05-21

## (undated) RX ORDER — PROPOFOL 10 MG/ML
INJECTION, EMULSION INTRAVENOUS
Status: DISPENSED
Start: 2018-09-14

## (undated) RX ORDER — CEFAZOLIN SODIUM 1 G/3ML
INJECTION, POWDER, FOR SOLUTION INTRAMUSCULAR; INTRAVENOUS
Status: DISPENSED
Start: 2018-09-14

## (undated) RX ORDER — DEXAMETHASONE SODIUM PHOSPHATE 4 MG/ML
INJECTION, SOLUTION INTRA-ARTICULAR; INTRALESIONAL; INTRAMUSCULAR; INTRAVENOUS; SOFT TISSUE
Status: DISPENSED
Start: 2018-09-14

## (undated) RX ORDER — FLUMAZENIL 0.1 MG/ML
INJECTION, SOLUTION INTRAVENOUS
Status: DISPENSED
Start: 2018-08-28

## (undated) RX ORDER — HEPARIN SODIUM (PORCINE) LOCK FLUSH IV SOLN 100 UNIT/ML 100 UNIT/ML
SOLUTION INTRAVENOUS
Status: DISPENSED
Start: 2019-01-03

## (undated) RX ORDER — FENTANYL CITRATE 50 UG/ML
INJECTION, SOLUTION INTRAMUSCULAR; INTRAVENOUS
Status: DISPENSED
Start: 2019-05-21

## (undated) RX ORDER — FENTANYL CITRATE 0.05 MG/ML
INJECTION, SOLUTION INTRAMUSCULAR; INTRAVENOUS
Status: DISPENSED
Start: 2021-01-01

## (undated) RX ORDER — ALBUTEROL SULFATE 90 UG/1
AEROSOL, METERED RESPIRATORY (INHALATION)
Status: DISPENSED
Start: 2021-01-01

## (undated) RX ORDER — HEPARIN SODIUM (PORCINE) LOCK FLUSH IV SOLN 100 UNIT/ML 100 UNIT/ML
SOLUTION INTRAVENOUS
Status: DISPENSED
Start: 2021-01-01

## (undated) RX ORDER — ACETAMINOPHEN 160 MG
TABLET,DISINTEGRATING ORAL
Status: DISPENSED
Start: 2021-01-01

## (undated) RX ORDER — FENTANYL CITRATE 50 UG/ML
INJECTION, SOLUTION INTRAMUSCULAR; INTRAVENOUS
Status: DISPENSED
Start: 2018-08-31

## (undated) RX ORDER — FLUMAZENIL 0.1 MG/ML
INJECTION, SOLUTION INTRAVENOUS
Status: DISPENSED
Start: 2018-08-31